# Patient Record
Sex: FEMALE | Race: WHITE | URBAN - METROPOLITAN AREA
[De-identification: names, ages, dates, MRNs, and addresses within clinical notes are randomized per-mention and may not be internally consistent; named-entity substitution may affect disease eponyms.]

---

## 2017-06-05 ENCOUNTER — INPATIENT (INPATIENT)
Facility: HOSPITAL | Age: 82
LOS: 14 days | Discharge: HOME HEALTH | End: 2017-06-20
Attending: PHYSICAL MEDICINE & REHABILITATION

## 2017-06-05 DIAGNOSIS — J44.9 CHRONIC OBSTRUCTIVE PULMONARY DISEASE, UNSPECIFIED: ICD-10-CM

## 2017-06-05 DIAGNOSIS — E03.9 HYPOTHYROIDISM, UNSPECIFIED: ICD-10-CM

## 2017-06-05 DIAGNOSIS — R06.02 SHORTNESS OF BREATH: ICD-10-CM

## 2017-06-07 PROBLEM — Z00.00 ENCOUNTER FOR PREVENTIVE HEALTH EXAMINATION: Status: ACTIVE | Noted: 2017-06-07
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
## 2017-06-28 DIAGNOSIS — S22.43XD MULTIPLE FRACTURES OF RIBS, BILATERAL, SUBSEQUENT ENCOUNTER FOR FRACTURE WITH ROUTINE HEALING: ICD-10-CM

## 2017-06-28 DIAGNOSIS — S22.49XD MULTIPLE FRACTURES OF RIBS, UNSPECIFIED SIDE, SUBSEQUENT ENCOUNTER FOR FRACTURE WITH ROUTINE HEALING: ICD-10-CM

## 2017-06-28 DIAGNOSIS — S01.01XD LACERATION WITHOUT FOREIGN BODY OF SCALP, SUBSEQUENT ENCOUNTER: ICD-10-CM

## 2017-06-28 DIAGNOSIS — S32.009D UNSPECIFIED FRACTURE OF UNSPECIFIED LUMBAR VERTEBRA, SUBSEQUENT ENCOUNTER FOR FRACTURE WITH ROUTINE HEALING: ICD-10-CM

## 2017-06-28 DIAGNOSIS — Z90.710 ACQUIRED ABSENCE OF BOTH CERVIX AND UTERUS: ICD-10-CM

## 2017-06-28 DIAGNOSIS — B35.1 TINEA UNGUIUM: ICD-10-CM

## 2017-06-28 DIAGNOSIS — Z87.01 PERSONAL HISTORY OF PNEUMONIA (RECURRENT): ICD-10-CM

## 2017-06-28 DIAGNOSIS — S42.002D FRACTURE OF UNSPECIFIED PART OF LEFT CLAVICLE, SUBSEQUENT ENCOUNTER FOR FRACTURE WITH ROUTINE HEALING: ICD-10-CM

## 2017-06-28 DIAGNOSIS — Z96.0 PRESENCE OF UROGENITAL IMPLANTS: ICD-10-CM

## 2017-06-28 DIAGNOSIS — J96.12 CHRONIC RESPIRATORY FAILURE WITH HYPERCAPNIA: ICD-10-CM

## 2017-06-28 DIAGNOSIS — I82.4Z3 ACUTE EMBOLISM AND THROMBOSIS OF UNSPECIFIED DEEP VEINS OF DISTAL LOWER EXTREMITY, BILATERAL: ICD-10-CM

## 2017-06-28 DIAGNOSIS — J44.9 CHRONIC OBSTRUCTIVE PULMONARY DISEASE, UNSPECIFIED: ICD-10-CM

## 2017-06-28 DIAGNOSIS — I50.9 HEART FAILURE, UNSPECIFIED: ICD-10-CM

## 2017-06-28 DIAGNOSIS — Z51.89 ENCOUNTER FOR OTHER SPECIFIED AFTERCARE: ICD-10-CM

## 2017-06-28 DIAGNOSIS — S00.03XD CONTUSION OF SCALP, SUBSEQUENT ENCOUNTER: ICD-10-CM

## 2017-06-28 DIAGNOSIS — E03.9 HYPOTHYROIDISM, UNSPECIFIED: ICD-10-CM

## 2017-06-28 DIAGNOSIS — Z99.81 DEPENDENCE ON SUPPLEMENTAL OXYGEN: ICD-10-CM

## 2017-06-28 DIAGNOSIS — B95.62 METHICILLIN RESISTANT STAPHYLOCOCCUS AUREUS INFECTION AS THE CAUSE OF DISEASES CLASSIFIED ELSEWHERE: ICD-10-CM

## 2017-06-29 DIAGNOSIS — M85.9 DISORDER OF BONE DENSITY AND STRUCTURE, UNSPECIFIED: ICD-10-CM

## 2017-06-29 DIAGNOSIS — S22.059D UNSPECIFIED FRACTURE OF T5-T6 VERTEBRA, SUBSEQUENT ENCOUNTER FOR FRACTURE WITH ROUTINE HEALING: ICD-10-CM

## 2017-06-29 DIAGNOSIS — S22.052D: ICD-10-CM

## 2017-06-29 DIAGNOSIS — S22.051D: ICD-10-CM

## 2017-09-13 ENCOUNTER — OUTPATIENT (OUTPATIENT)
Dept: OUTPATIENT SERVICES | Facility: HOSPITAL | Age: 82
LOS: 1 days | Discharge: HOME | End: 2017-09-13

## 2017-09-13 DIAGNOSIS — J44.9 CHRONIC OBSTRUCTIVE PULMONARY DISEASE, UNSPECIFIED: ICD-10-CM

## 2017-09-13 DIAGNOSIS — E03.9 HYPOTHYROIDISM, UNSPECIFIED: ICD-10-CM

## 2017-09-13 DIAGNOSIS — R06.02 SHORTNESS OF BREATH: ICD-10-CM

## 2017-09-13 DIAGNOSIS — R13.10 DYSPHAGIA, UNSPECIFIED: ICD-10-CM

## 2019-03-08 ENCOUNTER — OUTPATIENT (OUTPATIENT)
Dept: OUTPATIENT SERVICES | Facility: HOSPITAL | Age: 84
LOS: 1 days | Discharge: HOME | End: 2019-03-08

## 2019-03-08 DIAGNOSIS — R91.8 OTHER NONSPECIFIC ABNORMAL FINDING OF LUNG FIELD: ICD-10-CM

## 2019-08-23 ENCOUNTER — OUTPATIENT (OUTPATIENT)
Dept: OUTPATIENT SERVICES | Facility: HOSPITAL | Age: 84
LOS: 1 days | Discharge: HOME | End: 2019-08-23
Payer: MEDICARE

## 2019-08-23 DIAGNOSIS — J44.9 CHRONIC OBSTRUCTIVE PULMONARY DISEASE, UNSPECIFIED: ICD-10-CM

## 2019-08-23 PROCEDURE — 71046 X-RAY EXAM CHEST 2 VIEWS: CPT | Mod: 26

## 2020-08-12 ENCOUNTER — INPATIENT (INPATIENT)
Facility: HOSPITAL | Age: 85
LOS: 4 days | Discharge: NURSING, REHAB OR LONG-TERM CARE | End: 2020-08-17
Attending: INTERNAL MEDICINE | Admitting: INTERNAL MEDICINE
Payer: MEDICARE

## 2020-08-12 VITALS
SYSTOLIC BLOOD PRESSURE: 189 MMHG | OXYGEN SATURATION: 95 % | HEART RATE: 92 BPM | DIASTOLIC BLOOD PRESSURE: 76 MMHG | TEMPERATURE: 99 F | RESPIRATION RATE: 18 BRPM

## 2020-08-12 LAB
ALBUMIN SERPL ELPH-MCNC: 4.3 G/DL — SIGNIFICANT CHANGE UP (ref 3.5–5.2)
ALP SERPL-CCNC: 144 U/L — HIGH (ref 30–115)
ALT FLD-CCNC: 80 U/L — HIGH (ref 0–41)
ANION GAP SERPL CALC-SCNC: 12 MMOL/L — SIGNIFICANT CHANGE UP (ref 7–14)
APTT BLD: 26 SEC — LOW (ref 27–39.2)
AST SERPL-CCNC: 103 U/L — HIGH (ref 0–41)
BASOPHILS # BLD AUTO: 0.04 K/UL — SIGNIFICANT CHANGE UP (ref 0–0.2)
BASOPHILS NFR BLD AUTO: 0.7 % — SIGNIFICANT CHANGE UP (ref 0–1)
BILIRUB SERPL-MCNC: 0.3 MG/DL — SIGNIFICANT CHANGE UP (ref 0.2–1.2)
BLD GP AB SCN SERPL QL: SIGNIFICANT CHANGE UP
BUN SERPL-MCNC: 25 MG/DL — HIGH (ref 10–20)
CALCIUM SERPL-MCNC: 9.8 MG/DL — SIGNIFICANT CHANGE UP (ref 8.5–10.1)
CHLORIDE SERPL-SCNC: 98 MMOL/L — SIGNIFICANT CHANGE UP (ref 98–110)
CK SERPL-CCNC: 208 U/L — SIGNIFICANT CHANGE UP (ref 0–225)
CO2 SERPL-SCNC: 30 MMOL/L — SIGNIFICANT CHANGE UP (ref 17–32)
CREAT SERPL-MCNC: 0.9 MG/DL — SIGNIFICANT CHANGE UP (ref 0.7–1.5)
EOSINOPHIL # BLD AUTO: 0.45 K/UL — SIGNIFICANT CHANGE UP (ref 0–0.7)
EOSINOPHIL NFR BLD AUTO: 7.4 % — SIGNIFICANT CHANGE UP (ref 0–8)
ETHANOL SERPL-MCNC: <10 MG/DL — SIGNIFICANT CHANGE UP
GLUCOSE BLDC GLUCOMTR-MCNC: 136 MG/DL — HIGH (ref 70–99)
GLUCOSE SERPL-MCNC: 94 MG/DL — SIGNIFICANT CHANGE UP (ref 70–99)
HCT VFR BLD CALC: 35.5 % — LOW (ref 37–47)
HGB BLD-MCNC: 10.7 G/DL — LOW (ref 12–16)
IMM GRANULOCYTES NFR BLD AUTO: 0.5 % — HIGH (ref 0.1–0.3)
INR BLD: 0.97 RATIO — SIGNIFICANT CHANGE UP (ref 0.65–1.3)
LACTATE SERPL-SCNC: 1.8 MMOL/L — SIGNIFICANT CHANGE UP (ref 0.7–2)
LIDOCAIN IGE QN: 30 U/L — SIGNIFICANT CHANGE UP (ref 7–60)
LYMPHOCYTES # BLD AUTO: 2.13 K/UL — SIGNIFICANT CHANGE UP (ref 1.2–3.4)
LYMPHOCYTES # BLD AUTO: 35.2 % — SIGNIFICANT CHANGE UP (ref 20.5–51.1)
MCHC RBC-ENTMCNC: 30.1 G/DL — LOW (ref 32–37)
MCHC RBC-ENTMCNC: 30.6 PG — SIGNIFICANT CHANGE UP (ref 27–31)
MCV RBC AUTO: 101.4 FL — HIGH (ref 81–99)
MONOCYTES # BLD AUTO: 0.68 K/UL — HIGH (ref 0.1–0.6)
MONOCYTES NFR BLD AUTO: 11.2 % — HIGH (ref 1.7–9.3)
NEUTROPHILS # BLD AUTO: 2.72 K/UL — SIGNIFICANT CHANGE UP (ref 1.4–6.5)
NEUTROPHILS NFR BLD AUTO: 45 % — SIGNIFICANT CHANGE UP (ref 42.2–75.2)
NRBC # BLD: 0 /100 WBCS — SIGNIFICANT CHANGE UP (ref 0–0)
PLATELET # BLD AUTO: 162 K/UL — SIGNIFICANT CHANGE UP (ref 130–400)
POTASSIUM SERPL-MCNC: 5.3 MMOL/L — HIGH (ref 3.5–5)
POTASSIUM SERPL-SCNC: 5.3 MMOL/L — HIGH (ref 3.5–5)
PROT SERPL-MCNC: 6.6 G/DL — SIGNIFICANT CHANGE UP (ref 6–8)
PROTHROM AB SERPL-ACNC: 11.2 SEC — SIGNIFICANT CHANGE UP (ref 9.95–12.87)
RBC # BLD: 3.5 M/UL — LOW (ref 4.2–5.4)
RBC # FLD: 13.8 % — SIGNIFICANT CHANGE UP (ref 11.5–14.5)
SODIUM SERPL-SCNC: 140 MMOL/L — SIGNIFICANT CHANGE UP (ref 135–146)
WBC # BLD: 6.05 K/UL — SIGNIFICANT CHANGE UP (ref 4.8–10.8)
WBC # FLD AUTO: 6.05 K/UL — SIGNIFICANT CHANGE UP (ref 4.8–10.8)

## 2020-08-12 PROCEDURE — 71045 X-RAY EXAM CHEST 1 VIEW: CPT | Mod: 26

## 2020-08-12 PROCEDURE — 93010 ELECTROCARDIOGRAM REPORT: CPT

## 2020-08-12 PROCEDURE — 74177 CT ABD & PELVIS W/CONTRAST: CPT | Mod: 26

## 2020-08-12 PROCEDURE — 73562 X-RAY EXAM OF KNEE 3: CPT | Mod: 26,LT

## 2020-08-12 PROCEDURE — 71260 CT THORAX DX C+: CPT | Mod: 26

## 2020-08-12 PROCEDURE — 73552 X-RAY EXAM OF FEMUR 2/>: CPT | Mod: 26,LT

## 2020-08-12 PROCEDURE — 99285 EMERGENCY DEPT VISIT HI MDM: CPT | Mod: CS

## 2020-08-12 PROCEDURE — 73502 X-RAY EXAM HIP UNI 2-3 VIEWS: CPT | Mod: 26,LT

## 2020-08-12 RX ORDER — PANTOPRAZOLE SODIUM 20 MG/1
40 TABLET, DELAYED RELEASE ORAL
Refills: 0 | Status: DISCONTINUED | OUTPATIENT
Start: 2020-08-12 | End: 2020-08-13

## 2020-08-12 RX ORDER — SENNA PLUS 8.6 MG/1
2 TABLET ORAL AT BEDTIME
Refills: 0 | Status: DISCONTINUED | OUTPATIENT
Start: 2020-08-12 | End: 2020-08-13

## 2020-08-12 RX ORDER — ACETAMINOPHEN 500 MG
650 TABLET ORAL ONCE
Refills: 0 | Status: COMPLETED | OUTPATIENT
Start: 2020-08-12 | End: 2020-08-12

## 2020-08-12 RX ORDER — SODIUM CHLORIDE 9 MG/ML
1000 INJECTION, SOLUTION INTRAVENOUS
Refills: 0 | Status: DISCONTINUED | OUTPATIENT
Start: 2020-08-12 | End: 2020-08-13

## 2020-08-12 RX ORDER — MORPHINE SULFATE 50 MG/1
4 CAPSULE, EXTENDED RELEASE ORAL ONCE
Refills: 0 | Status: DISCONTINUED | OUTPATIENT
Start: 2020-08-12 | End: 2020-08-12

## 2020-08-12 RX ORDER — HEPARIN SODIUM 5000 [USP'U]/ML
5000 INJECTION INTRAVENOUS; SUBCUTANEOUS EVERY 8 HOURS
Refills: 0 | Status: DISCONTINUED | OUTPATIENT
Start: 2020-08-12 | End: 2020-08-13

## 2020-08-12 RX ORDER — SODIUM CHLORIDE 9 MG/ML
500 INJECTION INTRAMUSCULAR; INTRAVENOUS; SUBCUTANEOUS ONCE
Refills: 0 | Status: COMPLETED | OUTPATIENT
Start: 2020-08-12 | End: 2020-08-12

## 2020-08-12 RX ORDER — ONDANSETRON 8 MG/1
4 TABLET, FILM COATED ORAL ONCE
Refills: 0 | Status: COMPLETED | OUTPATIENT
Start: 2020-08-12 | End: 2020-08-12

## 2020-08-12 RX ORDER — ACETAMINOPHEN 500 MG
650 TABLET ORAL EVERY 6 HOURS
Refills: 0 | Status: DISCONTINUED | OUTPATIENT
Start: 2020-08-12 | End: 2020-08-13

## 2020-08-12 RX ADMIN — HEPARIN SODIUM 5000 UNIT(S): 5000 INJECTION INTRAVENOUS; SUBCUTANEOUS at 23:05

## 2020-08-12 RX ADMIN — SODIUM CHLORIDE 500 MILLILITER(S): 9 INJECTION INTRAMUSCULAR; INTRAVENOUS; SUBCUTANEOUS at 14:58

## 2020-08-12 RX ADMIN — MORPHINE SULFATE 4 MILLIGRAM(S): 50 CAPSULE, EXTENDED RELEASE ORAL at 20:10

## 2020-08-12 RX ADMIN — MORPHINE SULFATE 4 MILLIGRAM(S): 50 CAPSULE, EXTENDED RELEASE ORAL at 18:40

## 2020-08-12 RX ADMIN — SODIUM CHLORIDE 75 MILLILITER(S): 9 INJECTION, SOLUTION INTRAVENOUS at 23:05

## 2020-08-12 RX ADMIN — Medication 650 MILLIGRAM(S): at 14:58

## 2020-08-12 RX ADMIN — SODIUM CHLORIDE 500 MILLILITER(S): 9 INJECTION INTRAMUSCULAR; INTRAVENOUS; SUBCUTANEOUS at 15:30

## 2020-08-12 RX ADMIN — MORPHINE SULFATE 4 MILLIGRAM(S): 50 CAPSULE, EXTENDED RELEASE ORAL at 18:55

## 2020-08-12 RX ADMIN — ONDANSETRON 4 MILLIGRAM(S): 8 TABLET, FILM COATED ORAL at 20:09

## 2020-08-12 RX ADMIN — Medication 650 MILLIGRAM(S): at 15:30

## 2020-08-12 NOTE — CONSULT NOTE ADULT - SUBJECTIVE AND OBJECTIVE BOX
TRAUMA SERVICE CONSULT NOTE  --------------------------------------------------------------------------------------------    TRAUMA ACTIVATION LEVEL: CONSULT    MECHANISM OF INJURY:      [X] Blunt  	[] MVC	[X] Fall	[] Pedestrian Struck	[] Motorcycle accident      [] Penetrating  	[] Gun Shot Wound 		[] Stab Wound    GCS: 15	E: 4	V: 5	M: 6    HPI: 89F DNR/DNI PMH COPD on 3-4L NC, hypothyroidism, lives at home with her son, ADLs intact presenting to the ED s/p mechanical fall, -HT, -LOC, -AC. Per the patient she had gotten up to check on her albuterol treatment, started to sit back down on her recliner but missed, falling on her left side. She denies LOC, HT. She was unable to ambulate after the fall and complains of L hip pain. No obvious external signs of trauma on initial examination.     Primary Survey:   A - airway intact  B - bilateral breath sounds and good chest rise  C - initial BP  BP: 189/76 (08-12-20 @ 14:16) , HR HR: 92 (08-12-20 @ 14:16), palpable pulses in all extremities  D - GCS 15 on arrival  Exposure obtained      General: NAD  HEENT: Normocephalic, atraumatic, EOMI, PEERLA.  Neck: Soft, midline trachea.  Chest: No chest wall tenderness.   Cardiac: S1, S2, RRR  Respiratory: Bilateral breath sounds, clear and equal bilaterally  Abdomen: Soft, non-distended, non-tender, no rebound, no guarding, no masses palpated  Groin: Normal appearing  Ext: palp radial b/l UE, b/l DP palp in Lower Extrem, motor and sensory grossly intact in all 4 extremities, L hip tenderness  Back: no TTP, no palpable runoff/stepoff/deformity    Patient denies fevers/chills, denies lightheadedness/dizziness, denies SOB/chest pain, denies nausea/vomiting, denies constipation/diarrhea    ROS: 10-system review is otherwise negative except HPI above.      PAST MEDICAL & SURGICAL HISTORY:  Hypothyroid  COPD (chronic obstructive pulmonary disease)    FAMILY HISTORY:    [] Family history not pertinent as reviewed with the patient and family    SOCIAL HISTORY:  ***    ALLERGIES: No Known Allergies      HOME MEDICATIONS: ***    CURRENT MEDICATIONS  MEDICATIONS (STANDING):   MEDICATIONS (PRN):  --------------------------------------------------------------------------------------------    Vitals:   T(C): 37 (08-12-20 @ 14:16), Max: 37 (08-12-20 @ 14:16)  HR: 92 (08-12-20 @ 14:16) (92 - 92)  BP: 189/76 (08-12-20 @ 14:16) (189/76 - 189/76)  RR: 18 (08-12-20 @ 14:16) (18 - 18)  SpO2: 95% (08-12-20 @ 14:16) (95% - 95%)  CAPILLARY BLOOD GLUCOSE      --------------------------------------------------------------------------------------------    LABS  CBC (08-12 @ 14:32)                              10.7<L>                         6.05    )----------------(  162        45.0  % Neutrophils, 35.2  % Lymphocytes, ANC: 2.72                                35.5<L>    BMP (08-12 @ 14:32)             140     |  98      |  25<H> 		Ca++ --      Ca 9.8                ---------------------------------( 94    		Mg --                 5.3<H>  |  30      |  0.9   			Ph --        LFTs (08-12 @ 14:32)      TPro 6.6 / Alb 4.3 / TBili 0.3 / DBili -- / <H> / ALT 80<H> / AlkPhos 144<H>    Coags (08-12 @ 14:32)  aPTT 26.0<L> / INR 0.97 / PT 11.20    Cardiac Markers (08-12 @ 14:32)     HSTrop: -- / CKMB: -- / CK: 208    ABG (08-12 @ 14:32)      /  /  /  /  / %     Lactate:  1.8    --------------------------------------------------------------------------------------------    IMAGING  < from: Xray Chest 1 View-PORTABLE IMMEDIATE (08.12.20 @ 15:07) >  Impression:    No radiographic evidence of acute cardiopulmonary disease.    < end of copied text >  < from: Xray Knee 3 Views, Left (08.12.20 @ 15:07) >  Impression:  No fracture or dislocation is seen.    < end of copied text >      --------------------------------------------------------------------------------------------

## 2020-08-12 NOTE — ED PROVIDER NOTE - NS ED ROS FT
Review of Systems         Constitutional: (-) fever (-) chills (-) weakness       EENT: (-) visual changes (-) sore throat (-) congestion       Cardiovascular: (-) chest pain (-) syncope       Respiratory: (-) cough, (-) shortness of breath       Gastrointestinal: (-) abdominal pain (-) vomiting (-) diarrhea (-) nausea (-) constipation       Genitourinary: (-) dysuria (-) frequency (-) hematuria       Musculoskeletal: (-) neck pain (-) back pain (+) joint pain       Integumentary: (-) rash       Neurological: (-) headache (-) altered mental status (-) dizziness (-) paresthesias       Psych: (-) psych history

## 2020-08-12 NOTE — CONSULT NOTE ADULT - SUBJECTIVE AND OBJECTIVE BOX
SICU Consultation Note  ======================================================================================================  TANNA MCCRACKEN  [id number]    89y Female w/ PMHx of COPD on 3-4L of O2 at home, hypothyroidism, lives at home w/ her son (Andrzej), ADLs intact. Patient presents to the ED today s/p mechanical fall, - HT, -LOC, -AC. As per the patient, she got up out of her recliner to check on her albuterol treatment, went back to sit in her recliner and missed falling onto her L side. Patient was unable to ambulate after fall. Patient denies numbness/tingling or radiating pain. LLE shortened and externally rotated. Further imaging showed R posterior 10-11 rib fx and a age indeterminant T5 vertebral body compression fx.       Imaging:   CT Chest w/ IV Cont (08.12.20 @ 18:58)   IMPRESSION:  1.  Acute left sided comminuted intertrochanteric fracture with moderate surrounding hematoma.  2.  Acute right posterior 10th and 11th ribfractures.  3.  Age indeterminant T5 vertebral body compression deformity.  4.  Additional findings as detailed above.        PMH  PAST MEDICAL & SURGICAL HISTORY:  Hypothyroid  COPD (chronic obstructive pulmonary disease)      Allergies   No Known Allergies    Current Medications:   acetaminophen   Tablet .. 650 milliGRAM(s) Oral every 6 hours PRN Mild Pain (1 - 3)  heparin   Injectable 5000 Unit(s) SubCutaneous every 8 hours  lactated ringers. 1000 milliLiter(s) (75 mL/Hr) IV Continuous <Continuous>  pantoprazole    Tablet 40 milliGRAM(s) Oral before breakfast  senna 2 Tablet(s) Oral at bedtime      Advanced Directives: DNR/DNI     ICU Vital Signs Last 24 Hrs  T(F): 98.6 Max: 98.6   HR: 85  BP: 140/74   RR: 18   SpO2: 95%     Physical Exam:   ---------------------------------------------------------------------------------------  NEURO:   Exam: A&Ox3, no focal deficits    RESPIRATORY:  Lungs clear to auscultation b/l, Normal expansion/effort    CARDIOVASCULAR:   S1/S2.  RRR  No peripheral edema    GASTROINTESTINAL:  Abdomen soft, non-tender, non-distended      MUSCULOSKELETAL:  Extremities warm, pink, well-perfused. LLE shortened, externally rotated     DERM:  No skin breakdown     :   Exam: Tobar catheter in place.       Tubes/Lines/Drains   ----------------------------------------------------------------------------------------------------------  [x] Peripheral IV  [X] Urinary Catheter Tobar SICU Consultation Note  ======================================================================================================  TANNA MCCRACKEN  [id number]    89y Female w/ PMHx of COPD on 3-4L of O2 at home, hypothyroidism, lives at home w/ her son (Andrzej), ADLs intact. Patient presents to the ED today s/p mechanical fall, - HT, -LOC, -AC. As per the patient, she got up out of her recliner to check on her albuterol treatment, went back to sit in her recliner and missed falling onto her L side. Patient was unable to ambulate after fall. Patient denies numbness/tingling or radiating pain. LLE shortened and externally rotated. Further imaging showed R posterior 10-11 rib fx and a age indeterminant T5 vertebral body compression fx.       Imaging:   CT Chest w/ IV Cont (08.12.20 @ 18:58)   IMPRESSION:  1.  Acute left sided comminuted intertrochanteric fracture with moderate surrounding hematoma.  2.  Acute right posterior 10th and 11th ribfractures.  3.  Age indeterminant T5 vertebral body compression deformity.  4.  Additional findings as detailed above.        PMH  PAST MEDICAL & SURGICAL HISTORY:  Hypothyroid  COPD (chronic obstructive pulmonary disease)      Allergies   No Known Allergies    Current Medications:   acetaminophen   Tablet .. 650 milliGRAM(s) Oral every 6 hours PRN Mild Pain (1 - 3)  heparin   Injectable 5000 Unit(s) SubCutaneous every 8 hours  lactated ringers. 1000 milliLiter(s) (75 mL/Hr) IV Continuous <Continuous>  pantoprazole    Tablet 40 milliGRAM(s) Oral before breakfast  senna 2 Tablet(s) Oral at bedtime      Advanced Directives: Full code     ICU Vital Signs Last 24 Hrs  T(F): 98.6 Max: 98.6   HR: 85  BP: 140/74   RR: 18   SpO2: 95%     Physical Exam:   ---------------------------------------------------------------------------------------  NEURO:   Exam: A&Ox3, no focal deficits    RESPIRATORY:  Lungs clear to auscultation b/l, Normal expansion/effort    CARDIOVASCULAR:   S1/S2.  RRR  No peripheral edema    GASTROINTESTINAL:  Abdomen soft, non-tender, non-distended      MUSCULOSKELETAL:  Extremities warm, pink, well-perfused. LLE shortened, externally rotated     DERM:  No skin breakdown     :   Exam: Tobar catheter in place.       Tubes/Lines/Drains   ----------------------------------------------------------------------------------------------------------  [x] Peripheral IV  [X] Urinary Catheter Tobar

## 2020-08-12 NOTE — ED PROVIDER NOTE - ATTENDING CONTRIBUTION TO CARE
89 yr old f w/ a pmh significant for COPD and hypothyrodism who presents today s/p fall. As per pt she was attempting to sit down on a chair, when she fell and landed on her L hip pain. Denies any other complaints.     Review of Systems    Constitutional: (-) fever  Cardiovascular: (-) chest pain, (-) syncope  Respiratory: (-) cough, (-) shortness of breath  Gastrointestinal: (-) vomiting, (-) diarrhea, (-) abdominal pain  Musculoskeletal: (-) neck pain, (-) back pain, (-) joint pain  Integumentary: (-) rash, (-) edema  Neurological: (-) headache, (-) altered mental status    Except as documented in the HPI, all other systems are negative.    VITAL SIGNS: I have reviewed nursing notes and confirm.  CONSTITUTIONAL: non-toxic, well appearing  SKIN: no rash, no petechiae.  EYES: PERRL, EOMI, pink conjunctiva, anicteric  ENT: tongue midline, no exudates, MMM  NECK: Supple; no meningismus, no JVD  CARD: RRR, no murmurs, equal radial pulses bilaterally 2+  RESP: CTAB, no respiratory distress  ABD: Soft, non-tender, non-distended, no peritoneal signs, no HSM, no CVA tenderness  EXT: unable to range L hip or knee. pulses DP +2 bilaterally. sensation noted bilaterally.   NEURO: Alert, oriented. CN2-12 intact, equal strength bilaterally, nl gait.  PSYCH: Cooperative, appropriate.      a/p  89 yr old f that presents s/p fall concern for L hip fracture  -ortho and trauma aware  -labs  -imaging  -consider admission

## 2020-08-12 NOTE — ED PROVIDER NOTE - PHYSICAL EXAMINATION
Physical Exam    Vital Signs: I have reviewed the initial vital signs  Constitutional: well-nourished, appears stated age, no acute distress  EENT: Conjunctiva pink, Sclera clear, PERRLA, EOMI. Mucous membranes moist, no exudates or lesions noted, uvula midline.  Cardiovascular: S1 and S2 present, regular rate, regular rhythm. Well perfused extremities, no peripheral edema  Respiratory: unlabored respiratory effort, clear to auscultation bilaterally no wheezing, rales or rhonchi  Gastrointestinal: soft, non-tender abdomen. No guarding or rebound tenderness  Musculoskeletal: supple nontender neck, no midline tenderness. Left DP 2 +, sensation intact, guarded. no lrom in left hip and knee. from in left ankle. no ttp with rib compression   Integumentary: warm, dry, no rash  Neurologic: A & O x 3, CN II-XII grossly intact, all extremities’ motor and sensory functions grossly intact  Psychiatric: appropriate mood, appropriate affect

## 2020-08-12 NOTE — ED ADULT NURSE NOTE - OBJECTIVE STATEMENT
Patient c/o fall this afternoon at home. denies LOC, denies AC, denies head injury. Patient c/o left hip pain. On assessment left hip externally rotated, +pulses present.

## 2020-08-12 NOTE — H&P ADULT - ASSESSMENT
89F DNR/DNI PMH COPD on 3-4L NC, hypothyroidism, lives at home with her son, ADLs intact presenting to the ED s/p mechanical fall, -HT, -LOC, -AC.    Plan:   Admit to trauma  SICU consult   NPO   IVF  Ortho consult, weight bearing status, possible operative intervention   PT/Rehab consult

## 2020-08-12 NOTE — CONSULT NOTE ADULT - ASSESSMENT
ASSESSMENT: 89F DNR/DNI PMH COPD on 3-4L NC, hypothyroidism, lives at home with her son, ADLs intact presenting to the ED s/p mechanical fall, -HT, -LOC, -AC.    PLAN:  ***  - CT CAP  - will follow  -   -   - Patient seen/examined with attending.  - Plan to be discussed with Attending,

## 2020-08-12 NOTE — H&P ADULT - NSHPPHYSICALEXAM_GEN_ALL_CORE
General: NAD  HEENT: Normocephalic, atraumatic, EOMI, PEERLA.  Neck: Soft, midline trachea.  Chest: No chest wall tenderness.   Cardiac: S1, S2, RRR  Respiratory: Bilateral breath sounds, clear and equal bilaterally  Abdomen: Soft, non-distended, non-tender, no rebound, no guarding, no masses palpated  Groin: Normal appearing  Ext: palp radial b/l UE, b/l DP palp in Lower Extrem, motor and sensory grossly intact in all 4 extremities, L hip tenderness  Back: no TTP, no palpable runoff/stepoff/deformity

## 2020-08-12 NOTE — ED PROVIDER NOTE - OBJECTIVE STATEMENT
89 year old female hx copd on 3 l, hypothyroidism p/w fall. PAtient sliopped off chair and fell onto left hip, unable to ambulate afterwards. Left hip pain severe, constant, no pall/prov factors, unchanged. No numbness, weakness, paresthesias. No head trauma, loc, bloodthinners. No chest pain, abd pain, shortness of breath, dysuria, hematuria.

## 2020-08-12 NOTE — ED PROVIDER NOTE - CARE PLAN
Principal Discharge DX:	Hip fracture, left, closed, initial encounter  Secondary Diagnosis:	Rib fractures  Secondary Diagnosis:	Thoracic compression fracture, closed, initial encounter  Secondary Diagnosis:	Fall

## 2020-08-12 NOTE — ED PROVIDER NOTE - CLINICAL SUMMARY MEDICAL DECISION MAKING FREE TEXT BOX
Authored by Dr. Gala Hahn: pt s/o to me by Dr. Phoenix - 89f h/o copd on home O2, hypothyroidism presenting s/p fall, trauma alert was activated on arrival pt s/o to me pending imaging and consultant recommendations - imaging showing R hip & rib fxs, age indeterminant thoracic compression fx - ortho consulted, no plans for surgical intvn @ this time - admitted to Trauma Dr. Stearns for further mgmt Authored by Dr. Gala Hahn: pt s/o to me by Dr. Phoenix - 89f h/o copd on home O2, hypothyroidism presenting s/p mechanical fall (missed seat when trying to sit back down into recliner, no head trauma/loc), trauma alert was activated on arrival, pt s/o to me pending imaging and consultant recommendations - imaging showing R hip & rib fxs, age indeterminant thoracic compression fx - ortho consulted, no plans for surgical intvn @ this time - admitted to Trauma Dr. Stearns for further mgmt

## 2020-08-12 NOTE — CONSULT NOTE ADULT - ASSESSMENT
Assessment & Plan    89y Female  s/p     NEURO:    Pain-controlled with Tylenol     Monitor for changes in mental status     RESP:     R posterior 10-11 rib fractures. Encourage IS pulling 500cc     Hx of COPD. ON 3-4L NC at home. Accepting O2 sats >88%     AM CXR       CARDS:     Maintain normotension. MAP >65     Troponin x3 pending     EKG NSR        GI/NUTR:     Diet, NPO for OR tomorrow     GI Prophylaxis- Protonix     Bowel regimen- Senna     /RENAL:     Tobar in place. Monitor I&Os.     Monitor lytes, replete as needed     Trend BUN/Cr 25/0.9    LR @ 75cc/hr     HEME/ONC:     DVT prophylaxis- HSQ     Trend H/H     Type and Screen     2 units of PRBC on hold for OR tomorrow     ID:    No dx. Afebrile. Trend WBC         ENDO:    Hx of Hypothyroidism.     FS Q 6 hours while NPO     MSK:     L intertrochanteric hip fracture. OR tomorrow with Ortho     Hospitalist consulted at Ortho request for risk stratification     NWB LLE     LINES/DRAINS:  Ekaterina JOSEPH     Have to call tanja Donnelly to clarify medication list and DNR/DNI status **     DISPO:    CEU/Step Down Unit     D/W Dr Torres

## 2020-08-12 NOTE — ED PROVIDER NOTE - PROGRESS NOTE DETAILS
spoke with ortho and trauma- recs for ct a/p and chest seen by ortho, no plans for surgical intvn @ this time - d/w trauma, admit to Dr. Lund's svc

## 2020-08-12 NOTE — ED ADULT TRIAGE NOTE - CHIEF COMPLAINT QUOTE
S/p fall while trying to sit in recliner. Denies head trauma. C/o left groin pain. Pt takes aspirin.

## 2020-08-12 NOTE — H&P ADULT - HISTORY OF PRESENT ILLNESS
89F DNR/DNI PMH COPD on 3-4L NC, hypothyroidism, lives at home with her son, ADLs intact presenting to the ED s/p mechanical fall, -HT, -LOC, -AC. Per the patient she had gotten up to check on her albuterol treatment, started to sit back down on her recliner but missed, falling on her left side. She denies LOC, HT. She was unable to ambulate after the fall and complains of L hip pain. No obvious external signs of trauma on initial examination.

## 2020-08-12 NOTE — H&P ADULT - NSHPLABSRESULTS_GEN_ALL_CORE
LABS:                         10.7   6.05  )-----------( 162      ( 12 Aug 2020 14:32 )             35.5     08-12    140  |  98  |  25<H>  ----------------------------<  94  5.3<H>   |  30  |  0.9    Ca    9.8      12 Aug 2020 14:32    TPro  6.6  /  Alb  4.3  /  TBili  0.3  /  DBili  x   /  AST  103<H>  /  ALT  80<H>  /  AlkPhos  144<H>  08-12    PT/INR - ( 12 Aug 2020 14:32 )   PT: 11.20 sec;   INR: 0.97 ratio    PTT - ( 12 Aug 2020 14:32 )  PTT:26.0 sec      Lactate, Blood: 1.8 mmol/L (08-12 @ 14:32)      RADIOLOGY, EKG & ADDITIONAL TESTS: Reviewed.   < from: Xray Hip 2-3 Views, Left (08.12.20 @ 15:07) >    IMPRESSION:  Acute left intertrochanteric fracture.  < end of copied text >    < from: CT Chest w/ IV Cont (08.12.20 @ 18:58) >    IMPRESSION:  1.  Acute left sided comminuted intertrochanteric fracture with moderate surrounding hematoma.  2.  Acute right posterior 10th and 11th ribfractures.  3.  Age indeterminant T5 vertebral body compression deformity.  4.  Additional findings as detailed above.    < end of copied text >

## 2020-08-12 NOTE — CONSULT NOTE ADULT - SUBJECTIVE AND OBJECTIVE BOX
Orthopaedics Consult Note    TANNA MCCRACKEN  [id number]    Ph:   (Andrzej, son)      89F presenting after mechanical fall when trying to sit in her recliner earlier today. Reported immediate L hip pain and inability to ambulate.  Imaging shows Left intertrochanteric femur fx.   Baseline independent ambulator without walking assistance.  No head trauma/LOC.  Denies pain or injury elsewhere. Denies numbness/tingling/radiating pain.      PMH/PSH  COPD, hypothyroid, HTN, HLD      Medications      Allergies  No Known Allergies        T(C): 37 (08-12-20 @ 14:16), Max: 37 (08-12-20 @ 14:16)  HR: 92 (08-12-20 @ 14:16) (92 - 92)  BP: 189/76 (08-12-20 @ 14:16) (189/76 - 189/76)  RR: 18 (08-12-20 @ 14:16) (18 - 18)  SpO2: 95% (08-12-20 @ 14:16) (95% - 95%)    Physical Exam  NAD  Breathing comfortably on RA  Resting comfortably    BL UE  skin intact  full painless AROM  nonTTP throughout  Motor intact ax/ain/pin/io  SILT ax/m/r/u    LLE  skin intact  shortened, externally rotated  (+)TTP at hip  NonTTP elsewhere in extremity  Motor: TA/EHL/FHL/Gastroc intact  Sensory: SP/DP/Sorto/Sa intact  Vasc: foot WWP, 2+ DP pulse    RLE  skin intact  No deformity/laceration/abrasion noted  No swelling/erythema/ecchymosis noted  Motor: TA/EHL/FHL/Gastroc intact  Sensory: SP/DP/Sorto/Sa intact  Vasc: foot WWP, 2+ DP pulse    Labs                        10.7   6.05  )-----------( 162      ( 12 Aug 2020 14:32 )             35.5     08-12    140  |  98  |  25<H>  ----------------------------<  94  5.3<H>   |  30  |  0.9    Ca    9.8      12 Aug 2020 14:32    TPro  6.6  /  Alb  4.3  /  TBili  0.3  /  DBili  x   /  AST  103<H>  /  ALT  80<H>  /  AlkPhos  144<H>  08-12    LIVER FUNCTIONS - ( 12 Aug 2020 14:32 )  Alb: 4.3 g/dL / Pro: 6.6 g/dL / ALK PHOS: 144 U/L / ALT: 80 U/L / AST: 103 U/L / GGT: x           PT/INR - ( 12 Aug 2020 14:32 )   PT: 11.20 sec;   INR: 0.97 ratio         PTT - ( 12 Aug 2020 14:32 )  PTT:26.0 sec    Img  Left intertrochanteric femur fracture    A/P: Patient is a 89y year old Female with Left intertrochanteric femur fracture    -Admit to medicine  -Nonweightbearing to LLE  -Counseled patient that surgical fixation is necessary in order to resume function and improve pain.  RBCA discussed at length and patient/family amenable to plan. Consent obtained, patient added on to OR schedule for Left hip ORIF for tomorrow 8/13/2020  -Diet OK today, please make NPO at midnight  - please obtain preop labs/studies (CBC, CMP, T&S, Coags, EKG/CXR)  - rapid Covid test  - 2u pRBC on hold for OR  - DVT ppx (give stat dose of chem dvt ppx tonight, hold in AM), SCDs  -Pain control PRN  -Home medications to be resumed  - will need clearance/risk stratification note from primary team prior to proceeding with surgery, with note signed by medicine attending  - Ortho to follow  - remainder of care per primary team

## 2020-08-13 DIAGNOSIS — S22.000A WEDGE COMPRESSION FRACTURE OF UNSPECIFIED THORACIC VERTEBRA, INITIAL ENCOUNTER FOR CLOSED FRACTURE: ICD-10-CM

## 2020-08-13 LAB
ANION GAP SERPL CALC-SCNC: 10 MMOL/L — SIGNIFICANT CHANGE UP (ref 7–14)
ANION GAP SERPL CALC-SCNC: 5 MMOL/L — LOW (ref 7–14)
ANION GAP SERPL CALC-SCNC: 7 MMOL/L — SIGNIFICANT CHANGE UP (ref 7–14)
APPEARANCE UR: CLEAR — SIGNIFICANT CHANGE UP
APTT BLD: 29.4 SEC — SIGNIFICANT CHANGE UP (ref 27–39.2)
APTT BLD: 32.6 SEC — SIGNIFICANT CHANGE UP (ref 27–39.2)
BACTERIA # UR AUTO: NEGATIVE — SIGNIFICANT CHANGE UP
BASE EXCESS BLDA CALC-SCNC: 9.3 MMOL/L — HIGH (ref -2–2)
BILIRUB UR-MCNC: NEGATIVE — SIGNIFICANT CHANGE UP
BUN SERPL-MCNC: 18 MG/DL — SIGNIFICANT CHANGE UP (ref 10–20)
BUN SERPL-MCNC: 21 MG/DL — HIGH (ref 10–20)
BUN SERPL-MCNC: 22 MG/DL — HIGH (ref 10–20)
CALCIUM SERPL-MCNC: 8.3 MG/DL — LOW (ref 8.5–10.1)
CALCIUM SERPL-MCNC: 8.7 MG/DL — SIGNIFICANT CHANGE UP (ref 8.5–10.1)
CALCIUM SERPL-MCNC: 9.3 MG/DL — SIGNIFICANT CHANGE UP (ref 8.5–10.1)
CHLORIDE SERPL-SCNC: 100 MMOL/L — SIGNIFICANT CHANGE UP (ref 98–110)
CHLORIDE SERPL-SCNC: 102 MMOL/L — SIGNIFICANT CHANGE UP (ref 98–110)
CHLORIDE SERPL-SCNC: 98 MMOL/L — SIGNIFICANT CHANGE UP (ref 98–110)
CK MB CFR SERPL CALC: 4.3 NG/ML — SIGNIFICANT CHANGE UP (ref 0.6–6.3)
CK MB CFR SERPL CALC: 4.6 NG/ML — SIGNIFICANT CHANGE UP (ref 0.6–6.3)
CK MB CFR SERPL CALC: 5 NG/ML — SIGNIFICANT CHANGE UP (ref 0.6–6.3)
CK SERPL-CCNC: 113 U/L — SIGNIFICANT CHANGE UP (ref 0–225)
CK SERPL-CCNC: 126 U/L — SIGNIFICANT CHANGE UP (ref 0–225)
CK SERPL-CCNC: 127 U/L — SIGNIFICANT CHANGE UP (ref 0–225)
CK SERPL-CCNC: 148 U/L — SIGNIFICANT CHANGE UP (ref 0–225)
CO2 SERPL-SCNC: 29 MMOL/L — SIGNIFICANT CHANGE UP (ref 17–32)
CO2 SERPL-SCNC: 33 MMOL/L — HIGH (ref 17–32)
CO2 SERPL-SCNC: 36 MMOL/L — HIGH (ref 17–32)
COLOR SPEC: YELLOW — SIGNIFICANT CHANGE UP
CREAT SERPL-MCNC: 0.7 MG/DL — SIGNIFICANT CHANGE UP (ref 0.7–1.5)
CREAT SERPL-MCNC: 0.8 MG/DL — SIGNIFICANT CHANGE UP (ref 0.7–1.5)
CREAT SERPL-MCNC: 0.9 MG/DL — SIGNIFICANT CHANGE UP (ref 0.7–1.5)
DIFF PNL FLD: SIGNIFICANT CHANGE UP
EPI CELLS # UR: 0 /HPF — SIGNIFICANT CHANGE UP (ref 0–5)
GLUCOSE BLDC GLUCOMTR-MCNC: 116 MG/DL — HIGH (ref 70–99)
GLUCOSE BLDC GLUCOMTR-MCNC: 216 MG/DL — HIGH (ref 70–99)
GLUCOSE BLDC GLUCOMTR-MCNC: 54 MG/DL — LOW (ref 70–99)
GLUCOSE BLDC GLUCOMTR-MCNC: 72 MG/DL — SIGNIFICANT CHANGE UP (ref 70–99)
GLUCOSE BLDC GLUCOMTR-MCNC: 95 MG/DL — SIGNIFICANT CHANGE UP (ref 70–99)
GLUCOSE SERPL-MCNC: 110 MG/DL — HIGH (ref 70–99)
GLUCOSE SERPL-MCNC: 118 MG/DL — HIGH (ref 70–99)
GLUCOSE SERPL-MCNC: 151 MG/DL — HIGH (ref 70–99)
GLUCOSE UR QL: NEGATIVE — SIGNIFICANT CHANGE UP
HCO3 BLDA-SCNC: 35 MMOL/L — HIGH (ref 21–29)
HCT VFR BLD CALC: 25.6 % — LOW (ref 37–47)
HCT VFR BLD CALC: 30 % — LOW (ref 37–47)
HCT VFR BLD CALC: 30.6 % — LOW (ref 37–47)
HGB BLD-MCNC: 7.7 G/DL — LOW (ref 12–16)
HGB BLD-MCNC: 9 G/DL — LOW (ref 12–16)
HGB BLD-MCNC: 9.4 G/DL — LOW (ref 12–16)
HYALINE CASTS # UR AUTO: 0 /LPF — SIGNIFICANT CHANGE UP (ref 0–7)
INR BLD: 0.97 RATIO — SIGNIFICANT CHANGE UP (ref 0.65–1.3)
INR BLD: 0.99 RATIO — SIGNIFICANT CHANGE UP (ref 0.65–1.3)
KETONES UR-MCNC: ABNORMAL
LEUKOCYTE ESTERASE UR-ACNC: NEGATIVE — SIGNIFICANT CHANGE UP
MAGNESIUM SERPL-MCNC: 1.9 MG/DL — SIGNIFICANT CHANGE UP (ref 1.8–2.4)
MAGNESIUM SERPL-MCNC: 1.9 MG/DL — SIGNIFICANT CHANGE UP (ref 1.8–2.4)
MAGNESIUM SERPL-MCNC: 2.3 MG/DL — SIGNIFICANT CHANGE UP (ref 1.8–2.4)
MCHC RBC-ENTMCNC: 30 G/DL — LOW (ref 32–37)
MCHC RBC-ENTMCNC: 30.1 G/DL — LOW (ref 32–37)
MCHC RBC-ENTMCNC: 30.5 PG — SIGNIFICANT CHANGE UP (ref 27–31)
MCHC RBC-ENTMCNC: 30.7 G/DL — LOW (ref 32–37)
MCHC RBC-ENTMCNC: 30.8 PG — SIGNIFICANT CHANGE UP (ref 27–31)
MCHC RBC-ENTMCNC: 31.2 PG — HIGH (ref 27–31)
MCV RBC AUTO: 101.7 FL — HIGH (ref 81–99)
MCV RBC AUTO: 101.7 FL — HIGH (ref 81–99)
MCV RBC AUTO: 102.4 FL — HIGH (ref 81–99)
NITRITE UR-MCNC: NEGATIVE — SIGNIFICANT CHANGE UP
NRBC # BLD: 0 /100 WBCS — SIGNIFICANT CHANGE UP (ref 0–0)
PCO2 BLDA: 56 MMHG — HIGH (ref 38–42)
PH BLDA: 7.41 — SIGNIFICANT CHANGE UP (ref 7.38–7.42)
PH UR: 6.5 — SIGNIFICANT CHANGE UP (ref 5–8)
PHOSPHATE SERPL-MCNC: 3.6 MG/DL — SIGNIFICANT CHANGE UP (ref 2.1–4.9)
PHOSPHATE SERPL-MCNC: 3.8 MG/DL — SIGNIFICANT CHANGE UP (ref 2.1–4.9)
PHOSPHATE SERPL-MCNC: 3.8 MG/DL — SIGNIFICANT CHANGE UP (ref 2.1–4.9)
PLATELET # BLD AUTO: 124 K/UL — LOW (ref 130–400)
PLATELET # BLD AUTO: 125 K/UL — LOW (ref 130–400)
PLATELET # BLD AUTO: 142 K/UL — SIGNIFICANT CHANGE UP (ref 130–400)
PO2 BLDA: 134 MMHG — HIGH (ref 78–95)
POTASSIUM SERPL-MCNC: 4.8 MMOL/L — SIGNIFICANT CHANGE UP (ref 3.5–5)
POTASSIUM SERPL-MCNC: 4.8 MMOL/L — SIGNIFICANT CHANGE UP (ref 3.5–5)
POTASSIUM SERPL-MCNC: 5.5 MMOL/L — HIGH (ref 3.5–5)
POTASSIUM SERPL-SCNC: 4.8 MMOL/L — SIGNIFICANT CHANGE UP (ref 3.5–5)
POTASSIUM SERPL-SCNC: 4.8 MMOL/L — SIGNIFICANT CHANGE UP (ref 3.5–5)
POTASSIUM SERPL-SCNC: 5.5 MMOL/L — HIGH (ref 3.5–5)
PROT UR-MCNC: ABNORMAL
PROTHROM AB SERPL-ACNC: 11.2 SEC — SIGNIFICANT CHANGE UP (ref 9.95–12.87)
PROTHROM AB SERPL-ACNC: 11.4 SEC — SIGNIFICANT CHANGE UP (ref 9.95–12.87)
RBC # BLD: 2.5 M/UL — LOW (ref 4.2–5.4)
RBC # BLD: 2.95 M/UL — LOW (ref 4.2–5.4)
RBC # BLD: 3.01 M/UL — LOW (ref 4.2–5.4)
RBC # FLD: 13.8 % — SIGNIFICANT CHANGE UP (ref 11.5–14.5)
RBC # FLD: 13.8 % — SIGNIFICANT CHANGE UP (ref 11.5–14.5)
RBC # FLD: 13.9 % — SIGNIFICANT CHANGE UP (ref 11.5–14.5)
RBC CASTS # UR COMP ASSIST: 4 /HPF — SIGNIFICANT CHANGE UP (ref 0–4)
SAO2 % BLDA: 100 % — HIGH (ref 92–96)
SARS-COV-2 RNA SPEC QL NAA+PROBE: SIGNIFICANT CHANGE UP
SODIUM SERPL-SCNC: 138 MMOL/L — SIGNIFICANT CHANGE UP (ref 135–146)
SODIUM SERPL-SCNC: 141 MMOL/L — SIGNIFICANT CHANGE UP (ref 135–146)
SODIUM SERPL-SCNC: 141 MMOL/L — SIGNIFICANT CHANGE UP (ref 135–146)
SP GR SPEC: 1.04 — HIGH (ref 1.01–1.02)
TROPONIN T SERPL-MCNC: <0.01 NG/ML — SIGNIFICANT CHANGE UP
UROBILINOGEN FLD QL: SIGNIFICANT CHANGE UP
WBC # BLD: 6.93 K/UL — SIGNIFICANT CHANGE UP (ref 4.8–10.8)
WBC # BLD: 7.88 K/UL — SIGNIFICANT CHANGE UP (ref 4.8–10.8)
WBC # BLD: 8.83 K/UL — SIGNIFICANT CHANGE UP (ref 4.8–10.8)
WBC # FLD AUTO: 6.93 K/UL — SIGNIFICANT CHANGE UP (ref 4.8–10.8)
WBC # FLD AUTO: 7.88 K/UL — SIGNIFICANT CHANGE UP (ref 4.8–10.8)
WBC # FLD AUTO: 8.83 K/UL — SIGNIFICANT CHANGE UP (ref 4.8–10.8)
WBC UR QL: 0 /HPF — SIGNIFICANT CHANGE UP (ref 0–5)

## 2020-08-13 PROCEDURE — 72125 CT NECK SPINE W/O DYE: CPT | Mod: 26

## 2020-08-13 PROCEDURE — 99222 1ST HOSP IP/OBS MODERATE 55: CPT

## 2020-08-13 PROCEDURE — 93010 ELECTROCARDIOGRAM REPORT: CPT

## 2020-08-13 PROCEDURE — 99497 ADVNCD CARE PLAN 30 MIN: CPT | Mod: 25

## 2020-08-13 PROCEDURE — 71045 X-RAY EXAM CHEST 1 VIEW: CPT | Mod: 26

## 2020-08-13 PROCEDURE — 99233 SBSQ HOSP IP/OBS HIGH 50: CPT

## 2020-08-13 RX ORDER — ATORVASTATIN CALCIUM 80 MG/1
40 TABLET, FILM COATED ORAL AT BEDTIME
Refills: 0 | Status: DISCONTINUED | OUTPATIENT
Start: 2020-08-13 | End: 2020-08-17

## 2020-08-13 RX ORDER — METOPROLOL TARTRATE 50 MG
25 TABLET ORAL DAILY
Refills: 0 | Status: DISCONTINUED | OUTPATIENT
Start: 2020-08-13 | End: 2020-08-17

## 2020-08-13 RX ORDER — SODIUM CHLORIDE 9 MG/ML
1000 INJECTION, SOLUTION INTRAVENOUS
Refills: 0 | Status: DISCONTINUED | OUTPATIENT
Start: 2020-08-13 | End: 2020-08-13

## 2020-08-13 RX ORDER — ENOXAPARIN SODIUM 100 MG/ML
40 INJECTION SUBCUTANEOUS EVERY 24 HOURS
Refills: 0 | Status: DISCONTINUED | OUTPATIENT
Start: 2020-08-13 | End: 2020-08-13

## 2020-08-13 RX ORDER — PANTOPRAZOLE SODIUM 20 MG/1
40 TABLET, DELAYED RELEASE ORAL
Refills: 0 | Status: DISCONTINUED | OUTPATIENT
Start: 2020-08-13 | End: 2020-08-17

## 2020-08-13 RX ORDER — ACETAMINOPHEN 500 MG
650 TABLET ORAL EVERY 6 HOURS
Refills: 0 | Status: COMPLETED | OUTPATIENT
Start: 2020-08-13 | End: 2020-08-16

## 2020-08-13 RX ORDER — LEVOTHYROXINE SODIUM 125 MCG
88 TABLET ORAL DAILY
Refills: 0 | Status: DISCONTINUED | OUTPATIENT
Start: 2020-08-13 | End: 2020-08-17

## 2020-08-13 RX ORDER — INSULIN HUMAN 100 [IU]/ML
10 INJECTION, SOLUTION SUBCUTANEOUS ONCE
Refills: 0 | Status: COMPLETED | OUTPATIENT
Start: 2020-08-13 | End: 2020-08-13

## 2020-08-13 RX ORDER — OXYCODONE HYDROCHLORIDE 5 MG/1
5 TABLET ORAL EVERY 6 HOURS
Refills: 0 | Status: DISCONTINUED | OUTPATIENT
Start: 2020-08-13 | End: 2020-08-17

## 2020-08-13 RX ORDER — MAGNESIUM HYDROXIDE 400 MG/1
30 TABLET, CHEWABLE ORAL DAILY
Refills: 0 | Status: DISCONTINUED | OUTPATIENT
Start: 2020-08-13 | End: 2020-08-13

## 2020-08-13 RX ORDER — OXYCODONE HYDROCHLORIDE 5 MG/1
5 TABLET ORAL EVERY 4 HOURS
Refills: 0 | Status: DISCONTINUED | OUTPATIENT
Start: 2020-08-13 | End: 2020-08-13

## 2020-08-13 RX ORDER — ONDANSETRON 8 MG/1
4 TABLET, FILM COATED ORAL ONCE
Refills: 0 | Status: DISCONTINUED | OUTPATIENT
Start: 2020-08-13 | End: 2020-08-13

## 2020-08-13 RX ORDER — MEPERIDINE HYDROCHLORIDE 50 MG/ML
12.5 INJECTION INTRAMUSCULAR; INTRAVENOUS; SUBCUTANEOUS
Refills: 0 | Status: DISCONTINUED | OUTPATIENT
Start: 2020-08-13 | End: 2020-08-13

## 2020-08-13 RX ORDER — INSULIN HUMAN 100 [IU]/ML
INJECTION, SOLUTION SUBCUTANEOUS EVERY 6 HOURS
Refills: 0 | Status: DISCONTINUED | OUTPATIENT
Start: 2020-08-13 | End: 2020-08-13

## 2020-08-13 RX ORDER — KETOROLAC TROMETHAMINE 30 MG/ML
15 SYRINGE (ML) INJECTION ONCE
Refills: 0 | Status: DISCONTINUED | OUTPATIENT
Start: 2020-08-13 | End: 2020-08-13

## 2020-08-13 RX ORDER — ACETAMINOPHEN 500 MG
650 TABLET ORAL EVERY 6 HOURS
Refills: 0 | Status: DISCONTINUED | OUTPATIENT
Start: 2020-08-13 | End: 2020-08-13

## 2020-08-13 RX ORDER — MORPHINE SULFATE 50 MG/1
1 CAPSULE, EXTENDED RELEASE ORAL
Refills: 0 | Status: DISCONTINUED | OUTPATIENT
Start: 2020-08-13 | End: 2020-08-13

## 2020-08-13 RX ORDER — CHLORHEXIDINE GLUCONATE 213 G/1000ML
1 SOLUTION TOPICAL
Refills: 0 | Status: DISCONTINUED | OUTPATIENT
Start: 2020-08-13 | End: 2020-08-17

## 2020-08-13 RX ORDER — MAGNESIUM SULFATE 500 MG/ML
1 VIAL (ML) INJECTION ONCE
Refills: 0 | Status: COMPLETED | OUTPATIENT
Start: 2020-08-13 | End: 2020-08-13

## 2020-08-13 RX ORDER — MORPHINE SULFATE 50 MG/1
2 CAPSULE, EXTENDED RELEASE ORAL EVERY 4 HOURS
Refills: 0 | Status: DISCONTINUED | OUTPATIENT
Start: 2020-08-13 | End: 2020-08-13

## 2020-08-13 RX ORDER — SENNA PLUS 8.6 MG/1
2 TABLET ORAL AT BEDTIME
Refills: 0 | Status: DISCONTINUED | OUTPATIENT
Start: 2020-08-13 | End: 2020-08-17

## 2020-08-13 RX ORDER — ONDANSETRON 8 MG/1
4 TABLET, FILM COATED ORAL EVERY 6 HOURS
Refills: 0 | Status: DISCONTINUED | OUTPATIENT
Start: 2020-08-13 | End: 2020-08-17

## 2020-08-13 RX ORDER — ENOXAPARIN SODIUM 100 MG/ML
40 INJECTION SUBCUTANEOUS DAILY
Refills: 0 | Status: DISCONTINUED | OUTPATIENT
Start: 2020-08-14 | End: 2020-08-16

## 2020-08-13 RX ORDER — MORPHINE SULFATE 50 MG/1
2 CAPSULE, EXTENDED RELEASE ORAL
Refills: 0 | Status: DISCONTINUED | OUTPATIENT
Start: 2020-08-13 | End: 2020-08-13

## 2020-08-13 RX ORDER — DEXTROSE 50 % IN WATER 50 %
12.5 SYRINGE (ML) INTRAVENOUS ONCE
Refills: 0 | Status: COMPLETED | OUTPATIENT
Start: 2020-08-13 | End: 2020-08-13

## 2020-08-13 RX ORDER — CEFAZOLIN SODIUM 1 G
2000 VIAL (EA) INJECTION EVERY 8 HOURS
Refills: 0 | Status: COMPLETED | OUTPATIENT
Start: 2020-08-14 | End: 2020-08-14

## 2020-08-13 RX ORDER — DEXTROSE 50 % IN WATER 50 %
50 SYRINGE (ML) INTRAVENOUS ONCE
Refills: 0 | Status: COMPLETED | OUTPATIENT
Start: 2020-08-13 | End: 2020-08-13

## 2020-08-13 RX ORDER — INSULIN HUMAN 100 [IU]/ML
INJECTION, SOLUTION SUBCUTANEOUS EVERY 4 HOURS
Refills: 0 | Status: DISCONTINUED | OUTPATIENT
Start: 2020-08-13 | End: 2020-08-13

## 2020-08-13 RX ORDER — CALCIUM GLUCONATE 100 MG/ML
1 VIAL (ML) INTRAVENOUS ONCE
Refills: 0 | Status: COMPLETED | OUTPATIENT
Start: 2020-08-13 | End: 2020-08-13

## 2020-08-13 RX ORDER — CHLORHEXIDINE GLUCONATE 213 G/1000ML
1 SOLUTION TOPICAL
Refills: 0 | Status: DISCONTINUED | OUTPATIENT
Start: 2020-08-13 | End: 2020-08-13

## 2020-08-13 RX ADMIN — Medication 650 MILLIGRAM(S): at 08:24

## 2020-08-13 RX ADMIN — ATORVASTATIN CALCIUM 40 MILLIGRAM(S): 80 TABLET, FILM COATED ORAL at 22:25

## 2020-08-13 RX ADMIN — Medication 50 MILLILITER(S): at 03:50

## 2020-08-13 RX ADMIN — INSULIN HUMAN 10 UNIT(S): 100 INJECTION, SOLUTION SUBCUTANEOUS at 03:49

## 2020-08-13 RX ADMIN — OXYCODONE HYDROCHLORIDE 5 MILLIGRAM(S): 5 TABLET ORAL at 19:46

## 2020-08-13 RX ADMIN — Medication 100 GRAM(S): at 04:14

## 2020-08-13 RX ADMIN — Medication 15 MILLIGRAM(S): at 21:25

## 2020-08-13 RX ADMIN — OXYCODONE HYDROCHLORIDE 5 MILLIGRAM(S): 5 TABLET ORAL at 20:16

## 2020-08-13 RX ADMIN — Medication 12.5 GRAM(S): at 12:26

## 2020-08-13 RX ADMIN — SODIUM CHLORIDE 100 MILLILITER(S): 9 INJECTION, SOLUTION INTRAVENOUS at 17:20

## 2020-08-13 RX ADMIN — Medication 88 MICROGRAM(S): at 20:59

## 2020-08-13 RX ADMIN — SENNA PLUS 2 TABLET(S): 8.6 TABLET ORAL at 22:25

## 2020-08-13 RX ADMIN — Medication 15 MILLIGRAM(S): at 20:55

## 2020-08-13 RX ADMIN — Medication 50 GRAM(S): at 20:28

## 2020-08-13 RX ADMIN — PANTOPRAZOLE SODIUM 40 MILLIGRAM(S): 20 TABLET, DELAYED RELEASE ORAL at 06:22

## 2020-08-13 RX ADMIN — Medication 40 MILLIGRAM(S): at 12:20

## 2020-08-13 RX ADMIN — Medication 650 MILLIGRAM(S): at 08:54

## 2020-08-13 RX ADMIN — Medication 40 MILLIGRAM(S): at 18:58

## 2020-08-13 NOTE — CONSULT NOTE ADULT - SUBJECTIVE AND OBJECTIVE BOX
HPI:  89F DNR/DNI PMH COPD on 3-4L NC, hypothyroidism, lives at home with her son, ADLs intact presenting to the ED s/p mechanical fall, -HT, -LOC, -AC. Per the patient she had gotten up to check on her albuterol treatment, started to sit back down on her recliner but missed, falling on her left side. She denies LOC, HT. She was unable to ambulate after the fall and complains of L hip pain. No obvious external signs of trauma on initial examination. (12 Aug 2020 21:20)    PMHx:  COPD  Chronic resp failure on home o2  hypothyroidism     Home meds  synthroid 88 mcg  metoprolol 25mg q24  rosuvastatin 10mg  Trillegy inhaler - breo/incruse  asa 81mg  albuterol prn   ferrous sulfate     per son over phone      Vital Signs Last 24 Hrs  T(C): 36.8 (13 Aug 2020 12:32), Max: 37.1 (13 Aug 2020 08:00)  T(F): 98.3 (13 Aug 2020 12:00), Max: 98.7 (13 Aug 2020 08:00)  HR: 82 (13 Aug 2020 12:32) (69 - 92)  BP: 128/59 (13 Aug 2020 12:32) (127/60 - 189/76)  BP(mean): 84 (13 Aug 2020 12:32) (83 - 86)  RR: 19 (13 Aug 2020 12:32) (18 - 20)  SpO2: 99% (13 Aug 2020 12:32) (95% - 99%)  Pe  nad  aaox3  [id number]  ctabl no wheeze  softntn+bs  LLE ext rotated , tender lateral, shortned

## 2020-08-13 NOTE — PROGRESS NOTE ADULT - ASSESSMENT
A/P: 89yo F s/p procedure as above. Doing well post-operatively.  - WBAT LLE  - Abx for 24 hours (2g ancef x 3 doses; received 1 in OR)  - DVT prophylaxis   - AM labs  - Monitor dressing  - PT/OT/Rehab  - Dispo planning A/P: 89yo F s/p procedure as above. Doing well post-operatively.  - WBAT LLE  - Abx for 24 hours (2g ancef x 3 doses; received 1 in OR)  - DVT prophylaxis   - recommend transfusion 1U PRBC (post-op Hgb 7.7 from 9.0 pre-op)  - AM labs  - Monitor dressing  - PT/OT/Rehab  - Dispo planning

## 2020-08-13 NOTE — CONSULT NOTE ADULT - SUBJECTIVE AND OBJECTIVE BOX
HPI:  89 y/o F DNR/DNI PMH COPD on 3-4L NC, hypothyroidism, lives at home with her son, ADLs intact presenting to the ED s/p mechanical fall, -HT, -LOC, -AC. Per the patient she had gotten up to check on her albuterol treatment, started to sit back down on her recliner but missed, falling on her left side. She denies LOC, HT. She was unable to ambulate after the fall and complains of L hip pain. No obvious external signs of trauma on initial examination. (12 Aug 2020 21:20)    Cardiology consulted for pre-op optimization for surgery by orthopedic surgery.    PAST MEDICAL & SURGICAL HISTORY  Hypothyroid  COPD (chronic obstructive pulmonary disease)      FAMILY HISTORY:  FAMILY HISTORY:      SOCIAL HISTORY:  []smoker  []Alcohol  []Drug    ALLERGIES:  No Known Allergies      MEDICATIONS:  MEDICATIONS  (STANDING):  heparin   Injectable 5000 Unit(s) SubCutaneous every 8 hours  insulin regular  human corrective regimen sliding scale   IV Push every 6 hours  lactated ringers. 1000 milliLiter(s) (75 mL/Hr) IV Continuous <Continuous>  pantoprazole    Tablet 40 milliGRAM(s) Oral before breakfast  senna 2 Tablet(s) Oral at bedtime    MEDICATIONS  (PRN):  acetaminophen   Tablet .. 650 milliGRAM(s) Oral every 6 hours PRN Mild Pain (1 - 3)      HOME MEDICATIONS:  Home Medications:      VITALS:   T(F): 98.7 (08-13 @ 08:00), Max: 98.7 (08-13 @ 08:00)  HR: 78 (08-13 @ 08:00) (71 - 92)  BP: 127/60 (08-13 @ 08:00) (127/60 - 189/76)  BP(mean): 86 (08-13 @ 08:00) (86 - 86)  RR: 19 (08-13 @ 08:00) (18 - 19)  SpO2: 99% (08-13 @ 08:00) (95% - 99%)    I&O's Summary    12 Aug 2020 07:01  -  13 Aug 2020 07:00  --------------------------------------------------------  IN: 750 mL / OUT: 850 mL / NET: -100 mL    13 Aug 2020 07:01  -  13 Aug 2020 10:21  --------------------------------------------------------  IN: 150 mL / OUT: 100 mL / NET: 50 mL        REVIEW OF SYSTEMS:  CONSTITUTIONAL: No weakness, fevers or chills  EYES: No visual changes  ENT: No vertigo or throat pain   NECK: No pain or stiffness  RESPIRATORY: No cough, wheezing, hemoptysis; on 3L NC  CARDIOVASCULAR: No chest pain or palpitations  GASTROINTESTINAL: No abdominal or epigastric pain. No nausea, vomiting, or hematemesis; No diarrhea or constipation. No melena or hematochezia.  GENITOURINARY: No dysuria, frequency or hematuria  NEUROLOGICAL: No numbness or weakness  SKIN: No itching,   MSK: mild left hip pain    PHYSICAL EXAM:  NEURO: patient is awake , alert and oriented  GEN: Not in acute distress  NECK: no thyroid enlargement, no JVD  LUNGS: Clear to auscultation bilaterally   CARDIOVASCULAR: S1/S2 present, RRR , no murmurs or rubs, no carotid bruits,  + PP bilaterally  ABD: Soft, non-tender, non-distended, +BS  EXT: No CANDY  SKIN: mild ecchymosis in the lower extremities    LABS:                        9.0    6.93  )-----------( 142      ( 13 Aug 2020 06:55 )             30.0     08-13    141  |  100  |  21<H>  ----------------------------<  118<H>  4.8   |  36<H>  |  0.9    Ca    9.3      13 Aug 2020 06:55  Phos  3.8     08-13  Mg     2.3     08-13    TPro  6.6  /  Alb  4.3  /  TBili  0.3  /  DBili  x   /  AST  103<H>  /  ALT  80<H>  /  AlkPhos  144<H>  08-12    PT/INR - ( 13 Aug 2020 06:55 )   PT: 11.20 sec;   INR: 0.97 ratio         PTT - ( 13 Aug 2020 06:55 )  PTT:29.4 sec  Creatine Kinase, Serum: 113 U/L (08-13-20 @ 06:55)  Troponin T, Serum: <0.01 ng/mL (08-13-20 @ 06:55)  Creatine Kinase, Serum: 126 U/L (08-13-20 @ 02:08)  Lactate, Blood: 1.8 mmol/L (08-12-20 @ 14:32)  Creatine Kinase, Serum: 208 U/L (08-12-20 @ 14:32)    CARDIAC MARKERS ( 13 Aug 2020 06:55 )  x     / <0.01 ng/mL / 113 U/L / x     / 4.6 ng/mL  CARDIAC MARKERS ( 13 Aug 2020 02:08 )  x     / x     / 126 U/L / x     / x      CARDIAC MARKERS ( 12 Aug 2020 14:32 )  x     / x     / 208 U/L / x     / x            Troponin trend:            RADIOLOGY:  -CXR:   EXAM:  XR CHEST PORTABLE IMMED 1V          PROCEDURE DATE:  08/12/2020      INTERPRETATION:  Clinical History / Reason for Exam:  89-year-old female post fall    Comparison:  Chest radiograph performed 8/23/2019.    Technique/Positioning:  A single, frontal view of the chest is submitted..    Findings:    Support devices:  None.    Cardiac/mediastinum/hilum:  Unremarkable.    Lung parenchyma/Pleura:  No focal pulmonary opacity, pleural effusion or pneumothorax is seen.    Skeleton/soft tissues: Stable    Impression:    No radiographic evidence of acute cardiopulmonary disease.    -TTE:  -CT Chest & CT [place] and Pelvis  :  EXAM:  CT ABDOMEN AND PELVIS IC        EXAM:  CT CHEST IC          PROCEDURE DATE:  08/12/2020        INTERPRETATION:  CLINICAL STATEMENT: Fall, hip fracture    TECHNIQUE: Contiguous axial CT images were obtained from the thoracic inlet to the pubic symphysis following administration of 100c Optiray 320 intravenous contrast.  Oral contrast was not administered.  Reformatted images in the coronal and sagittal planes were acquired.    COMPARISON CT: CT chest 8/30/2016    OTHER STUDIES USED FOR CORRELATION: None.      FINDINGS:    CHEST:    LUNGS/PLEURA/AIRWAYS: Secretions within the trachea. Moderate centrilobular emphysema. Evaluation of lung parenchyma is limited secondary to patient motion artifact.    MEDIASTINUM/THORACIC NODES: Unremarkable.    HEART/GREAT VESSELS: Aortic and coronary artery calcifications.      ABDOMEN/PELVIS:    HEPATOBILIARY: Unremarkable.    SPLEEN: Unremarkable.    PANCREAS: Unremarkable.    ADRENAL GLANDS: Unremarkable.    KIDNEYS: Numerous bilateral renal cysts. No hydronephrosis.    ABDOMINOPELVIC NODES: Unremarkable.    PELVIC ORGANS: Evaluation of pelvis is limited secondary to metal artifact. .    PERITONEUM/MESENTERY/BOWEL: No obvious bowel obstruction, pneumoperitoneum or ascites. Diffuse sigmoid diverticulosis without evidence of diverticulitis.    BONES/SOFT TISSUES: Acute right posterior 10th and 11th rib fractures. Age indeterminant T5 vertebral body compression deformity. Acute left sided comminuted intertrochanteric fracture with moderate surrounding hematoma. Deformity of the left fourth rib compatible with chronic fracture.      IMPRESSION:  1.  Acute left sided comminuted intertrochanteric fracture with moderate surrounding hematoma.  2.  Acute right posterior 10th and 11th rib fractures.  3.  Age indeterminant T5 vertebral body compression deformity.  4.  Additional findings as detailed above.    -STRESS TEST: N/A  -CATHETERIZATION: N/A    ECG:  Ventricular Rate 75 BPM    Atrial Rate 75 BPM    P-R Interval 154 ms    QRS Duration 84 ms    Q-T Interval 402 ms    QTC Calculation(Bezet) 448 ms    P Axis 65 degrees    R Axis 79 degrees    T Axis 73 degrees    Diagnosis Line Sinus rhythm with marked sinus arrhythmia  Otherwise normal ECG    Confirmed by Nicola Kay [id number]) on 8/13/2020 7:58:23 AM    TELEMETRY EVENTS: N/A HPI:  89 y/o F DNR/DNI PMH COPD on 3-4L NC, hypothyroidism, lives at home with her son, ADLs intact presenting to the ED s/p mechanical fall, -HT, -LOC, -AC. Per the patient she had gotten up to check on her albuterol treatment, started to sit back down on her recliner but missed, falling on her left side. She denies LOC, HT. She was unable to ambulate after the fall and complains of L hip pain. No obvious external signs of trauma on initial examination. (12 Aug 2020 21:20)    Cardiology consulted for pre-op optimization for surgery by orthopedic surgery.    PAST MEDICAL & SURGICAL HISTORY  Hypothyroid  COPD (chronic obstructive pulmonary disease)      FAMILY HISTORY:  FAMILY HISTORY:      SOCIAL HISTORY:  []smoker  []Alcohol  []Drug    ALLERGIES:  No Known Allergies      MEDICATIONS:  MEDICATIONS  (STANDING):  heparin   Injectable 5000 Unit(s) SubCutaneous every 8 hours  insulin regular  human corrective regimen sliding scale   IV Push every 6 hours  lactated ringers. 1000 milliLiter(s) (75 mL/Hr) IV Continuous <Continuous>  pantoprazole    Tablet 40 milliGRAM(s) Oral before breakfast  senna 2 Tablet(s) Oral at bedtime    MEDICATIONS  (PRN):  acetaminophen   Tablet .. 650 milliGRAM(s) Oral every 6 hours PRN Mild Pain (1 - 3)      HOME MEDICATIONS:  Home Medications:      VITALS:   T(F): 98.7 (08-13 @ 08:00), Max: 98.7 (08-13 @ 08:00)  HR: 78 (08-13 @ 08:00) (71 - 92)  BP: 127/60 (08-13 @ 08:00) (127/60 - 189/76)  BP(mean): 86 (08-13 @ 08:00) (86 - 86)  RR: 19 (08-13 @ 08:00) (18 - 19)  SpO2: 99% (08-13 @ 08:00) (95% - 99%)    I&O's Summary    12 Aug 2020 07:01  -  13 Aug 2020 07:00  --------------------------------------------------------  IN: 750 mL / OUT: 850 mL / NET: -100 mL    13 Aug 2020 07:01  -  13 Aug 2020 10:21  --------------------------------------------------------  IN: 150 mL / OUT: 100 mL / NET: 50 mL        REVIEW OF SYSTEMS:  CONSTITUTIONAL: No weakness, fevers or chills  EYES: No visual changes  ENT: No vertigo or throat pain   NECK: No pain or stiffness  RESPIRATORY: No cough, wheezing, hemoptysis; on 3L NC  CARDIOVASCULAR: No chest pain or palpitations  GASTROINTESTINAL: No abdominal or epigastric pain. No nausea, vomiting, or hematemesis; No diarrhea or constipation. No melena or hematochezia.  GENITOURINARY: No dysuria, frequency or hematuria  NEUROLOGICAL: No numbness or weakness  SKIN: No itching,   MSK: mild left hip pain    PHYSICAL EXAM:  NEURO: patient is awake , alert and oriented  GEN: Not in acute distress  NECK: no thyroid enlargement, no JVD  LUNGS: Clear to auscultation bilaterally   CARDIOVASCULAR: S1/S2 present, RRR , no murmurs or rubs, no carotid bruits,  + PP bilaterally  ABD: Soft, non-tender, non-distended, +BS  EXT: No CANDY  SKIN: mild ecchymosis in the lower extremities    LABS:                        9.0    6.93  )-----------( 142      ( 13 Aug 2020 06:55 )             30.0     08-13    141  |  100  |  21<H>  ----------------------------<  118<H>  4.8   |  36<H>  |  0.9    Ca    9.3      13 Aug 2020 06:55  Phos  3.8     08-13  Mg     2.3     08-13    TPro  6.6  /  Alb  4.3  /  TBili  0.3  /  DBili  x   /  AST  103<H>  /  ALT  80<H>  /  AlkPhos  144<H>  08-12    PT/INR - ( 13 Aug 2020 06:55 )   PT: 11.20 sec;   INR: 0.97 ratio         PTT - ( 13 Aug 2020 06:55 )  PTT:29.4 sec  Creatine Kinase, Serum: 113 U/L (08-13-20 @ 06:55)  Troponin T, Serum: <0.01 ng/mL (08-13-20 @ 06:55)  Creatine Kinase, Serum: 126 U/L (08-13-20 @ 02:08)  Lactate, Blood: 1.8 mmol/L (08-12-20 @ 14:32)  Creatine Kinase, Serum: 208 U/L (08-12-20 @ 14:32)    CARDIAC MARKERS ( 13 Aug 2020 06:55 )  x     / <0.01 ng/mL / 113 U/L / x     / 4.6 ng/mL  CARDIAC MARKERS ( 13 Aug 2020 02:08 )  x     / x     / 126 U/L / x     / x      CARDIAC MARKERS ( 12 Aug 2020 14:32 )  x     / x     / 208 U/L / x     / x            Troponin trend:            RADIOLOGY:  -CXR:   EXAM:  XR CHEST PORTABLE IMMED 1V          PROCEDURE DATE:  08/12/2020      INTERPRETATION:  Clinical History / Reason for Exam:  89-year-old female post fall    Comparison:  Chest radiograph performed 8/23/2019.    Technique/Positioning:  A single, frontal view of the chest is submitted..    Findings:    Support devices:  None.    Cardiac/mediastinum/hilum:  Unremarkable.    Lung parenchyma/Pleura:  No focal pulmonary opacity, pleural effusion or pneumothorax is seen.    Skeleton/soft tissues: Stable    Impression:    No radiographic evidence of acute cardiopulmonary disease.    -TTE:  -CT Chest & CT [place] and Pelvis  :  EXAM:  CT ABDOMEN AND PELVIS IC        EXAM:  CT CHEST IC          PROCEDURE DATE:  08/12/2020        INTERPRETATION:  CLINICAL STATEMENT: Fall, hip fracture    TECHNIQUE: Contiguous axial CT images were obtained from the thoracic inlet to the pubic symphysis following administration of 100c Optiray 320 intravenous contrast.  Oral contrast was not administered.  Reformatted images in the coronal and sagittal planes were acquired.    COMPARISON CT: CT chest 8/30/2016    OTHER STUDIES USED FOR CORRELATION: None.      FINDINGS:    CHEST:    LUNGS/PLEURA/AIRWAYS: Secretions within the trachea. Moderate centrilobular emphysema. Evaluation of lung parenchyma is limited secondary to patient motion artifact.    MEDIASTINUM/THORACIC NODES: Unremarkable.    HEART/GREAT VESSELS: Aortic and coronary artery calcifications.      ABDOMEN/PELVIS:    HEPATOBILIARY: Unremarkable.    SPLEEN: Unremarkable.    PANCREAS: Unremarkable.    ADRENAL GLANDS: Unremarkable.    KIDNEYS: Numerous bilateral renal cysts. No hydronephrosis.    ABDOMINOPELVIC NODES: Unremarkable.    PELVIC ORGANS: Evaluation of pelvis is limited secondary to metal artifact. .    PERITONEUM/MESENTERY/BOWEL: No obvious bowel obstruction, pneumoperitoneum or ascites. Diffuse sigmoid diverticulosis without evidence of diverticulitis.    BONES/SOFT TISSUES: Acute right posterior 10th and 11th rib fractures. Age indeterminant T5 vertebral body compression deformity. Acute left sided comminuted intertrochanteric fracture with moderate surrounding hematoma. Deformity of the left fourth rib compatible with chronic fracture.      IMPRESSION:  1.  Acute left sided comminuted intertrochanteric fracture with moderate surrounding hematoma.  2.  Acute right posterior 10th and 11th rib fractures.  3.  Age indeterminant T5 vertebral body compression deformity.  4.  Additional findings as detailed above.      -STRESS TEST: N/A  -CATHETERIZATION: N/A    ECG:  Ventricular Rate 75 BPM    Atrial Rate 75 BPM    P-R Interval 154 ms    QRS Duration 84 ms    Q-T Interval 402 ms    QTC Calculation(Bezet) 448 ms    P Axis 65 degrees    R Axis 79 degrees    T Axis 73 degrees    Diagnosis Line Sinus rhythm with marked sinus arrhythmia  Otherwise normal ECG    Confirmed by Nicola Kay ([id number]) on 8/13/2020 7:58:23 AM    TELEMETRY EVENTS: N/A

## 2020-08-13 NOTE — PRE-ANESTHESIA EVALUATION ADULT - NSANTHOSAYNRD_GEN_A_CORE
No. YUNIOR screening performed.  STOP BANG Legend: 0-2 = LOW Risk; 3-4 = INTERMEDIATE Risk; 5-8 = HIGH Risk

## 2020-08-13 NOTE — CONSULT NOTE ADULT - SUBJECTIVE AND OBJECTIVE BOX
89F DNR/DNI PMH COPD on 3-4L NC, hypothyroidism, lives at home with her son, ADLs intact presenting to the ED s/p mechanical fall, -HT, -LOC, -AC. Per the patient she had gotten up to check on her albuterol treatment, started to sit back down on her recliner but missed, falling on her left side. She denies LOC, HT. She was unable to ambulate after the fall and complains of L hip pain.  She denied any back pain, as per son, she has history of vertebral fracture few years ago.

## 2020-08-13 NOTE — CHART NOTE - NSCHARTNOTEFT_GEN_A_CORE
PACU ANESTHESIA ADMISSION NOTE      Procedure: Insertion, Gamma nail, hip: left    Post op diagnosis:  Fracture, intertrochanteric, left femur      ____  Intubated  TV:______       Rate: ______      FiO2: ______    _x___  Patent Airway    _x___  Full return of protective reflexes    _x___  Full recovery from anesthesia / back to baseline status    Vitals  SPO2:-99% 4l nc  HR:-90  RR:-12  B.P:-104/51  TEMP:-97.8    Mental Status:  _x___ Awake   ___x_ Alert   _____ Drowsy   _____ Sedated    Nausea/Vomiting:  _x___  NO       ______Yes,   See Post - Op Orders         Pain Scale (0-10):  __0___    Treatment: _x___ None    __x__ See Post - Op/PCA Orders    Post - Operative Fluids:   ___ Oral   ____x See Post - Op Orders    Plan: Discharge:   ____Home       ___x__Floor     _____Critical Care    _____  Other:_________________    Comments:  Report endorsed to RN in pacu  Vitals stable  No anesthesia issues or complications noted.  Discharge to patient to floor when criteria met.

## 2020-08-13 NOTE — CONSULT NOTE ADULT - ATTENDING COMMENTS
OR
89 y/o female, S/p Fall.  Closed Left Intertrochanteric Femur Fracture.  Closed Right 10,11 Rib Fractures.  Acute pain due to trauma.  COPD.    PLAN:  - pain control  - follow CXR  - incentive spirometer  - Ortho for ORIF  - keep on Solumedrol perioperatively due to COPD  - DVT prophylaxis
patient seen and examined, agree with above, sp fall, fx, severe COPD oxygen dependant, high for surgery, steroids periop, spoke with son

## 2020-08-13 NOTE — PATIENT PROFILE ADULT - NSTRANSFERBELONGINGSDISPO_GEN_A_NUR
HOME MONITORING REPORT    INR today:   Results for orders placed or performed in visit on 11/21/18   Protime-INR   Result Value Ref Range    INR 1.80        INR Goal: 2.0-3.0    Dosing Plan  As of 11/21/2018    TTR:   61.7 % (6 mo)   Full warfarin instructions:   0 mg on Sun; 2.5 mg all other days            Ange notified to go back to 2.5mg daily
not applicable

## 2020-08-13 NOTE — PROGRESS NOTE ADULT - SUBJECTIVE AND OBJECTIVE BOX
TANNA MCCRACKEN  [id number]  89y Female    Indication for ICU admission: L intertrochanteric hip fracture. R posterior rib fracture 10-11     Admit Date:08-12-20  ICU Date:8/12  OR Date:    No Known Allergies    PAST MEDICAL & SURGICAL HISTORY:  Hypothyroid  COPD (chronic obstructive pulmonary disease)        24HRS EVENT:    Overnight: K 5.5, EKG pending. Treated w/ Ca, D50 and insulin. Repleted Mg. F/U 4:30 AM BMP. F/U hospitalist consult. Call son in the AM to clarify medication list and code status post op     NEURO:    Pain-controlled with Tylenol     Monitor for changes in mental status     RESP:     R posterior 10-11 rib fractures. Encourage IS pulling 500cc     Hx of COPD. ON 3-4L NC at home. Accepting O2 sats >88%     AM CXR       CARDS:     Maintain normotension. MAP >65     Troponin x3 pending     EKG NSR        GI/NUTR:     Diet, NPO for OR tomorrow     GI Prophylaxis- Protonix     Bowel regimen- Senna     /RENAL:     Tobar in place. UOP 50cc/hr     Hyper K treated. Mg repleted     Trend BUN/Cr 25/0.9    LR @ 75cc/hr     HEME/ONC:     DVT prophylaxis- HSQ     Hgb 12     Type and Screen     2 units of PRBC on hold for OR tomorrow     ID:    No dx. Afebrile. Trend WBC       ENDO:    Hx of Hypothyroidism. Clarify medications     FS Q 6 hours while NPO     MSK:     L intertrochanteric hip fracture. OR tomorrow with Ortho     Hospitalist consulted at Ortho request for risk stratification     NWB LLE         DVT PTX: HSQ     GI PTX: PPI     ***Tubes/Lines/Drains  ***  Peripheral IV  Urinary Catheter		    REVIEW OF SYSTEMS    [x] A ten-point review of systems was otherwise negative except as noted.  [ ] Due to altered mental status/intubation, subjective information were not able to be obtained from the patient. History was obtained, to the extent possible, from review of the chart and collateral sources of information. TANNA MCCRACKEN  [id number]  89y Female    Indication for ICU admission: L intertrochanteric hip fracture. R posterior rib fracture 10-11     Admit Date:20  ICU Date:  OR Date:    No Known Allergies    PAST MEDICAL & SURGICAL HISTORY:  Hypothyroid  COPD (chronic obstructive pulmonary disease)    24HRS EVENT:    Overnight: K 5.5, EKG pending. Treated w/ Ca, D50 and insulin. Repleted Mg. F/U 4:30 AM BMP. F/U hospitalist consult. Call son in the AM to clarify medication list and code status post op     NEURO:    Pain-controlled with Tylenol     Monitor for changes in mental status     RESP:     R posterior 10-11 rib fractures. Encourage IS pulling 500cc     Hx of COPD. ON 3-4L NC at home. Accepting O2 sats >88%     AM CXR       CARDS:     Maintain normotension. MAP >65     Troponin x3 pending     EKG NSR        GI/NUTR:     Diet, NPO for OR tomorrow     GI Prophylaxis- Protonix     Bowel regimen- Senna     /RENAL:     Tobar in place. UOP 50cc/hr     Hyper K treated. Mg repleted     Trend BUN/Cr 25/0.9    LR @ 75cc/hr     HEME/ONC:     DVT prophylaxis- HSQ     Hgb 12     Type and Screen     2 units of PRBC on hold for OR tomorrow     ID:    No dx. Afebrile. Trend WBC       ENDO:    Hx of Hypothyroidism. Clarify medications     FS Q 6 hours while NPO     MSK:     L intertrochanteric hip fracture. OR tomorrow with Ortho     Hospitalist consulted at Ortho request for risk stratification     NWB LLE         DVT PTX: HSQ     GI PTX: PPI     ***Tubes/Lines/Drains  ***  Peripheral IV  Urinary Catheter		    REVIEW OF SYSTEMS    [x] A ten-point review of systems was otherwise negative except as noted.  [ ] Due to altered mental status/intubation, subjective information were not able to be obtained from the patient. History was obtained, to the extent possible, from review of the chart and collateral sources of information.      Daily     Daily     Diet, NPO:   Except Medications (20 @ 21:29)      CURRENT MEDS:  Neurologic Medications  acetaminophen   Tablet .. 650 milliGRAM(s) Oral every 6 hours PRN Mild Pain (1 - 3)    Respiratory Medications    Cardiovascular Medications    Gastrointestinal Medications  lactated ringers. 1000 milliLiter(s) IV Continuous <Continuous>  pantoprazole    Tablet 40 milliGRAM(s) Oral before breakfast  senna 2 Tablet(s) Oral at bedtime    Genitourinary Medications    Hematologic/Oncologic Medications  heparin   Injectable 5000 Unit(s) SubCutaneous every 8 hours    Antimicrobial/Immunologic Medications    Endocrine/Metabolic Medications  insulin regular  human corrective regimen sliding scale   IV Push every 6 hours    Topical/Other Medications      ICU Vital Signs Last 24 Hrs  T(C): 37.1 (13 Aug 2020 08:00), Max: 37.1 (13 Aug 2020 08:00)  T(F): 98.7 (13 Aug 2020 08:00), Max: 98.7 (13 Aug 2020 08:00)  HR: 78 (13 Aug 2020 08:00) (71 - 92)  BP: 127/60 (13 Aug 2020 08:00) (127/60 - 189/76)  BP(mean): 86 (13 Aug 2020 08:) (86 - 86)  ABP: --  ABP(mean): --  RR: 19 (13 Aug 2020 08:) (18 - 19)  SpO2: 99% (13 Aug 2020 08:) (95% - 99%)      Adult Advanced Hemodynamics Last 24 Hrs  CVP(mm Hg): --  CVP(cm H2O): --  CO: --  CI: --  PA: --  PA(mean): --  PCWP: --  SVR: --  SVRI: --  PVR: --  PVRI: --          I&O's Summary    12 Aug 2020 07:  -  13 Aug 2020 07:00  --------------------------------------------------------  IN: 750 mL / OUT: 850 mL / NET: -100 mL    13 Aug 2020 07:01  -  13 Aug 2020 08:30  --------------------------------------------------------  IN: 150 mL / OUT: 100 mL / NET: 50 mL      I&O's Detail    12 Aug 2020 07:  -  13 Aug 2020 07:00  --------------------------------------------------------  IN:    IV PiggyBack: 150 mL    lactated ringers.: 600 mL  Total IN: 750 mL    OUT:    Indwelling Catheter - Urethral: 850 mL  Total OUT: 850 mL    Total NET: -100 mL      13 Aug 2020 07:01  -  13 Aug 2020 08:30  --------------------------------------------------------  IN:    lactated ringers.: 150 mL  Total IN: 150 mL    OUT:    Indwelling Catheter - Urethral: 100 mL  Total OUT: 100 mL    Total NET: 50 mL          PHYSICAL EXAM:    General/Neuro  Deficits:                             alert & oriented x 3, no focal deficits  Pupils:    Lungs:      clear to auscultation, Normal expansion/effort.     Cardiovascular : S1, S2.  Regular rate and rhythm.  Peripheral edema   Cardiac Rhythm: Normal Sinus Rhythm    GI: Abdomen soft, Non-tender, Non-distended.  +BS    Extremities: Extremities warm, pink, well-perfused. Pulses: palpable dp b/l    Derm: Good skin turgor, no skin breakdown.      :       Tobar catheter in place.      CXR:     LABS:  CAPILLARY BLOOD GLUCOSE      POCT Blood Glucose.: 216 mg/dL (13 Aug 2020 05:56)  POCT Blood Glucose.: 136 mg/dL (12 Aug 2020 23:37)                          9.0    6.93  )-----------( 142      ( 13 Aug 2020 06:55 )             30.0           141  |  100  |  21<H>  ----------------------------<  118<H>  4.8   |  36<H>  |  0.9    Ca    9.3      13 Aug 2020 06:55  Phos  3.8       Mg     2.3         TPro  6.6  /  Alb  4.3  /  TBili  0.3  /  DBili  x   /  AST  103<H>  /  ALT  80<H>  /  AlkPhos  144<H>        PT/INR - ( 13 Aug 2020 06:55 )   PT: 11.20 sec;   INR: 0.97 ratio         PTT - ( 13 Aug 2020 06:55 )  PTT:29.4 sec  CARDIAC MARKERS ( 13 Aug 2020 06:55 )  x     / <0.01 ng/mL / 113 U/L / x     / 4.6 ng/mL  CARDIAC MARKERS ( 13 Aug 2020 02:08 )  x     / x     / 126 U/L / x     / x      CARDIAC MARKERS ( 12 Aug 2020 14:32 )  x     / x     / 208 U/L / x     / x          Urinalysis Basic - ( 13 Aug 2020 03:00 )    Color: Yellow / Appearance: Clear / S.042 / pH: x  Gluc: x / Ketone: Small  / Bili: Negative / Urobili: <2 mg/dL   Blood: x / Protein: 30 mg/dL / Nitrite: Negative   Leuk Esterase: Negative / RBC: 4 /HPF / WBC 0 /HPF   Sq Epi: x / Non Sq Epi: 0 /HPF / Bacteria: Negative

## 2020-08-13 NOTE — CHART NOTE - NSCHARTNOTEFT_GEN_A_CORE
Vigorito, Di    89 y/o, well known to ICU, s/p fall w/ L intertrochanteric fx and R 10-11 rib fx, s/p LLE insertion of Gamma nail  OR 1h, EBL 100cc, IVF 800cc, U/O 50cc, 2gm of Ancef given. Spinal anesthesia used, pt was hemodynamically stable intra op    PE  -neuro intact  -CTA, no chest tenderness  -S1, S2  -abd soft, nt, nd  -santizo in place  -LLE 2 lateral dressings in place, c/d/i, soft, non tender, no hematoma/ecchymosis  -DP's palpable b/l    Labs pending    A/P  -pain control w/ tylenol  -BIPAP post op, encourage IS, AM cxr pending, solumedrol 40mg q12 as per Pulmonology  -keep normotensive, CE x3, post op EKG - Sinus arrhythmia,   -diet, PPI, senna  -santizo in place, strict i/o's, IVF LR @100, IVL once tolerating diet  -holding LVX as per Ortho til am, SCDs only  -darin-op ancef, monitor for signs of fever, f/u stat labs  -f/u f/s while on solu-medrol

## 2020-08-13 NOTE — PROGRESS NOTE ADULT - SUBJECTIVE AND OBJECTIVE BOX
Orthopedic Post-Operative Check    Procedure: L hip IMN  EBL: 100    Patient seen and examined postoperatively.  No acute issues. Pain in L hip well controlled.  Denies nausea/vomiting, fever/chills, chest pain/SOB.    T(C): 37 (08-13-20 @ 19:15), Max: 37.1 (08-13-20 @ 08:00)  HR: 89 (08-13-20 @ 20:30) (68 - 101)  BP: 154/67 (08-13-20 @ 20:30) (99/55 - 160/68)  RR: 18 (08-13-20 @ 20:30) (18 - 25)  SpO2: 97% (08-13-20 @ 20:30) (95% - 100%)    No apparent distress  Nonlabored breathing    LLE  Dressing C/D/I  Motor intact EHL/FHL/TA/Gastroc  SILT s/s/sp/dp/t distribution  Palpable DP/PT pulses, foot/toes wwp    08-13-20 @ 17:47  H/H: 7.7/25.6  WBC: 8.83  08-13-20 @ 06:55  H/H: 9.0/30.0  WBC: 6.93  08-13-20 @ 02:08  H/H: 9.4/30.6  WBC: 7.88      08-13-20 @ 17:47  Na 141   K 4.8    Cl 102  HCO3 29    BUN 18  Cr 0.7  Gluc 110  08-13-20 @ 06:55  Na 141   K 4.8    Cl 100  HCO3 36    BUN 21  Cr 0.9  Gluc 118  08-13-20 @ 02:08  Na 138   K 5.5    Cl 98  HCO3 33    BUN 22  Cr 0.8  Gluc 151

## 2020-08-13 NOTE — CONSULT NOTE ADULT - ASSESSMENT
Assessment:  89 y/o F DNR/DNI PMH COPD on 3-4L NC, hypothyroidism, lives at home with her son, ADLs intact presenting to the ED s/p mechanical fall, -HT, -LOC, -AC. Per the patient she had gotten up to check on her albuterol treatment, started to sit back down on her recliner but missed, falling on her left side. She denies LOC, HT. She was unable to ambulate after the fall and complains of L hip pain. No obvious external signs of trauma on initial examination. Cardiology was consulted for pre-op optimization for surgery by orthopedics.      * SUMMARY:    * Patient-based characteristics (Functional capacity)  Patient is able to achieve more than 4 MET (walk 4 blocks, climb 2 flights of stairs, etc...)          Y [X] / N []    High-risk patient:   Recent (<30 days) or active MI          Y [] / N [X]                                    Unstable or severe angina          Y [] / N [X]                                    Decompensated heart failure, or worsening or new-onset heart failure          Y [] / N [X]                                    Severe valvular disease          Y [] / N [X]                                    Significant arrhythmia (Tachy- or Bradyarrhythmia)          Y [] / N [X]    * Surgery/Procedure-based characteristics (Type of surgery)  - Low-risk procedure (outpatient procedure, elective, endoscopy, etc...)          Y [] / N [X]  - Elevated or Moderate-risk procedure (Inpatient)          Y [X] / N []  - High-risk procedure (urgent/emergent procedure, Intrathoracic, vascular, etc...)          Y [] / N [X]    * Revised Cardiac Risk Index (RCRI)  1- History of ischemic heart disease          Y [] / N [X]  2- History of congestive heart failure          Y [] / N [X]  3- History of stroke/TIA          Y [] / N [X]  4- History of insulin-dependent diabetes          Y [] / N [X]  5- Chronic kidney disease (Cr >2mg/dL)          Y [] / N [X]  6- Undergoing suprainguinal vascular, intraperitoneal, or intrathoracic surgery          Y [] / N [X]    Class I risk (Zero factors) --> 3.9% (30-day risk of death, MI, or cardiac arrest)  Class II risk (One factor) --> 6% risk  Class III risk (Two factors) --> 10.1% risk  Class IV risk (Three factors or more) --> >15% risk    * IMPRESSION & RECOMMENDATIONS:  Low (<1%) / Moderate / High (>25%) -risk patient for a Low (<1%) / Moderate / High (>7%) -risk surgery/procedure  No further cardiac work-up is needed at the moment. There are no current cardiac contraindications to prevent from proceeding with the scheduled surgery/procedure.    - This consult serves only as a darin-operative cardiac risk stratification and evaluation to predict 30-days cardiac complications risk and mortality. The decision to proceed with the surgery/procedure is made by the performing physician and the patient - Assessment:  89 y/o F DNR/DNI PMH COPD on 3-4L NC, hypothyroidism, lives at home with her son, ADLs intact presenting to the ED s/p mechanical fall, -HT, -LOC, -AC. Per the patient she had gotten up to check on her albuterol treatment, started to sit back down on her recliner but missed, falling on her left side. She denies LOC, HT. She was unable to ambulate after the fall and complains of L hip pain. No obvious external signs of trauma on initial examination. Cardiology was consulted for pre-op optimization for surgery by orthopedics.      * SUMMARY:    * Patient-based characteristics (Functional capacity)  Patient is able to achieve more than 4 MET (walk 4 blocks, climb 2 flights of stairs, etc...)          Y [X] / N []    High-risk patient:   Recent (<30 days) or active MI          Y [] / N [X]                                    Unstable or severe angina          Y [] / N [X]                                    Decompensated heart failure, or worsening or new-onset heart failure          Y [] / N [X]                                    Severe valvular disease          Y [] / N [X]                                    Significant arrhythmia (Tachy- or Bradyarrhythmia)          Y [] / N [X]    * Surgery/Procedure-based characteristics (Type of surgery)  - Low-risk procedure (outpatient procedure, elective, endoscopy, etc...)          Y [] / N [X]  - Elevated or Moderate-risk procedure (Inpatient)          Y [X] / N []  - High-risk procedure (urgent/emergent procedure, Intrathoracic, vascular, etc...)          Y [] / N [X]    * Revised Cardiac Risk Index (RCRI)  1- History of ischemic heart disease          Y [] / N [X]  2- History of congestive heart failure          Y [] / N [X]  3- History of stroke/TIA          Y [] / N [X]  4- History of insulin-dependent diabetes          Y [] / N [X]  5- Chronic kidney disease (Cr >2mg/dL)          Y [] / N [X]  6- Undergoing suprainguinal vascular, intraperitoneal, or intrathoracic surgery          Y [] / N [X]    Class I risk (Zero factors) --> 3.9% (30-day risk of death, MI, or cardiac arrest)  Class II risk (One factor) --> 6% risk  Class III risk (Two factors) --> 10.1% risk  Class IV risk (Three factors or more) --> >15% risk    * IMPRESSION & RECOMMENDATIONS:  Intermediate risk    No further cardiac work-up is needed at the moment. There are no current cardiac contraindications to prevent from proceeding with the scheduled surgery/procedure.    - This consult serves only as a darin-operative cardiac risk stratification and evaluation to predict 30-days cardiac complications risk and mortality. The decision to proceed with the surgery/procedure is made by the performing physician and the patient -

## 2020-08-13 NOTE — CONSULT NOTE ADULT - ASSESSMENT
PE:  T=98.7   BP: 133/58   HR: 69   RR: 20           awake, alert and oriented x3, follows simple commands         pupils pinpoint bilaterally         no pronator drift         no tenderness on palpation to cervical, thoracic and lumbar spine region         moves all 4s spontaneously with good strength except LLE due to hip fracture and pain limited exam         sensation intact and symmetrical

## 2020-08-13 NOTE — PROGRESS NOTE ADULT - ASSESSMENT
Assessment & Plan    NEURO:    Pain-controlled with Tylenol     Monitor for changes in mental status     RESP:     R posterior 10-11 rib fractures. Encourage IS pulling 500cc     Hx of COPD. ON 3-4L NC at home. Accepting O2 sats >88%     AM CXR       CARDS:     Maintain normotension. MAP >65     Troponin x3 pending     EKG NSR        GI/NUTR:     Diet, NPO for OR tomorrow     GI Prophylaxis- Protonix     Bowel regimen- Senna     /RENAL:     Tobar in place. Monitor I&Os.     Monitor lytes, replete as needed     Trend BUN/Cr 25/0.9    LR @ 75cc/hr     HEME/ONC:     DVT prophylaxis- HSQ     Trend H/H     Type and Screen     2 units of PRBC on hold for OR tomorrow     ID:    No dx. Afebrile. Trend WBC         ENDO:    Hx of Hypothyroidism.     FS Q 6 hours while NPO     MSK:     L intertrochanteric hip fracture. OR tomorrow with Ortho     Hospitalist consulted at Ortho request for risk stratification     NWB LLE     LINES/DRAINS:  Ekaterina JOSEPH     Have to call tanja Donnelly to clarify medication list and DNR/DNI status **     DISPO:    CEU/Step Down Unit     D/W Dr Torres Assessment & Plan    NEURO:    Pain-controlled with Tylenol     Monitor for changes in mental status     RESP:     R posterior 10-11 rib fractures. Encourage IS pulling 500cc     Hx of COPD. ON 3-4L NC at home. Accepting O2 sats >88%     AM CXR       CARDS:     Maintain normotension. MAP >65     Troponin x3 pending     EKG NSR        GI/NUTR:     Diet, NPO for OR today     GI Prophylaxis- Protonix     Bowel regimen- Senna     /RENAL:     Tobar in place. Monitor I&Os.     OV- K 5.5, no EKG changes, treated, repeat 4.8    Monitor lytes, replete as needed     Trend BUN/Cr 25/0.9    LR @ 75cc/hr     HEME/ONC:     DVT prophylaxis- HSQ     Trend H/H 9/30    Type and Screen     2 units of PRBC on hold for OR    ID:    No dx. Afebrile. Trend WBC         ENDO:    Hx of Hypothyroidism.     FS Q 6 hours while NPO     MSK:     L intertrochanteric hip fracture. OR tomorrow with Ortho     Hospitalist consulted at Ortho request for risk stratification     NWB LLE     LINES/DRAINS:  Ekaterina JOSEPH     Have to call tanja Donnelly to clarify medication list and DNR/DNI status **     DISPO:    CEU/Step Down Unit

## 2020-08-13 NOTE — CONSULT NOTE ADULT - ASSESSMENT
Impression:  Severe COPD on home O2 4L NC  Probable Pulmonary HTN WHO III  Left comminuted intertrochanteric fracture  Clearance for ORIF    ARISCAT > 82; higher risk than average for post-op pulmonary complications including respiratory failure, hypoxemia, pneumonia, aspiration pneumonitis, atelectasis, pneumothorax, bronchospasm, COPD exacerbation  [facility] respiratory failure index 23; 4.2%  [place] postop respiratory failure 4.16%  NSQIP higher risk than average for serious complication, any complication, pneumonia, reintubation, cardiac, UTI, VTE, return to OR, sepsis & death  Please obtain ABG  c/w Breo-Ellipta & Incruse IH  Prednisone 40mg day before, of and postop  BiPAP 2 hours before and continuous postop  TTE  Modified Caprini score > 5, DVT ppx Lovenox 30mg bid Impression:  Severe COPD on home O2 4L NC  Probable Pulmonary HTN WHO III  Left comminuted intertrochanteric fracture  Clearance for ORIF    ARISCAT > 82; higher risk than average for post-op pulmonary complications including respiratory failure, hypoxemia, pneumonia, aspiration pneumonitis, atelectasis, pneumothorax, bronchospasm, COPD exacerbation  [facility] respiratory failure index 23; 4.2%  [place] postop respiratory failure 4.16%  NSQIP higher risk than average for serious complication, any complication, pneumonia, reintubation, cardiac, UTI, VTE, return to OR, sepsis & death  Please obtain ABG  c/w Breo-Ellipta & Incruse IH  Prednisone 40mg day before, of and postop  BiPAP 2 hours before and continuous postop  TTE  Modified Caprini score > 5, DVT ppx Lovenox 30mg bid  Pending Attending to see patient Impression:  Severe COPD on home O2 4L NC  Probable Pulmonary HTN WHO III  Left comminuted intertrochanteric fracture  Posterior 10-11th rib fractures  Clearance for ORIF    Recommendations:  ARISCAT > 82; higher risk than average for post-op pulmonary complications including respiratory failure, hypoxemia, pneumonia, aspiration pneumonitis, atelectasis, pneumothorax, bronchospasm, COPD exacerbation  [facility] failure index 23; 4.2%  [place] postop respiratory failure 4.16%  NSQIP higher risk than average for serious complication, any complication, pneumonia, reintubation, cardiac, UTI, VTE, return to OR, sepsis & death  Please obtain ABG  c/w Breo-Ellipta & Incruse IH  Prednisone 40mg day before, of and postop  BiPAP 2 hours before and continuous postop  TTE  Modified Caprini score > 5, DVT ppx Lovenox 30mg bid  Transfuse & attempt a target Hb > 10-11  Pending Attending to see patient Impression:  Severe COPD on home O2 4L NC  Probable Pulmonary HTN WHO III  Left comminuted intertrochanteric fracture  Right posterior 10-11th rib fractures  Clearance for ORIF    Recommendations:  ARISCAT > 82; higher risk than average for post-op pulmonary complications including respiratory failure, hypoxemia, pneumonia, aspiration pneumonitis, atelectasis, pneumothorax, bronchospasm, COPD exacerbation  [facility]; 4.2%  [place] postop respiratory failure 4.16%  NSQIP higher risk than average for serious complication, any complication, pneumonia, reintubation, cardiac, UTI, VTE, return to OR, sepsis & death  Please obtain ABG  c/w Breo-Ellipta & Incruse IH  Prednisone 40mg day before, of and postop  BiPAP 2 hours before and continuous postop  TTE  Transfuse & attempt a target Hb > 10-11  Pain control, avoid opioids if possible  Bowel regimen  Modified Caprini score > 5, DVT ppx Lovenox 30mg bid  Pending Attending to see patient Impression:  Severe COPD on home O2 4L NC  Probable Pulmonary HTN WHO III  Left comminuted intertrochanteric fracture  Right posterior 10-11th rib fractures  Clearance for ORIF    Recommendations:  ARISCAT > 82; higher risk than average for post-op pulmonary complications including respiratory failure, hypoxemia, pneumonia, aspiration pneumonitis, atelectasis, pneumothorax, bronchospasm, COPD exacerbation  [facility] index 23; 4.2%  [place] postop respiratory failure 4.16%  NSQIP higher risk than average for serious complication, any complication, pneumonia, reintubation, cardiac, UTI, VTE, return to OR, sepsis & death  Please obtain ABG  c/w Breo-Ellipta & Incruse IH or alternative LABA/ICS & LAMA  Solumedrol 40mg bid  BiPAP 2 hours before and continuous postop  TTE  Hb target per Ortho  Pain control, avoid opioids if possible  Bowel regimen  Modified Caprini score > 5, DVT ppx per Ortho  Avoid general anesthesia, if possible epidural or NMB Impression:  Severe COPD on home O2 4L NC  Probable Pulmonary HTN WHO III  Left comminuted intertrochanteric fracture  Right posterior 10-11th rib fractures  Clearance for ORIF    Recommendations:  ARISCAT > 82; higher risk than average for post-op pulmonary complications including respiratory failure, hypoxemia, pneumonia, aspiration pneumonitis, atelectasis, pneumothorax, bronchospasm, COPD exacerbation  [facility] 23; 4.2%  [place] postop respiratory failure 4.16%  NSQIP higher risk than average for serious complication, any complication, pneumonia, reintubation, cardiac, UTI, VTE, return to OR, sepsis & death  Please obtain ABG  c/w Breo-Ellipta & Incruse IH or alternative LABA/ICS & LAMA  Solumedrol 40mg bid  BiPAP 2 hours before and continuous postop  TTE  Hb target per Ortho  Incentive spirometer  Pain control, avoid opioids if possible  Bowel regimen  Modified Caprini score > 5, DVT ppx per Ortho  Avoid general anesthesia, if possible epidural or NMB Impression:  Severe COPD on home O2 4L NC  Left comminuted intertrochanteric fracture sp fall  Right posterior 10-11th rib fractures  Clearance for ORIF    Recommendations:  ARISCAT > 82; higher risk than average for post-op pulmonary complications including respiratory failure, hypoxemia, pneumonia, aspiration pneumonitis, atelectasis, pneumothorax, bronchospasm, COPD exacerbation  [facility] 23; 4.2%  [place] postop respiratory failure 4.16%  NSQIP higher risk than average for serious complication, any complication, pneumonia, reintubation, cardiac, UTI, VTE, return to OR, sepsis & death  Please obtain ABG  c/w Breo-Ellipta & Incruse IH or alternative LABA/ICS & LAMA  Solumedrol 40mg bid  BiPAP 2 hours before and continuous postop  Incentive spirometer  Pain control, avoid opioids if possible  Bowel regimen  spoke with son

## 2020-08-13 NOTE — CONSULT NOTE ADULT - ASSESSMENT
#mechanical fall  #left hip fracture  #copd with chronic resp failure  #hypothyroidism   #DNR/I    - bed rest, pain control, npo - add on for OR  - Ortho for ORIF  - discussed with pulmonary - resume maintance inhalers - in house can use Symbicort and spiriva   - solumderol perioperatively   - pt and son willing to rescind dnr/i for surgery   - follow up with Pulm recs  - resume synthroid (not ordered)  - GOC discussed with pt and son over phone - DNR/I - willing to rescind for surgery  - proceed with all other medical tx  time spent 20 mins  - consider post op icu monitoring   - dvt ppx  - pt has METS>4 prior to fall , no chest pain, no CHF sx - pt is moderate risk for intermediate risk procedure , EKG wnl no ischemic changes - may proceed - no further cardiac workup needed     discussed with surgical team and pulmonary and son over phone   post op pt/rehab

## 2020-08-13 NOTE — PROGRESS NOTE ADULT - SUBJECTIVE AND OBJECTIVE BOX
Orthopaedics Progress Note    TANNA MCCRACKEN  [id number]    Patient is a 89y year old Female with L IT fx s/p fall yesterday.    Patient seen and examined bedside this morning. Pain in L hip well controlled. No other complaints. Denies nausea/vomiting, fever/chills, chest pain/SOB.    acetaminophen   Tablet .. 650 milliGRAM(s) Oral every 6 hours PRN  heparin   Injectable 5000 Unit(s) SubCutaneous every 8 hours  insulin regular  human corrective regimen sliding scale   IV Push every 6 hours  lactated ringers. 1000 milliLiter(s) IV Continuous <Continuous>  pantoprazole    Tablet 40 milliGRAM(s) Oral before breakfast  senna 2 Tablet(s) Oral at bedtime      T(C): 37.1 (08-13-20 @ 08:00), Max: 37.1 (08-13-20 @ 08:00)  HR: 78 (08-13-20 @ 08:00) (71 - 92)  BP: 127/60 (08-13-20 @ 08:00) (127/60 - 189/76)  RR: 19 (08-13-20 @ 08:00) (18 - 19)  SpO2: 99% (08-13-20 @ 08:00) (95% - 99%)    Physical Exam  NAD, resting comfortably  Breathing comfortably on RA    LLE  Skin intact; no erythema, ecchymosis, fluctuance  TTP L hip, nttp elsewhere  Compartments soft, compressible  Motor: TA/EHL/Gastroc intact  Sensory: SP/DP/Sorto/Sa intact  Vasc: foot WWP, cap refill <2s    Labs                        9.0    6.93  )-----------( 142      ( 13 Aug 2020 06:55 )             30.0     08-13    141  |  100  |  21<H>  ----------------------------<  118<H>  4.8   |  36<H>  |  0.9    Ca    9.3      13 Aug 2020 06:55  Phos  3.8     08-13  Mg     2.3     08-13    TPro  6.6  /  Alb  4.3  /  TBili  0.3  /  DBili  x   /  AST  103<H>  /  ALT  80<H>  /  AlkPhos  144<H>  08-12    LIVER FUNCTIONS - ( 12 Aug 2020 14:32 )  Alb: 4.3 g/dL / Pro: 6.6 g/dL / ALK PHOS: 144 U/L / ALT: 80 U/L / AST: 103 U/L / GGT: x           PT/INR - ( 13 Aug 2020 06:55 )   PT: 11.20 sec;   INR: 0.97 ratio         PTT - ( 13 Aug 2020 06:55 )  PTT:29.4 sec    A/P: Patient is a 89y year old Female as above, with plans for operative fixation of L IT fx with orthopaedics today. Consent in chart. Pending medical clearance.  -NWB LLE  -DVT ppx  -Pain control  -NPO pending OR this afternoon  -pre-op for today: needs medical clearance

## 2020-08-13 NOTE — CONSULT NOTE ADULT - SUBJECTIVE AND OBJECTIVE BOX
Patient is a 89y old  Female who presents with a chief complaint of     HPI:  89F DNR/DNI PMH COPD on 3-4L NC, hypothyroidism, lives at home with her son, ADLs intact presenting to the ED s/p mechanical fall, -HT, -LOC, -AC. Per the patient she had gotten up to check on her albuterol treatment, started to sit back down on her recliner but missed, falling on her left side. She denies LOC, HT. She was unable to ambulate after the fall and complains of L hip pain. No obvious external signs of trauma on initial examination. (12 Aug 2020 21:20)      PAST MEDICAL & SURGICAL HISTORY:  Hypothyroid  COPD (chronic obstructive pulmonary disease)      SOCIAL HX:   Smoking                         ETOH                            Other    FAMILY HISTORY:  No cardiovascular or pulmonary family history     REVIEW OF SYSTEMS:  All ROS are negative exept per HPI     Allergies  No Known Allergies  Intolerances      PHYSICAL EXAM  Vital Signs Last 24 Hrs  T(C): 37.1 (13 Aug 2020 08:00), Max: 37.1 (13 Aug 2020 08:00)  T(F): 98.7 (13 Aug 2020 08:00), Max: 98.7 (13 Aug 2020 08:00)  HR: 78 (13 Aug 2020 08:00) (71 - 92)  BP: 127/60 (13 Aug 2020 08:00) (127/60 - 189/76)  BP(mean): 86 (13 Aug 2020 08:00) (86 - 86)  RR: 19 (13 Aug 2020 08:00) (18 - 19)  SpO2: 99% (13 Aug 2020 08:00) (95% - 99%)    CONSTITUTIONAL:  Well nourished.  NAD    ENT:   Airway patent,   No thrush    EYES:   Clear bilaterally,   pupils equal,   round and reactive to light.    CARDIAC:   Normal rate,   regular rhythm.    no edema    RESPIRATORY:   No wheezing   Normal chest expansion  Not tachypneic,  No use of accessory muscles    GASTROINTESTINAL:  Abdomen soft, non-tender,   No guarding,   Positive BS    MUSCULOSKELETAL:   Range of motion is not limited,  No clubbing, cyanosis    NEUROLOGICAL:   Alert and oriented   No motor deficits.    SKIN:   Skin normal color for race,   No evidence of rash.      HEME LYMPH:   No cervical  lymphadenopathy.  no inguinal lymphadenopathy        LABS:                        9.0    6.93  )-----------( 142      ( 13 Aug 2020 06:55 )             30.0     08-13  141  |  100  |  21<H>  ----------------------------<  118<H>  4.8   |  36<H>  |  0.9    Ca    9.3     13 Aug 2020 06:55  Phos  3.8     08  Mg     2.3       TPro  6.6  /  Alb  4.3  /  TBili  0.3  /  DBili  x   /  AST  103<H>  /  ALT  80<H>  /  AlkPhos  144<H>  0812  PT/INR - ( 13 Aug 2020 06:55 )   PT: 11.20 sec;   INR: 0.97 ratio    PTT - ( 13 Aug 2020 06:55 )  PTT:29.4 sec                                           Urinalysis Basic - ( 13 Aug 2020 03:00 )  Color: Yellow / Appearance: Clear / S.042 / pH: x  Gluc: x / Ketone: Small  / Bili: Negative / Urobili: <2 mg/dL   Blood: x / Protein: 30 mg/dL / Nitrite: Negative   Leuk Esterase: Negative / RBC: 4 /HPF / WBC 0 /HPF   Sq Epi: x / Non Sq Epi: 0 /HPF / Bacteria: Negative      CARDIAC MARKERS ( 13 Aug 2020 06:55 )  x     / <0.01 ng/mL / 113 U/L / x     / 4.6 ng/mL  CARDIAC MARKERS ( 13 Aug 2020 02:08 )  x     / x     / 126 U/L / x     / x      CARDIAC MARKERS ( 12 Aug 2020 14:32 )  x     / x     / 208 U/L / x     / x        LIVER FUNCTIONS - ( 12 Aug 2020 14:32 )  Alb: 4.3 g/dL / Pro: 6.6 g/dL / ALK PHOS: 144 U/L / ALT: 80 U/L / AST: 103 U/L / GGT: x                          MEDICATIONS  (STANDING):  heparin   Injectable 5000 Unit(s) SubCutaneous every 8 hours  insulin regular  human corrective regimen sliding scale   IV Push every 6 hours  lactated ringers. 1000 milliLiter(s) (75 mL/Hr) IV Continuous <Continuous>  pantoprazole    Tablet 40 milliGRAM(s) Oral before breakfast  senna 2 Tablet(s) Oral at bedtime    MEDICATIONS  (PRN):  acetaminophen   Tablet .. 650 milliGRAM(s) Oral every 6 hours PRN Mild Pain (1 - 3)      X-Rays reviewed:    CXR interpreted by me: Patient is a 89y old  Female who presents with a chief complaint of     HPI:  89F DNR/DNI PMH COPD on 3-4L NC, hypothyroidism, lives at home with her son, ADLs intact presenting to the ED s/p mechanical fall, -HT, -LOC, -AC. Per the patient she had gotten up to check on her albuterol treatment, started to sit back down on her recliner but missed, falling on her left side. She denies LOC, HT. She was unable to ambulate after the fall and complains of L hip pain. No obvious external signs of trauma on initial examination. (12 Aug 2020 21:20)      PAST MEDICAL & SURGICAL HISTORY:  Hypothyroid  COPD (chronic obstructive pulmonary disease)      SOCIAL HX:   Smoking                         ETOH                            Other    FAMILY HISTORY:  No cardiovascular or pulmonary family history     REVIEW OF SYSTEMS:  All ROS are negative exept per HPI     Allergies  No Known Allergies  Intolerances      PHYSICAL EXAM  Vital Signs Last 24 Hrs  T(C): 37.1 (13 Aug 2020 08:00), Max: 37.1 (13 Aug 2020 08:00)  T(F): 98.7 (13 Aug 2020 08:00), Max: 98.7 (13 Aug 2020 08:00)  HR: 78 (13 Aug 2020 08:00) (71 - 92)  BP: 127/60 (13 Aug 2020 08:00) (127/60 - 189/76)  BP(mean): 86 (13 Aug 2020 08:00) (86 - 86)  RR: 19 (13 Aug 2020 08:00) (18 - 19)  SpO2: 99% (13 Aug 2020 08:00) (95% - 99%)    CONSTITUTIONAL:  Frail.  NAD    ENT:   Airway patent,   No thrush    EYES:   Clear bilaterally,   pupils equal,   round and reactive to light.    CARDIAC:   Normal rate,   regular rhythm.    no edema    RESPIRATORY:   Right inspiratory crackles  No wheezing   Normal chest expansion  Not tachypneic,  No use of accessory muscles    GASTROINTESTINAL:  Abdomen soft, non-tender,   No guarding,   Positive BS    MUSCULOSKELETAL:   Shortened left extremity  Externally rotated left lower extremity  Left hip tenderness  Mild clubbing, no cyanosis    NEUROLOGICAL:   Alert and oriented   No motor deficits.    SKIN:   Skin normal color for race,   No evidence of rash.    HEME LYMPH:   No cervical  lymphadenopathy.  no inguinal lymphadenopathy      LABS:                        9.0    6.93  )-----------( 142      ( 13 Aug 2020 06:55 )             30.0     08-13  141  |  100  |  21<H>  ----------------------------<  118<H>  4.8   |  36<H>  |  0.9    Ca    9.3     13 Aug 2020 06:55  Phos  3.8     08-13  Mg     2.3     -  TPro  6.6  /  Alb  4.3  /  TBili  0.3  /  DBili  x   /  AST  103<H>  /  ALT  80<H>  /  AlkPhos  144<H>  08-12  PT/INR - ( 13 Aug 2020 06:55 )   PT: 11.20 sec;   INR: 0.97 ratio    PTT - ( 13 Aug 2020 06:55 )  PTT:29.4 sec                                           Urinalysis Basic - ( 13 Aug 2020 03:00 )  Color: Yellow / Appearance: Clear / S.042 / pH: x  Gluc: x / Ketone: Small  / Bili: Negative / Urobili: <2 mg/dL   Blood: x / Protein: 30 mg/dL / Nitrite: Negative   Leuk Esterase: Negative / RBC: 4 /HPF / WBC 0 /HPF   Sq Epi: x / Non Sq Epi: 0 /HPF / Bacteria: Negative      CARDIAC MARKERS ( 13 Aug 2020 06:55 )  x     / <0.01 ng/mL / 113 U/L / x     / 4.6 ng/mL  CARDIAC MARKERS ( 13 Aug 2020 02:08 )  x     / x     / 126 U/L / x     / x      CARDIAC MARKERS ( 12 Aug 2020 14:32 )  x     / x     / 208 U/L / x     / x        LIVER FUNCTIONS - ( 12 Aug 2020 14:32 )  Alb: 4.3 g/dL / Pro: 6.6 g/dL / ALK PHOS: 144 U/L / ALT: 80 U/L / AST: 103 U/L / GGT: x                          MEDICATIONS  (STANDING):  heparin   Injectable 5000 Unit(s) SubCutaneous every 8 hours  insulin regular  human corrective regimen sliding scale   IV Push every 6 hours  lactated ringers. 1000 milliLiter(s) (75 mL/Hr) IV Continuous <Continuous>  pantoprazole    Tablet 40 milliGRAM(s) Oral before breakfast  senna 2 Tablet(s) Oral at bedtime    MEDICATIONS  (PRN):  acetaminophen   Tablet .. 650 milliGRAM(s) Oral every 6 hours PRN Mild Pain (1 - 3)      < from: CT Abdomen and Pelvis w/ IV Cont (20 @ 18:59) >  FINDINGS:  CHEST:  LUNGS/PLEURA/AIRWAYS: Secretions within the trachea. Moderate centrilobular emphysema. Evaluation of lung parenchyma is limited secondary to patient motion artifact.  MEDIASTINUM/THORACIC NODES: Unremarkable.  HEART/GREAT VESSELS: Aortic and coronary artery calcifications.    ABDOMEN/PELVIS:  HEPATOBILIARY: Unremarkable.  SPLEEN: Unremarkable.  PANCREAS: Unremarkable.  ADRENAL GLANDS: Unremarkable.  KIDNEYS: Numerous bilateral renal cysts. No hydronephrosis.  ABDOMINOPELVIC NODES: Unremarkable.  PELVIC ORGANS: Evaluation of pelvis is limited secondary to metal artifact. .  PERITONEUM/MESENTERY/BOWEL: No obvious bowel obstruction, pneumoperitoneum or ascites. Diffuse sigmoid diverticulosis without evidence of diverticulitis.  BONES/SOFT TISSUES: Acute right posterior 10th and 11th rib fractures. Age indeterminant T5 vertebral body compression deformity. Acute left sided comminuted intertrochanteric fracture with moderate surrounding hematoma. Deformity of the left fourth rib compatible with chronic fracture.    IMPRESSION:  1.  Acute left sided comminuted intertrochanteric fracture with moderate surrounding hematoma.  2.  Acute right posterior 10th and 11th ribfractures.  3.  Age indeterminant T5 vertebral body compression deformity.  4.  Additional findings as detailed above.  < end of copied text > Patient is a 89y old  Female who presents with a chief complaint of     HPI:  89F DNR/DNI PMH COPD on 3-4L NC, hypothyroidism, lives at home with her son, ADLs intact presenting to the ED s/p mechanical fall, -HT, -LOC, -AC. Per the patient she had gotten up to check on her albuterol treatment, started to sit back down on her recliner but missed, falling on her left side. She denies LOC, HT. She was unable to ambulate after the fall and complains of L hip pain. No obvious external signs of trauma on initial examination. (12 Aug 2020 21:20)      PAST MEDICAL & SURGICAL HISTORY:  Hypothyroid  COPD (chronic obstructive pulmonary disease)      SOCIAL HX:   Smoking                         ETOH                            Other    FAMILY HISTORY:  No cardiovascular or pulmonary family history     REVIEW OF SYSTEMS:  All ROS are negative exept per HPI     Allergies  No Known Allergies  Intolerances      PHYSICAL EXAM  Vital Signs Last 24 Hrs  T(C): 37.1 (13 Aug 2020 08:00), Max: 37.1 (13 Aug 2020 08:00)  T(F): 98.7 (13 Aug 2020 08:00), Max: 98.7 (13 Aug 2020 08:00)  HR: 78 (13 Aug 2020 08:00) (71 - 92)  BP: 127/60 (13 Aug 2020 08:00) (127/60 - 189/76)  BP(mean): 86 (13 Aug 2020 08:00) (86 - 86)  RR: 19 (13 Aug 2020 08:00) (18 - 19)  SpO2: 99% (13 Aug 2020 08:00) (95% - 99%)    CONSTITUTIONAL:  Frail.  NAD    ENT:   Airway patent,   No thrush    EYES:   Clear bilaterally,   pupils equal,   round and reactive to light.    CARDIAC:   Normal rate,   regular rhythm.    no edema    RESPIRATORY:   Right inspiratory crackles  No wheezing   Normal chest expansion  Not tachypneic,  No use of accessory muscles    GASTROINTESTINAL:  Abdomen soft, non-tender,   No guarding,   Positive BS    MUSCULOSKELETAL:   Shortened left extremity  Externally rotated left lower extremity  Left hip tenderness  Right lower posterior chest wall mild tenderness  Mild clubbing, no cyanosis    NEUROLOGICAL:   Alert and oriented   No motor deficits.    SKIN:   Skin normal color for race,   No evidence of rash.    HEME LYMPH:   No cervical  lymphadenopathy.  no inguinal lymphadenopathy      LABS:                        9.0    6.93  )-----------( 142      ( 13 Aug 2020 06:55 )             30.0     08-13  141  |  100  |  21<H>  ----------------------------<  118<H>  4.8   |  36<H>  |  0.9    Ca    9.3     13 Aug 2020 06:55  Phos  3.8     08-13  Mg     2.3     08-13  TPro  6.6  /  Alb  4.3  /  TBili  0.3  /  DBili  x   /  AST  103<H>  /  ALT  80<H>  /  AlkPhos  144<H>  08-12  PT/INR - ( 13 Aug 2020 06:55 )   PT: 11.20 sec;   INR: 0.97 ratio    PTT - ( 13 Aug 2020 06:55 )  PTT:29.4 sec                                           Urinalysis Basic - ( 13 Aug 2020 03:00 )  Color: Yellow / Appearance: Clear / S.042 / pH: x  Gluc: x / Ketone: Small  / Bili: Negative / Urobili: <2 mg/dL   Blood: x / Protein: 30 mg/dL / Nitrite: Negative   Leuk Esterase: Negative / RBC: 4 /HPF / WBC 0 /HPF   Sq Epi: x / Non Sq Epi: 0 /HPF / Bacteria: Negative      CARDIAC MARKERS ( 13 Aug 2020 06:55 )  x     / <0.01 ng/mL / 113 U/L / x     / 4.6 ng/mL  CARDIAC MARKERS ( 13 Aug 2020 02:08 )  x     / x     / 126 U/L / x     / x      CARDIAC MARKERS ( 12 Aug 2020 14:32 )  x     / x     / 208 U/L / x     / x        LIVER FUNCTIONS - ( 12 Aug 2020 14:32 )  Alb: 4.3 g/dL / Pro: 6.6 g/dL / ALK PHOS: 144 U/L / ALT: 80 U/L / AST: 103 U/L / GGT: x                          MEDICATIONS  (STANDING):  heparin   Injectable 5000 Unit(s) SubCutaneous every 8 hours  insulin regular  human corrective regimen sliding scale   IV Push every 6 hours  lactated ringers. 1000 milliLiter(s) (75 mL/Hr) IV Continuous <Continuous>  pantoprazole    Tablet 40 milliGRAM(s) Oral before breakfast  senna 2 Tablet(s) Oral at bedtime    MEDICATIONS  (PRN):  acetaminophen   Tablet .. 650 milliGRAM(s) Oral every 6 hours PRN Mild Pain (1 - 3)      < from: CT Abdomen and Pelvis w/ IV Cont (20 @ 18:59) >  FINDINGS:  CHEST:  LUNGS/PLEURA/AIRWAYS: Secretions within the trachea. Moderate centrilobular emphysema. Evaluation of lung parenchyma is limited secondary to patient motion artifact.  MEDIASTINUM/THORACIC NODES: Unremarkable.  HEART/GREAT VESSELS: Aortic and coronary artery calcifications.    ABDOMEN/PELVIS:  HEPATOBILIARY: Unremarkable.  SPLEEN: Unremarkable.  PANCREAS: Unremarkable.  ADRENAL GLANDS: Unremarkable.  KIDNEYS: Numerous bilateral renal cysts. No hydronephrosis.  ABDOMINOPELVIC NODES: Unremarkable.  PELVIC ORGANS: Evaluation of pelvis is limited secondary to metal artifact. .  PERITONEUM/MESENTERY/BOWEL: No obvious bowel obstruction, pneumoperitoneum or ascites. Diffuse sigmoid diverticulosis without evidence of diverticulitis.  BONES/SOFT TISSUES: Acute right posterior 10th and 11th rib fractures. Age indeterminant T5 vertebral body compression deformity. Acute left sided comminuted intertrochanteric fracture with moderate surrounding hematoma. Deformity of the left fourth rib compatible with chronic fracture.    IMPRESSION:  1.  Acute left sided comminuted intertrochanteric fracture with moderate surrounding hematoma.  2.  Acute right posterior 10th and 11th ribfractures.  3.  Age indeterminant T5 vertebral body compression deformity.  4.  Additional findings as detailed above.  < end of copied text > Patient is a 89y old  Female who presents with a chief complaint of fall    HPI:  89F DNR/DNI PMH COPD on 3-4L NC, hypothyroidism, lives at home with her son, ADLs intact presenting to the ED s/p mechanical fall, -HT, -LOC, -AC. Per the patient she had gotten up to check on her albuterol treatment, started to sit back down on her recliner but missed, falling on her left side. She denies LOC, HT. She was unable to ambulate after the fall and complains of L hip pain. No obvious external signs of trauma on initial examination. (12 Aug 2020 21:20)      PAST MEDICAL & SURGICAL HISTORY:  Hypothyroid  COPD (chronic obstructive pulmonary disease)      SOCIAL HX:   Smoking                         ETOH                            Other    FAMILY HISTORY:  No cardiovascular or pulmonary family history     REVIEW OF SYSTEMS:  All ROS are negative exept per HPI     Allergies  No Known Allergies  Intolerances      PHYSICAL EXAM  Vital Signs Last 24 Hrs  T(C): 37.1 (13 Aug 2020 08:00), Max: 37.1 (13 Aug 2020 08:00)  T(F): 98.7 (13 Aug 2020 08:00), Max: 98.7 (13 Aug 2020 08:00)  HR: 78 (13 Aug 2020 08:00) (71 - 92)  BP: 127/60 (13 Aug 2020 08:00) (127/60 - 189/76)  BP(mean): 86 (13 Aug 2020 08:00) (86 - 86)  RR: 19 (13 Aug 2020 08:00) (18 - 19)  SpO2: 99% (13 Aug 2020 08:00) (95% - 99%)    CONSTITUTIONAL:  Frail.  NAD    ENT:   Airway patent,   No thrush    EYES:   Clear bilaterally,   pupils equal,   round and reactive to light.    CARDIAC:   Normal rate,   regular rhythm.    no edema    RESPIRATORY:   Right inspiratory crackles  No wheezing   Normal chest expansion  Not tachypneic,  No use of accessory muscles    GASTROINTESTINAL:  Abdomen soft, non-tender,   No guarding,   Positive BS    MUSCULOSKELETAL:   Shortened left extremity  Externally rotated left lower extremity  Left hip tenderness  Right lower posterior chest wall mild tenderness  Mild clubbing, no cyanosis    NEUROLOGICAL:   Alert and oriented   No motor deficits.    SKIN:   Skin normal color for race,   No evidence of rash.    HEME LYMPH:   No cervical  lymphadenopathy.  no inguinal lymphadenopathy      LABS:                        9.0    6.93  )-----------( 142      ( 13 Aug 2020 06:55 )             30.0     08-13  141  |  100  |  21<H>  ----------------------------<  118<H>  4.8   |  36<H>  |  0.9    Ca    9.3     13 Aug 2020 06:55  Phos  3.8     08-13  Mg     2.3     08-13  TPro  6.6  /  Alb  4.3  /  TBili  0.3  /  DBili  x   /  AST  103<H>  /  ALT  80<H>  /  AlkPhos  144<H>  08-12  PT/INR - ( 13 Aug 2020 06:55 )   PT: 11.20 sec;   INR: 0.97 ratio    PTT - ( 13 Aug 2020 06:55 )  PTT:29.4 sec                                           Urinalysis Basic - ( 13 Aug 2020 03:00 )  Color: Yellow / Appearance: Clear / S.042 / pH: x  Gluc: x / Ketone: Small  / Bili: Negative / Urobili: <2 mg/dL   Blood: x / Protein: 30 mg/dL / Nitrite: Negative   Leuk Esterase: Negative / RBC: 4 /HPF / WBC 0 /HPF   Sq Epi: x / Non Sq Epi: 0 /HPF / Bacteria: Negative      CARDIAC MARKERS ( 13 Aug 2020 06:55 )  x     / <0.01 ng/mL / 113 U/L / x     / 4.6 ng/mL  CARDIAC MARKERS ( 13 Aug 2020 02:08 )  x     / x     / 126 U/L / x     / x      CARDIAC MARKERS ( 12 Aug 2020 14:32 )  x     / x     / 208 U/L / x     / x        LIVER FUNCTIONS - ( 12 Aug 2020 14:32 )  Alb: 4.3 g/dL / Pro: 6.6 g/dL / ALK PHOS: 144 U/L / ALT: 80 U/L / AST: 103 U/L / GGT: x                          MEDICATIONS  (STANDING):  heparin   Injectable 5000 Unit(s) SubCutaneous every 8 hours  insulin regular  human corrective regimen sliding scale   IV Push every 6 hours  lactated ringers. 1000 milliLiter(s) (75 mL/Hr) IV Continuous <Continuous>  pantoprazole    Tablet 40 milliGRAM(s) Oral before breakfast  senna 2 Tablet(s) Oral at bedtime    MEDICATIONS  (PRN):  acetaminophen   Tablet .. 650 milliGRAM(s) Oral every 6 hours PRN Mild Pain (1 - 3)      < from: CT Abdomen and Pelvis w/ IV Cont (20 @ 18:59) >  FINDINGS:  CHEST:  LUNGS/PLEURA/AIRWAYS: Secretions within the trachea. Moderate centrilobular emphysema. Evaluation of lung parenchyma is limited secondary to patient motion artifact.  MEDIASTINUM/THORACIC NODES: Unremarkable.  HEART/GREAT VESSELS: Aortic and coronary artery calcifications.    ABDOMEN/PELVIS:  HEPATOBILIARY: Unremarkable.  SPLEEN: Unremarkable.  PANCREAS: Unremarkable.  ADRENAL GLANDS: Unremarkable.  KIDNEYS: Numerous bilateral renal cysts. No hydronephrosis.  ABDOMINOPELVIC NODES: Unremarkable.  PELVIC ORGANS: Evaluation of pelvis is limited secondary to metal artifact. .  PERITONEUM/MESENTERY/BOWEL: No obvious bowel obstruction, pneumoperitoneum or ascites. Diffuse sigmoid diverticulosis without evidence of diverticulitis.  BONES/SOFT TISSUES: Acute right posterior 10th and 11th rib fractures. Age indeterminant T5 vertebral body compression deformity. Acute left sided comminuted intertrochanteric fracture with moderate surrounding hematoma. Deformity of the left fourth rib compatible with chronic fracture.    IMPRESSION:  1.  Acute left sided comminuted intertrochanteric fracture with moderate surrounding hematoma.  2.  Acute right posterior 10th and 11th ribfractures.  3.  Age indeterminant T5 vertebral body compression deformity.  4.  Additional findings as detailed above.  < end of copied text > Patient is a 89y old  Female who presents with a chief complaint of fall    HPI:  89F PMH severe COPD on 3-4L NC, hypothyroidism, lives at home with her son, ADLs intact presenting to the ED s/p mechanical fall, -HT, -LOC, -AC. Per the patient she had gotten up to check on her albuterol treatment, started to sit back down on her recliner but missed, falling on her left side. She denies LOC, HT. She was unable to ambulate after the fall and complains of L hip pain. No obvious external signs of trauma on initial examination. (12 Aug 2020 21:20) called for preop clearance, occ cough      PAST MEDICAL & SURGICAL HISTORY:  Hypothyroid  COPD (chronic obstructive pulmonary disease)      SOCIAL HX:   Smoking  ex  FAMILY HISTORY:  No cardiovascular or pulmonary family history     REVIEW OF SYSTEMS:  All ROS are negative exept per HPI     Allergies  No Known Allergies  Intolerances      PHYSICAL EXAM  Vital Signs Last 24 Hrs  T(C): 37.1 (13 Aug 2020 08:00), Max: 37.1 (13 Aug 2020 08:00)  T(F): 98.7 (13 Aug 2020 08:00), Max: 98.7 (13 Aug 2020 08:00)  HR: 78 (13 Aug 2020 08:00) (71 - 92)  BP: 127/60 (13 Aug 2020 08:00) (127/60 - 189/76)  BP(mean): 86 (13 Aug 2020 08:00) (86 - 86)  RR: 19 (13 Aug 2020 08:00) (18 - 19)  SpO2: 99% (13 Aug 2020 08:00) (95% - 99%)    CONSTITUTIONAL:  Frail.  NAD    ENT:   Airway patent,   No thrush    EYES:   Clear bilaterally,   pupils equal,   round and reactive to light.    CARDIAC:   Normal rate,   regular rhythm.    no edema    RESPIRATORY:   Right inspiratory crackles  No wheezing   Normal chest expansion  Not tachypneic,  No use of accessory muscles    GASTROINTESTINAL:  Abdomen soft, non-tender,   No guarding,   Positive BS    MUSCULOSKELETAL:   Shortened left extremity  Externally rotated left lower extremity  Left hip tenderness  Right lower posterior chest wall mild tenderness  Mild clubbing, no cyanosis    NEUROLOGICAL:   Alert and oriented   No motor deficits.      LABS:                        9.0    6.93  )-----------( 142      ( 13 Aug 2020 06:55 )             30.0     08-13  141  |  100  |  21<H>  ----------------------------<  118<H>  4.8   |  36<H>  |  0.9    Ca    9.3     13 Aug 2020 06:55  Phos  3.8     08-13  Mg     2.3       TPro  6.6  /  Alb  4.3  /  TBili  0.3  /  DBili  x   /  AST  103<H>  /  ALT  80<H>  /  AlkPhos  144<H>  08-12  PT/INR - ( 13 Aug 2020 06:55 )   PT: 11.20 sec;   INR: 0.97 ratio    PTT - ( 13 Aug 2020 06:55 )  PTT:29.4 sec                                           Urinalysis Basic - ( 13 Aug 2020 03:00 )  Color: Yellow / Appearance: Clear / S.042 / pH: x  Gluc: x / Ketone: Small  / Bili: Negative / Urobili: <2 mg/dL   Blood: x / Protein: 30 mg/dL / Nitrite: Negative   Leuk Esterase: Negative / RBC: 4 /HPF / WBC 0 /HPF   Sq Epi: x / Non Sq Epi: 0 /HPF / Bacteria: Negative      CARDIAC MARKERS ( 13 Aug 2020 06:55 )  x     / <0.01 ng/mL / 113 U/L / x     / 4.6 ng/mL  CARDIAC MARKERS ( 13 Aug 2020 02:08 )  x     / x     / 126 U/L / x     / x      CARDIAC MARKERS ( 12 Aug 2020 14:32 )  x     / x     / 208 U/L / x     / x        LIVER FUNCTIONS - ( 12 Aug 2020 14:32 )  Alb: 4.3 g/dL / Pro: 6.6 g/dL / ALK PHOS: 144 U/L / ALT: 80 U/L / AST: 103 U/L / GGT: x                          MEDICATIONS  (STANDING):  heparin   Injectable 5000 Unit(s) SubCutaneous every 8 hours  insulin regular  human corrective regimen sliding scale   IV Push every 6 hours  lactated ringers. 1000 milliLiter(s) (75 mL/Hr) IV Continuous <Continuous>  pantoprazole    Tablet 40 milliGRAM(s) Oral before breakfast  senna 2 Tablet(s) Oral at bedtime    MEDICATIONS  (PRN):  acetaminophen   Tablet .. 650 milliGRAM(s) Oral every 6 hours PRN Mild Pain (1 - 3)      < from: CT Abdomen and Pelvis w/ IV Cont (20 @ 18:59) >  FINDINGS:  CHEST:  LUNGS/PLEURA/AIRWAYS: Secretions within the trachea. Moderate centrilobular emphysema. Evaluation of lung parenchyma is limited secondary to patient motion artifact.  MEDIASTINUM/THORACIC NODES: Unremarkable.  HEART/GREAT VESSELS: Aortic and coronary artery calcifications.    ABDOMEN/PELVIS:  HEPATOBILIARY: Unremarkable.  SPLEEN: Unremarkable.  PANCREAS: Unremarkable.  ADRENAL GLANDS: Unremarkable.  KIDNEYS: Numerous bilateral renal cysts. No hydronephrosis.  ABDOMINOPELVIC NODES: Unremarkable.  PELVIC ORGANS: Evaluation of pelvis is limited secondary to metal artifact. .  PERITONEUM/MESENTERY/BOWEL: No obvious bowel obstruction, pneumoperitoneum or ascites. Diffuse sigmoid diverticulosis without evidence of diverticulitis.  BONES/SOFT TISSUES: Acute right posterior 10th and 11th rib fractures. Age indeterminant T5 vertebral body compression deformity. Acute left sided comminuted intertrochanteric fracture with moderate surrounding hematoma. Deformity of the left fourth rib compatible with chronic fracture.    IMPRESSION:  1.  Acute left sided comminuted intertrochanteric fracture with moderate surrounding hematoma.  2.  Acute right posterior 10th and 11th ribfractures.  3.  Age indeterminant T5 vertebral body compression deformity.  4.  Additional findings as detailed above.  < end of copied text >

## 2020-08-14 LAB
A1C WITH ESTIMATED AVERAGE GLUCOSE RESULT: 5.3 % — SIGNIFICANT CHANGE UP (ref 4–5.6)
ANION GAP SERPL CALC-SCNC: 12 MMOL/L — SIGNIFICANT CHANGE UP (ref 7–14)
ANION GAP SERPL CALC-SCNC: 9 MMOL/L — SIGNIFICANT CHANGE UP (ref 7–14)
APTT BLD: 28.8 SEC — SIGNIFICANT CHANGE UP (ref 27–39.2)
BUN SERPL-MCNC: 22 MG/DL — HIGH (ref 10–20)
BUN SERPL-MCNC: 25 MG/DL — HIGH (ref 10–20)
CALCIUM SERPL-MCNC: 8.5 MG/DL — SIGNIFICANT CHANGE UP (ref 8.5–10.1)
CALCIUM SERPL-MCNC: 8.6 MG/DL — SIGNIFICANT CHANGE UP (ref 8.5–10.1)
CHLORIDE SERPL-SCNC: 98 MMOL/L — SIGNIFICANT CHANGE UP (ref 98–110)
CHLORIDE SERPL-SCNC: 99 MMOL/L — SIGNIFICANT CHANGE UP (ref 98–110)
CK MB CFR SERPL CALC: 6 NG/ML — SIGNIFICANT CHANGE UP (ref 0.6–6.3)
CK MB CFR SERPL CALC: 6.4 NG/ML — HIGH (ref 0.6–6.3)
CK MB CFR SERPL CALC: 8.3 NG/ML — HIGH (ref 0.6–6.3)
CK SERPL-CCNC: 171 U/L — SIGNIFICANT CHANGE UP (ref 0–225)
CK SERPL-CCNC: 190 U/L — SIGNIFICANT CHANGE UP (ref 0–225)
CK SERPL-CCNC: 266 U/L — HIGH (ref 0–225)
CO2 SERPL-SCNC: 30 MMOL/L — SIGNIFICANT CHANGE UP (ref 17–32)
CO2 SERPL-SCNC: 30 MMOL/L — SIGNIFICANT CHANGE UP (ref 17–32)
CREAT SERPL-MCNC: 0.8 MG/DL — SIGNIFICANT CHANGE UP (ref 0.7–1.5)
CREAT SERPL-MCNC: 0.9 MG/DL — SIGNIFICANT CHANGE UP (ref 0.7–1.5)
ESTIMATED AVERAGE GLUCOSE: 105 MG/DL — SIGNIFICANT CHANGE UP (ref 68–114)
GLUCOSE BLDC GLUCOMTR-MCNC: 141 MG/DL — HIGH (ref 70–99)
GLUCOSE BLDC GLUCOMTR-MCNC: 142 MG/DL — HIGH (ref 70–99)
GLUCOSE BLDC GLUCOMTR-MCNC: 161 MG/DL — HIGH (ref 70–99)
GLUCOSE BLDC GLUCOMTR-MCNC: 174 MG/DL — HIGH (ref 70–99)
GLUCOSE BLDC GLUCOMTR-MCNC: 177 MG/DL — HIGH (ref 70–99)
GLUCOSE BLDC GLUCOMTR-MCNC: 187 MG/DL — HIGH (ref 70–99)
GLUCOSE SERPL-MCNC: 144 MG/DL — HIGH (ref 70–99)
GLUCOSE SERPL-MCNC: 163 MG/DL — HIGH (ref 70–99)
HCT VFR BLD CALC: 27.4 % — LOW (ref 37–47)
HGB BLD-MCNC: 8.2 G/DL — LOW (ref 12–16)
MAGNESIUM SERPL-MCNC: 2.2 MG/DL — SIGNIFICANT CHANGE UP (ref 1.8–2.4)
MCHC RBC-ENTMCNC: 29.9 G/DL — LOW (ref 32–37)
MCHC RBC-ENTMCNC: 30.6 PG — SIGNIFICANT CHANGE UP (ref 27–31)
MCV RBC AUTO: 102.2 FL — HIGH (ref 81–99)
NRBC # BLD: 0 /100 WBCS — SIGNIFICANT CHANGE UP (ref 0–0)
PHOSPHATE SERPL-MCNC: 4.2 MG/DL — SIGNIFICANT CHANGE UP (ref 2.1–4.9)
PLATELET # BLD AUTO: 126 K/UL — LOW (ref 130–400)
POTASSIUM SERPL-MCNC: 5 MMOL/L — SIGNIFICANT CHANGE UP (ref 3.5–5)
POTASSIUM SERPL-MCNC: 5.1 MMOL/L — HIGH (ref 3.5–5)
POTASSIUM SERPL-SCNC: 5 MMOL/L — SIGNIFICANT CHANGE UP (ref 3.5–5)
POTASSIUM SERPL-SCNC: 5.1 MMOL/L — HIGH (ref 3.5–5)
RBC # BLD: 2.68 M/UL — LOW (ref 4.2–5.4)
RBC # FLD: 13.8 % — SIGNIFICANT CHANGE UP (ref 11.5–14.5)
SODIUM SERPL-SCNC: 137 MMOL/L — SIGNIFICANT CHANGE UP (ref 135–146)
SODIUM SERPL-SCNC: 141 MMOL/L — SIGNIFICANT CHANGE UP (ref 135–146)
TROPONIN T SERPL-MCNC: <0.01 NG/ML — SIGNIFICANT CHANGE UP
WBC # BLD: 8.02 K/UL — SIGNIFICANT CHANGE UP (ref 4.8–10.8)
WBC # FLD AUTO: 8.02 K/UL — SIGNIFICANT CHANGE UP (ref 4.8–10.8)

## 2020-08-14 PROCEDURE — 71045 X-RAY EXAM CHEST 1 VIEW: CPT | Mod: 26

## 2020-08-14 PROCEDURE — 93010 ELECTROCARDIOGRAM REPORT: CPT

## 2020-08-14 PROCEDURE — 99233 SBSQ HOSP IP/OBS HIGH 50: CPT

## 2020-08-14 RX ORDER — METOPROLOL TARTRATE 50 MG
5 TABLET ORAL ONCE
Refills: 0 | Status: COMPLETED | OUTPATIENT
Start: 2020-08-14 | End: 2020-08-14

## 2020-08-14 RX ORDER — LANOLIN ALCOHOL/MO/W.PET/CERES
5 CREAM (GRAM) TOPICAL AT BEDTIME
Refills: 0 | Status: DISCONTINUED | OUTPATIENT
Start: 2020-08-14 | End: 2020-08-17

## 2020-08-14 RX ORDER — INSULIN LISPRO 100/ML
VIAL (ML) SUBCUTANEOUS
Refills: 0 | Status: DISCONTINUED | OUTPATIENT
Start: 2020-08-14 | End: 2020-08-17

## 2020-08-14 RX ADMIN — Medication 40 MILLIGRAM(S): at 06:08

## 2020-08-14 RX ADMIN — Medication 650 MILLIGRAM(S): at 06:08

## 2020-08-14 RX ADMIN — ATORVASTATIN CALCIUM 40 MILLIGRAM(S): 80 TABLET, FILM COATED ORAL at 21:40

## 2020-08-14 RX ADMIN — Medication 3: at 17:13

## 2020-08-14 RX ADMIN — Medication 25 MILLIGRAM(S): at 06:09

## 2020-08-14 RX ADMIN — Medication 650 MILLIGRAM(S): at 06:40

## 2020-08-14 RX ADMIN — Medication 650 MILLIGRAM(S): at 11:22

## 2020-08-14 RX ADMIN — Medication 650 MILLIGRAM(S): at 17:08

## 2020-08-14 RX ADMIN — Medication 3: at 11:23

## 2020-08-14 RX ADMIN — ENOXAPARIN SODIUM 40 MILLIGRAM(S): 100 INJECTION SUBCUTANEOUS at 11:22

## 2020-08-14 RX ADMIN — PANTOPRAZOLE SODIUM 40 MILLIGRAM(S): 20 TABLET, DELAYED RELEASE ORAL at 06:09

## 2020-08-14 RX ADMIN — SENNA PLUS 2 TABLET(S): 8.6 TABLET ORAL at 21:41

## 2020-08-14 RX ADMIN — Medication 5 MILLIGRAM(S): at 18:24

## 2020-08-14 RX ADMIN — Medication 650 MILLIGRAM(S): at 11:52

## 2020-08-14 RX ADMIN — Medication 100 MILLIGRAM(S): at 08:53

## 2020-08-14 RX ADMIN — Medication 40 MILLIGRAM(S): at 17:07

## 2020-08-14 RX ADMIN — Medication 100 MILLIGRAM(S): at 00:04

## 2020-08-14 RX ADMIN — CHLORHEXIDINE GLUCONATE 1 APPLICATION(S): 213 SOLUTION TOPICAL at 06:09

## 2020-08-14 RX ADMIN — Medication 5 MILLIGRAM(S): at 18:33

## 2020-08-14 RX ADMIN — Medication 650 MILLIGRAM(S): at 17:38

## 2020-08-14 RX ADMIN — Medication 650 MILLIGRAM(S): at 00:40

## 2020-08-14 RX ADMIN — Medication 650 MILLIGRAM(S): at 00:06

## 2020-08-14 NOTE — OCCUPATIONAL THERAPY INITIAL EVALUATION ADULT - PLANNED THERAPY INTERVENTIONS, OT EVAL
ADL retraining/balance training/ROM/transfer training/bed mobility training/strengthening/stretching

## 2020-08-14 NOTE — PHYSICAL THERAPY INITIAL EVALUATION ADULT - PERTINENT HX OF CURRENT PROBLEM, REHAB EVAL
Pt presented to hospital s/p fall @ home. Trauma w/u showed left hip intertrochanteric fx, right 10-11 rib fx and C1 fx ( neck collar ). s/p ORIF of left hip fx on 8/13, wbat as per ortho

## 2020-08-14 NOTE — PHYSICAL THERAPY INITIAL EVALUATION ADULT - GENERAL OBSERVATIONS, REHAB EVAL
10:00-10:52 Pt encountered semifowler in bed with tele, c/s collar, IV lock, 3L/m O2 NC, santizo, chair alarm, incision C/D/I, in NAD. Pt without c/o, denies pain. BP: 128/58 SpO2 99% on 3L/m. Pt agreeable to PT. Pt left same as found, sitting in b/s recliner chair in NAD with +chair alarm & OT Mahsa @ b/s. BP: 145/61 SpO2 99% on 3L/m. Pt with c/o pain 5/10 with bed mobility & 4/10 with standing activities.

## 2020-08-14 NOTE — CONSULT NOTE ADULT - CONSULT REQUESTED DATE/TIME
13-Aug-2020 09:01
13-Aug-2020 10:20
12-Aug-2020 17:14
12-Aug-2020 21:00
13-Aug-2020 12:01
13-Aug-2020 12:50
14-Aug-2020 08:20
12-Aug-2020 23:45

## 2020-08-14 NOTE — CONSULT NOTE ADULT - CONSULT REASON
Mechanical Fall   L intertrochanteric Fx   R 10-11 posterior rib fx
Pre-op clearance
Preop clearance  Severe COPD
Left hip fracture
S/p fall, -HT, -LOC, -AC
T5 compression fracture
left hip fx
preop risk

## 2020-08-14 NOTE — CHART NOTE - NSCHARTNOTEFT_GEN_A_CORE
At 6:10 PM, SICU PA receives call from CEU nurse that patient's HR is in the 160s. STAT EKG is ordered. CEU nurse told to call EKG tech. SICU PA arrives at bedside and finds patient tachycardic to 145 and BP 86/60 MAP 68. Patient is A&Ox3, conversant, in NAD. Metoprolol 5 IVP is given.  HR decreased to 130s then returns to 140s.  cc bolus is given as patient reported minimal PO intake. Five minutes later, a second dose of metoprolol 5 IVP is given. BP at this point is 105/70. After about 3 minutes, HR normalizes to 76. EKG tech arrives and EKG is obtained. EKG shows sinus rhythm with marked sinus arrythmia, non specific ST abnormalities. No ST elevation is observed. Monitor in CEU (which does not document twelve lead) looks to show Afib RVR. Patient's planned downgrade to the floor will be cancelled. Primary team will be notified. Patient will be monitored overnight in CEU. At 6:10 PM, SICU PA receives call from CEU nurse that patient's HR is in the 160s. STAT EKG is ordered. CEU nurse told to call EKG tech. SICU PA arrives at bedside and finds patient tachycardic to 145 and BP 86/60 MAP 68. Patient is A&Ox3, conversant, in NAD. Metoprolol 5 IVP is given.  HR decreased to 130s then returns to 140s.  cc bolus is given as patient reported minimal PO intake. Five minutes later, a second dose of metoprolol 5 IVP is given. BP at this point is 105/70. After about 3 minutes, HR normalizes to 76. EKG tech arrives and EKG is obtained. EKG shows sinus rhythm with marked sinus arrythmia, non specific ST abnormalities. No ST elevation is observed. Monitor in CEU (which does not document twelve lead) looks to show Afib RVR. Patient's planned downgrade to the floor will be cancelled. Primary team notified. Patient will be monitored overnight in CEU. CE x3 will be obtained along with AM EKG. At 6:10 PM, SICU PA receives call from CEU nurse that patient's HR is in the 160s. STAT EKG is ordered. CEU nurse told to call EKG tech. SICU PA arrives at bedside and finds patient tachycardic to 145 and BP 86/60 MAP 68. Patient is A&Ox3, conversant, in NAD. Metoprolol 5 IVP is given.  HR decreased to 130s then returns to 140s.  cc bolus is given as patient reported minimal PO intake. Five minutes later, a second dose of metoprolol 5 IVP is given. BP at this point is 105/70. After about 3 minutes, HR normalizes to 76. EKG tech arrives and EKG is obtained. EKG shows sinus rhythm with marked sinus arrythmia, non specific ST abnormalities. No ST elevation is observed. Monitor in CEU (which does not document twelve lead) looks to show Afib RVR. By the time SICU PA left bedside, /56 MAP 80. Patient's planned downgrade to the floor will be cancelled. Primary team notified. Patient will be monitored overnight in CEU. CE x3 will be obtained along with AM EKG.

## 2020-08-14 NOTE — CONSULT NOTE ADULT - SUBJECTIVE AND OBJECTIVE BOX
HPI:  89F DNR/DNI PMH COPD on 3-4L NC, hypothyroidism, lives at home with her son, ADLs intact presenting to the ED s/p mechanical fall, -HT, -LOC, -AC. Per the patient she had gotten up to check on her albuterol treatment, started to sit back down on her recliner but missed, falling on her left side. She denies LOC, HT. She was unable to ambulate after the fall and complains of L hip pain. No obvious external signs of trauma on initial examination. (12 Aug 2020 21:20). Trauma w/u showed left hip it fx, right 10-11 rib fx and C1 fx ( neck collar ). s/p orif of left hip fx on , wbat as per ortho      PAST MEDICAL & SURGICAL HISTORY:  Hypothyroid  COPD (chronic obstructive pulmonary disease)      Hospital Course:    TODAY'S SUBJECTIVE & REVIEW OF SYMPTOMS:     Constitutional WNL   Cardio WNL   Resp WNL   GI WNL  Heme WNL  Endo WNL  Skin WNL  MSK pain  Neuro WNL  Cognitive WNL  Psych WNL      MEDICATIONS  (STANDING):  acetaminophen   Tablet .. 650 milliGRAM(s) Oral every 6 hours  atorvastatin 40 milliGRAM(s) Oral at bedtime  ceFAZolin   IVPB 2000 milliGRAM(s) IV Intermittent every 8 hours  chlorhexidine 4% Liquid 1 Application(s) Topical <User Schedule>  enoxaparin Injectable 40 milliGRAM(s) SubCutaneous daily  insulin lispro (HumaLOG) corrective regimen sliding scale   SubCutaneous Before meals and at bedtime  levothyroxine 88 MICROGram(s) Oral daily  methylPREDNISolone sodium succinate Injectable 40 milliGRAM(s) IV Push every 12 hours  metoprolol succinate ER 25 milliGRAM(s) Oral daily  pantoprazole    Tablet 40 milliGRAM(s) Oral before breakfast  senna 2 Tablet(s) Oral at bedtime    MEDICATIONS  (PRN):  ondansetron Injectable 4 milliGRAM(s) IV Push every 6 hours PRN Nausea and/or Vomiting  oxyCODONE    IR 5 milliGRAM(s) Oral every 6 hours PRN Severe Pain (7 - 10)      FAMILY HISTORY:      Allergies    No Known Allergies    Intolerances        SOCIAL HISTORY:    [  ] Etoh  [  ] Smoking  [  ] Substance abuse     Home Environment:  [  ] Home Alone  [x  ] Lives with Family  [  ] Home Health Aid    Dwelling:  [  ] Apartment  [x  ] Private House  [  ] Adult Home  [  ] Skilled Nursing Facility      [  ] Short Term  [  ] Long Term  [x  ] Stairs       Elevator [  ]    FUNCTIONAL STATUS PTA: (Check all that apply)  Ambulation: [ x  ]Independent    [  ] Dependent     [  ] Non-Ambulatory  Assistive Device: [  ] SA Cane  [  ]  Q Cane  [  ] Walker  [  ]  Wheelchair  ADL : [x  ] Independent  [  ]  Dependent       Vital Signs Last 24 Hrs  T(C): 36.8 (14 Aug 2020 08:00), Max: 37.1 (13 Aug 2020 22:00)  T(F): 98.2 (14 Aug 2020 08:00), Max: 98.8 (13 Aug 2020 22:00)  HR: 77 (14 Aug 2020 08:00) (68 - 101)  BP: 117/53 (14 Aug 2020 08:00) (99/55 - 154/67)  BP(mean): 81 (13 Aug 2020 19:00) (70 - 106)  RR: 19 (14 Aug 2020 08:00) (18 - 25)  SpO2: 99% (14 Aug 2020 06:00) (95% - 100%)      PHYSICAL EXAM: Alert & Oriented X3  GENERAL: NAD, well-groomed, well-developed  HEAD:  Atraumatic, Normocephalic  CHEST/LUNG: Clear   HEART: S1S2+  ABDOMEN: Soft, Nontender  EXTREMITIES:  no calf tenderness    NERVOUS SYSTEM:  Cranial Nerves 2-12 intact [  ] Abnormal  [  ]  ROM: WFL all extremities [  ]  Abnormal [x  ]limited lle  Motor Strength: WFL all extremities  [  ]  Abnormal [x  ]limited lle  Sensation: intact to light touch [ x ] Abnormal [  ]  Reflexes: Symmetric [  ]  Abnormal [  ]    FUNCTIONAL STATUS:  Bed Mobility: Independent [  ]  Supervision [  ]  Needs Assistance [x  ]  N/A [  ]  Transfers: Independent [  ]  Supervision [  ]  Needs Assistance [ x ]  N/A [  ]   Ambulation: Independent [  ]  Supervision [  ]  Needs Assistance [  ]  N/A [  ]  ADL: Independent [  ] Requires Assistance [  ] N/A [  ]      LABS:                        8.2    8.02  )-----------( 126      ( 14 Aug 2020 00:03 )             27.4     08-14    137  |  98  |  22<H>  ----------------------------<  163<H>  5.1<H>   |  30  |  0.8    Ca    8.5      14 Aug 2020 00:03  Phos  4.2     08-14  Mg     2.2     08-14    TPro  6.6  /  Alb  4.3  /  TBili  0.3  /  DBili  x   /  AST  103<H>  /  ALT  80<H>  /  AlkPhos  144<H>  08-12    PT/INR - ( 13 Aug 2020 06:55 )   PT: 11.20 sec;   INR: 0.97 ratio         PTT - ( 13 Aug 2020 06:55 )  PTT:29.4 sec  Urinalysis Basic - ( 13 Aug 2020 03:00 )    Color: Yellow / Appearance: Clear / S.042 / pH: x  Gluc: x / Ketone: Small  / Bili: Negative / Urobili: <2 mg/dL   Blood: x / Protein: 30 mg/dL / Nitrite: Negative   Leuk Esterase: Negative / RBC: 4 /HPF / WBC 0 /HPF   Sq Epi: x / Non Sq Epi: 0 /HPF / Bacteria: Negative        RADIOLOGY & ADDITIONAL STUDIES:

## 2020-08-14 NOTE — OCCUPATIONAL THERAPY INITIAL EVALUATION ADULT - GENERAL OBSERVATIONS, REHAB EVAL
pt received seated in b/s chair in NAD with PT Sunni finishing PT evaluation, +IV lock, +[place] J collar, agreeable to OT evaluation left seated in b/s chair with +chair alarm, call bell in reach

## 2020-08-14 NOTE — PROGRESS NOTE ADULT - ASSESSMENT
Assessment & Plan    NEURO:    Pain-controlled with Tylenol and Oxycodone     Monitor for changes in mental status     [place] J collar in place. New finding of C1 non displaced fracture. Re consulted Neuro Sx overnight     RESP:     R posterior 10-11 rib fractures. Encourage IS pulling 500-750cc    Hx of COPD. On RA currently satting >92%     AM CXR       CARDS:     Maintain normotension. MAP >65     Troponin x3 pending     Hx of HTN and HLD: Restarted Metoprolol and Statin     EKG NSR        GI/NUTR:     Diet, regular     GI Prophylaxis- Protonix     Bowel regimen- Senna     /RENAL:     Tobar in place. Monitor I&Os.     Monitor lytes, replete as needed     Trend BUN/Cr     HEME/ONC:     DVT prophylaxis- Lovenox     Trend H/H     ID:    No dx. Afebrile. Trend WBC         ENDO:    Hx of Hypothyroidism. Restarted Synthroid     FS AC HS     MSK:     POD #1 s/p L hip gamma nail     PT/OT consult     LINES/DRAINS:  Ekaterina JOSEPH       DISPO:    CEU/Step Down Unit

## 2020-08-14 NOTE — OCCUPATIONAL THERAPY INITIAL EVALUATION ADULT - NS ASR FOLLOW COMMAND OT EVAL
You can access the [facility] Portal, offered by [facility], by registering with the following website: [email or web address] Psychiatric Center 100% of the time

## 2020-08-14 NOTE — PROGRESS NOTE ADULT - SUBJECTIVE AND OBJECTIVE BOX
TANNA MCCRACKEN  [id number]  89y Female    Indication for ICU admission: L intertrochanteric hip fracture. R posterior rib fracture 10-11     Admit Date:08-12-20  ICU Date:8/12  OR Date:    No Known Allergies    PAST MEDICAL & SURGICAL HISTORY:  Hypothyroid  COPD (chronic obstructive pulmonary disease)        24HRS EVENT:    Overnight: Was not able to get in contact w/ son Andrzej to verify meds. Restarted Metoprolol, Statin and Synthroid she refilled those prescriptions last in June 2020. CT cervical spine came back w/ fracture of the posterior arch of C1 extending into the transverse foramen. Patient has no tenderness and no neuro deficits. Power J collar was placed on the patient overnight and Neuro Sx was re-consulted.     NEURO:    Pain-controlled with Tylenol     Monitor for changes in mental status     Age indeterminant T5 fx: NSX c/s, CT c-spine pending     RESP:     R posterior 10-11 rib fractures. Encourage IS pulling 500cc     Hx of COPD. ON 3-4L NC at home. Accepting O2 sats >88%     AM CXR   - pulm c/s - BIPAP pre and post op, solumedrol 40q12, pre op ABG CO2 56      CARDS:     Maintain normotension. MAP >65     Troponin x3 pending     EKG NSR        GI/NUTR:     Diet, NPO for OR tomorrow     GI Prophylaxis- Protonix     Bowel regimen- Senna     /RENAL:     Tobar in place. UOP 50cc/hr     Hyper K treated. Mg repleted     Trend BUN/Cr 25/0.9    LR @ 75cc/hr     HEME/ONC:     DVT prophylaxis- HSQ     Hgb 12     Type and Screen     2 units of PRBC on hold for OR tomorrow     ID:    No dx. Afebrile. Trend WBC         ENDO:    Hx of Hypothyroidism. Clarify medications     FS Q 6 hours while NPO     MSK:     L intertrochanteric hip fracture. OR tomorrow with Ortho     Hospitalist consulted at Ortho request for risk stratification     NWB LLE         DVT PTX: HSQ     GI PTX: PPI     ***Tubes/Lines/Drains  ***  Peripheral IV  Urinary Catheter		    REVIEW OF SYSTEMS    [x] A ten-point review of systems was otherwise negative except as noted.  [ ] Due to altered mental status/intubation, subjective information were not able to be obtained from the patient. History was obtained, to the extent possible, from review of the chart and collateral sources of information. TANNA MCCRACKEN  [id number]  89y Female    Indication for ICU admission: L intertrochanteric hip fracture. R posterior rib fracture 10-11     Admit Date:20  ICU Date:  OR Date:    No Known Allergies    PAST MEDICAL & SURGICAL HISTORY:  Hypothyroid  COPD (chronic obstructive pulmonary disease)        24HRS EVENT:    Overnight: Was not able to get in contact w/ son Andrzej to verify meds. Restarted Metoprolol, Statin and Synthroid she refilled those prescriptions last in 2020. CT cervical spine came back w/ fracture of the posterior arch of C1 extending into the transverse foramen. Patient has no tenderness and no neuro deficits. [place] J collar was placed on the patient overnight and Neuro Sx was re-consulted.     NEURO:    Pain-controlled with Tylenol     Monitor for changes in mental status     Age indeterminant T5 fx: NSX c/s, CT c-spine pending     RESP:     R posterior 10-11 rib fractures. Encourage IS pulling 500cc     Hx of COPD. ON 3-4L NC at home. Accepting O2 sats >88%     AM CXR   - pulm c/s - BIPAP pre and post op, solumedrol 40q12, pre op ABG CO2 56      CARDS:     Maintain normotension. MAP >65     Troponin x3 pending     EKG NSR        GI/NUTR:     Diet, NPO for OR tomorrow     GI Prophylaxis- Protonix     Bowel regimen- Senna     /RENAL:     Tobar in place. UOP 50cc/hr     Hyper K treated. Mg repleted     Trend BUN/Cr 25/0.9    LR @ 75cc/hr     HEME/ONC:     DVT prophylaxis- HSQ     Hgb 12     Type and Screen     2 units of PRBC on hold for OR tomorrow     ID:    No dx. Afebrile. Trend WBC         ENDO:    Hx of Hypothyroidism. Clarify medications     FS Q 6 hours while NPO     MSK:     L intertrochanteric hip fracture. OR tomorrow with Ortho     Hospitalist consulted at Ortho request for risk stratification     NWB LLE         DVT PTX: HSQ     GI PTX: PPI     ***Tubes/Lines/Drains  ***  Peripheral IV  Urinary Catheter		    REVIEW OF SYSTEMS    [x] A ten-point review of systems was otherwise negative except as noted.  [ ] Due to altered mental status/intubation, subjective information were not able to be obtained from the patient. History was obtained, to the extent possible, from review of the chart and collateral sources of information.          Daily Height in cm: 149.86 (13 Aug 2020 15:07)    Daily     Diet, Regular (20 @ 17:50)      CURRENT MEDS:  Neurologic Medications  acetaminophen   Tablet .. 650 milliGRAM(s) Oral every 6 hours  ondansetron Injectable 4 milliGRAM(s) IV Push every 6 hours PRN Nausea and/or Vomiting  oxyCODONE    IR 5 milliGRAM(s) Oral every 6 hours PRN Severe Pain (7 - 10)    Respiratory Medications    Cardiovascular Medications  metoprolol succinate ER 25 milliGRAM(s) Oral daily    Gastrointestinal Medications  pantoprazole    Tablet 40 milliGRAM(s) Oral before breakfast  senna 2 Tablet(s) Oral at bedtime    Genitourinary Medications    Hematologic/Oncologic Medications  enoxaparin Injectable 40 milliGRAM(s) SubCutaneous daily    Antimicrobial/Immunologic Medications  ceFAZolin   IVPB 2000 milliGRAM(s) IV Intermittent every 8 hours    Endocrine/Metabolic Medications  atorvastatin 40 milliGRAM(s) Oral at bedtime  insulin lispro (HumaLOG) corrective regimen sliding scale   SubCutaneous Before meals and at bedtime  levothyroxine 88 MICROGram(s) Oral daily  methylPREDNISolone sodium succinate Injectable 40 milliGRAM(s) IV Push every 12 hours    Topical/Other Medications  chlorhexidine 4% Liquid 1 Application(s) Topical <User Schedule>      ICU Vital Signs Last 24 Hrs  T(C): 36.8 (14 Aug 2020 08:00), Max: 37.1 (13 Aug 2020 22:00)  T(F): 98.2 (14 Aug 2020 08:00), Max: 98.8 (13 Aug 2020 22:00)  HR: 77 (14 Aug 2020 08:00) (68 - 101)  BP: 117/53 (14 Aug 2020 08:00) (99/55 - 154/67)  BP(mean): 81 (13 Aug 2020 19:00) (70 - 106)  ABP: --  ABP(mean): --  RR: 19 (14 Aug 2020 08:00) (18 - 25)  SpO2: 99% (14 Aug 2020 06:00) (95% - 100%)      Adult Advanced Hemodynamics Last 24 Hrs  CVP(mm Hg): --  CVP(cm H2O): --  CO: --  CI: --  PA: --  PA(mean): --  PCWP: --  SVR: --  SVRI: --  PVR: --  PVRI: --      ABG - ( 13 Aug 2020 12:39 )  pH, Arterial: 7.41  pH, Blood: x     /  pCO2: 56    /  pO2: 134   / HCO3: 35    / Base Excess: 9.3   /  SaO2: 100                 I&O's Summary    13 Aug 2020 07:  -  14 Aug 2020 07:00  --------------------------------------------------------  IN: 1200 mL / OUT: 712 mL / NET: 488 mL      I&O's Detail    13 Aug 2020 07:  -  14 Aug 2020 07:00  --------------------------------------------------------  IN:    IV PiggyBack: 200 mL    lactated ringers.: 600 mL    lactated ringers.: 400 mL  Total IN: 1200 mL    OUT:    Indwelling Catheter - Urethral: 712 mL  Total OUT: 712 mL    Total NET: 488 mL          PHYSICAL EXAM:    General/Neuro  RASS:             GCS:     = E   / V   / M      Deficits:                             alert & oriented x 3, no focal deficits  Pupils:    Lungs:      clear to auscultation, Normal expansion/effort.     Cardiovascular : S1, S2.  Regular rate and rhythm.  Peripheral edema   Cardiac Rhythm: Normal Sinus Rhythm    GI: Abdomen soft, Non-tender, Non-distended.    Gastrostomy / Jejunostomy tube in place.  Nasogastric tube in place.  Colostomy / Ileostomy.    Wound:    Extremities: Extremities warm, pink, well-perfused. Pulses:Rt     Lt. dressing c/d/i    Derm: Good skin turgor, no skin breakdown.            CXR:     LABS:  CAPILLARY BLOOD GLUCOSE      POCT Blood Glucose.: 141 mg/dL (14 Aug 2020 07:59)  POCT Blood Glucose.: 161 mg/dL (14 Aug 2020 02:04)  POCT Blood Glucose.: 174 mg/dL (14 Aug 2020 00:00)  POCT Blood Glucose.: 95 mg/dL (13 Aug 2020 18:19)  POCT Blood Glucose.: 116 mg/dL (13 Aug 2020 14:10)  POCT Blood Glucose.: 72 mg/dL (13 Aug 2020 12:42)  POCT Blood Glucose.: 54 mg/dL (13 Aug 2020 12:02)                          8.2    8.02  )-----------( 126      ( 14 Aug 2020 00:03 )             27.4       08-14    137  |  98  |  22<H>  ----------------------------<  163<H>  5.1<H>   |  30  |  0.8    Ca    8.5      14 Aug 2020 00:03  Phos  4.2     08-14  Mg     2.2     08-14    TPro  6.6  /  Alb  4.3  /  TBili  0.3  /  DBili  x   /  AST  103<H>  /  ALT  80<H>  /  AlkPhos  144<H>  08-12      PT/INR - ( 13 Aug 2020 06:55 )   PT: 11.20 sec;   INR: 0.97 ratio         PTT - ( 13 Aug 2020 06:55 )  PTT:29.4 sec  CARDIAC MARKERS ( 14 Aug 2020 05:29 )  x     / <0.01 ng/mL / x     / x     / 6.0 ng/mL  CARDIAC MARKERS ( 14 Aug 2020 00:03 )  x     / <0.01 ng/mL / 190 U/L / x     / 6.4 ng/mL  CARDIAC MARKERS ( 13 Aug 2020 17:47 )  x     / <0.01 ng/mL / 148 U/L / x     / 5.0 ng/mL  CARDIAC MARKERS ( 13 Aug 2020 11:39 )  x     / <0.01 ng/mL / 127 U/L / x     / 4.3 ng/mL  CARDIAC MARKERS ( 13 Aug 2020 06:55 )  x     / <0.01 ng/mL / 113 U/L / x     / 4.6 ng/mL  CARDIAC MARKERS ( 13 Aug 2020 02:08 )  x     / x     / 126 U/L / x     / x      CARDIAC MARKERS ( 12 Aug 2020 14:32 )  x     / x     / 208 U/L / x     / x          Urinalysis Basic - ( 13 Aug 2020 03:00 )    Color: Yellow / Appearance: Clear / S.042 / pH: x  Gluc: x / Ketone: Small  / Bili: Negative / Urobili: <2 mg/dL   Blood: x / Protein: 30 mg/dL / Nitrite: Negative   Leuk Esterase: Negative / RBC: 4 /HPF / WBC 0 /HPF   Sq Epi: x / Non Sq Epi: 0 /HPF / Bacteria: Negative

## 2020-08-14 NOTE — CHART NOTE - NSCHARTNOTEFT_GEN_A_CORE
SICU Transfer Note:    Transfer from: SICU  Transfer to:  ( x) Surgery    (  ) Telemetry    (  ) Medicine    (  ) Palliative    (  ) Stroke Unit    (  ) _______________      SICU COURSE:      89y Female w/ PMHx of COPD on 3-4L of O2 at home, hypothyroidism, lives at home w/ her son (Andrzej), ADLs intact. Patient presents to the ED today s/p mechanical fall, - HT, -LOC, -AC. As per the patient, she got up out of her recliner to check on her albuterol treatment, went back to sit in her recliner and missed falling onto her L side. Patient was unable to ambulate after fall. Patient denies numbness/tingling or radiating pain. LLE shortened and externally rotated. Further imaging showed R posterior 10-11 rib fx and a age indeterminant T5 vertebral body compression fx (for which neurosurgery was consulted and said no recs).       Imaging:   CT Chest w/ IV Cont (08.12.20 @ 18:58)   IMPRESSION:  1.  Acute left sided comminuted intertrochanteric fracture with moderate surrounding hematoma.  2.  Acute right posterior 10th and 11th ribfractures.  3.  Age indeterminant T5 vertebral body compression deformity.  4.  Additional findings as detailed above.      Insertion of gamma nail to R hip 8/13. Solumedrol pre and post op. Primary team ordered ct spine non con which showed nondisplaced fracture traversing right posterior arch of C1 and extending into the right transverse foramen. Neuro surgery consulted, pending recs. Pt currently in [facility].         PAST MEDICAL & SURGICAL HISTORY:  Hypothyroid  COPD (chronic obstructive pulmonary disease)    Allergies    No Known Allergies    Intolerances      MEDICATIONS  (STANDING):  acetaminophen   Tablet .. 650 milliGRAM(s) Oral every 6 hours  atorvastatin 40 milliGRAM(s) Oral at bedtime  chlorhexidine 4% Liquid 1 Application(s) Topical <User Schedule>  enoxaparin Injectable 40 milliGRAM(s) SubCutaneous daily  insulin lispro (HumaLOG) corrective regimen sliding scale   SubCutaneous Before meals and at bedtime  levothyroxine 88 MICROGram(s) Oral daily  methylPREDNISolone sodium succinate Injectable 40 milliGRAM(s) IV Push every 12 hours  metoprolol succinate ER 25 milliGRAM(s) Oral daily  pantoprazole    Tablet 40 milliGRAM(s) Oral before breakfast  senna 2 Tablet(s) Oral at bedtime    MEDICATIONS  (PRN):  ondansetron Injectable 4 milliGRAM(s) IV Push every 6 hours PRN Nausea and/or Vomiting  oxyCODONE    IR 5 milliGRAM(s) Oral every 6 hours PRN Severe Pain (7 - 10)        Vital Signs Last 24 Hrs  T(C): 36.7 (14 Aug 2020 09:51), Max: 37.1 (13 Aug 2020 22:00)  T(F): 98 (14 Aug 2020 09:51), Max: 98.8 (13 Aug 2020 22:00)  HR: 86 (14 Aug 2020 09:51) (68 - 101)  BP: 131/58 (14 Aug 2020 09:51) (99/55 - 154/67)  BP(mean): 81 (13 Aug 2020 19:00) (70 - 106)  RR: 19 (14 Aug 2020 08:00) (18 - 25)  SpO2: 99% (14 Aug 2020 06:00) (95% - 100%)  I&O's Summary    13 Aug 2020 07:01  -  14 Aug 2020 07:00  --------------------------------------------------------  IN: 1200 mL / OUT: 712 mL / NET: 488 mL        LABS                                            8.2                   Neurophils% (auto):   x      (08-14 @ 00:03):    8.02 )-----------(126          Lymphocytes% (auto):  x                                             27.4                   Eosinphils% (auto):   x        Manual%: Neutrophils x    ; Lymphocytes x    ; Eosinophils x    ; Bands%: x    ; Blasts x                                    141    |  99     |  25                  Calcium: 8.6   / iCa: x      (08-14 @ 05:29)    ----------------------------<  144       Magnesium: x                                5.0     |  30     |  0.9              Phosphorous: x                NEURO:    Pain-controlled with Tylenol and Oxycodone     Monitor for changes in mental status     [place] J collar in place. New finding of C1 non displaced fracture. Re consulted Neuro Sx    RESP:     R posterior 10-11 rib fractures. Encourage IS pulling 500-750cc    Hx of COPD. On RA currently satting >92%     AM CXR       CARDS:     Maintain normotension. MAP >65     Troponin x3 pending     Hx of HTN and HLD: Restarted Metoprolol and Statin     EKG NSR        GI/NUTR:     Diet, regular     GI Prophylaxis- Protonix     Bowel regimen- Senna     /RENAL:     Tobar in place. Monitor I&Os.     Monitor lytes, replete as needed     Trend BUN/Cr     HEME/ONC:     DVT prophylaxis- Lovenox     Trend H/H     ID:    No dx. Afebrile. Trend WBC         ENDO:    Hx of Hypothyroidism. Restarted Synthroid     FS AC HS     MSK:     POD #1 s/p L hip gamma nail     PT/OT consult     LINES/DRAINS:  PIV    DISPO:    Trauma floor SICU Transfer Note:    Transfer from: SICU  Transfer to:  ( x) Surgery    (  ) Telemetry    (  ) Medicine    (  ) Palliative    (  ) Stroke Unit    (  ) _______________      SICU COURSE:      89y Female w/ PMHx of COPD on 3-4L of O2 at home, hypothyroidism, lives at home w/ her son (Andrzej), ADLs intact. Patient presents to the ED today s/p mechanical fall, - HT, -LOC, -AC. As per the patient, she got up out of her recliner to check on her albuterol treatment, went back to sit in her recliner and missed falling onto her L side. Patient was unable to ambulate after fall. Patient denies numbness/tingling or radiating pain. LLE shortened and externally rotated. Further imaging showed R posterior 10-11 rib fx and a age indeterminant T5 vertebral body compression fx (for which neurosurgery was consulted and said no recs).       Imaging:   CT Chest w/ IV Cont (08.12.20 @ 18:58)   IMPRESSION:  1.  Acute left sided comminuted intertrochanteric fracture with moderate surrounding hematoma.  2.  Acute right posterior 10th and 11th ribfractures.  3.  Age indeterminant T5 vertebral body compression deformity.  4.  Additional findings as detailed above.      Insertion of gamma nail to R hip 8/13. Solumedrol pre and post op. Primary team ordered ct spine non con which showed nondisplaced fracture traversing right posterior arch of C1 and extending into the right transverse foramen. Neuro surgery consulted, pending recs. Pt currently in [facility].         PAST MEDICAL & SURGICAL HISTORY:  Hypothyroid  COPD (chronic obstructive pulmonary disease)    Allergies    No Known Allergies    Intolerances      MEDICATIONS  (STANDING):  acetaminophen   Tablet .. 650 milliGRAM(s) Oral every 6 hours  atorvastatin 40 milliGRAM(s) Oral at bedtime  chlorhexidine 4% Liquid 1 Application(s) Topical <User Schedule>  enoxaparin Injectable 40 milliGRAM(s) SubCutaneous daily  insulin lispro (HumaLOG) corrective regimen sliding scale   SubCutaneous Before meals and at bedtime  levothyroxine 88 MICROGram(s) Oral daily  methylPREDNISolone sodium succinate Injectable 40 milliGRAM(s) IV Push every 12 hours  metoprolol succinate ER 25 milliGRAM(s) Oral daily  pantoprazole    Tablet 40 milliGRAM(s) Oral before breakfast  senna 2 Tablet(s) Oral at bedtime    MEDICATIONS  (PRN):  ondansetron Injectable 4 milliGRAM(s) IV Push every 6 hours PRN Nausea and/or Vomiting  oxyCODONE    IR 5 milliGRAM(s) Oral every 6 hours PRN Severe Pain (7 - 10)        Vital Signs Last 24 Hrs  T(C): 36.7 (14 Aug 2020 09:51), Max: 37.1 (13 Aug 2020 22:00)  T(F): 98 (14 Aug 2020 09:51), Max: 98.8 (13 Aug 2020 22:00)  HR: 86 (14 Aug 2020 09:51) (68 - 101)  BP: 131/58 (14 Aug 2020 09:51) (99/55 - 154/67)  BP(mean): 81 (13 Aug 2020 19:00) (70 - 106)  RR: 19 (14 Aug 2020 08:00) (18 - 25)  SpO2: 99% (14 Aug 2020 06:00) (95% - 100%)  I&O's Summary    13 Aug 2020 07:01  -  14 Aug 2020 07:00  --------------------------------------------------------  IN: 1200 mL / OUT: 712 mL / NET: 488 mL        LABS                                            8.2                   Neurophils% (auto):   x      (08-14 @ 00:03):    8.02 )-----------(126          Lymphocytes% (auto):  x                                             27.4                   Eosinphils% (auto):   x        Manual%: Neutrophils x    ; Lymphocytes x    ; Eosinophils x    ; Bands%: x    ; Blasts x                                    141    |  99     |  25                  Calcium: 8.6   / iCa: x      (08-14 @ 05:29)    ----------------------------<  144       Magnesium: x                                5.0     |  30     |  0.9              Phosphorous: x                NEURO:    Pain-controlled with Tylenol and Oxycodone     Monitor for changes in mental status     [place] J collar in place. New finding of C1 non displaced fracture. Re consulted Neuro Sx    Neuro says no to CT angio, will follow up with pt    RESP:     R posterior 10-11 rib fractures. Encourage IS pulling 500-750cc    Hx of COPD. On RA currently satting >92%     AM CXR       CARDS:     Maintain normotension. MAP >65     Troponin x3 negative    Hx of HTN and HLD: Restarted Metoprolol and Statin     EKG NSR        GI/NUTR:     Diet, regular     GI Prophylaxis- Protonix     Bowel regimen- Senna     /RENAL:     Tobar in place. Monitor I&Os.     Monitor lytes, replete as needed     Trend BUN/Cr     HEME/ONC:     DVT prophylaxis- Lovenox     Trend H/H     ID:    No dx. Afebrile. Trend WBC         ENDO:    Hx of Hypothyroidism. Restarted Synthroid     FS AC HS     MSK:     POD #1 s/p L hip gamma nail     PT/OT consult     LINES/DRAINS:  PIV    DISPO:    Trauma floor SICU Transfer Note:    Transfer from: SICU  Transfer to:  ( x) Surgery    (  ) Telemetry    (  ) Medicine    (  ) Palliative    (  ) Stroke Unit    (  ) _______________      SICU COURSE:      89y Female w/ PMHx of COPD on 3-4L of O2 at home, hypothyroidism, lives at home w/ her son (Andrzej), ADLs intact. Patient presents to the ED today s/p mechanical fall, - HT, -LOC, -AC. As per the patient, she got up out of her recliner to check on her albuterol treatment, went back to sit in her recliner and missed falling onto her L side. Patient was unable to ambulate after fall. Patient denies numbness/tingling or radiating pain. LLE shortened and externally rotated. Further imaging showed R posterior 10-11 rib fx and a age indeterminant T5 vertebral body compression fx (for which neurosurgery was consulted and said no recs).       Imaging:   CT Chest w/ IV Cont (08.12.20 @ 18:58)   IMPRESSION:  1.  Acute left sided comminuted intertrochanteric fracture with moderate surrounding hematoma.  2.  Acute right posterior 10th and 11th ribfractures.  3.  Age indeterminant T5 vertebral body compression deformity.  4.  Additional findings as detailed above.      Insertion of gamma nail to R hip 8/13. Solumedrol pre and post op. Primary team ordered ct spine non con which showed nondisplaced fracture traversing right posterior arch of C1 and extending into the right transverse foramen. Neuro surgery consulted, pending recs. Pt currently in [facility].         PAST MEDICAL & SURGICAL HISTORY:  Hypothyroid  COPD (chronic obstructive pulmonary disease)    Allergies    No Known Allergies    Intolerances      MEDICATIONS  (STANDING):  acetaminophen   Tablet .. 650 milliGRAM(s) Oral every 6 hours  atorvastatin 40 milliGRAM(s) Oral at bedtime  chlorhexidine 4% Liquid 1 Application(s) Topical <User Schedule>  enoxaparin Injectable 40 milliGRAM(s) SubCutaneous daily  insulin lispro (HumaLOG) corrective regimen sliding scale   SubCutaneous Before meals and at bedtime  levothyroxine 88 MICROGram(s) Oral daily  methylPREDNISolone sodium succinate Injectable 40 milliGRAM(s) IV Push every 12 hours  metoprolol succinate ER 25 milliGRAM(s) Oral daily  pantoprazole    Tablet 40 milliGRAM(s) Oral before breakfast  senna 2 Tablet(s) Oral at bedtime    MEDICATIONS  (PRN):  ondansetron Injectable 4 milliGRAM(s) IV Push every 6 hours PRN Nausea and/or Vomiting  oxyCODONE    IR 5 milliGRAM(s) Oral every 6 hours PRN Severe Pain (7 - 10)        Vital Signs Last 24 Hrs  T(C): 36.7 (14 Aug 2020 09:51), Max: 37.1 (13 Aug 2020 22:00)  T(F): 98 (14 Aug 2020 09:51), Max: 98.8 (13 Aug 2020 22:00)  HR: 86 (14 Aug 2020 09:51) (68 - 101)  BP: 131/58 (14 Aug 2020 09:51) (99/55 - 154/67)  BP(mean): 81 (13 Aug 2020 19:00) (70 - 106)  RR: 19 (14 Aug 2020 08:00) (18 - 25)  SpO2: 99% (14 Aug 2020 06:00) (95% - 100%)  I&O's Summary    13 Aug 2020 07:01  -  14 Aug 2020 07:00  --------------------------------------------------------  IN: 1200 mL / OUT: 712 mL / NET: 488 mL        LABS                                            8.2                   Neurophils% (auto):   x      (08-14 @ 00:03):    8.02 )-----------(126          Lymphocytes% (auto):  x                                             27.4                   Eosinphils% (auto):   x        Manual%: Neutrophils x    ; Lymphocytes x    ; Eosinophils x    ; Bands%: x    ; Blasts x                                    141    |  99     |  25                  Calcium: 8.6   / iCa: x      (08-14 @ 05:29)    ----------------------------<  144       Magnesium: x                                5.0     |  30     |  0.9              Phosphorous: x                NEURO:    Pain-controlled with Tylenol and Oxycodone     Monitor for changes in mental status     [place] J collar in place. New finding of C1 non displaced fracture. Re consulted Neuro Sx    Neuro says no to CT angio, will follow up with pt    RESP:     R posterior 10-11 rib fractures. Encourage IS pulling 500-750cc    Hx of COPD. On RA currently satting >92%     AM CXR       CARDS:     Maintain normotension. MAP >65     Troponin x3 negative    Hx of HTN and HLD: Restarted Metoprolol and Statin     EKG NSR        GI/NUTR:     Diet, regular     GI Prophylaxis- Protonix     Bowel regimen- Senna     /RENAL:     D/haritha santizo 8/14    F/u TOV     Monitor lytes, replete as needed     Trend BUN/Cr     HEME/ONC:     DVT prophylaxis- Lovenox     Trend H/H     ID:    No dx. Afebrile. Trend WBC         ENDO:    Hx of Hypothyroidism. Restarted Synthroid     FS AC HS     MSK:     POD #1 s/p L hip gamma nail     PT/OT consult     LINES/DRAINS:  PIV    DISPO:    Trauma floor SICU Transfer Note:    Transfer from: SICU  Transfer to:  ( x) Surgery    (  ) Telemetry    (  ) Medicine    (  ) Palliative    (  ) Stroke Unit    (  ) _______________      SICU COURSE:      89y Female w/ PMHx of COPD on 3-4L of O2 at home, hypothyroidism, lives at home w/ her son (Andrzej), ADLs intact. Patient presents to the ED today s/p mechanical fall, - HT, -LOC, -AC. As per the patient, she got up out of her recliner to check on her albuterol treatment, went back to sit in her recliner and missed falling onto her L side. Patient was unable to ambulate after fall. Patient denies numbness/tingling or radiating pain. LLE shortened and externally rotated. Further imaging showed R posterior 10-11 rib fx and a age indeterminant T5 vertebral body compression fx (for which neurosurgery was consulted and said no recs).       Imaging:   CT Chest w/ IV Cont (08.12.20 @ 18:58)   IMPRESSION:  1.  Acute left sided comminuted intertrochanteric fracture with moderate surrounding hematoma.  2.  Acute right posterior 10th and 11th ribfractures.  3.  Age indeterminant T5 vertebral body compression deformity.  4.  Additional findings as detailed above.      Insertion of gamma nail to R hip 8/13. Solumedrol pre and post op. Primary team ordered ct spine non con which showed nondisplaced fracture traversing right posterior arch of C1 and extending into the right transverse foramen. Neuro surgery consulted, pending recs. Pt currently in [facility].         PAST MEDICAL & SURGICAL HISTORY:  Hypothyroid  COPD (chronic obstructive pulmonary disease)    Allergies    No Known Allergies    Intolerances      MEDICATIONS  (STANDING):  acetaminophen   Tablet .. 650 milliGRAM(s) Oral every 6 hours  atorvastatin 40 milliGRAM(s) Oral at bedtime  chlorhexidine 4% Liquid 1 Application(s) Topical <User Schedule>  enoxaparin Injectable 40 milliGRAM(s) SubCutaneous daily  insulin lispro (HumaLOG) corrective regimen sliding scale   SubCutaneous Before meals and at bedtime  levothyroxine 88 MICROGram(s) Oral daily  methylPREDNISolone sodium succinate Injectable 40 milliGRAM(s) IV Push every 12 hours  metoprolol succinate ER 25 milliGRAM(s) Oral daily  pantoprazole    Tablet 40 milliGRAM(s) Oral before breakfast  senna 2 Tablet(s) Oral at bedtime    MEDICATIONS  (PRN):  ondansetron Injectable 4 milliGRAM(s) IV Push every 6 hours PRN Nausea and/or Vomiting  oxyCODONE    IR 5 milliGRAM(s) Oral every 6 hours PRN Severe Pain (7 - 10)        Vital Signs Last 24 Hrs  T(C): 36.7 (14 Aug 2020 09:51), Max: 37.1 (13 Aug 2020 22:00)  T(F): 98 (14 Aug 2020 09:51), Max: 98.8 (13 Aug 2020 22:00)  HR: 86 (14 Aug 2020 09:51) (68 - 101)  BP: 131/58 (14 Aug 2020 09:51) (99/55 - 154/67)  BP(mean): 81 (13 Aug 2020 19:00) (70 - 106)  RR: 19 (14 Aug 2020 08:00) (18 - 25)  SpO2: 99% (14 Aug 2020 06:00) (95% - 100%)  I&O's Summary    13 Aug 2020 07:01  -  14 Aug 2020 07:00  --------------------------------------------------------  IN: 1200 mL / OUT: 712 mL / NET: 488 mL        LABS                                            8.2                   Neurophils% (auto):   x      (08-14 @ 00:03):    8.02 )-----------(126          Lymphocytes% (auto):  x                                             27.4                   Eosinphils% (auto):   x        Manual%: Neutrophils x    ; Lymphocytes x    ; Eosinophils x    ; Bands%: x    ; Blasts x                                    141    |  99     |  25                  Calcium: 8.6   / iCa: x      (08-14 @ 05:29)    ----------------------------<  144       Magnesium: x                                5.0     |  30     |  0.9              Phosphorous: x                NEURO:    Pain-controlled with Tylenol and Oxycodone     Monitor for changes in mental status     [place] J collar in place. New finding of C1 non displaced fracture. Re consulted Neuro Sx    Neuro says no to CT angio, will follow up with pt    RESP:     R posterior 10-11 rib fractures. Encourage IS pulling 500-750cc    Hx of COPD. On NC    Uses 3-4L O2 at home    AM CXR       CARDS:     Maintain normotension. MAP >65     Troponin x3 negative    Hx of HTN and HLD: Restarted Metoprolol and Statin     EKG NSR        GI/NUTR:     Diet, regular     GI Prophylaxis- Protonix     Bowel regimen- Senna     /RENAL:     D/haritha santizo 8/14    F/u TOV     Monitor lytes, replete as needed     Trend BUN/Cr     HEME/ONC:     DVT prophylaxis- Lovenox     Trend H/H     ID:    No dx. Afebrile. Trend WBC         ENDO:    Hx of Hypothyroidism. Restarted Synthroid     FS AC HS     MSK:     POD #1 s/p L hip gamma nail     PT/OT consult     LINES/DRAINS:  PIV    DISPO:    Trauma floor

## 2020-08-14 NOTE — CONSULT NOTE ADULT - ASSESSMENT
IMPRESSION: Rehab of multitrauma - L hip IT fx, s/p orif                                                       - C1 fx, s/p neck collar                                                       - R 10-11 rib fx    PRECAUTIONS: [  ] Cardiac  [  ] Respiratory  [  ] Seizures [  ] Contact Isolation  [  ] Droplet Isolation  [  ] Other    Weight Bearing Status: WBAT LLE    RECOMMENDATION:    Out of Bed to Chair     DVT/Decubiti Prophylaxis    REHAB PLAN:     [ x  ] Bedside P/T 3-5 times a week   [ x  ]   Bedside O/T  2-3 times a week             [   ] No Rehab Therapy Indicated                   [   ]  Speech Therapy   Conditioning/ROM                                    ADL  Bed Mobility                                               Conditioning/ROM  Transfers                                                     Bed Mobility  Sitting /Standing Balance                         Transfers                                        Gait Training                                               Sitting/Standing Balance  Stair Training [   ]Applicable                    Home equipment Eval                                                                        Splinting  [   ] Only      GOALS:   ADL   [ x  ]   Independent                    Transfers  [  x ] Independent                          Ambulation  [  x ] Independent     [    x] With device                            [   ]  CG                                                         [   ]  CG                                                                  [   ] CG                            [    ] Min A                                                   [   ] Min A                                                              [   ] Min  A          DISCHARGE PLAN:   [ x  ]  Good candidate for Intensive Rehabilitation/Hospital based                                             Will tolerate 3hrs Intensive Rehab Daily                                       [    ]  Short Term Rehab in Skilled Nursing Facility                                       [    ]  Home with Outpatient or  services                                         [    ]  Possible Candidate for Intensive Hospital based Rehab

## 2020-08-14 NOTE — OCCUPATIONAL THERAPY INITIAL EVALUATION ADULT - PERTINENT HX OF CURRENT PROBLEM, REHAB EVAL
89 y/o f s/p fall with resulting LIT fx s/p IMN,posterior 10th and 11th rib fractures, Age indeterminate T5 vertebral body compression deformity,Nondisplaced fracture traversing right posterior arch of C1 and extending into the right transverse foramen

## 2020-08-15 LAB
ANION GAP SERPL CALC-SCNC: 10 MMOL/L — SIGNIFICANT CHANGE UP (ref 7–14)
ANION GAP SERPL CALC-SCNC: 8 MMOL/L — SIGNIFICANT CHANGE UP (ref 7–14)
BLD GP AB SCN SERPL QL: SIGNIFICANT CHANGE UP
BUN SERPL-MCNC: 40 MG/DL — HIGH (ref 10–20)
BUN SERPL-MCNC: 41 MG/DL — HIGH (ref 10–20)
CALCIUM SERPL-MCNC: 8.1 MG/DL — LOW (ref 8.5–10.1)
CALCIUM SERPL-MCNC: 8.5 MG/DL — SIGNIFICANT CHANGE UP (ref 8.5–10.1)
CHLORIDE SERPL-SCNC: 101 MMOL/L — SIGNIFICANT CHANGE UP (ref 98–110)
CHLORIDE SERPL-SCNC: 101 MMOL/L — SIGNIFICANT CHANGE UP (ref 98–110)
CK MB CFR SERPL CALC: 6.6 NG/ML — HIGH (ref 0.6–6.3)
CK MB CFR SERPL CALC: 7.3 NG/ML — HIGH (ref 0.6–6.3)
CK SERPL-CCNC: 213 U/L — SIGNIFICANT CHANGE UP (ref 0–225)
CK SERPL-CCNC: 213 U/L — SIGNIFICANT CHANGE UP (ref 0–225)
CO2 SERPL-SCNC: 30 MMOL/L — SIGNIFICANT CHANGE UP (ref 17–32)
CO2 SERPL-SCNC: 31 MMOL/L — SIGNIFICANT CHANGE UP (ref 17–32)
CREAT SERPL-MCNC: 1 MG/DL — SIGNIFICANT CHANGE UP (ref 0.7–1.5)
CREAT SERPL-MCNC: 1.1 MG/DL — SIGNIFICANT CHANGE UP (ref 0.7–1.5)
GLUCOSE BLDC GLUCOMTR-MCNC: 115 MG/DL — HIGH (ref 70–99)
GLUCOSE BLDC GLUCOMTR-MCNC: 159 MG/DL — HIGH (ref 70–99)
GLUCOSE BLDC GLUCOMTR-MCNC: 72 MG/DL — SIGNIFICANT CHANGE UP (ref 70–99)
GLUCOSE BLDC GLUCOMTR-MCNC: 96 MG/DL — SIGNIFICANT CHANGE UP (ref 70–99)
GLUCOSE SERPL-MCNC: 125 MG/DL — HIGH (ref 70–99)
GLUCOSE SERPL-MCNC: 136 MG/DL — HIGH (ref 70–99)
HCT VFR BLD CALC: 20.9 % — LOW (ref 37–47)
HCT VFR BLD CALC: 22 % — LOW (ref 37–47)
HCT VFR BLD CALC: 25.9 % — LOW (ref 37–47)
HGB BLD-MCNC: 6.5 G/DL — CRITICAL LOW (ref 12–16)
HGB BLD-MCNC: 6.7 G/DL — CRITICAL LOW (ref 12–16)
HGB BLD-MCNC: 8.2 G/DL — LOW (ref 12–16)
MAGNESIUM SERPL-MCNC: 2.5 MG/DL — HIGH (ref 1.8–2.4)
MCHC RBC-ENTMCNC: 30.5 G/DL — LOW (ref 32–37)
MCHC RBC-ENTMCNC: 30.5 PG — SIGNIFICANT CHANGE UP (ref 27–31)
MCHC RBC-ENTMCNC: 30.7 PG — SIGNIFICANT CHANGE UP (ref 27–31)
MCHC RBC-ENTMCNC: 31 PG — SIGNIFICANT CHANGE UP (ref 27–31)
MCHC RBC-ENTMCNC: 31.1 G/DL — LOW (ref 32–37)
MCHC RBC-ENTMCNC: 31.7 G/DL — LOW (ref 32–37)
MCV RBC AUTO: 100.9 FL — HIGH (ref 81–99)
MCV RBC AUTO: 96.3 FL — SIGNIFICANT CHANGE UP (ref 81–99)
MCV RBC AUTO: 99.5 FL — HIGH (ref 81–99)
NRBC # BLD: 0 /100 WBCS — SIGNIFICANT CHANGE UP (ref 0–0)
PHOSPHATE SERPL-MCNC: 4.1 MG/DL — SIGNIFICANT CHANGE UP (ref 2.1–4.9)
PLATELET # BLD AUTO: 145 K/UL — SIGNIFICANT CHANGE UP (ref 130–400)
PLATELET # BLD AUTO: 159 K/UL — SIGNIFICANT CHANGE UP (ref 130–400)
PLATELET # BLD AUTO: 170 K/UL — SIGNIFICANT CHANGE UP (ref 130–400)
POTASSIUM SERPL-MCNC: 5.1 MMOL/L — HIGH (ref 3.5–5)
POTASSIUM SERPL-MCNC: 5.3 MMOL/L — HIGH (ref 3.5–5)
POTASSIUM SERPL-SCNC: 5.1 MMOL/L — HIGH (ref 3.5–5)
POTASSIUM SERPL-SCNC: 5.3 MMOL/L — HIGH (ref 3.5–5)
RBC # BLD: 2.1 M/UL — LOW (ref 4.2–5.4)
RBC # BLD: 2.18 M/UL — LOW (ref 4.2–5.4)
RBC # BLD: 2.69 M/UL — LOW (ref 4.2–5.4)
RBC # FLD: 14.1 % — SIGNIFICANT CHANGE UP (ref 11.5–14.5)
RBC # FLD: 14.2 % — SIGNIFICANT CHANGE UP (ref 11.5–14.5)
RBC # FLD: 16.1 % — HIGH (ref 11.5–14.5)
SODIUM SERPL-SCNC: 139 MMOL/L — SIGNIFICANT CHANGE UP (ref 135–146)
SODIUM SERPL-SCNC: 142 MMOL/L — SIGNIFICANT CHANGE UP (ref 135–146)
TROPONIN T SERPL-MCNC: <0.01 NG/ML — SIGNIFICANT CHANGE UP
TROPONIN T SERPL-MCNC: <0.01 NG/ML — SIGNIFICANT CHANGE UP
WBC # BLD: 7.54 K/UL — SIGNIFICANT CHANGE UP (ref 4.8–10.8)
WBC # BLD: 8.09 K/UL — SIGNIFICANT CHANGE UP (ref 4.8–10.8)
WBC # BLD: 8.93 K/UL — SIGNIFICANT CHANGE UP (ref 4.8–10.8)
WBC # FLD AUTO: 7.54 K/UL — SIGNIFICANT CHANGE UP (ref 4.8–10.8)
WBC # FLD AUTO: 8.09 K/UL — SIGNIFICANT CHANGE UP (ref 4.8–10.8)
WBC # FLD AUTO: 8.93 K/UL — SIGNIFICANT CHANGE UP (ref 4.8–10.8)

## 2020-08-15 PROCEDURE — 71045 X-RAY EXAM CHEST 1 VIEW: CPT | Mod: 26

## 2020-08-15 PROCEDURE — 99233 SBSQ HOSP IP/OBS HIGH 50: CPT

## 2020-08-15 RX ADMIN — PANTOPRAZOLE SODIUM 40 MILLIGRAM(S): 20 TABLET, DELAYED RELEASE ORAL at 05:49

## 2020-08-15 RX ADMIN — Medication 40 MILLIGRAM(S): at 05:48

## 2020-08-15 RX ADMIN — ATORVASTATIN CALCIUM 40 MILLIGRAM(S): 80 TABLET, FILM COATED ORAL at 21:33

## 2020-08-15 RX ADMIN — SENNA PLUS 2 TABLET(S): 8.6 TABLET ORAL at 21:33

## 2020-08-15 RX ADMIN — Medication 650 MILLIGRAM(S): at 23:29

## 2020-08-15 RX ADMIN — Medication 650 MILLIGRAM(S): at 05:48

## 2020-08-15 RX ADMIN — Medication 650 MILLIGRAM(S): at 17:41

## 2020-08-15 RX ADMIN — Medication 650 MILLIGRAM(S): at 11:31

## 2020-08-15 RX ADMIN — Medication 40 MILLIGRAM(S): at 17:40

## 2020-08-15 RX ADMIN — ENOXAPARIN SODIUM 40 MILLIGRAM(S): 100 INJECTION SUBCUTANEOUS at 16:42

## 2020-08-15 RX ADMIN — CHLORHEXIDINE GLUCONATE 1 APPLICATION(S): 213 SOLUTION TOPICAL at 05:49

## 2020-08-15 RX ADMIN — Medication 88 MICROGRAM(S): at 05:49

## 2020-08-15 RX ADMIN — Medication 25 MILLIGRAM(S): at 05:49

## 2020-08-15 RX ADMIN — Medication 5 MILLIGRAM(S): at 21:33

## 2020-08-15 RX ADMIN — Medication 650 MILLIGRAM(S): at 11:30

## 2020-08-15 RX ADMIN — Medication 3: at 11:59

## 2020-08-15 NOTE — PROGRESS NOTE ADULT - SUBJECTIVE AND OBJECTIVE BOX
TANNA MCCRACKEN  [id number]  89y Female    Indication for ICU admission: L intertrochanteric hip fracture. R posterior rib fracture 10-11     Admit Date:08-12-20  ICU Date:8/12  OR Date:    No Known Allergies    PAST MEDICAL & SURGICAL HISTORY:  Hypothyroid  COPD (chronic obstructive pulmonary disease)        24HRS EVENT:    Overnight: Voided. Changed [place] J to a smaller size.  overnight. Melatonin given x1.     NEURO:    Pain-controlled with Tylenol     Monitor for changes in mental status     Age indeterminant T5 fx     C1 transverse foramen fx. Neuro intact. No CTA recommended. Neuro Sx outpatient.      [place] J collar in place     RESP:     R posterior 10-11 rib fractures. Encourage IS pulling 500-750cc    Hx of COPD. ON 3-4L NC at home. Accepting O2 sats >88%     AM CXR   - pulm c/s - BIPAP pre and post op, solumedrol 40q12, pre op ABG CO2 56      CARDS:     Maintain normotension. MAP >65     Hx of HTN and HLD. Continue Metoprolol and Atorvastatin daily     Troponin x3 pending     EKG NSR     Episode of Afib w/ RVR 's 8/14. HR broke w/ Lopressor 5x2.     GI/NUTR:     Diet,  Regular     GI Prophylaxis- Protonix     Bowel regimen- Senna     /RENAL:     No santizo, voiding     Trend BUN/Cr 25/0.9       HEME/ONC:     DVT prophylaxis- Lovenox      Hgb 8.2     Type and Screen       ID:    No dx. Afebrile. Trend WBC         ENDO:    Hx of Hypothyroidism. Synthroid     FS Q 6 hours while NPO     MSK:     L intertrochanteric hip fracture. POD #2 L hip gamma nail     WBAT LLE         DVT PTX: HSQ     GI PTX: PPI     ***Tubes/Lines/Drains  ***  Peripheral IV  		    REVIEW OF SYSTEMS    [x] A ten-point review of systems was otherwise negative except as noted.  [ ] Due to altered mental status/intubation, subjective information were not able to be obtained from the patient. History was obtained, to the extent possible, from review of the chart and collateral sources of information. TANNA MCCRACKEN  [id number]  89y Female    Indication for ICU admission: L intertrochanteric hip fracture. R posterior rib fracture 10-11     Admit Date:08-12-20  ICU Date:8/12  OR Date:    No Known Allergies    PAST MEDICAL & SURGICAL HISTORY:  Hypothyroid  COPD (chronic obstructive pulmonary disease)        24HRS EVENT:    Went into Afib RVR yesterday 6pm, broke with metoprolol 5 IVP x2 and 250 cc bolus    ON: Voided, HR remained <110. Hemoglobin 6.7, transfused 1 u PRBC, pending repeat     NEURO:    Pain-controlled with Tylenol     Monitor for changes in mental status     Age indeterminant T5 fx     C1 transverse foramen fx. Neuro intact. No CTA recommended. Neuro Sx outpatient.      [place] J collar in place     RESP:     R posterior 10-11 rib fractures. Encourage IS pulling 500-750cc    Hx of COPD. ON 3-4L NC at home. Accepting O2 sats >88%     AM CXR   - pulm c/s - BIPAP pre and post op, solumedrol 40q12, pre op ABG CO2 56      CARDS:     Maintain normotension. MAP >65     Hx of HTN and HLD. Continue Metoprolol and Atorvastatin daily     Troponin x3 negative    EKG NSR     Episode of Afib w/ RVR 's 8/14. HR broke w/ Lopressor 5x2.     GI/NUTR:     Diet,  Regular     GI Prophylaxis- Protonix     Bowel regimen- Senna     /RENAL:     No santizo, voiding     Trend BUN/Cr 25/0.9       HEME/ONC:     DVT prophylaxis- Lovenox      Hgb 8.2 > 6.7, transfused 1 u PRBC, f/u repeat     Type and Screen     ID:    No dx. Afebrile. Trend WBC 7      ENDO:    Hx of Hypothyroidism. Synthroid     FS Q 6 hours while NPO     MSK:     L intertrochanteric hip fracture. POD #2 L hip gamma nail     WBAT LLE         DVT PTX: HSQ     GI PTX: PPI     ***Tubes/Lines/Drains  ***  Peripheral IV  		    REVIEW OF SYSTEMS    [x] A ten-point review of systems was otherwise negative except as noted.  [ ] Due to altered mental status/intubation, subjective information were not able to be obtained from the patient. History was obtained, to the extent possible, from review of the chart and collateral sources of information.\    Daily Height in cm: 53.34 (14 Aug 2020 09:51)    Daily     Diet, Regular (08-13-20 @ 17:50)      CURRENT MEDS:  Neurologic Medications  acetaminophen   Tablet .. 650 milliGRAM(s) Oral every 6 hours  melatonin 5 milliGRAM(s) Oral at bedtime  ondansetron Injectable 4 milliGRAM(s) IV Push every 6 hours PRN Nausea and/or Vomiting  oxyCODONE    IR 5 milliGRAM(s) Oral every 6 hours PRN Severe Pain (7 - 10)    Respiratory Medications    Cardiovascular Medications  metoprolol succinate ER 25 milliGRAM(s) Oral daily    Gastrointestinal Medications  bisacodyl Suppository 10 milliGRAM(s) Rectal daily PRN If no bowel movement  pantoprazole    Tablet 40 milliGRAM(s) Oral before breakfast  senna 2 Tablet(s) Oral at bedtime    Genitourinary Medications    Hematologic/Oncologic Medications  enoxaparin Injectable 40 milliGRAM(s) SubCutaneous daily    Antimicrobial/Immunologic Medications    Endocrine/Metabolic Medications  atorvastatin 40 milliGRAM(s) Oral at bedtime  insulin lispro (HumaLOG) corrective regimen sliding scale   SubCutaneous Before meals and at bedtime  levothyroxine 88 MICROGram(s) Oral daily  methylPREDNISolone sodium succinate Injectable 40 milliGRAM(s) IV Push every 12 hours    Topical/Other Medications  chlorhexidine 4% Liquid 1 Application(s) Topical <User Schedule>      ICU Vital Signs Last 24 Hrs  T(C): 36.8 (15 Aug 2020 06:04), Max: 36.8 (14 Aug 2020 08:00)  T(F): 98.2 (15 Aug 2020 06:04), Max: 98.2 (14 Aug 2020 08:00)  HR: 90 (15 Aug 2020 06:04) (75 - 90)  BP: 146/65 (15 Aug 2020 06:04) (100/55 - 146/65)  BP(mean): --  ABP: --  ABP(mean): --  RR: 20 (15 Aug 2020 06:04) (18 - 20)  SpO2: 100% (15 Aug 2020 06:04) (100% - 100%)      Adult Advanced Hemodynamics Last 24 Hrs  CVP(mm Hg): --  CVP(cm H2O): --  CO: --  CI: --  PA: --  PA(mean): --  PCWP: --  SVR: --  SVRI: --  PVR: --  PVRI: --      ABG - ( 13 Aug 2020 12:39 )  pH, Arterial: 7.41  pH, Blood: x     /  pCO2: 56    /  pO2: 134   / HCO3: 35    / Base Excess: 9.3   /  SaO2: 100                 I&O's Summary    14 Aug 2020 07:01  -  15 Aug 2020 07:00  --------------------------------------------------------  IN: 0 mL / OUT: 1500 mL / NET: -1500 mL      I&O's Detail    14 Aug 2020 07:01  -  15 Aug 2020 07:00  --------------------------------------------------------  IN:  Total IN: 0 mL    OUT:    Indwelling Catheter - Urethral: 100 mL    Voided: 1400 mL  Total OUT: 1500 mL    Total NET: -1500 mL          PHYSICAL EXAM:    General/Neuro  Deficits:  Aalert & oriented x 3, no focal deficits    Lungs: Clear to auscultation, Normal expansion/effort.     Cardiovascular : S1, S2.  Regular rate and rhythm.    Cardiac Rhythm: Normal Sinus Rhythm    GI: Abdomen soft, Non-tender, Non-distended.      Extremities: Extremities warm, pink, well-perfused.   Wounds: SS fluid, clean, intact     Derm: Good skin turgor, no skin breakdown.      :  Voiding    CXR:       LABS:  CAPILLARY BLOOD GLUCOSE      POCT Blood Glucose.: 142 mg/dL (14 Aug 2020 21:35)  POCT Blood Glucose.: 177 mg/dL (14 Aug 2020 17:11)  POCT Blood Glucose.: 187 mg/dL (14 Aug 2020 11:20)  POCT Blood Glucose.: 141 mg/dL (14 Aug 2020 07:59)                          6.7    7.54  )-----------( 159      ( 15 Aug 2020 01:50 )             22.0         08-15    142  |  101  |  41<H>  ----------------------------<  136<H>  5.3<H>   |  31  |  1.1    Ca    8.5      15 Aug 2020 01:50  Phos  4.1     08-15  Mg     2.5     08-15        PTT - ( 14 Aug 2020 12:04 )  PTT:28.8 sec  CARDIAC MARKERS ( 14 Aug 2020 21:01 )  x     / <0.01 ng/mL / 266 U/L / x     / 8.3 ng/mL  CARDIAC MARKERS ( 14 Aug 2020 05:29 )  x     / <0.01 ng/mL / 171 U/L / x     / 6.0 ng/mL  CARDIAC MARKERS ( 14 Aug 2020 00:03 )  x     / <0.01 ng/mL / 190 U/L / x     / 6.4 ng/mL  CARDIAC MARKERS ( 13 Aug 2020 17:47 )  x     / <0.01 ng/mL / 148 U/L / x     / 5.0 ng/mL  CARDIAC MARKERS ( 13 Aug 2020 11:39 )  x     / <0.01 ng/mL / 127 U/L / x     / 4.3 ng/mL TANNA MCCRACKEN  [id number]  89y Female    Indication for ICU admission: L intertrochanteric hip fracture. R posterior rib fracture 10-11     Admit Date:08-12-20  ICU Date:8/12  OR Date:    No Known Allergies    PAST MEDICAL & SURGICAL HISTORY:  Hypothyroid  COPD (chronic obstructive pulmonary disease)        24HRS EVENT:    Went into Afib RVR yesterday 6pm, broke with metoprolol 5 IVP x2 and 250 cc bolus    ON: Voided, HR remained <110. Hemoglobin 6.7, transfused 1 u PRBC, pending repeat     NEURO:    Pain-controlled with Tylenol     Monitor for changes in mental status     Age indeterminant T5 fx     C1 transverse foramen fx. Neuro intact. No CTA recommended. Neuro Sx outpatient.      [place] J collar in place     RESP:     R posterior 10-11 rib fractures. Encourage IS pulling 500-750cc    Hx of COPD. ON 3-4L NC at home. Accepting O2 sats >88%     AM CXR   - pulm c/s - BIPAP pre and post op, solumedrol 40q12, pre op ABG CO2 56      CARDS:     Maintain normotension. MAP >65     Hx of HTN and HLD. Continue Metoprolol and Atorvastatin daily     Troponin x3 negative post episode of Afib RVR    EKG NSR     Episode of Afib w/ RVR 's 8/14. HR broke w/ Lopressor 5x2.     GI/NUTR:     Diet,  Regular     GI Prophylaxis- Protonix     Bowel regimen- Senna     /RENAL:     No santizo, voiding     Trend BUN/Cr 25/0.9       HEME/ONC:     DVT prophylaxis- Lovenox      Hgb 8.2 > 6.7, transfused 1 u PRBC, f/u repeat     Type and Screen     ID:    No dx. Afebrile. Trend WBC 7      ENDO:    Hx of Hypothyroidism. Synthroid     FS Q 6 hours while NPO     MSK:     L intertrochanteric hip fracture. POD #2 L hip gamma nail     WBAT LLE         DVT PTX: HSQ     GI PTX: PPI     ***Tubes/Lines/Drains  ***  Peripheral IV  		    REVIEW OF SYSTEMS    [x] A ten-point review of systems was otherwise negative except as noted.  [ ] Due to altered mental status/intubation, subjective information were not able to be obtained from the patient. History was obtained, to the extent possible, from review of the chart and collateral sources of information.\    Daily Height in cm: 53.34 (14 Aug 2020 09:51)    Daily     Diet, Regular (08-13-20 @ 17:50)      CURRENT MEDS:  Neurologic Medications  acetaminophen   Tablet .. 650 milliGRAM(s) Oral every 6 hours  melatonin 5 milliGRAM(s) Oral at bedtime  ondansetron Injectable 4 milliGRAM(s) IV Push every 6 hours PRN Nausea and/or Vomiting  oxyCODONE    IR 5 milliGRAM(s) Oral every 6 hours PRN Severe Pain (7 - 10)    Respiratory Medications    Cardiovascular Medications  metoprolol succinate ER 25 milliGRAM(s) Oral daily    Gastrointestinal Medications  bisacodyl Suppository 10 milliGRAM(s) Rectal daily PRN If no bowel movement  pantoprazole    Tablet 40 milliGRAM(s) Oral before breakfast  senna 2 Tablet(s) Oral at bedtime    Genitourinary Medications    Hematologic/Oncologic Medications  enoxaparin Injectable 40 milliGRAM(s) SubCutaneous daily    Antimicrobial/Immunologic Medications    Endocrine/Metabolic Medications  atorvastatin 40 milliGRAM(s) Oral at bedtime  insulin lispro (HumaLOG) corrective regimen sliding scale   SubCutaneous Before meals and at bedtime  levothyroxine 88 MICROGram(s) Oral daily  methylPREDNISolone sodium succinate Injectable 40 milliGRAM(s) IV Push every 12 hours    Topical/Other Medications  chlorhexidine 4% Liquid 1 Application(s) Topical <User Schedule>      ICU Vital Signs Last 24 Hrs  T(C): 36.8 (15 Aug 2020 06:04), Max: 36.8 (14 Aug 2020 08:00)  T(F): 98.2 (15 Aug 2020 06:04), Max: 98.2 (14 Aug 2020 08:00)  HR: 90 (15 Aug 2020 06:04) (75 - 90)  BP: 146/65 (15 Aug 2020 06:04) (100/55 - 146/65)  BP(mean): --  ABP: --  ABP(mean): --  RR: 20 (15 Aug 2020 06:04) (18 - 20)  SpO2: 100% (15 Aug 2020 06:04) (100% - 100%)      Adult Advanced Hemodynamics Last 24 Hrs  CVP(mm Hg): --  CVP(cm H2O): --  CO: --  CI: --  PA: --  PA(mean): --  PCWP: --  SVR: --  SVRI: --  PVR: --  PVRI: --      ABG - ( 13 Aug 2020 12:39 )  pH, Arterial: 7.41  pH, Blood: x     /  pCO2: 56    /  pO2: 134   / HCO3: 35    / Base Excess: 9.3   /  SaO2: 100                 I&O's Summary    14 Aug 2020 07:01  -  15 Aug 2020 07:00  --------------------------------------------------------  IN: 0 mL / OUT: 1500 mL / NET: -1500 mL      I&O's Detail    14 Aug 2020 07:01  -  15 Aug 2020 07:00  --------------------------------------------------------  IN:  Total IN: 0 mL    OUT:    Indwelling Catheter - Urethral: 100 mL    Voided: 1400 mL  Total OUT: 1500 mL    Total NET: -1500 mL          PHYSICAL EXAM:    General/Neuro  Deficits:  Aalert & oriented x 3, no focal deficits  Pepin J in place, reports less pain    Lungs: Clear to auscultation, Normal expansion/effort.     Cardiovascular : S1, S2.  Regular rate and rhythm.  HR <100    GI: Abdomen soft, Non-tender, Non-distended.      Extremities: Extremities warm, pink, well-perfused.   Wound: L thigh dress intact, some SS soiling on dressing     Derm: Good skin turgor, no skin breakdown.      :  Voiding    CXR:       LABS:  CAPILLARY BLOOD GLUCOSE      POCT Blood Glucose.: 142 mg/dL (14 Aug 2020 21:35)  POCT Blood Glucose.: 177 mg/dL (14 Aug 2020 17:11)  POCT Blood Glucose.: 187 mg/dL (14 Aug 2020 11:20)  POCT Blood Glucose.: 141 mg/dL (14 Aug 2020 07:59)                          6.7    7.54  )-----------( 159      ( 15 Aug 2020 01:50 )             22.0         08-15    142  |  101  |  41<H>  ----------------------------<  136<H>  5.3<H>   |  31  |  1.1    Ca    8.5      15 Aug 2020 01:50  Phos  4.1     08-15  Mg     2.5     08-15        PTT - ( 14 Aug 2020 12:04 )  PTT:28.8 sec  CARDIAC MARKERS ( 14 Aug 2020 21:01 )  x     / <0.01 ng/mL / 266 U/L / x     / 8.3 ng/mL  CARDIAC MARKERS ( 14 Aug 2020 05:29 )  x     / <0.01 ng/mL / 171 U/L / x     / 6.0 ng/mL  CARDIAC MARKERS ( 14 Aug 2020 00:03 )  x     / <0.01 ng/mL / 190 U/L / x     / 6.4 ng/mL  CARDIAC MARKERS ( 13 Aug 2020 17:47 )  x     / <0.01 ng/mL / 148 U/L / x     / 5.0 ng/mL  CARDIAC MARKERS ( 13 Aug 2020 11:39 )  x     / <0.01 ng/mL / 127 U/L / x     / 4.3 ng/mL TANNA MCCRACKEN  [id number]  89y Female    Indication for ICU admission: L intertrochanteric hip fracture. R posterior rib fracture 10-11     Admit Date:08-12-20  ICU Date:8/12  OR Date:    No Known Allergies    PAST MEDICAL & SURGICAL HISTORY:  Hypothyroid  COPD (chronic obstructive pulmonary disease)        24HRS EVENT:    Went into Afib RVR yesterday 6pm, broke with metoprolol 5 IVP x2 and 250 cc bolus    ON: Voided, HR remained <110. Hemoglobin 6.7, transfused 1 u PRBC, repeat 6.5 >> just started getting blood    NEURO:    Pain-controlled with Tylenol     Monitor for changes in mental status     Age indeterminant T5 fx     C1 transverse foramen fx. Neuro intact. No CTA recommended. Neuro Sx outpatient.      [place] J collar in place     RESP:     R posterior 10-11 rib fractures. Encourage IS pulling 500-750cc    Hx of COPD. ON 3-4L NC at home. Accepting O2 sats >88%     AM CXR   - pulm c/s - BIPAP pre and post op, solumedrol 40q12, pre op ABG CO2 56      CARDS:     Maintain normotension. MAP >65     Hx of HTN and HLD. Continue Metoprolol and Atorvastatin daily     Troponin x3 negative post episode of Afib RVR    EKG NSR     Episode of Afib w/ RVR 's 8/14. HR broke w/ Lopressor 5x2.     GI/NUTR:     Diet,  Regular     GI Prophylaxis- Protonix     Bowel regimen- Senna     /RENAL:     No santizo, voiding     Trend BUN/Cr 25/0.9       HEME/ONC:     DVT prophylaxis- Lovenox      Hgb 8.2 > 6.7, transfused 1 u PRBC, f/u repeat     Type and Screen     ID:    No dx. Afebrile. Trend WBC 7      ENDO:    Hx of Hypothyroidism. Synthroid     FS Q 6 hours while NPO     MSK:     L intertrochanteric hip fracture. POD #2 L hip gamma nail     WBAT LLE         DVT PTX: HSQ     GI PTX: PPI     ***Tubes/Lines/Drains  ***  Peripheral IV  		    REVIEW OF SYSTEMS    [x] A ten-point review of systems was otherwise negative except as noted.  [ ] Due to altered mental status/intubation, subjective information were not able to be obtained from the patient. History was obtained, to the extent possible, from review of the chart and collateral sources of information.\    Daily Height in cm: 53.34 (14 Aug 2020 09:51)    Daily     Diet, Regular (08-13-20 @ 17:50)      CURRENT MEDS:  Neurologic Medications  acetaminophen   Tablet .. 650 milliGRAM(s) Oral every 6 hours  melatonin 5 milliGRAM(s) Oral at bedtime  ondansetron Injectable 4 milliGRAM(s) IV Push every 6 hours PRN Nausea and/or Vomiting  oxyCODONE    IR 5 milliGRAM(s) Oral every 6 hours PRN Severe Pain (7 - 10)    Respiratory Medications    Cardiovascular Medications  metoprolol succinate ER 25 milliGRAM(s) Oral daily    Gastrointestinal Medications  bisacodyl Suppository 10 milliGRAM(s) Rectal daily PRN If no bowel movement  pantoprazole    Tablet 40 milliGRAM(s) Oral before breakfast  senna 2 Tablet(s) Oral at bedtime    Genitourinary Medications    Hematologic/Oncologic Medications  enoxaparin Injectable 40 milliGRAM(s) SubCutaneous daily    Antimicrobial/Immunologic Medications    Endocrine/Metabolic Medications  atorvastatin 40 milliGRAM(s) Oral at bedtime  insulin lispro (HumaLOG) corrective regimen sliding scale   SubCutaneous Before meals and at bedtime  levothyroxine 88 MICROGram(s) Oral daily  methylPREDNISolone sodium succinate Injectable 40 milliGRAM(s) IV Push every 12 hours    Topical/Other Medications  chlorhexidine 4% Liquid 1 Application(s) Topical <User Schedule>      ICU Vital Signs Last 24 Hrs  T(C): 36.8 (15 Aug 2020 06:04), Max: 36.8 (14 Aug 2020 08:00)  T(F): 98.2 (15 Aug 2020 06:04), Max: 98.2 (14 Aug 2020 08:00)  HR: 90 (15 Aug 2020 06:04) (75 - 90)  BP: 146/65 (15 Aug 2020 06:04) (100/55 - 146/65)  BP(mean): --  ABP: --  ABP(mean): --  RR: 20 (15 Aug 2020 06:04) (18 - 20)  SpO2: 100% (15 Aug 2020 06:04) (100% - 100%)      Adult Advanced Hemodynamics Last 24 Hrs  CVP(mm Hg): --  CVP(cm H2O): --  CO: --  CI: --  PA: --  PA(mean): --  PCWP: --  SVR: --  SVRI: --  PVR: --  PVRI: --      ABG - ( 13 Aug 2020 12:39 )  pH, Arterial: 7.41  pH, Blood: x     /  pCO2: 56    /  pO2: 134   / HCO3: 35    / Base Excess: 9.3   /  SaO2: 100                 I&O's Summary    14 Aug 2020 07:01  -  15 Aug 2020 07:00  --------------------------------------------------------  IN: 0 mL / OUT: 1500 mL / NET: -1500 mL      I&O's Detail    14 Aug 2020 07:01  -  15 Aug 2020 07:00  --------------------------------------------------------  IN:  Total IN: 0 mL    OUT:    Indwelling Catheter - Urethral: 100 mL    Voided: 1400 mL  Total OUT: 1500 mL    Total NET: -1500 mL          PHYSICAL EXAM:    General/Neuro  Deficits:  Aalert & oriented x 3, no focal deficits  [place] J in place, reports less pain    Lungs: Clear to auscultation, Normal expansion/effort.     Cardiovascular : S1, S2.  Regular rate and rhythm.  HR <100    GI: Abdomen soft, Non-tender, Non-distended.      Extremities: Extremities warm, pink, well-perfused.   Wound: L thigh dress intact, some SS soiling on dressing     Derm: Good skin turgor, no skin breakdown.      :  Voiding    CXR:       LABS:  CAPILLARY BLOOD GLUCOSE      POCT Blood Glucose.: 142 mg/dL (14 Aug 2020 21:35)  POCT Blood Glucose.: 177 mg/dL (14 Aug 2020 17:11)  POCT Blood Glucose.: 187 mg/dL (14 Aug 2020 11:20)  POCT Blood Glucose.: 141 mg/dL (14 Aug 2020 07:59)                          6.7    7.54  )-----------( 159      ( 15 Aug 2020 01:50 )             22.0         08-15    142  |  101  |  41<H>  ----------------------------<  136<H>  5.3<H>   |  31  |  1.1    Ca    8.5      15 Aug 2020 01:50  Phos  4.1     08-15  Mg     2.5     08-15        PTT - ( 14 Aug 2020 12:04 )  PTT:28.8 sec  CARDIAC MARKERS ( 14 Aug 2020 21:01 )  x     / <0.01 ng/mL / 266 U/L / x     / 8.3 ng/mL  CARDIAC MARKERS ( 14 Aug 2020 05:29 )  x     / <0.01 ng/mL / 171 U/L / x     / 6.0 ng/mL  CARDIAC MARKERS ( 14 Aug 2020 00:03 )  x     / <0.01 ng/mL / 190 U/L / x     / 6.4 ng/mL  CARDIAC MARKERS ( 13 Aug 2020 17:47 )  x     / <0.01 ng/mL / 148 U/L / x     / 5.0 ng/mL  CARDIAC MARKERS ( 13 Aug 2020 11:39 )  x     / <0.01 ng/mL / 127 U/L / x     / 4.3 ng/mL

## 2020-08-15 NOTE — CHART NOTE - NSCHARTNOTEFT_GEN_A_CORE
Hospitalist recalled for transfer of service.    Patient seen and examined, feels well, no complaints.  Hemodynamically stable  PE unremarkable  s/p 1u PRBC today for acute post op anemia with appropriate response  Pending cardiac f/u for post-op Afib and tapering steroids for COPD  PT f/u  Okay to transfer to medical service, discussed with Trauma team

## 2020-08-15 NOTE — CHART NOTE - NSCHARTNOTEFT_GEN_A_CORE
GENERAL SURGERY FLOOR TRANSFER NOTE    89y [id number] transferred to medicine from trauma service.    SUBJECTIVE: 89f S/P Fall with left hip fracture, acute right posterior 10-11 rib fractures, age indeterminate T5 fracture. Went to OR with orthopedics for Gamma nail comminuted intertrochanteric hip fx, DNR/DNI was rescinded for the OR. Post operatively, the patient was managed in the step down unit, had an episode of A fib overnight, returned to normal sinus rhythm after 2 doses of metoprolol. Cardiology consult pending. The patient's hemoglobin dropped to 6.5 this morning, was given 1 unit PRBC, hgb now 8.2. Neurosurgery saw the patient, recommended to D/C hard collar, the patient should continue to use incentive spirometer 10x/hr for at least 1 week. Approved for medical transfer by Dr. Arshad for management of A fib, no further traumatic intervention is required.     HIP FRACTURE LEFT CLOSED INITIAL RIB FRACTURE THORACIC COMPRESSION  ^S/P FALL  C ORSA 5/31/2017 [facility]    ABSCESS  Handoff  MEWS Score  Hypothyroid  COPD (chronic obstructive pulmonary disease)  Fracture, intertrochanteric, left femur  Fracture, intertrochanteric, left femur  Hip fracture, left, closed, initial encounter  Thoracic compression fracture, closed, initial encounter  Insertion, Gamma nail, hip  S/P FALL  C ORSA 5/31/2017 [facility]    ABSCESS  90+  Fall  Thoracic compression fracture, closed, initial encounter  Rib fractures  SysAdmin_VisitLink    No Known Allergies    acetaminophen   Tablet .. 650 milliGRAM(s) Oral every 6 hours  atorvastatin 40 milliGRAM(s) Oral at bedtime  bisacodyl Suppository 10 milliGRAM(s) Rectal daily PRN  chlorhexidine 4% Liquid 1 Application(s) Topical <User Schedule>  enoxaparin Injectable 40 milliGRAM(s) SubCutaneous daily  insulin lispro (HumaLOG) corrective regimen sliding scale   SubCutaneous Before meals and at bedtime  levothyroxine 88 MICROGram(s) Oral daily  melatonin 5 milliGRAM(s) Oral at bedtime  methylPREDNISolone sodium succinate Injectable 40 milliGRAM(s) IV Push every 12 hours  metoprolol succinate ER 25 milliGRAM(s) Oral daily  ondansetron Injectable 4 milliGRAM(s) IV Push every 6 hours PRN  oxyCODONE    IR 5 milliGRAM(s) Oral every 6 hours PRN  pantoprazole    Tablet 40 milliGRAM(s) Oral before breakfast  senna 2 Tablet(s) Oral at bedtime      OBJECTIVE:    T(C): 36.6 (08-15-20 @ 13:43), Max: 36.8 (08-15-20 @ 06:04)  HR: 86 (08-15-20 @ 13:43) (70 - 93)  BP: 121/63 (08-15-20 @ 13:43) (116/56 - 146/65)  RR: 20 (08-15-20 @ 13:43) (19 - 20)  SpO2: 100% (08-15-20 @ 13:43) (100% - 100%)  Wt(kg): --      I&O's Summary    14 Aug 2020 07:01  -  15 Aug 2020 07:00  --------------------------------------------------------  IN: 0 mL / OUT: 1500 mL / NET: -1500 mL    15 Aug 2020 07:01  -  15 Aug 2020 16:32  --------------------------------------------------------  IN: 304 mL / OUT: 900 mL / NET: -596 mL                              8.2    8.93  )-----------( 170      ( 15 Aug 2020 11:46 )             25.9       08-15    139  |  101  |  40<H>  ----------------------------<  125<H>  5.1<H>   |  30  |  1.0    Ca    8.1<L>      15 Aug 2020 06:41  Phos  4.1     08-15  Mg     2.5     08-15        MEDICATIONS  (STANDING):  acetaminophen   Tablet .. 650 milliGRAM(s) Oral every 6 hours  atorvastatin 40 milliGRAM(s) Oral at bedtime  chlorhexidine 4% Liquid 1 Application(s) Topical <User Schedule>  enoxaparin Injectable 40 milliGRAM(s) SubCutaneous daily  insulin lispro (HumaLOG) corrective regimen sliding scale   SubCutaneous Before meals and at bedtime  levothyroxine 88 MICROGram(s) Oral daily  melatonin 5 milliGRAM(s) Oral at bedtime  methylPREDNISolone sodium succinate Injectable 40 milliGRAM(s) IV Push every 12 hours  metoprolol succinate ER 25 milliGRAM(s) Oral daily  pantoprazole    Tablet 40 milliGRAM(s) Oral before breakfast  senna 2 Tablet(s) Oral at bedtime    MEDICATIONS  (PRN):  bisacodyl Suppository 10 milliGRAM(s) Rectal daily PRN If no bowel movement  ondansetron Injectable 4 milliGRAM(s) IV Push every 6 hours PRN Nausea and/or Vomiting  oxyCODONE    IR 5 milliGRAM(s) Oral every 6 hours PRN Severe Pain (7 - 10)          Basic Metabolic Panel: 23:30 (08-15-20 @ 10:10)  Magnesium, Serum: 23:30 (08-15-20 @ 10:10)  Phosphorus Level, Serum: 23:30 (08-15-20 @ 10:10)  Complete Blood Count: 23:30 (08-15-20 @ 10:10)  Prothrombin Time and INR, Plasma:  Start Date:15-Aug-2020. 23:30 (08-15-20 @ 10:10)  Activated Partial Thromboplastin Time:  Start Date:15-Aug-2020. 23:30 (08-15-20 @ 10:10)      89F s/p ORIF gamma nail for left hip fracture, acute right posterior 10-11 rib fractures, age indeterminate T5 fracture  -Trend hgb, given 1 unit prbc 8/15 for hgb 6.7, repeat hemoglobin 8.2  -Follow up orthopedics for starting anticoagulation if necessary for a fib  -Follow up cardiology consult for new onset a fib  -Follow up PT/Rehab  -Neurosurgery : no c collar required  -Continue incentive spirometer 10x/hr for at least 1 week for rib fractures    Approved for medical transfer by Dr. Arshad at [phone]  Signed out to medical resident, Dr. Echols at [phone] on 8/15 at [phone]

## 2020-08-15 NOTE — PROGRESS NOTE ADULT - SUBJECTIVE AND OBJECTIVE BOX
ORTHOPEDIC PROGRESS      POD2 s/p L hip IMN.  S/e at bedside.  Pain controlled.  Hgb 6.7, received 1u, repeat 6.5, receiving 2nd unit now.  Got into chair with PT yesterday.    T(C): 36.7 (08-15-20 @ 08:00), Max: 36.8 (08-15-20 @ 06:04)  HR: 70 (08-15-20 @ 08:00) (70 - 90)  BP: 142/59 (08-15-20 @ 08:00) (100/55 - 146/65)  RR: 19 (08-15-20 @ 08:00) (18 - 20)  SpO2: 100% (08-15-20 @ 08:00) (100% - 100%)    Gen: awake alert NAD    LLE:   dressing moderate drainage, changed  SILT s/s/sp/dp/t  motor intact TA/EHL/GS  compartments soft/nontender  2+ dp, digits wwp                             6.5    8.09  )-----------( 145      ( 15 Aug 2020 06:41 )             20.9       A/P 89yFemale POD2 s/p L hip IMN    - WBAT  - trend Hgb, transfuse prn  - pain control  - DVT prophylaxis  - IS encouraged  - PT/rehab  - Dispo planning

## 2020-08-15 NOTE — CHART NOTE - NSCHARTNOTEFT_GEN_A_CORE
Subjective: 89yFemale with a pmhx of HIP FRACTURE LEFT CLOSED INITIAL RIB FRACTURE THORACIC COMPRESSION  ^S/P FALL  C ORSA 5/31/2017 [facility]    ABSCESS  Handoff  MEWS Score  Hypothyroid  COPD (chronic obstructive pulmonary disease)  Fracture, intertrochanteric, left femur  Fracture, intertrochanteric, left femur  Hip fracture, left, closed, initial encounter  Thoracic compression fracture, closed, initial encounter  Insertion, Gamma nail, hip  S/P FALL  C ORSA 5/31/2017 [facility]    ABSCESS  90+  Fall  Thoracic compression fracture, closed, initial encounter  Rib fractures  SysAdmin_VisitLink    s/p Fall  Called to evaluate finding of C1 R posterior arch fracture.   Pt found with hard C-collar on.  Examined at bedside with Dr Villeda.  Pt denies neck pain, difficulty with ROM.  Denies pain, numbness or tingling down arms.  Collar removed and pt examined.  Displayed full ROM without any pain elicited.  No pain to palpation of neck.  Good motor strength b/l UE's and LE's. Collar removed.  No need for follow up imaging or collar.     Allergies    No Known Allergies    Intolerances        Vital Signs Last 24 Hrs  T(C): 36.6 (15 Aug 2020 13:43), Max: 36.8 (15 Aug 2020 06:04)  T(F): 97.9 (15 Aug 2020 13:43), Max: 98.2 (15 Aug 2020 06:04)  HR: 86 (15 Aug 2020 13:43) (70 - 93)  BP: 121/63 (15 Aug 2020 13:43) (116/56 - 146/65)  BP(mean): 86 (15 Aug 2020 13:43) (86 - 88)  RR: 20 (15 Aug 2020 13:43) (19 - 20)  SpO2: 100% (15 Aug 2020 12:00) (100% - 100%)      acetaminophen   Tablet .. 650 milliGRAM(s) Oral every 6 hours  atorvastatin 40 milliGRAM(s) Oral at bedtime  bisacodyl Suppository 10 milliGRAM(s) Rectal daily PRN  chlorhexidine 4% Liquid 1 Application(s) Topical <User Schedule>  enoxaparin Injectable 40 milliGRAM(s) SubCutaneous daily  insulin lispro (HumaLOG) corrective regimen sliding scale   SubCutaneous Before meals and at bedtime  levothyroxine 88 MICROGram(s) Oral daily  melatonin 5 milliGRAM(s) Oral at bedtime  methylPREDNISolone sodium succinate Injectable 40 milliGRAM(s) IV Push every 12 hours  metoprolol succinate ER 25 milliGRAM(s) Oral daily  ondansetron Injectable 4 milliGRAM(s) IV Push every 6 hours PRN  oxyCODONE    IR 5 milliGRAM(s) Oral every 6 hours PRN  pantoprazole    Tablet 40 milliGRAM(s) Oral before breakfast  senna 2 Tablet(s) Oral at bedtime        08-14-20 @ 07:01  -  08-15-20 @ 07:00  --------------------------------------------------------  IN: 0 mL / OUT: 1500 mL / NET: -1500 mL    08-15-20 @ 07:01  -  08-15-20 @ 14:18  --------------------------------------------------------  IN: 304 mL / OUT: 900 mL / NET: -596 mL        REVIEW OF SYSTEMS    [x ] A ten-point review of systems was otherwise negative except as noted.  [ ] Due to altered mental status/intubation, subjective information were not able to be obtained from the patient. History was obtained, to the extent possible, from review of the chart and collateral sources of information.          CBC Full  -  ( 15 Aug 2020 11:46 )  WBC Count : 8.93 K/uL  RBC Count : 2.69 M/uL  Hemoglobin : 8.2 g/dL  Hematocrit : 25.9 %  Platelet Count - Automated : 170 K/uL  Mean Cell Volume : 96.3 fL  Mean Cell Hemoglobin : 30.5 pg  Mean Cell Hemoglobin Concentration : 31.7 g/dL  Auto Neutrophil # : x  Auto Lymphocyte # : x  Auto Monocyte # : x  Auto Eosinophil # : x  Auto Basophil # : x  Auto Neutrophil % : x  Auto Lymphocyte % : x  Auto Monocyte % : x  Auto Eosinophil % : x  Auto Basophil % : x    08-15    139  |  101  |  40<H>  ----------------------------<  125<H>  5.1<H>   |  30  |  1.0    Ca    8.1<L>      15 Aug 2020 06:41  Phos  4.1     08-15  Mg     2.5     08-15      PTT - ( 14 Aug 2020 12:04 )  PTT:28.8 sec      Assessment/Plan:   d/c C-collar  no need for further imaging  care per surgery team  d/w attg

## 2020-08-15 NOTE — PROGRESS NOTE ADULT - ASSESSMENT
NEURO:    Pain-controlled with Tylenol and Oxycodone     Monitor for changes in mental status     [place] J collar in place. New finding of C1 non displaced fracture.     Neuro Sx f/u outpatient     RESP:     R posterior 10-11 rib fractures. Encourage IS pulling 500-750cc    Hx of COPD. On RA currently satting >92%     AM CXR       CARDS:     Maintain normotension. MAP >65     Troponin x3 negative     Hx of HTN and HLD: Continue Metoprolol and Statin     EKG NSR        GI/NUTR:     Diet, regular     GI Prophylaxis- Protonix     Bowel regimen- Senna     /RENAL:     Voiding     Monitor lytes, replete as needed     Trend BUN/Cr     HEME/ONC:     DVT prophylaxis- Lovenox     Trend H/H     ID:    No dx. Afebrile. Trend WBC         ENDO:    Hx of Hypothyroidism. Continue Synthroid     FS AC HS     MSK:     POD #2 s/p L hip gamma nail     PT/OT following    LINES/DRAINS:  PIV    DISPO:    CEU/Step Down Unit ASSESSMENT: POD#2 s/p gamma nail of L hip fracture.     Went into Afib RVR yesterday 6pm, broke with metoprolol 5 IVP x2  ON: Voided, HR remained <110. Hemoglobin 6.7, transfused 1 u PRBC    Overnight: Voided. Changed [place] J to a smaller size.  overnight. Melatonin given x1.     NEURO:    Pain-controlled with Tylenol     Monitor for changes in mental status     Age indeterminant T5 fx     C1 transverse foramen fx. Neuro intact. No CTA recommended. Neuro Sx outpatient.      [place] J collar in place     RESP:     R posterior 10-11 rib fractures. Encourage IS pulling 500-750cc    Hx of COPD. ON 3-4L NC at home. Accepting O2 sats >88%     AM CXR   - pulm c/s - BIPAP pre and post op, solumedrol 40q12, pre op ABG CO2 56      CARDS:     Maintain normotension. MAP >65     Hx of HTN and HLD. Continue Metoprolol and Atorvastatin daily     Troponin x3 pending     EKG NSR     Episode of Afib w/ RVR 's 8/14. HR broke w/ Lopressor 5x2.     GI/NUTR:     Diet,  Regular     GI Prophylaxis- Protonix     Bowel regimen- Senna     /RENAL:     No santizo, voiding     Trend BUN/Cr 25/0.9       HEME/ONC:     DVT prophylaxis- Lovenox      Hgb 8.2     Type and Screen     ID:    No dx. Afebrile. Trend WBC       ENDO:    Hx of Hypothyroidism. Synthroid     FS Q 6 hours while NPO     MSK:     L intertrochanteric hip fracture. POD #2 L hip gamma nail     WBAT LLE       LINES/DRAINS:  PIV    DISPO:    CEU/Step Down Unit

## 2020-08-15 NOTE — CHART NOTE - NSCHARTNOTEFT_GEN_A_CORE
HPI: 89y Female w/ PMHx of COPD on 3-4L of O2 at home, hypothyroidism, lives at home w/ her son (Andrzej), ADLs intact. Patient presents to the ED 8/12 s/p mechanical fall, - HT, -LOC, -AC. As per the patient, she got up out of her recliner to check on her albuterol treatment, went back to sit in her recliner and missed falling onto her L side. Patient was unable to ambulate after fall. LLE was found to be shortened and externally rotated. Plan scan was significant for R posterior 10-11 rib fx, age indeterminant T5 vertebral body compression fx, and L intertrochanteric hip fracture. She is s/p Left intermedullary gamma nail fixation on 8/13. She was then found to have non displaced fracture transvering right posterior arch of C1 extending into right transverse foramen.  As per neurosurgery, there is nothing to do at this time. CTA of neck is not warranted and patient can maintain c-collar. After L hip fixation, patient has been monitored in CEU. She was planned for downgrade yesterday, but was kept in CEU due to episode of Afib RVR to  that resolved after metoprolol 5 IVP x2 and 250 cc bolus. She was monitored overnight and HR remained <100.  Hemoglobin overnight dropped to 6.7 with repeat 6.5, then given 1 u PRBC, repeat Hgb ______. Cardiac enzymes x3 after episode of tachycardia were negative.    PHYSICAL EXAM:    General/Neuro  Deficits:  Alert & oriented x 3, no focal deficits  [place] J in place, reports less pain    Lungs: Clear to auscultation, Normal expansion/effort.     Cardiovascular : S1, S2.  Regular rate and rhythm.  HR <100    GI: Abdomen soft, Non-tender, Non-distended.      Extremities: Extremities warm, pink, well-perfused.   Wound: L thigh dress c/d/i, changed today  Palpable small hematoma of L thigh     Derm: Good skin turgor, no skin breakdown.      :  Voiding    ASSESSMENT/PLAN: 89y Female w/ PMHx of COPD on 3-4L of O2 at home, hypothyroidism, lives at home w/ her son (Andrzej), ADLs intact s/p Left intermedullary gamma nail fixation on 8/13.     NEURO:    Pain-controlled with Tylenol     Monitor for changes in mental status     Age indeterminant T5 fx     C1 transverse foramen fx. Neuro intact. No CTA recommended. Neuro Sx outpatient.     [place] J collar in place     RESP:     R posterior 10-11 rib fractures. Encourage IS pulling 500-750cc    Hx of COPD. ON 3-4L NC at home. Accepting O2 sats >88%     AM CXR     Pulm c/s - BIPAP pre and post op, solumedrol 40q12, pre op ABG CO2 56      CARDS:     Maintain normotension. MAP >65     Hx of HTN and HLD. Continue Metoprolol and Atorvastatin daily     Troponin x3 negative post episode of Afib RVR    EKG NSR     Episode of Afib w/ RVR 's 8/14. HR broke w/ Lopressor 5x2.     GI/NUTR:     Diet,  Regular     GI Prophylaxis- Protonix     Bowel regimen- Senna     /RENAL:     No santizo, voiding     Trend BUN/Cr 25/0.9       HEME/ONC:     DVT prophylaxis- Lovenox      Hgb 8.2 > 6.7, transfused 1 u PRBC, f/u repeat     Type and Screen     ID:    No dx. Afebrile. Trend WBC 7      ENDO:    Hx of Hypothyroidism. Synthroid     FS Q 6 hours while NPO     MSK:     L intertrochanteric hip fracture. POD #2 L hip gamma nail     Small hematoma on L thigh     WBAT LLE    Disposition: Downgrade to telemetry floor    Follow up:  Monitor cardiac status   F/u 2330 labs     Signed out to on 8/15/20 at HPI: 89y Female w/ PMHx of COPD on 3-4L of O2 at home, hypothyroidism, lives at home w/ her son (Andrzej), ADLs intact. Patient presents to the ED 8/12 s/p mechanical fall, - HT, -LOC, -AC. As per the patient, she got up out of her recliner to check on her albuterol treatment, went back to sit in her recliner and missed falling onto her L side. Patient was unable to ambulate after fall. LLE was found to be shortened and externally rotated. Plan scan was significant for R posterior 10-11 rib fx, age indeterminant T5 vertebral body compression fx, and L intertrochanteric hip fracture. She is s/p Left intermedullary gamma nail fixation on 8/13. She was then found to have non displaced fracture transvering right posterior arch of C1 extending into right transverse foramen.  As per neurosurgery, there is nothing to do at this time. CTA of neck is not warranted and patient can maintain c-collar. After L hip fixation, patient has been monitored in CEU. She was planned for downgrade yesterday, but was kept in CEU due to episode of Afib RVR to  that resolved after metoprolol 5 IVP x2 and 250 cc bolus. She was monitored overnight and HR remained <100.  Hemoglobin overnight dropped to 6.7 with repeat 6.5, then given 1 u PRBC, repeat Hgb 8.2. Cardiac enzymes x3 after episode of tachycardia were negative.    PHYSICAL EXAM:    General/Neuro  Deficits:  Alert & oriented x 3, no focal deficits  [place] J in place, reports less pain    Lungs: Clear to auscultation, Normal expansion/effort.     Cardiovascular : S1, S2.  Regular rate and rhythm.  HR <100    GI: Abdomen soft, Non-tender, Non-distended.      Extremities: Extremities warm, pink, well-perfused.   Wound: L thigh dress c/d/i, changed today  Palpable small hematoma of L thigh     Derm: Good skin turgor, no skin breakdown.      :  Voiding    ASSESSMENT/PLAN: 89y Female w/ PMHx of COPD on 3-4L of O2 at home, hypothyroidism, lives at home w/ her son (Andrzej), ADLs intact s/p Left intermedullary gamma nail fixation on 8/13.     NEURO:    Pain-controlled with Tylenol     Monitor for changes in mental status     Age indeterminant T5 fx     C1 transverse foramen fx. Neuro intact. No CTA recommended. Neuro Sx outpatient.     [place] J collar in place     RESP:     R posterior 10-11 rib fractures. Encourage IS pulling 500-750cc    Hx of COPD. ON 3-4L NC at home. Accepting O2 sats >88%     AM CXR     Pulm c/s - BIPAP pre and post op, solumedrol 40q12, pre op ABG CO2 56      CARDS:     Maintain normotension. MAP >65     Hx of HTN and HLD. Continue Metoprolol and Atorvastatin daily     Troponin x2 negative post episode of Afib RVR, f/u 3rd CE    EKG NSR     Episode of Afib w/ RVR 's 8/14. HR broke w/ Lopressor 5x2.     GI/NUTR:     Diet,  Regular     GI Prophylaxis- Protonix     Bowel regimen- Senna     /RENAL:     No santizo, voiding     Trend BUN/Cr 25/0.9       HEME/ONC:     DVT prophylaxis- Lovenox      Hgb 8.2 > 6.7, transfused 1 u PRBC, f/u repeat     Type and Screen     ID:    No dx. Afebrile. Trend WBC 7      ENDO:    Hx of Hypothyroidism. Synthroid     FS Q 6 hours while NPO     MSK:     L intertrochanteric hip fracture. POD #2 L hip gamma nail     Small hematoma on L thigh     WBAT LLE    Disposition: Downgrade to telemetry floor    Follow up:  Monitor cardiac status  F/u third CE, has been neg for two post arrhythmia event   F/u 2330 labs     Signed out to  on 8/15/20 at   Signed out to on 8/15/20 at HPI: 89y Female w/ PMHx of COPD on 3-4L of O2 at home, hypothyroidism, lives at home w/ her son (Andrzej), ADLs intact. Patient presents to the ED 8/12 s/p mechanical fall, - HT, -LOC, -AC. As per the patient, she got up out of her recliner to check on her albuterol treatment, went back to sit in her recliner and missed falling onto her L side. Patient was unable to ambulate after fall. LLE was found to be shortened and externally rotated. Plan scan was significant for R posterior 10-11 rib fx, age indeterminant T5 vertebral body compression fx, and L intertrochanteric hip fracture. She is s/p Left intermedullary gamma nail fixation on 8/13. She was then found to have non displaced fracture transvering right posterior arch of C1 extending into right transverse foramen.  As per neurosurgery, there is nothing to do at this time. CTA of neck is not warranted and patient can maintain c-collar. After L hip fixation, patient has been monitored in CEU. She was planned for downgrade yesterday, but was kept in CEU due to episode of Afib RVR to  that resolved after metoprolol 5 IVP x2 and 250 cc bolus. She was monitored overnight and HR remained <100.  Hemoglobin overnight dropped to 6.7 with repeat 6.5, then given 1 u PRBC, repeat Hgb 8.2. Cardiac enzymes x3 after episode of tachycardia were negative.    PHYSICAL EXAM:    General/Neuro  Deficits:  Alert & oriented x 3, no focal deficits  [place] J in place, reports less pain    Lungs: Clear to auscultation, Normal expansion/effort.     Cardiovascular : S1, S2.  Regular rate and rhythm.  HR <100    GI: Abdomen soft, Non-tender, Non-distended.      Extremities: Extremities warm, pink, well-perfused.   Wound: L thigh dress c/d/i, changed today  Palpable small hematoma of L thigh     Derm: Good skin turgor, no skin breakdown.      :  Voiding    ASSESSMENT/PLAN: 89y Female w/ PMHx of COPD on 3-4L of O2 at home, hypothyroidism, lives at home w/ her son (Andrzej), ADLs intact s/p Left intermedullary gamma nail fixation on 8/13.     NEURO:    Pain-controlled with Tylenol     Monitor for changes in mental status     Age indeterminant T5 fx     C1 transverse foramen fx. Neuro intact. No CTA recommended. Neuro Sx outpatient.     [place] J collar in place     RESP:     R posterior 10-11 rib fractures. Encourage IS pulling 500-750cc    Hx of COPD. ON 3-4L NC at home. Accepting O2 sats >88%     AM CXR     Pulm c/s - BIPAP pre and post op, solumedrol 40q12, pre op ABG CO2 56      CARDS:     Maintain normotension. MAP >65     Hx of HTN and HLD. Continue Metoprolol and Atorvastatin daily     Troponin x2 negative post episode of Afib RVR, f/u 3rd CE    EKG NSR     Episode of Afib w/ RVR 's 8/14. HR broke w/ Lopressor 5x2.     GI/NUTR:     Diet,  Regular     GI Prophylaxis- Protonix     Bowel regimen- Senna     /RENAL:     No santizo, voiding     Trend BUN/Cr 25/0.9       HEME/ONC:     DVT prophylaxis- Lovenox      Hgb 8.2 > 6.7, transfused 1 u PRBC -> repeat Hgb 8.2    Type and Screen     ID:    No dx. Afebrile. Trend WBC 7      ENDO:    Hx of Hypothyroidism. Synthroid     FS Q 6 hours while NPO     MSK:     L intertrochanteric hip fracture. POD #2 L hip gamma nail     Small hematoma on L thigh     WBAT LLE    Disposition: Downgrade to telemetry floor    Follow up:  Monitor cardiac status  F/u third CE, has been neg for two post arrhythmia event   F/u 2330 labs     Signed out to  on 8/15/20 at HPI: 89y Female w/ PMHx of COPD on 3-4L of O2 at home, hypothyroidism, lives at home w/ her son (Andrzej), ADLs intact. Patient presents to the ED 8/12 s/p mechanical fall, - HT, -LOC, -AC. As per the patient, she got up out of her recliner to check on her albuterol treatment, went back to sit in her recliner and missed falling onto her L side. Patient was unable to ambulate after fall. LLE was found to be shortened and externally rotated. Plan scan was significant for R posterior 10-11 rib fx, age indeterminant T5 vertebral body compression fx, and L intertrochanteric hip fracture. She is s/p Left intermedullary gamma nail fixation on 8/13. She was then found to have non displaced fracture transvering right posterior arch of C1 extending into right transverse foramen.  As per neurosurgery, there is nothing to do at this time. CTA of neck is not warranted and patient can maintain c-collar. After L hip fixation, patient has been monitored in CEU. She was planned for downgrade yesterday, but was kept in CEU due to episode of Afib RVR to  that resolved after metoprolol 5 IVP x2 and 250 cc bolus. She was monitored overnight and HR remained <100.  Hemoglobin overnight dropped to 6.7 with repeat 6.5, then given 1 u PRBC, repeat Hgb 8.2. Cardiac enzymes x3 after episode of tachycardia were negative.    PHYSICAL EXAM:    General/Neuro  Deficits:  Alert & oriented x 3, no focal deficits  [place] J in place, reports less pain    Lungs: Clear to auscultation, Normal expansion/effort.     Cardiovascular : S1, S2.  Regular rate and rhythm.  HR <100    GI: Abdomen soft, Non-tender, Non-distended.      Extremities: Extremities warm, pink, well-perfused.   Wound: L thigh dress c/d/i, changed today  Palpable small hematoma of L thigh     Derm: Good skin turgor, no skin breakdown.      :  Voiding    ASSESSMENT/PLAN: 89y Female w/ PMHx of COPD on 3-4L of O2 at home, hypothyroidism, lives at home w/ her son (Andrzej), ADLs intact s/p Left intermedullary gamma nail fixation on 8/13.     NEURO:    Pain-controlled with Tylenol     Monitor for changes in mental status     Age indeterminant T5 fx     C1 transverse foramen fx. Neuro intact. No CTA recommended. Neuro Sx outpatient.     [place] J collar in place     RESP:     R posterior 10-11 rib fractures. Encourage IS pulling 500-750cc    Hx of COPD. ON 3-4L NC at home. Accepting O2 sats >88%     AM CXR     Pulm c/s - BIPAP pre and post op, solumedrol 40q12, pre op ABG CO2 56      CARDS:     Maintain normotension. MAP >65     Hx of HTN and HLD. Continue Metoprolol and Atorvastatin daily     Troponin x3 negative post episode of Afib RVR    EKG NSR     Episode of Afib w/ RVR 's 8/14. HR broke w/ Lopressor 5x2.     GI/NUTR:     Diet,  Regular     GI Prophylaxis- Protonix     Bowel regimen- Senna     /RENAL:     No santizo, voiding     Trend BUN/Cr 25/0.9       HEME/ONC:     DVT prophylaxis- Lovenox      Hgb 8.2 > 6.7, transfused 1 u PRBC -> repeat Hgb 8.2    Type and Screen     ID:    No dx. Afebrile. Trend WBC 7      ENDO:    Hx of Hypothyroidism. Synthroid     FS Q 6 hours while NPO     MSK:     L intertrochanteric hip fracture. POD #2 L hip gamma nail     Small hematoma on L thigh     WBAT LLE    Disposition: Downgrade to telemetry floor    Follow up:  Monitor cardiac status  F/u 2330 labs     Signed out to  on 8/15/20 at HPI: 89y Female w/ PMHx of COPD on 3-4L of O2 at home, hypothyroidism, lives at home w/ her son (Andrzej), ADLs intact. Patient presents to the ED 8/12 s/p mechanical fall, - HT, -LOC, -AC. As per the patient, she got up out of her recliner to check on her albuterol treatment, went back to sit in her recliner and missed falling onto her L side. Patient was unable to ambulate after fall. LLE was found to be shortened and externally rotated. Plan scan was significant for R posterior 10-11 rib fx, age indeterminant T5 vertebral body compression fx, and L intertrochanteric hip fracture. She is s/p Left intermedullary gamma nail fixation on 8/13. She was then found to have non displaced fracture transvering right posterior arch of C1 extending into right transverse foramen.  As per neurosurgery, there is nothing to do at this time. CTA of neck is not warranted and patient can maintain c-collar. After L hip fixation, patient has been monitored in CEU. She was planned for downgrade yesterday, but was kept in CEU due to episode of Afib RVR to  that resolved after metoprolol 5 IVP x2 and 250 cc bolus. She was monitored overnight and HR remained <100.  Hemoglobin overnight dropped to 6.7 with repeat 6.5, then given 1 u PRBC, repeat Hgb 8.2. Cardiac enzymes x3 after episode of tachycardia were negative.  Patient is stable for downgrade to telemetry.     PHYSICAL EXAM:    General/Neuro  Deficits:  Alert & oriented x 3, no focal deficits  [place] J in place, reports less pain    Lungs: Clear to auscultation, Normal expansion/effort.     Cardiovascular : S1, S2.  Regular rate and rhythm.  HR <100    GI: Abdomen soft, Non-tender, Non-distended.      Extremities: Extremities warm, pink, well-perfused.   Wound: L thigh dress c/d/i, changed today  Palpable small hematoma of L thigh     Derm: Good skin turgor, no skin breakdown.      :  Voiding    ASSESSMENT/PLAN: 89y Female w/ PMHx of COPD on 3-4L of O2 at home, hypothyroidism, lives at home w/ her son (Andrzej), ADLs intact s/p Left intermedullary gamma nail fixation on 8/13.     NEURO:    Pain-controlled with Tylenol     Monitor for changes in mental status     Age indeterminant T5 fx     C1 transverse foramen fx. Neuro intact. No CTA recommended. Neuro Sx outpatient.     [place] J collar in place     RESP:     R posterior 10-11 rib fractures. Encourage IS pulling 500-750cc    Hx of COPD. ON 3-4L NC at home. Accepting O2 sats >88%     AM CXR     Pulm c/s - BIPAP pre and post op, solumedrol 40q12, pre op ABG CO2 56      CARDS:     Maintain normotension. MAP >65     Hx of HTN and HLD. Continue Metoprolol and Atorvastatin daily     Troponin x3 negative post episode of Afib RVR    EKG NSR     Episode of Afib w/ RVR 's 8/14. HR broke w/ Lopressor 5x2.     Cards consult placed 8/15    GI/NUTR:     Diet,  Regular     GI Prophylaxis- Protonix     Bowel regimen- Senna     /RENAL:     No santizo, voiding     Trend BUN/Cr 25/0.9       HEME/ONC:     DVT prophylaxis- Lovenox      Hgb 8.2 > 6.7, transfused 1 u PRBC -> repeat Hgb 8.2    Type and Screen     ID:    No dx. Afebrile. Trend WBC 7      ENDO:    Hx of Hypothyroidism. Synthroid     FS Q 6 hours while NPO     MSK:     L intertrochanteric hip fracture. POD #2 L hip gamma nail     Small hematoma on L thigh     WBAT LLE    Disposition: Downgrade to telemetry floor    Follow up:  Monitor cardiac status  F/u 2330 labs   F/u Cardiology consult     Signed out to trauma service [id number] Workman 8/15/20 at 1:30PM

## 2020-08-16 LAB
ANION GAP SERPL CALC-SCNC: 7 MMOL/L — SIGNIFICANT CHANGE UP (ref 7–14)
APTT BLD: 23.7 SEC — CRITICAL LOW (ref 27–39.2)
BUN SERPL-MCNC: 38 MG/DL — HIGH (ref 10–20)
CALCIUM SERPL-MCNC: 8.2 MG/DL — LOW (ref 8.5–10.1)
CHLORIDE SERPL-SCNC: 103 MMOL/L — SIGNIFICANT CHANGE UP (ref 98–110)
CO2 SERPL-SCNC: 32 MMOL/L — SIGNIFICANT CHANGE UP (ref 17–32)
CREAT SERPL-MCNC: 0.9 MG/DL — SIGNIFICANT CHANGE UP (ref 0.7–1.5)
GLUCOSE BLDC GLUCOMTR-MCNC: 116 MG/DL — HIGH (ref 70–99)
GLUCOSE BLDC GLUCOMTR-MCNC: 153 MG/DL — HIGH (ref 70–99)
GLUCOSE BLDC GLUCOMTR-MCNC: 159 MG/DL — HIGH (ref 70–99)
GLUCOSE BLDC GLUCOMTR-MCNC: 80 MG/DL — SIGNIFICANT CHANGE UP (ref 70–99)
GLUCOSE SERPL-MCNC: 84 MG/DL — SIGNIFICANT CHANGE UP (ref 70–99)
HCT VFR BLD CALC: 26 % — LOW (ref 37–47)
HGB BLD-MCNC: 8.2 G/DL — LOW (ref 12–16)
INR BLD: 0.92 RATIO — SIGNIFICANT CHANGE UP (ref 0.65–1.3)
MAGNESIUM SERPL-MCNC: 2.2 MG/DL — SIGNIFICANT CHANGE UP (ref 1.8–2.4)
MCHC RBC-ENTMCNC: 30.6 PG — SIGNIFICANT CHANGE UP (ref 27–31)
MCHC RBC-ENTMCNC: 31.5 G/DL — LOW (ref 32–37)
MCV RBC AUTO: 97 FL — SIGNIFICANT CHANGE UP (ref 81–99)
NRBC # BLD: 0 /100 WBCS — SIGNIFICANT CHANGE UP (ref 0–0)
PHOSPHATE SERPL-MCNC: 2.7 MG/DL — SIGNIFICANT CHANGE UP (ref 2.1–4.9)
PLATELET # BLD AUTO: 155 K/UL — SIGNIFICANT CHANGE UP (ref 130–400)
POTASSIUM SERPL-MCNC: 4.5 MMOL/L — SIGNIFICANT CHANGE UP (ref 3.5–5)
POTASSIUM SERPL-SCNC: 4.5 MMOL/L — SIGNIFICANT CHANGE UP (ref 3.5–5)
PROTHROM AB SERPL-ACNC: 10.6 SEC — SIGNIFICANT CHANGE UP (ref 9.95–12.87)
RBC # BLD: 2.68 M/UL — LOW (ref 4.2–5.4)
RBC # FLD: 16.8 % — HIGH (ref 11.5–14.5)
SODIUM SERPL-SCNC: 142 MMOL/L — SIGNIFICANT CHANGE UP (ref 135–146)
WBC # BLD: 7.57 K/UL — SIGNIFICANT CHANGE UP (ref 4.8–10.8)
WBC # FLD AUTO: 7.57 K/UL — SIGNIFICANT CHANGE UP (ref 4.8–10.8)

## 2020-08-16 PROCEDURE — 71045 X-RAY EXAM CHEST 1 VIEW: CPT | Mod: 26

## 2020-08-16 PROCEDURE — 99232 SBSQ HOSP IP/OBS MODERATE 35: CPT

## 2020-08-16 PROCEDURE — 99233 SBSQ HOSP IP/OBS HIGH 50: CPT

## 2020-08-16 RX ORDER — APIXABAN 2.5 MG/1
2.5 TABLET, FILM COATED ORAL EVERY 12 HOURS
Refills: 0 | Status: DISCONTINUED | OUTPATIENT
Start: 2020-08-16 | End: 2020-08-17

## 2020-08-16 RX ORDER — DRONEDARONE 400 MG/1
400 TABLET, FILM COATED ORAL
Refills: 0 | Status: DISCONTINUED | OUTPATIENT
Start: 2020-08-16 | End: 2020-08-17

## 2020-08-16 RX ADMIN — ATORVASTATIN CALCIUM 40 MILLIGRAM(S): 80 TABLET, FILM COATED ORAL at 21:21

## 2020-08-16 RX ADMIN — Medication 88 MICROGRAM(S): at 05:41

## 2020-08-16 RX ADMIN — Medication 5 MILLIGRAM(S): at 21:21

## 2020-08-16 RX ADMIN — PANTOPRAZOLE SODIUM 40 MILLIGRAM(S): 20 TABLET, DELAYED RELEASE ORAL at 05:40

## 2020-08-16 RX ADMIN — SENNA PLUS 2 TABLET(S): 8.6 TABLET ORAL at 21:21

## 2020-08-16 RX ADMIN — Medication 650 MILLIGRAM(S): at 12:00

## 2020-08-16 RX ADMIN — CHLORHEXIDINE GLUCONATE 1 APPLICATION(S): 213 SOLUTION TOPICAL at 05:42

## 2020-08-16 RX ADMIN — Medication 3: at 11:49

## 2020-08-16 RX ADMIN — Medication 25 MILLIGRAM(S): at 05:40

## 2020-08-16 RX ADMIN — APIXABAN 2.5 MILLIGRAM(S): 2.5 TABLET, FILM COATED ORAL at 16:44

## 2020-08-16 RX ADMIN — Medication 3: at 16:43

## 2020-08-16 RX ADMIN — Medication 650 MILLIGRAM(S): at 05:42

## 2020-08-16 RX ADMIN — Medication 40 MILLIGRAM(S): at 05:41

## 2020-08-16 RX ADMIN — Medication 650 MILLIGRAM(S): at 11:12

## 2020-08-16 RX ADMIN — Medication 650 MILLIGRAM(S): at 06:50

## 2020-08-16 RX ADMIN — DRONEDARONE 400 MILLIGRAM(S): 400 TABLET, FILM COATED ORAL at 16:44

## 2020-08-16 NOTE — PROGRESS NOTE ADULT - SUBJECTIVE AND OBJECTIVE BOX
no chest pain   no sob   was in afib post op and now in NSR    Vital Signs Last 24 Hrs  T(C): 36.6 (16 Aug 2020 05:31), Max: 36.6 (15 Aug 2020 13:43)  T(F): 97.9 (16 Aug 2020 05:31), Max: 97.9 (15 Aug 2020 13:43)  HR: 73 (16 Aug 2020 05:31) (73 - 90)  BP: 138/63 (16 Aug 2020 05:31) (121/63 - 143/65)  BP(mean): 86 (15 Aug 2020 13:43) (86 - 88)  RR: 18 (16 Aug 2020 05:31) (18 - 20)  SpO2: 98% (16 Aug 2020 06:55) (98% - 100%)    PHYSICAL EXAM:  GENERAL: NAD  Pulm: Clear to auscultation bilaterally; No wheeze  CV: Regular rate and rhythm; No murmurs, rubs, or gallops  GI: Soft, Nontender, Nondistended; Bowel sounds present  EXTREMITIES:  2+ Peripheral Pulses, No clubbing, cyanosis, or edema  PSYCH: AAOx3  NEUROLOGY: non-focal  SKIN: No rashes                           8.2    7.57  )-----------( 155      ( 16 Aug 2020 01:09 )             26.0     08-16    142  |  103  |  38<H>  ----------------------------<  84  4.5   |  32  |  0.9    Ca    8.2<L>      16 Aug 2020 01:09  Phos  2.7     08-16  Mg     2.2     08-16        PT/INR - ( 16 Aug 2020 01:09 )   PT: 10.60 sec;   INR: 0.92 ratio         PTT - ( 16 Aug 2020 01:09 )  PTT:23.7 sec  CARDIAC MARKERS ( 15 Aug 2020 11:46 )  x     / <0.01 ng/mL / 213 U/L / x     / 6.6 ng/mL  CARDIAC MARKERS ( 15 Aug 2020 06:41 )  x     / <0.01 ng/mL / 213 U/L / x     / 7.3 ng/mL  CARDIAC MARKERS ( 14 Aug 2020 21:01 )  x     / <0.01 ng/mL / 266 U/L / x     / 8.3 ng/mL    MEDICATIONS  (STANDING):  acetaminophen   Tablet .. 650 milliGRAM(s) Oral every 6 hours  apixaban 2.5 milliGRAM(s) Oral every 12 hours  atorvastatin 40 milliGRAM(s) Oral at bedtime  chlorhexidine 4% Liquid 1 Application(s) Topical <User Schedule>  dronedarone 400 milliGRAM(s) Oral two times a day  insulin lispro (HumaLOG) corrective regimen sliding scale   SubCutaneous Before meals and at bedtime  levothyroxine 88 MICROGram(s) Oral daily  melatonin 5 milliGRAM(s) Oral at bedtime  metoprolol succinate ER 25 milliGRAM(s) Oral daily  pantoprazole    Tablet 40 milliGRAM(s) Oral before breakfast  senna 2 Tablet(s) Oral at bedtime    MEDICATIONS  (PRN):  bisacodyl Suppository 10 milliGRAM(s) Rectal daily PRN If no bowel movement  ondansetron Injectable 4 milliGRAM(s) IV Push every 6 hours PRN Nausea and/or Vomiting  oxyCODONE    IR 5 milliGRAM(s) Oral every 6 hours PRN Severe Pain (7 - 10)

## 2020-08-16 NOTE — PROGRESS NOTE ADULT - SUBJECTIVE AND OBJECTIVE BOX
Orthopaedics Progress Note    TANNA MCCRACKEN  [id number]    POD #3 s/p L hip IMN.  S/e at bedside.  Pain controlled.  Received 2u pRBC transfusion yesterday due to hgb 6.5, improved to 8.2 today.  Has been OOBTC.      acetaminophen   Tablet .. 650 milliGRAM(s) Oral every 6 hours  apixaban 2.5 milliGRAM(s) Oral every 12 hours  atorvastatin 40 milliGRAM(s) Oral at bedtime  bisacodyl Suppository 10 milliGRAM(s) Rectal daily PRN  chlorhexidine 4% Liquid 1 Application(s) Topical <User Schedule>  dronedarone 400 milliGRAM(s) Oral two times a day  insulin lispro (HumaLOG) corrective regimen sliding scale   SubCutaneous Before meals and at bedtime  levothyroxine 88 MICROGram(s) Oral daily  melatonin 5 milliGRAM(s) Oral at bedtime  metoprolol succinate ER 25 milliGRAM(s) Oral daily  ondansetron Injectable 4 milliGRAM(s) IV Push every 6 hours PRN  oxyCODONE    IR 5 milliGRAM(s) Oral every 6 hours PRN  pantoprazole    Tablet 40 milliGRAM(s) Oral before breakfast  senna 2 Tablet(s) Oral at bedtime      T(C): 36.6 (08-16-20 @ 05:31), Max: 36.6 (08-15-20 @ 13:43)  HR: 73 (08-16-20 @ 05:31) (73 - 90)  BP: 138/63 (08-16-20 @ 05:31) (121/63 - 143/65)  RR: 18 (08-16-20 @ 05:31) (18 - 20)  SpO2: 98% (08-16-20 @ 06:55) (98% - 100%)    Physical Exam  NAD, resting comfortably  Breathing comfortably on RA    LLE:   dressing moderate drainage, changed  SILT s/s/sp/dp/t  motor intact TA/EHL/GS  compartments soft/nontender  2+ dp, digits wwp    Labs                        8.2    7.57  )-----------( 155      ( 16 Aug 2020 01:09 )             26.0     08-16    142  |  103  |  38<H>  ----------------------------<  84  4.5   |  32  |  0.9    Ca    8.2<L>      16 Aug 2020 01:09  Phos  2.7     08-16  Mg     2.2     08-16        PT/INR - ( 16 Aug 2020 01:09 )   PT: 10.60 sec;   INR: 0.92 ratio         PTT - ( 16 Aug 2020 01:09 )  PTT:23.7 sec    A/P 89yFemale POD#3 s/p L hip IMN    - WBAT to LLE  - trend Hgb, transfuse prn  - pain control  - DVT prophylaxis  - IS encouraged  - PT/rehab  - Dispo planning

## 2020-08-16 NOTE — PROGRESS NOTE ADULT - ASSESSMENT
89F  PMH COPD on 3-4L NC, hypothyroidism, lives at home with her son, ADLs intact presenting to the ED s/p mechanical fall, -HT, -LOC, -AC. Per the patient she had gotten up to check on her albuterol treatment, started to sit back down on her recliner but missed, falling on her left side. She denies LOC, HT. She was unable to ambulate after the fall and complained of L hip pain. found to have left hip fx and s/p IMN left hip today is post op day 3 and post op had afib and post op anemia       #POD3  s/p L hip IMN  ortho follow up   incentive spiromtery  PT francie carmen   d/c sukhdev     #AFIB now in NSR   started on eliquis 2.5 BID Discussed benefits of starting anticoagulation to prevent strokes from Afib and risks involved in starting anticoagulantion including risk of bleeding, hemorrhagic stroke, GI bleed and even death. Pt agreed to start anticoagulation given benefits outweighs risk.  per cardiology started on multaq 400 BID to maintain NSR   c/w toprol 25 qday     #posy op anemia: s/p transfusion and hgb stable for 2 days now     #COPD stable on 3-4 L of O2 which is baseline     #hypothyroid c/w synthyroid     #Progress Note Handoff  Pending (specify):  PT follow up, ortho follow up.  discharge planning   Disposition: ?SNF 89F  PMH COPD on 3-4L NC, hypothyroidism, lives at home with her son, ADLs intact presenting to the ED s/p mechanical fall, -HT, -LOC, -AC. Per the patient she had gotten up to check on her albuterol treatment, started to sit back down on her recliner but missed, falling on her left side. She denies LOC, HT. She was unable to ambulate after the fall and complained of L hip pain. found to have left hip fx and s/p IMN left hip today is post op day 3 and post op had afib and post op anemia       #POD3  s/p L hip IMN  ortho follow up   incentive spiromtery  PT francie carmen   d/c sukhdev     #AFIB now in NSR   started on eliquis 2.5 BID Discussed benefits of starting anticoagulation to prevent strokes from Afib and risks involved in starting anticoagulantion including risk of bleeding, hemorrhagic stroke, GI bleed and even death. Pt agreed to start anticoagulation given benefits outweighs risk.  per cardiology started on multaq 400 BID to maintain NSR   c/w toprol 25 qday     #posy op anemia: s/p transfusion and hgb stable for 2 days now     #COPD stable on 3-4 L of O2 which is baseline. pulm started patient on IV steroids prior to surgery to prevent COPD exacerbation and possible pulm complications. patient stable from pulm stand point at baseline on 3-4 liters NC which is her baseline and no wheezes on exam. can d/c IV steroids. will need to clarify with son  if patient is on home prednisone chronically     #hypothyroid c/w synthyroid     #Progress Note Handoff  Pending (specify):  PT follow up, ortho follow up.  discharge planning   Disposition: ?SNF

## 2020-08-16 NOTE — PROGRESS NOTE ADULT - ASSESSMENT
89 y/o F DNR/DNI PMH COPD on 3-4L NC, hypothyroidism, lives at home with her son, ADLs intact presenting to the ED s/p mechanical fall  Post op course complicated by paroxysmal a.fib    - Multaq 400 bid for NSR maintenance  - Long term A/C once safe from sx point  - Cont BB

## 2020-08-17 ENCOUNTER — TRANSCRIPTION ENCOUNTER (OUTPATIENT)
Age: 85
End: 2020-08-17

## 2020-08-17 ENCOUNTER — INPATIENT (INPATIENT)
Facility: HOSPITAL | Age: 85
LOS: 13 days | Discharge: HOME HEALTH | End: 2020-08-31
Attending: PHYSICAL MEDICINE & REHABILITATION | Admitting: PHYSICAL MEDICINE & REHABILITATION
Payer: MEDICARE

## 2020-08-17 VITALS
SYSTOLIC BLOOD PRESSURE: 112 MMHG | TEMPERATURE: 98 F | DIASTOLIC BLOOD PRESSURE: 53 MMHG | HEART RATE: 69 BPM | RESPIRATION RATE: 18 BRPM

## 2020-08-17 VITALS
RESPIRATION RATE: 18 BRPM | HEART RATE: 76 BPM | DIASTOLIC BLOOD PRESSURE: 63 MMHG | SYSTOLIC BLOOD PRESSURE: 134 MMHG | TEMPERATURE: 97 F

## 2020-08-17 PROBLEM — E03.9 HYPOTHYROIDISM, UNSPECIFIED: Chronic | Status: ACTIVE | Noted: 2020-08-12

## 2020-08-17 PROBLEM — J44.9 CHRONIC OBSTRUCTIVE PULMONARY DISEASE, UNSPECIFIED: Chronic | Status: ACTIVE | Noted: 2020-08-12

## 2020-08-17 LAB
GLUCOSE BLDC GLUCOMTR-MCNC: 114 MG/DL — HIGH (ref 70–99)
GLUCOSE BLDC GLUCOMTR-MCNC: 76 MG/DL — SIGNIFICANT CHANGE UP (ref 70–99)
GLUCOSE BLDC GLUCOMTR-MCNC: 90 MG/DL — SIGNIFICANT CHANGE UP (ref 70–99)

## 2020-08-17 PROCEDURE — 93010 ELECTROCARDIOGRAM REPORT: CPT

## 2020-08-17 PROCEDURE — 99239 HOSP IP/OBS DSCHRG MGMT >30: CPT

## 2020-08-17 RX ORDER — ENOXAPARIN SODIUM 100 MG/ML
40 INJECTION SUBCUTANEOUS DAILY
Refills: 0 | Status: DISCONTINUED | OUTPATIENT
Start: 2020-08-18 | End: 2020-08-31

## 2020-08-17 RX ORDER — IPRATROPIUM/ALBUTEROL SULFATE 18-103MCG
3 AEROSOL WITH ADAPTER (GRAM) INHALATION EVERY 6 HOURS
Refills: 0 | Status: DISCONTINUED | OUTPATIENT
Start: 2020-08-17 | End: 2020-08-31

## 2020-08-17 RX ORDER — LANOLIN ALCOHOL/MO/W.PET/CERES
5 CREAM (GRAM) TOPICAL AT BEDTIME
Refills: 0 | Status: DISCONTINUED | OUTPATIENT
Start: 2020-08-17 | End: 2020-08-31

## 2020-08-17 RX ORDER — SENNA PLUS 8.6 MG/1
2 TABLET ORAL AT BEDTIME
Refills: 0 | Status: DISCONTINUED | OUTPATIENT
Start: 2020-08-17 | End: 2020-08-31

## 2020-08-17 RX ORDER — ALBUTEROL 90 UG/1
1 AEROSOL, METERED ORAL EVERY 4 HOURS
Refills: 0 | Status: COMPLETED | OUTPATIENT
Start: 2020-08-17 | End: 2021-07-16

## 2020-08-17 RX ORDER — APIXABAN 2.5 MG/1
1 TABLET, FILM COATED ORAL
Qty: 0 | Refills: 0 | DISCHARGE
Start: 2020-08-17

## 2020-08-17 RX ORDER — ONDANSETRON 8 MG/1
4 TABLET, FILM COATED ORAL EVERY 6 HOURS
Refills: 0 | Status: DISCONTINUED | OUTPATIENT
Start: 2020-08-17 | End: 2020-08-21

## 2020-08-17 RX ORDER — TIOTROPIUM BROMIDE 18 UG/1
1 CAPSULE ORAL; RESPIRATORY (INHALATION) DAILY
Refills: 0 | Status: DISCONTINUED | OUTPATIENT
Start: 2020-08-17 | End: 2020-08-31

## 2020-08-17 RX ORDER — LEVOTHYROXINE SODIUM 125 MCG
88 TABLET ORAL DAILY
Refills: 0 | Status: DISCONTINUED | OUTPATIENT
Start: 2020-08-17 | End: 2020-08-25

## 2020-08-17 RX ORDER — OXYCODONE HYDROCHLORIDE 5 MG/1
5 TABLET ORAL EVERY 6 HOURS
Refills: 0 | Status: DISCONTINUED | OUTPATIENT
Start: 2020-08-17 | End: 2020-08-21

## 2020-08-17 RX ORDER — METOPROLOL TARTRATE 50 MG
25 TABLET ORAL DAILY
Refills: 0 | Status: DISCONTINUED | OUTPATIENT
Start: 2020-08-17 | End: 2020-08-31

## 2020-08-17 RX ORDER — INSULIN LISPRO 100/ML
VIAL (ML) SUBCUTANEOUS
Refills: 0 | Status: DISCONTINUED | OUTPATIENT
Start: 2020-08-17 | End: 2020-08-18

## 2020-08-17 RX ORDER — ASPIRIN/CALCIUM CARB/MAGNESIUM 324 MG
1 TABLET ORAL
Qty: 0 | Refills: 0 | DISCHARGE
Start: 2020-08-17

## 2020-08-17 RX ORDER — METOPROLOL TARTRATE 50 MG
1 TABLET ORAL
Qty: 0 | Refills: 0 | DISCHARGE
Start: 2020-08-17

## 2020-08-17 RX ORDER — APIXABAN 2.5 MG/1
2.5 TABLET, FILM COATED ORAL EVERY 12 HOURS
Refills: 0 | Status: DISCONTINUED | OUTPATIENT
Start: 2020-08-17 | End: 2020-08-17

## 2020-08-17 RX ORDER — LANOLIN ALCOHOL/MO/W.PET/CERES
1 CREAM (GRAM) TOPICAL
Qty: 0 | Refills: 0 | DISCHARGE
Start: 2020-08-17

## 2020-08-17 RX ORDER — LEVOTHYROXINE SODIUM 125 MCG
1 TABLET ORAL
Qty: 0 | Refills: 0 | DISCHARGE
Start: 2020-08-17

## 2020-08-17 RX ORDER — OXYCODONE HYDROCHLORIDE 5 MG/1
1 TABLET ORAL
Qty: 0 | Refills: 0 | DISCHARGE
Start: 2020-08-17

## 2020-08-17 RX ORDER — ASPIRIN/CALCIUM CARB/MAGNESIUM 324 MG
81 TABLET ORAL DAILY
Refills: 0 | Status: DISCONTINUED | OUTPATIENT
Start: 2020-08-17 | End: 2020-08-17

## 2020-08-17 RX ORDER — ALBUTEROL 90 UG/1
1 AEROSOL, METERED ORAL EVERY 6 HOURS
Refills: 0 | Status: DISCONTINUED | OUTPATIENT
Start: 2020-08-17 | End: 2020-08-31

## 2020-08-17 RX ORDER — LANOLIN ALCOHOL/MO/W.PET/CERES
5 CREAM (GRAM) TOPICAL AT BEDTIME
Refills: 0 | Status: DISCONTINUED | OUTPATIENT
Start: 2020-08-17 | End: 2020-08-19

## 2020-08-17 RX ORDER — PANTOPRAZOLE SODIUM 20 MG/1
1 TABLET, DELAYED RELEASE ORAL
Qty: 0 | Refills: 0 | DISCHARGE
Start: 2020-08-17

## 2020-08-17 RX ORDER — ATORVASTATIN CALCIUM 80 MG/1
40 TABLET, FILM COATED ORAL AT BEDTIME
Refills: 0 | Status: DISCONTINUED | OUTPATIENT
Start: 2020-08-17 | End: 2020-08-31

## 2020-08-17 RX ORDER — DRONEDARONE 400 MG/1
1 TABLET, FILM COATED ORAL
Qty: 0 | Refills: 0 | DISCHARGE
Start: 2020-08-17

## 2020-08-17 RX ORDER — SENNA PLUS 8.6 MG/1
2 TABLET ORAL
Qty: 0 | Refills: 0 | DISCHARGE
Start: 2020-08-17

## 2020-08-17 RX ORDER — ASPIRIN/CALCIUM CARB/MAGNESIUM 324 MG
81 TABLET ORAL DAILY
Refills: 0 | Status: DISCONTINUED | OUTPATIENT
Start: 2020-08-17 | End: 2020-08-31

## 2020-08-17 RX ORDER — CHLORHEXIDINE GLUCONATE 213 G/1000ML
1 SOLUTION TOPICAL
Refills: 0 | Status: DISCONTINUED | OUTPATIENT
Start: 2020-08-17 | End: 2020-08-28

## 2020-08-17 RX ORDER — ATORVASTATIN CALCIUM 80 MG/1
1 TABLET, FILM COATED ORAL
Qty: 0 | Refills: 0 | DISCHARGE
Start: 2020-08-17

## 2020-08-17 RX ORDER — DRONEDARONE 400 MG/1
400 TABLET, FILM COATED ORAL
Refills: 0 | Status: DISCONTINUED | OUTPATIENT
Start: 2020-08-17 | End: 2020-08-31

## 2020-08-17 RX ORDER — PANTOPRAZOLE SODIUM 20 MG/1
40 TABLET, DELAYED RELEASE ORAL
Refills: 0 | Status: DISCONTINUED | OUTPATIENT
Start: 2020-08-17 | End: 2020-08-31

## 2020-08-17 RX ORDER — BUDESONIDE AND FORMOTEROL FUMARATE DIHYDRATE 160; 4.5 UG/1; UG/1
2 AEROSOL RESPIRATORY (INHALATION)
Refills: 0 | Status: DISCONTINUED | OUTPATIENT
Start: 2020-08-17 | End: 2020-08-31

## 2020-08-17 RX ADMIN — ATORVASTATIN CALCIUM 40 MILLIGRAM(S): 80 TABLET, FILM COATED ORAL at 22:23

## 2020-08-17 RX ADMIN — PANTOPRAZOLE SODIUM 40 MILLIGRAM(S): 20 TABLET, DELAYED RELEASE ORAL at 06:01

## 2020-08-17 RX ADMIN — SENNA PLUS 2 TABLET(S): 8.6 TABLET ORAL at 22:24

## 2020-08-17 RX ADMIN — DRONEDARONE 400 MILLIGRAM(S): 400 TABLET, FILM COATED ORAL at 17:27

## 2020-08-17 RX ADMIN — DRONEDARONE 400 MILLIGRAM(S): 400 TABLET, FILM COATED ORAL at 06:01

## 2020-08-17 RX ADMIN — Medication 5 MILLIGRAM(S): at 22:23

## 2020-08-17 RX ADMIN — CHLORHEXIDINE GLUCONATE 1 APPLICATION(S): 213 SOLUTION TOPICAL at 06:01

## 2020-08-17 RX ADMIN — Medication 88 MICROGRAM(S): at 06:01

## 2020-08-17 RX ADMIN — Medication 25 MILLIGRAM(S): at 06:01

## 2020-08-17 RX ADMIN — APIXABAN 2.5 MILLIGRAM(S): 2.5 TABLET, FILM COATED ORAL at 06:02

## 2020-08-17 NOTE — H&P ADULT - ASSESSMENT
ASSESSMENT/PLAN  89 y/o female with hx of COPD on 3-4 L O2 nasal canula, hypothyroidism and new onset atrial fibriliation admitted to  for rehab of multitrauma after fall with injuries including a nondisplaced C1 fx, aged indeterminant T5 fx, right posterior 10-11 rib fractures, and left intertroch fx. She is WBAT for her LLE.    Level of function PTA: Independent with ambulation without assistive device and ADLs,    Rehab of multitrauma  #Left hip IMN - WBAT LLE  #C1 fx - c-collar cleared by ortho, f/u outpatient  #T5 fx - f/u outpatient  #Right posterior 10-11 fx - Incentive spirometer     -Pain control: Tylenol PRN, oxycodone prn    -GI/Bowel Mgmt -  senna, miralax prn    -Bladder management: not indicated     -Skin: left hip surgical wound incisions C/D/I  -No active issues at this time    -FEN   - Diet -  Regular  - Dysphagia  - None    Medical Comorbidities    #Atrial fibrillation  -Eliquis 2.5 mg q12h  -Metoprolol ER 25 POD  -Multaq 400 BID    #COPD  -3-4 L O2 NC  -Continue home meds    #Hypothyroidism  -Levothyroxine 88 mcg daily    #HLD  Atorvastatin 40mg po qhs    Precautions / PROPHYLAXIS:      - Falls    - Ortho: Weight bearing status: WBAT LLE      - DVT prophylaxis: Eliquis      MEDICAL PROGNOSIS: GOOD            REHAB POTENTIAL: GOOD             ESTIMATED DISPOSITION: HOME WITH HOME CARE                ELOS:  [     ] 7-14    [X]  14-21    [    ]    Other    THERAPY ORDERS and INITIAL INDIVIDUALIZED PLAN OF CARE:  This initial indivualized interdisciplinary plan of care, which was established by me (the attending physiatrist), is based on elements from the post admission evaluation. The interdisciplinary therapy program is to be at least 3 hrs a day, at least 5 days per week from from physical, occupational and/ or speech therapies as ordered by me below.      [ x  ] P.T. 90 mins /day at least 5 out of 7 days:  [  x ] superficial  modalities prn, [ x  ] A/AAROM, [ x  ] PREs, [ x  ] transfer training,            [ x  ] progressive ambulation, [x   ] stairs                                               [ x  ] O.T. 90 mins. /day at least 5 out of 7 days::  [ x  ] modalities prn, [ x  ]A/AAROM, [ x  ] PREs, functional transfer training, [ x  ] ADLs,              [   ] cognitive/ perceptual eval and training, [   ] splint eval                                                  [   ] S.L.P:  [   ] speech eval, [   ] swallow eval     [   ] Neuropsychology      [X] Individualized rec. therapy      RATIONALE FOR INPATIENT ADMISSION - Patient demonstrates the following: (check all that apply)  [X] Medically appropriate for rehabilitation admission  [X] Has attainable rehab goals with an appropriate initial discharge plan  [X] Has rehabilitation potential (expected to make a significant improvement within a reasonable period of time)  [X] Requires close medical management by a rehab physician, rehab nursing care,  and comprehensive interdisciplinary team (including PT, OT)      Patient see and discussed with my attending, Dr. Min ASSESSMENT/PLAN  89 y/o female with hx of COPD on 3-4 L O2 nasal canula, hypothyroidism and new onset atrial fibriliation admitted to  for rehab of multitrauma after fall with injuries including a nondisplaced C1 fx, aged indeterminant T5 fx, right posterior 10-11 rib fractures, and left intertroch fx. She is WBAT for her LLE.    Level of function PTA: Independent with ambulation without assistive device and ADLs,    Rehab of multitrauma statr 3 hrs of acute interdisciplinary rehab  #Left hip IMN - WBAT LLE  #C1 fx - c-collar cleared by ortho, f/u outpatient  #T5 fx - f/u outpatient  #Right posterior 10-11 fx   resp- Incentive spirometer     -Pain control: Tylenol PRN, oxycodone prn    -GI/Bowel Mgmt -  senna, miralax prn    -Bladder management: not indicated     -Skin: left hip surgical wound incisions C/D/I  -No active issues at this time    -FEN   - Diet -  Regular  - Dysphagia  - None    Medical Comorbidities    #Atrial fibrillation  -Eliquis 2.5 mg q12h  -Metoprolol ER 25 POD  -Multaq 400 BID    #COPD  -2-4 L O2 NC  -Continue home meds    #Hypothyroidism  -Levothyroxine 88 mcg daily    #HLD  Atorvastatin 40mg po qhs    Precautions / PROPHYLAXIS:      - Falls    - Ortho: Weight bearing status: WBAT LLE      - DVT prophylaxis: Eliquis      MEDICAL PROGNOSIS: GOOD            REHAB POTENTIAL: GOOD             ESTIMATED DISPOSITION: HOME WITH HOME CARE                ELOS:  [     ] 7-14    [X]  14-21    [    ]    Other    THERAPY ORDERS and INITIAL INDIVIDUALIZED PLAN OF CARE:  This initial indivualized interdisciplinary plan of care, which was established by me (the attending physiatrist), is based on elements from the post admission evaluation. The interdisciplinary therapy program is to be at least 3 hrs a day, at least 5 days per week from from physical, occupational and/ or speech therapies as ordered by me below.      [ x  ] P.T. 90 mins /day at least 5 out of 7 days:  [  x ] superficial  modalities prn, [ x  ] A/AAROM, [ x  ] PREs, [ x  ] transfer training,            [ x  ] progressive ambulation, [x   ] stairs                                               [ x  ] O.T. 90 mins. /day at least 5 out of 7 days::  [ x  ] modalities prn, [ x  ]A/AAROM, [ x  ] PREs, functional transfer training, [ x  ] ADLs,              [   ] cognitive/ perceptual eval and training, [   ] splint eval                                                  [   ] S.L.P:  [   ] speech eval, [   ] swallow eval     [   ] Neuropsychology      [X] Individualized rec. therapy      RATIONALE FOR INPATIENT ADMISSION - Patient demonstrates the following: (check all that apply)  [X] Medically appropriate for rehabilitation admission  [X] Has attainable rehab goals with an appropriate initial discharge plan  [X] Has rehabilitation potential (expected to make a significant improvement within a reasonable period of time)  [X] Requires close medical management by a rehab physician, rehab nursing care,  and comprehensive interdisciplinary team (including PT, OT)      Patient see and discussed with my attending, Dr. Min ASSESSMENT/PLAN  89 y/o female with hx of COPD on 3-4 L O2 nasal canula, hypothyroidism and new onset atrial fibriliation admitted to  for rehab of multitrauma after fall with injuries including a nondisplaced C1 fx, aged indeterminant T5 fx, right posterior 10-11 rib fractures, and left intertroch fx. She is WBAT for her LLE.    Level of function PTA: Independent with ambulation without assistive device and ADLs,    Rehab of multitrauma statr 3 hrs of acute interdisciplinary rehab  #Left hip IMN - WBAT LLE  #C1 fx - c-collar cleared by ortho, f/u outpatient  #T5 fx - f/u outpatient  #Right posterior 10-11 fx   resp- Incentive spirometer     -Pain control: Tylenol PRN, oxycodone prn    -GI/Bowel Mgmt -  senna, miralax prn    -Bladder management: not indicated     -Skin: left hip surgical wound incisions C/D/I  -No active issues at this time    -FEN   - Diet -  Regular  - Dysphagia  - None    Medical Comorbidities    #Atrial fibrillation  -Eliquis 2.5 mg q12h  -Metoprolol ER 25 POD  -Multaq 400 BID    #COPD  -2-4 L O2 NC  -Continue home meds    #Hypothyroidism  -Levothyroxine 88 mcg daily   osteoporosis by FX= add calcium = vid D check level  #HLD  Atorvastatin 40mg po qhs    Precautions / PROPHYLAXIS:      - Falls    - Ortho: Weight bearing status: WBAT LLE      - DVT prophylaxis: Eliquis      MEDICAL PROGNOSIS: GOOD            REHAB POTENTIAL: GOOD             ESTIMATED DISPOSITION: HOME WITH HOME CARE                ELOS:  [     ] 7-14    [X]  14-21    [    ]    Other    THERAPY ORDERS and INITIAL INDIVIDUALIZED PLAN OF CARE:  This initial indivualized interdisciplinary plan of care, which was established by me (the attending physiatrist), is based on elements from the post admission evaluation. The interdisciplinary therapy program is to be at least 3 hrs a day, at least 5 days per week from from physical, occupational and/ or speech therapies as ordered by me below.      [ x  ] P.T. 90 mins /day at least 5 out of 7 days:  [  x ] superficial  modalities prn, [ x  ] A/AAROM, [ x  ] PREs, [ x  ] transfer training,            [ x  ] progressive ambulation, [x   ] stairs                                               [ x  ] O.T. 90 mins. /day at least 5 out of 7 days::  [ x  ] modalities prn, [ x  ]A/AAROM, [ x  ] PREs, functional transfer training, [ x  ] ADLs,              [   ] cognitive/ perceptual eval and training, [   ] splint eval                                                  [   ] S.L.P:  [   ] speech eval, [   ] swallow eval     [   ] Neuropsychology      [X] Individualized rec. therapy      RATIONALE FOR INPATIENT ADMISSION - Patient demonstrates the following: (check all that apply)  [X] Medically appropriate for rehabilitation admission  [X] Has attainable rehab goals with an appropriate initial discharge plan  [X] Has rehabilitation potential (expected to make a significant improvement within a reasonable period of time)  [X] Requires close medical management by a rehab physician, rehab nursing care,  and comprehensive interdisciplinary team (including PT, OT)      Patient see and discussed with my attending, Dr. Min

## 2020-08-17 NOTE — H&P ADULT - HISTORY OF PRESENT ILLNESS
89 y/o right hand dominant female with PMH COPD on 3-4L NC, hypothyroidism presenting to the ED s/p mechanical fall Per the patient she had gotten up to check on her albuterol treatment, started to sit back down on her recliner but missed, falling on her left side. She denies LOC, HT. She was unable to ambulate after the fall and complained of L hip pain. Found have multiple injuries including a nondisplaced C1 fx, aged indeterminant T5 fx, right posterior 10-11 rib fractures, and left intertroch fx. After clearance by both pulmonology and cardiology, she went to OR with orthopedics for Left hip IMN on 813/2020. Post operatively her course was complicated with new onset atrial fibrillation and post operative anemia for which she was given 2 units pRBCs. Hgb over the past 48 hours has been stable at 8.2. For her atrial fibrillation cardiology was consulted and she was started on Eliquis, metoprolol and multaq and is currently in NSR. In regards to her nondisplaced C1 fx she was in a [place] J which was subsequently cleared by neurosurgery    Patient was evaluated by physiatry to be a good candidate for rehab and will be admitted to  to undergo intensive 3 hour/day rehabilitation MD at bedside.    89 y/o right hand dominant female with PMH COPD on 3-4L NC, hypothyroidism presenting to the ED s/p mechanical fall Per the patient she had gotten up to check on her albuterol treatment, started to sit back down on her recliner but missed, falling on her left side. She denies LOC, HT. She was unable to ambulate after the fall and complained of L hip pain. Found have multiple injuries including a nondisplaced C1 fx, aged indeterminant T5 fx, right posterior 10-11 rib fractures, and left intertroch fx. After clearance by both pulmonology and cardiology, she went to OR with orthopedics for Left hip IMN on 813/2020. Post operatively her course was complicated with new onset atrial fibrillation and post operative anemia for which she was given 1 units pRBCs. Hgb over the past 48 hours has been stable at 8.2. For her atrial fibrillation cardiology was consulted and she was started on Eliquis, metoprolol and multaq and is currently in NSR. In regards to her nondisplaced C1 fx she was in a [place] J which was subsequently cleared by neurosurgery    Patient was evaluated by physiatry to be a good candidate for rehab and will be admitted to  to undergo intensive 3 hour/day rehabilitation 89 y/o right hand dominant female with PMH COPD on 3-4L NC, hypothyroidism presenting to the ED s/p mechanical fall Per the patient she had gotten up to check on her albuterol treatment, started to sit back down on her recliner but missed, falling on her left side. She denies LOC, HT. She was unable to ambulate after the fall and complained of L hip pain. Found have multiple injuries including a nondisplaced C1 fx, aged indeterminant T5 fx, right posterior 10-11 rib fractures, and left intertroch fx. After clearance by both pulmonology and cardiology, she went to OR with orthopedics for Left hip IMN on 813/2020. Post operatively her course was complicated with new onset atrial fibrillation and post operative anemia for which she was given 1 units pRBCs. Hgb over the past 48 hours has been stable at 8.2. For her atrial fibrillation cardiology was consulted and she was started on Eliquis, metoprolol and multaq and is currently in NSR. In regards to her nondisplaced C1 fx she was in a [place] J which was subsequently cleared by neurosurgery. pt shama by physiatry deemed a good acute rehab candidate to improve function for safe . DC home CAN tolerate and benefit from 3 hrs  ofd acute rehab     Patient was evaluated by physiatry to be a good candidate for rehab and will be admitted to  to undergo intensive 3 hour/day rehabilitation

## 2020-08-17 NOTE — PROGRESS NOTE ADULT - ATTENDING COMMENTS
Orthopedic Attending    Patient seen and examined with resident and/or PA.  All new laboratory work-up and consults reviewed.  All new radiographs were reviewed.   Agree with findings, assessment and plan.
Patient looks comfortable this am, sitting on a chair.  Afebrile.  CT C-spine yesterday showed C1 fracture - has C-collar on.  Patient is neurologically intact.    ASSESSMENT:  89 y/o female, S/p Fall.  Closed Left Intertrochanteric Femur Fracture.  Closed Right 10,11 Rib Fractures.  C1 Fracture.  Acute pain due to trauma.  COPD.    PLAN:  - pain control  - keep C-collar on at all time  - Neurosurgery evaluation - no CTA neck needed as per Neurosurgery  - incentive spirometer  - Ortho f/u  - keep on Solumedrol perioperatively due to COPD  - DVT prophylaxis  - PT evaluation
Patient remains clinically stable, afebrile.  Pain is controlled.    89 y/o female, S/p Fall.  Closed Left Intertrochanteric Femur Fracture.  Closed Right 10,11 Rib Fractures.  Acute pain due to trauma.  COPD.    PLAN:  - pain control  - follow CXR  - incentive spirometer  - Ortho for ORIF  - keep on Solumedrol perioperatively due to COPD  - DVT prophylaxis    OK from Trauma stand point to proceed with ORIF of hip fracture
agree w above, cont current management
Patient had short episode of A.fib with RVR - controlled with Lopressor.  Hb today 6.3.  Has C-collar on.    ASSESSMENT:  89 y/o female, S/p Fall.  Closed Left Intertrochanteric Femur Fracture. S/p ORIF  Closed Right 10,11 Rib Fractures.  C1 Fracture.  Acute pain due to trauma.  Acute blood loss anemia.  COPD.    PLAN:  - keep C-collar on  - awaiting Neurosurgery evaluation  - transfuse 1 unit PRBC - follow H&H  - continue DVT prophylaxis  - PT  - Cardiology evaluation.

## 2020-08-17 NOTE — PROGRESS NOTE ADULT - SUBJECTIVE AND OBJECTIVE BOX
89 y/o female s/p Left hip intramedullary nail POD#4. Reports pain (3/10) controlled by pain meds. Denies any SOB, palpitations, chest pain, dizziness or any other c/o.  Vital Signs Last 24 Hrs  T(C): 36.7 (17 Aug 2020 07:00), Max: 36.9 (16 Aug 2020 13:26)  T(F): 98 (17 Aug 2020 07:00), Max: 98.5 (16 Aug 2020 13:26)  HR: 74 (17 Aug 2020 07:00) (67 - 74)  BP: 136/60 (17 Aug 2020 07:00) (120/58 - 149/65)  BP(mean): --  RR: 18 (17 Aug 2020 07:00) (18 - 18)  SpO2: --    GEN: WN/WD, A&O X 3, NAD;  Left hip and LE:  Proximal dressing is moderately saturated with serosanguinous drainage changed. Incision clean and dry, staples intact, decreased ROM secondary to pain, NVI, no decreased sensation, dorsi/plantar flexes ankle, no calf tenderness, DP pulse 2+.                        8.2    7.57  )-----------( 155      ( 16 Aug 2020 01:09 )             26.0   08-16    142  |  103  |  38<H>  ----------------------------<  84  4.5   |  32  |  0.9    Ca    8.2<L>      16 Aug 2020 01:09  Phos  2.7     08-16  Mg     2.2     08-16    s/p Left hip intramedullary nail POD#4:  - continue current Tx and PT;  - DVT prophylaxis;  - pain management;  - WBAT, fall precautions;  - d/ disposition per primary team.

## 2020-08-17 NOTE — DISCHARGE NOTE PROVIDER - PROVIDER TOKENS
PROVIDER:[TOKEN:[[id number]],FOLLOWUP:[1 week]],PROVIDER:[TOKEN:[[id number]],FOLLOWUP:[1 week]],PROVIDER:[TOKEN:[[id number]],FOLLOWUP:[Routine]]

## 2020-08-17 NOTE — DISCHARGE NOTE PROVIDER - CARE PROVIDER_API CALL
Chan, Yeoman K (MD)  Family Medicine  [address] [facility]  [place] [address]  Phone: [phone]  Fax: [phone]  Follow Up Time: 1 week    [facility] [place]  CARDIOVASCULAR DISEASE  [address] [place] [address]  Phone: [phone]  Fax: [phone]  Follow Up Time: 1 week    Jose Ramon Jose)  Critical Care Medicine; Internal Medicine; Pulmonary Disease; Sleep Medicine  [address] [place] [address]  Phone: [phone]  Fax: [phone]  Follow Up Time: Routine

## 2020-08-17 NOTE — H&P ADULT - NSHPSOCIALHISTORY_GEN_ALL_CORE
Patient lives in [place] with her son  She reports she is a former   Former smoker, now requiring 3-4 L O2 NC  Prior to fall patient was independent with ambulation and not requiring assistance device  She reports 1 step to front door. Walk in shower. Patient lives in [place] with her son  She reports she is a former   Former smoker, now requiring 3-4 L O2 NC- on 02 at home  Prior to fall patient was independent with ambulation and not requiring assistance device  She reports 1 step to front door. Walk in shower. no stairs to BR

## 2020-08-17 NOTE — PROGRESS NOTE ADULT - ASSESSMENT
Impression:  Severe COPD on home O2 4L NC  Left comminuted intertrochanteric fracture sp fall s/p sx  Right posterior 10-11th rib fx  A Fib    Recommendations:    Incentive spirometry  Pain control  Neb as needed  restart OP inhalers  DVT prophylaxis  rehab eval

## 2020-08-17 NOTE — H&P ADULT - NSHPLABSRESULTS_GEN_ALL_CORE
8.2    7.57  )-----------( 155      ( 16 Aug 2020 01:09 )             26.0     08-16    142  |  103  |  38<H>  ----------------------------<  84  4.5   |  32  |  0.9    Ca    8.2<L>      16 Aug 2020 01:09  Phos  2.7     08-16  Mg     2.2     08-16      PT/INR - ( 16 Aug 2020 01:09 )   PT: 10.60 sec;   INR: 0.92 ratio         PTT - ( 16 Aug 2020 01:09 )  PTT:23.7 sec    POCT Blood Glucose.: 76 mg/dL (08-17-20 @ 11:31)  POCT Blood Glucose.: 90 mg/dL (08-17-20 @ 08:00)  POCT Blood Glucose.: 116 mg/dL (08-16-20 @ 21:40)  POCT Blood Glucose.: 153 mg/dL (08-16-20 @ 16:17)  POCT Blood Glucose.: 159 mg/dL (08-16-20 @ 11:15)  POCT Blood Glucose.: 80 mg/dL (08-16-20 @ 07:29)  POCT Blood Glucose.: 115 mg/dL (08-15-20 @ 21:40)  POCT Blood Glucose.: 72 mg/dL (08-15-20 @ 16:53)  POCT Blood Glucose.: 159 mg/dL (08-15-20 @ 11:41)  POCT Blood Glucose.: 96 mg/dL (08-15-20 @ 07:41)  POCT Blood Glucose.: 142 mg/dL (08-14-20 @ 21:35)  POCT Blood Glucose.: 177 mg/dL (08-14-20 @ 17:11) 8.2    7.57  )-----------( 155      ( 16 Aug 2020 01:09 )             26.0     08-16    142  |  103  |  38<H>  ----------------------------<  84  4.5   |  32  |  0.9    Ca    8.2<L>      16 Aug 2020 01:09  Phos  2.7     08-16  Mg     2.2     08-16  anemia post op    PT/INR - ( 16 Aug 2020 01:09 )   PT: 10.60 sec;   INR: 0.92 ratio         PTT - ( 16 Aug 2020 01:09 )  PTT:23.7 sec    POCT Blood Glucose.: 76 mg/dL (08-17-20 @ 11:31)  POCT Blood Glucose.: 90 mg/dL (08-17-20 @ 08:00)  POCT Blood Glucose.: 116 mg/dL (08-16-20 @ 21:40)  POCT Blood Glucose.: 153 mg/dL (08-16-20 @ 16:17)  POCT Blood Glucose.: 159 mg/dL (08-16-20 @ 11:15)  POCT Blood Glucose.: 80 mg/dL (08-16-20 @ 07:29)  POCT Blood Glucose.: 115 mg/dL (08-15-20 @ 21:40)  POCT Blood Glucose.: 72 mg/dL (08-15-20 @ 16:53)  POCT Blood Glucose.: 159 mg/dL (08-15-20 @ 11:41)  POCT Blood Glucose.: 96 mg/dL (08-15-20 @ 07:41)  POCT Blood Glucose.: 142 mg/dL (08-14-20 @ 21:35)  POCT Blood Glucose.: 177 mg/dL (08-14-20 @ 17:11)

## 2020-08-17 NOTE — H&P ADULT - NSHPREVIEWOFSYSTEMS_GEN_ALL_CORE
REVIEW OF SYSTEMS  Constitutional: No fever, No Chills, No fatigue  HEENT: No eye pain, No visual disturbances, No difficulty hearing  Pulm: No cough,  No shortness of breath  Cardio: No chest pain, No palpitations  GI:  No abdominal pain, No nausea, No vomiting, No diarrhea, No constipation  : No dysuria, No frequency, No hematuria  Neuro: No headaches, No memory loss, No loss of strength, No numbness, No tremors  Skin: No itching, No rashes, No lesions   Endo: No temperature intolerance  MSK: Left hip pain  Psych:  No depression, No anxiety REVIEW OF SYSTEMS  Constitutional: No fever, No Chills, No fatigue  HEENT: No eye pain, No visual disturbances, No difficulty hearing  Pulm: No cough,  +- on o2 at home= shortness of breath( ex smoker)  Cardio: No chest pain, No palpitations  GI:  No abdominal pain, No nausea, No vomiting, No diarrhea, No constipation  : No dysuria, No frequency, No hematuria  Neuro: No headaches, No memory loss, No loss of strength, No numbness, No tremors  Skin: No itching, No rashes, No lesions   Endo: No temperature intolerance  MSK: Left hip pain due to FX  Psych:  No depression, No anxiety

## 2020-08-17 NOTE — CHART NOTE - NSCHARTNOTEFT_GEN_A_CORE
<<<RESIDENT DISCHARGE NOTE>>>     TANNA MCCRACKEN  MRN-[id number]    VITAL SIGNS:  T(F): 97.6 (08-17-20 @ 15:27), Max: 98 (08-17-20 @ 07:00)  HR: 69 (08-17-20 @ 15:27)  BP: 112/53 (08-17-20 @ 15:27)  SpO2: 95% on 3-4L NC      PHYSICAL EXAMINATION:  General: No acute distress  Head & Neck: anicteric sclera  Pulmonary: clear to auscultation bilaterally  Cardiovascular: regular S1 & S2  Gastrointestinal/Abdomen & Pelvis: soft, non tender, non distended  Neurologic/Motor: AAOx3     TEST RESULTS:                        8.2    7.57  )-----------( 155      ( 16 Aug 2020 01:09 )             26.0       08-16    142  |  103  |  38<H>  ----------------------------<  84  4.5   |  32  |  0.9    Ca    8.2<L>      16 Aug 2020 01:09  Phos  2.7     08-16  Mg     2.2     08-16        FINAL DISCHARGE INTERVIEW:  Resident(s) Present: (Name:__Dr. Priya Harrell___________), RN Present: (Name:  ___________)    DISCHARGE MEDICATION RECONCILIATION  reviewed with Attending (Name:_____Dr. Nia Rebollar______)    DISPOSITION:   [  ] Home,    [  ] Home with Visiting Nursing Services,   [    ]  SNF/ NH,    [  x ] Acute Rehab (4A),   [   ] Other (Specify:_________)

## 2020-08-17 NOTE — DISCHARGE NOTE PROVIDER - NSDCCPCAREPLAN_GEN_ALL_CORE_FT
PRINCIPAL DISCHARGE DIAGNOSIS  Diagnosis: Hip fracture, left, closed, initial encounter  Assessment and Plan of Treatment: You came in after a fall. You were found to have fracture in your left hip that required surgery on 8/13. A nail was placed in your bone to help fix the fracture. Please follow up with orthopedics 2 weeks after discharge. You are allowed to weight bear as tolerated on that leg.      SECONDARY DISCHARGE DIAGNOSES  Diagnosis: COPD without exacerbation  Assessment and Plan of Treatment: You are known to have COPD. You use oxygen at home. We gave you steroid here to avoid any flares because of the surgery. You did not go into any flares and did not require any increase of oxygen more than your basal levels.    Diagnosis: Paroxysmal A-fib  Assessment and Plan of Treatment: You had an episode of fast irregular heart beats. You had an EKG that showed Afib. You received medications to slow your heart down to normal. This condition is unkown to cause blood clots which increases your risk of strokes. You were started on a blood thinner to help prevent that problem. We explained to you the risks of bleeding that could come with it especially given your recent surgery and you agreed to take it. You were also started on a medication to keep your heart rate controlled and another medication to keep it regular. You will need to follow up with a cardiologist regarding this issue. Please also follow up with your primary care provider.    Diagnosis: Thoracic compression fracture, closed, initial encounter  Assessment and Plan of Treatment: You came in after a fall, a fracture in one of your vertebra (back bone) was found but it is unkown when it occured, likely not due to this fall.    Diagnosis: Rib fractures  Assessment and Plan of Treatment: You were found to have fractures in 2 ribs on the right side. We did not see any lung injury on the CT scan. You were treated with pain medications. PRINCIPAL DISCHARGE DIAGNOSIS  Diagnosis: Hip fracture, left, closed, initial encounter  Assessment and Plan of Treatment: You came in after a fall. You were found to have fracture in your left hip that required surgery on 8/13. A nail was placed in your bone to help fix the fracture. Please follow up with orthopedics 2 weeks after discharge. You are allowed to weight bear as tolerated on that leg.      SECONDARY DISCHARGE DIAGNOSES  Diagnosis: COPD without exacerbation  Assessment and Plan of Treatment: You are known to have COPD. You use oxygen at home. We gave you steroid here to avoid any flares because of the surgery. You did not go into any flares and did not require any increase of oxygen more than your basal levels.    Diagnosis: Paroxysmal A-fib  Assessment and Plan of Treatment: You had an episode of fast irregular heart beats. You had an EKG that showed Afib. You received medications to slow your heart down to normal. This condition is unkown to cause blood clots which increases your risk of strokes. You were started on a blood thinner to help prevent that problem. We explained to you the risks of bleeding that could come with it especially given your recent surgery and you agreed to take it. Your son who is your healthcare proxy refused the blood thinner so you will not be taking it upon discharge. You were also started on a medication to keep your heart rate controlled and another medication to keep it regular. You will need to follow up with a cardiologist regarding this issue. Please also follow up with your primary care provider.    Diagnosis: Thoracic compression fracture, closed, initial encounter  Assessment and Plan of Treatment: You came in after a fall, a fracture in one of your vertebra (back bone) was found but it is unkown when it occured, likely not due to this fall.    Diagnosis: Rib fractures  Assessment and Plan of Treatment: You were found to have fractures in 2 ribs on the right side. We did not see any lung injury on the CT scan. You were treated with pain medications.

## 2020-08-17 NOTE — DISCHARGE NOTE PROVIDER - NSDCMRMEDTOKEN_GEN_ALL_CORE_FT
albuterol 2.5 mg/3 mL (0.083%) inhalation solution: 3 milliliter(s) inhaled every 6 hours  atorvastatin 40 mg oral tablet: 1 tab(s) orally once a day (at bedtime)  bisacodyl 10 mg rectal suppository: 1 suppository(ies) rectal once a day, As needed, If no bowel movement  dronedarone 400 mg oral tablet: 1 tab(s) orally 2 times a day  levothyroxine 88 mcg (0.088 mg) oral tablet: 1 tab(s) orally once a day  melatonin 5 mg oral tablet: 1 tab(s) orally once a day (at bedtime)  metoprolol succinate 25 mg oral tablet, extended release: 1 tab(s) orally once a day  oxyCODONE 5 mg oral tablet: 1 tab(s) orally every 6 hours, As needed, Severe Pain (7 - 10)  pantoprazole 40 mg oral delayed release tablet: 1 tab(s) orally once a day (before a meal)  senna oral tablet: 2 tab(s) orally once a day (at bedtime)  Trelegy Ellipta inhalation powder: 1 puff(s) inhaled once a day albuterol 2.5 mg/3 mL (0.083%) inhalation solution: 3 milliliter(s) inhaled every 6 hours  aspirin 81 mg oral tablet, chewable: 1 tab(s) orally once a day  atorvastatin 40 mg oral tablet: 1 tab(s) orally once a day (at bedtime)  bisacodyl 10 mg rectal suppository: 1 suppository(ies) rectal once a day, As needed, If no bowel movement  dronedarone 400 mg oral tablet: 1 tab(s) orally 2 times a day  levothyroxine 88 mcg (0.088 mg) oral tablet: 1 tab(s) orally once a day  melatonin 5 mg oral tablet: 1 tab(s) orally once a day (at bedtime)  metoprolol succinate 25 mg oral tablet, extended release: 1 tab(s) orally once a day  oxyCODONE 5 mg oral tablet: 1 tab(s) orally every 6 hours, As needed, Severe Pain (7 - 10)  pantoprazole 40 mg oral delayed release tablet: 1 tab(s) orally once a day (before a meal)  senna oral tablet: 2 tab(s) orally once a day (at bedtime)  Trelegy Ellipta inhalation powder: 1 puff(s) inhaled once a day

## 2020-08-17 NOTE — H&P ADULT - NSHPPHYSICALEXAM_GEN_ALL_CORE
PHYSICAL EXAMINATION   VItals: T(C): 36.7 (08-17-20 @ 07:00), Max: 36.9 (08-16-20 @ 13:26)  HR: 74 (08-17-20 @ 07:00) (67 - 74)  BP: 136/60 (08-17-20 @ 07:00) (120/58 - 149/65)  RR: 18 (08-17-20 @ 07:00) (18 - 18)  SpO2: --    General: NAD, Resting Comfortable,                                  HEENT: NC/AT, EOM I, PERRLA, Normal Conjunctivae  Cardio: RRR                              Pulm: Breathing comfortably on 3 L nasal canula, No Respiratory Distress,  Lungs CTAB                        Abdomen: ND/NT, Soft                                              MSK: No joint swelling, Full ROM                                         Ext: No C/C/E, No calf tenderness    Skin: intact                                                                   Neurological Examination:  Cognitive: [x] AAO x 3                                                                   Attention:  [x] intact                          Memory: [x] intact   Mood/Affect:  [x] wnl                                                                       Communication:  [x]Fluent  CN II - XII  [x] intact  Coordination:   [x] FTN/HTS intact                                                                       Sensory: [x]Intact to light touch                                                                                    Tone:  [x]  wnl    Motor    LEFT    UE: [X] WNL  RIGHT UE:  [X] WNL   LEFT    LE:  [X] other: Decrease strength with hip flexion and knee extension  RIGHT LE:  [X] WNL    Reflex:  [X]  symmetric PHYSICAL EXAMINATION   VItals: T(C): 36.7 (08-17-20 @ 07:00), Max: 36.9 (08-16-20 @ 13:26)  HR: 74 (08-17-20 @ 07:00) (67 - 74)  BP: 136/60 (08-17-20 @ 07:00) (120/58 - 149/65)  RR: 18 (08-17-20 @ 07:00) (18 - 18)  SpO2: --    General: NAD, Resting Comfortable,- seen OOB no collar on== (needs to have miami j collar in place)                                  HEENT: NC/AT, EOM I, PERRLA, Normal Conjunctivae  Cardio: RRR                              Pulm: Breathing comfortably on 3 L nasal canula, No Respiratory Distress,  Lungs CTAB                        Abdomen: ND/NT, Soft                                              MSK: No joint swelling, Full ROM                                         Ext: No C/C/E, No calf tenderness    Skin: intact                                                                   Neurological Examination:  Cognitive: [x] AAO x 3                                                                   Attention:  [x] intact                          Memory: [x] intact   Mood/Affect:  [x] wnl                                                                       Communication:  [x]Fluent  CN II - XII  [x] intact  Coordination:   [x] FTN/HTS intact                                                                       Sensory: [x]Intact to light touch                                                                                    Tone:  [x]  wnl    Motor    LEFT    UE: [X] WNL  RIGHT UE:  [X] WNL   LEFT    LE:  [X] other: Decrease strength with hip flexion and knee extension due to FX hip  RIGHT LE:  [X] WNL    Reflex:  [X]  symmetric

## 2020-08-17 NOTE — DISCHARGE NOTE NURSING/CASE MANAGEMENT/SOCIAL WORK - PATIENT PORTAL LINK FT
You can access the FollowMyHealth Patient Portal offered by [facility] by registering at the following website: [email or web address]. By joining [employer]’s FollowMyHealth portal, you will also be able to view your health information using other applications (apps) compatible with our system.

## 2020-08-17 NOTE — DISCHARGE NOTE PROVIDER - CARE PROVIDERS DIRECT ADDRESSES
,DirectAddress_Unknown,[email or web address],DirectAddress_Unknown

## 2020-08-17 NOTE — DISCHARGE NOTE PROVIDER - HOSPITAL COURSE
89 yo F PMHx COPD on 3-4L NC and hypothyroidism, presented from home (lives with son) s/p mechanical fall that caused Lt. hip pain. No head trauma, no LOC. She was found to have Lt sided comminuted non displaced IT fracture along with Rt. posterior 10-11 rib fractures and T5 compression fracture of undetermined age. She had an intramedullary gamma nail placed on 8/13, ortho signed off, she is WBAT. Patient received 1 dose of IV solumedrol pre-op and 6 doses post-op as per pulm to avoid  exacerbation. There was no increase in her oxygen requirements during hospitalization.    Post-op course complicated by an episode of Afib with RVR, , BP 80/60, MAP 68 on 8/14 in SICU. She received 5 mg IVP metoprolol x2 and was started on fluids. Patient went into sinus rhythm and BP stabilized. Patient was started on eliquis 2.5 mg after explaining risks vs benefits. She was started on metoprolol 25 mg once and multaq 400 mg bid.    She was also found anemic post op Hb 6.7 on 8/15 ( 8/12 Hgb on admission 10.7), she received 1 unit of PRBCs, Hb stable at 8.2 (x2 days). 89 yo F PMHx COPD on 3-4L NC and hypothyroidism, presented from home (lives with son) s/p mechanical fall that caused Lt. hip pain. No head trauma, no LOC. She was found to have Lt sided comminuted non displaced IT fracture along with Rt. posterior 10-11 rib fractures and T5 compression fracture of undetermined age. She had an intramedullary gamma nail placed on 8/13, ortho signed off, she is WBAT. Patient received 1 dose of IV solumedrol pre-op and 6 doses post-op as per pulm to avoid  exacerbation. There was no increase in her oxygen requirements during hospitalization.    Post-op course complicated by an episode of Afib with RVR, , BP 80/60, MAP 68 on 8/14 in SICU. She received 5 mg IVP metoprolol x2 and was started on fluids. Patient went into sinus rhythm and BP stabilized. Patient was started on eliquis 2.5 mg after explaining risks vs benefits. She was started on multaq 400 mg bid and continuing metoprolol 25 mg daily. Patient son who is the proxy on 8/17 refused eliquis so the patient will not be discharged on it as she is a fall risk.    She was also found anemic post op Hb 6.7 on 8/15 ( 8/12 Hgb on admission 10.7), she received 1 unit of PRBCs, Hb stable at 8.2 (x2 days).

## 2020-08-17 NOTE — PROGRESS NOTE ADULT - SUBJECTIVE AND OBJECTIVE BOX
OVERNIGHT EVENTS: events noted, on NC, occ cough    Vital Signs Last 24 Hrs  T(C): 36.5 (17 Aug 2020 00:44), Max: 36.9 (16 Aug 2020 13:26)  T(F): 97.7 (17 Aug 2020 00:44), Max: 98.5 (16 Aug 2020 13:26)  HR: 73 (17 Aug 2020 06:02) (67 - 73)  BP: 132/61 (17 Aug 2020 06:02) (120/58 - 149/65)  RR: 18 (17 Aug 2020 00:44) (18 - 18)  SpO2: 95% NC    PHYSICAL EXAMINATION:    GENERAL: The patient is awake and alert in no apparent distress.     HEENT: Head is normocephalic and atraumatic.    NECK: Supple.    LUNGS: DEC BS BOTH ABSES    HEART: MANUEL 3/6    ABDOMEN: Soft, nontender, and nondistended.      EXTREMITIES: Without any cyanosis, clubbing, rash, lesions or edema.    NEUROLOGIC: Grossly intact.    SKIN: No ulceration or induration present.      LABS:                        8.2    7.57  )-----------( 155      ( 16 Aug 2020 01:09 )             26.0     08-16    142  |  103  |  38<H>  ----------------------------<  84  4.5   |  32  |  0.9    Ca    8.2<L>      16 Aug 2020 01:09  Phos  2.7     08-16  Mg     2.2     08-16      PT/INR - ( 16 Aug 2020 01:09 )   PT: 10.60 sec;   INR: 0.92 ratio         PTT - ( 16 Aug 2020 01:09 )  PTT:23.7 sec      CARDIAC MARKERS ( 15 Aug 2020 11:46 )  x     / <0.01 ng/mL / 213 U/L / x     / 6.6 ng/mL                  08-16-20 @ 07:01  -  08-17-20 @ 07:00  --------------------------------------------------------  IN: 940 mL / OUT: 300 mL / NET: 640 mL        MICROBIOLOGY:      MEDICATIONS  (STANDING):  apixaban 2.5 milliGRAM(s) Oral every 12 hours  atorvastatin 40 milliGRAM(s) Oral at bedtime  chlorhexidine 4% Liquid 1 Application(s) Topical <User Schedule>  dronedarone 400 milliGRAM(s) Oral two times a day  insulin lispro (HumaLOG) corrective regimen sliding scale   SubCutaneous Before meals and at bedtime  levothyroxine 88 MICROGram(s) Oral daily  melatonin 5 milliGRAM(s) Oral at bedtime  metoprolol succinate ER 25 milliGRAM(s) Oral daily  pantoprazole    Tablet 40 milliGRAM(s) Oral before breakfast  senna 2 Tablet(s) Oral at bedtime    MEDICATIONS  (PRN):  bisacodyl Suppository 10 milliGRAM(s) Rectal daily PRN If no bowel movement  ondansetron Injectable 4 milliGRAM(s) IV Push every 6 hours PRN Nausea and/or Vomiting  oxyCODONE    IR 5 milliGRAM(s) Oral every 6 hours PRN Severe Pain (7 - 10)      RADIOLOGY & ADDITIONAL STUDIES:

## 2020-08-18 LAB
ALBUMIN SERPL ELPH-MCNC: 3.2 G/DL — LOW (ref 3.5–5.2)
ALP SERPL-CCNC: 67 U/L — SIGNIFICANT CHANGE UP (ref 30–115)
ALT FLD-CCNC: 10 U/L — SIGNIFICANT CHANGE UP (ref 0–41)
ANION GAP SERPL CALC-SCNC: 10 MMOL/L — SIGNIFICANT CHANGE UP (ref 7–14)
ANION GAP SERPL CALC-SCNC: 6 MMOL/L — LOW (ref 7–14)
APPEARANCE UR: CLEAR — SIGNIFICANT CHANGE UP
AST SERPL-CCNC: 24 U/L — SIGNIFICANT CHANGE UP (ref 0–41)
BILIRUB SERPL-MCNC: 0.5 MG/DL — SIGNIFICANT CHANGE UP (ref 0.2–1.2)
BILIRUB UR-MCNC: NEGATIVE — SIGNIFICANT CHANGE UP
BUN SERPL-MCNC: 26 MG/DL — HIGH (ref 10–20)
BUN SERPL-MCNC: 28 MG/DL — HIGH (ref 10–20)
CALCIUM SERPL-MCNC: 8.2 MG/DL — LOW (ref 8.5–10.1)
CALCIUM SERPL-MCNC: 8.4 MG/DL — LOW (ref 8.5–10.1)
CHLORIDE SERPL-SCNC: 101 MMOL/L — SIGNIFICANT CHANGE UP (ref 98–110)
CHLORIDE SERPL-SCNC: 104 MMOL/L — SIGNIFICANT CHANGE UP (ref 98–110)
CO2 SERPL-SCNC: 30 MMOL/L — SIGNIFICANT CHANGE UP (ref 17–32)
CO2 SERPL-SCNC: 31 MMOL/L — SIGNIFICANT CHANGE UP (ref 17–32)
COLOR SPEC: YELLOW — SIGNIFICANT CHANGE UP
CREAT SERPL-MCNC: 0.8 MG/DL — SIGNIFICANT CHANGE UP (ref 0.7–1.5)
CREAT SERPL-MCNC: 0.9 MG/DL — SIGNIFICANT CHANGE UP (ref 0.7–1.5)
DIFF PNL FLD: NEGATIVE — SIGNIFICANT CHANGE UP
GLUCOSE BLDC GLUCOMTR-MCNC: 116 MG/DL — HIGH (ref 70–99)
GLUCOSE BLDC GLUCOMTR-MCNC: 122 MG/DL — HIGH (ref 70–99)
GLUCOSE BLDC GLUCOMTR-MCNC: 200 MG/DL — HIGH (ref 70–99)
GLUCOSE BLDC GLUCOMTR-MCNC: 215 MG/DL — HIGH (ref 70–99)
GLUCOSE BLDC GLUCOMTR-MCNC: 23 MG/DL — CRITICAL LOW (ref 70–99)
GLUCOSE BLDC GLUCOMTR-MCNC: 23 MG/DL — CRITICAL LOW (ref 70–99)
GLUCOSE BLDC GLUCOMTR-MCNC: 24 MG/DL — CRITICAL LOW (ref 70–99)
GLUCOSE BLDC GLUCOMTR-MCNC: 37 MG/DL — CRITICAL LOW (ref 70–99)
GLUCOSE BLDC GLUCOMTR-MCNC: 37 MG/DL — CRITICAL LOW (ref 70–99)
GLUCOSE BLDC GLUCOMTR-MCNC: 43 MG/DL — CRITICAL LOW (ref 70–99)
GLUCOSE BLDC GLUCOMTR-MCNC: 54 MG/DL — LOW (ref 70–99)
GLUCOSE BLDC GLUCOMTR-MCNC: 73 MG/DL — SIGNIFICANT CHANGE UP (ref 70–99)
GLUCOSE BLDC GLUCOMTR-MCNC: 88 MG/DL — SIGNIFICANT CHANGE UP (ref 70–99)
GLUCOSE BLDC GLUCOMTR-MCNC: 89 MG/DL — SIGNIFICANT CHANGE UP (ref 70–99)
GLUCOSE SERPL-MCNC: 213 MG/DL — HIGH (ref 70–99)
GLUCOSE SERPL-MCNC: 87 MG/DL — SIGNIFICANT CHANGE UP (ref 70–99)
GLUCOSE UR QL: NEGATIVE — SIGNIFICANT CHANGE UP
HCT VFR BLD CALC: 25.9 % — LOW (ref 37–47)
HGB BLD-MCNC: 7.9 G/DL — LOW (ref 12–16)
KETONES UR-MCNC: NEGATIVE — SIGNIFICANT CHANGE UP
LEUKOCYTE ESTERASE UR-ACNC: NEGATIVE — SIGNIFICANT CHANGE UP
MAGNESIUM SERPL-MCNC: 2 MG/DL — SIGNIFICANT CHANGE UP (ref 1.8–2.4)
MCHC RBC-ENTMCNC: 30 PG — SIGNIFICANT CHANGE UP (ref 27–31)
MCHC RBC-ENTMCNC: 30.5 G/DL — LOW (ref 32–37)
MCV RBC AUTO: 98.5 FL — SIGNIFICANT CHANGE UP (ref 81–99)
NITRITE UR-MCNC: NEGATIVE — SIGNIFICANT CHANGE UP
NRBC # BLD: 0 /100 WBCS — SIGNIFICANT CHANGE UP (ref 0–0)
PH UR: 6 — SIGNIFICANT CHANGE UP (ref 5–8)
PLATELET # BLD AUTO: 217 K/UL — SIGNIFICANT CHANGE UP (ref 130–400)
POTASSIUM SERPL-MCNC: 5.2 MMOL/L — HIGH (ref 3.5–5)
POTASSIUM SERPL-MCNC: 5.3 MMOL/L — HIGH (ref 3.5–5)
POTASSIUM SERPL-SCNC: 5.2 MMOL/L — HIGH (ref 3.5–5)
POTASSIUM SERPL-SCNC: 5.3 MMOL/L — HIGH (ref 3.5–5)
PROT SERPL-MCNC: 5 G/DL — LOW (ref 6–8)
PROT UR-MCNC: SIGNIFICANT CHANGE UP
RBC # BLD: 2.63 M/UL — LOW (ref 4.2–5.4)
RBC # FLD: 15.2 % — HIGH (ref 11.5–14.5)
SODIUM SERPL-SCNC: 141 MMOL/L — SIGNIFICANT CHANGE UP (ref 135–146)
SODIUM SERPL-SCNC: 141 MMOL/L — SIGNIFICANT CHANGE UP (ref 135–146)
SP GR SPEC: 1.02 — SIGNIFICANT CHANGE UP (ref 1.01–1.02)
UROBILINOGEN FLD QL: SIGNIFICANT CHANGE UP
WBC # BLD: 6.5 K/UL — SIGNIFICANT CHANGE UP (ref 4.8–10.8)
WBC # FLD AUTO: 6.5 K/UL — SIGNIFICANT CHANGE UP (ref 4.8–10.8)

## 2020-08-18 RX ORDER — DEXTROSE 50 % IN WATER 50 %
25 SYRINGE (ML) INTRAVENOUS ONCE
Refills: 0 | Status: COMPLETED | OUTPATIENT
Start: 2020-08-18 | End: 2020-08-18

## 2020-08-18 RX ORDER — DEXTROSE 10 % IN WATER 10 %
250 INTRAVENOUS SOLUTION INTRAVENOUS ONCE
Refills: 0 | Status: COMPLETED | OUTPATIENT
Start: 2020-08-18 | End: 2020-08-18

## 2020-08-18 RX ADMIN — OXYCODONE HYDROCHLORIDE 5 MILLIGRAM(S): 5 TABLET ORAL at 00:07

## 2020-08-18 RX ADMIN — BUDESONIDE AND FORMOTEROL FUMARATE DIHYDRATE 2 PUFF(S): 160; 4.5 AEROSOL RESPIRATORY (INHALATION) at 21:02

## 2020-08-18 RX ADMIN — PANTOPRAZOLE SODIUM 40 MILLIGRAM(S): 20 TABLET, DELAYED RELEASE ORAL at 06:42

## 2020-08-18 RX ADMIN — DRONEDARONE 400 MILLIGRAM(S): 400 TABLET, FILM COATED ORAL at 05:49

## 2020-08-18 RX ADMIN — Medication 81 MILLIGRAM(S): at 11:39

## 2020-08-18 RX ADMIN — ENOXAPARIN SODIUM 40 MILLIGRAM(S): 100 INJECTION SUBCUTANEOUS at 11:40

## 2020-08-18 RX ADMIN — ATORVASTATIN CALCIUM 40 MILLIGRAM(S): 80 TABLET, FILM COATED ORAL at 21:47

## 2020-08-18 RX ADMIN — Medication 25 MILLIGRAM(S): at 06:42

## 2020-08-18 RX ADMIN — DRONEDARONE 400 MILLIGRAM(S): 400 TABLET, FILM COATED ORAL at 17:11

## 2020-08-18 RX ADMIN — Medication 5 MILLIGRAM(S): at 21:47

## 2020-08-18 RX ADMIN — BUDESONIDE AND FORMOTEROL FUMARATE DIHYDRATE 2 PUFF(S): 160; 4.5 AEROSOL RESPIRATORY (INHALATION) at 08:56

## 2020-08-18 RX ADMIN — Medication 3 MILLILITER(S): at 00:04

## 2020-08-18 NOTE — PROGRESS NOTE ADULT - SUBJECTIVE AND OBJECTIVE BOX
Patient is a 89y old  Female who presents with a chief complaint of Rehab of Multitrauma, rt hip fx orif, c1 fx j collar, rt rib fx (18 Aug 2020 07:39)      HPI:  89 y/o right hand dominant female with PMH COPD on 3-4L NC, hypothyroidism presenting to the ED s/p mechanical fall Per the patient she had gotten up to check on her albuterol treatment, started to sit back down on her recliner but missed, falling on her left side. She denies LOC, HT. She was unable to ambulate after the fall and complained of L hip pain. Found have multiple injuries including a nondisplaced C1 fx, aged indeterminant T5 fx, right posterior 10-11 rib fractures, and left intertroch fx. After clearance by both pulmonology and cardiology, she went to OR with orthopedics for Left hip IMN on 813/2020. Post operatively her course was complicated with new onset atrial fibrillation and post operative anemia for which she was given 1 units pRBCs. Hgb over the past 48 hours has been stable at 8.2. For her atrial fibrillation cardiology was consulted and she was started on Eliquis, metoprolol and multaq and is currently in NSR. In regards to her nondisplaced C1 fx she was in a [place] J which was subsequently cleared by neurosurgery. pt eval by physiatry deemed a good acute rehab candidate to improve function for safe . DC home CAN tolerate and benefit from 3 hrs  ofd acute rehab     Patient was evaluated by physiatry to be a good candidate for rehab and will be admitted to  to undergo intensive 3 hour/day rehabilitation (17 Aug 2020 11:27)      I examined the patient and reviewed the chart. There have been no significant changes since my history and physical except where documented below.    TODAY'S SUBJECTIVE & REVIEW OF SYMPTOMS unchanged         PHYSICAL EXAM= AVSS    Vital Signs Last 24 Hrs  T(C): 35.8 (18 Aug 2020 06:20), Max: 36.4 (17 Aug 2020 15:27)  T(F): 96.5 (18 Aug 2020 06:20), Max: 97.6 (17 Aug 2020 15:27)  HR: 64 (18 Aug 2020 06:20) (62 - 76)  BP: 123/74 (18 Aug 2020 06:20) (112/53 - 134/64)  BP(mean): --  RR: 18 (18 Aug 2020 06:20) (18 - 18)  SpO2: 98% (17 Aug 2020 18:50) (98% - 98%)    Constitutional - NAD, Comfortable OOB to WC, says slept well, o2 via NC  Chest - CTAB  Cardiovascular - RRR  Abdomen - Soft, NTND  Extremities - No C/C/E, No calf tenderness   Neurologic Exam -                    Cognitive - Awake, Alert, AAO to self, place, date, year, situation     Communication - Fluent, No dysarthria     Motor -L EFT    UE: [X] WNL  	RIGHT UE:  [X] WNL   	LEFT    LE:  [X] other: Decrease strength with hip flexion and knee extension due to FX hip  	RIGHT LE:  [X] WNLNo focal deficits                    Sensory - Intact to LT     Reflexes - wnl/ symmetric  Psychiatric - Mood stable, Affect WNL      ALBUTerol    90 MICROgram(s) HFA Inhaler 1 Puff(s) Inhalation every 6 hours  albuterol/ipratropium for Nebulization 3 milliLiter(s) Nebulizer every 6 hours PRN  aspirin  chewable 81 milliGRAM(s) Oral daily  atorvastatin 40 milliGRAM(s) Oral at bedtime  bisacodyl Suppository 10 milliGRAM(s) Rectal daily PRN  budesonide 160 MICROgram(s)/formoterol 4.5 MICROgram(s) Inhaler 2 Puff(s) Inhalation two times a day  chlorhexidine 4% Liquid 1 Application(s) Topical <User Schedule>  dextrose 10% Bolus 250 milliLiter(s) IV Bolus once  dextrose 50% Injectable 25 milliLiter(s) IV Push once  dronedarone 400 milliGRAM(s) Oral two times a day  enoxaparin Injectable 40 milliGRAM(s) SubCutaneous daily  insulin lispro (HumaLOG) corrective regimen sliding scale   SubCutaneous Before meals and at bedtime  levothyroxine 88 MICROGram(s) Oral daily  melatonin 5 milliGRAM(s) Oral at bedtime  melatonin 5 milliGRAM(s) Oral at bedtime  metoprolol succinate ER 25 milliGRAM(s) Oral daily  ondansetron Injectable 4 milliGRAM(s) IV Push every 6 hours PRN  oxyCODONE    IR 5 milliGRAM(s) Oral every 6 hours PRN  pantoprazole    Tablet 40 milliGRAM(s) Oral before breakfast  senna 2 Tablet(s) Oral at bedtime  tiotropium 18 MICROgram(s) Capsule 1 Capsule(s) Inhalation daily      RECENT LABS/IMAGING                        7.9    6.50  )-----------( 217      ( 18 Aug 2020 07:57 )             25.9     08-18    141  |  104  |  26<H>  ----------------------------<  87  5.2<H>   |  31  |  0.8    Ca    8.2<L>      18 Aug 2020 07:57  Mg     2.0     08-18  post op anemia    TPro  5.0<L>  /  Alb  3.2<L>  /  TBili  0.5  /  DBili  x   /  AST  24  /  ALT  10  /  AlkPhos  67  08-18 Patient is a 89y old  Female who presents with a chief complaint of Rehab of Multitrauma, left hip fx orif, c1 fx j collar, rt rib fx (18 Aug 2020 07:39)      HPI:  89 y/o right hand dominant female with PMH COPD on 3-4L NC, hypothyroidism presenting to the ED s/p mechanical fall Per the patient she had gotten up to check on her albuterol treatment, started to sit back down on her recliner but missed, falling on her left side. She denies LOC, HT. She was unable to ambulate after the fall and complained of L hip pain. Found have multiple injuries including a nondisplaced C1 fx, aged indeterminant T5 fx, right posterior 10-11 rib fractures, and left intertroch fx. After clearance by both pulmonology and cardiology, she went to OR with orthopedics for Left hip IMN on 813/2020. Post operatively her course was complicated with new onset atrial fibrillation and post operative anemia for which she was given 1 units pRBCs. Hgb over the past 48 hours has been stable at 8.2. For her atrial fibrillation cardiology was consulted and she was started on Eliquis, metoprolol and multaq and is currently in NSR. In regards to her nondisplaced C1 fx she was in a [place] J which was subsequently cleared by neurosurgery. pt eval by physiatry deemed a good acute rehab candidate to improve function for safe . DC home CAN tolerate and benefit from 3 hrs  ofd acute rehab     Patient was evaluated by physiatry to be a good candidate for rehab and will be admitted to  to undergo intensive 3 hour/day rehabilitation (17 Aug 2020 11:27)      I examined the patient and reviewed the chart. There have been no significant changes since my history and physical except where documented below.    TODAY'S SUBJECTIVE & REVIEW OF SYMPTOMS unchanged         PHYSICAL EXAM= AVSS    Vital Signs Last 24 Hrs  T(C): 35.8 (18 Aug 2020 06:20), Max: 36.4 (17 Aug 2020 15:27)  T(F): 96.5 (18 Aug 2020 06:20), Max: 97.6 (17 Aug 2020 15:27)  HR: 64 (18 Aug 2020 06:20) (62 - 76)  BP: 123/74 (18 Aug 2020 06:20) (112/53 - 134/64)  BP(mean): --  RR: 18 (18 Aug 2020 06:20) (18 - 18)  SpO2: 98% (17 Aug 2020 18:50) (98% - 98%)    Constitutional - NAD, Comfortable OOB to WC, says slept well, o2 via NC  Chest - CTAB  Cardiovascular - RRR  Abdomen - Soft, NTND  Extremities - No C/C/E, No calf tenderness   Neurologic Exam -                    Cognitive - Awake, Alert, AAO to self, place, date, year, situation     Communication - Fluent, No dysarthria     Motor -L EFT    UE: [X] WNL  	RIGHT UE:  [X] WNL   	LEFT    LE:  [X] other: Decrease strength with hip flexion and knee extension due to FX hip  	RIGHT LE:  [X] WNLNo focal deficits                    Sensory - Intact to LT     Reflexes - wnl/ symmetric  Psychiatric - Mood stable, Affect WNL      ALBUTerol    90 MICROgram(s) HFA Inhaler 1 Puff(s) Inhalation every 6 hours  albuterol/ipratropium for Nebulization 3 milliLiter(s) Nebulizer every 6 hours PRN  aspirin  chewable 81 milliGRAM(s) Oral daily  atorvastatin 40 milliGRAM(s) Oral at bedtime  bisacodyl Suppository 10 milliGRAM(s) Rectal daily PRN  budesonide 160 MICROgram(s)/formoterol 4.5 MICROgram(s) Inhaler 2 Puff(s) Inhalation two times a day  chlorhexidine 4% Liquid 1 Application(s) Topical <User Schedule>  dextrose 10% Bolus 250 milliLiter(s) IV Bolus once  dextrose 50% Injectable 25 milliLiter(s) IV Push once  dronedarone 400 milliGRAM(s) Oral two times a day  enoxaparin Injectable 40 milliGRAM(s) SubCutaneous daily  insulin lispro (HumaLOG) corrective regimen sliding scale   SubCutaneous Before meals and at bedtime  levothyroxine 88 MICROGram(s) Oral daily  melatonin 5 milliGRAM(s) Oral at bedtime  melatonin 5 milliGRAM(s) Oral at bedtime  metoprolol succinate ER 25 milliGRAM(s) Oral daily  ondansetron Injectable 4 milliGRAM(s) IV Push every 6 hours PRN  oxyCODONE    IR 5 milliGRAM(s) Oral every 6 hours PRN  pantoprazole    Tablet 40 milliGRAM(s) Oral before breakfast  senna 2 Tablet(s) Oral at bedtime  tiotropium 18 MICROgram(s) Capsule 1 Capsule(s) Inhalation daily      RECENT LABS/IMAGING                        7.9    6.50  )-----------( 217      ( 18 Aug 2020 07:57 )             25.9     08-18    141  |  104  |  26<H>  ----------------------------<  87  5.2<H>   |  31  |  0.8    Ca    8.2<L>      18 Aug 2020 07:57  Mg     2.0     08-18  post op anemia    TPro  5.0<L>  /  Alb  3.2<L>  /  TBili  0.5  /  DBili  x   /  AST  24  /  ALT  10  /  AlkPhos  67  08-18

## 2020-08-18 NOTE — PROGRESS NOTE ADULT - SUBJECTIVE AND OBJECTIVE BOX
89 y/o female s/p Left hip intramedullary nail POD#5. Denies any SOB, palpitations, chest pain, dizziness or any other c/o.  ICU Vital Signs Last 24 Hrs  T(C): 35.8 (18 Aug 2020 06:20), Max: 36.4 (17 Aug 2020 15:27)  T(F): 96.5 (18 Aug 2020 06:20), Max: 97.6 (17 Aug 2020 15:27)  HR: 64 (18 Aug 2020 06:20) (62 - 76)  BP: 123/74 (18 Aug 2020 06:20) (112/53 - 134/64)  BP(mean): --  ABP: --  ABP(mean): --  RR: 18 (18 Aug 2020 06:20) (18 - 18)  SpO2: 98% (17 Aug 2020 18:50) (98% - 98%)      GEN: WN/WD, A&O X 3, NAD;  Left hip and LE:  Proximal dressing is C/D/I. Decreased ROM secondary to pain, NVI, no decreased sensation, dorsi/plantar flexes ankle, no calf tenderness, DP pulse 2+.      s/p Left hip intramedullary nail POD#5:  - continue current Tx and PT;  - DVT prophylaxis;  - pain management;  - WBAT, fall precautions;  - d/ disposition per primary team.  - [facility], please call [phone] for an appointment

## 2020-08-18 NOTE — PROGRESS NOTE ADULT - ASSESSMENT
Rehab of multiple trauma c1 fx, HIP FT rib FX's  Patient requires 3 hrs daily of interdisciplinary inpatient rehab.    89 y/o female with hx of COPD on 3-4 L O2 nasal canula, hypothyroidism and new onset atrial fibriliation admitted to  for rehab of multitrauma after fall with injuries including a nondisplaced C1 fx, aged indeterminant T5 fx, right posterior 10-11 rib fractures, and left intertroch fx. She is WBAT for her LLE.    Level of function PTA: Independent with ambulation without assistive device and ADLs,    Rehab of multitrauma statr 3 hrs of acute interdisciplinary rehab  #Left hip IMN - WBAT LLE  #C1 fx - c-collar cleared by ortho, f/u outpatient  #T5 fx - f/u outpatient  #Right posterior 10-11 fx   resp- Incentive spirometer     -Pain control: Tylenol PRN, oxycodone prn    -GI/Bowel Mgmt -  senna, miralax prn    -Bladder management: not indicated     -Skin: left hip surgical wound incisions C/D/I  -No active issues at this time    -FEN   - Diet -  Regular  - Dysphagia  - None    Medical Comorbidities    paroxsymal Atrial fibrillation  -Eliquis 2.5 mg q12h    -Metoprolol ER 25 POD  -Multaq 400 BID    #COPD  -2-4 L O2 NC  -Continue home meds    #Hypothyroidism  -Levothyroxine 88 mcg daily   osteoporosis by FX= add calcium = vid D check level  #HLD  Atorvastatin 40mg po qhs    Precautions / PROPHYLAXIS:      - Falls    - Ortho: Weight bearing status: WBAT LLE      - DVT prophylaxis: Eliquis  -Metoprolol ER 25 POD  -Multaq 400 BID    #COPD  -2-4 L O2 NC  -Continue home meds    #Hypothyroidism  -Levothyroxine 88 mcg daily   osteoporosis by FX= add calcium = vid D check level  #HLD  Atorvastatin 40mg po qhs    Precautions / PROPHYLAXIS:      - Falls    - Ortho: Weight bearing status: WBAT LLE      - DVT prophylaxis: Eliquis Rehab of multiple trauma c1 fx,  left HIP FT rib FX's  Patient requires 3 hrs daily of interdisciplinary inpatient rehab.    89 y/o female with hx of COPD on 3-4 L O2 nasal canula, hypothyroidism and new onset atrial fibriliation admitted to  for rehab of multitrauma after fall with injuries including a nondisplaced C1 fx, aged indeterminant T5 fx, right posterior 10-11 rib fractures, and left intertroch fx. She is WBAT for her LLE.    Level of function PTA: Independent with ambulation without assistive device and ADLs,    Rehab of multitrauma statr 3 hrs of acute interdisciplinary rehab  #Left hip IMN - WBAT LLE  #C1 fx - c-collar cleared by ortho, f/u outpatient  #T5 fx - f/u outpatient  #Right posterior 10-11 fx   resp- Incentive spirometer     -Pain control: Tylenol PRN, oxycodone prn    -GI/Bowel Mgmt -  senna, miralax prn    -Bladder management: not indicated     -Skin: left hip surgical wound incisions C/D/I  -No active issues at this time    -FEN   - Diet -  Regular  - Dysphagia  - None    Medical Comorbidities    paroxsymal Atrial fibrillation  -Eliquis 2.5 mg q12h    -Metoprolol ER 25 POD  -Multaq 400 BID    #COPD  -2-4 L O2 NC  -Continue home meds    #Hypothyroidism  -Levothyroxine 88 mcg daily   osteoporosis by FX= add calcium = vid D check level  #HLD  Atorvastatin 40mg po qhs    Precautions / PROPHYLAXIS:      - Falls    - Ortho: Weight bearing status: WBAT LLE      - DVT prophylaxis: Eliquis  -Metoprolol ER 25 POD  -Multaq 400 BID    #COPD  -2-4 L O2 NC  -Continue home meds    #Hypothyroidism  -Levothyroxine 88 mcg daily   osteoporosis by FX= add calcium = vid D check level  #HLD  Atorvastatin 40mg po qhs    Precautions / PROPHYLAXIS:      - Falls    - Ortho: Weight bearing status: WBAT LLE      - DVT prophylaxis: Eliquis

## 2020-08-18 NOTE — PROGRESS NOTE ADULT - REASON FOR ADMISSION
Rehab of Multitrauma, rt hip fx orif, c1 fx j collar, rt rib fx Rehab of Multitrauma, left hip fx orif, c1 fx j collar, rt rib fx

## 2020-08-18 NOTE — PROGRESS NOTE ADULT - SUBJECTIVE AND OBJECTIVE BOX
OVERNIGHT EVENTS: events noted, transferred to 4 A, co twitching LE    Vital Signs Last 24 Hrs  T(C): 35.8 (18 Aug 2020 06:20), Max: 36.4 (17 Aug 2020 15:27)  T(F): 96.5 (18 Aug 2020 06:20), Max: 97.6 (17 Aug 2020 15:27)  HR: 64 (18 Aug 2020 06:20) (62 - 76)  BP: 123/74 (18 Aug 2020 06:20) (112/53 - 134/64)  RR: 18 (18 Aug 2020 06:20) (18 - 18)  SpO2: 98% (17 Aug 2020 18:50) (98% - 98%)    PHYSICAL EXAMINATION:    GENERAL: The patient is awake and alert in no apparent distress.     HEENT: Head is normocephalic and atraumatic. Extraocular muscles are intact. Mucous membranes are moist.    NECK: Supple.    LUNGS: DEC BS both bases    HEART: MANUEL 3/6    ABDOMEN: Soft, nontender, and nondistended.      EXTREMITIES: Without any cyanosis, clubbing, rash, lesions or edema.    NEUROLOGIC: Grossly intact.    SKIN: No ulceration or induration present.      LABS:                                08-17-20 @ 07:01  -  08-18-20 @ 07:00  --------------------------------------------------------  IN: 0 mL / OUT: 300 mL / NET: -300 mL        MICROBIOLOGY:      MEDICATIONS  (STANDING):  ALBUTerol    90 MICROgram(s) HFA Inhaler 1 Puff(s) Inhalation every 6 hours  aspirin  chewable 81 milliGRAM(s) Oral daily  atorvastatin 40 milliGRAM(s) Oral at bedtime  budesonide 160 MICROgram(s)/formoterol 4.5 MICROgram(s) Inhaler 2 Puff(s) Inhalation two times a day  chlorhexidine 4% Liquid 1 Application(s) Topical <User Schedule>  dronedarone 400 milliGRAM(s) Oral two times a day  enoxaparin Injectable 40 milliGRAM(s) SubCutaneous daily  insulin lispro (HumaLOG) corrective regimen sliding scale   SubCutaneous Before meals and at bedtime  levothyroxine 88 MICROGram(s) Oral daily  melatonin 5 milliGRAM(s) Oral at bedtime  melatonin 5 milliGRAM(s) Oral at bedtime  metoprolol succinate ER 25 milliGRAM(s) Oral daily  pantoprazole    Tablet 40 milliGRAM(s) Oral before breakfast  senna 2 Tablet(s) Oral at bedtime  tiotropium 18 MICROgram(s) Capsule 1 Capsule(s) Inhalation daily    MEDICATIONS  (PRN):  albuterol/ipratropium for Nebulization 3 milliLiter(s) Nebulizer every 6 hours PRN Shortness of Breath and/or Wheezing  bisacodyl Suppository 10 milliGRAM(s) Rectal daily PRN If no bowel movement  ondansetron Injectable 4 milliGRAM(s) IV Push every 6 hours PRN Nausea and/or Vomiting  oxyCODONE    IR 5 milliGRAM(s) Oral every 6 hours PRN Severe Pain (7 - 10)      RADIOLOGY & ADDITIONAL STUDIES:

## 2020-08-18 NOTE — PROGRESS NOTE ADULT - ASSESSMENT
Impression:    Severe COPD on home O2 4L NC STABLE  Left comminuted intertrochanteric fracture sp fall s/p sx  Right posterior 10-11th rib fx  A Fib    Recommendations:    Incentive spirometry  cardio f/up  Pain control  Neb as needed  SYMBICORT/ spiriva  DVT prophylaxis

## 2020-08-19 LAB
GLUCOSE BLDC GLUCOMTR-MCNC: 88 MG/DL — SIGNIFICANT CHANGE UP (ref 70–99)
GLUCOSE BLDC GLUCOMTR-MCNC: 92 MG/DL — SIGNIFICANT CHANGE UP (ref 70–99)
GLUCOSE BLDC GLUCOMTR-MCNC: 99 MG/DL — SIGNIFICANT CHANGE UP (ref 70–99)

## 2020-08-19 RX ORDER — ACETAMINOPHEN 500 MG
650 TABLET ORAL ONCE
Refills: 0 | Status: COMPLETED | OUTPATIENT
Start: 2020-08-19 | End: 2020-08-19

## 2020-08-19 RX ORDER — SENNOSIDES 8.6 MG/1
8.6 CAPSULE, GELATIN COATED ORAL
Refills: 0 | Status: ACTIVE | COMMUNITY
Start: 2020-08-19

## 2020-08-19 RX ADMIN — Medication 25 MILLIGRAM(S): at 05:40

## 2020-08-19 RX ADMIN — ENOXAPARIN SODIUM 40 MILLIGRAM(S): 100 INJECTION SUBCUTANEOUS at 11:08

## 2020-08-19 RX ADMIN — CHLORHEXIDINE GLUCONATE 1 APPLICATION(S): 213 SOLUTION TOPICAL at 05:39

## 2020-08-19 RX ADMIN — Medication 88 MICROGRAM(S): at 05:40

## 2020-08-19 RX ADMIN — PANTOPRAZOLE SODIUM 40 MILLIGRAM(S): 20 TABLET, DELAYED RELEASE ORAL at 05:40

## 2020-08-19 RX ADMIN — Medication 650 MILLIGRAM(S): at 20:06

## 2020-08-19 RX ADMIN — ATORVASTATIN CALCIUM 40 MILLIGRAM(S): 80 TABLET, FILM COATED ORAL at 22:10

## 2020-08-19 RX ADMIN — ALBUTEROL 1 PUFF(S): 90 AEROSOL, METERED ORAL at 19:59

## 2020-08-19 RX ADMIN — Medication 5 MILLIGRAM(S): at 22:10

## 2020-08-19 RX ADMIN — SENNA PLUS 2 TABLET(S): 8.6 TABLET ORAL at 22:10

## 2020-08-19 RX ADMIN — BUDESONIDE AND FORMOTEROL FUMARATE DIHYDRATE 2 PUFF(S): 160; 4.5 AEROSOL RESPIRATORY (INHALATION) at 11:08

## 2020-08-19 RX ADMIN — Medication 81 MILLIGRAM(S): at 11:08

## 2020-08-19 RX ADMIN — DRONEDARONE 400 MILLIGRAM(S): 400 TABLET, FILM COATED ORAL at 05:40

## 2020-08-19 RX ADMIN — DRONEDARONE 400 MILLIGRAM(S): 400 TABLET, FILM COATED ORAL at 17:19

## 2020-08-19 NOTE — PROGRESS NOTE ADULT - SUBJECTIVE AND OBJECTIVE BOX
OVERNIGHT EVENTS: events noted, c/p hip pain, occ cough    Vital Signs Last 24 Hrs  T(C): 36.4 (19 Aug 2020 04:36), Max: 36.4 (19 Aug 2020 04:36)  T(F): 97.6 (19 Aug 2020 04:36), Max: 97.6 (19 Aug 2020 04:36)  HR: 72 (19 Aug 2020 04:36) (72 - 77)  BP: 105/44 (19 Aug 2020 04:36) (103/53 - 112/53)  RR: 18 (19 Aug 2020 04:36) (18 - 18)  SpO2: 94% (19 Aug 2020 04:36) (94% - 94%)    PHYSICAL EXAMINATION:    GENERAL: The patient is awake and alert in no apparent distress.     HEENT: Head is normocephalic and atraumatic.    NECK: Supple.    LUNGS: dec bs both bases    HEART: MANUEL 3/6    ABDOMEN: Soft, nontender, and nondistended.      EXTREMITIES: Without any cyanosis, clubbing, rash, lesions or edema.    NEUROLOGIC: Grossly intact.    SKIN: No ulceration or induration present.      LABS:                        7.9    6.50  )-----------( 217      ( 18 Aug 2020 07:57 )             25.9     08-18    141  |  101  |  28<H>  ----------------------------<  213<H>  5.3<H>   |  30  |  0.9    Ca    8.4<L>      18 Aug 2020 10:00  Mg     2.0         TPro  5.0<L>  /  Alb  3.2<L>  /  TBili  0.5  /  DBili  x   /  AST  24  /  ALT  10  /  AlkPhos  67  08-18      Urinalysis Basic - ( 18 Aug 2020 11:40 )    Color: Yellow / Appearance: Clear / S.020 / pH: x  Gluc: x / Ketone: Negative  / Bili: Negative / Urobili: <2 mg/dL   Blood: x / Protein: Trace / Nitrite: Negative   Leuk Esterase: Negative / RBC: x / WBC x   Sq Epi: x / Non Sq Epi: x / Bacteria: x                        20 @ 07:01  -  20 @ 07:00  --------------------------------------------------------  IN: 1080 mL / OUT: 400 mL / NET: 680 mL        MICROBIOLOGY:      MEDICATIONS  (STANDING):  ALBUTerol    90 MICROgram(s) HFA Inhaler 1 Puff(s) Inhalation every 6 hours  aspirin  chewable 81 milliGRAM(s) Oral daily  atorvastatin 40 milliGRAM(s) Oral at bedtime  budesonide 160 MICROgram(s)/formoterol 4.5 MICROgram(s) Inhaler 2 Puff(s) Inhalation two times a day  chlorhexidine 4% Liquid 1 Application(s) Topical <User Schedule>  dronedarone 400 milliGRAM(s) Oral two times a day  enoxaparin Injectable 40 milliGRAM(s) SubCutaneous daily  levothyroxine 88 MICROGram(s) Oral daily  melatonin 5 milliGRAM(s) Oral at bedtime  melatonin 5 milliGRAM(s) Oral at bedtime  metoprolol succinate ER 25 milliGRAM(s) Oral daily  pantoprazole    Tablet 40 milliGRAM(s) Oral before breakfast  senna 2 Tablet(s) Oral at bedtime  tiotropium 18 MICROgram(s) Capsule 1 Capsule(s) Inhalation daily    MEDICATIONS  (PRN):  albuterol/ipratropium for Nebulization 3 milliLiter(s) Nebulizer every 6 hours PRN Shortness of Breath and/or Wheezing  bisacodyl Suppository 10 milliGRAM(s) Rectal daily PRN If no bowel movement  ondansetron Injectable 4 milliGRAM(s) IV Push every 6 hours PRN Nausea and/or Vomiting  oxyCODONE    IR 5 milliGRAM(s) Oral every 6 hours PRN Severe Pain (7 - 10)      RADIOLOGY & ADDITIONAL STUDIES:

## 2020-08-19 NOTE — PROGRESS NOTE ADULT - ASSESSMENT
Impression:    Severe COPD on home O2   Left comminuted intertrochanteric fracture sp fall s/p sx  Right posterior 10-11th rib fx  A Fib    Recommendations:    Incentive spirometry  cardio f/up  Neb as needed  SYMBICORT/ spiriva  Pain control  DVT prophylaxis

## 2020-08-19 NOTE — PROGRESS NOTE ADULT - SUBJECTIVE AND OBJECTIVE BOX
Patient is a 89y old  Female who presents with a chief complaint of Rehab of Multitrauma, left hip fx orif, c1 fx j collar, rt rib fx (18 Aug 2020 07:39)      HPI:  89 y/o right hand dominant female with PMH COPD on 3-4L NC, hypothyroidism presenting to the ED s/p mechanical fall Per the patient she had gotten up to check on her albuterol treatment, started to sit back down on her recliner but missed, falling on her left side. She denies LOC, HT. She was unable to ambulate after the fall and complained of L hip pain. Found have multiple injuries including a nondisplaced C1 fx, aged indeterminant T5 fx, right posterior 10-11 rib fractures, and left intertroch fx. After clearance by both pulmonology and cardiology, she went to OR with orthopedics for Left hip IMN on . Post operatively her course was complicated with new onset atrial fibrillation and post operative anemia for which she was given 1 units pRBCs. Hgb over the past 48 hours has been stable at 8.2. For her atrial fibrillation cardiology was consulted and she was started on Eliquis, metoprolol and multaq and is currently in NSR. In regards to her nondisplaced C1 fx she was in a [place] J which was subsequently cleared by neurosurgery. pt shama by physiatry deemed a good acute rehab candidate to improve function for safe . DC home CAN tolerate and benefit from 3 hrs  ofd acute rehab     Patient was evaluated by physiatry to be a good candidate for rehab and will be admitted to  to undergo intensive 3 hour/day rehabilitation (17 Aug 2020 11:27)      I examined the patient and reviewed the chart. There have been no significant changes since my history and physical except where documented below.    TODAY'S SUBJECTIVE & REVIEW OF SYMPTOMS unchanged    No acute overnight events. VSS. Patients BS wnl this morning. She denies any lightheadedness or dizziness      PHYSICAL EXAM    Vital Signs Last 24 Hrs  T(C): 36.4 (19 Aug 2020 04:36), Max: 36.4 (19 Aug 2020 04:36)  T(F): 97.6 (19 Aug 2020 04:36), Max: 97.6 (19 Aug 2020 04:36)  HR: 72 (19 Aug 2020 04:36) (72 - 77)  BP: 105/44 (19 Aug 2020 04:36) (103/53 - 112/53)  BP(mean): --  RR: 18 (19 Aug 2020 04:36) (18 - 18)  SpO2: 94% (19 Aug 2020 04:36) (94% - 94%)    Constitutional - NAD, Comfortable OOB to WC, says slept well, o2 via NC  Chest - CTAB  Cardiovascular - RRR  Abdomen - Soft, NTND  Extremities - No C/C/E, No calf tenderness   Neurologic Exam -                    Cognitive - Awake, Alert, AAO to self, place, date, year, situation     Communication - Fluent, No dysarthria  Motor     LEFT    UE: [X] WNL  	RIGHT UE:  [X] WNL   	LEFT    LE:  [X] other: Decrease strength with hip flexion and knee extension due to FX hip    RIGHT LE:  [X] WNLNo focal deficits   Sensory - Intact to LT  Reflexes - wnl/ symmetric  Psychiatric - Mood stable, Affect WNL    ALBUTerol    90 MICROgram(s) HFA Inhaler 1 Puff(s) Inhalation every 6 hours  albuterol/ipratropium for Nebulization 3 milliLiter(s) Nebulizer every 6 hours PRN  aspirin  chewable 81 milliGRAM(s) Oral daily  atorvastatin 40 milliGRAM(s) Oral at bedtime  bisacodyl Suppository 10 milliGRAM(s) Rectal daily PRN  budesonide 160 MICROgram(s)/formoterol 4.5 MICROgram(s) Inhaler 2 Puff(s) Inhalation two times a day  chlorhexidine 4% Liquid 1 Application(s) Topical <User Schedule>  dronedarone 400 milliGRAM(s) Oral two times a day  enoxaparin Injectable 40 milliGRAM(s) SubCutaneous daily  levothyroxine 88 MICROGram(s) Oral daily  melatonin 5 milliGRAM(s) Oral at bedtime  metoprolol succinate ER 25 milliGRAM(s) Oral daily  ondansetron Injectable 4 milliGRAM(s) IV Push every 6 hours PRN  oxyCODONE    IR 5 milliGRAM(s) Oral every 6 hours PRN  pantoprazole    Tablet 40 milliGRAM(s) Oral before breakfast  senna 2 Tablet(s) Oral at bedtime  tiotropium 18 MICROgram(s) Capsule 1 Capsule(s) Inhalation daily      RECENT LABS/IMAGING                        7.9    6.50  )-----------( 217      ( 18 Aug 2020 07:57 )             25.9     08-18    141  |  101  |  28<H>  ----------------------------<  213<H>  5.3<H>   |  30  |  0.9    Ca    8.4<L>      18 Aug 2020 10:00  Mg     2.0         TPro  5.0<L>  /  Alb  3.2<L>  /  TBili  0.5  /  DBili  x   /  AST  24  /  ALT  10  /  AlkPhos  67        Urinalysis Basic - ( 18 Aug 2020 11:40 )    Color: Yellow / Appearance: Clear / S.020 / pH: x  Gluc: x / Ketone: Negative  / Bili: Negative / Urobili: <2 mg/dL   Blood: x / Protein: Trace / Nitrite: Negative   Leuk Esterase: Negative / RBC: x / WBC x   Sq Epi: x / Non Sq Epi: x / Bacteria: x    Post operative anemia, stable Patient is a 89y old  Female who presents with a chief complaint of Rehab of Multitrauma, left hip fx orif, c1 fx j collar, rt rib fx (18 Aug 2020 07:39)      HPI:  89 y/o right hand dominant female with PMH COPD on 3-4L NC, hypothyroidism presenting to the ED s/p mechanical fall Per the patient she had gotten up to check on her albuterol treatment, started to sit back down on her recliner but missed, falling on her left side. She denies LOC, HT. She was unable to ambulate after the fall and complained of L hip pain. Found have multiple injuries including a nondisplaced C1 fx, aged indeterminant T5 fx, right posterior 10-11 rib fractures, and left intertroch fx. After clearance by both pulmonology and cardiology, she went to OR with orthopedics for Left hip IMN on . Post operatively her course was complicated with new onset atrial fibrillation and post operative anemia for which she was given 1 units pRBCs. Hgb over the past 48 hours has been stable at 8.2. For her atrial fibrillation cardiology was consulted and she was started on Eliquis, metoprolol and multaq and is currently in NSR. In regards to her nondisplaced C1 fx she was in a [place] J which was subsequently cleared by neurosurgery. pt eval by physiatry deemed a good acute rehab candidate to improve function for safe . DC home CAN tolerate and benefit from 3 hrs  ofd acute rehab     Patient was evaluated by physiatry to be a good candidate for rehab and will be admitted to  to undergo intensive 3 hour/day rehabilitation (17 Aug 2020 11:27)      TODAY'S SUBJECTIVE & REVIEW OF SYMPTOMS unchanged    No acute overnight events. VSS. Patients BS wnl this morning. She denies any lightheadedness or dizziness      PHYSICAL EXAM    Vital Signs Last 24 Hrs  T(C): 36.4 (19 Aug 2020 04:36), Max: 36.4 (19 Aug 2020 04:36)  T(F): 97.6 (19 Aug 2020 04:36), Max: 97.6 (19 Aug 2020 04:36)  HR: 72 (19 Aug 2020 04:36) (72 - 77)  BP: 105/44 (19 Aug 2020 04:36) (103/53 - 112/53)  BP(mean): --  RR: 18 (19 Aug 2020 04:36) (18 - 18)  SpO2: 94% (19 Aug 2020 04:36) (94% - 94%)    Constitutional - NAD, Comfortable OOB to WC, says slept well, o2 via NC  Chest - CTAB  Cardiovascular - RRR  Abdomen - Soft, NTND  Extremities - No C/C/E, No calf tenderness   Neurologic Exam -                    Cognitive - Awake, Alert, AAO to self, place, date, year, situation     Communication - Fluent, No dysarthria  Motor     LEFT    UE: [X] WNL  	RIGHT UE:  [X] WNL   	LEFT    LE:  [X] other: Decrease strength with hip flexion and knee extension due to FX hip    RIGHT LE:  [X] WNLNo focal deficits   Sensory - Intact to LT  Reflexes - wnl/ symmetric  Psychiatric - Mood stable, Affect WNL    ALBUTerol    90 MICROgram(s) HFA Inhaler 1 Puff(s) Inhalation every 6 hours  albuterol/ipratropium for Nebulization 3 milliLiter(s) Nebulizer every 6 hours PRN  aspirin  chewable 81 milliGRAM(s) Oral daily  atorvastatin 40 milliGRAM(s) Oral at bedtime  bisacodyl Suppository 10 milliGRAM(s) Rectal daily PRN  budesonide 160 MICROgram(s)/formoterol 4.5 MICROgram(s) Inhaler 2 Puff(s) Inhalation two times a day  chlorhexidine 4% Liquid 1 Application(s) Topical <User Schedule>  dronedarone 400 milliGRAM(s) Oral two times a day  enoxaparin Injectable 40 milliGRAM(s) SubCutaneous daily  levothyroxine 88 MICROGram(s) Oral daily  melatonin 5 milliGRAM(s) Oral at bedtime  metoprolol succinate ER 25 milliGRAM(s) Oral daily  ondansetron Injectable 4 milliGRAM(s) IV Push every 6 hours PRN  oxyCODONE    IR 5 milliGRAM(s) Oral every 6 hours PRN  pantoprazole    Tablet 40 milliGRAM(s) Oral before breakfast  senna 2 Tablet(s) Oral at bedtime  tiotropium 18 MICROgram(s) Capsule 1 Capsule(s) Inhalation daily      RECENT LABS/IMAGING                        7.9    6.50  )-----------( 217      ( 18 Aug 2020 07:57 )             25.9     08-18    141  |  101  |  28<H>  ----------------------------<  213<H>  5.3<H>   |  30  |  0.9    Ca    8.4<L>      18 Aug 2020 10:00  Mg     2.0         TPro  5.0<L>  /  Alb  3.2<L>  /  TBili  0.5  /  DBili  x   /  AST  24  /  ALT  10  /  AlkPhos  67        Urinalysis Basic - ( 18 Aug 2020 11:40 )    Color: Yellow / Appearance: Clear / S.020 / pH: x  Gluc: x / Ketone: Negative  / Bili: Negative / Urobili: <2 mg/dL   Blood: x / Protein: Trace / Nitrite: Negative   Leuk Esterase: Negative / RBC: x / WBC x   Sq Epi: x / Non Sq Epi: x / Bacteria: x    Post operative anemia, stable

## 2020-08-19 NOTE — PROGRESS NOTE ADULT - ASSESSMENT
Rehab of multiple trauma c1 fx,  left HIP FT rib FX's  Patient requires 3 hrs daily of interdisciplinary inpatient rehab.    89 y/o female with hx of COPD on 3-4 L O2 nasal canula, hypothyroidism and new onset atrial fibriliation admitted to  for rehab of multitrauma after fall with injuries including a nondisplaced C1 fx, aged indeterminant T5 fx, right posterior 10-11 rib fractures, and left intertroch fx. She is WBAT for her LLE.    Level of function PTA: Independent with ambulation without assistive device and ADLs,    Rehab of multitrauma statr 3 hrs of acute interdisciplinary rehab  #Left hip IMN - WBAT LLE  #C1 fx - c-collar cleared by ortho, f/u outpatient  #T5 fx - f/u outpatient  #Right posterior 10-11 fx   resp- Incentive spirometer     -Pain control: Tylenol PRN, oxycodone prn    -GI/Bowel Mgmt -  senna, miralax prn    -Bladder management: not indicated     -Skin: left hip surgical wound incisions C/D/I  -No active issues at this time    -FEN   - Diet -  Regular  - Dysphagia  - None    Medical Comorbidities    paroxsymal Atrial fibrillation  -Eliquis discontinued, patient now only on ASA after discussion with patient and son regarding risks and benefits  -Metoprolol ER 25 POD  -Multaq 400 BID    #COPD  -2-4 L O2 NC  -Continue home meds    #Hypothyroidism  -Levothyroxine 88 mcg daily   osteoporosis by FX= add calcium = vid D check level    #HLD  Atorvastatin 40mg po qhs    Precautions / PROPHYLAXIS:      - Falls    - Ortho: Weight bearing status: WBAT LLE      - DVT prophylaxis: Lovenox  -Metoprolol ER 25 POD  -Multaq 400 BID    #COPD  -2-4 L O2 NC  -Continue home meds    #Hypothyroidism  -Levothyroxine 88 mcg daily   osteoporosis by FX= add calcium = vid D check level  #HLD  Atorvastatin 40mg po qhs    Precautions / PROPHYLAXIS:      - Falls    - Ortho: Weight bearing status: WBAT LLE      - DVT prophylaxis: Lovenox    Patient seen and discussed with my attending, Dr. Min

## 2020-08-20 DIAGNOSIS — M48.54XA COLLAPSED VERTEBRA, NOT ELSEWHERE CLASSIFIED, THORACIC REGION, INITIAL ENCOUNTER FOR FRACTURE: ICD-10-CM

## 2020-08-20 DIAGNOSIS — E03.9 HYPOTHYROIDISM, UNSPECIFIED: ICD-10-CM

## 2020-08-20 DIAGNOSIS — S72.142A DISPLACED INTERTROCHANTERIC FRACTURE OF LEFT FEMUR, INITIAL ENCOUNTER FOR CLOSED FRACTURE: ICD-10-CM

## 2020-08-20 DIAGNOSIS — I10 ESSENTIAL (PRIMARY) HYPERTENSION: ICD-10-CM
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
## 2020-08-20 DIAGNOSIS — E78.5 HYPERLIPIDEMIA, UNSPECIFIED: ICD-10-CM

## 2020-08-20 DIAGNOSIS — I48.0 PAROXYSMAL ATRIAL FIBRILLATION: ICD-10-CM
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
## 2020-08-20 DIAGNOSIS — Z66 DO NOT RESUSCITATE: ICD-10-CM

## 2020-08-20 DIAGNOSIS — D62 ACUTE POSTHEMORRHAGIC ANEMIA: ICD-10-CM

## 2020-08-20 DIAGNOSIS — I97.191 OTHER POSTPROCEDURAL CARDIAC FUNCTIONAL DISTURBANCES FOLLOWING OTHER SURGERY: ICD-10-CM

## 2020-08-20 DIAGNOSIS — Z99.81 DEPENDENCE ON SUPPLEMENTAL OXYGEN: ICD-10-CM

## 2020-08-20 DIAGNOSIS — J44.9 CHRONIC OBSTRUCTIVE PULMONARY DISEASE, UNSPECIFIED: ICD-10-CM

## 2020-08-20 DIAGNOSIS — S22.41XA MULTIPLE FRACTURES OF RIBS, RIGHT SIDE, INITIAL ENCOUNTER FOR CLOSED FRACTURE: ICD-10-CM
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
## 2020-08-20 DIAGNOSIS — S12.001A UNSPECIFIED NONDISPLACED FRACTURE OF FIRST CERVICAL VERTEBRA, INITIAL ENCOUNTER FOR CLOSED FRACTURE: ICD-10-CM
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
## 2020-08-20 LAB
GLUCOSE BLDC GLUCOMTR-MCNC: 114 MG/DL — HIGH (ref 70–99)
GLUCOSE BLDC GLUCOMTR-MCNC: 97 MG/DL — SIGNIFICANT CHANGE UP (ref 70–99)
GLUCOSE BLDC GLUCOMTR-MCNC: 99 MG/DL — SIGNIFICANT CHANGE UP (ref 70–99)

## 2020-08-20 RX ORDER — POLYETHYLENE GLYCOL 3350 17 G/17G
17 POWDER, FOR SOLUTION ORAL
Refills: 0 | Status: DISCONTINUED | OUTPATIENT
Start: 2020-08-20 | End: 2020-08-31

## 2020-08-20 RX ADMIN — ALBUTEROL 1 PUFF(S): 90 AEROSOL, METERED ORAL at 07:53

## 2020-08-20 RX ADMIN — ALBUTEROL 1 PUFF(S): 90 AEROSOL, METERED ORAL at 13:18

## 2020-08-20 RX ADMIN — DRONEDARONE 400 MILLIGRAM(S): 400 TABLET, FILM COATED ORAL at 06:14

## 2020-08-20 RX ADMIN — Medication 5 MILLIGRAM(S): at 21:40

## 2020-08-20 RX ADMIN — DRONEDARONE 400 MILLIGRAM(S): 400 TABLET, FILM COATED ORAL at 17:25

## 2020-08-20 RX ADMIN — Medication 81 MILLIGRAM(S): at 12:09

## 2020-08-20 RX ADMIN — Medication 25 MILLIGRAM(S): at 06:14

## 2020-08-20 RX ADMIN — ALBUTEROL 1 PUFF(S): 90 AEROSOL, METERED ORAL at 19:47

## 2020-08-20 RX ADMIN — CHLORHEXIDINE GLUCONATE 1 APPLICATION(S): 213 SOLUTION TOPICAL at 06:14

## 2020-08-20 RX ADMIN — BUDESONIDE AND FORMOTEROL FUMARATE DIHYDRATE 2 PUFF(S): 160; 4.5 AEROSOL RESPIRATORY (INHALATION) at 12:10

## 2020-08-20 RX ADMIN — ENOXAPARIN SODIUM 40 MILLIGRAM(S): 100 INJECTION SUBCUTANEOUS at 12:10

## 2020-08-20 RX ADMIN — SENNA PLUS 2 TABLET(S): 8.6 TABLET ORAL at 21:40

## 2020-08-20 RX ADMIN — POLYETHYLENE GLYCOL 3350 17 GRAM(S): 17 POWDER, FOR SOLUTION ORAL at 17:25

## 2020-08-20 RX ADMIN — Medication 88 MICROGRAM(S): at 06:15

## 2020-08-20 RX ADMIN — PANTOPRAZOLE SODIUM 40 MILLIGRAM(S): 20 TABLET, DELAYED RELEASE ORAL at 06:14

## 2020-08-20 RX ADMIN — ATORVASTATIN CALCIUM 40 MILLIGRAM(S): 80 TABLET, FILM COATED ORAL at 21:40

## 2020-08-20 NOTE — PROGRESS NOTE ADULT - SUBJECTIVE AND OBJECTIVE BOX
OVERNIGHT EVENTS: events noted, doing well    Vital Signs Last 24 Hrs  T(C): 36.6 (20 Aug 2020 05:53), Max: 37 (19 Aug 2020 14:38)  T(F): 97.8 (20 Aug 2020 05:53), Max: 98.6 (19 Aug 2020 14:38)  HR: 74 (20 Aug 2020 05:53) (72 - 79)  BP: 138/63 (20 Aug 2020 05:53) (100/53 - 138/63)  RR: 18 (20 Aug 2020 05:53) (18 - 18)  SpO2: 100% (19 Aug 2020 14:38) (100% - 100%)    PHYSICAL EXAMINATION:    GENERAL: The patient is awake and alert in no apparent distress.     HEENT: Head is normocephalic and atraumatic. Extraocular muscles are intact. Mucous membranes are moist.    NECK: Supple.    LUNGS: Clear to auscultation without wheezing    HEART: MANUEL 3.6    ABDOMEN: Soft, nontender, and nondistended.      EXTREMITIES: Without any cyanosis, clubbing, rash, lesions or edema.    NEUROLOGIC: Grossly intact.    SKIN: No ulceration or induration present.      LABS:                        7.9    6.50  )-----------( 217      ( 18 Aug 2020 07:57 )             25.9     08-18    141  |  101  |  28<H>  ----------------------------<  213<H>  5.3<H>   |  30  |  0.9    Ca    8.4<L>      18 Aug 2020 10:00  Mg     2.0         TPro  5.0<L>  /  Alb  3.2<L>  /  TBili  0.5  /  DBili  x   /  AST  24  /  ALT  10  /  AlkPhos  67  08-18      Urinalysis Basic - ( 18 Aug 2020 11:40 )    Color: Yellow / Appearance: Clear / S.020 / pH: x  Gluc: x / Ketone: Negative  / Bili: Negative / Urobili: <2 mg/dL   Blood: x / Protein: Trace / Nitrite: Negative   Leuk Esterase: Negative / RBC: x / WBC x   Sq Epi: x / Non Sq Epi: x / Bacteria: x                        20 @ 07:01  -  20 @ 07:00  --------------------------------------------------------  IN: 0 mL / OUT: 400 mL / NET: -400 mL        MICROBIOLOGY:      MEDICATIONS  (STANDING):  ALBUTerol    90 MICROgram(s) HFA Inhaler 1 Puff(s) Inhalation every 6 hours  aspirin  chewable 81 milliGRAM(s) Oral daily  atorvastatin 40 milliGRAM(s) Oral at bedtime  budesonide 160 MICROgram(s)/formoterol 4.5 MICROgram(s) Inhaler 2 Puff(s) Inhalation two times a day  chlorhexidine 4% Liquid 1 Application(s) Topical <User Schedule>  dronedarone 400 milliGRAM(s) Oral two times a day  enoxaparin Injectable 40 milliGRAM(s) SubCutaneous daily  levothyroxine 88 MICROGram(s) Oral daily  melatonin 5 milliGRAM(s) Oral at bedtime  metoprolol succinate ER 25 milliGRAM(s) Oral daily  pantoprazole    Tablet 40 milliGRAM(s) Oral before breakfast  senna 2 Tablet(s) Oral at bedtime  tiotropium 18 MICROgram(s) Capsule 1 Capsule(s) Inhalation daily    MEDICATIONS  (PRN):  albuterol/ipratropium for Nebulization 3 milliLiter(s) Nebulizer every 6 hours PRN Shortness of Breath and/or Wheezing  bisacodyl Suppository 10 milliGRAM(s) Rectal daily PRN If no bowel movement  ondansetron Injectable 4 milliGRAM(s) IV Push every 6 hours PRN Nausea and/or Vomiting  oxyCODONE    IR 5 milliGRAM(s) Oral every 6 hours PRN Severe Pain (7 - 10)      RADIOLOGY & ADDITIONAL STUDIES:

## 2020-08-20 NOTE — PROGRESS NOTE ADULT - ASSESSMENT
Rehab of multiple trauma c1 fx,  left HIP FT rib FX's  Patient requires 3 hrs daily of interdisciplinary inpatient rehab.    89 y/o female with hx of COPD on 3-4 L O2 nasal canula, hypothyroidism and new onset atrial fibriliation admitted to  for rehab of multitrauma after fall with injuries including a nondisplaced C1 fx, aged indeterminant T5 fx, right posterior 10-11 rib fractures, and left intertroch fx. She is WBAT for her LLE.    Level of function PTA: Independent with ambulation without assistive device and ADLs,    Rehab of multitrauma statr 3 hrs of acute interdisciplinary rehab  #Left hip IMN - WBAT LLE  #C1 fx - c-collar cleared by ortho, f/u outpatient  #T5 fx - f/u outpatient  #Right posterior 10-11 fx   resp- Incentive spirometer     -Pain control: Tylenol PRN, oxycodone prn    -GI/Bowel Mgmt -  senna, miralax prn    -Bladder management: not indicated     -Skin: left hip surgical wound incisions C/D/I  -No active issues at this time    -FEN   - Diet -  Regular  - Dysphagia  - None    Medical Comorbidities    paroxsymal Atrial fibrillation  -Eliquis discontinued, patient now only on ASA after discussion with patient and son regarding risks and benefits  -Metoprolol ER 25 POD  -Multaq 400 BID    #COPD  -2-4 L O2 NC  -Continue home meds    #Hypothyroidism  -Levothyroxine 88 mcg daily   osteoporosis by FX= add calcium = vid D check level    #HLD  Atorvastatin 40mg po qhs    Precautions / PROPHYLAXIS:      - Falls    - Ortho: Weight bearing status: WBAT LLE      - DVT prophylaxis: Lovenox  -Metoprolol ER 25 POD  -Multaq 400 BID    #COPD  -2-4 L O2 NC  -Continue home meds    #Hypothyroidism  -Levothyroxine 88 mcg daily   osteoporosis by FX= add calcium = vid D check level  #HLD  Atorvastatin 40mg po qhs    Precautions / PROPHYLAXIS:      - Falls    - Ortho: Weight bearing status: WBAT LLE      - DVT prophylaxis: Lovenox    Patient seen and discussed with my attending, Dr. Spears 89 y/o female with hx of COPD on 3-4 L O2 nasal canula, hypothyroidism and new onset atrial fibriliation admitted to  for rehab of multitrauma after fall with injuries including a nondisplaced C1 fx, aged indeterminant T5 fx, right posterior 10-11 rib fractures, and left intertroch fx. She is WBAT for her LLE. Continue acute rehab program.    Anemia- post op. Stable. Monitor     -Pain control: Tylenol PRN, oxycodone prn    -GI/Bowel Mgmt -  senna, miralax prn for constipation     -Skin: left hip surgical wound incisions C/D/I    -FEN   - Diet -  Regular    Medical Comorbidities    paroxsymal Atrial fibrillation  -Eliquis discontinued, patient now only on ASA after discussion with patient and son regarding risks and benefits  -Metoprolol ER 25 POD  -Multaq 400 BID    #COPD  -2-4 L O2 NC  -Continue home meds    #Hypothyroidism  -Levothyroxine 88 mcg daily   osteoporosis by FX= add calcium = vid D check level    #HLD  Atorvastatin 40mg po qhs    Precautions / PROPHYLAXIS:      - Falls    - Ortho: Weight bearing status: WBAT LLE      - DVT prophylaxis: Lovenox    #Hypothyroidism  -Levothyroxine 88 mcg daily    Presumed osteoporosis- add calcium = vit D check level    Precautions / PROPHYLAXIS:      - Falls    - Ortho: Weight bearing status: WBAT LLE      - DVT prophylaxis: Lovenox    Patient seen and discussed with my attending, Dr. Spears

## 2020-08-20 NOTE — PROGRESS NOTE ADULT - SUBJECTIVE AND OBJECTIVE BOX
Patient is a 89y old  Female who presents with a chief complaint of Rehab of Multitrauma, left hip fx orif, c1 fx j collar, rt rib fx (18 Aug 2020 07:39)      HPI:  89 y/o right hand dominant female with PMH COPD on 3-4L NC, hypothyroidism presenting to the ED s/p mechanical fall Per the patient she had gotten up to check on her albuterol treatment, started to sit back down on her recliner but missed, falling on her left side. She denies LOC, HT. She was unable to ambulate after the fall and complained of L hip pain. Found have multiple injuries including a nondisplaced C1 fx, aged indeterminant T5 fx, right posterior 10-11 rib fractures, and left intertroch fx. After clearance by both pulmonology and cardiology, she went to OR with orthopedics for Left hip IMN on . Post operatively her course was complicated with new onset atrial fibrillation and post operative anemia for which she was given 1 units pRBCs. Hgb over the past 48 hours has been stable at 8.2. For her atrial fibrillation cardiology was consulted and she was started on Eliquis, metoprolol and multaq and is currently in NSR. In regards to her nondisplaced C1 fx she was in a [place] [facility] which was subsequently cleared by neurosurgery. pt eval by physiatry deemed a good acute rehab candidate to improve function for safe . DC home CAN tolerate and benefit from 3 hrs  ofd acute rehab     Patient was evaluated by physiatry to be a good candidate for rehab and will be admitted to  to undergo intensive 3 hour/day rehabilitation (17 Aug 2020 11:27)      TODAY'S SUBJECTIVE & REVIEW OF SYMPTOMS     No acute overnight events. VSS. Patients BS wnl this morning. She denies any lightheadedness or dizziness      PHYSICAL EXAM    Vital Signs Last 24 Hrs  T(C): 36.6 (20 Aug 2020 05:53), Max: 37 (19 Aug 2020 14:38)  T(F): 97.8 (20 Aug 2020 05:53), Max: 98.6 (19 Aug 2020 14:38)  HR: 74 (20 Aug 2020 05:53) (72 - 79)  BP: 138/63 (20 Aug 2020 05:53) (100/53 - 138/63)  BP(mean): --  RR: 18 (20 Aug 2020 05:53) (18 - 18)  SpO2: 100% (19 Aug 2020 14:38) (100% - 100%)    Constitutional - NAD, Comfortable OOB to WC, says slept well, o2 via NC  Chest - CTAB  Cardiovascular - RRR  Abdomen - Soft, NTND  Extremities - No C/C/E, No calf tenderness   Neurologic Exam -                    Cognitive - Awake, Alert, AAO to self, place, date, year, situation     Communication - Fluent, No dysarthria  Motor     LEFT    UE: [X] WNL  	RIGHT UE:  [X] WNL   	LEFT    LE:  [X] other: Decrease strength with hip flexion and knee extension due to FX hip    RIGHT LE:  [X] WNLNo focal deficits   Sensory - Intact to LT  Reflexes - wnl/ symmetric  Psychiatric - Mood stable, Affect WNL    ALBUTerol    90 MICROgram(s) HFA Inhaler 1 Puff(s) Inhalation every 6 hours  albuterol/ipratropium for Nebulization 3 milliLiter(s) Nebulizer every 6 hours PRN  aspirin  chewable 81 milliGRAM(s) Oral daily  atorvastatin 40 milliGRAM(s) Oral at bedtime  bisacodyl Suppository 10 milliGRAM(s) Rectal daily PRN  budesonide 160 MICROgram(s)/formoterol 4.5 MICROgram(s) Inhaler 2 Puff(s) Inhalation two times a day  chlorhexidine 4% Liquid 1 Application(s) Topical <User Schedule>  dronedarone 400 milliGRAM(s) Oral two times a day  enoxaparin Injectable 40 milliGRAM(s) SubCutaneous daily  levothyroxine 88 MICROGram(s) Oral daily  melatonin 5 milliGRAM(s) Oral at bedtime  metoprolol succinate ER 25 milliGRAM(s) Oral daily  ondansetron Injectable 4 milliGRAM(s) IV Push every 6 hours PRN  oxyCODONE    IR 5 milliGRAM(s) Oral every 6 hours PRN  pantoprazole    Tablet 40 milliGRAM(s) Oral before breakfast  senna 2 Tablet(s) Oral at bedtime  tiotropium 18 MICROgram(s) Capsule 1 Capsule(s) Inhalation daily      RECENT LABS/IMAGING                        7.9    6.50  )-----------( 217      ( 18 Aug 2020 07:57 )             25.9     08-18    141  |  101  |  28<H>  ----------------------------<  213<H>  5.3<H>   |  30  |  0.9    Ca    8.4<L>      18 Aug 2020 10:00  Mg     2.0         TPro  5.0<L>  /  Alb  3.2<L>  /  TBili  0.5  /  DBili  x   /  AST  24  /  ALT  10  /  AlkPhos  67        Urinalysis Basic - ( 18 Aug 2020 11:40 )    Color: Yellow / Appearance: Clear / S.020 / pH: x  Gluc: x / Ketone: Negative  / Bili: Negative / Urobili: <2 mg/dL   Blood: x / Protein: Trace / Nitrite: Negative   Leuk Esterase: Negative / RBC: x / WBC x   Sq Epi: x / Non Sq Epi: x / Bacteria: x    Post operative anemia, stable Patient is a 89y old  Female who presents with a chief complaint of Rehab of Multitrauma, left hip fx orif, c1 fx j collar, rt rib fx (18 Aug 2020 07:39)      HPI:  89 y/o right hand dominant female with PMH COPD on 3-4L NC, hypothyroidism presenting to the ED s/p mechanical fall Per the patient she had gotten up to check on her albuterol treatment, started to sit back down on her recliner but missed, falling on her left side. She denies LOC, HT. She was unable to ambulate after the fall and complained of L hip pain. Found have multiple injuries including a nondisplaced C1 fx, aged indeterminant T5 fx, right posterior 10-11 rib fractures, and left intertroch fx. After clearance by both pulmonology and cardiology, she went to OR with orthopedics for Left hip IMN on . Post operatively her course was complicated with new onset atrial fibrillation and post operative anemia for which she was given 1 units pRBCs. Hgb over the past 48 hours has been stable at 8.2. For her atrial fibrillation cardiology was consulted and she was started on Eliquis, metoprolol and multaq and is currently in NSR. In regards to her nondisplaced C1 fx she was in a [place] J which was subsequently cleared by neurosurgery. pt eval by physiatry deemed a good acute rehab candidate to improve function for safe . DC home CAN tolerate and benefit from 3 hrs  ofd acute rehab     Patient was evaluated by physiatry to be a good candidate for rehab and will be admitted to  to undergo intensive 3 hour/day rehabilitation (17 Aug 2020 11:27)      TODAY'S SUBJECTIVE & REVIEW OF SYMPTOMS     No acute overnight events. VSS. Patients BS wnl this morning. She denies any lightheadedness or dizziness  ROS otherwise unchanged from previous note    PHYSICAL EXAM    Vital Signs Last 24 Hrs  T(C): 36.6 (20 Aug 2020 05:53), Max: 37 (19 Aug 2020 14:38)  T(F): 97.8 (20 Aug 2020 05:53), Max: 98.6 (19 Aug 2020 14:38)  HR: 74 (20 Aug 2020 05:53) (72 - 79)  BP: 138/63 (20 Aug 2020 05:53) (100/53 - 138/63)  BP(mean): --  RR: 18 (20 Aug 2020 05:53) (18 - 18)  SpO2: 100% (19 Aug 2020 14:38) (100% - 100%)    Constitutional - NAD, Comfortable OOB to WC, says slept well, o2 via NC  Chest - CTAB  Cardiovascular - RRR  Abdomen - Soft, NTND  Extremities - No C/C/E, No calf tenderness   Neurologic Exam -                    Cognitive - Awake, Alert, AAO to self, place, date, year, situation     Communication - Fluent, No dysarthria  Motor     LEFT    UE: [X] WNL  	RIGHT UE:  [X] WNL   	LEFT    LE:  [X] other: Decrease strength with hip flexion and knee extension due to FX hip    RIGHT LE:  [X] WNLNo focal deficits   Sensory - Intact to LT  Reflexes - wnl/ symmetric  Psychiatric - Mood stable, Affect WNL    ALBUTerol    90 MICROgram(s) HFA Inhaler 1 Puff(s) Inhalation every 6 hours  albuterol/ipratropium for Nebulization 3 milliLiter(s) Nebulizer every 6 hours PRN  aspirin  chewable 81 milliGRAM(s) Oral daily  atorvastatin 40 milliGRAM(s) Oral at bedtime  bisacodyl Suppository 10 milliGRAM(s) Rectal daily PRN  budesonide 160 MICROgram(s)/formoterol 4.5 MICROgram(s) Inhaler 2 Puff(s) Inhalation two times a day  chlorhexidine 4% Liquid 1 Application(s) Topical <User Schedule>  dronedarone 400 milliGRAM(s) Oral two times a day  enoxaparin Injectable 40 milliGRAM(s) SubCutaneous daily  levothyroxine 88 MICROGram(s) Oral daily  melatonin 5 milliGRAM(s) Oral at bedtime  metoprolol succinate ER 25 milliGRAM(s) Oral daily  ondansetron Injectable 4 milliGRAM(s) IV Push every 6 hours PRN  oxyCODONE    IR 5 milliGRAM(s) Oral every 6 hours PRN  pantoprazole    Tablet 40 milliGRAM(s) Oral before breakfast  senna 2 Tablet(s) Oral at bedtime  tiotropium 18 MICROgram(s) Capsule 1 Capsule(s) Inhalation daily      RECENT LABS/IMAGING                        7.9    6.50  )-----------( 217      ( 18 Aug 2020 07:57 )             25.9     08-18    141  |  101  |  28<H>  ----------------------------<  213<H>  5.3<H>   |  30  |  0.9    Ca    8.4<L>      18 Aug 2020 10:00  Mg     2.0         TPro  5.0<L>  /  Alb  3.2<L>  /  TBili  0.5  /  DBili  x   /  AST  24  /  ALT  10  /  AlkPhos  67        Urinalysis Basic - ( 18 Aug 2020 11:40 )    Color: Yellow / Appearance: Clear / S.020 / pH: x  Gluc: x / Ketone: Negative  / Bili: Negative / Urobili: <2 mg/dL   Blood: x / Protein: Trace / Nitrite: Negative   Leuk Esterase: Negative / RBC: x / WBC x   Sq Epi: x / Non Sq Epi: x / Bacteria: x    Post operative anemia, stable

## 2020-08-21 LAB
ANION GAP SERPL CALC-SCNC: 7 MMOL/L — SIGNIFICANT CHANGE UP (ref 7–14)
BASOPHILS # BLD AUTO: 0.01 K/UL — SIGNIFICANT CHANGE UP (ref 0–0.2)
BASOPHILS NFR BLD AUTO: 0.1 % — SIGNIFICANT CHANGE UP (ref 0–1)
BUN SERPL-MCNC: 23 MG/DL — HIGH (ref 10–20)
CALCIUM SERPL-MCNC: 8.7 MG/DL — SIGNIFICANT CHANGE UP (ref 8.5–10.1)
CHLORIDE SERPL-SCNC: 97 MMOL/L — LOW (ref 98–110)
CO2 SERPL-SCNC: 32 MMOL/L — SIGNIFICANT CHANGE UP (ref 17–32)
CREAT SERPL-MCNC: 0.9 MG/DL — SIGNIFICANT CHANGE UP (ref 0.7–1.5)
EOSINOPHIL # BLD AUTO: 0.46 K/UL — SIGNIFICANT CHANGE UP (ref 0–0.7)
EOSINOPHIL NFR BLD AUTO: 4.2 % — SIGNIFICANT CHANGE UP (ref 0–8)
GLUCOSE BLDC GLUCOMTR-MCNC: 135 MG/DL — HIGH (ref 70–99)
GLUCOSE BLDC GLUCOMTR-MCNC: 87 MG/DL — SIGNIFICANT CHANGE UP (ref 70–99)
GLUCOSE BLDC GLUCOMTR-MCNC: 95 MG/DL — SIGNIFICANT CHANGE UP (ref 70–99)
GLUCOSE SERPL-MCNC: 141 MG/DL — HIGH (ref 70–99)
HCT VFR BLD CALC: 26.7 % — LOW (ref 37–47)
HGB BLD-MCNC: 8.1 G/DL — LOW (ref 12–16)
IMM GRANULOCYTES NFR BLD AUTO: 0.6 % — HIGH (ref 0.1–0.3)
LYMPHOCYTES # BLD AUTO: 2.79 K/UL — SIGNIFICANT CHANGE UP (ref 1.2–3.4)
LYMPHOCYTES # BLD AUTO: 25.4 % — SIGNIFICANT CHANGE UP (ref 20.5–51.1)
MAGNESIUM SERPL-MCNC: 1.9 MG/DL — SIGNIFICANT CHANGE UP (ref 1.8–2.4)
MCHC RBC-ENTMCNC: 30.3 G/DL — LOW (ref 32–37)
MCHC RBC-ENTMCNC: 30.5 PG — SIGNIFICANT CHANGE UP (ref 27–31)
MCV RBC AUTO: 100.4 FL — HIGH (ref 81–99)
MONOCYTES # BLD AUTO: 0.77 K/UL — HIGH (ref 0.1–0.6)
MONOCYTES NFR BLD AUTO: 7 % — SIGNIFICANT CHANGE UP (ref 1.7–9.3)
NEUTROPHILS # BLD AUTO: 6.89 K/UL — HIGH (ref 1.4–6.5)
NEUTROPHILS NFR BLD AUTO: 62.7 % — SIGNIFICANT CHANGE UP (ref 42.2–75.2)
NRBC # BLD: 0 /100 WBCS — SIGNIFICANT CHANGE UP (ref 0–0)
PLATELET # BLD AUTO: 328 K/UL — SIGNIFICANT CHANGE UP (ref 130–400)
POTASSIUM SERPL-MCNC: 5.3 MMOL/L — HIGH (ref 3.5–5)
POTASSIUM SERPL-SCNC: 5.3 MMOL/L — HIGH (ref 3.5–5)
RBC # BLD: 2.66 M/UL — LOW (ref 4.2–5.4)
RBC # FLD: 15.4 % — HIGH (ref 11.5–14.5)
SODIUM SERPL-SCNC: 136 MMOL/L — SIGNIFICANT CHANGE UP (ref 135–146)
WBC # BLD: 10.99 K/UL — HIGH (ref 4.8–10.8)
WBC # FLD AUTO: 10.99 K/UL — HIGH (ref 4.8–10.8)

## 2020-08-21 RX ORDER — ONDANSETRON 8 MG/1
4 TABLET, FILM COATED ORAL EVERY 8 HOURS
Refills: 0 | Status: DISCONTINUED | OUTPATIENT
Start: 2020-08-21 | End: 2020-08-31

## 2020-08-21 RX ORDER — ACETAMINOPHEN 500 MG
650 TABLET ORAL EVERY 6 HOURS
Refills: 0 | Status: DISCONTINUED | OUTPATIENT
Start: 2020-08-21 | End: 2020-08-28

## 2020-08-21 RX ADMIN — DRONEDARONE 400 MILLIGRAM(S): 400 TABLET, FILM COATED ORAL at 06:19

## 2020-08-21 RX ADMIN — ALBUTEROL 1 PUFF(S): 90 AEROSOL, METERED ORAL at 13:02

## 2020-08-21 RX ADMIN — ATORVASTATIN CALCIUM 40 MILLIGRAM(S): 80 TABLET, FILM COATED ORAL at 21:44

## 2020-08-21 RX ADMIN — Medication 81 MILLIGRAM(S): at 13:13

## 2020-08-21 RX ADMIN — DRONEDARONE 400 MILLIGRAM(S): 400 TABLET, FILM COATED ORAL at 17:39

## 2020-08-21 RX ADMIN — ALBUTEROL 1 PUFF(S): 90 AEROSOL, METERED ORAL at 07:23

## 2020-08-21 RX ADMIN — ENOXAPARIN SODIUM 40 MILLIGRAM(S): 100 INJECTION SUBCUTANEOUS at 13:14

## 2020-08-21 RX ADMIN — ONDANSETRON 4 MILLIGRAM(S): 8 TABLET, FILM COATED ORAL at 08:52

## 2020-08-21 RX ADMIN — ALBUTEROL 1 PUFF(S): 90 AEROSOL, METERED ORAL at 19:52

## 2020-08-21 RX ADMIN — OXYCODONE HYDROCHLORIDE 5 MILLIGRAM(S): 5 TABLET ORAL at 04:14

## 2020-08-21 RX ADMIN — PANTOPRAZOLE SODIUM 40 MILLIGRAM(S): 20 TABLET, DELAYED RELEASE ORAL at 06:19

## 2020-08-21 RX ADMIN — Medication 88 MICROGRAM(S): at 06:19

## 2020-08-21 RX ADMIN — Medication 5 MILLIGRAM(S): at 21:44

## 2020-08-21 RX ADMIN — POLYETHYLENE GLYCOL 3350 17 GRAM(S): 17 POWDER, FOR SOLUTION ORAL at 17:39

## 2020-08-21 RX ADMIN — BUDESONIDE AND FORMOTEROL FUMARATE DIHYDRATE 2 PUFF(S): 160; 4.5 AEROSOL RESPIRATORY (INHALATION) at 07:23

## 2020-08-21 RX ADMIN — BUDESONIDE AND FORMOTEROL FUMARATE DIHYDRATE 2 PUFF(S): 160; 4.5 AEROSOL RESPIRATORY (INHALATION) at 21:43

## 2020-08-21 RX ADMIN — SENNA PLUS 2 TABLET(S): 8.6 TABLET ORAL at 21:43

## 2020-08-21 RX ADMIN — CHLORHEXIDINE GLUCONATE 1 APPLICATION(S): 213 SOLUTION TOPICAL at 06:16

## 2020-08-21 RX ADMIN — Medication 25 MILLIGRAM(S): at 06:19

## 2020-08-21 NOTE — PROGRESS NOTE ADULT - SUBJECTIVE AND OBJECTIVE BOX
Patient is a 89y old  Female who presents with a chief complaint of Rehab of Multitrauma, left hip fx orif, c1 fx j collar, rt rib fx (18 Aug 2020 07:39)      HPI:  89 y/o right hand dominant female with PMH COPD on 3-4L NC, hypothyroidism presenting to the ED s/p mechanical fall Per the patient she had gotten up to check on her albuterol treatment, started to sit back down on her recliner but missed, falling on her left side. She denies LOC, HT. She was unable to ambulate after the fall and complained of L hip pain. Found have multiple injuries including a nondisplaced C1 fx, aged indeterminant T5 fx, right posterior 10-11 rib fractures, and left intertroch fx. After clearance by both pulmonology and cardiology, she went to OR with orthopedics for Left hip IMN on 813/2020. Post operatively her course was complicated with new onset atrial fibrillation and post operative anemia for which she was given 1 units pRBCs. Hgb over the past 48 hours has been stable at 8.2. For her atrial fibrillation cardiology was consulted and she was started on Eliquis, metoprolol and multaq and is currently in NSR. In regards to her nondisplaced C1 fx she was in a [place] J which was subsequently cleared by neurosurgery. pt eval by physiatry deemed a good acute rehab candidate to improve function for safe . DC home CAN tolerate and benefit from 3 hrs  ofd acute rehab     Patient was evaluated by physiatry to be a good candidate for rehab and will be admitted to  to undergo intensive 3 hour/day rehabilitation (17 Aug 2020 11:27)      TODAY'S SUBJECTIVE & REVIEW OF SYMPTOMS     No acute overnight events. VSS. c/o slight cough, reports has had this cough for mulitple years. denies fever, chills, or chest pain  ROS otherwise unchanged from previous note      PHYSICAL EXAM    Vital Signs Last 24 Hrs  T(C): 35.9 (21 Aug 2020 05:53), Max: 37.1 (20 Aug 2020 14:20)  T(F): 96.7 (21 Aug 2020 05:53), Max: 98.7 (20 Aug 2020 14:20)  HR: 77 (21 Aug 2020 05:53) (77 - 82)  BP: 116/57 (21 Aug 2020 05:53) (116/57 - 125/58)  BP(mean): --  RR: 18 (21 Aug 2020 05:53) (16 - 18)  SpO2: --    Constitutional - NAD, Comfortable OOB to WC, says slept well, o2 via NC  Chest - CTAB  Cardiovascular - RRR  Abdomen - Soft, NTND  Extremities - No C/C/E, No calf tenderness   Neurologic Exam -                    Cognitive - Awake, Alert, AAO to self, place, date, year, situation     Communication - Fluent, No dysarthria  Motor     LEFT    UE: [X] WNL  	RIGHT UE:  [X] WNL   	LEFT    LE:  [X] other: Decrease strength with hip flexion and knee extension due to FX hip    RIGHT LE:  [X] WNLNo focal deficits   Sensory - Intact to LT  Reflexes - wnl/ symmetric  Psychiatric - Mood stable, Affect WNL      acetaminophen   Tablet .. 650 milliGRAM(s) Oral every 6 hours PRN  ALBUTerol    90 MICROgram(s) HFA Inhaler 1 Puff(s) Inhalation every 6 hours  albuterol/ipratropium for Nebulization 3 milliLiter(s) Nebulizer every 6 hours PRN  aspirin  chewable 81 milliGRAM(s) Oral daily  atorvastatin 40 milliGRAM(s) Oral at bedtime  bisacodyl Suppository 10 milliGRAM(s) Rectal daily PRN  budesonide 160 MICROgram(s)/formoterol 4.5 MICROgram(s) Inhaler 2 Puff(s) Inhalation two times a day  chlorhexidine 4% Liquid 1 Application(s) Topical <User Schedule>  dronedarone 400 milliGRAM(s) Oral two times a day  enoxaparin Injectable 40 milliGRAM(s) SubCutaneous daily  levothyroxine 88 MICROGram(s) Oral daily  melatonin 5 milliGRAM(s) Oral at bedtime  metoprolol succinate ER 25 milliGRAM(s) Oral daily  ondansetron   Disintegrating Tablet 4 milliGRAM(s) Oral every 8 hours PRN  oxyCODONE    IR 5 milliGRAM(s) Oral every 6 hours PRN  pantoprazole    Tablet 40 milliGRAM(s) Oral before breakfast  polyethylene glycol 3350 17 Gram(s) Oral <User Schedule>  senna 2 Tablet(s) Oral at bedtime  tiotropium 18 MICROgram(s) Capsule 1 Capsule(s) Inhalation daily      RECENT LABS/IMAGING                        8.1    10.99 )-----------( 328      ( 21 Aug 2020 08:06 )             26.7     08-21    136  |  97<L>  |  23<H>  ----------------------------<  141<H>  5.3<H>   |  32  |  0.9    Ca    8.7      21 Aug 2020 08:06  Mg     1.9     08-21 Patient is a 89y old  Female who presents with a chief complaint of Rehab of Multitrauma, left hip fx orif, c1 fx j collar, rt rib fx (18 Aug 2020 07:39)      HPI:  89 y/o right hand dominant female with PMH COPD on 3-4L NC, hypothyroidism presenting to the ED s/p mechanical fall Per the patient she had gotten up to check on her albuterol treatment, started to sit back down on her recliner but missed, falling on her left side. She denies LOC, HT. She was unable to ambulate after the fall and complained of L hip pain. Found have multiple injuries including a nondisplaced C1 fx, aged indeterminant T5 fx, right posterior 10-11 rib fractures, and left intertroch fx. After clearance by both pulmonology and cardiology, she went to OR with orthopedics for Left hip IMN on 813/2020. Post operatively her course was complicated with new onset atrial fibrillation and post operative anemia for which she was given 1 units pRBCs. Hgb over the past 48 hours has been stable at 8.2. For her atrial fibrillation cardiology was consulted and she was started on Eliquis, metoprolol and multaq and is currently in NSR. In regards to her nondisplaced C1 fx she was in a [place] J which was subsequently cleared by neurosurgery. pt eval by physiatry deemed a good acute rehab candidate to improve function for safe . DC home CAN tolerate and benefit from 3 hrs  ofd acute rehab     Patient was evaluated by physiatry to be a good candidate for rehab and will be admitted to  to undergo intensive 3 hour/day rehabilitation (17 Aug 2020 11:27)    pulmonary noted  TODAY'S SUBJECTIVE & REVIEW OF SYMPTOMS     No acute overnight events. VSS. c/o slight cough, reports has had this cough for mulitple years. denies fever, chills, or chest pain  ROS otherwise unchanged from previous note, less pain      PHYSICAL EXAM    Vital Signs Last 24 Hrs=avss  T(C): 35.9 (21 Aug 2020 05:53), Max: 37.1 (20 Aug 2020 14:20)  T(F): 96.7 (21 Aug 2020 05:53), Max: 98.7 (20 Aug 2020 14:20)  HR: 77 (21 Aug 2020 05:53) (77 - 82)  BP: 116/57 (21 Aug 2020 05:53) (116/57 - 125/58)  BP(mean): --  RR: 18 (21 Aug 2020 05:53) (16 - 18)  SpO2: --    Constitutional - NAD, Comfortable OOB to WC, says slept well, o2 via NC  Chest - CTAB  Cardiovascular - RRR  Abdomen - Soft, NTND  Extremities - No C/C/E, No calf tenderness   Neurologic Exam -                    Cognitive - Awake, Alert, AAO to self, place, date, year, situation     Communication - Fluent, No dysarthria  Motor     LEFT    UE: [X] WNL  	RIGHT UE:  [X] WNL   	LEFT    LE:  [X] other: Decrease strength with hip flexion and knee extension due to FX hip    RIGHT LE:  [X] WNLNo focal deficits   Sensory - Intact to LT  Reflexes - wnl/ symmetric  Psychiatric - Mood stable, Affect WNL      acetaminophen   Tablet .. 650 milliGRAM(s) Oral every 6 hours PRN  ALBUTerol    90 MICROgram(s) HFA Inhaler 1 Puff(s) Inhalation every 6 hours  albuterol/ipratropium for Nebulization 3 milliLiter(s) Nebulizer every 6 hours PRN  aspirin  chewable 81 milliGRAM(s) Oral daily  atorvastatin 40 milliGRAM(s) Oral at bedtime  bisacodyl Suppository 10 milliGRAM(s) Rectal daily PRN  budesonide 160 MICROgram(s)/formoterol 4.5 MICROgram(s) Inhaler 2 Puff(s) Inhalation two times a day  chlorhexidine 4% Liquid 1 Application(s) Topical <User Schedule>  dronedarone 400 milliGRAM(s) Oral two times a day  enoxaparin Injectable 40 milliGRAM(s) SubCutaneous daily  levothyroxine 88 MICROGram(s) Oral daily  melatonin 5 milliGRAM(s) Oral at bedtime  metoprolol succinate ER 25 milliGRAM(s) Oral daily  ondansetron   Disintegrating Tablet 4 milliGRAM(s) Oral every 8 hours PRN  oxyCODONE    IR 5 milliGRAM(s) Oral every 6 hours PRN  pantoprazole    Tablet 40 milliGRAM(s) Oral before breakfast  polyethylene glycol 3350 17 Gram(s) Oral <User Schedule>  senna 2 Tablet(s) Oral at bedtime  tiotropium 18 MICROgram(s) Capsule 1 Capsule(s) Inhalation daily      RECENT LABS/IMAGING                        8.1    10.99 )-----------( 328      ( 21 Aug 2020 08:06 )             26.7     08-21    136  |  97<L>  |  23<H>  ----------------------------<  141<H>  5.3<H>   |  32  |  0.9    Ca    8.7      21 Aug 2020 08:06  Mg     1.9     08-21    anemia

## 2020-08-21 NOTE — CHART NOTE - NSCHARTNOTEFT_GEN_A_CORE
The patient is a 89 yo F with severe COPD, on home O2 2 to 4LPM, also on nebulizers and inhalers, hypothyroid, who fell at home and sustained multi-trauma:  fracture left hip s/p ORIF 8/13/2020  fracture right posterior ribs #10 and 11  and T5 compression fx of undetermined age  She is WBAT LLE.  The patient developed post-op new onset rapid a fib, and is now on metoprolol and Multaq. Eliquis 2.5mg po q12h was also started, but her son does not want her to take this medication, so it was stopped and  she is now taking ASA 81mg po daily and is on Lovenox for DVT prophylaxis.    She vomited x1 this AM after breakfast.  She said she was nauseous before she ate, has had nausea for the past 2 or 3 days.  She's been constipated, on senna q hs, had a small bm yesterday. MiraLax was added daily with dinner on 8/20/2020.  She also took oxycodone at 4AM today on an empty stomach, so there are a few reasons why she may feel nausea.  She took zofran 4mg ODT with relief, and she was able to eat her lunch.  She has a chronic cough, no changes, and says her breathing is good, denies SOB.    MEDICATIONS  (STANDING):  ALBUTerol    90 MICROgram(s) HFA Inhaler 1 Puff(s) Inhalation every 6 hours  aspirin  chewable 81 milliGRAM(s) Oral daily  atorvastatin 40 milliGRAM(s) Oral at bedtime  budesonide 160 MICROgram(s)/formoterol 4.5 MICROgram(s) Inhaler 2 Puff(s) Inhalation two times a day  chlorhexidine 4% Liquid 1 Application(s) Topical <User Schedule>  dronedarone 400 milliGRAM(s) Oral two times a day  enoxaparin Injectable 40 milliGRAM(s) SubCutaneous daily  levothyroxine 88 MICROGram(s) Oral daily  melatonin 5 milliGRAM(s) Oral at bedtime  metoprolol succinate ER 25 milliGRAM(s) Oral daily  pantoprazole    Tablet 40 milliGRAM(s) Oral before breakfast  polyethylene glycol 3350 17 Gram(s) Oral <User Schedule>  senna 2 Tablet(s) Oral at bedtime  tiotropium 18 MICROgram(s) Capsule 1 Capsule(s) Inhalation daily    MEDICATIONS  (PRN):  acetaminophen   Tablet .. 650 milliGRAM(s) Oral every 6 hours PRN Temp greater or equal to 38C (100.4F), Mild Pain (1 - 3), Moderate Pain (4 - 6)  albuterol/ipratropium for Nebulization 3 milliLiter(s) Nebulizer every 6 hours PRN Shortness of Breath and/or Wheezing  bisacodyl Suppository 10 milliGRAM(s) Rectal daily PRN If no bowel movement  ondansetron   Disintegrating Tablet 4 milliGRAM(s) Oral every 8 hours PRN Nausea and/or Vomiting  oxyCODONE    IR 5 milliGRAM(s) Oral every 6 hours PRN Severe Pain (7 - 10)    Vital Signs Last 24 Hrs  T(C): 35.9 (21 Aug 2020 05:53), Max: 37.1 (20 Aug 2020 14:20)  T(F): 96.7 (21 Aug 2020 05:53), Max: 98.7 (20 Aug 2020 14:20)  HR: 77 (21 Aug 2020 05:53) (77 - 82)  BP: 116/57 (21 Aug 2020 05:53) (116/57 - 125/58)  BP(mean): --  RR: 18 (21 Aug 2020 05:53) (16 - 18)  SpO2: --    PE--the patient is alert, occasionally coughs and sounds congested but in NAD  Lungs--coarse rales spencer mid and lower lung fields posteriorly  Heart--RR  Abdomen--soft, bs+, non-tender  Exremities--no CCE      LABS:                8.1    10.99 )-----------( 328      ( 21 Aug 2020 08:06 )             26.7     08-21    136  |  97<L>  |  23<H>  ----------------------------<  141<H>  5.3<H>   |  32  |  0.9    Ca    8.7      21 Aug 2020 08:06  Mg     1.9     08-21    Labs are stable today    Assessment/Plans:    -- Multi-trauma:       - continue Rehab program       - pain control       - WBAT LLE     -- severe COPD:      - pulmonary follow up appreciated      - continue symbicort, prn nebs, O2, monitor pulse ox, VS, labs      - incentive spirometer        -- new onset PAF:      - continue Toprol XL, Multaq, ASA      - monitor labs, VS     -- hypothyroid:      - continue synthroid      - check TFT's with next labs on 8/24     -- anemia:      - work-up ordered with Monday's labs 8/24         --GI-nausea/constipation:     - Zofran ODT 4mg q8h prn     - bowel regimen The patient is a 89 yo F with severe COPD, on home O2 2 to 4LPM, also on nebulizers and inhalers, hypothyroid, who came to ED on 8/12/2020 because she fell at home and sustained multi-trauma:  fracture left hip s/p ORIF 8/13/2020--received IV solumedrol pre-op and post-op, was also put on Bipap post-op  fracture right posterior ribs #10 and 11  and T5 compression fx of undetermined age  She is WBAT LLE.  The patient developed post-op new onset rapid a fib, and is now on metoprolol succinate and Multaq. Eliquis 2.5mg po q12h was also started, but her son does not want her to take this medication, so it was stopped and she is now taking ASA 81mg po daily and is on Lovenox for DVT prophylaxis.    She was admitted to Rehab medicine service on 8/17/2020.  COVID swab was NEGATIVE on 8/12/2020.    She vomited x1 this AM after breakfast.  She said she was nauseous before she ate, has had nausea for the past 2 or 3 days.  She's been constipated, on senna q hs, had a small bm yesterday. MiraLax was added daily with dinner on 8/20/2020.  She also took oxycodone at 4AM today on an empty stomach, so there are a few reasons why she may feel nausea.  She took zofran 4mg ODT with relief, and she was able to eat her lunch.  She has a chronic cough, no changes, and says her breathing is good, denies SOB.    MEDICATIONS  (STANDING):  ALBUTerol    90 MICROgram(s) HFA Inhaler 1 Puff(s) Inhalation every 6 hours  aspirin  chewable 81 milliGRAM(s) Oral daily  atorvastatin 40 milliGRAM(s) Oral at bedtime  budesonide 160 MICROgram(s)/formoterol 4.5 MICROgram(s) Inhaler 2 Puff(s) Inhalation two times a day  chlorhexidine 4% Liquid 1 Application(s) Topical <User Schedule>  dronedarone 400 milliGRAM(s) Oral two times a day  enoxaparin Injectable 40 milliGRAM(s) SubCutaneous daily  levothyroxine 88 MICROGram(s) Oral daily  melatonin 5 milliGRAM(s) Oral at bedtime  metoprolol succinate ER 25 milliGRAM(s) Oral daily  pantoprazole    Tablet 40 milliGRAM(s) Oral before breakfast  polyethylene glycol 3350 17 Gram(s) Oral <User Schedule>  senna 2 Tablet(s) Oral at bedtime  tiotropium 18 MICROgram(s) Capsule 1 Capsule(s) Inhalation daily    MEDICATIONS  (PRN):  acetaminophen   Tablet .. 650 milliGRAM(s) Oral every 6 hours PRN Temp greater or equal to 38C (100.4F), Mild Pain (1 - 3), Moderate Pain (4 - 6)  albuterol/ipratropium for Nebulization 3 milliLiter(s) Nebulizer every 6 hours PRN Shortness of Breath and/or Wheezing  bisacodyl Suppository 10 milliGRAM(s) Rectal daily PRN If no bowel movement  ondansetron   Disintegrating Tablet 4 milliGRAM(s) Oral every 8 hours PRN Nausea and/or Vomiting  oxyCODONE    IR 5 milliGRAM(s) Oral every 6 hours PRN Severe Pain (7 - 10)    Vital Signs Last 24 Hrs  T(C): 35.9 (21 Aug 2020 05:53), Max: 37.1 (20 Aug 2020 14:20)  T(F): 96.7 (21 Aug 2020 05:53), Max: 98.7 (20 Aug 2020 14:20)  HR: 77 (21 Aug 2020 05:53) (77 - 82)  BP: 116/57 (21 Aug 2020 05:53) (116/57 - 125/58)  BP(mean): --  RR: 18 (21 Aug 2020 05:53) (16 - 18)  SpO2: --    PE--the patient is alert, occasionally coughs and sounds congested but in NAD  Lungs--coarse rales spencer mid and lower lung fields posteriorly  Heart--RR  Abdomen--soft, bs+, non-tender  Exremities--no CCE      LABS:                8.1    10.99 )-----------( 328      ( 21 Aug 2020 08:06 )             26.7     08-21    136  |  97<L>  |  23<H>  ----------------------------<  141<H>  5.3<H>   |  32  |  0.9    Ca    8.7      21 Aug 2020 08:06  Mg     1.9     08-21    Labs are stable today    Assessment/Plans:    -- Multi-trauma:       - continue Rehab program       - pain control       - WBAT LLE     -- severe COPD:      - pulmonary follow up appreciated      - continue symbicort, prn nebs, O2, monitor pulse ox, VS, labs      - incentive spirometer        -- new onset PAF:      - continue Toprol XL, Multaq, ASA      - monitor labs, VS      - Cardiology follow up out-patient, sooner if there are any acute changes     -- hypothyroid:      - continue synthroid      - check TFT's with next labs on 8/24     -- anemia:      - work-up ordered with Monday's labs 8/24         --GI-nausea/constipation:     - Zofran ODT 4mg q8h prn     - bowel regimen     - Protonix for GI prophylaxis

## 2020-08-21 NOTE — PROGRESS NOTE ADULT - ASSESSMENT
89 y/o female with hx of COPD on 3-4 L O2 nasal canula, hypothyroidism and new onset atrial fibrillation admitted to  for rehab of multitrauma after fall with injuries including a nondisplaced C1 fx, aged indeterminant T5 fx, right posterior 10-11 rib fractures, and left intertroch fx. She is WBAT for her LLE. .    Rehab of multiple trauma c1 fx,  left HIP FT rib FX's    Patient requires 3 hrs daily of interdisciplinary inpatient rehab.     -Pain control: Tylenol PRN, oxycodone prn    -GI/Bowel Mgmt -  senna, miralax prn for constipation     -Skin: left hip surgical wound incisions C/D/I    -FEN   - Diet -  Regular    Medical Comorbidities    #paroxsymal Atrial fibrillation  -Eliquis discontinued, patient now only on ASA after discussion with patient and son regarding risks and benefits  -Metoprolol ER 25 POD  -Multaq 400 BID    #COPD  -2-4 L O2 NC  -Continue home meds    #Hypothyroidism  -Levothyroxine 88 mcg daily   osteoporosis by FX= add calcium = vid D check level    #HLD  Atorvastatin 40mg po qhs    #Hypothyroidism  -Levothyroxine 88 mcg daily    #Presumed osteoporosis- add calcium = vit D check level    #Post op anemia  - stable, hgb 8.1  - will continue to monitor    Precautions / PROPHYLAXIS:      - Falls    - Ortho: Weight bearing status: WBAT LLE      - DVT prophylaxis: Lovenox    Precautions / PROPHYLAXIS:      - Falls    - Ortho: Weight bearing status: WBAT LLE      - DVT prophylaxis: Lovenox    Patient seen and discussed with my attending, Dr. Min 89 y/o female with hx of COPD on 3-4 L O2 nasal canula, hypothyroidism and new onset atrial fibrillation admitted to  for rehab of multitrauma after fall with injuries including a nondisplaced C1 fx, aged indeterminant T5 fx, right posterior 10-11 rib fractures, and left intertroch fx. She is WBAT for her LLE. .    Rehab of multiple trauma c1 fx,  left HIP FT rib FX's    Patient requires 3 hrs daily of interdisciplinary inpatient rehab.     -Pain control: Tylenol PRN, oxycodone prn    -GI/Bowel Mgmt -  senna, miralax prn for constipation     -Skin: left hip surgical wound incisions C/D/I    -FEN   - Diet -  Regular    Medical Comorbidities    #paroxsymal Atrial fibrillation  -Eliquis discontinued, patient now only on ASA after discussion with patient and son regarding risks and benefits  -Metoprolol ER 25 POD  -Multaq 400 BID    #COPD  -2-4 L O2 NC  -Continue home meds    #Hypothyroidism  -Levothyroxine 88 mcg daily   osteoporosis by FX= add calcium = vid D check level    #HLD  Atorvastatin 40mg po qhs    #Hypothyroidism  -Levothyroxine 88 mcg daily    #Presumed osteoporosis- add calcium = vit D check level    #Post op anemia  - stable, hgb 8.1  - will continue to monitor    Precautions / PROPHYLAXIS:      - Falls    - Ortho: Weight bearing status: WBAT LLE      - DVT prophylaxis: Lovenox          -  Patient seen and discussed with my attending, Dr. Min

## 2020-08-21 NOTE — PROGRESS NOTE ADULT - ASSESSMENT
Impression:    Severe COPD on home O2   Left comminuted intertrochanteric fracture sp fall s/p sx  Right posterior 10-11th rib fx  A Fib  N ? related to pain killer    Recommendations:    Incentive spirometry  cardio f/up  Neb as needed  SYMBICORT/ spiriva  DVT prophylaxis  ambulate  recall as needed

## 2020-08-21 NOTE — PROGRESS NOTE ADULT - SUBJECTIVE AND OBJECTIVE BOX
OVERNIGHT EVENTS: events noted, s/p N, occ cough    Vital Signs Last 24 Hrs  T(C): 35.9 (21 Aug 2020 05:53), Max: 37.1 (20 Aug 2020 14:20)  T(F): 96.7 (21 Aug 2020 05:53), Max: 98.7 (20 Aug 2020 14:20)  HR: 77 (21 Aug 2020 05:53) (77 - 82)  BP: 116/57 (21 Aug 2020 05:53) (116/57 - 125/58)  RR: 18 (21 Aug 2020 05:53) (16 - 18)  SpO2: 94%    PHYSICAL EXAMINATION:    GENERAL: The patient is awake and alert in no apparent distress.     HEENT: Head is normocephalic and atraumatic. Extraocular muscles are intact. Mucous membranes are moist.    NECK: Supple.    LUNGS: dec bs both bases    HEART: Regular rate and rhythm without murmur.    ABDOMEN: Soft, nontender, and nondistended.      EXTREMITIES: Without any cyanosis, clubbing, rash, lesions or edema.    NEUROLOGIC: Grossly intact.    SKIN: No ulceration or induration present.      LABS:                                08-20-20 @ 07:01  -  08-21-20 @ 07:00  --------------------------------------------------------  IN: 600 mL / OUT: 250 mL / NET: 350 mL        MICROBIOLOGY:      MEDICATIONS  (STANDING):  ALBUTerol    90 MICROgram(s) HFA Inhaler 1 Puff(s) Inhalation every 6 hours  aspirin  chewable 81 milliGRAM(s) Oral daily  atorvastatin 40 milliGRAM(s) Oral at bedtime  budesonide 160 MICROgram(s)/formoterol 4.5 MICROgram(s) Inhaler 2 Puff(s) Inhalation two times a day  chlorhexidine 4% Liquid 1 Application(s) Topical <User Schedule>  dronedarone 400 milliGRAM(s) Oral two times a day  enoxaparin Injectable 40 milliGRAM(s) SubCutaneous daily  levothyroxine 88 MICROGram(s) Oral daily  melatonin 5 milliGRAM(s) Oral at bedtime  metoprolol succinate ER 25 milliGRAM(s) Oral daily  pantoprazole    Tablet 40 milliGRAM(s) Oral before breakfast  polyethylene glycol 3350 17 Gram(s) Oral <User Schedule>  senna 2 Tablet(s) Oral at bedtime  tiotropium 18 MICROgram(s) Capsule 1 Capsule(s) Inhalation daily    MEDICATIONS  (PRN):  albuterol/ipratropium for Nebulization 3 milliLiter(s) Nebulizer every 6 hours PRN Shortness of Breath and/or Wheezing  bisacodyl Suppository 10 milliGRAM(s) Rectal daily PRN If no bowel movement  ondansetron Injectable 4 milliGRAM(s) IV Push every 6 hours PRN Nausea and/or Vomiting  oxyCODONE    IR 5 milliGRAM(s) Oral every 6 hours PRN Severe Pain (7 - 10)      RADIOLOGY & ADDITIONAL STUDIES:

## 2020-08-21 NOTE — CHART NOTE - NSCHARTNOTEFT_GEN_A_CORE
Registered Dietitian Follow-Up     Patient Profile Reviewed                           Yes [x]   No []     Nutrition History Previously Obtained        Yes [x]  No []       Pertinent Subjective Information: Pt reports she does not like hospital food, eats <50% of her meal tray. Drinks 1 Ensure Enlive per day. Expressed concern for her elevated blood glucose when drinking supplement, re-assured pt her POCT BG have been WNL x1-2 days. Pt was receptive, demonstrated understanding. Pt likes the soups. Ordered for prosourc gelatin but states she has never received on meal tray, CA made aware. Discussed with pt menu ordering protocol, discussed the "always available" section on the menu.      Pertinent Medical Interventions: Rehab of Multitrauma, left hip fx orif, c1 fx j collar, rt rib fx        Diet order: dysphagia 3 soft nectar      Anthropometrics:  - Ht.   - Wt.   - %wt change  - BMI  - IBW     Pertinent Lab Data: (8/21) H/H 8.1/26.7 (8/18) K 5.3, BUN 28, , eGFR 56  CAPILLARY BLOOD GLUCOSE  POCT Blood Glucose.: 95 mg/dL (21 Aug 2020 07:14)  POCT Blood Glucose.: 114 mg/dL (20 Aug 2020 16:07)  POCT Blood Glucose.: 97 mg/dL (20 Aug 2020 11:49)       Pertinent Meds: lovenox, aspirin, miralax, lipitor, dulcolax, synthroid, protonix, senna      Physical Findings:  - Appearance: Alert and oriented. Hard of haring, has hearing aid. No edema noted per RN flow sheets  - GI function: States she feels nauseous every morning. Vomited this morning. Does not have anti-emetic prescribed. Will discuss with RN. No c/o diarrhea but notes she has not had a BM in 1-2 days, on bowel regimen. RN aware.   - Tubes: N/A   - Oral/Mouth cavity: No chewing/swallowing difficulties reported by staff   - Skin: WDL     Nutrition Requirements  Weight Used:      Estimated Energy Needs    Continue []  Adjust []  Adjusted Energy Recommendations:   kcal/day        Estimated Protein Needs    Continue []  Adjust []  Adjusted Protein Recommendations:   gm/day        Estimated Fluid Needs        Continue []  Adjust []  Adjusted Fluid Recommendations:   mL/day     Nutrient Intake: <50% per pt         [x] Previous Nutrition Diagnosis: inadequate oral intake             [x] Ongoing          [] Resolved       Nutrition Intervention: meals and snacks, medical food supplements      Goal/Expected Outcome: Pt to meet greater than 50% of estimated needs within 4 days      Indicator/Monitoring: energy intake, body comp, NFPF    Recommendations:  1. Continue current diet order as tolerated  2. Continue to provide Ensure Enlive BID  3. Continue to provide prosource gelatin once daily   4. Encourage po intake during meal time Registered Dietitian Follow-Up     Patient Profile Reviewed                           Yes [x]   No []     Nutrition History Previously Obtained        Yes [x]  No []       Pertinent Subjective Information: Pt reports she does not like hospital food, eats <50% of her meal tray. Drinks 1 Ensure Enlive per day. Expressed concern for her elevated blood glucose when drinking supplement, re-assured pt her POCT BG have been WNL x1-2 days. Pt was receptive, demonstrated understanding. Pt likes the soups. Ordered for prosource gelatin but states she has never received on meal tray, CA made aware. Discussed with pt menu ordering protocol, discussed the "always available" section on the menu.     Per PA note, pt was given Zofran and tolerated having her lunch today.      Pertinent Medical Interventions: Rehab of Multitrauma, left hip fx orif, c1 fx j collar, rt rib fx        Diet order: dysphagia 3 soft nectar      Anthropometrics:  - Ht. 57"  - Wt.  116.4lbs (8/15) -- no new wts in EMR. Pt out of bed in chair at time of f/u. Staff aware to document most recent wt in EMR  - %wt change  - BMI 25.2 -- based on 57"  - IBW 94lbs     Pertinent Lab Data: (8/21) H/H 8.1/26.7 (8/18) K 5.3, BUN 28, , eGFR 56  CAPILLARY BLOOD GLUCOSE  POCT Blood Glucose.: 95 mg/dL (21 Aug 2020 07:14)  POCT Blood Glucose.: 114 mg/dL (20 Aug 2020 16:07)  POCT Blood Glucose.: 97 mg/dL (20 Aug 2020 11:49)       Pertinent Meds: lovenox, aspirin, miralax, lipitor, dulcolax, synthroid, protonix, senna, zofran      Physical Findings:  - Appearance: Alert and oriented. Hard of haring, has hearing aid. No edema noted per RN flow sheets  - GI function: States she feels nauseous every morning. Vomited this morning. Does not have anti-emetic prescribed. Will discuss with RN. No c/o diarrhea but notes she has not had a BM in 1-2 days, on bowel regimen. RN aware.   - Tubes: N/A   - Oral/Mouth cavity: No chewing/swallowing difficulties reported by staff   - Skin: WDL     Nutrition Requirements  Weight Used: 45.8kg  -- continued from initial RD assessment (8/18)     Estimated Energy Needs    Continue [x]  Adjust []  984-1066kcal (MSJ x 1.2-1.3 AF)         Estimated Protein Needs    Continue [x]  Adjust []  53-64g (1-1.2g/kg dosing wt)        Estimated Fluid Needs        Continue [x]  Adjust []  1ml/kcal or per LIP      Nutrient Intake: <50% per pt         [x] Previous Nutrition Diagnosis: inadequate oral intake             [x] Ongoing          [] Resolved       Nutrition Intervention: meals and snacks, medical food supplements      Goal/Expected Outcome: Pt to meet greater than 50% of estimated needs within 4 days      Indicator/Monitoring: energy intake, body comp, NFPF    Recommendations:  1. Continue current diet order as tolerated  2. Continue to provide Ensure Enlive BID  3. Continue to provide prosource gelatin once daily   4. Encourage po intake during meal time

## 2020-08-22 LAB
GLUCOSE BLDC GLUCOMTR-MCNC: 79 MG/DL — SIGNIFICANT CHANGE UP (ref 70–99)
GLUCOSE BLDC GLUCOMTR-MCNC: 85 MG/DL — SIGNIFICANT CHANGE UP (ref 70–99)
GLUCOSE BLDC GLUCOMTR-MCNC: 92 MG/DL — SIGNIFICANT CHANGE UP (ref 70–99)

## 2020-08-22 RX ADMIN — Medication 25 MILLIGRAM(S): at 06:08

## 2020-08-22 RX ADMIN — SENNA PLUS 2 TABLET(S): 8.6 TABLET ORAL at 21:10

## 2020-08-22 RX ADMIN — ALBUTEROL 1 PUFF(S): 90 AEROSOL, METERED ORAL at 20:42

## 2020-08-22 RX ADMIN — CHLORHEXIDINE GLUCONATE 1 APPLICATION(S): 213 SOLUTION TOPICAL at 06:09

## 2020-08-22 RX ADMIN — ENOXAPARIN SODIUM 40 MILLIGRAM(S): 100 INJECTION SUBCUTANEOUS at 12:39

## 2020-08-22 RX ADMIN — ATORVASTATIN CALCIUM 40 MILLIGRAM(S): 80 TABLET, FILM COATED ORAL at 21:10

## 2020-08-22 RX ADMIN — DRONEDARONE 400 MILLIGRAM(S): 400 TABLET, FILM COATED ORAL at 06:08

## 2020-08-22 RX ADMIN — BUDESONIDE AND FORMOTEROL FUMARATE DIHYDRATE 2 PUFF(S): 160; 4.5 AEROSOL RESPIRATORY (INHALATION) at 08:13

## 2020-08-22 RX ADMIN — Medication 5 MILLIGRAM(S): at 21:10

## 2020-08-22 RX ADMIN — Medication 81 MILLIGRAM(S): at 12:39

## 2020-08-22 RX ADMIN — POLYETHYLENE GLYCOL 3350 17 GRAM(S): 17 POWDER, FOR SOLUTION ORAL at 18:03

## 2020-08-22 RX ADMIN — DRONEDARONE 400 MILLIGRAM(S): 400 TABLET, FILM COATED ORAL at 18:01

## 2020-08-22 RX ADMIN — Medication 88 MICROGRAM(S): at 06:08

## 2020-08-22 RX ADMIN — PANTOPRAZOLE SODIUM 40 MILLIGRAM(S): 20 TABLET, DELAYED RELEASE ORAL at 06:08

## 2020-08-22 RX ADMIN — BUDESONIDE AND FORMOTEROL FUMARATE DIHYDRATE 2 PUFF(S): 160; 4.5 AEROSOL RESPIRATORY (INHALATION) at 19:56

## 2020-08-22 NOTE — CONSULT NOTE ADULT - SUBJECTIVE AND OBJECTIVE BOX
T(C): 36.1 (08-22-20 @ 06:13), Max: 36.5 (08-21-20 @ 14:49)  HR: 68 (08-22-20 @ 06:13) (63 - 72)  BP: 110/54 (08-22-20 @ 06:13) (82/42 - 129/60)  RR: 18 (08-22-20 @ 06:13) (18 - 18)  SpO2: 95% (08-21-20 @ 20:46) (95% - 95%)      Patient was stable overnight and expresses no new complaints     PE: ptn seen and exam  oob  to  wc no  new comp  bp  110/54 no  dizzness    Alert   LUNGS- clear  COR- RRR  ABD- SOFT, NT  EXTR- w/o edema  NEURO- stable    08-21    136  |  97<L>  |  23<H>  ----------------------------<  141<H>  5.3<H>   |  32  |  0.9    Ca    8.7      21 Aug 2020 08:06  Mg     1.9     08-21                              8.1    10.99 )-----------( 328      ( 21 Aug 2020 08:06 )             26.7           Assess:  Rehab of lt  hip  fx multi  truma  sp orif       Continue acute rehab program. pt ot and  current care

## 2020-08-22 NOTE — PROGRESS NOTE ADULT - SUBJECTIVE AND OBJECTIVE BOX
T(C): 36.6 (08-22-20 @ 13:00), Max: 36.6 (08-22-20 @ 13:00)  HR: 66 (08-22-20 @ 13:00) (66 - 72)  BP: 131/58 (08-22-20 @ 13:00) (82/42 - 131/58)  RR: 18 (08-22-20 @ 13:00) (18 - 18)  SpO2: 95% (08-21-20 @ 20:46) (95% - 95%)      Patient was stable overnight and expresses fno new complaints     PE: ptn  seen  and  examed  nad doing  oky    Alert   LUNGS- clear  COR- RRR  ABD- SOFT, NT  EXTR- w/o edema  NEURO- stable    08-21    136  |  97<L>  |  23<H>  ----------------------------<  141<H>  5.3<H>   |  32  |  0.9    Ca    8.7      21 Aug 2020 08:06  Mg     1.9     08-21                              8.1    10.99 )-----------( 328      ( 21 Aug 2020 08:06 )             26.7           Assess:  Rehab of multi  truma        Continue acute rehab program. pt ot and current care

## 2020-08-23 LAB
GLUCOSE BLDC GLUCOMTR-MCNC: 92 MG/DL — SIGNIFICANT CHANGE UP (ref 70–99)
GLUCOSE BLDC GLUCOMTR-MCNC: 96 MG/DL — SIGNIFICANT CHANGE UP (ref 70–99)
GLUCOSE BLDC GLUCOMTR-MCNC: 99 MG/DL — SIGNIFICANT CHANGE UP (ref 70–99)

## 2020-08-23 RX ADMIN — Medication 5 MILLIGRAM(S): at 21:08

## 2020-08-23 RX ADMIN — POLYETHYLENE GLYCOL 3350 17 GRAM(S): 17 POWDER, FOR SOLUTION ORAL at 17:29

## 2020-08-23 RX ADMIN — ALBUTEROL 1 PUFF(S): 90 AEROSOL, METERED ORAL at 20:19

## 2020-08-23 RX ADMIN — Medication 88 MICROGRAM(S): at 05:06

## 2020-08-23 RX ADMIN — DRONEDARONE 400 MILLIGRAM(S): 400 TABLET, FILM COATED ORAL at 05:06

## 2020-08-23 RX ADMIN — BUDESONIDE AND FORMOTEROL FUMARATE DIHYDRATE 2 PUFF(S): 160; 4.5 AEROSOL RESPIRATORY (INHALATION) at 07:57

## 2020-08-23 RX ADMIN — CHLORHEXIDINE GLUCONATE 1 APPLICATION(S): 213 SOLUTION TOPICAL at 05:06

## 2020-08-23 RX ADMIN — Medication 81 MILLIGRAM(S): at 12:56

## 2020-08-23 RX ADMIN — BUDESONIDE AND FORMOTEROL FUMARATE DIHYDRATE 2 PUFF(S): 160; 4.5 AEROSOL RESPIRATORY (INHALATION) at 19:58

## 2020-08-23 RX ADMIN — ATORVASTATIN CALCIUM 40 MILLIGRAM(S): 80 TABLET, FILM COATED ORAL at 21:07

## 2020-08-23 RX ADMIN — Medication 3 MILLILITER(S): at 15:10

## 2020-08-23 RX ADMIN — DRONEDARONE 400 MILLIGRAM(S): 400 TABLET, FILM COATED ORAL at 17:29

## 2020-08-23 RX ADMIN — PANTOPRAZOLE SODIUM 40 MILLIGRAM(S): 20 TABLET, DELAYED RELEASE ORAL at 05:06

## 2020-08-23 RX ADMIN — ENOXAPARIN SODIUM 40 MILLIGRAM(S): 100 INJECTION SUBCUTANEOUS at 12:57

## 2020-08-23 RX ADMIN — Medication 25 MILLIGRAM(S): at 05:06

## 2020-08-23 RX ADMIN — SENNA PLUS 2 TABLET(S): 8.6 TABLET ORAL at 21:08

## 2020-08-23 NOTE — PROGRESS NOTE ADULT - SUBJECTIVE AND OBJECTIVE BOX
T(C): 36.2 (08-23-20 @ 13:13), Max: 36.2 (08-23-20 @ 13:13)  HR: 78 (08-23-20 @ 13:13) (69 - 78)  BP: 115/56 (08-23-20 @ 13:13) (104/54 - 115/56)  RR: 18 (08-23-20 @ 13:13) (18 - 20)  SpO2: --      Patient was stable overnight and expresses no new complaints     PE:    Alert   LUNGS- clear  COR- RRR  ABD- SOFT, NT  EXTR- w/o edema  NEURO- stable                      Rehab of multitrauma    Continue acute rehab program.

## 2020-08-24 LAB
24R-OH-CALCIDIOL SERPL-MCNC: 42 NG/ML — SIGNIFICANT CHANGE UP (ref 30–80)
ALBUMIN SERPL ELPH-MCNC: 3.2 G/DL — LOW (ref 3.5–5.2)
ALP SERPL-CCNC: 77 U/L — SIGNIFICANT CHANGE UP (ref 30–115)
ALT FLD-CCNC: 12 U/L — SIGNIFICANT CHANGE UP (ref 0–41)
ANION GAP SERPL CALC-SCNC: 8 MMOL/L — SIGNIFICANT CHANGE UP (ref 7–14)
AST SERPL-CCNC: 24 U/L — SIGNIFICANT CHANGE UP (ref 0–41)
BILIRUB SERPL-MCNC: 1 MG/DL — SIGNIFICANT CHANGE UP (ref 0.2–1.2)
BUN SERPL-MCNC: 25 MG/DL — HIGH (ref 10–20)
CALCIUM SERPL-MCNC: 8.7 MG/DL — SIGNIFICANT CHANGE UP (ref 8.5–10.1)
CHLORIDE SERPL-SCNC: 98 MMOL/L — SIGNIFICANT CHANGE UP (ref 98–110)
CO2 SERPL-SCNC: 34 MMOL/L — HIGH (ref 17–32)
CREAT SERPL-MCNC: 1.1 MG/DL — SIGNIFICANT CHANGE UP (ref 0.7–1.5)
FERRITIN SERPL-MCNC: 292 NG/ML — HIGH (ref 15–150)
FOLATE SERPL-MCNC: 8.5 NG/ML — SIGNIFICANT CHANGE UP
GLUCOSE BLDC GLUCOMTR-MCNC: 172 MG/DL — HIGH (ref 70–99)
GLUCOSE BLDC GLUCOMTR-MCNC: 89 MG/DL — SIGNIFICANT CHANGE UP (ref 70–99)
GLUCOSE BLDC GLUCOMTR-MCNC: 95 MG/DL — SIGNIFICANT CHANGE UP (ref 70–99)
GLUCOSE SERPL-MCNC: 93 MG/DL — SIGNIFICANT CHANGE UP (ref 70–99)
HCT VFR BLD CALC: 26.2 % — LOW (ref 37–47)
HGB BLD-MCNC: 7.9 G/DL — LOW (ref 12–16)
IRON SATN MFR SERPL: 20 % — SIGNIFICANT CHANGE UP (ref 15–50)
IRON SATN MFR SERPL: 51 UG/DL — SIGNIFICANT CHANGE UP (ref 35–150)
MAGNESIUM SERPL-MCNC: 2 MG/DL — SIGNIFICANT CHANGE UP (ref 1.8–2.4)
MCHC RBC-ENTMCNC: 30.2 G/DL — LOW (ref 32–37)
MCHC RBC-ENTMCNC: 30.9 PG — SIGNIFICANT CHANGE UP (ref 27–31)
MCV RBC AUTO: 102.3 FL — HIGH (ref 81–99)
NRBC # BLD: 0 /100 WBCS — SIGNIFICANT CHANGE UP (ref 0–0)
PLATELET # BLD AUTO: 339 K/UL — SIGNIFICANT CHANGE UP (ref 130–400)
POTASSIUM SERPL-MCNC: 5.3 MMOL/L — HIGH (ref 3.5–5)
POTASSIUM SERPL-SCNC: 5.3 MMOL/L — HIGH (ref 3.5–5)
PROT SERPL-MCNC: 5.3 G/DL — LOW (ref 6–8)
RBC # BLD: 2.56 M/UL — LOW (ref 4.2–5.4)
RBC # FLD: 16.3 % — HIGH (ref 11.5–14.5)
SODIUM SERPL-SCNC: 140 MMOL/L — SIGNIFICANT CHANGE UP (ref 135–146)
T4 FREE SERPL-MCNC: 0.9 NG/DL — SIGNIFICANT CHANGE UP (ref 0.9–1.8)
TIBC SERPL-MCNC: 257 UG/DL — SIGNIFICANT CHANGE UP (ref 220–430)
TSH SERPL-MCNC: 17.1 UIU/ML — HIGH (ref 0.27–4.2)
UIBC SERPL-MCNC: 206 UG/DL — SIGNIFICANT CHANGE UP (ref 110–370)
VIT B12 SERPL-MCNC: 746 PG/ML — SIGNIFICANT CHANGE UP (ref 232–1245)
WBC # BLD: 7.55 K/UL — SIGNIFICANT CHANGE UP (ref 4.8–10.8)
WBC # FLD AUTO: 7.55 K/UL — SIGNIFICANT CHANGE UP (ref 4.8–10.8)

## 2020-08-24 PROCEDURE — 93970 EXTREMITY STUDY: CPT | Mod: 26

## 2020-08-24 RX ADMIN — SENNA PLUS 2 TABLET(S): 8.6 TABLET ORAL at 21:26

## 2020-08-24 RX ADMIN — Medication 81 MILLIGRAM(S): at 12:17

## 2020-08-24 RX ADMIN — ALBUTEROL 1 PUFF(S): 90 AEROSOL, METERED ORAL at 13:23

## 2020-08-24 RX ADMIN — PANTOPRAZOLE SODIUM 40 MILLIGRAM(S): 20 TABLET, DELAYED RELEASE ORAL at 05:51

## 2020-08-24 RX ADMIN — DRONEDARONE 400 MILLIGRAM(S): 400 TABLET, FILM COATED ORAL at 05:51

## 2020-08-24 RX ADMIN — BUDESONIDE AND FORMOTEROL FUMARATE DIHYDRATE 2 PUFF(S): 160; 4.5 AEROSOL RESPIRATORY (INHALATION) at 07:54

## 2020-08-24 RX ADMIN — POLYETHYLENE GLYCOL 3350 17 GRAM(S): 17 POWDER, FOR SOLUTION ORAL at 17:37

## 2020-08-24 RX ADMIN — Medication 25 MILLIGRAM(S): at 05:51

## 2020-08-24 RX ADMIN — ATORVASTATIN CALCIUM 40 MILLIGRAM(S): 80 TABLET, FILM COATED ORAL at 21:26

## 2020-08-24 RX ADMIN — CHLORHEXIDINE GLUCONATE 1 APPLICATION(S): 213 SOLUTION TOPICAL at 05:51

## 2020-08-24 RX ADMIN — BUDESONIDE AND FORMOTEROL FUMARATE DIHYDRATE 2 PUFF(S): 160; 4.5 AEROSOL RESPIRATORY (INHALATION) at 21:26

## 2020-08-24 RX ADMIN — ENOXAPARIN SODIUM 40 MILLIGRAM(S): 100 INJECTION SUBCUTANEOUS at 12:17

## 2020-08-24 RX ADMIN — Medication 5 MILLIGRAM(S): at 21:26

## 2020-08-24 RX ADMIN — Medication 650 MILLIGRAM(S): at 02:49

## 2020-08-24 RX ADMIN — ALBUTEROL 1 PUFF(S): 90 AEROSOL, METERED ORAL at 19:41

## 2020-08-24 RX ADMIN — DRONEDARONE 400 MILLIGRAM(S): 400 TABLET, FILM COATED ORAL at 17:37

## 2020-08-24 RX ADMIN — Medication 88 MICROGRAM(S): at 05:51

## 2020-08-24 RX ADMIN — Medication 650 MILLIGRAM(S): at 03:03

## 2020-08-24 NOTE — PROGRESS NOTE ADULT - SUBJECTIVE AND OBJECTIVE BOX
OVERNIGHT EVENTS: events noted, feels better, occ cough    Vital Signs Last 24 Hrs  T(C): 36.6 (24 Aug 2020 06:01), Max: 36.6 (24 Aug 2020 06:01)  T(F): 97.8 (24 Aug 2020 06:01), Max: 97.8 (24 Aug 2020 06:01)  HR: 102 (24 Aug 2020 06:01) (65 - 102)  BP: 107/53 (24 Aug 2020 06:01) (107/53 - 120/56)  RR: 18 (24 Aug 2020 06:01) (18 - 18)  SpO2: 96% 3 l NC    PHYSICAL EXAMINATION:    GENERAL: The patient is awake and alert in no apparent distress.     HEENT: Head is normocephalic and atraumatic.     NECK: Supple.    LUNGS: Dec BS both bases    HEART: EM 3.6    ABDOMEN: Soft, nontender, and nondistended.      EXTREMITIES: Without any cyanosis, clubbing, rash, lesions or edema.    NEUROLOGIC: Grossly intact.    SKIN: No ulceration or induration present.      LABS:                                  MICROBIOLOGY:      MEDICATIONS  (STANDING):  ALBUTerol    90 MICROgram(s) HFA Inhaler 1 Puff(s) Inhalation every 6 hours  aspirin  chewable 81 milliGRAM(s) Oral daily  atorvastatin 40 milliGRAM(s) Oral at bedtime  budesonide 160 MICROgram(s)/formoterol 4.5 MICROgram(s) Inhaler 2 Puff(s) Inhalation two times a day  chlorhexidine 4% Liquid 1 Application(s) Topical <User Schedule>  dronedarone 400 milliGRAM(s) Oral two times a day  enoxaparin Injectable 40 milliGRAM(s) SubCutaneous daily  levothyroxine 88 MICROGram(s) Oral daily  melatonin 5 milliGRAM(s) Oral at bedtime  metoprolol succinate ER 25 milliGRAM(s) Oral daily  pantoprazole    Tablet 40 milliGRAM(s) Oral before breakfast  polyethylene glycol 3350 17 Gram(s) Oral <User Schedule>  senna 2 Tablet(s) Oral at bedtime  tiotropium 18 MICROgram(s) Capsule 1 Capsule(s) Inhalation daily    MEDICATIONS  (PRN):  acetaminophen   Tablet .. 650 milliGRAM(s) Oral every 6 hours PRN Temp greater or equal to 38C (100.4F), Mild Pain (1 - 3), Moderate Pain (4 - 6)  albuterol/ipratropium for Nebulization 3 milliLiter(s) Nebulizer every 6 hours PRN Shortness of Breath and/or Wheezing  bisacodyl Suppository 10 milliGRAM(s) Rectal daily PRN If no bowel movement  ondansetron   Disintegrating Tablet 4 milliGRAM(s) Oral every 8 hours PRN Nausea and/or Vomiting  oxyCODONE    IR 5 milliGRAM(s) Oral every 6 hours PRN Severe Pain (7 - 10)      RADIOLOGY & ADDITIONAL STUDIES:

## 2020-08-24 NOTE — PROGRESS NOTE ADULT - SUBJECTIVE AND OBJECTIVE BOX
Patient is a 89y old  Female who presents with a chief complaint of Rehab of Multitrauma, left hip fx orif, c1 fx j collar, rt rib fx (18 Aug 2020 07:39)      HPI:  89 y/o right hand dominant female with PMH COPD on 3-4L NC, hypothyroidism presenting to the ED s/p mechanical fall Per the patient she had gotten up to check on her albuterol treatment, started to sit back down on her recliner but missed, falling on her left side. She denies LOC, HT. She was unable to ambulate after the fall and complained of L hip pain. Found have multiple injuries including a nondisplaced C1 fx, aged indeterminant T5 fx, right posterior 10-11 rib fractures, and left intertroch fx. After clearance by both pulmonology and cardiology, she went to OR with orthopedics for Left hip IMN on 813/2020. Post operatively her course was complicated with new onset atrial fibrillation and post operative anemia for which she was given 1 units pRBCs. Hgb over the past 48 hours has been stable at 8.2. For her atrial fibrillation cardiology was consulted and she was started on Eliquis, metoprolol and multaq and is currently in NSR. In regards to her nondisplaced C1 fx she was in a [place] J which was subsequently cleared by neurosurgery. pt eval by physiatry deemed a good acute rehab candidate to improve function for safe . DC home CAN tolerate and benefit from 3 hrs  ofd acute rehab     Patient was evaluated by physiatry to be a good candidate for rehab and will be admitted to  to undergo intensive 3 hour/day rehabilitation (17 Aug 2020 11:27)    pulmonary noted  TODAY'S SUBJECTIVE & REVIEW OF SYMPTOMS     No acute overnight events. VSS. Tolerating therapy well.     PHYSICAL EXAM    Vital Signs Last 24 Hrs  T(C): 36.6 (24 Aug 2020 06:01), Max: 36.6 (24 Aug 2020 06:01)  T(F): 97.8 (24 Aug 2020 06:01), Max: 97.8 (24 Aug 2020 06:01)  HR: 102 (24 Aug 2020 06:01) (65 - 102)  BP: 107/53 (24 Aug 2020 06:01) (107/53 - 120/56)  BP(mean): --  RR: 18 (24 Aug 2020 06:01) (18 - 18)  SpO2: --    Constitutional - NAD, Comfortable OOB to WC, says slept well, o2 via NC  Chest - CTAB  Cardiovascular - RRR  Abdomen - Soft, NTND  Extremities - No C/C/E, No calf tenderness   Neurologic Exam -                    Cognitive - Awake, Alert, AAO to self, place, date, year, situation     Communication - Fluent, No dysarthria  Motor     LEFT    UE: [X] WNL  	RIGHT UE:  [X] WNL   	LEFT    LE:  [X] other: Decrease strength with hip flexion and knee extension due to FX hip    RIGHT LE:  [X] WNLNo focal deficits   Sensory - Intact to LT  Reflexes - wnl/ symmetric  Psychiatric - Mood stable, Affect WNL      acetaminophen   Tablet .. 650 milliGRAM(s) Oral every 6 hours PRN  ALBUTerol    90 MICROgram(s) HFA Inhaler 1 Puff(s) Inhalation every 6 hours  albuterol/ipratropium for Nebulization 3 milliLiter(s) Nebulizer every 6 hours PRN  aspirin  chewable 81 milliGRAM(s) Oral daily  atorvastatin 40 milliGRAM(s) Oral at bedtime  bisacodyl Suppository 10 milliGRAM(s) Rectal daily PRN  budesonide 160 MICROgram(s)/formoterol 4.5 MICROgram(s) Inhaler 2 Puff(s) Inhalation two times a day  chlorhexidine 4% Liquid 1 Application(s) Topical <User Schedule>  dronedarone 400 milliGRAM(s) Oral two times a day  enoxaparin Injectable 40 milliGRAM(s) SubCutaneous daily  levothyroxine 88 MICROGram(s) Oral daily  melatonin 5 milliGRAM(s) Oral at bedtime  metoprolol succinate ER 25 milliGRAM(s) Oral daily  ondansetron   Disintegrating Tablet 4 milliGRAM(s) Oral every 8 hours PRN  oxyCODONE    IR 5 milliGRAM(s) Oral every 6 hours PRN  pantoprazole    Tablet 40 milliGRAM(s) Oral before breakfast  polyethylene glycol 3350 17 Gram(s) Oral <User Schedule>  senna 2 Tablet(s) Oral at bedtime  tiotropium 18 MICROgram(s) Capsule 1 Capsule(s) Inhalation daily      RECENT LABS/IMAGING                                   7.9    7.55  )-----------( 339      ( 24 Aug 2020 07:09 )             26.2     08-24    140  |  98  |  25<H>  ----------------------------<  93  5.3<H>   |  34<H>  |  1.1    Ca    8.7      24 Aug 2020 07:09  Mg     2.0     08-24    TPro  5.3<L>  /  Alb  3.2<L>  /  TBili  1.0  /  DBili  x   /  AST  24  /  ALT  12  /  AlkPhos  77  08-24

## 2020-08-24 NOTE — PROGRESS NOTE ADULT - ASSESSMENT
Impression:    Severe COPD on home O2 now on 3 l nc stable  Left comminuted intertrochanteric fracture sp fall s/p sx  Right posterior 10-11th rib fx  A Fib    Recommendations:    Incentive spirometry  Neb as needed  SYMBICORT/ spiriva  DVT prophylaxis  ambulate  recall as needed

## 2020-08-24 NOTE — PROGRESS NOTE ADULT - ASSESSMENT
89 y/o female with hx of COPD on 3-4 L O2 nasal canula, hypothyroidism and new onset atrial fibrillation admitted to  for rehab of multitrauma after fall with injuries including a nondisplaced C1 fx, aged indeterminant T5 fx, right posterior 10-11 rib fractures, and left intertroch fx. She is WBAT for her LLE. .    Rehab of multiple trauma c1 fx,  left HIP FT rib FX's    Patient requires 3 hrs daily of interdisciplinary inpatient rehab.     -Pain control: Tylenol PRN, oxycodone prn    -GI/Bowel Mgmt -  senna, miralax prn for constipation     -Skin: left hip surgical wound incisions C/D/I    -FEN   - Diet -  Regular    Medical Comorbidities    #paroxsymal Atrial fibrillation  -Eliquis discontinued, patient now only on ASA after discussion with patient and son regarding risks and benefits  -Metoprolol ER 25 POD  -Multaq 400 BID    #COPD  -2-4 L O2 NC  -Continue home meds    #Hypothyroidism  -Levothyroxine 88 mcg daily   osteoporosis by FX= add calcium = vid D check level    #HLD  Atorvastatin 40mg po qhs    #Hypothyroidism  -Levothyroxine 88 mcg daily    #Presumed osteoporosis- add calcium = vit D check level    #Post op anemia  - stable, hgb 8.1  - will continue to monitor    Precautions / PROPHYLAXIS:      - Falls    - Ortho: Weight bearing status: WBAT LLE      - DVT prophylaxis: Lovenox       Patient seen and discussed with my attending, Dr. Diaz

## 2020-08-25 LAB
ANION GAP SERPL CALC-SCNC: 5 MMOL/L — LOW (ref 7–14)
BUN SERPL-MCNC: 20 MG/DL — SIGNIFICANT CHANGE UP (ref 10–20)
CALCIUM SERPL-MCNC: 8.6 MG/DL — SIGNIFICANT CHANGE UP (ref 8.5–10.1)
CHLORIDE SERPL-SCNC: 100 MMOL/L — SIGNIFICANT CHANGE UP (ref 98–110)
CO2 SERPL-SCNC: 35 MMOL/L — HIGH (ref 17–32)
CREAT SERPL-MCNC: 0.9 MG/DL — SIGNIFICANT CHANGE UP (ref 0.7–1.5)
GLUCOSE BLDC GLUCOMTR-MCNC: 102 MG/DL — HIGH (ref 70–99)
GLUCOSE BLDC GLUCOMTR-MCNC: 83 MG/DL — SIGNIFICANT CHANGE UP (ref 70–99)
GLUCOSE BLDC GLUCOMTR-MCNC: 93 MG/DL — SIGNIFICANT CHANGE UP (ref 70–99)
GLUCOSE SERPL-MCNC: 95 MG/DL — SIGNIFICANT CHANGE UP (ref 70–99)
POTASSIUM SERPL-MCNC: 5.1 MMOL/L — HIGH (ref 3.5–5)
POTASSIUM SERPL-SCNC: 5.1 MMOL/L — HIGH (ref 3.5–5)
SODIUM SERPL-SCNC: 140 MMOL/L — SIGNIFICANT CHANGE UP (ref 135–146)

## 2020-08-25 RX ORDER — LEVOTHYROXINE SODIUM 125 MCG
112 TABLET ORAL DAILY
Refills: 0 | Status: DISCONTINUED | OUTPATIENT
Start: 2020-08-25 | End: 2020-08-31

## 2020-08-25 RX ADMIN — SENNA PLUS 2 TABLET(S): 8.6 TABLET ORAL at 21:25

## 2020-08-25 RX ADMIN — DRONEDARONE 400 MILLIGRAM(S): 400 TABLET, FILM COATED ORAL at 06:03

## 2020-08-25 RX ADMIN — CHLORHEXIDINE GLUCONATE 1 APPLICATION(S): 213 SOLUTION TOPICAL at 06:03

## 2020-08-25 RX ADMIN — BUDESONIDE AND FORMOTEROL FUMARATE DIHYDRATE 2 PUFF(S): 160; 4.5 AEROSOL RESPIRATORY (INHALATION) at 21:25

## 2020-08-25 RX ADMIN — PANTOPRAZOLE SODIUM 40 MILLIGRAM(S): 20 TABLET, DELAYED RELEASE ORAL at 06:03

## 2020-08-25 RX ADMIN — BUDESONIDE AND FORMOTEROL FUMARATE DIHYDRATE 2 PUFF(S): 160; 4.5 AEROSOL RESPIRATORY (INHALATION) at 07:51

## 2020-08-25 RX ADMIN — POLYETHYLENE GLYCOL 3350 17 GRAM(S): 17 POWDER, FOR SOLUTION ORAL at 17:24

## 2020-08-25 RX ADMIN — ALBUTEROL 1 PUFF(S): 90 AEROSOL, METERED ORAL at 07:51

## 2020-08-25 RX ADMIN — Medication 3 MILLILITER(S): at 08:05

## 2020-08-25 RX ADMIN — ALBUTEROL 1 PUFF(S): 90 AEROSOL, METERED ORAL at 13:23

## 2020-08-25 RX ADMIN — Medication 81 MILLIGRAM(S): at 12:08

## 2020-08-25 RX ADMIN — ENOXAPARIN SODIUM 40 MILLIGRAM(S): 100 INJECTION SUBCUTANEOUS at 12:08

## 2020-08-25 RX ADMIN — Medication 25 MILLIGRAM(S): at 06:03

## 2020-08-25 RX ADMIN — Medication 5 MILLIGRAM(S): at 21:25

## 2020-08-25 RX ADMIN — ATORVASTATIN CALCIUM 40 MILLIGRAM(S): 80 TABLET, FILM COATED ORAL at 21:25

## 2020-08-25 RX ADMIN — Medication 88 MICROGRAM(S): at 06:03

## 2020-08-25 RX ADMIN — ALBUTEROL 1 PUFF(S): 90 AEROSOL, METERED ORAL at 19:46

## 2020-08-25 RX ADMIN — DRONEDARONE 400 MILLIGRAM(S): 400 TABLET, FILM COATED ORAL at 17:24

## 2020-08-25 NOTE — PROGRESS NOTE ADULT - ASSESSMENT
Impression:    Severe COPD on home O2 now on 3 l nc stable  Left comminuted intertrochanteric fracture sp fall s/p sx  Right posterior 10-11th rib fx  A Fib    Recommendations:    Incentive spirometry  F/UP LE doppler  Neb as needed  SYMBICORT/ spiriva  DVT prophylaxis  will follow

## 2020-08-25 NOTE — CHART NOTE - NSCHARTNOTEFT_GEN_A_CORE
Registered Dietitian Follow-Up     Patient Profile Reviewed                           Yes [x]   No []     Nutrition History Previously Obtained        Yes [x]  No []       Pertinent Subjective Information: Pt reports appetite is the same because she does not like the food served. Son at bedside, reports he brings her food from home. Drinks 1 Glucerna daily, has not tried the Prosource gelatin plus yet. It was on pt's table, she states she will try it later today.     CA aware to provide prune juice and double portions of soups at meal times.      Pertinent Medical Interventions: Rehab of Multitrauma, left hip fx orif, c1 fx j collar, rt rib fx. Noted K 5.1     Diet order: Regular + Ensure Ensure BID + Prosource gelatin plus once daily      Anthropometrics:  - Ht. 57"  - Wt.  116.4lbs (8/15) -- no new wts in EMR. Pt out of bed in chair at time of f/u. Staff aware to document most recent wt in EMR  - %wt change  - BMI 25.2 -- based on 57"  - IBW 94lbs     Pertinent Lab Data: (8/25) K 5.1, eGFR 56  CAPILLARY BLOOD GLUCOSE  POCT Blood Glucose.: 83 mg/dL (25 Aug 2020 11:51)  POCT Blood Glucose.: 93 mg/dL (25 Aug 2020 07:35)  POCT Blood Glucose.: 89 mg/dL (24 Aug 2020 16:17)       Pertinent Meds: lovenox, aspirin, zofran, miralax, lipitor, dulcolax, synthroid, protonix, senna      Physical Findings:  - Appearance: Alert and oriented. No edema noted per RN flow sheets  - GI function: Last BM (8/24) Was constipated before, given suppository yesterday morning.   - Tubes: N/A  - Oral/Mouth cavity: No chewing/swallowing difficulties reported by staff  - Skin: WDL     Nutrition Requirements  Weight Used: 45.8kg  -- continued from initial RD assessment (8/18)       Estimated Energy Needs    Continue [x]  Adjust []  984-1066kcal (MSJ x 1.2-1.3 AF)         Estimated Protein Needs    Continue [x]  Adjust []  53-64g (1-1.2g/kg dosing wt)        Estimated Fluid Needs        Continue [x]  Adjust []  1ml/kcal or per LIP            Nutrient Intake: 50% po intake + 1 glucerna + family brings food from home         [x] Previous Nutrition Diagnosis: Inadequate oral intake             [x] Ongoing          [] Resolved       Nutrition Intervention: meals and snacks, medical food supplements      Goal/Expected Outcome: Pt to meet 50-75% of estimated needs within7 days      Indicator/Monitoring: energy intake, body comp, NFPF, lyte profile    Recommendations:   1. Continue current diet order as tolerated  2. Continue to provide Ensure Enlive BID  3. Continue to provide prosource gelatin once daily   4. Encourage po intake during meal time.

## 2020-08-25 NOTE — PROGRESS NOTE ADULT - ASSESSMENT
89 y/o female with hx of COPD on 3-4 L O2 nasal canula, hypothyroidism and new onset atrial fibrillation admitted to  for rehab of multitrauma after fall with injuries including a nondisplaced C1 fx, aged indeterminant T5 fx, right posterior 10-11 rib fractures, and left intertroch fx. She is WBAT for her LLE. .    Rehab of multiple trauma c1 fx,  left HIP FT rib FX's    Patient requires 3 hrs daily of interdisciplinary inpatient rehab.     -Pain control: Tylenol PRN, oxycodone prn    -GI/Bowel Mgmt -  senna, miralax prn for constipation     -Skin: left hip surgical wound incisions C/D/I    -FEN   - Diet -  Regular    Medical Comorbidities    #paroxsymal Atrial fibrillation  -Eliquis discontinued, patient now only on ASA after discussion with patient and son regarding risks and benefits  -Metoprolol ER 25 POD  -Multaq 400 BID    #COPD  -2-4 L O2 NC  -Continue home meds    #Hypothyroidism  -Levothyroxine 88 mcg daily   osteoporosis by FX= add calcium = vid D check level    #HLD  Atorvastatin 40mg po qhs    #Hypothyroidism  -Levothyroxine 88 mcg daily    #Presumed osteoporosis- add calcium = vit D check level    #Post op anemia  - stable, hgb 8.1  - will continue to monitor    #Lower extremity swelling  -likely 2/2 recent surgery  -venous duplex negative for dvt    Precautions / PROPHYLAXIS:      - Falls    - Ortho: Weight bearing status: WBAT LLE      - DVT prophylaxis: Lovenox       Patient seen and discussed with my attending, Dr. Diaz 89 y/o female with hx of COPD on 3-4 L O2 nasal canula, hypothyroidism and new onset atrial fibrillation admitted to  for rehab of multitrauma after fall with injuries including a nondisplaced C1 fx, aged indeterminant T5 fx, right posterior 10-11 rib fractures, and left intertroch fx. She is WBAT for her LLE. .    Rehab of multiple trauma c1 fx,  left HIP FT rib FX's    Patient requires 3 hrs daily of interdisciplinary inpatient rehab.     -Pain control: Tylenol PRN, oxycodone prn    -GI/Bowel Mgmt -  senna, miralax prn for constipation     -Skin: left hip surgical wound incisions C/D/I    -FEN   - Diet -  Regular    Medical Comorbidities    #paroxsymal Atrial fibrillation  -Eliquis discontinued, patient now only on ASA after discussion with patient and son regarding risks and benefits  -Metoprolol ER 25 POD  -Multaq 400 BID    #COPD  -2-4 L O2 NC  -Continue home meds    #osteoporosis by FX= On calcium = vit D 25O$check level    #HLD  Atorvastatin 40mg po qhs    #Hypothyroidism  -Levothyroxine increased to 112 mcg daily with her increased TSH. Will need recheck in 6-8 weeks.    #Presumed osteoporosis- add calcium = vit D check level    #Post op anemia  - stable, hgb 8.1  - will continue to monitor    #Lower extremity swelling  -likely 2/2 recent surgery  -venous duplex negative for dvt    Precautions / PROPHYLAXIS:      - Falls    - Ortho: Weight bearing status: WBAT LLE      - DVT prophylaxis: Lovenox       Patient seen and discussed with my attending, Dr. Diaz 89 y/o female with hx of COPD on 3-4 L O2 nasal canula, hypothyroidism and new onset atrial fibrillation admitted to  for rehab of multitrauma after fall with injuries including a nondisplaced C1 fx, aged indeterminant T5 fx, right posterior 10-11 rib fractures, and left intertroch fx. She is WBAT for her LLE. .    Rehab of multiple trauma c1 fx,  left HIP FT rib FX's    Patient requires 3 hrs daily of interdisciplinary inpatient rehab.     -Pain control: Tylenol PRN, oxycodone prn    -GI/Bowel Mgmt -  senna, miralax prn for constipation     -Skin: left hip surgical wound incisions C/D/I    -FEN   - Diet -  Regular    Medical Comorbidities    #paroxsymal Atrial fibrillation  -Eliquis discontinued, patient now only on ASA after discussion with patient and son regarding risks and benefits  -Metoprolol ER 25 POD  -Multaq 400 BID    #COPD  -2-4 L O2 NC  -Continue home meds    #HLD  Atorvastatin 40mg po qhs    #Hypothyroidism  -Levothyroxine increased to 112 mcg daily with her increased TSH. Will need recheck in 6-8 weeks.    #Presumed osteoporosis on calcium; vit D 25 OH was checked and WNL's.    #Post op anemia  - stable, hgb 8.1  - will continue to monitor    #Lower extremity swelling  -likely 2/2 recent surgery  -venous duplex negative for dvt    Precautions / PROPHYLAXIS:      - Falls    - Ortho: Weight bearing status: WBAT LLE      - DVT prophylaxis: Lovenox       Patient seen and discussed with my attending, Dr. Diaz

## 2020-08-25 NOTE — PROGRESS NOTE ADULT - SUBJECTIVE AND OBJECTIVE BOX
Patient is a 89y old  Female who presents with a chief complaint of Rehab of Multitrauma, left hip fx orif, c1 fx j collar, rt rib fx (18 Aug 2020 07:39)      HPI:  89 y/o right hand dominant female with PMH COPD on 3-4L NC, hypothyroidism presenting to the ED s/p mechanical fall Per the patient she had gotten up to check on her albuterol treatment, started to sit back down on her recliner but missed, falling on her left side. She denies LOC, HT. She was unable to ambulate after the fall and complained of L hip pain. Found have multiple injuries including a nondisplaced C1 fx, aged indeterminant T5 fx, right posterior 10-11 rib fractures, and left intertroch fx. After clearance by both pulmonology and cardiology, she went to OR with orthopedics for Left hip IMN on 813/2020. Post operatively her course was complicated with new onset atrial fibrillation and post operative anemia for which she was given 1 units pRBCs. Hgb over the past 48 hours has been stable at 8.2. For her atrial fibrillation cardiology was consulted and she was started on Eliquis, metoprolol and multaq and is currently in NSR. In regards to her nondisplaced C1 fx she was in a [place] J which was subsequently cleared by neurosurgery. pt eval by physiatry deemed a good acute rehab candidate to improve function for safe . DC home CAN tolerate and benefit from 3 hrs  ofd acute rehab     Patient was evaluated by physiatry to be a good candidate for rehab and will be admitted to  to undergo intensive 3 hour/day rehabilitation (17 Aug 2020 11:27)    pulmonary noted  TODAY'S SUBJECTIVE & REVIEW OF SYMPTOMS     No acute overnight events. VSS. Tolerating therapy well. Venous doppler negative for DVT b/l    PHYSICAL EXAM  Vital Signs Last 24 Hrs  T(C): 35.7 (25 Aug 2020 05:19), Max: 36.8 (24 Aug 2020 14:10)  T(F): 96.2 (25 Aug 2020 05:19), Max: 98.3 (24 Aug 2020 14:10)  HR: 72 (25 Aug 2020 05:19) (72 - 72)  BP: 107/59 (25 Aug 2020 05:19) (107/59 - 129/63)  BP(mean): --  RR: 18 (24 Aug 2020 21:12) (18 - 18)  SpO2: 95% (24 Aug 2020 21:35) (95% - 95%)    Constitutional - NAD, Comfortable OOB to WC, o2 via NC  Chest - CTAB  Cardiovascular - RRR  Abdomen - Soft, NTND  Extremities - No C/C/E, No calf tenderness   Neurologic Exam -                    Cognitive - Awake, Alert, AAO to self, place, date, year, situation     Communication - Fluent, No dysarthria  Motor     LEFT    UE: [X] WNL  	RIGHT UE:  [X] WNL   	LEFT    LE:  [X] other: Decrease strength with hip flexion and knee extension due to FX hip    RIGHT LE:  [X] WNLNo focal deficits   Sensory - Intact to LT  Reflexes - wnl/ symmetric  Psychiatric - Mood stable, Affect WNL      acetaminophen   Tablet .. 650 milliGRAM(s) Oral every 6 hours PRN  ALBUTerol    90 MICROgram(s) HFA Inhaler 1 Puff(s) Inhalation every 6 hours  albuterol/ipratropium for Nebulization 3 milliLiter(s) Nebulizer every 6 hours PRN  aspirin  chewable 81 milliGRAM(s) Oral daily  atorvastatin 40 milliGRAM(s) Oral at bedtime  bisacodyl Suppository 10 milliGRAM(s) Rectal daily PRN  budesonide 160 MICROgram(s)/formoterol 4.5 MICROgram(s) Inhaler 2 Puff(s) Inhalation two times a day  chlorhexidine 4% Liquid 1 Application(s) Topical <User Schedule>  dronedarone 400 milliGRAM(s) Oral two times a day  enoxaparin Injectable 40 milliGRAM(s) SubCutaneous daily  levothyroxine 88 MICROGram(s) Oral daily  melatonin 5 milliGRAM(s) Oral at bedtime  metoprolol succinate ER 25 milliGRAM(s) Oral daily  ondansetron   Disintegrating Tablet 4 milliGRAM(s) Oral every 8 hours PRN  oxyCODONE    IR 5 milliGRAM(s) Oral every 6 hours PRN  pantoprazole    Tablet 40 milliGRAM(s) Oral before breakfast  polyethylene glycol 3350 17 Gram(s) Oral <User Schedule>  senna 2 Tablet(s) Oral at bedtime  tiotropium 18 MICROgram(s) Capsule 1 Capsule(s) Inhalation daily      RECENT LABS/IMAGING                                   7.9    7.55  )-----------( 339      ( 24 Aug 2020 07:09 )             26.2     08-24    140  |  98  |  25<H>  ----------------------------<  93  5.3<H>   |  34<H>  |  1.1    Ca    8.7      24 Aug 2020 07:09  Mg     2.0     08-24    TPro  5.3<L>  /  Alb  3.2<L>  /  TBili  1.0  /  DBili  x   /  AST  24  /  ALT  12  /  AlkPhos  77  08-24

## 2020-08-25 NOTE — PROGRESS NOTE ADULT - SUBJECTIVE AND OBJECTIVE BOX
OVERNIGHT EVENTS: co SOB this am, s/p LE doppler    Vital Signs Last 24 Hrs  T(C): 35.7 (25 Aug 2020 05:19), Max: 36.8 (24 Aug 2020 14:10)  T(F): 96.2 (25 Aug 2020 05:19), Max: 98.3 (24 Aug 2020 14:10)  HR: 72 (25 Aug 2020 05:19) (72 - 72)  BP: 107/59 (25 Aug 2020 05:19) (107/59 - 129/63)  RR: 18 (24 Aug 2020 21:12) (18 - 18)  SpO2: 95% (24 Aug 2020 21:35) (95% - 95%)    PHYSICAL EXAMINATION:    GENERAL: The patient is awake and alert in no apparent distress.     HEENT: Head is normocephalic and atraumatic. Extraocular muscles are intact.    NECK: Supple.    LUNGS: dec bs both bases    HEART: Regular rate and rhythm without murmur.    ABDOMEN: Soft, nontender, and nondistended.      EXTREMITIES: Without any cyanosis, clubbing, rash, lesions or edema.    NEUROLOGIC: Grossly intact.    SKIN: No ulceration or induration present.      LABS:                        7.9    7.55  )-----------( 339      ( 24 Aug 2020 07:09 )             26.2     08-25    140  |  100  |  20  ----------------------------<  95  5.1<H>   |  35<H>  |  0.9    Ca    8.6      25 Aug 2020 06:57  Mg     2.0     08-24    TPro  5.3<L>  /  Alb  3.2<L>  /  TBili  1.0  /  DBili  x   /  AST  24  /  ALT  12  /  AlkPhos  77  08-24 08-24-20 @ 07:01  -  08-25-20 @ 07:00  --------------------------------------------------------  IN: 120 mL / OUT: 0 mL / NET: 120 mL        MICROBIOLOGY:      MEDICATIONS  (STANDING):  ALBUTerol    90 MICROgram(s) HFA Inhaler 1 Puff(s) Inhalation every 6 hours  aspirin  chewable 81 milliGRAM(s) Oral daily  atorvastatin 40 milliGRAM(s) Oral at bedtime  budesonide 160 MICROgram(s)/formoterol 4.5 MICROgram(s) Inhaler 2 Puff(s) Inhalation two times a day  chlorhexidine 4% Liquid 1 Application(s) Topical <User Schedule>  dronedarone 400 milliGRAM(s) Oral two times a day  enoxaparin Injectable 40 milliGRAM(s) SubCutaneous daily  levothyroxine 88 MICROGram(s) Oral daily  melatonin 5 milliGRAM(s) Oral at bedtime  metoprolol succinate ER 25 milliGRAM(s) Oral daily  pantoprazole    Tablet 40 milliGRAM(s) Oral before breakfast  polyethylene glycol 3350 17 Gram(s) Oral <User Schedule>  senna 2 Tablet(s) Oral at bedtime  tiotropium 18 MICROgram(s) Capsule 1 Capsule(s) Inhalation daily    MEDICATIONS  (PRN):  acetaminophen   Tablet .. 650 milliGRAM(s) Oral every 6 hours PRN Temp greater or equal to 38C (100.4F), Mild Pain (1 - 3), Moderate Pain (4 - 6)  albuterol/ipratropium for Nebulization 3 milliLiter(s) Nebulizer every 6 hours PRN Shortness of Breath and/or Wheezing  bisacodyl Suppository 10 milliGRAM(s) Rectal daily PRN If no bowel movement  ondansetron   Disintegrating Tablet 4 milliGRAM(s) Oral every 8 hours PRN Nausea and/or Vomiting      RADIOLOGY & ADDITIONAL STUDIES:

## 2020-08-26 LAB
GLUCOSE BLDC GLUCOMTR-MCNC: 102 MG/DL — HIGH (ref 70–99)
GLUCOSE BLDC GLUCOMTR-MCNC: 115 MG/DL — HIGH (ref 70–99)
GLUCOSE BLDC GLUCOMTR-MCNC: 68 MG/DL — LOW (ref 70–99)
GLUCOSE BLDC GLUCOMTR-MCNC: 76 MG/DL — SIGNIFICANT CHANGE UP (ref 70–99)
GLUCOSE BLDC GLUCOMTR-MCNC: 99 MG/DL — SIGNIFICANT CHANGE UP (ref 70–99)

## 2020-08-26 RX ADMIN — Medication 650 MILLIGRAM(S): at 16:13

## 2020-08-26 RX ADMIN — Medication 650 MILLIGRAM(S): at 11:38

## 2020-08-26 RX ADMIN — ATORVASTATIN CALCIUM 40 MILLIGRAM(S): 80 TABLET, FILM COATED ORAL at 21:17

## 2020-08-26 RX ADMIN — PANTOPRAZOLE SODIUM 40 MILLIGRAM(S): 20 TABLET, DELAYED RELEASE ORAL at 06:01

## 2020-08-26 RX ADMIN — ENOXAPARIN SODIUM 40 MILLIGRAM(S): 100 INJECTION SUBCUTANEOUS at 11:37

## 2020-08-26 RX ADMIN — POLYETHYLENE GLYCOL 3350 17 GRAM(S): 17 POWDER, FOR SOLUTION ORAL at 17:29

## 2020-08-26 RX ADMIN — Medication 5 MILLIGRAM(S): at 21:17

## 2020-08-26 RX ADMIN — CHLORHEXIDINE GLUCONATE 1 APPLICATION(S): 213 SOLUTION TOPICAL at 06:00

## 2020-08-26 RX ADMIN — DRONEDARONE 400 MILLIGRAM(S): 400 TABLET, FILM COATED ORAL at 06:01

## 2020-08-26 RX ADMIN — Medication 650 MILLIGRAM(S): at 22:14

## 2020-08-26 RX ADMIN — Medication 25 MILLIGRAM(S): at 06:01

## 2020-08-26 RX ADMIN — Medication 3 MILLILITER(S): at 19:32

## 2020-08-26 RX ADMIN — DRONEDARONE 400 MILLIGRAM(S): 400 TABLET, FILM COATED ORAL at 17:28

## 2020-08-26 RX ADMIN — Medication 3 MILLILITER(S): at 13:16

## 2020-08-26 RX ADMIN — BUDESONIDE AND FORMOTEROL FUMARATE DIHYDRATE 2 PUFF(S): 160; 4.5 AEROSOL RESPIRATORY (INHALATION) at 07:45

## 2020-08-26 RX ADMIN — ALBUTEROL 1 PUFF(S): 90 AEROSOL, METERED ORAL at 07:48

## 2020-08-26 RX ADMIN — SENNA PLUS 2 TABLET(S): 8.6 TABLET ORAL at 21:18

## 2020-08-26 RX ADMIN — Medication 112 MICROGRAM(S): at 06:01

## 2020-08-26 RX ADMIN — Medication 650 MILLIGRAM(S): at 08:44

## 2020-08-26 RX ADMIN — Medication 81 MILLIGRAM(S): at 11:37

## 2020-08-26 RX ADMIN — Medication 650 MILLIGRAM(S): at 17:30

## 2020-08-26 NOTE — PROGRESS NOTE ADULT - ASSESSMENT
89 y/o female with hx of COPD on 3-4 L O2 nasal canula, hypothyroidism and new onset atrial fibrillation admitted to  for rehab of multitrauma after fall with injuries including a nondisplaced C1 fx, aged indeterminant T5 fx, right posterior 10-11 rib fractures, and left intertroch fx. She is WBAT for her LLE. .    Rehab of multiple trauma c1 fx,  left HIP FT rib FX's    Patient requires 3 hrs daily of interdisciplinary inpatient rehab.     -Pain control: Tylenol PRN, oxycodone prn    -GI/Bowel Mgmt -  senna, miralax prn for constipation     -Skin: left hip surgical wound incisions C/D/I    -FEN   - Diet -  Regular    Medical Comorbidities    #paroxsymal Atrial fibrillation  -Eliquis discontinued, patient now only on ASA after discussion with patient and son regarding risks and benefits  -Metoprolol ER 25 POD  -Multaq 400 BID    #COPD  -2-4 L O2 NC  -Continue home meds    #HLD  Atorvastatin 40mg po qhs    #Hypothyroidism  -Levothyroxine increased to 112 mcg daily with her increased TSH. Will need recheck in 6-8 weeks.    #Presumed osteoporosis on calcium; vit D 25 OH was checked and WNL's.    #Post op anemia  - stable, hgb 8.1  - will continue to monitor    #Lower extremity swelling  -likely 2/2 recent surgery  -venous duplex negative for dvt    Precautions / PROPHYLAXIS:      - Falls    - Ortho: Weight bearing status: WBAT LLE      - DVT prophylaxis: Lovenox       Patient seen and discussed with my attending, Dr. Diaz

## 2020-08-26 NOTE — PROGRESS NOTE ADULT - ASSESSMENT
Impression:    Severe COPD on home O2 now on 3 l nc stable  Left comminuted intertrochanteric fracture sp fall s/p sx  Right posterior 10-11th rib fx  A Fib    Recommendations:    Incentive spirometry  Neb as needed  SYMBICORT/ spiriva  DVT prophylaxis  will follow

## 2020-08-26 NOTE — PROGRESS NOTE ADULT - SUBJECTIVE AND OBJECTIVE BOX
Patient is a 89y old  Female who presents with a chief complaint of Rehab of Multitrauma, left hip fx orif, c1 fx j collar, rt rib fx (18 Aug 2020 07:39)      HPI:  89 y/o right hand dominant female with PMH COPD on 3-4L NC, hypothyroidism presenting to the ED s/p mechanical fall Per the patient she had gotten up to check on her albuterol treatment, started to sit back down on her recliner but missed, falling on her left side. She denies LOC, HT. She was unable to ambulate after the fall and complained of L hip pain. Found have multiple injuries including a nondisplaced C1 fx, aged indeterminant T5 fx, right posterior 10-11 rib fractures, and left intertroch fx. After clearance by both pulmonology and cardiology, she went to OR with orthopedics for Left hip IMN on 813/2020. Post operatively her course was complicated with new onset atrial fibrillation and post operative anemia for which she was given 1 units pRBCs. Hgb over the past 48 hours has been stable at 8.2. For her atrial fibrillation cardiology was consulted and she was started on Eliquis, metoprolol and multaq and is currently in NSR. In regards to her nondisplaced C1 fx she was in a [place] J which was subsequently cleared by neurosurgery. pt eval by physiatry deemed a good acute rehab candidate to improve function for safe . DC home CAN tolerate and benefit from 3 hrs  ofd acute rehab     Patient was evaluated by physiatry to be a good candidate for rehab and will be admitted to  to undergo intensive 3 hour/day rehabilitation (17 Aug 2020 11:27)    pulmonary noted  TODAY'S SUBJECTIVE & REVIEW OF SYMPTOMS     No acute overnight events. VSS. Tolerating therapy well.     CLOF: Supervision for BM and transfers. Ambulates 100ft with RW, step through gait    PHYSICAL EXAM  Vital Signs Last 24 Hrs  T(C): 37 (26 Aug 2020 06:13), Max: 37 (26 Aug 2020 06:13)  T(F): 98.6 (26 Aug 2020 06:13), Max: 98.6 (26 Aug 2020 06:13)  HR: 98 (26 Aug 2020 06:13) (69 - 98)  BP: 124/60 (26 Aug 2020 06:13) (96/63 - 124/60)  BP(mean): --  RR: 18 (26 Aug 2020 06:13) (18 - 18)  SpO2: 96% (26 Aug 2020 06:13) (96% - 97%)    Constitutional - NAD, Comfortable OOB to WC, o2 via NC  Chest - CTAB  Cardiovascular - RRR  Abdomen - Soft, NTND  Extremities - No C/C/E, No calf tenderness   Neurologic Exam -                    Cognitive - Awake, Alert, AAO to self, place, date, year, situation     Communication - Fluent, No dysarthria  Motor     LEFT    UE: [X] WNL  	RIGHT UE:  [X] WNL   	LEFT    LE:  [X] other: Decrease strength with hip flexion and knee extension due to FX hip    RIGHT LE:  [X] WNLNo focal deficits   Sensory - Intact to LT  Reflexes - wnl/ symmetric  Psychiatric - Mood stable, Affect WNL      acetaminophen   Tablet .. 650 milliGRAM(s) Oral every 6 hours PRN  ALBUTerol    90 MICROgram(s) HFA Inhaler 1 Puff(s) Inhalation every 6 hours  albuterol/ipratropium for Nebulization 3 milliLiter(s) Nebulizer every 6 hours PRN  aspirin  chewable 81 milliGRAM(s) Oral daily  atorvastatin 40 milliGRAM(s) Oral at bedtime  bisacodyl Suppository 10 milliGRAM(s) Rectal daily PRN  budesonide 160 MICROgram(s)/formoterol 4.5 MICROgram(s) Inhaler 2 Puff(s) Inhalation two times a day  chlorhexidine 4% Liquid 1 Application(s) Topical <User Schedule>  dronedarone 400 milliGRAM(s) Oral two times a day  enoxaparin Injectable 40 milliGRAM(s) SubCutaneous daily  levothyroxine 88 MICROGram(s) Oral daily  melatonin 5 milliGRAM(s) Oral at bedtime  metoprolol succinate ER 25 milliGRAM(s) Oral daily  ondansetron   Disintegrating Tablet 4 milliGRAM(s) Oral every 8 hours PRN  oxyCODONE    IR 5 milliGRAM(s) Oral every 6 hours PRN  pantoprazole    Tablet 40 milliGRAM(s) Oral before breakfast  polyethylene glycol 3350 17 Gram(s) Oral <User Schedule>  senna 2 Tablet(s) Oral at bedtime  tiotropium 18 MICROgram(s) Capsule 1 Capsule(s) Inhalation daily      RECENT LABS/IMAGING                                   7.9    7.55  )-----------( 339      ( 24 Aug 2020 07:09 )             26.2     08-24    140  |  98  |  25<H>  ----------------------------<  93  5.3<H>   |  34<H>  |  1.1    Ca    8.7      24 Aug 2020 07:09  Mg     2.0     08-24    TPro  5.3<L>  /  Alb  3.2<L>  /  TBili  1.0  /  DBili  x   /  AST  24  /  ALT  12  /  AlkPhos  77  08-24 Patient is a 89y old  Female who presents with a chief complaint of Rehab of Multitrauma, left hip fx orif, c1 fx j collar, rt rib fx (18 Aug 2020 07:39)      HPI:  89 y/o right hand dominant female with PMH COPD on 3-4L NC, hypothyroidism presenting to the ED s/p mechanical fall Per the patient she had gotten up to check on her albuterol treatment, started to sit back down on her recliner but missed, falling on her left side. She denies LOC, HT. She was unable to ambulate after the fall and complained of L hip pain. Found have multiple injuries including a nondisplaced C1 fx, aged indeterminant T5 fx, right posterior 10-11 rib fractures, and left intertroch fx. After clearance by both pulmonology and cardiology, she went to OR with orthopedics for Left hip IMN on 813/2020. Post operatively her course was complicated with new onset atrial fibrillation and post operative anemia for which she was given 1 units pRBCs. Hgb over the past 48 hours has been stable at 8.2. For her atrial fibrillation cardiology was consulted and she was started on Eliquis, metoprolol and multaq and is currently in NSR. In regards to her nondisplaced C1 fx she was in a [place] J which was subsequently cleared by neurosurgery. pt eval by physiatry deemed a good acute rehab candidate to improve function for safe . DC home CAN tolerate and benefit from 3 hrs  ofd acute rehab     Patient was evaluated by physiatry to be a good candidate for rehab and will be admitted to  to undergo intensive 3 hour/day rehabilitation (17 Aug 2020 11:27)    pulmonary noted  TODAY'S SUBJECTIVE & REVIEW OF SYMPTOMS     No acute overnight events. VSS. Tolerating therapy well.   I suggested she take Tylenol at lunch time before PT at 1 pm to help with hip pain.  CLOF: Supervision for BM and transfers. Ambulates 100ft with RW, step through gait    PHYSICAL EXAM  Vital Signs Last 24 Hrs  T(C): 37 (26 Aug 2020 06:13), Max: 37 (26 Aug 2020 06:13)  T(F): 98.6 (26 Aug 2020 06:13), Max: 98.6 (26 Aug 2020 06:13)  HR: 98 (26 Aug 2020 06:13) (69 - 98)  BP: 124/60 (26 Aug 2020 06:13) (96/63 - 124/60)  BP(mean): --  RR: 18 (26 Aug 2020 06:13) (18 - 18)  SpO2: 96% (26 Aug 2020 06:13) (96% - 97%)    Constitutional - NAD, Comfortable OOB to WC, o2 via NC  Chest - CTAB  Cardiovascular - RRR  Abdomen - Soft, NTND  Extremities - No C/C/E, No calf tenderness   Neurologic Exam -                    Cognitive - Awake, Alert, AAO to self, place, date, year, situation     Communication - Fluent, No dysarthria  Motor     LEFT    UE: [X] WNL  	RIGHT UE:  [X] WNL   	LEFT    LE:  [X] other: Decrease strength with hip flexion and knee extension due to FX hip    RIGHT LE:  [X] WNLNo focal deficits   Sensory - Intact to LT  Reflexes - wnl/ symmetric  Psychiatric - Mood stable, Affect WNL      acetaminophen   Tablet .. 650 milliGRAM(s) Oral every 6 hours PRN  ALBUTerol    90 MICROgram(s) HFA Inhaler 1 Puff(s) Inhalation every 6 hours  albuterol/ipratropium for Nebulization 3 milliLiter(s) Nebulizer every 6 hours PRN  aspirin  chewable 81 milliGRAM(s) Oral daily  atorvastatin 40 milliGRAM(s) Oral at bedtime  bisacodyl Suppository 10 milliGRAM(s) Rectal daily PRN  budesonide 160 MICROgram(s)/formoterol 4.5 MICROgram(s) Inhaler 2 Puff(s) Inhalation two times a day  chlorhexidine 4% Liquid 1 Application(s) Topical <User Schedule>  dronedarone 400 milliGRAM(s) Oral two times a day  enoxaparin Injectable 40 milliGRAM(s) SubCutaneous daily  levothyroxine 88 MICROGram(s) Oral daily  melatonin 5 milliGRAM(s) Oral at bedtime  metoprolol succinate ER 25 milliGRAM(s) Oral daily  ondansetron   Disintegrating Tablet 4 milliGRAM(s) Oral every 8 hours PRN  oxyCODONE    IR 5 milliGRAM(s) Oral every 6 hours PRN  pantoprazole    Tablet 40 milliGRAM(s) Oral before breakfast  polyethylene glycol 3350 17 Gram(s) Oral <User Schedule>  senna 2 Tablet(s) Oral at bedtime  tiotropium 18 MICROgram(s) Capsule 1 Capsule(s) Inhalation daily      RECENT LABS/IMAGING                                   7.9    7.55  )-----------( 339      ( 24 Aug 2020 07:09 )             26.2     08-24    140  |  98  |  25<H>  ----------------------------<  93  5.3<H>   |  34<H>  |  1.1    Ca    8.7      24 Aug 2020 07:09  Mg     2.0     08-24    TPro  5.3<L>  /  Alb  3.2<L>  /  TBili  1.0  /  DBili  x   /  AST  24  /  ALT  12  /  AlkPhos  77  08-24

## 2020-08-26 NOTE — PROGRESS NOTE ADULT - SUBJECTIVE AND OBJECTIVE BOX
OVERNIGHT EVENTS: events noted, co leg swelling, want to go home, LE doppler neg    Vital Signs Last 24 Hrs  T(C): 37 (26 Aug 2020 06:13), Max: 37 (26 Aug 2020 06:13)  T(F): 98.6 (26 Aug 2020 06:13), Max: 98.6 (26 Aug 2020 06:13)  HR: 98 (26 Aug 2020 06:13) (69 - 98)  BP: 124/60 (26 Aug 2020 06:13) (96/63 - 124/60)  RR: 18 (26 Aug 2020 06:13) (18 - 18)  SpO2: 96% (26 Aug 2020 06:13) (96% - 97%) on 3 l NC    PHYSICAL EXAMINATION:    GENERAL: The patient is awake and alert in no apparent distress.     HEENT: Head is normocephalic and atraumatic.    NECK: Supple.    LUNGS: Dec BS both bases    HEART: Regular rate and rhythm without murmur.    ABDOMEN: Soft, nontender, and nondistended.      EXTREMITIES: Without any cyanosis, clubbing, rash, lesions or edema.    NEUROLOGIC: Grossly intact.    SKIN: No ulceration or induration present.      LABS:    08-25    140  |  100  |  20  ----------------------------<  95  5.1<H>   |  35<H>  |  0.9    Ca    8.6      25 Aug 2020 06:57                            08-25-20 @ 07:01  -  08-26-20 @ 07:00  --------------------------------------------------------  IN: 240 mL / OUT: 500 mL / NET: -260 mL        MICROBIOLOGY:      MEDICATIONS  (STANDING):  ALBUTerol    90 MICROgram(s) HFA Inhaler 1 Puff(s) Inhalation every 6 hours  aspirin  chewable 81 milliGRAM(s) Oral daily  atorvastatin 40 milliGRAM(s) Oral at bedtime  budesonide 160 MICROgram(s)/formoterol 4.5 MICROgram(s) Inhaler 2 Puff(s) Inhalation two times a day  chlorhexidine 4% Liquid 1 Application(s) Topical <User Schedule>  dronedarone 400 milliGRAM(s) Oral two times a day  enoxaparin Injectable 40 milliGRAM(s) SubCutaneous daily  levothyroxine 112 MICROGram(s) Oral daily  melatonin 5 milliGRAM(s) Oral at bedtime  metoprolol succinate ER 25 milliGRAM(s) Oral daily  pantoprazole    Tablet 40 milliGRAM(s) Oral before breakfast  polyethylene glycol 3350 17 Gram(s) Oral <User Schedule>  senna 2 Tablet(s) Oral at bedtime  tiotropium 18 MICROgram(s) Capsule 1 Capsule(s) Inhalation daily    MEDICATIONS  (PRN):  acetaminophen   Tablet .. 650 milliGRAM(s) Oral every 6 hours PRN Temp greater or equal to 38C (100.4F), Mild Pain (1 - 3), Moderate Pain (4 - 6)  albuterol/ipratropium for Nebulization 3 milliLiter(s) Nebulizer every 6 hours PRN Shortness of Breath and/or Wheezing  bisacodyl Suppository 10 milliGRAM(s) Rectal daily PRN If no bowel movement  ondansetron   Disintegrating Tablet 4 milliGRAM(s) Oral every 8 hours PRN Nausea and/or Vomiting      RADIOLOGY & ADDITIONAL STUDIES:

## 2020-08-27 LAB
GLUCOSE BLDC GLUCOMTR-MCNC: 107 MG/DL — HIGH (ref 70–99)
GLUCOSE BLDC GLUCOMTR-MCNC: 91 MG/DL — SIGNIFICANT CHANGE UP (ref 70–99)
GLUCOSE BLDC GLUCOMTR-MCNC: 99 MG/DL — SIGNIFICANT CHANGE UP (ref 70–99)

## 2020-08-27 PROCEDURE — 73502 X-RAY EXAM HIP UNI 2-3 VIEWS: CPT | Mod: 26,LT

## 2020-08-27 PROCEDURE — 71045 X-RAY EXAM CHEST 1 VIEW: CPT | Mod: 26

## 2020-08-27 RX ADMIN — ALBUTEROL 1 PUFF(S): 90 AEROSOL, METERED ORAL at 08:20

## 2020-08-27 RX ADMIN — CHLORHEXIDINE GLUCONATE 1 APPLICATION(S): 213 SOLUTION TOPICAL at 05:45

## 2020-08-27 RX ADMIN — Medication 650 MILLIGRAM(S): at 12:31

## 2020-08-27 RX ADMIN — ATORVASTATIN CALCIUM 40 MILLIGRAM(S): 80 TABLET, FILM COATED ORAL at 21:54

## 2020-08-27 RX ADMIN — Medication 25 MILLIGRAM(S): at 05:45

## 2020-08-27 RX ADMIN — ENOXAPARIN SODIUM 40 MILLIGRAM(S): 100 INJECTION SUBCUTANEOUS at 12:27

## 2020-08-27 RX ADMIN — Medication 3 MILLILITER(S): at 19:55

## 2020-08-27 RX ADMIN — ALBUTEROL 1 PUFF(S): 90 AEROSOL, METERED ORAL at 13:06

## 2020-08-27 RX ADMIN — PANTOPRAZOLE SODIUM 40 MILLIGRAM(S): 20 TABLET, DELAYED RELEASE ORAL at 06:24

## 2020-08-27 RX ADMIN — SENNA PLUS 2 TABLET(S): 8.6 TABLET ORAL at 21:55

## 2020-08-27 RX ADMIN — Medication 81 MILLIGRAM(S): at 12:27

## 2020-08-27 RX ADMIN — DRONEDARONE 400 MILLIGRAM(S): 400 TABLET, FILM COATED ORAL at 05:45

## 2020-08-27 RX ADMIN — Medication 650 MILLIGRAM(S): at 17:53

## 2020-08-27 RX ADMIN — BUDESONIDE AND FORMOTEROL FUMARATE DIHYDRATE 2 PUFF(S): 160; 4.5 AEROSOL RESPIRATORY (INHALATION) at 07:52

## 2020-08-27 RX ADMIN — DRONEDARONE 400 MILLIGRAM(S): 400 TABLET, FILM COATED ORAL at 17:50

## 2020-08-27 RX ADMIN — POLYETHYLENE GLYCOL 3350 17 GRAM(S): 17 POWDER, FOR SOLUTION ORAL at 17:50

## 2020-08-27 RX ADMIN — Medication 5 MILLIGRAM(S): at 21:54

## 2020-08-27 RX ADMIN — Medication 112 MICROGRAM(S): at 05:45

## 2020-08-27 RX ADMIN — BUDESONIDE AND FORMOTEROL FUMARATE DIHYDRATE 2 PUFF(S): 160; 4.5 AEROSOL RESPIRATORY (INHALATION) at 21:55

## 2020-08-27 NOTE — PROGRESS NOTE ADULT - ASSESSMENT
Impression:    Severe COPD on home O2 now on 3 l nc stable/ CO sob NO CHANGE IN PAX  Anemia  Left comminuted intertrochanteric fracture sp fall s/p sx  Right posterior 10-11th rib fx  A Fib    Recommendations:    please repeat CBC evaluate trend of hb  cxr  Incentive spirometry  Neb as needed  SYMBICORT/ spiriva  DVT prophylaxis  will follow

## 2020-08-27 NOTE — PROGRESS NOTE ADULT - ASSESSMENT
89 y/o female with hx of COPD on 3-4 L O2 nasal canula, hypothyroidism and new onset atrial fibrillation admitted to  for rehab of multitrauma after fall with injuries including a nondisplaced C1 fx, aged indeterminant T5 fx, right posterior 10-11 rib fractures, and left intertroch fx. She is WBAT for her LLE. .    Rehab of multiple trauma c1 fx,  left HIP FT rib FX's    Patient requires 3 hrs daily of interdisciplinary inpatient rehab.     -Pain control: Tylenol PRN, oxycodone prn    -GI/Bowel Mgmt -  senna, miralax prn for constipation     -Skin: left hip surgical wound incisions C/D/I    -FEN   - Diet -  Regular    Medical Comorbidities    #paroxsymal Atrial fibrillation  -Eliquis discontinued, patient now only on ASA after discussion with patient and son regarding risks and benefits  -Metoprolol ER 25 POD  -Multaq 400 BID    #COPD  -2-4 L O2 NC  -Continue home meds  -As per pulm 8/27, f/u CXR    #HLD  Atorvastatin 40mg po qhs    #Hypothyroidism  -Levothyroxine increased to 112 mcg daily with her increased TSH. Will need recheck in 6-8 weeks.    #Presumed osteoporosis on calcium; vit D 25 OH was checked and WNL's.    #Post op anemia  - stable, hgb 8.1  - will continue to monitor  -F/u cbc    #Lower extremity swelling  -likely 2/2 recent surgery  -venous duplex negative for dvt    Precautions / PROPHYLAXIS:      - Falls    - Ortho: Weight bearing status: WBAT LLE      - DVT prophylaxis: Lovenox       Patient seen and discussed with my attending, Dr. Diaz

## 2020-08-27 NOTE — PROGRESS NOTE ADULT - SUBJECTIVE AND OBJECTIVE BOX
OVERNIGHT EVENTS: events noted, co sob, occ cough, still on 3 l NC 96%    Vital Signs Last 24 Hrs  T(C): 36.7 (27 Aug 2020 04:38), Max: 36.7 (26 Aug 2020 12:50)  T(F): 98.1 (27 Aug 2020 04:38), Max: 98.1 (26 Aug 2020 12:50)  HR: 78 (27 Aug 2020 04:38) (76 - 78)  BP: 110/60 (27 Aug 2020 04:38) (98/53 - 112/53)  RR: 18 (27 Aug 2020 04:38) (18 - 18)  SpO2: 96% (27 Aug 2020 04:38) (96% - 96%)    PHYSICAL EXAMINATION:    GENERAL: The patient is awake and alert in no apparent distress.     HEENT: Head is normocephalic and atraumatic.     NECK: Supple.    LUNGS: DEC BS BOTH BASES    HEART: MANUEL 3/6    ABDOMEN: Soft, nontender, and nondistended.      EXTREMITIES: Without any cyanosis, clubbing, rash, lesions or edema.    NEUROLOGIC: Grossly intact.    SKIN: No ulceration or induration present.      LABS:                                08-26-20 @ 07:01  -  08-27-20 @ 07:00  --------------------------------------------------------  IN: 240 mL / OUT: 0 mL / NET: 240 mL        MICROBIOLOGY:      MEDICATIONS  (STANDING):  ALBUTerol    90 MICROgram(s) HFA Inhaler 1 Puff(s) Inhalation every 6 hours  aspirin  chewable 81 milliGRAM(s) Oral daily  atorvastatin 40 milliGRAM(s) Oral at bedtime  budesonide 160 MICROgram(s)/formoterol 4.5 MICROgram(s) Inhaler 2 Puff(s) Inhalation two times a day  chlorhexidine 4% Liquid 1 Application(s) Topical <User Schedule>  dronedarone 400 milliGRAM(s) Oral two times a day  enoxaparin Injectable 40 milliGRAM(s) SubCutaneous daily  levothyroxine 112 MICROGram(s) Oral daily  melatonin 5 milliGRAM(s) Oral at bedtime  metoprolol succinate ER 25 milliGRAM(s) Oral daily  pantoprazole    Tablet 40 milliGRAM(s) Oral before breakfast  polyethylene glycol 3350 17 Gram(s) Oral <User Schedule>  senna 2 Tablet(s) Oral at bedtime  tiotropium 18 MICROgram(s) Capsule 1 Capsule(s) Inhalation daily    MEDICATIONS  (PRN):  acetaminophen   Tablet .. 650 milliGRAM(s) Oral every 6 hours PRN Temp greater or equal to 38C (100.4F), Mild Pain (1 - 3), Moderate Pain (4 - 6)  albuterol/ipratropium for Nebulization 3 milliLiter(s) Nebulizer every 6 hours PRN Shortness of Breath and/or Wheezing  bisacodyl Suppository 10 milliGRAM(s) Rectal daily PRN If no bowel movement  ondansetron   Disintegrating Tablet 4 milliGRAM(s) Oral every 8 hours PRN Nausea and/or Vomiting      RADIOLOGY & ADDITIONAL STUDIES:

## 2020-08-27 NOTE — PROGRESS NOTE ADULT - SUBJECTIVE AND OBJECTIVE BOX
Patient is a 89y old  Female who presents with a chief complaint of Rehab of Multitrauma, left hip fx orif, c1 fx j collar, rt rib fx (18 Aug 2020 07:39)      HPI:  89 y/o right hand dominant female with PMH COPD on 3-4L NC, hypothyroidism presenting to the ED s/p mechanical fall Per the patient she had gotten up to check on her albuterol treatment, started to sit back down on her recliner but missed, falling on her left side. She denies LOC, HT. She was unable to ambulate after the fall and complained of L hip pain. Found have multiple injuries including a nondisplaced C1 fx, aged indeterminant T5 fx, right posterior 10-11 rib fractures, and left intertroch fx. After clearance by both pulmonology and cardiology, she went to OR with orthopedics for Left hip IMN on 813/2020. Post operatively her course was complicated with new onset atrial fibrillation and post operative anemia for which she was given 1 units pRBCs. Hgb over the past 48 hours has been stable at 8.2. For her atrial fibrillation cardiology was consulted and she was started on Eliquis, metoprolol and multaq and is currently in NSR. In regards to her nondisplaced C1 fx she was in a [place] J which was subsequently cleared by neurosurgery. pt eval by physiatry deemed a good acute rehab candidate to improve function for safe . DC home CAN tolerate and benefit from 3 hrs  ofd acute rehab     Patient was evaluated by physiatry to be a good candidate for rehab and will be admitted to  to undergo intensive 3 hour/day rehabilitation (17 Aug 2020 11:27)    pulmonary noted  TODAY'S SUBJECTIVE & REVIEW OF SYMPTOMS     No acute overnight events. VSS. Tolerating therapy well. Slighty SBO after therapy. Pulm recs appreciated  I suggested she take Tylenol at lunch time before PT at 1 pm to help with hip pain.  CLOF: Supervision for BM and transfers. Ambulates 100ft with RW, step through gait    PHYSICAL EXAM  Vital Signs Last 24 Hrs  T(C): 36.7 (27 Aug 2020 04:38), Max: 36.7 (26 Aug 2020 12:50)  T(F): 98.1 (27 Aug 2020 04:38), Max: 98.1 (26 Aug 2020 12:50)  HR: 78 (27 Aug 2020 04:38) (76 - 78)  BP: 110/60 (27 Aug 2020 04:38) (98/53 - 112/53)  BP(mean): --  RR: 18 (27 Aug 2020 04:38) (18 - 18)  SpO2: 96% (27 Aug 2020 04:38) (96% - 96%)    Constitutional - NAD, Comfortable OOB to WC, o2 via NC  Chest - CTAB  Cardiovascular - RRR  Abdomen - Soft, NTND  Extremities - No C/C/E, No calf tenderness   Neurologic Exam -                    Cognitive - Awake, Alert, AAO to self, place, date, year, situation     Communication - Fluent, No dysarthria  Motor     LEFT    UE: [X] WNL  	RIGHT UE:  [X] WNL   	LEFT    LE:  [X] other: Decrease strength with hip flexion and knee extension due to FX hip    RIGHT LE:  [X] WNLNo focal deficits   Sensory - Intact to LT  Reflexes - wnl/ symmetric  Psychiatric - Mood stable, Affect WNL      acetaminophen   Tablet .. 650 milliGRAM(s) Oral every 6 hours PRN  ALBUTerol    90 MICROgram(s) HFA Inhaler 1 Puff(s) Inhalation every 6 hours  albuterol/ipratropium for Nebulization 3 milliLiter(s) Nebulizer every 6 hours PRN  aspirin  chewable 81 milliGRAM(s) Oral daily  atorvastatin 40 milliGRAM(s) Oral at bedtime  bisacodyl Suppository 10 milliGRAM(s) Rectal daily PRN  budesonide 160 MICROgram(s)/formoterol 4.5 MICROgram(s) Inhaler 2 Puff(s) Inhalation two times a day  chlorhexidine 4% Liquid 1 Application(s) Topical <User Schedule>  dronedarone 400 milliGRAM(s) Oral two times a day  enoxaparin Injectable 40 milliGRAM(s) SubCutaneous daily  levothyroxine 88 MICROGram(s) Oral daily  melatonin 5 milliGRAM(s) Oral at bedtime  metoprolol succinate ER 25 milliGRAM(s) Oral daily  ondansetron   Disintegrating Tablet 4 milliGRAM(s) Oral every 8 hours PRN  oxyCODONE    IR 5 milliGRAM(s) Oral every 6 hours PRN  pantoprazole    Tablet 40 milliGRAM(s) Oral before breakfast  polyethylene glycol 3350 17 Gram(s) Oral <User Schedule>  senna 2 Tablet(s) Oral at bedtime  tiotropium 18 MICROgram(s) Capsule 1 Capsule(s) Inhalation daily      RECENT LABS/IMAGING                                   7.9    7.55  )-----------( 339      ( 24 Aug 2020 07:09 )             26.2     08-24    140  |  98  |  25<H>  ----------------------------<  93  5.3<H>   |  34<H>  |  1.1    Ca    8.7      24 Aug 2020 07:09  Mg     2.0     08-24    TPro  5.3<L>  /  Alb  3.2<L>  /  TBili  1.0  /  DBili  x   /  AST  24  /  ALT  12  /  AlkPhos  77  08-24

## 2020-08-27 NOTE — PROGRESS NOTE ADULT - SUBJECTIVE AND OBJECTIVE BOX
Asked to be seen by OT.  Much pain in left with PT and OT.  Pain is severe today.  Yesterday pain was mild and she walked 100 ft yesterday.  Prox left hip incission some mild erythema.  Warrants Xrays and ortho f/U.  There is some mild left thigh swelling.  on Lovenox for DVT prophylaxis.  had neg duplex recently.

## 2020-08-28 LAB
GLUCOSE BLDC GLUCOMTR-MCNC: 101 MG/DL — HIGH (ref 70–99)
GLUCOSE BLDC GLUCOMTR-MCNC: 83 MG/DL — SIGNIFICANT CHANGE UP (ref 70–99)
HCT VFR BLD CALC: 27.8 % — LOW (ref 37–47)
HGB BLD-MCNC: 8.3 G/DL — LOW (ref 12–16)
MCHC RBC-ENTMCNC: 29.9 G/DL — LOW (ref 32–37)
MCHC RBC-ENTMCNC: 31.2 PG — HIGH (ref 27–31)
MCV RBC AUTO: 104.5 FL — HIGH (ref 81–99)
NRBC # BLD: 0 /100 WBCS — SIGNIFICANT CHANGE UP (ref 0–0)
PLATELET # BLD AUTO: 320 K/UL — SIGNIFICANT CHANGE UP (ref 130–400)
RBC # BLD: 2.66 M/UL — LOW (ref 4.2–5.4)
RBC # FLD: 17.4 % — HIGH (ref 11.5–14.5)
WBC # BLD: 6.87 K/UL — SIGNIFICANT CHANGE UP (ref 4.8–10.8)
WBC # FLD AUTO: 6.87 K/UL — SIGNIFICANT CHANGE UP (ref 4.8–10.8)

## 2020-08-28 RX ORDER — IBUPROFEN 200 MG
400 TABLET ORAL EVERY 8 HOURS
Refills: 0 | Status: DISCONTINUED | OUTPATIENT
Start: 2020-08-28 | End: 2020-08-28

## 2020-08-28 RX ORDER — IBUPROFEN 200 MG
200 TABLET ORAL EVERY 8 HOURS
Refills: 0 | Status: DISCONTINUED | OUTPATIENT
Start: 2020-08-28 | End: 2020-08-31

## 2020-08-28 RX ORDER — ACETAMINOPHEN 500 MG
975 TABLET ORAL EVERY 8 HOURS
Refills: 0 | Status: DISCONTINUED | OUTPATIENT
Start: 2020-08-28 | End: 2020-08-31

## 2020-08-28 RX ORDER — FUROSEMIDE 40 MG
20 TABLET ORAL DAILY
Refills: 0 | Status: DISCONTINUED | OUTPATIENT
Start: 2020-08-28 | End: 2020-08-31

## 2020-08-28 RX ADMIN — Medication 650 MILLIGRAM(S): at 02:48

## 2020-08-28 RX ADMIN — Medication 112 MICROGRAM(S): at 05:42

## 2020-08-28 RX ADMIN — ALBUTEROL 1 PUFF(S): 90 AEROSOL, METERED ORAL at 07:33

## 2020-08-28 RX ADMIN — Medication 650 MILLIGRAM(S): at 08:41

## 2020-08-28 RX ADMIN — Medication 975 MILLIGRAM(S): at 22:31

## 2020-08-28 RX ADMIN — Medication 200 MILLIGRAM(S): at 22:32

## 2020-08-28 RX ADMIN — Medication 650 MILLIGRAM(S): at 02:17

## 2020-08-28 RX ADMIN — CHLORHEXIDINE GLUCONATE 1 APPLICATION(S): 213 SOLUTION TOPICAL at 05:43

## 2020-08-28 RX ADMIN — BUDESONIDE AND FORMOTEROL FUMARATE DIHYDRATE 2 PUFF(S): 160; 4.5 AEROSOL RESPIRATORY (INHALATION) at 08:08

## 2020-08-28 RX ADMIN — SENNA PLUS 2 TABLET(S): 8.6 TABLET ORAL at 22:31

## 2020-08-28 RX ADMIN — PANTOPRAZOLE SODIUM 40 MILLIGRAM(S): 20 TABLET, DELAYED RELEASE ORAL at 05:43

## 2020-08-28 RX ADMIN — Medication 81 MILLIGRAM(S): at 12:08

## 2020-08-28 RX ADMIN — POLYETHYLENE GLYCOL 3350 17 GRAM(S): 17 POWDER, FOR SOLUTION ORAL at 18:09

## 2020-08-28 RX ADMIN — DRONEDARONE 400 MILLIGRAM(S): 400 TABLET, FILM COATED ORAL at 18:09

## 2020-08-28 RX ADMIN — Medication 25 MILLIGRAM(S): at 05:42

## 2020-08-28 RX ADMIN — BUDESONIDE AND FORMOTEROL FUMARATE DIHYDRATE 2 PUFF(S): 160; 4.5 AEROSOL RESPIRATORY (INHALATION) at 22:33

## 2020-08-28 RX ADMIN — Medication 975 MILLIGRAM(S): at 14:41

## 2020-08-28 RX ADMIN — ATORVASTATIN CALCIUM 40 MILLIGRAM(S): 80 TABLET, FILM COATED ORAL at 22:32

## 2020-08-28 RX ADMIN — Medication 20 MILLIGRAM(S): at 14:40

## 2020-08-28 RX ADMIN — ENOXAPARIN SODIUM 40 MILLIGRAM(S): 100 INJECTION SUBCUTANEOUS at 12:08

## 2020-08-28 RX ADMIN — Medication 5 MILLIGRAM(S): at 22:31

## 2020-08-28 RX ADMIN — DRONEDARONE 400 MILLIGRAM(S): 400 TABLET, FILM COATED ORAL at 05:42

## 2020-08-28 NOTE — PROGRESS NOTE ADULT - SUBJECTIVE AND OBJECTIVE BOX
pod15 s/p orif left hip   rpt xrays no interval change   d/c staples   continue with therapy pod15 s/p orif left hip   rpt xrays no interval change   d/c staples   continue with therapy   pt seen and examined   staples out  mild swelling   xrays look good  continue PT

## 2020-08-28 NOTE — PROGRESS NOTE ADULT - SUBJECTIVE AND OBJECTIVE BOX
OVERNIGHT EVENTS: events noted, CXR, hil x ray reviewed, afebrile    Vital Signs Last 24 Hrs  T(C): 36.5 (28 Aug 2020 05:44), Max: 36.5 (28 Aug 2020 05:44)  T(F): 97.7 (28 Aug 2020 05:44), Max: 97.7 (28 Aug 2020 05:44)  HR: 73 (28 Aug 2020 05:44) (71 - 77)  BP: 109/54 (28 Aug 2020 05:44) (109/54 - 115/56)  RR: 18 (28 Aug 2020 05:44) (18 - 20)  SpO2: 96%    PHYSICAL EXAMINATION:    GENERAL: comfortable, pale      HEENT: Head is normocephalic and atraumatic. Extraocular muscles are intact. Mucous membranes are moist.    NECK: Supple.    LUNGS: dec bs both bases    HEART: MANUEL 3/6    ABDOMEN: Soft, nontender, and nondistended.      EXTREMITIES: hip swelling    NEUROLOGIC: Grossly intact.    SKIN: No ulceration or induration present.      LABS:                                08-27-20 @ 07:01  -  08-28-20 @ 07:00  --------------------------------------------------------  IN: 120 mL / OUT: 0 mL / NET: 120 mL        MICROBIOLOGY:      MEDICATIONS  (STANDING):  ALBUTerol    90 MICROgram(s) HFA Inhaler 1 Puff(s) Inhalation every 6 hours  aspirin  chewable 81 milliGRAM(s) Oral daily  atorvastatin 40 milliGRAM(s) Oral at bedtime  budesonide 160 MICROgram(s)/formoterol 4.5 MICROgram(s) Inhaler 2 Puff(s) Inhalation two times a day  chlorhexidine 4% Liquid 1 Application(s) Topical <User Schedule>  dronedarone 400 milliGRAM(s) Oral two times a day  enoxaparin Injectable 40 milliGRAM(s) SubCutaneous daily  levothyroxine 112 MICROGram(s) Oral daily  melatonin 5 milliGRAM(s) Oral at bedtime  metoprolol succinate ER 25 milliGRAM(s) Oral daily  pantoprazole    Tablet 40 milliGRAM(s) Oral before breakfast  polyethylene glycol 3350 17 Gram(s) Oral <User Schedule>  senna 2 Tablet(s) Oral at bedtime  tiotropium 18 MICROgram(s) Capsule 1 Capsule(s) Inhalation daily    MEDICATIONS  (PRN):  acetaminophen   Tablet .. 650 milliGRAM(s) Oral every 6 hours PRN Temp greater or equal to 38C (100.4F), Mild Pain (1 - 3), Moderate Pain (4 - 6)  albuterol/ipratropium for Nebulization 3 milliLiter(s) Nebulizer every 6 hours PRN Shortness of Breath and/or Wheezing  bisacodyl Suppository 10 milliGRAM(s) Rectal daily PRN If no bowel movement  ondansetron   Disintegrating Tablet 4 milliGRAM(s) Oral every 8 hours PRN Nausea and/or Vomiting      RADIOLOGY & ADDITIONAL STUDIES:

## 2020-08-28 NOTE — PROGRESS NOTE ADULT - ASSESSMENT
89 y/o female with hx of COPD on 3-4 L O2 nasal canula, hypothyroidism and new onset atrial fibrillation admitted to  for rehab of multitrauma after fall with injuries including a nondisplaced C1 fx, aged indeterminant T5 fx, right posterior 10-11 rib fractures, and left intertroch fx. She is WBAT for her LLE. .    Rehab of multiple trauma c1 fx,  left HIP FT rib FX's    Patient requires 3 hrs daily of interdisciplinary inpatient rehab.     -Pain control: Tylenol PRN, oxycodone prn    -GI/Bowel Mgmt -  senna, miralax prn for constipation     -Skin: left hip surgical wound incisions C/D/I    -FEN   - Diet -  Regular    Medical Comorbidities    #paroxsymal Atrial fibrillation  -Eliquis discontinued, patient now only on ASA after discussion with patient and son regarding risks and benefits  -Metoprolol ER 25 POD  -Multaq 400 BID    #COPD  -2-4 L O2 NC  -Continue home meds  -As per pulm 8/27, f/u CXR    #HLD  Atorvastatin 40mg po qhs    #Hypothyroidism  -Levothyroxine increased to 112 mcg daily with her increased TSH. Will need recheck in 6-8 weeks.    #Presumed osteoporosis on calcium; vit D 25 OH was checked and WNL's.    #Post op anemia  - stable, hgb 8.1  - will continue to monitor  -F/u cbc    #Lower extremity swelling  -likely 2/2 recent surgery  -venous duplex negative for dvt  -Follow ortho recs  -Follow up hip x rays    Precautions / PROPHYLAXIS:      - Falls    - Ortho: Weight bearing status: WBAT LLE      - DVT prophylaxis: Lovenox       Patient seen and discussed with my attending, Dr. Diaz

## 2020-08-28 NOTE — PROGRESS NOTE ADULT - ASSESSMENT
Impression:    Severe COPD on home O2 now on 3 l nc stable/ CO sob NO CHANGE IN POX/ CXR stable  Anemia ( ro hematoma hip)  Left comminuted intertrochanteric fracture sp fall s/p sx  Right posterior 10-11th rib fx  A Fib    Recommendations:    please repeat CBC evaluate trend of hb ( if dec will explain SOB)  Incentive spirometry  Neb as needed  SYMBICORT/ spiriva  DVT prophylaxis  will follow Impression:    Severe COPD on home O2 now on 3 l nc stable/ CO sob NO CHANGE IN POX/ CXR reviewed  Anemia ( ro hematoma hip)  Left comminuted intertrochanteric fracture sp fall s/p sx  Right posterior 10-11th rib fx  A Fib    Recommendations:    please repeat CBC evaluate trend of hb ( if dec will explain SOB)  BNP  Lasix po  Incentive spirometry  Neb as needed  SYMBICORT/ spiriva  DVT prophylaxis  will follow

## 2020-08-28 NOTE — CHART NOTE - NSCHARTNOTEFT_GEN_A_CORE
89 yo F with multitrauma due to a fall at home, s/p ORIF left hip fx 8/13, was seen by Ortho today because of increased left hip pain;  she was previously able to ambulate 100 feet but only 15 to 30 feet for the past 2 days. Xrays of left hip were done yesterday,  and showed good alignment, no new fractures; she has some staple erythema proximally, but otherwise healing well.  Staples will be removed today, okay to d/c as per Ortho. Spoke to the patient's son, who wants to talk to the orthopedist.  Per Ortho ELOISA Montaño, Dr. Lynn will see the patient later today on rounds, and if her son isn't there, Dr. Lynn will call him. Her son was notified  of this and is agreeable.    Dr. Diaz wanted to start ibuprofen 400mg po q8h x 7 days for her left hip pain, but after discussing this with him, it was decided to   give her very low dose of 200mg po q8h x 5 days, since she is fragile, 89 years old, on ASA 81mg and Lovenox 40mg sc daily, and also with multiple co-morbidities. She is on Protonix for GI prophylaxis.  Tylenol was also increased to 975mg po q8h standing dose.    The patient also has severe COPD and is O2 dependent and on nebulizers and inhalers. She is followed often by Pulmonary  Dr. Manuel. He reviewed her CXR from yesterday, which showed pulmonary vascular congestion, and she is having  more SOB. Dr. Manuel recommended starting lasix, so began 20mg po daily today, since she is small and BP was   only 109/54  this AM.  Per Dr. Manuel's recommendation, will get BNP with tomorrow's labs. Also  ordered daily weights and I's and O's.    Vital Signs Last 24 Hrs:  T(C): 36.2 (28 Aug 2020 14:13), Max: 36.5 (28 Aug 2020 05:44)  T(F): 97.2 (28 Aug 2020 14:13), Max: 97.7 (28 Aug 2020 05:44)  HR: 70 (28 Aug 2020 14:13) (70 - 73)  BP: 115/58 (28 Aug 2020 14:13) (109/54 - 115/58)  BP(mean): --  RR: 20 (28 Aug 2020 14:13) (18 - 20)  SpO2: -- 96% on 3LPM 8/27    The patient was also found to have a fib this admission, and is being treated with Multaq and metoprolol.  Her son did not want her to take apixaban, so she is taking aspirin 81mg po daily instead.  She was seen by Dr. Kay and Dr. Hermosillo while in hospital, however her son stated that her  cardiologist is Dr. Gray, and he would like her to follow up with him after discharge, since   he knows her and has all her records.    The patient's synthroid was also increased from 88 to 112mcg po daily on 8/25, due to TSH = 17.1; free T4 = 0.9.    LABS: (CBC is stable today):                8.3    6.87  )-----------( 320      ( 28 Aug 2020 07:02 )              27.8       Continue to monitor VS, labs, I's and O's, weights closely  Check BMP, Mg++ and BNP tomorrow and all labs on 8/31  Pulmonary and Ortho follow ups appreciated  D/C left hip staples today    Discussed with Dr. Diaz

## 2020-08-29 LAB
ANION GAP SERPL CALC-SCNC: 10 MMOL/L — SIGNIFICANT CHANGE UP (ref 7–14)
BUN SERPL-MCNC: 28 MG/DL — HIGH (ref 10–20)
CALCIUM SERPL-MCNC: 8.7 MG/DL — SIGNIFICANT CHANGE UP (ref 8.5–10.1)
CHLORIDE SERPL-SCNC: 99 MMOL/L — SIGNIFICANT CHANGE UP (ref 98–110)
CO2 SERPL-SCNC: 32 MMOL/L — SIGNIFICANT CHANGE UP (ref 17–32)
CREAT SERPL-MCNC: 1.1 MG/DL — SIGNIFICANT CHANGE UP (ref 0.7–1.5)
GLUCOSE BLDC GLUCOMTR-MCNC: 108 MG/DL — HIGH (ref 70–99)
GLUCOSE BLDC GLUCOMTR-MCNC: 124 MG/DL — HIGH (ref 70–99)
GLUCOSE BLDC GLUCOMTR-MCNC: 66 MG/DL — LOW (ref 70–99)
GLUCOSE SERPL-MCNC: 85 MG/DL — SIGNIFICANT CHANGE UP (ref 70–99)
MAGNESIUM SERPL-MCNC: 2 MG/DL — SIGNIFICANT CHANGE UP (ref 1.8–2.4)
NT-PROBNP SERPL-SCNC: 439 PG/ML — HIGH (ref 0–300)
POTASSIUM SERPL-MCNC: 5.1 MMOL/L — HIGH (ref 3.5–5)
POTASSIUM SERPL-SCNC: 5.1 MMOL/L — HIGH (ref 3.5–5)
SODIUM SERPL-SCNC: 141 MMOL/L — SIGNIFICANT CHANGE UP (ref 135–146)

## 2020-08-29 RX ADMIN — Medication 200 MILLIGRAM(S): at 22:43

## 2020-08-29 RX ADMIN — POLYETHYLENE GLYCOL 3350 17 GRAM(S): 17 POWDER, FOR SOLUTION ORAL at 17:28

## 2020-08-29 RX ADMIN — BUDESONIDE AND FORMOTEROL FUMARATE DIHYDRATE 2 PUFF(S): 160; 4.5 AEROSOL RESPIRATORY (INHALATION) at 07:40

## 2020-08-29 RX ADMIN — Medication 112 MICROGRAM(S): at 06:12

## 2020-08-29 RX ADMIN — Medication 81 MILLIGRAM(S): at 12:12

## 2020-08-29 RX ADMIN — BUDESONIDE AND FORMOTEROL FUMARATE DIHYDRATE 2 PUFF(S): 160; 4.5 AEROSOL RESPIRATORY (INHALATION) at 21:35

## 2020-08-29 RX ADMIN — Medication 3 MILLILITER(S): at 08:01

## 2020-08-29 RX ADMIN — Medication 975 MILLIGRAM(S): at 06:13

## 2020-08-29 RX ADMIN — Medication 200 MILLIGRAM(S): at 06:12

## 2020-08-29 RX ADMIN — DRONEDARONE 400 MILLIGRAM(S): 400 TABLET, FILM COATED ORAL at 17:27

## 2020-08-29 RX ADMIN — Medication 25 MILLIGRAM(S): at 06:12

## 2020-08-29 RX ADMIN — PANTOPRAZOLE SODIUM 40 MILLIGRAM(S): 20 TABLET, DELAYED RELEASE ORAL at 06:12

## 2020-08-29 RX ADMIN — DRONEDARONE 400 MILLIGRAM(S): 400 TABLET, FILM COATED ORAL at 06:13

## 2020-08-29 RX ADMIN — Medication 3 MILLILITER(S): at 13:37

## 2020-08-29 RX ADMIN — ENOXAPARIN SODIUM 40 MILLIGRAM(S): 100 INJECTION SUBCUTANEOUS at 12:12

## 2020-08-29 RX ADMIN — Medication 975 MILLIGRAM(S): at 13:54

## 2020-08-29 RX ADMIN — Medication 975 MILLIGRAM(S): at 22:43

## 2020-08-29 RX ADMIN — Medication 200 MILLIGRAM(S): at 21:38

## 2020-08-29 RX ADMIN — Medication 5 MILLIGRAM(S): at 21:37

## 2020-08-29 RX ADMIN — Medication 975 MILLIGRAM(S): at 21:37

## 2020-08-29 RX ADMIN — ATORVASTATIN CALCIUM 40 MILLIGRAM(S): 80 TABLET, FILM COATED ORAL at 21:37

## 2020-08-29 RX ADMIN — Medication 200 MILLIGRAM(S): at 13:54

## 2020-08-29 RX ADMIN — Medication 975 MILLIGRAM(S): at 11:33

## 2020-08-29 RX ADMIN — Medication 20 MILLIGRAM(S): at 06:12

## 2020-08-29 RX ADMIN — Medication 975 MILLIGRAM(S): at 12:12

## 2020-08-29 NOTE — PROGRESS NOTE ADULT - SUBJECTIVE AND OBJECTIVE BOX
T(C): 36.4 (08-29-20 @ 14:17), Max: 36.4 (08-29-20 @ 14:17)  HR: 74 (08-29-20 @ 14:17) (74 - 84)  BP: 112/56 (08-29-20 @ 14:17) (112/56 - 131/60)  RR: 18 (08-29-20 @ 14:17) (18 - 18)  SpO2: --      Patient was stable overnight and expresses no new complaints. She has had severe pain in the left hip on Thurs. and Fri. Xrays were OK. She is on Advil 200 mg po q8h for 4 more out of 5 days. Better after lasix yesterday. Antic D/c home Mon.     PE:    Alert   LUNGS- clear  COR- RRR  ABD- SOFT, NT  EXTR- w/o edema  NEURO- stable    08-29    141  |  99  |  28<H>  ----------------------------<  85  5.1<H>   |  32  |  1.1    Ca    8.7      29 Aug 2020 06:34  Mg     2.0     08-29                              8.3    6.87  )-----------( 320      ( 28 Aug 2020 07:02 )             27.8             Rehab of Left hip Fx, s/p gamma nail.  Improving after pain flare up on Thurs and Fri.  Continue acute rehab program.  Low K diet.

## 2020-08-30 LAB
ANION GAP SERPL CALC-SCNC: 10 MMOL/L — SIGNIFICANT CHANGE UP (ref 7–14)
BUN SERPL-MCNC: 29 MG/DL — HIGH (ref 10–20)
CALCIUM SERPL-MCNC: 8.5 MG/DL — SIGNIFICANT CHANGE UP (ref 8.5–10.1)
CHLORIDE SERPL-SCNC: 99 MMOL/L — SIGNIFICANT CHANGE UP (ref 98–110)
CO2 SERPL-SCNC: 33 MMOL/L — HIGH (ref 17–32)
CREAT SERPL-MCNC: 1.1 MG/DL — SIGNIFICANT CHANGE UP (ref 0.7–1.5)
GLUCOSE BLDC GLUCOMTR-MCNC: 110 MG/DL — HIGH (ref 70–99)
GLUCOSE BLDC GLUCOMTR-MCNC: 131 MG/DL — HIGH (ref 70–99)
GLUCOSE BLDC GLUCOMTR-MCNC: 85 MG/DL — SIGNIFICANT CHANGE UP (ref 70–99)
GLUCOSE BLDC GLUCOMTR-MCNC: 93 MG/DL — SIGNIFICANT CHANGE UP (ref 70–99)
GLUCOSE SERPL-MCNC: 92 MG/DL — SIGNIFICANT CHANGE UP (ref 70–99)
POTASSIUM SERPL-MCNC: 5.4 MMOL/L — HIGH (ref 3.5–5)
POTASSIUM SERPL-SCNC: 5.4 MMOL/L — HIGH (ref 3.5–5)
SODIUM SERPL-SCNC: 142 MMOL/L — SIGNIFICANT CHANGE UP (ref 135–146)

## 2020-08-30 RX ORDER — SODIUM ZIRCONIUM CYCLOSILICATE 10 G/10G
5 POWDER, FOR SUSPENSION ORAL
Refills: 0 | Status: COMPLETED | OUTPATIENT
Start: 2020-08-30 | End: 2020-08-31

## 2020-08-30 RX ADMIN — PANTOPRAZOLE SODIUM 40 MILLIGRAM(S): 20 TABLET, DELAYED RELEASE ORAL at 06:10

## 2020-08-30 RX ADMIN — BUDESONIDE AND FORMOTEROL FUMARATE DIHYDRATE 2 PUFF(S): 160; 4.5 AEROSOL RESPIRATORY (INHALATION) at 07:24

## 2020-08-30 RX ADMIN — Medication 200 MILLIGRAM(S): at 13:54

## 2020-08-30 RX ADMIN — ATORVASTATIN CALCIUM 40 MILLIGRAM(S): 80 TABLET, FILM COATED ORAL at 21:57

## 2020-08-30 RX ADMIN — Medication 200 MILLIGRAM(S): at 21:58

## 2020-08-30 RX ADMIN — ENOXAPARIN SODIUM 40 MILLIGRAM(S): 100 INJECTION SUBCUTANEOUS at 12:05

## 2020-08-30 RX ADMIN — Medication 975 MILLIGRAM(S): at 12:05

## 2020-08-30 RX ADMIN — Medication 975 MILLIGRAM(S): at 13:54

## 2020-08-30 RX ADMIN — Medication 200 MILLIGRAM(S): at 06:09

## 2020-08-30 RX ADMIN — Medication 81 MILLIGRAM(S): at 12:05

## 2020-08-30 RX ADMIN — Medication 112 MICROGRAM(S): at 06:10

## 2020-08-30 RX ADMIN — Medication 3 MILLILITER(S): at 08:05

## 2020-08-30 RX ADMIN — Medication 200 MILLIGRAM(S): at 12:05

## 2020-08-30 RX ADMIN — DRONEDARONE 400 MILLIGRAM(S): 400 TABLET, FILM COATED ORAL at 17:21

## 2020-08-30 RX ADMIN — Medication 5 MILLIGRAM(S): at 21:57

## 2020-08-30 RX ADMIN — Medication 975 MILLIGRAM(S): at 06:10

## 2020-08-30 RX ADMIN — Medication 975 MILLIGRAM(S): at 21:56

## 2020-08-30 RX ADMIN — DRONEDARONE 400 MILLIGRAM(S): 400 TABLET, FILM COATED ORAL at 06:10

## 2020-08-30 RX ADMIN — Medication 25 MILLIGRAM(S): at 06:10

## 2020-08-30 RX ADMIN — Medication 200 MILLIGRAM(S): at 06:39

## 2020-08-30 RX ADMIN — SENNA PLUS 2 TABLET(S): 8.6 TABLET ORAL at 21:57

## 2020-08-30 RX ADMIN — Medication 975 MILLIGRAM(S): at 06:39

## 2020-08-30 RX ADMIN — POLYETHYLENE GLYCOL 3350 17 GRAM(S): 17 POWDER, FOR SOLUTION ORAL at 17:21

## 2020-08-30 RX ADMIN — Medication 975 MILLIGRAM(S): at 21:58

## 2020-08-30 RX ADMIN — Medication 20 MILLIGRAM(S): at 06:10

## 2020-08-30 RX ADMIN — SODIUM ZIRCONIUM CYCLOSILICATE 5 GRAM(S): 10 POWDER, FOR SUSPENSION ORAL at 15:21

## 2020-08-30 NOTE — PROGRESS NOTE ADULT - SUBJECTIVE AND OBJECTIVE BOX
T(C): 35.7 (08-30-20 @ 05:55), Max: 36.4 (08-29-20 @ 14:17)  HR: 72 (08-30-20 @ 05:55) (72 - 74)  BP: 118/62 (08-30-20 @ 05:55) (112/56 - 118/62)  RR: 18 (08-30-20 @ 05:55) (18 - 18)  SpO2: 98% (08-30-20 @ 05:55) (97% - 98%)      Patient was stable overnight and expresses fno new complaints     PE: nad  aox3  doing  well      Alert   LUNGS- clear  COR- RRR  ABD- SOFT, NT  EXTR- w/o edema  NEURO- stable    08-30    142  |  99  |  29<H>  ----------------------------<  92  5.4<H>   |  33<H>  |  1.1    Ca    8.5      30 Aug 2020 06:52  Mg     2.0     08-29                Assess:  Rehab of multi  traum         Continue acute rehab program. pt  ot and current care

## 2020-08-31 ENCOUNTER — TRANSCRIPTION ENCOUNTER (OUTPATIENT)
Age: 85
End: 2020-08-31

## 2020-08-31 VITALS
RESPIRATION RATE: 18 BRPM | SYSTOLIC BLOOD PRESSURE: 126 MMHG | WEIGHT: 110.45 LBS | HEART RATE: 71 BPM | DIASTOLIC BLOOD PRESSURE: 58 MMHG | OXYGEN SATURATION: 100 % | TEMPERATURE: 97 F

## 2020-08-31 LAB
ALBUMIN SERPL ELPH-MCNC: 3.7 G/DL — SIGNIFICANT CHANGE UP (ref 3.5–5.2)
ALP SERPL-CCNC: 114 U/L — SIGNIFICANT CHANGE UP (ref 30–115)
ALT FLD-CCNC: 22 U/L — SIGNIFICANT CHANGE UP (ref 0–41)
ANION GAP SERPL CALC-SCNC: 12 MMOL/L — SIGNIFICANT CHANGE UP (ref 7–14)
AST SERPL-CCNC: 39 U/L — SIGNIFICANT CHANGE UP (ref 0–41)
BILIRUB SERPL-MCNC: 0.3 MG/DL — SIGNIFICANT CHANGE UP (ref 0.2–1.2)
BUN SERPL-MCNC: 28 MG/DL — HIGH (ref 10–20)
CALCIUM SERPL-MCNC: 8.7 MG/DL — SIGNIFICANT CHANGE UP (ref 8.5–10.1)
CHLORIDE SERPL-SCNC: 97 MMOL/L — LOW (ref 98–110)
CO2 SERPL-SCNC: 32 MMOL/L — SIGNIFICANT CHANGE UP (ref 17–32)
CREAT SERPL-MCNC: 1.2 MG/DL — SIGNIFICANT CHANGE UP (ref 0.7–1.5)
GLUCOSE BLDC GLUCOMTR-MCNC: 130 MG/DL — HIGH (ref 70–99)
GLUCOSE BLDC GLUCOMTR-MCNC: 70 MG/DL — SIGNIFICANT CHANGE UP (ref 70–99)
GLUCOSE BLDC GLUCOMTR-MCNC: 99 MG/DL — SIGNIFICANT CHANGE UP (ref 70–99)
GLUCOSE SERPL-MCNC: 88 MG/DL — SIGNIFICANT CHANGE UP (ref 70–99)
HCT VFR BLD CALC: 29.1 % — LOW (ref 37–47)
HGB BLD-MCNC: 8.4 G/DL — LOW (ref 12–16)
MAGNESIUM SERPL-MCNC: 2 MG/DL — SIGNIFICANT CHANGE UP (ref 1.8–2.4)
MCHC RBC-ENTMCNC: 28.9 G/DL — LOW (ref 32–37)
MCHC RBC-ENTMCNC: 31 PG — SIGNIFICANT CHANGE UP (ref 27–31)
MCV RBC AUTO: 107.4 FL — HIGH (ref 81–99)
NRBC # BLD: 0 /100 WBCS — SIGNIFICANT CHANGE UP (ref 0–0)
NT-PROBNP SERPL-SCNC: 511 PG/ML — HIGH (ref 0–300)
PLATELET # BLD AUTO: 274 K/UL — SIGNIFICANT CHANGE UP (ref 130–400)
POTASSIUM SERPL-MCNC: 5.5 MMOL/L — HIGH (ref 3.5–5)
POTASSIUM SERPL-SCNC: 5.5 MMOL/L — HIGH (ref 3.5–5)
PROT SERPL-MCNC: 5.9 G/DL — LOW (ref 6–8)
RBC # BLD: 2.71 M/UL — LOW (ref 4.2–5.4)
RBC # FLD: 17.4 % — HIGH (ref 11.5–14.5)
SODIUM SERPL-SCNC: 141 MMOL/L — SIGNIFICANT CHANGE UP (ref 135–146)
WBC # BLD: 6.23 K/UL — SIGNIFICANT CHANGE UP (ref 4.8–10.8)
WBC # FLD AUTO: 6.23 K/UL — SIGNIFICANT CHANGE UP (ref 4.8–10.8)

## 2020-08-31 RX ORDER — LEVOTHYROXINE SODIUM 125 MCG
1 TABLET ORAL
Qty: 30 | Refills: 0
Start: 2020-08-31 | End: 2020-09-29

## 2020-08-31 RX ORDER — ASPIRIN/CALCIUM CARB/MAGNESIUM 324 MG
1 TABLET ORAL
Qty: 100 | Refills: 0
Start: 2020-08-31

## 2020-08-31 RX ORDER — PANTOPRAZOLE SODIUM 20 MG/1
1 TABLET, DELAYED RELEASE ORAL
Qty: 30 | Refills: 0
Start: 2020-08-31 | End: 2020-09-29

## 2020-08-31 RX ORDER — POLYETHYLENE GLYCOL 3350 17 G/17G
17 POWDER, FOR SOLUTION ORAL
Qty: 0 | Refills: 0 | DISCHARGE
Start: 2020-08-31

## 2020-08-31 RX ORDER — METOPROLOL TARTRATE 50 MG
1 TABLET ORAL
Qty: 30 | Refills: 0
Start: 2020-08-31 | End: 2020-09-29

## 2020-08-31 RX ORDER — FLUTICASONE FUROATE, UMECLIDINIUM BROMIDE AND VILANTEROL TRIFENATATE 200; 62.5; 25 UG/1; UG/1; UG/1
1 POWDER RESPIRATORY (INHALATION)
Qty: 1 | Refills: 0
Start: 2020-08-31

## 2020-08-31 RX ORDER — FLUTICASONE FUROATE, UMECLIDINIUM BROMIDE AND VILANTEROL TRIFENATATE 200; 62.5; 25 UG/1; UG/1; UG/1
1 POWDER RESPIRATORY (INHALATION)
Qty: 0 | Refills: 0 | DISCHARGE

## 2020-08-31 RX ORDER — ALBUTEROL 90 UG/1
3 AEROSOL, METERED ORAL
Qty: 0 | Refills: 0 | DISCHARGE

## 2020-08-31 RX ORDER — ATORVASTATIN CALCIUM 80 MG/1
1 TABLET, FILM COATED ORAL
Qty: 30 | Refills: 0
Start: 2020-08-31 | End: 2020-09-29

## 2020-08-31 RX ORDER — FUROSEMIDE 40 MG
1 TABLET ORAL
Qty: 30 | Refills: 0
Start: 2020-08-31 | End: 2020-09-29

## 2020-08-31 RX ORDER — ACETAMINOPHEN 500 MG
3 TABLET ORAL
Qty: 0 | Refills: 0 | DISCHARGE
Start: 2020-08-31

## 2020-08-31 RX ORDER — DRONEDARONE 400 MG/1
1 TABLET, FILM COATED ORAL
Qty: 60 | Refills: 0
Start: 2020-08-31 | End: 2020-09-29

## 2020-08-31 RX ORDER — LANOLIN ALCOHOL/MO/W.PET/CERES
1 CREAM (GRAM) TOPICAL
Qty: 0 | Refills: 0 | DISCHARGE
Start: 2020-08-31

## 2020-08-31 RX ADMIN — Medication 200 MILLIGRAM(S): at 12:56

## 2020-08-31 RX ADMIN — Medication 200 MILLIGRAM(S): at 05:14

## 2020-08-31 RX ADMIN — Medication 81 MILLIGRAM(S): at 10:53

## 2020-08-31 RX ADMIN — Medication 3 MILLILITER(S): at 05:38

## 2020-08-31 RX ADMIN — BUDESONIDE AND FORMOTEROL FUMARATE DIHYDRATE 2 PUFF(S): 160; 4.5 AEROSOL RESPIRATORY (INHALATION) at 07:41

## 2020-08-31 RX ADMIN — Medication 975 MILLIGRAM(S): at 05:12

## 2020-08-31 RX ADMIN — ENOXAPARIN SODIUM 40 MILLIGRAM(S): 100 INJECTION SUBCUTANEOUS at 10:53

## 2020-08-31 RX ADMIN — PANTOPRAZOLE SODIUM 40 MILLIGRAM(S): 20 TABLET, DELAYED RELEASE ORAL at 06:02

## 2020-08-31 RX ADMIN — Medication 20 MILLIGRAM(S): at 05:13

## 2020-08-31 RX ADMIN — Medication 25 MILLIGRAM(S): at 05:13

## 2020-08-31 RX ADMIN — Medication 975 MILLIGRAM(S): at 12:56

## 2020-08-31 RX ADMIN — DRONEDARONE 400 MILLIGRAM(S): 400 TABLET, FILM COATED ORAL at 05:13

## 2020-08-31 RX ADMIN — Medication 112 MICROGRAM(S): at 05:13

## 2020-08-31 RX ADMIN — Medication 975 MILLIGRAM(S): at 05:14

## 2020-08-31 RX ADMIN — SODIUM ZIRCONIUM CYCLOSILICATE 5 GRAM(S): 10 POWDER, FOR SUSPENSION ORAL at 07:33

## 2020-08-31 NOTE — PROGRESS NOTE ADULT - PROVIDER SPECIALTY LIST ADULT
Orthopedics
Orthopedics
Physiatry
Pulmonology
Rehab Medicine

## 2020-08-31 NOTE — DISCHARGE NOTE PROVIDER - CARE PROVIDER_API CALL
Veronica Henson)  Surgery of the Hand  [address] [place] [address]  Phone: [phone]  Fax: [phone]  Follow Up Time: 2 weeks    Jose Ramon Jose)  Critical Care Medicine; Internal Medicine; Pulmonary Disease; Sleep Medicine  [address] [place] [address]  Phone: [phone]  Fax: [phone]  Established Patient  Follow Up Time: 1 week    Micah Gray  CARDIOVASCULAR DISEASE  [facility] [facility], [place] [address]  Phone: [phone]  Fax: [phone]  Established Patient  Follow Up Time: 1 week    Chan, Yeoman K (MD)  Family Medicine  [address] [place] [address], [address]  [place] [address]  Phone: [phone]  Fax: [phone]  Established Patient  Follow Up Time: 1 week

## 2020-08-31 NOTE — DISCHARGE NOTE PROVIDER - NSDCCPCAREPLAN_GEN_ALL_CORE_FT
PRINCIPAL DISCHARGE DIAGNOSIS  Diagnosis: Fracture of left hip requiring operative repair  Assessment and Plan of Treatment: Due to a fall at home, you sustained multiple trauma, including a fracture of your left hip, which was surgically repaired on 8/13/2020; you are able to weight-bear as tolerated on your left leg and ambulate with the rolling walker and close supervision/assistance. You also have a fracture of 2 ribs on your right side, and a small fracture in one of the bones (vertebrae/spine) in your neck and a fracture in one of the bones (vertebrae/spine) in your upper back. You were seen by the Neurosurgeon, and no treatment is necessary at this time. It is important to use the incentive spirometer to remind you to take deep breaths and cough to keep your lungs open and clear and filled with air. Physical therapy will continue at home. Please follow up with your Orthopedic Surgeon, Dr. Veronica Henson, in about 2 weeks, also follow up if needed with the Neurosurgeon, Dr. Villeda, and with your primary care provider (PCP) Dr. Nobles, in one week. Continue to take aspirin 81mg with food twice a day until 9/24/2020 (total 6 weeks post-op), then decrease aspirin to 81mg once a day starting 9/25/2020.      SECONDARY DISCHARGE DIAGNOSES  Diagnosis: Paroxysmal atrial fibrillation  Assessment and Plan of Treatment: You developed an irregular hearbeat called atrial fibrillation, after your surgery, for which you are now taking new heart medications. You are also taking low-dose aspirin 81mg as a blood-thinner to help prevent a stroke. Please take all medications as prescribed and continue a low salt heart healthy diet. You are also taking a small dose of a water pill, to help reduce fluid build-up in your lungs from mild heart failure. Please follow up with your PCP and with your Cardiologist, Dr. Gray, in one week.    Diagnosis: Hypothyroid  Assessment and Plan of Treatment: Your thyroid tests showed that your thyroid is underactive, so the dose of your thyroid hormone, levothyroxine (Synthroid) was increased on 8/ /2020. Please continue this new dose, take it on an empty stomach one hour before breakfast daily, and Dr. Nobles will need to repeat your thyroid blood tests in 4 to 6 weeks, to evaluate if your dose needs further adjustment.    Diagnosis: Chronic obstructive pulmonary disease (COPD)  Assessment and Plan of Treatment: Continue inhalers and nebulizers as instructed, avoid smoking including second-hand smoke, and follow up often with Dr. Manuel. PRINCIPAL DISCHARGE DIAGNOSIS  Diagnosis: Fracture of left hip requiring operative repair  Assessment and Plan of Treatment: Due to a fall at home, you sustained multiple trauma, including a fracture of your left hip, which was surgically repaired on 8/13/2020; you are able to weight-bear as tolerated on your left leg and ambulate with the rolling walker and close supervision/assistance. You also have a fracture of 2 ribs on your right side, and a small fracture in one of the bones (vertebrae/spine) in your neck and a fracture in one of the bones (vertebrae/spine) in your upper back. You were seen by the Neurosurgeon, and no treatment is necessary at this time. It is important to use the incentive spirometer to remind you to take deep breaths and cough to keep your lungs open and clear and filled with air. Physical therapy will continue at home. Please follow up with your Orthopedic Surgeon, Dr. Veronica Henson, in about 2 weeks, also follow up if needed with the Neurosurgeon, Dr. Villeda, and with your primary care provider (PCP) Dr. Nobles, in one week. Continue to take aspirin 81mg with food twice a day until 9/24/2020 (total 6 weeks post-op), then decrease aspirin to 81mg once a day starting 9/25/2020.      SECONDARY DISCHARGE DIAGNOSES  Diagnosis: Paroxysmal atrial fibrillation  Assessment and Plan of Treatment: You developed an irregular hearbeat called atrial fibrillation, after your surgery, for which you are now taking new heart medications. You are also taking low-dose aspirin 81mg as a blood-thinner to help prevent a stroke. Please take all medications as prescribed and continue a low salt heart healthy diet. You are also taking a small dose of a water pill, to help reduce fluid build-up in your lungs from mild heart failure. Please follow up with your PCP and with your Cardiologist, Dr. Gray, in one week.    Diagnosis: Hypothyroid  Assessment and Plan of Treatment: Your thyroid tests showed that your thyroid is underactive, so the dose of your thyroid hormone, levothyroxine (Synthroid) was increased on 8/26 /2020. Please continue this new dose, take it on an empty stomach one hour before breakfast daily, and Dr. Nobles will need to repeat your thyroid blood tests in 4 to 6 weeks, to evaluate if your dose needs further adjustment.    Diagnosis: Chronic obstructive pulmonary disease (COPD)  Assessment and Plan of Treatment: Continue inhalers and nebulizers as instructed, avoid smoking including second-hand smoke, and follow up often with Dr. Manuel.

## 2020-08-31 NOTE — DISCHARGE NOTE PROVIDER - REASON FOR ADMISSION
Rehab of Multitrauma, left hip fx orif, c1 fx, rt rib fx, T5 fracture Rehab of Multitrauma, left hip fx orif, c1 fx j collar, rt rib fx Rehab of Multitrauma, left hip fx orif, c1  and T5 fractures, rt rib fx Rehab of Multitrauma, left hip fx orif, c1 and T5 fx, rt rib fx Contraindicated

## 2020-08-31 NOTE — DISCHARGE NOTE PROVIDER - HOSPITAL COURSE
The patient is a 89 yo F with severe COPD, on home O2 2 to 4LPM, also on nebulizers and inhalers, hypothyroid, who came to ED on 8/12/2020 because she fell at home and sustained multi-trauma:    fracture left hip s/p ORIF 8/13/2020--received IV solumedrol pre-op and post-op, was also put on Bipap post-op.    fracture right posterior ribs #10 and 11 and T5 compression fx of undetermined age, and nondisplaced fx traversing right posterior arch of C1 and extending into right transverse foramen. She was evaluated by neurosurgery, no intervention necessary.    She is WBAT LLE.    The patient developed post-op new onset rapid a fib, and is now on metoprolol succinate and Multaq. Eliquis 2.5mg po q12h was also started, but her son does not want her to take this medication, so it was stopped and she is now taking ASA 81mg po daily and is on Lovenox for DVT prophylaxis.        COVID swab was NEGATIVE on 8/12/2020.        She was admitted to Rehab medicine service on 8/17/2020, where she participated in 3 hours of therapy daily for at least 5 days a week.    She currently ambulates 100 feet with the rolling walker and contact guard assistance.    She also needs touching assistance for transfers, is set up for upper body grooming/dressing, partial assist for showering, steady assist for toileting, and independent with    eating and oral hygiene (while seated).        She was followed daily by Pulmonary, and was found to have mild CHF on CXR 8/27, when she c/o more SOB. Lasix 20mg po daily was added with improvement.        TFT's were checked since the patient is on Synthroid 88mcg po daily, TSH was elevated at 17.1, her dose of synthroid was increased to 112mcg po daily, will need to have TFT's followed up in 4 to 6 weeks.        Staples left hip were removed on 8/28, 3 small incisions healed well.        She is being discharged home with home care on 8/31/2020. She will need to follow up with Pulmonary Dr. Jose Ramon Manuel, also with her Cardiologist Dr. Gray and with Ortho Dr. Veronica Henson, in 1 to 2 weeks. The patient is a 89 yo F with severe COPD, on home O2 2 to 4LPM, also on nebulizers and inhalers, hypothyroid, who came to ED on 8/12/2020 because she fell at home and sustained multi-trauma:    --fracture left hip s/p ORIF 8/13/2020--received IV solumedrol pre-op and post-op, was also put on Bipap post-op. She is WBAT LLE.    --fracture right posterior ribs #10 and 11 and     --T5 compression fx of undetermined age, and nondisplaced fx traversing right posterior arch of C1 and extending into right transverse foramen. She was evaluated by neurosurgery, no intervention necessary.        The patient developed post-op new onset rapid a fib, and is now on metoprolol succinate and Multaq. Eliquis 2.5mg po q12h was also started, but her son does not want her to take this medication, so it was stopped and she is now taking ASA 81mg po daily and is on Lovenox for DVT prophylaxis.        COVID swab was NEGATIVE on 8/12/2020.        She was admitted to Rehab medicine service on 8/17/2020, where she participated in 3 hours of therapy daily for at least 5 days a week.    She currently ambulates 100 feet with the rolling walker and contact guard assistance.    She needs touching assistance for transfers, needs set up for upper body grooming/dressing, partial assist for showering, steady assist for toileting, and is independent with    eating and oral hygiene (while seated).        She was followed daily by Pulmonary, and was found to have mild CHF on CXR 8/27, when she c/o more SOB. Lasix 20mg po daily was added with improvement.        TFT's were checked since the patient is on Synthroid 88mcg po daily, TSH was elevated at 17.1; her dose of synthroid was increased to 112mcg po daily, and she will need to have TFT's followed up in 4 to 6 weeks.        Staples left hip were removed on 8/28; 3 small incisions healed well.        She is being discharged home with home care on 8/31/2020. She was advised to continue ASA 81mg twice a day with meals until 9/24/2020 (total 6 weeks post-op); then on 9/25 she can resume usual dose of 81mg daily with food.    She will need to follow up with Pulmonary Dr. Jose Ramon Manuel, also with her Cardiologist Dr. Gray and with Ortho Dr. Veronica Henson, in 1 to 2 weeks,    as well as with her PCP Dr. Yeoman Chan. The patient is a 89 yo F with severe COPD, on home O2 2 to 4LPM, also on nebulizers and inhalers, hypothyroid, who came to ED on 8/12/2020 because she fell at home and sustained multi-trauma:    --fracture left hip s/p ORIF 8/13/2020--received IV solumedrol pre-op and post-op, was also put on Bipap post-op. She is WBAT LLE.    --fracture right posterior ribs #10 and 11 and     --T5 compression fx of undetermined age, and nondisplaced fx traversing right posterior arch of C1 and extending into right transverse foramen. She was evaluated by neurosurgery, no intervention necessary.        The patient developed post-op new onset rapid a fib, and is now on metoprolol succinate and Multaq. Eliquis 2.5mg po q12h was also started, but her son does not want her to take this medication, so it was stopped and she is now taking ASA 81mg po daily and is on Lovenox for DVT prophylaxis.        COVID swab was NEGATIVE on 8/12/2020.        She was admitted to Rehab medicine service on 8/17/2020, where she participated in 3 hours of therapy daily for at least 5 days a week.    She currently ambulates 100 feet with the rolling walker and contact guard assistance.    She needs touching assistance for transfers, needs set up for upper body grooming/dressing, partial assist for showering, steady assist for toileting, and is independent with    eating and oral hygiene (while seated).        She was followed daily by Pulmonary, and was found to have mild CHF on CXR 8/27, when she c/o more SOB. Lasix 20mg po daily was added with improvement.        TFT's were checked since the patient is on Synthroid 88mcg po daily, TSH was elevated at 17.1; her dose of synthroid was increased to 112mcg po daily, and she will need to have TFT's followed up in 4 to 6 weeks.        Staples left hip were removed on 8/28; 3 small incisions healed well. Left hip Xrays were done on 8/27/2020 due to increased pain and decreased ability to ambulate; the xrays showed good alignment    and she was seen by Ortho as well on 8/28. Low-dose ibuprofen 200mg po q8h was added to tylenol around the clock, and her pain and function improved over the weekend.         She is being discharged home with home care on 8/31/2020. She was advised to continue ASA 81mg twice a day with meals until 9/24/2020 (total 6 weeks post-op); then on 9/25 she can resume usual dose of 81mg daily with food.    She will need to follow up with Pulmonary Dr. Jose Ramon Manuel, also with her Cardiologist Dr. Gray and with Ortho Dr. Veronica Henson, in 1 to 2 weeks,    as well as with her PCP Dr. Yeoman Chan, to monitor labs, VS and repeat TFT's in 4 to 6 weeks. The patient is a 89 yo F with severe COPD, on home O2 2 to 4LPM, also on nebulizers and inhalers, hypothyroid, who came to ED on 8/12/2020 because she fell at home and sustained multi-trauma:    --fracture left hip s/p ORIF 8/13/2020--received IV solumedrol pre-op and post-op, was also put on Bipap post-op. She is WBAT LLE.    --fracture right posterior ribs #10 and 11 and     --T5 compression fx of undetermined age, and nondisplaced fx traversing right posterior arch of C1 and extending into right transverse foramen. She was evaluated by neurosurgery, no intervention necessary.        The patient developed post-op new onset rapid a fib, and is now on metoprolol succinate and Multaq. Eliquis 2.5mg po q12h was also started, but her son does not want her to take this medication, so it was stopped and she is now taking ASA 81mg po daily and is on Lovenox for DVT prophylaxis.        COVID swab was NEGATIVE on 8/12/2020.        She was admitted to Rehab medicine service on 8/17/2020, where she participated in 3 hours of therapy daily for at least 5 days a week.    She currently ambulates 100 feet with the rolling walker and contact guard assistance.    She needs touching assistance for transfers, needs set up for upper body grooming/dressing, partial assist for showering, steady assist for toileting, and is independent with    eating and oral hygiene (while seated).        She was followed daily by Pulmonary, and was found to have mild CHF on CXR 8/27, when she c/o more SOB. Lasix 20mg po daily was added with improvement.        TFT's were checked since the patient is on Synthroid 88mcg po daily, TSH was elevated at 17.1; her dose of synthroid was increased to 112mcg po daily starting 8/26, and she will need to have TFT's followed up in 4 to 6 weeks.        Staples left hip were removed on 8/28; 3 small incisions healed well. Left hip Xrays were done on 8/27/2020 due to increased pain and decreased ability to ambulate; the xrays showed good alignment    and she was seen by Ortho as well on 8/28. Low-dose ibuprofen 200mg po q8h was added to tylenol around the clock, and her pain and function improved over the weekend.         She is being discharged home with home care on 8/31/2020. She was advised to continue ASA 81mg twice a day with meals until 9/24/2020 (total 6 weeks post-op); then on 9/25 she can resume usual dose of 81mg daily with food.    She will need to follow up with Pulmonary Dr. Jose Ramon Manuel, also with her Cardiologist Dr. Gray and with Ortho Dr. Veronica Henson, in 1 to 2 weeks,    as well as with her PCP Dr. Yeoman Chan, to monitor labs, VS and repeat TFT's in 4 to 6 weeks.

## 2020-08-31 NOTE — DISCHARGE NOTE PROVIDER - NSDCMRMEDTOKEN_GEN_ALL_CORE_FT
albuterol 2.5 mg/3 mL (0.083%) inhalation solution: 3 milliliter(s) inhaled every 6 hours  aspirin 81 mg oral tablet, chewable: 1 tab(s) orally once a day  atorvastatin 40 mg oral tablet: 1 tab(s) orally once a day (at bedtime)  bisacodyl 10 mg rectal suppository: 1 suppository(ies) rectal once a day, As needed, If no bowel movement  dronedarone 400 mg oral tablet: 1 tab(s) orally 2 times a day  levothyroxine 88 mcg (0.088 mg) oral tablet: 1 tab(s) orally once a day  melatonin 5 mg oral tablet: 1 tab(s) orally once a day (at bedtime)  metoprolol succinate 25 mg oral tablet, extended release: 1 tab(s) orally once a day  oxyCODONE 5 mg oral tablet: 1 tab(s) orally every 6 hours, As needed, Severe Pain (7 - 10)  pantoprazole 40 mg oral delayed release tablet: 1 tab(s) orally once a day (before a meal)  senna oral tablet: 2 tab(s) orally once a day (at bedtime)  Trelegy Ellipta inhalation powder: 1 puff(s) inhaled once a day acetaminophen 325 mg oral tablet: 2 or 3 tab(s) orally every 8 hours as needed, for pain or fever  albuterol 2.5 mg/3 mL (0.083%) inhalation solution: 3 milliliter(s) inhaled every 6 hours, As Needed for wheezing, shortness of breath  aspirin 81 mg oral tablet, chewable: 1 tab(s) orally 2 times a day (with meals) until Sept 24, then on Sept 25 decrease to 81mg orally once a day with food  atorvastatin 40 mg oral tablet: 1 tab(s) orally once a day (at bedtime)  dronedarone 400 mg oral tablet: 1 tab(s) orally 2 times a day  furosemide 20 mg oral tablet: 1 tab(s) orally once a day in the morning  levothyroxine 112 mcg (0.112 mg) oral tablet: 1 tab(s) orally once a day, take one hour before breakfast (on an empty stomach)  melatonin 5 mg oral tablet: 1 tab(s) orally once a day (at bedtime), As Needed, to help you sleep at night  metoprolol succinate 25 mg oral tablet, extended release: 1 tab(s) orally once a day  pantoprazole 40 mg oral delayed release tablet: 1 tab(s) orally once a day (before a meal); take 30 minutes before breakfast  polyethylene glycol 3350 oral powder for reconstitution: 17 gram(s) orally once a day, As Needed, after dinner  senna oral tablet: take 1 or 2 tab(s) orally once a day (at bedtime), if needed, for constipation  Trelegy Ellipta inhalation powder: 1 puff(s) inhaled once a day

## 2020-08-31 NOTE — DISCHARGE NOTE NURSING/CASE MANAGEMENT/SOCIAL WORK - PATIENT PORTAL LINK FT
You can access the FollowMyHealth Patient Portal offered by [facility] by registering at the following website: [email or web address]. By joining [facility] FollowMyHealth portal, you will also be able to view your health information using other applications (apps) compatible with our system.

## 2020-08-31 NOTE — DISCHARGE NOTE PROVIDER - NSDCFUADDINST_GEN_ALL_CORE_FT
***PLEASE SHOW THESE DISCHARGE PAPERS TO ALL  YOUR DOCTORS AND CAREGIVERS***    ***AMBULATE WITH THE ROLLING WALKER AND ASSISTANCE/SUPERVISION FOR YOUR SAFETY; YOU ARE WEIGHT-BEARING AS TOLERATED ON YOUR LEFT LOWER EXTREMITY***

## 2020-08-31 NOTE — PROGRESS NOTE ADULT - ASSESSMENT
89 y/o female with hx of COPD on 3-4 L O2 nasal canula, hypothyroidism and new onset atrial fibrillation admitted to  for rehab of multitrauma after fall with injuries including a nondisplaced C1 fx, aged indeterminant T5 fx, right posterior 10-11 rib fractures, and left intertroch fx. She is WBAT for her LLE. .    Rehab of multiple trauma c1 fx,  left HIP FT rib FX's    Patient requires 3 hrs daily of interdisciplinary inpatient rehab.     -Pain control: Tylenol PRN, oxycodone prn    -GI/Bowel Mgmt -  senna, miralax prn for constipation     -Skin: left hip surgical wound incisions C/D/I    -FEN   - Diet -  Regular    Medical Comorbidities    #paroxsymal Atrial fibrillation  -Eliquis discontinued, patient now only on ASA after discussion with patient and son regarding risks and benefits  -Metoprolol ER 25 POD  -Multaq 400 BID    #COPD  -2-4 L O2 NC  -Continue home meds  -As per pulm 8/27, f/u CXR    #HLD  Atorvastatin 40mg po qhs    #Hypothyroidism  -Levothyroxine increased to 112 mcg daily with her increased TSH. Will need recheck in 6-8 weeks.    #Presumed osteoporosis on calcium; vit D 25 OH was checked and WNL's.    #Post op anemia  - stable, hgb 8.1  - will continue to monitor  -F/u cbc    #Lower extremity swelling  -likely 2/2 recent surgery  -venous duplex negative for dvt      Precautions / PROPHYLAXIS:      - Falls    - Ortho: Weight bearing status: WBAT LLE      - DVT prophylaxis: Lovenox 89 y/o female with hx of COPD on 3-4 L O2 nasal canula, hypothyroidism and new onset atrial fibrillation admitted to  for rehab of multitrauma after fall with injuries including a nondisplaced C1 fx, aged indeterminant T5 fx, right posterior 10-11 rib fractures, and left intertroch fx. She is WBAT for her LLE. .    Rehab of multiple trauma c1 fx,  left HIP FT rib FX's    completed 3 hrs daily of interdisciplinary inpatient rehab., DC today     -Pain control: Tylenol PRN, oxycodone prn    -GI/Bowel Mgmt -  stable     -Skin: left hip surgical wound incisions C/D/I    -FEN   - Diet -  Regular    Medical Comorbidities    #paroxsymal Atrial fibrillation  -Eliquis discontinued, patient now only on ASA after discussion with patient and son regarding risks and benefits  -Metoprolol ER 25 POD  -Multaq 400 BID    #COPD  -2-4 L O2 NC  -Continue home meds  -As per pulm 8/27, f/u CXR    #HLD  Atorvastatin 40mg po qhs    #Hypothyroidism  -Levothyroxine increased to 112 mcg daily with her increased TSH. Will need recheck in 6-8 weeks.    #Presumed osteoporosis on calcium; vit D 25 OH was checked and WNL's.    #Post op anemia  - stable, hgb 8.1  -   #Lower extremity swelling  -likely 2/2 recent surgery  -venous duplex negative for dvt      Precautions / PROPHYLAXIS:      - Falls    - Ortho: Weight bearing status: WBAT LLE      - DVT prophylaxis: ambulates + send home w ASA 81 mg BID

## 2020-08-31 NOTE — DISCHARGE NOTE PROVIDER - CARE PROVIDERS DIRECT ADDRESSES
[email or web address]_Unknown,DirectAddress_Unknown,DirectAddress_Unknown

## 2020-08-31 NOTE — DISCHARGE NOTE PROVIDER - INSTRUCTIONS
Low salt low potassium heart healthy (low fat low cholesterol), high fiber; you can drink Ensure twice a day for additional nutrition, protein and calories

## 2020-08-31 NOTE — PROGRESS NOTE ADULT - SUBJECTIVE AND OBJECTIVE BOX
Patient is a 89y old  Female who presents with a chief complaint of Rehab of Multitrauma, left hip fx orif, c1 fx j collar, rt rib fx       HPI:  89 y/o right hand dominant female with PMH COPD on 3-4L NC, hypothyroidism presenting to the ED s/p mechanical fall Per the patient she had gotten up to check on her albuterol treatment, started to sit back down on her recliner but missed, falling on her left side. She denies LOC, HT. She was unable to ambulate after the fall and complained of L hip pain. Found have multiple injuries including a nondisplaced C1 fx, aged indeterminant T5 fx, right posterior 10-11 rib fractures, and left intertroch fx. After clearance by both pulmonology and cardiology, she went to OR with orthopedics for Left hip IMN on 813/2020. Post operatively her course was complicated with new onset atrial fibrillation and post operative anemia for which she was given 1 units pRBCs. Hgb over the past 48 hours has been stable at 8.2. For her atrial fibrillation cardiology was consulted and she was started on Eliquis, metoprolol and multaq and is currently in NSR. In regards to her nondisplaced C1 fx she was in a [place] J which was subsequently cleared by neurosurgery. pt eval by physiatry deemed a good acute rehab candidate to improve function for safe . DC home CAN tolerate and benefit from 3 hrs  ofd acute rehab     Patient was initially evaluated by physiatry to be a good candidate for rehab and will be admitted to  to undergo intensive 3 hour/day rehabilitation     TODAY'S SUBJECTIVE & REVIEW OF SYMPTOMS     No acute overnight events. VSS. Therapists noted that patient has increased pain during therapy. Pulmonology originally consulted and provided recommendation accordingly. Orthopedics consulted as well, desired hip x rays; which were performed discussed with ortho team, they recommended to continue PT as patient is doing well. The Hip X-Ray 2-3 views of the left side impressions are listed below.      Patient was informed about discharging her home today and she was agreeable. She informed the team that her pharmacy is the "[facility] on [address]"     CLOF: Supervision for BM and transfers. Ambulates 100ft with RW, step through gait.    PHYSICAL EXAM    Vital Signs Last 24 Hrs  T(C): 36.1 (31 Aug 2020 05:00), Max: 36.7 (30 Aug 2020 14:07)  T(F): 97 (31 Aug 2020 05:00), Max: 98 (30 Aug 2020 14:07)  HR: 71 (31 Aug 2020 05:00) (71 - 75)  BP: 126/58 (31 Aug 2020 05:00) (113/56 - 126/58)  BP(mean): --  RR: 18 (31 Aug 2020 05:00) (18 - 20)  SpO2: 100% (31 Aug 2020 05:00) (94% - 100%)    Constitutional - NAD, Comfortable OOB to WC, o2 via NC  Chest - CTAB  Cardiovascular - RRR  Abdomen - Soft, NTND  Extremities - No C/C/E, No calf tenderness   Neurologic Exam -                    Cognitive - Awake, Alert, AAO to self, place, date, year, situation     Communication - Fluent, No dysarthria  Motor     LEFT    UE: [X] WNL  	RIGHT UE:  [X] WNL   	LEFT    LE:  [X] other: Decrease strength with hip flexion and knee extension due to FX hip, surgical site c/d/i    RIGHT LE:  [X] WNLNo focal deficits     Sensory - Intact to LT  Reflexes - wnl/ symmetric  Psychiatric - Mood stable, Affect WNL    MEDICATIONS  (STANDING):  acetaminophen   Tablet .. 975 milliGRAM(s) Oral every 8 hours  ALBUTerol    90 MICROgram(s) HFA Inhaler 1 Puff(s) Inhalation every 6 hours  aspirin  chewable 81 milliGRAM(s) Oral daily  atorvastatin 40 milliGRAM(s) Oral at bedtime  budesonide 160 MICROgram(s)/formoterol 4.5 MICROgram(s) Inhaler 2 Puff(s) Inhalation two times a day  dronedarone 400 milliGRAM(s) Oral two times a day  enoxaparin Injectable 40 milliGRAM(s) SubCutaneous daily  furosemide    Tablet 20 milliGRAM(s) Oral daily  ibuprofen  Tablet. 200 milliGRAM(s) Oral every 8 hours  levothyroxine 112 MICROGram(s) Oral daily  melatonin 5 milliGRAM(s) Oral at bedtime  metoprolol succinate ER 25 milliGRAM(s) Oral daily  pantoprazole    Tablet 40 milliGRAM(s) Oral before breakfast  polyethylene glycol 3350 17 Gram(s) Oral <User Schedule>  senna 2 Tablet(s) Oral at bedtime  tiotropium 18 MICROgram(s) Capsule 1 Capsule(s) Inhalation daily    MEDICATIONS  (PRN):  albuterol/ipratropium for Nebulization 3 milliLiter(s) Nebulizer every 6 hours PRN Shortness of Breath and/or Wheezing  bisacodyl Suppository 10 milliGRAM(s) Rectal daily PRN If no bowel movement  ondansetron   Disintegrating Tablet 4 milliGRAM(s) Oral every 8 hours PRN Nausea and/or Vomiting                          8.4    6.23  )-----------( 274      ( 31 Aug 2020 07:27 )             29.1     08-31    141  |  97<L>  |  28<H>  ----------------------------<  88  5.5<H>   |  32  |  1.2    Ca    8.7      31 Aug 2020 07:27  Mg     2.0     08-31    TPro  5.9<L>  /  Alb  3.7  /  TBili  0.3  /  DBili  x   /  AST  39  /  ALT  22  /  AlkPhos  114  08-31    X-ray Hip 2-3 views of Left side    Patient is status post gamma nail fixation of the left intertrochanteric fracture, in stable alignment compared to the intraoperative films. No radiographic evidence of hardware complication.  No evidence of new fractures or dislocation. Moderate degenerative changes of the bilateral hips. Degenerative changes are also noted sacroiliac joints and lower lumbar spine. Surgical clips noted over the pelvis. Patient is a 89y old  Female who presents with a chief complaint of Rehab of Multitrauma, left hip fx orif, c1 fx j collar, rt rib fx       HPI:  89 y/o right hand dominant female with PMH COPD on 3-4L NC, hypothyroidism presenting to the ED s/p mechanical fall Per the patient she had gotten up to check on her albuterol treatment, started to sit back down on her recliner but missed, falling on her left side. She denies LOC, HT. She was unable to ambulate after the fall and complained of L hip pain. Found have multiple injuries including a nondisplaced C1 fx, aged indeterminant T5 fx, right posterior 10-11 rib fractures, and left intertroch fx. After clearance by both pulmonology and cardiology, she went to OR with orthopedics for Left hip IMN on 813/2020. Post operatively her course was complicated with new onset atrial fibrillation and post operative anemia for which she was given 1 units pRBCs. Hgb over the past 48 hours has been stable at 8.2. For her atrial fibrillation cardiology was consulted and she was started on Eliquis, metoprolol and multaq and is currently in NSR. In regards to her nondisplaced C1 fx she was in a [place] J which was subsequently cleared by neurosurgery. pt eval by physiatry deemed a good acute rehab candidate to improve function for safe . DC home CAN tolerate and benefit from 3 hrs  ofd acute rehab     Patient was initially evaluated by physiatry to be a good candidate for rehab and will be admitted to  to undergo intensive 3 hour/day rehabilitation     TODAY'S SUBJECTIVE & REVIEW OF SYMPTOMS less pain today    No acute overnight events. VSS. Therapists noted that patient has increased pain during therapy. Pulmonology originally consulted and provided recommendation accordingly. Orthopedics consulted as well, desired hip x rays; which were performed discussed with ortho team, they recommended to continue PT as patient is doing well. The Hip X-Ray 2-3 views of the left side impressions are listed below.      Patient was informed about discharging her home today and she was agreeable. She informed the team that her pharmacy is the "[facility] on [address]"     CLOF: Supervision for BM and transfers. Ambulates 100ft with RW, step through gait.    PHYSICAL EXAM    Vital Signs Last 24 Hrs  T(C): 36.1 (31 Aug 2020 05:00), Max: 36.7 (30 Aug 2020 14:07)  T(F): 97 (31 Aug 2020 05:00), Max: 98 (30 Aug 2020 14:07)  HR: 71 (31 Aug 2020 05:00) (71 - 75)  BP: 126/58 (31 Aug 2020 05:00) (113/56 - 126/58)  BP(mean): --  RR: 18 (31 Aug 2020 05:00) (18 - 20)  SpO2: 100% (31 Aug 2020 05:00) (94% - 100%)    Constitutional - NAD, Comfortable OOB to WC, o2 via NC  Chest - CTAB  Cardiovascular - RRR  Abdomen - Soft, NTND  Extremities - No C/C/E, No calf tenderness , LLE ecchymosis and swelling  Neurologic Exam -                    Cognitive - Awake, Alert, AAO to self, place, date, year, situation     Communication - Fluent, No dysarthria  Motor     LEFT    UE: [X] WNL  	RIGHT UE:  [X] WNL   	LEFT    LE:  [X] other: Decrease strength with hip flexion and knee extension due to FX hip, surgical site c/d/i    RIGHT LE:  [X] WNLNo focal deficits     Sensory - Intact to LT  Reflexes - wnl/ symmetric  Psychiatric - Mood stable, Affect WNL    MEDICATIONS  (STANDING):  acetaminophen   Tablet .. 975 milliGRAM(s) Oral every 8 hours  ALBUTerol    90 MICROgram(s) HFA Inhaler 1 Puff(s) Inhalation every 6 hours  aspirin  chewable 81 milliGRAM(s) Oral daily  atorvastatin 40 milliGRAM(s) Oral at bedtime  budesonide 160 MICROgram(s)/formoterol 4.5 MICROgram(s) Inhaler 2 Puff(s) Inhalation two times a day  dronedarone 400 milliGRAM(s) Oral two times a day  enoxaparin Injectable 40 milliGRAM(s) SubCutaneous daily  furosemide    Tablet 20 milliGRAM(s) Oral daily  ibuprofen  Tablet. 200 milliGRAM(s) Oral every 8 hours  levothyroxine 112 MICROGram(s) Oral daily  melatonin 5 milliGRAM(s) Oral at bedtime  metoprolol succinate ER 25 milliGRAM(s) Oral daily  pantoprazole    Tablet 40 milliGRAM(s) Oral before breakfast  polyethylene glycol 3350 17 Gram(s) Oral <User Schedule>  senna 2 Tablet(s) Oral at bedtime  tiotropium 18 MICROgram(s) Capsule 1 Capsule(s) Inhalation daily    MEDICATIONS  (PRN):  albuterol/ipratropium for Nebulization 3 milliLiter(s) Nebulizer every 6 hours PRN Shortness of Breath and/or Wheezing  bisacodyl Suppository 10 milliGRAM(s) Rectal daily PRN If no bowel movement  ondansetron   Disintegrating Tablet 4 milliGRAM(s) Oral every 8 hours PRN Nausea and/or Vomiting                          8.4    6.23  )-----------( 274      ( 31 Aug 2020 07:27 )             29.1     08-31    141  |  97<L>  |  28<H>  ----------------------------<  88  5.5<H>   |  32  |  1.2    Ca    8.7      31 Aug 2020 07:27  Mg     2.0     08-31  post op anemia  TPro  5.9<L>  /  Alb  3.7  /  TBili  0.3  /  DBili  x   /  AST  39  /  ALT  22  /  AlkPhos  114  08-31    X-ray Hip 2-3 views of Left side    Patient is status post gamma nail fixation of the left intertrochanteric fracture, in stable alignment compared to the intraoperative films. No radiographic evidence of hardware complication.  No evidence of new fractures or dislocation. Moderate degenerative changes of the bilateral hips. Degenerative changes are also noted sacroiliac joints and lower lumbar spine. Surgical clips noted over the pelvis.

## 2020-08-31 NOTE — DISCHARGE NOTE PROVIDER - PROVIDER TOKENS
PROVIDER:[TOKEN:[[id number]],FOLLOWUP:[2 weeks]],PROVIDER:[TOKEN:[[id number]],FOLLOWUP:[1 week],ESTABLISHEDPATIENT:[T]],PROVIDER:[TOKEN:[[id number]],FOLLOWUP:[1 week],ESTABLISHEDPATIENT:[T]],PROVIDER:[TOKEN:[[id number]],FOLLOWUP:[1 week],ESTABLISHEDPATIENT:[T]]

## 2020-09-08 DIAGNOSIS — M81.0 AGE-RELATED OSTEOPOROSIS WITHOUT CURRENT PATHOLOGICAL FRACTURE: ICD-10-CM

## 2020-09-08 DIAGNOSIS — I48.0 PAROXYSMAL ATRIAL FIBRILLATION: ICD-10-CM

## 2020-09-08 DIAGNOSIS — S12.9XXD FRACTURE OF NECK, UNSPECIFIED, SUBSEQUENT ENCOUNTER: ICD-10-CM
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
## 2020-09-08 DIAGNOSIS — J96.10 CHRONIC RESPIRATORY FAILURE, UNSPECIFIED WHETHER WITH HYPOXIA OR HYPERCAPNIA: ICD-10-CM

## 2020-09-08 DIAGNOSIS — Z51.89 ENCOUNTER FOR OTHER SPECIFIED AFTERCARE: ICD-10-CM

## 2020-09-08 DIAGNOSIS — S22.41XD MULTIPLE FRACTURES OF RIBS, RIGHT SIDE, SUBSEQUENT ENCOUNTER FOR FRACTURE WITH ROUTINE HEALING: ICD-10-CM

## 2020-09-08 DIAGNOSIS — E78.5 HYPERLIPIDEMIA, UNSPECIFIED: ICD-10-CM

## 2020-09-08 DIAGNOSIS — S72.142D DISPLACED INTERTROCHANTERIC FRACTURE OF LEFT FEMUR, SUBSEQUENT ENCOUNTER FOR CLOSED FRACTURE WITH ROUTINE HEALING: ICD-10-CM

## 2020-09-08 DIAGNOSIS — Z87.891 PERSONAL HISTORY OF NICOTINE DEPENDENCE: ICD-10-CM

## 2020-09-08 DIAGNOSIS — M79.89 OTHER SPECIFIED SOFT TISSUE DISORDERS: ICD-10-CM

## 2020-09-08 DIAGNOSIS — S22.059D UNSPECIFIED FRACTURE OF T5-T6 VERTEBRA, SUBSEQUENT ENCOUNTER FOR FRACTURE WITH ROUTINE HEALING: ICD-10-CM

## 2020-09-08 DIAGNOSIS — E03.9 HYPOTHYROIDISM, UNSPECIFIED: ICD-10-CM

## 2020-09-08 DIAGNOSIS — J44.9 CHRONIC OBSTRUCTIVE PULMONARY DISEASE, UNSPECIFIED: ICD-10-CM
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
## 2020-09-08 DIAGNOSIS — D64.9 ANEMIA, UNSPECIFIED: ICD-10-CM

## 2020-09-08 DIAGNOSIS — Z99.81 DEPENDENCE ON SUPPLEMENTAL OXYGEN: ICD-10-CM

## 2020-09-28 ENCOUNTER — INPATIENT (INPATIENT)
Facility: HOSPITAL | Age: 85
LOS: 8 days | Discharge: HOME HEALTH | End: 2020-10-07
Attending: HOSPITALIST | Admitting: HOSPITALIST
Payer: MEDICARE

## 2020-09-28 VITALS
RESPIRATION RATE: 18 BRPM | WEIGHT: 111.99 LBS | TEMPERATURE: 98 F | DIASTOLIC BLOOD PRESSURE: 51 MMHG | HEART RATE: 71 BPM | SYSTOLIC BLOOD PRESSURE: 114 MMHG | HEIGHT: 57 IN | OXYGEN SATURATION: 94 %

## 2020-09-28 LAB
ALBUMIN SERPL ELPH-MCNC: 3.8 G/DL — SIGNIFICANT CHANGE UP (ref 3.5–5.2)
ALP SERPL-CCNC: 135 U/L — HIGH (ref 30–115)
ALT FLD-CCNC: 20 U/L — SIGNIFICANT CHANGE UP (ref 0–41)
ANION GAP SERPL CALC-SCNC: 10 MMOL/L — SIGNIFICANT CHANGE UP (ref 7–14)
AST SERPL-CCNC: 38 U/L — SIGNIFICANT CHANGE UP (ref 0–41)
BASE EXCESS BLDV CALC-SCNC: 3.1 MMOL/L — HIGH (ref -2–2)
BASOPHILS # BLD AUTO: 0.03 K/UL — SIGNIFICANT CHANGE UP (ref 0–0.2)
BASOPHILS NFR BLD AUTO: 0.3 % — SIGNIFICANT CHANGE UP (ref 0–1)
BILIRUB SERPL-MCNC: 0.4 MG/DL — SIGNIFICANT CHANGE UP (ref 0.2–1.2)
BUN SERPL-MCNC: 34 MG/DL — HIGH (ref 10–20)
CA-I SERPL-SCNC: 1.21 MMOL/L — SIGNIFICANT CHANGE UP (ref 1.12–1.3)
CALCIUM SERPL-MCNC: 9.2 MG/DL — SIGNIFICANT CHANGE UP (ref 8.5–10.1)
CHLORIDE SERPL-SCNC: 94 MMOL/L — LOW (ref 98–110)
CO2 SERPL-SCNC: 31 MMOL/L — SIGNIFICANT CHANGE UP (ref 17–32)
CREAT SERPL-MCNC: 1.7 MG/DL — HIGH (ref 0.7–1.5)
EOSINOPHIL # BLD AUTO: 0.16 K/UL — SIGNIFICANT CHANGE UP (ref 0–0.7)
EOSINOPHIL NFR BLD AUTO: 1.7 % — SIGNIFICANT CHANGE UP (ref 0–8)
GAS PNL BLDV: 134 MMOL/L — LOW (ref 136–145)
GAS PNL BLDV: SIGNIFICANT CHANGE UP
GLUCOSE SERPL-MCNC: 113 MG/DL — HIGH (ref 70–99)
HCO3 BLDV-SCNC: 31 MMOL/L — HIGH (ref 22–29)
HCT VFR BLD CALC: 32.7 % — LOW (ref 37–47)
HCT VFR BLDA CALC: 32.9 % — LOW (ref 34–44)
HGB BLD CALC-MCNC: 10.7 G/DL — LOW (ref 14–18)
HGB BLD-MCNC: 9.8 G/DL — LOW (ref 12–16)
IMM GRANULOCYTES NFR BLD AUTO: 0.3 % — SIGNIFICANT CHANGE UP (ref 0.1–0.3)
LACTATE BLDV-MCNC: 0.8 MMOL/L — SIGNIFICANT CHANGE UP (ref 0.5–1.6)
LIDOCAIN IGE QN: 17 U/L — SIGNIFICANT CHANGE UP (ref 7–60)
LYMPHOCYTES # BLD AUTO: 0.8 K/UL — LOW (ref 1.2–3.4)
LYMPHOCYTES # BLD AUTO: 8.5 % — LOW (ref 20.5–51.1)
MCHC RBC-ENTMCNC: 30 G/DL — LOW (ref 32–37)
MCHC RBC-ENTMCNC: 31.2 PG — HIGH (ref 27–31)
MCV RBC AUTO: 104.1 FL — HIGH (ref 81–99)
MONOCYTES # BLD AUTO: 0.72 K/UL — HIGH (ref 0.1–0.6)
MONOCYTES NFR BLD AUTO: 7.7 % — SIGNIFICANT CHANGE UP (ref 1.7–9.3)
NEUTROPHILS # BLD AUTO: 7.63 K/UL — HIGH (ref 1.4–6.5)
NEUTROPHILS NFR BLD AUTO: 81.5 % — HIGH (ref 42.2–75.2)
NRBC # BLD: 0 /100 WBCS — SIGNIFICANT CHANGE UP (ref 0–0)
PCO2 BLDV: 65 MMHG — HIGH (ref 41–51)
PH BLDV: 7.29 — SIGNIFICANT CHANGE UP (ref 7.26–7.43)
PLATELET # BLD AUTO: 182 K/UL — SIGNIFICANT CHANGE UP (ref 130–400)
PO2 BLDV: 32 MMHG — SIGNIFICANT CHANGE UP (ref 20–40)
POTASSIUM BLDV-SCNC: 4.9 MMOL/L — SIGNIFICANT CHANGE UP (ref 3.3–5.6)
POTASSIUM SERPL-MCNC: 5.1 MMOL/L — HIGH (ref 3.5–5)
POTASSIUM SERPL-SCNC: 5.1 MMOL/L — HIGH (ref 3.5–5)
PROT SERPL-MCNC: 6.6 G/DL — SIGNIFICANT CHANGE UP (ref 6–8)
RBC # BLD: 3.14 M/UL — LOW (ref 4.2–5.4)
RBC # FLD: 14.9 % — HIGH (ref 11.5–14.5)
SAO2 % BLDV: 57 % — SIGNIFICANT CHANGE UP
SODIUM SERPL-SCNC: 135 MMOL/L — SIGNIFICANT CHANGE UP (ref 135–146)
WBC # BLD: 9.37 K/UL — SIGNIFICANT CHANGE UP (ref 4.8–10.8)
WBC # FLD AUTO: 9.37 K/UL — SIGNIFICANT CHANGE UP (ref 4.8–10.8)

## 2020-09-28 PROCEDURE — 71045 X-RAY EXAM CHEST 1 VIEW: CPT | Mod: 26

## 2020-09-28 PROCEDURE — 99285 EMERGENCY DEPT VISIT HI MDM: CPT | Mod: CS

## 2020-09-28 PROCEDURE — 93010 ELECTROCARDIOGRAM REPORT: CPT

## 2020-09-28 RX ORDER — SODIUM CHLORIDE 9 MG/ML
1550 INJECTION, SOLUTION INTRAVENOUS ONCE
Refills: 0 | Status: COMPLETED | OUTPATIENT
Start: 2020-09-28 | End: 2020-09-28

## 2020-09-28 RX ORDER — METOCLOPRAMIDE HCL 10 MG
10 TABLET ORAL ONCE
Refills: 0 | Status: COMPLETED | OUTPATIENT
Start: 2020-09-28 | End: 2020-09-28

## 2020-09-28 RX ADMIN — Medication 10 MILLIGRAM(S): at 23:10

## 2020-09-28 RX ADMIN — SODIUM CHLORIDE 1550 MILLILITER(S): 9 INJECTION, SOLUTION INTRAVENOUS at 23:10

## 2020-09-28 NOTE — ED PROVIDER NOTE - NS ED ROS FT
Constitutional:  See HPI.   Eyes:  No visual changes, eye pain or discharge.  ENMT:  No hearing changes, pain, discharge or infections. No neck pain or stiffness.  Cardiac:  No chest pain, SOB or edema. No chest pain with exertion.  Respiratory:  No cough or respiratory distress. No hemoptysis.  GI:  No abdominal pain.  :  No dysuria, frequency, hematuria  MS:  No joint pain or back pain.  Neuro:  No LOC. No headache or weakness.    Skin:  No skin rash.  Except as in HPI, all other review of systems is negative

## 2020-09-28 NOTE — ED PROVIDER NOTE - CLINICAL SUMMARY MEDICAL DECISION MAKING FREE TEXT BOX
89yo F history of COPD 2-4L NC home O2, recent L hip sx, Hypothyroidism, AFib no anticoagulation, presenting with diarrhea x2d, foul smelling, 6+ episodes daily. Today developed nausea, retching, no vomiting. No abdominal pain, chest pain, shortness of breath, fevers/chills, cough, dysuria/hematuria, back pain, numbness/focal weakness. No recent abx, travel. Sx gradual onset, moderate. Tried immodium with minimal relief. +hx hysterectomy, no other hx abdominal surgeries. labs ekg imaging reviewed. 89yo F history of COPD 2-4L NC home O2, recent L hip sx, Hypothyroidism, AFib no anticoagulation, presenting with diarrhea x2d, foul smelling, 6+ episodes daily. Today developed nausea, retching, no vomiting. No abdominal pain, chest pain, shortness of breath, fevers/chills, cough, dysuria/hematuria, back pain, numbness/focal weakness. No recent abx, travel. Sx gradual onset, moderate. Tried immodium with minimal relief. +hx hysterectomy, no other hx abdominal surgeries. labs ekg imaging reviewed. +MAURI, colitis. presenting with globally weak. will admit for further eval

## 2020-09-28 NOTE — ED PROVIDER NOTE - OBJECTIVE STATEMENT
89yo F history of COPD 2-4L NC home O2, recent L hip sx, Hypothyroidism, AFib no anticoagulation, presenting with diarrhea x2d, foul smelling, 6+ episodes daily. Today developed nausea, retching, no vomiting. No abdominal pain, chest pain, shortness of breath, fevers/chills, cough, dysuria/hematuria, back pain, numbness/focal weakness. No recent abx, travel. Sx gradual onset, moderate. Tried immodium with minimal relief. +hx hysterectomy, no other hx abdominal surgeries

## 2020-09-28 NOTE — ED PROVIDER NOTE - PHYSICAL EXAMINATION
Constitutional: chronically ill appearing Non toxic.   Eyes: PERRLA. Extraocular movements intact, no entrapment. Conjunctiva normal.   ENT: No nasal discharge. dry mucus membranes.  Neck: Supple, non tender, full range of motion.  CV: RRR no murmurs, rubs, or gallops. +S1S2.   Pulm: Clear to auscultation bilaterally. Normal work of breathing.  Abd: soft mild diffuse ttp no rebound no guarding ND +BS.   Ext: Warm and well perfused x4, moving all extremities, no edema.   Psy: Cooperative, appropriate.   Skin: Warm, dry, no rash  Neuro: CN2-12 grossly intact no sensory or motor deficits throughout, no drift, no ataxia

## 2020-09-29 LAB
ANION GAP SERPL CALC-SCNC: 9 MMOL/L — SIGNIFICANT CHANGE UP (ref 7–14)
APPEARANCE UR: ABNORMAL
BACTERIA # UR AUTO: NEGATIVE — SIGNIFICANT CHANGE UP
BASOPHILS # BLD AUTO: 0.01 K/UL — SIGNIFICANT CHANGE UP (ref 0–0.2)
BASOPHILS NFR BLD AUTO: 0.1 % — SIGNIFICANT CHANGE UP (ref 0–1)
BILIRUB UR-MCNC: NEGATIVE — SIGNIFICANT CHANGE UP
BUN SERPL-MCNC: 27 MG/DL — HIGH (ref 10–20)
CALCIUM SERPL-MCNC: 8.6 MG/DL — SIGNIFICANT CHANGE UP (ref 8.5–10.1)
CHLORIDE SERPL-SCNC: 101 MMOL/L — SIGNIFICANT CHANGE UP (ref 98–110)
CO2 SERPL-SCNC: 24 MMOL/L — SIGNIFICANT CHANGE UP (ref 17–32)
COLOR SPEC: YELLOW — SIGNIFICANT CHANGE UP
CREAT SERPL-MCNC: 1.3 MG/DL — SIGNIFICANT CHANGE UP (ref 0.7–1.5)
DIFF PNL FLD: ABNORMAL
EOSINOPHIL # BLD AUTO: 0.22 K/UL — SIGNIFICANT CHANGE UP (ref 0–0.7)
EOSINOPHIL NFR BLD AUTO: 2.8 % — SIGNIFICANT CHANGE UP (ref 0–8)
EPI CELLS # UR: 5 /HPF — SIGNIFICANT CHANGE UP (ref 0–5)
GLUCOSE SERPL-MCNC: 110 MG/DL — HIGH (ref 70–99)
GLUCOSE UR QL: NEGATIVE — SIGNIFICANT CHANGE UP
HCT VFR BLD CALC: 31.9 % — LOW (ref 37–47)
HGB BLD-MCNC: 9.6 G/DL — LOW (ref 12–16)
HYALINE CASTS # UR AUTO: 33 /LPF — HIGH (ref 0–7)
IMM GRANULOCYTES NFR BLD AUTO: 0.5 % — HIGH (ref 0.1–0.3)
KETONES UR-MCNC: NEGATIVE — SIGNIFICANT CHANGE UP
LEUKOCYTE ESTERASE UR-ACNC: NEGATIVE — SIGNIFICANT CHANGE UP
LYMPHOCYTES # BLD AUTO: 0.78 K/UL — LOW (ref 1.2–3.4)
LYMPHOCYTES # BLD AUTO: 9.8 % — LOW (ref 20.5–51.1)
MAGNESIUM SERPL-MCNC: 1.5 MG/DL — LOW (ref 1.8–2.4)
MCHC RBC-ENTMCNC: 30.1 G/DL — LOW (ref 32–37)
MCHC RBC-ENTMCNC: 31.2 PG — HIGH (ref 27–31)
MCV RBC AUTO: 103.6 FL — HIGH (ref 81–99)
MONOCYTES # BLD AUTO: 0.78 K/UL — HIGH (ref 0.1–0.6)
MONOCYTES NFR BLD AUTO: 9.8 % — HIGH (ref 1.7–9.3)
NEUTROPHILS # BLD AUTO: 6.13 K/UL — SIGNIFICANT CHANGE UP (ref 1.4–6.5)
NEUTROPHILS NFR BLD AUTO: 77 % — HIGH (ref 42.2–75.2)
NITRITE UR-MCNC: NEGATIVE — SIGNIFICANT CHANGE UP
NRBC # BLD: 0 /100 WBCS — SIGNIFICANT CHANGE UP (ref 0–0)
PH UR: 5.5 — SIGNIFICANT CHANGE UP (ref 5–8)
PLATELET # BLD AUTO: 187 K/UL — SIGNIFICANT CHANGE UP (ref 130–400)
POTASSIUM SERPL-MCNC: 4.6 MMOL/L — SIGNIFICANT CHANGE UP (ref 3.5–5)
POTASSIUM SERPL-SCNC: 4.6 MMOL/L — SIGNIFICANT CHANGE UP (ref 3.5–5)
PROT UR-MCNC: ABNORMAL
RBC # BLD: 3.08 M/UL — LOW (ref 4.2–5.4)
RBC # FLD: 14.7 % — HIGH (ref 11.5–14.5)
RBC CASTS # UR COMP ASSIST: 1 /HPF — SIGNIFICANT CHANGE UP (ref 0–4)
SARS-COV-2 RNA SPEC QL NAA+PROBE: SIGNIFICANT CHANGE UP
SODIUM SERPL-SCNC: 134 MMOL/L — LOW (ref 135–146)
SP GR SPEC: 1.02 — SIGNIFICANT CHANGE UP (ref 1.01–1.03)
UROBILINOGEN FLD QL: SIGNIFICANT CHANGE UP
WBC # BLD: 7.96 K/UL — SIGNIFICANT CHANGE UP (ref 4.8–10.8)
WBC # FLD AUTO: 7.96 K/UL — SIGNIFICANT CHANGE UP (ref 4.8–10.8)
WBC UR QL: 2 /HPF — SIGNIFICANT CHANGE UP (ref 0–5)

## 2020-09-29 PROCEDURE — 74177 CT ABD & PELVIS W/CONTRAST: CPT | Mod: 26

## 2020-09-29 PROCEDURE — 99223 1ST HOSP IP/OBS HIGH 75: CPT

## 2020-09-29 RX ORDER — SODIUM CHLORIDE 9 MG/ML
1000 INJECTION INTRAMUSCULAR; INTRAVENOUS; SUBCUTANEOUS ONCE
Refills: 0 | Status: COMPLETED | OUTPATIENT
Start: 2020-09-29 | End: 2020-09-29

## 2020-09-29 RX ORDER — MAGNESIUM SULFATE 500 MG/ML
2 VIAL (ML) INJECTION ONCE
Refills: 0 | Status: COMPLETED | OUTPATIENT
Start: 2020-09-29 | End: 2020-09-29

## 2020-09-29 RX ORDER — ATORVASTATIN CALCIUM 80 MG/1
40 TABLET, FILM COATED ORAL AT BEDTIME
Refills: 0 | Status: DISCONTINUED | OUTPATIENT
Start: 2020-09-29 | End: 2020-10-07

## 2020-09-29 RX ORDER — METRONIDAZOLE 500 MG
500 TABLET ORAL ONCE
Refills: 0 | Status: COMPLETED | OUTPATIENT
Start: 2020-09-29 | End: 2020-09-29

## 2020-09-29 RX ORDER — BUDESONIDE AND FORMOTEROL FUMARATE DIHYDRATE 160; 4.5 UG/1; UG/1
2 AEROSOL RESPIRATORY (INHALATION)
Refills: 0 | Status: DISCONTINUED | OUTPATIENT
Start: 2020-09-29 | End: 2020-10-07

## 2020-09-29 RX ORDER — CIPROFLOXACIN LACTATE 400MG/40ML
400 VIAL (ML) INTRAVENOUS EVERY 12 HOURS
Refills: 0 | Status: DISCONTINUED | OUTPATIENT
Start: 2020-09-29 | End: 2020-09-29

## 2020-09-29 RX ORDER — LEVOTHYROXINE SODIUM 125 MCG
112 TABLET ORAL DAILY
Refills: 0 | Status: DISCONTINUED | OUTPATIENT
Start: 2020-09-29 | End: 2020-10-07

## 2020-09-29 RX ORDER — CIPROFLOXACIN LACTATE 400MG/40ML
400 VIAL (ML) INTRAVENOUS ONCE
Refills: 0 | Status: COMPLETED | OUTPATIENT
Start: 2020-09-29 | End: 2020-09-29

## 2020-09-29 RX ORDER — HEPARIN SODIUM 5000 [USP'U]/ML
5000 INJECTION INTRAVENOUS; SUBCUTANEOUS EVERY 8 HOURS
Refills: 0 | Status: DISCONTINUED | OUTPATIENT
Start: 2020-09-29 | End: 2020-10-07

## 2020-09-29 RX ORDER — ASPIRIN/CALCIUM CARB/MAGNESIUM 324 MG
81 TABLET ORAL DAILY
Refills: 0 | Status: DISCONTINUED | OUTPATIENT
Start: 2020-09-29 | End: 2020-10-07

## 2020-09-29 RX ORDER — METOPROLOL TARTRATE 50 MG
25 TABLET ORAL DAILY
Refills: 0 | Status: DISCONTINUED | OUTPATIENT
Start: 2020-09-29 | End: 2020-10-07

## 2020-09-29 RX ORDER — ACETAMINOPHEN 500 MG
650 TABLET ORAL EVERY 6 HOURS
Refills: 0 | Status: DISCONTINUED | OUTPATIENT
Start: 2020-09-29 | End: 2020-10-07

## 2020-09-29 RX ORDER — IPRATROPIUM/ALBUTEROL SULFATE 18-103MCG
3 AEROSOL WITH ADAPTER (GRAM) INHALATION EVERY 6 HOURS
Refills: 0 | Status: DISCONTINUED | OUTPATIENT
Start: 2020-09-29 | End: 2020-09-29

## 2020-09-29 RX ORDER — IPRATROPIUM/ALBUTEROL SULFATE 18-103MCG
3 AEROSOL WITH ADAPTER (GRAM) INHALATION EVERY 6 HOURS
Refills: 0 | Status: DISCONTINUED | OUTPATIENT
Start: 2020-09-29 | End: 2020-10-01

## 2020-09-29 RX ORDER — CHLORHEXIDINE GLUCONATE 213 G/1000ML
1 SOLUTION TOPICAL ONCE
Refills: 0 | Status: COMPLETED | OUTPATIENT
Start: 2020-09-29 | End: 2020-10-06

## 2020-09-29 RX ORDER — METRONIDAZOLE 500 MG
500 TABLET ORAL EVERY 8 HOURS
Refills: 0 | Status: DISCONTINUED | OUTPATIENT
Start: 2020-09-29 | End: 2020-10-01

## 2020-09-29 RX ORDER — LANOLIN ALCOHOL/MO/W.PET/CERES
5 CREAM (GRAM) TOPICAL AT BEDTIME
Refills: 0 | Status: DISCONTINUED | OUTPATIENT
Start: 2020-09-29 | End: 2020-10-07

## 2020-09-29 RX ORDER — PANTOPRAZOLE SODIUM 20 MG/1
40 TABLET, DELAYED RELEASE ORAL
Refills: 0 | Status: DISCONTINUED | OUTPATIENT
Start: 2020-09-29 | End: 2020-10-07

## 2020-09-29 RX ORDER — DRONEDARONE 400 MG/1
400 TABLET, FILM COATED ORAL
Refills: 0 | Status: DISCONTINUED | OUTPATIENT
Start: 2020-09-29 | End: 2020-10-07

## 2020-09-29 RX ORDER — FUROSEMIDE 40 MG
20 TABLET ORAL DAILY
Refills: 0 | Status: DISCONTINUED | OUTPATIENT
Start: 2020-09-29 | End: 2020-09-29

## 2020-09-29 RX ORDER — ALBUTEROL 90 UG/1
2.5 AEROSOL, METERED ORAL ONCE
Refills: 0 | Status: DISCONTINUED | OUTPATIENT
Start: 2020-09-29 | End: 2020-09-29

## 2020-09-29 RX ORDER — SODIUM CHLORIDE 9 MG/ML
1000 INJECTION, SOLUTION INTRAVENOUS
Refills: 0 | Status: DISCONTINUED | OUTPATIENT
Start: 2020-09-29 | End: 2020-10-01

## 2020-09-29 RX ADMIN — BUDESONIDE AND FORMOTEROL FUMARATE DIHYDRATE 2 PUFF(S): 160; 4.5 AEROSOL RESPIRATORY (INHALATION) at 11:31

## 2020-09-29 RX ADMIN — Medication 200 MILLIGRAM(S): at 03:30

## 2020-09-29 RX ADMIN — HEPARIN SODIUM 5000 UNIT(S): 5000 INJECTION INTRAVENOUS; SUBCUTANEOUS at 21:41

## 2020-09-29 RX ADMIN — SODIUM CHLORIDE 75 MILLILITER(S): 9 INJECTION, SOLUTION INTRAVENOUS at 06:51

## 2020-09-29 RX ADMIN — Medication 1 TABLET(S): at 11:31

## 2020-09-29 RX ADMIN — Medication 112 MICROGRAM(S): at 06:50

## 2020-09-29 RX ADMIN — Medication 100 MILLIGRAM(S): at 03:30

## 2020-09-29 RX ADMIN — Medication 25 GRAM(S): at 18:58

## 2020-09-29 RX ADMIN — Medication 100 MILLIGRAM(S): at 21:41

## 2020-09-29 RX ADMIN — HEPARIN SODIUM 5000 UNIT(S): 5000 INJECTION INTRAVENOUS; SUBCUTANEOUS at 06:50

## 2020-09-29 RX ADMIN — SODIUM CHLORIDE 75 MILLILITER(S): 9 INJECTION, SOLUTION INTRAVENOUS at 17:42

## 2020-09-29 RX ADMIN — Medication 100 MILLIGRAM(S): at 13:32

## 2020-09-29 RX ADMIN — BUDESONIDE AND FORMOTEROL FUMARATE DIHYDRATE 2 PUFF(S): 160; 4.5 AEROSOL RESPIRATORY (INHALATION) at 21:36

## 2020-09-29 RX ADMIN — Medication 81 MILLIGRAM(S): at 11:32

## 2020-09-29 RX ADMIN — HEPARIN SODIUM 5000 UNIT(S): 5000 INJECTION INTRAVENOUS; SUBCUTANEOUS at 13:33

## 2020-09-29 RX ADMIN — ATORVASTATIN CALCIUM 40 MILLIGRAM(S): 80 TABLET, FILM COATED ORAL at 21:41

## 2020-09-29 RX ADMIN — SODIUM CHLORIDE 1000 MILLILITER(S): 9 INJECTION INTRAMUSCULAR; INTRAVENOUS; SUBCUTANEOUS at 02:53

## 2020-09-29 RX ADMIN — Medication 5 MILLIGRAM(S): at 21:41

## 2020-09-29 RX ADMIN — DRONEDARONE 400 MILLIGRAM(S): 400 TABLET, FILM COATED ORAL at 17:31

## 2020-09-29 NOTE — H&P ADULT - ATTENDING COMMENTS
Patient seen and examined independently. Agree with resident note/ history / physical exam and plan of care with following exceptions/additions/updates. Case discussed with patient/pt decision maker, house-staff and nursing.     pt still has abd pain and tenderness  Vital Signs Last 24 Hrs  T(C): 36.2 (29 Sep 2020 07:50), Max: 36.9 (29 Sep 2020 05:24)  T(F): 97.1 (29 Sep 2020 07:50), Max: 98.4 (29 Sep 2020 05:24)  HR: 72 (29 Sep 2020 07:50) (71 - 80)  BP: 98/51 (29 Sep 2020 07:50) (98/51 - 114/51)  BP(mean): --  RR: 19 (29 Sep 2020 07:50) (18 - 19)  SpO2: 99% (29 Sep 2020 07:50) (83% - 99%)    Physical exam:   constitutional NAD, AAOX3, Respiratory  lungs CTA, CVS heart RRR, GI: abdomen diffuse tenderness, ND, BS+, skin: intact  neuro exam non focal. limited exam                         9.8    9.37  )-----------( 182      ( 28 Sep 2020 22:33 )             32.7   09-28    135  |  94<L>  |  34<H>  ----------------------------<  113<H>  5.1<H>   |  31  |  1.7<H>    Ca    9.2      28 Sep 2020 22:33    TPro  6.6  /  Alb  3.8  /  TBili  0.4  /  DBili  x   /  AST  38  /  ALT  20  /  AlkPhos  135<H>  09-28    < from: CT Abdomen and Pelvis w/ IV Cont (09.29.20 @ 02:56) >    1. Findings consistent with acute pancolitis; no evidence of abscess.  2. Small abdominopelvic ascites.    < end of copied text >    a/p  #Colitis, cont abx, fu gi pcr, id consult. cont hydration  #MAURI, on ckd, probably pre renal, cont fluids, repeat labs  #hx of Hypothyroid cont meds  #COPD stable cont mes  #hx of hip surgery, limited gait. PT eval

## 2020-09-29 NOTE — H&P ADULT - NSHPPHYSICALEXAM_GEN_ALL_CORE
LOS:     VITALS:   T(C): 36.6 (09-28-20 @ 21:39), Max: 36.6 (09-28-20 @ 21:39)  HR: 71 (09-28-20 @ 21:39) (71 - 71)  BP: 114/51 (09-28-20 @ 21:39) (114/51 - 114/51)  RR: 18 (09-28-20 @ 21:39) (18 - 18)  SpO2: 94% (09-28-20 @ 21:39) (94% - 94%)    GENERAL: NAD, lying in bed comfortably  HEAD:  Atraumatic, Normocephalic  EYES: EOMI, PERRLA, conjunctiva and sclera clear  ENT: Moist mucous membranes  NECK: Supple, No JVD  CHEST/LUNG: Clear to auscultation bilaterally; No rales, rhonchi, wheezing, or rubs. Unlabored respirations  HEART: Regular rate and rhythm; No murmurs, rubs, or gallops  ABDOMEN: BSx4; Soft, nontender, nondistended  EXTREMITIES:  2+ Peripheral Pulses, brisk capillary refill. No clubbing, cyanosis, or edema  NERVOUS SYSTEM:  A&Ox3, no focal deficits   SKIN: No rashes or lesions

## 2020-09-29 NOTE — H&P ADULT - NSHPLABSRESULTS_GEN_ALL_CORE
< from: CT Abdomen and Pelvis w/ IV Cont (09.29.20 @ 02:56) >      IMPRESSION:  1. Findings consistent with acute pancolitis; no evidence of abscess.  2. Small abdominopelvic ascites.      < end of copied text >

## 2020-09-29 NOTE — ED ADULT NURSE REASSESSMENT NOTE - NS ED NURSE REASSESS COMMENT FT1
Pt lethargic and not aware enough to swallow pills. Synthroid given and RN watched it dissolve on patient's tongue. Dronedarone, Toprol, and Protonix held, MD [phone] aware.

## 2020-09-29 NOTE — H&P ADULT - HISTORY OF PRESENT ILLNESS
This is a 89 years old female with PMHx of hypothyroidism, COPD ( on 3.5L of oxygen at home), who was brought to ED by the son for increased lethargy and diarrhea for one day.    Collateral history obtained from son who reports that since yesterday patient has been having diarrhea. About 5-6 episodes daily, consisting of loose stools, associated with increased sleepiness. She also complained of nausea today but did not throw up. Son gave her imodium yesterday that helped with the diarrhea temporarily. Denies fever chills, chest pain, abdominal pain, headache, SOB, palpitations, dysuria, at home.    In ED her vitals were   T(C): 36.6 (09-28-20 @ 21:39), Max: 36.6 (09-28-20 @ 21:39)  HR: 71 (09-28-20 @ 21:39) (71 - 71)  BP: 114/51 (09-28-20 @ 21:39) (114/51 - 114/51)  RR: 18 (09-28-20 @ 21:39) (18 - 18)  SpO2: 94% (09-28-20 @ 21:39) (94% - 94%)    In ED patient had CT abdomen that was consistent with acute pancolitis; no evidence of abscess. There was also mild abdomino-pelvic ascites. Patient was started on IVF aout 2.5 L and she was started on IV flagyl and IV cipro.

## 2020-09-29 NOTE — H&P ADULT - NSHPSOCIALHISTORY_GEN_ALL_CORE
Non-smoker  non-alcohol  lives with the son who takes care of her  AAO x 3 at baseline , walks with the help of a walker

## 2020-09-29 NOTE — H&P ADULT - ASSESSMENT
This is a 89 years old female with PMHx of hypothyroidism, COPD ( on 3.5L of oxygen at home), who was brought to ED by the son for increased lethargy and diarrhea for one day.    Immpression :    # Pancolitis  #COPD  #AFIB    Plan :    # Pancolitis This is a 89 years old female with PMHx of hypothyroidism, COPD ( on 3.5L of oxygen at home), who was brought to ED by the son for increased lethargy and diarrhea for one day.    Immpression :    # Pancolitis  # MAURI  #COPD  #AFIB    Plan :    # Pancolitis  - CT evidence of Pancolitis  - abdominal pain on deep palpation more in lower quadrants  - No fever, leukocytosis, vitals normal  - start IV cipro and Flagel  - Bowel rest, NPO except meds  - Gentle fluid hydration, avoid overload  - send GI PCR if patient continues to have diarrhea    # MAURI ( most likely pre-renal sec to dehydration)  - baseline SCr ~ 0.9, on admission ~1.7, UA positive for hyaline cast  - patient has already received 2.5L IVF in ED, urine lytes would be futile  - monitor BMP for improvement in function  - avoid nephrotoxic meds  - c/w gentle IVF hydration    #COPD  - stable on 2.5L (same as home)  - c/w symbicort/spiriva  - Nebs prn    #paroxsymal Atrial fibrillation  - Eliquis was discontinued, patient now only on ASA after discussion with patient and son regarding risks and benefits  - Metoprolol ER 25 POD  - Multaq 400 BID    #HLD  - Atorvastatin 40mg po qhs    # Hypothyroidism  - Levothyroxine increased to 112 mcg daily with her increased TSH in August. Will need recheck in 6-8 weeks.    # Macrocytic Anemia  - stable, hgb 9.8 mg/dl   - Vit B12 and Folate studies were checked and were normal in August    DVT ppx : heparin  GI ppx : protonix  Diet NPO except meds  Ambulate w/ assistance     This is a 89 years old female with PMHx of hypothyroidism, COPD ( on 3.5L of oxygen at home), who was brought to ED by the son for increased lethargy and diarrhea for one day.    Immpression :    # Pancolitis  # MAURI  #COPD  #AFIB    Plan :    # Pancolitis  - CT evidence of Pancolitis  - abdominal pain on deep palpation more in lower quadrants  - No fever, leukocytosis, vitals normal  - start IV Cipro and Flagyl ( Cipro known to prolong QT , pt already on Dronaderon , current EKG shows QTc ~ 456) avoid adding other QTc prolonging agent  - Bowel rest, NPO except meds  - Gentle fluid hydration, avoid overload  - send GI PCR     # MAURI ( most likely pre-renal sec to dehydration)  - baseline SCr ~ 0.9, on admission ~1.7, UA positive for hyaline cast  - patient has already received 2.5L IVF in ED, urine lytes would be futile  - monitor BMP for improvement in function  - avoid nephrotoxic meds  - c/w gentle IVF hydration    #COPD  - stable on 2.5L (same as home)  - c/w symbicort/spiriva  - Nebs prn    #paroxsymal Atrial fibrillation  - Eliquis was discontinued, patient now only on ASA after discussion with patient and son regarding risks and benefits  - Metoprolol ER 25 POD  - Multaq 400 BID    #HLD  - Atorvastatin 40mg po qhs    # Hypothyroidism  - Levothyroxine increased to 112 mcg daily with her increased TSH in August. Will need recheck in 6-8 weeks.    # Macrocytic Anemia  - stable, hgb 9.8 mg/dl   - Vit B12 and Folate studies were checked and were normal in August    DVT ppx : heparin  GI ppx : protonix  Diet NPO except meds  Ambulate w/ assistance     This is a 89 years old female with PMHx of hypothyroidism, COPD ( on 3.5L of oxygen at home), who was brought to ED by the son for increased lethargy and diarrhea for one day.    Immpression :    # Pancolitis  # MAURI  #COPD  #AFIB    Plan :    # Pancolitis  - CT evidence of Pancolitis  - abdominal pain on deep palpation more in lower quadrants  - No fever, leukocytosis, vitals normal  - start IV Cipro and Flagyl ( Cipro known to prolong QT , pt already on Dronaderon , current EKG shows QTc ~ 456) avoid adding other QTc prolonging agent  - Bowel rest, NPO except meds  - Gentle fluid hydration, avoid overload  - send GI PCR     # MAURI ( most likely pre-renal sec to dehydration)  - baseline SCr ~ 0.9, on admission ~1.7, UA positive for hyaline cast  - patient has already received 2.5L IVF in ED, urine lytes would be futile  - monitor BMP for improvement in function  - avoid nephrotoxic meds, patient is on lasix 20 mg. HOLD  - c/w gentle IVF hydration    #COPD  - stable on 2.5L (same as home)  - c/w symbicort/spiriva  - Nebs prn    #paroxsymal Atrial fibrillation  - Eliquis was discontinued, patient now only on ASA after discussion with patient and son regarding risks and benefits  - Metoprolol ER 25 POD  - Multaq 400 BID    #HLD  - Atorvastatin 40mg po qhs    # Hypothyroidism  - Levothyroxine increased to 112 mcg daily with her increased TSH in August. Will need recheck in 6-8 weeks.    # Macrocytic Anemia  - stable, hgb 9.8 mg/dl   - Vit B12 and Folate studies were checked and were normal in August    DVT ppx : heparin  GI ppx : protonix  Diet NPO except meds  Ambulate w/ assistance     This is a 89 years old female with PMHx of hypothyroidism, COPD ( on 3.5L of oxygen at home), who was brought to ED by the son for increased lethargy and diarrhea for one day.    Immpression :    # Pancolitis  # MAURI  #COPD  #AFIB    Plan :    # Pancolitis  - CT evidence of Pancolitis  - abdominal pain on deep palpation more in lower quadrants  - No fever, leukocytosis, vitals normal  - start IV Cipro and Flagyl ( Cipro known to prolong QT , pt already on Dronaderon , current EKG shows QTc ~ 456) avoid adding other QTc prolonging agent  - Bowel rest, NPO except meds  - Gentle fluid hydration, avoid overload  - send GI PCR     # MAURI ( most likely pre-renal sec to dehydration)  - baseline SCr ~ 0.9, on admission ~1.7, UA positive for hyaline cast  - patient has already received 2.5L IVF in ED, urine lytes would be futile  - monitor BMP for improvement in function  - avoid nephrotoxic meds, patient is on lasix 20 mg. HOLD  - c/w gentle IVF hydration    #COPD  - stable on 2.5L (same as home)  - c/w symbicort/spiriva  - Nebs prn    #paroxsymal Atrial fibrillation  - Eliquis was discontinued, patient now only on ASA after discussion with patient and son regarding risks and benefits  - Metoprolol ER 25 POD  - Multaq 400 BID    #HLD  - Atorvastatin 40mg po qhs    # Hypothyroidism  - Levothyroxine increased to 112 mcg daily with her increased TSH in August. Will need recheck in 6-8 weeks.    # Macrocytic Anemia  - stable, hgb 9.8 mg/dl   - Vit B12 and Folate studies were checked and were normal in August    DVT ppx : heparin  GI ppx : protonix  Diet NPO except meds  Ambulate w/ assistance    Repeat labs ordered for 11am

## 2020-09-29 NOTE — PATIENT PROFILE ADULT - VISION (WITH CORRECTIVE LENSES IF THE PATIENT USUALLY WEARS THEM):
The patient is a 89y Female complaining of headache. Normal vision: sees adequately in most situations; can see medication labels, newsprint

## 2020-09-30 LAB
ALBUMIN SERPL ELPH-MCNC: 3.2 G/DL — LOW (ref 3.5–5.2)
ALP SERPL-CCNC: 102 U/L — SIGNIFICANT CHANGE UP (ref 30–115)
ALT FLD-CCNC: 14 U/L — SIGNIFICANT CHANGE UP (ref 0–41)
ANION GAP SERPL CALC-SCNC: 10 MMOL/L — SIGNIFICANT CHANGE UP (ref 7–14)
AST SERPL-CCNC: 23 U/L — SIGNIFICANT CHANGE UP (ref 0–41)
BASOPHILS # BLD AUTO: 0.02 K/UL — SIGNIFICANT CHANGE UP (ref 0–0.2)
BASOPHILS NFR BLD AUTO: 0.4 % — SIGNIFICANT CHANGE UP (ref 0–1)
BILIRUB SERPL-MCNC: 0.2 MG/DL — SIGNIFICANT CHANGE UP (ref 0.2–1.2)
BUN SERPL-MCNC: 16 MG/DL — SIGNIFICANT CHANGE UP (ref 10–20)
CALCIUM SERPL-MCNC: 8.4 MG/DL — LOW (ref 8.5–10.1)
CHLORIDE SERPL-SCNC: 106 MMOL/L — SIGNIFICANT CHANGE UP (ref 98–110)
CO2 SERPL-SCNC: 24 MMOL/L — SIGNIFICANT CHANGE UP (ref 17–32)
CREAT SERPL-MCNC: 1 MG/DL — SIGNIFICANT CHANGE UP (ref 0.7–1.5)
CULTURE RESULTS: NO GROWTH — SIGNIFICANT CHANGE UP
EOSINOPHIL # BLD AUTO: 0.3 K/UL — SIGNIFICANT CHANGE UP (ref 0–0.7)
EOSINOPHIL NFR BLD AUTO: 5.4 % — SIGNIFICANT CHANGE UP (ref 0–8)
GLUCOSE SERPL-MCNC: 102 MG/DL — HIGH (ref 70–99)
HCT VFR BLD CALC: 29.9 % — LOW (ref 37–47)
HGB BLD-MCNC: 9.1 G/DL — LOW (ref 12–16)
IMM GRANULOCYTES NFR BLD AUTO: 0.2 % — SIGNIFICANT CHANGE UP (ref 0.1–0.3)
LACTATE SERPL-SCNC: 1.9 MMOL/L — SIGNIFICANT CHANGE UP (ref 0.7–2)
LYMPHOCYTES # BLD AUTO: 1.18 K/UL — LOW (ref 1.2–3.4)
LYMPHOCYTES # BLD AUTO: 21.1 % — SIGNIFICANT CHANGE UP (ref 20.5–51.1)
MAGNESIUM SERPL-MCNC: 2.1 MG/DL — SIGNIFICANT CHANGE UP (ref 1.8–2.4)
MCHC RBC-ENTMCNC: 30.4 G/DL — LOW (ref 32–37)
MCHC RBC-ENTMCNC: 31.6 PG — HIGH (ref 27–31)
MCV RBC AUTO: 103.8 FL — HIGH (ref 81–99)
MONOCYTES # BLD AUTO: 0.45 K/UL — SIGNIFICANT CHANGE UP (ref 0.1–0.6)
MONOCYTES NFR BLD AUTO: 8.1 % — SIGNIFICANT CHANGE UP (ref 1.7–9.3)
NEUTROPHILS # BLD AUTO: 3.62 K/UL — SIGNIFICANT CHANGE UP (ref 1.4–6.5)
NEUTROPHILS NFR BLD AUTO: 64.8 % — SIGNIFICANT CHANGE UP (ref 42.2–75.2)
NRBC # BLD: 0 /100 WBCS — SIGNIFICANT CHANGE UP (ref 0–0)
PLATELET # BLD AUTO: 217 K/UL — SIGNIFICANT CHANGE UP (ref 130–400)
POTASSIUM SERPL-MCNC: 4.7 MMOL/L — SIGNIFICANT CHANGE UP (ref 3.5–5)
POTASSIUM SERPL-SCNC: 4.7 MMOL/L — SIGNIFICANT CHANGE UP (ref 3.5–5)
PROT SERPL-MCNC: 5.2 G/DL — LOW (ref 6–8)
RBC # BLD: 2.88 M/UL — LOW (ref 4.2–5.4)
RBC # FLD: 14.6 % — HIGH (ref 11.5–14.5)
SODIUM SERPL-SCNC: 140 MMOL/L — SIGNIFICANT CHANGE UP (ref 135–146)
SPECIMEN SOURCE: SIGNIFICANT CHANGE UP
WBC # BLD: 5.58 K/UL — SIGNIFICANT CHANGE UP (ref 4.8–10.8)
WBC # FLD AUTO: 5.58 K/UL — SIGNIFICANT CHANGE UP (ref 4.8–10.8)

## 2020-09-30 PROCEDURE — 99233 SBSQ HOSP IP/OBS HIGH 50: CPT

## 2020-09-30 RX ORDER — CEFTRIAXONE 500 MG/1
1000 INJECTION, POWDER, FOR SOLUTION INTRAMUSCULAR; INTRAVENOUS EVERY 24 HOURS
Refills: 0 | Status: DISCONTINUED | OUTPATIENT
Start: 2020-09-30 | End: 2020-10-01

## 2020-09-30 RX ADMIN — SODIUM CHLORIDE 75 MILLILITER(S): 9 INJECTION, SOLUTION INTRAVENOUS at 11:40

## 2020-09-30 RX ADMIN — DRONEDARONE 400 MILLIGRAM(S): 400 TABLET, FILM COATED ORAL at 17:15

## 2020-09-30 RX ADMIN — BUDESONIDE AND FORMOTEROL FUMARATE DIHYDRATE 2 PUFF(S): 160; 4.5 AEROSOL RESPIRATORY (INHALATION) at 12:15

## 2020-09-30 RX ADMIN — DRONEDARONE 400 MILLIGRAM(S): 400 TABLET, FILM COATED ORAL at 06:11

## 2020-09-30 RX ADMIN — HEPARIN SODIUM 5000 UNIT(S): 5000 INJECTION INTRAVENOUS; SUBCUTANEOUS at 21:38

## 2020-09-30 RX ADMIN — Medication 5 MILLIGRAM(S): at 21:38

## 2020-09-30 RX ADMIN — Medication 3 MILLILITER(S): at 17:52

## 2020-09-30 RX ADMIN — CEFTRIAXONE 100 MILLIGRAM(S): 500 INJECTION, POWDER, FOR SOLUTION INTRAMUSCULAR; INTRAVENOUS at 11:40

## 2020-09-30 RX ADMIN — BUDESONIDE AND FORMOTEROL FUMARATE DIHYDRATE 2 PUFF(S): 160; 4.5 AEROSOL RESPIRATORY (INHALATION) at 20:36

## 2020-09-30 RX ADMIN — Medication 100 MILLIGRAM(S): at 06:12

## 2020-09-30 RX ADMIN — HEPARIN SODIUM 5000 UNIT(S): 5000 INJECTION INTRAVENOUS; SUBCUTANEOUS at 13:24

## 2020-09-30 RX ADMIN — Medication 100 MILLIGRAM(S): at 21:37

## 2020-09-30 RX ADMIN — Medication 1 TABLET(S): at 11:41

## 2020-09-30 RX ADMIN — Medication 81 MILLIGRAM(S): at 11:41

## 2020-09-30 RX ADMIN — Medication 1 TABLET(S): at 12:15

## 2020-09-30 RX ADMIN — Medication 112 MICROGRAM(S): at 06:11

## 2020-09-30 RX ADMIN — HEPARIN SODIUM 5000 UNIT(S): 5000 INJECTION INTRAVENOUS; SUBCUTANEOUS at 06:12

## 2020-09-30 RX ADMIN — PANTOPRAZOLE SODIUM 40 MILLIGRAM(S): 20 TABLET, DELAYED RELEASE ORAL at 06:11

## 2020-09-30 RX ADMIN — ATORVASTATIN CALCIUM 40 MILLIGRAM(S): 80 TABLET, FILM COATED ORAL at 21:37

## 2020-09-30 RX ADMIN — Medication 25 MILLIGRAM(S): at 06:11

## 2020-09-30 RX ADMIN — Medication 100 MILLIGRAM(S): at 13:22

## 2020-09-30 NOTE — CONSULT NOTE ADULT - ASSESSMENT
89 years old female with PMHx of hypothyroidism, COPD ( on 3.5L of oxygen at home), who was brought to ED by the son for increased lethargy and diarrhea for one day.    IMPRESSION;  Pancolitis : doubt infectious etiology as no fevers, WBC 7.9, no abdominal , no pain on PE, presently no diarrhea  Given Afib would include mesenteric ischemia in DDX  WBC 7.9  CT 9/29 : pancolitis   Clinically no evidence of CD colitis    RECOMMENDATIONS;  Stool guaiac  No ABx  No ABx

## 2020-09-30 NOTE — CONSULT NOTE ADULT - SUBJECTIVE AND OBJECTIVE BOX
TANNA MCCRACKEN  89y, Female  Allergy: No Known Allergies      All historical available data reviewed.    HPI:  This is a 89 years old female with PMHx of hypothyroidism, COPD ( on 3.5L of oxygen at home), who was brought to ED by the son for increased lethargy and diarrhea for one day.    Collateral history obtained from son who reports that since yesterday patient has been having diarrhea. About 5-6 episodes daily, consisting of loose stools, associated with increased sleepiness. She also complained of nausea today but did not throw up. Son gave her imodium yesterday that helped with the diarrhea temporarily. Denies fever chills, chest pain, abdominal pain, headache, SOB, palpitations, dysuria, at home.    In ED her vitals were   T(C): 36.6 (20 @ 21:39), Max: 36.6 (20 @ 21:39)  HR: 71 (20 @ 21:39) (71 - 71)  BP: 114/51 (20 @ 21:39) (114/51 - 114/51)  RR: 18 (20 @ 21:39) (18 - 18)  SpO2: 94% (20 @ 21:39) (94% - 94%)    In ED patient had CT abdomen that was consistent with acute pancolitis; no evidence of abscess. There was also mild abdomino-pelvic ascites. Patient was started on IVF aout 2.5 L and she was started on IV flagyl and IV cipro.   (29 Sep 2020 04:02)    ID called for cloitis    FAMILY HISTORY:    PAST MEDICAL & SURGICAL HISTORY:  Hypothyroid    COPD (chronic obstructive pulmonary disease)          VITALS:  T(F): 97.8, Max: 98.6 (20 @ 13:37)  HR: 75  BP: 112/52  RR: 18Vital Signs Last 24 Hrs  T(C): 36.6 (29 Sep 2020 21:06), Max: 37 (29 Sep 2020 13:37)  T(F): 97.8 (29 Sep 2020 21:06), Max: 98.6 (29 Sep 2020 13:37)  HR: 75 (29 Sep 2020 21:06) (72 - 77)  BP: 112/52 (29 Sep 2020 21:06) (98/51 - 113/54)  BP(mean): --  RR: 18 (29 Sep 2020 13:37) (18 - 19)  SpO2: 99% (29 Sep 2020 21:06) (98% - 99%)    TESTS & MEASUREMENTS:                        9.6    7.96  )-----------( 187      ( 29 Sep 2020 11:10 )             31.9         134<L>  |  101  |  27<H>  ----------------------------<  110<H>  4.6   |  24  |  1.3    Ca    8.6      29 Sep 2020 11:10  Mg     1.5         TPro  6.6  /  Alb  3.8  /  TBili  0.4  /  DBili  x   /  AST  38  /  ALT  20  /  AlkPhos  135<H>      LIVER FUNCTIONS - ( 28 Sep 2020 22:33 )  Alb: 3.8 g/dL / Pro: 6.6 g/dL / ALK PHOS: 135 U/L / ALT: 20 U/L / AST: 38 U/L / GGT: x             Culture - Urine (collected 20 @ 00:55)  Source: .Urine Clean Catch (Midstream)  Final Report (20 @ 04:43):    No growth      Urinalysis Basic - ( 29 Sep 2020 00:55 )    Color: Yellow / Appearance: Slightly Turbid / S.018 / pH: x  Gluc: x / Ketone: Negative  / Bili: Negative / Urobili: <2 mg/dL   Blood: x / Protein: 30 mg/dL / Nitrite: Negative   Leuk Esterase: Negative / RBC: 1 /HPF / WBC 2 /HPF   Sq Epi: x / Non Sq Epi: 5 /HPF / Bacteria: Negative          RADIOLOGY & ADDITIONAL TESTS:  Personal review of radiological diagnostics performed  Echo and EKG results noted when applicable.     MEDICATIONS:  acetaminophen   Tablet .. 650 milliGRAM(s) Oral every 6 hours PRN  albuterol/ipratropium for Nebulization 3 milliLiter(s) Nebulizer every 6 hours PRN  aspirin  chewable 81 milliGRAM(s) Oral daily  atorvastatin 40 milliGRAM(s) Oral at bedtime  budesonide  80 MICROgram(s)/formoterol 4.5 MICROgram(s) Inhaler 2 Puff(s) Inhalation two times a day  chlorhexidine 4% Liquid 1 Application(s) Topical once  dextrose 5% + sodium chloride 0.9%. 1000 milliLiter(s) IV Continuous <Continuous>  dronedarone 400 milliGRAM(s) Oral two times a day  heparin   Injectable 5000 Unit(s) SubCutaneous every 8 hours  levothyroxine 112 MICROGram(s) Oral daily  melatonin 5 milliGRAM(s) Oral at bedtime  metoprolol succinate ER 25 milliGRAM(s) Oral daily  metroNIDAZOLE  IVPB 500 milliGRAM(s) IV Intermittent every 8 hours  multivitamin 1 Tablet(s) Oral daily  pantoprazole    Tablet 40 milliGRAM(s) Oral before breakfast      ANTIBIOTICS:  metroNIDAZOLE  IVPB 500 milliGRAM(s) IV Intermittent every 8 hours

## 2020-09-30 NOTE — PHYSICAL THERAPY INITIAL EVALUATION ADULT - CRITERIA FOR SKILLED THERAPEUTIC INTERVENTIONS
risk reduction/prevention/rehab potential/impairments found/therapy frequency/functional limitations in following categories/predicted duration of therapy intervention/anticipated equipment needs at discharge

## 2020-09-30 NOTE — PHYSICAL THERAPY INITIAL EVALUATION ADULT - GENERAL OBSERVATIONS, REHAB EVAL
11:00-11:32. Pt encountered semifowler in bed in NAD,+ O2 3 lpm via NC, agreeable to PT and eager for PT and OOB activity. 11:00-11:32. Pt encountered semifowler in bed in NAD,+ O2 3 lpm via NC, +sukhdev, agreeable to PT and eager for PT and OOB activity.

## 2020-09-30 NOTE — PROGRESS NOTE ADULT - SUBJECTIVE AND OBJECTIVE BOX
TANNA MCCRACKEN 89y Female  MRN#: [id number]   Hospital Day: 1d    SUBJECTIVE  Patient is a 89y old Female who presents with a chief complaint of Diarrhea (30 Sep 2020 05:54)  Currently admitted to medicine with the primary diagnosis of Colitis      INTERVAL HPI AND OVERNIGHT EVENTS:  Patient was examined and seen at bedside. This morning she is resting comfortably in bed and reports no issues or overnight events. she is alert to person but not to place or time. She is currently not in any pain. patient denies headache, chest pain, shortness of breath, nausea, and vomiting. she denies abdominal pain this morning.     REVIEW OF SYMPTOMS:  CONSTITUTIONAL: No weakness, fevers or chills; No headaches  EYES: No visual changes, eye pain, or discharge  ENT: No vertigo; No ear pain or change in hearing; No sore throat or difficulty swallowing  NECK: No pain or stiffness  RESPIRATORY: No cough, wheezing, or hemoptysis; No shortness of breath  CARDIOVASCULAR: No chest pain or palpitations  GASTROINTESTINAL: No abdominal or epigastric pain; No nausea, vomiting, or hematemesis; No diarrhea or constipation; No melena or hematochezia  GENITOURINARY: No dysuria, frequency or hematuria  MUSCULOSKELETAL: No joint pain, no muscle pain, no weakness  NEUROLOGICAL: No numbness or weakness  SKIN: No itching or rashes    OBJECTIVE  PAST MEDICAL & SURGICAL HISTORY  Hypothyroid    COPD (chronic obstructive pulmonary disease) on 3.5L home oxygen      ALLERGIES:  No Known Allergies    MEDICATIONS:  STANDING MEDICATIONS  aspirin  chewable 81 milliGRAM(s) Oral daily  atorvastatin 40 milliGRAM(s) Oral at bedtime  budesonide  80 MICROgram(s)/formoterol 4.5 MICROgram(s) Inhaler 2 Puff(s) Inhalation two times a day  cefTRIAXone   IVPB 1000 milliGRAM(s) IV Intermittent every 24 hours  chlorhexidine 4% Liquid 1 Application(s) Topical once  dextrose 5% + sodium chloride 0.9%. 1000 milliLiter(s) IV Continuous <Continuous>  dronedarone 400 milliGRAM(s) Oral two times a day  heparin   Injectable 5000 Unit(s) SubCutaneous every 8 hours  levothyroxine 112 MICROGram(s) Oral daily  melatonin 5 milliGRAM(s) Oral at bedtime  metoprolol succinate ER 25 milliGRAM(s) Oral daily  metroNIDAZOLE  IVPB 500 milliGRAM(s) IV Intermittent every 8 hours  multivitamin 1 Tablet(s) Oral daily  Nephro-billy 1 Tablet(s) Oral daily  pantoprazole    Tablet 40 milliGRAM(s) Oral before breakfast    PRN MEDICATIONS  acetaminophen   Tablet .. 650 milliGRAM(s) Oral every 6 hours PRN  albuterol/ipratropium for Nebulization 3 milliLiter(s) Nebulizer every 6 hours PRN      VITAL SIGNS: Last 24 Hours  T(C): 35.9 (30 Sep 2020 05:24), Max: 37 (29 Sep 2020 13:37)  T(F): 96.6 (30 Sep 2020 05:24), Max: 98.6 (29 Sep 2020 13:37)  HR: 76 (30 Sep 2020 06:06) (75 - 80)  BP: 106/55 (30 Sep 2020 06:06) (106/55 - 113/54)  BP(mean): --  RR: 18 (30 Sep 2020 08:08) (18 - 18)  SpO2: 97% (30 Sep 2020 08:08) (97% - 100%)    LABS:                        9.1    5.58  )-----------( 217      ( 30 Sep 2020 06:36 )             29.9     09-30    140  |  106  |  16  ----------------------------<  102<H>  4.7   |  24  |  1.0    Ca    8.4<L>      30 Sep 2020 06:36  Mg     2.1     09-30    TPro  5.2<L>  /  Alb  3.2<L>  /  TBili  0.2  /  DBili  x   /  AST  23  /  ALT  14  /  AlkPhos  102  09-30      Urinalysis Basic - ( 29 Sep 2020 00:55 )    Color: Yellow / Appearance: Slightly Turbid / S.018 / pH: x  Gluc: x / Ketone: Negative  / Bili: Negative / Urobili: <2 mg/dL   Blood: x / Protein: 30 mg/dL / Nitrite: Negative   Leuk Esterase: Negative / RBC: 1 /HPF / WBC 2 /HPF   Sq Epi: x / Non Sq Epi: 5 /HPF / Bacteria: Negative      Culture - Urine (collected 29 Sep 2020 00:55)  Source: .Urine Clean Catch (Midstream)  Final Report (30 Sep 2020 04:43):    No growth          RADIOLOGY:  < from: CT Abdomen and Pelvis w/ IV Cont (20 @ 02:56) >  IMPRESSION:  1. Findings consistent with acute pancolitis; no evidence of abscess.  2. Small abdominopelvic ascites.    < end of copied text >    < from: Xray Chest 1 View- PORTABLE-Urgent (20 @ 23:49) >  Impression:    No focal consolidation.    < end of copied text >          PHYSICAL EXAM:  CONSTITUTIONAL: No acute distress, well-developed, well-groomed, AAOx1 (person)  HEAD: Atraumatic, normocephalic  EYES: EOM intact, PERRLA, conjunctiva and sclera clear  ENT: Hard of hearing, neck supple, no masses, no thyromegaly, no bruits, no JVD; moist mucous membranes  PULMONARY: Clear to auscultation bilaterally; no wheezes, rales, or rhonchi  CARDIOVASCULAR: Regular rate and rhythm; no murmurs, rubs, or gallops  GASTROINTESTINAL: Soft, non-tender, non-distended; +BS  MUSCULOSKELETAL: 2+ peripheral pulses; no clubbing, no cyanosis, no edema  NEUROLOGY: non-focal  SKIN: No rashes or lesions; warm and dry    ASSESSMENT & PLAN    PAST MEDICAL & SURGICAL HISTORY:  Hypothyroid    COPD (chronic obstructive pulmonary disease)      This is a 89 years old female with PMHx of hypothyroidism, COPD ( on 3.5L of oxygen at home), who was brought to ED by the son for increased lethargy and diarrhea for one day.    Immpression :  #Pancolitis  #MAURI  #COPD  #AFIB    Plan :    # Colitis  - CT evidence of Pancolitis  - abdominal pain on deep palpation more in lower quadrants - currently resolved  - No fever, leukocytosis, vitals normal  - Continue IV Flagyl  - Start ceftriaxone 1gm QD  - Advance to clear liquid diet  - avoid fluid overload  - send GI PCR with next bowel movement    # MAURI ( most likely pre-renal sec to dehydration)  - baseline SCr ~ 0.9, on admission ~1.7, UA positive for hyaline cast  - SCr 1.3 this AM  - patient received 2.5L IVF in ED, urine lytes would be futile  - monitor BMP for improvement in function  - avoid nephrotoxic meds, patient is on lasix 20 mg. HOLD  - c/w gentle IVF hydration    #COPD  - saturating at 97% on 3.5L nasal canula (same as home)  - c/w symbicort/spiriva  - Nebs prn    #paroxsymal Atrial fibrillation  - Eliquis was discontinued, patient now only on ASA after discussion with patient and son regarding risks and benefits  - Metoprolol ER 25 POD  - Multaq 400 BID    #HLD  - Atorvastatin 40mg po qhs    # Hypothyroidism  - Levothyroxine increased to 112 mcg daily with her increased TSH in August. Will need recheck in 6-8 weeks.    # Macrocytic Anemia  - stable, hgb 9.6 mg/dl   - Vit B12 and Folate studies were checked and were normal in August. Recheck now    DVT ppx : heparin 5000u sc  GI ppx : protonix PO  Diet NPO except meds  Ambulate w/ assistance    Repeat labs ordered for 11am

## 2020-09-30 NOTE — PHYSICAL THERAPY INITIAL EVALUATION ADULT - PERTINENT HX OF CURRENT PROBLEM, REHAB EVAL
This is a 89 years old female with PMHx of hypothyroidism, COPD ( on 3.5L of oxygen at home), who was brought to ED by the son for increased lethargy and diarrhea for one day.

## 2020-10-01 LAB
ALBUMIN SERPL ELPH-MCNC: 3.6 G/DL — SIGNIFICANT CHANGE UP (ref 3.5–5.2)
ALP SERPL-CCNC: 125 U/L — HIGH (ref 30–115)
ALT FLD-CCNC: 16 U/L — SIGNIFICANT CHANGE UP (ref 0–41)
ANION GAP SERPL CALC-SCNC: 9 MMOL/L — SIGNIFICANT CHANGE UP (ref 7–14)
AST SERPL-CCNC: 25 U/L — SIGNIFICANT CHANGE UP (ref 0–41)
BASE EXCESS BLDA CALC-SCNC: -1.7 MMOL/L — SIGNIFICANT CHANGE UP (ref -2–2)
BASE EXCESS BLDA CALC-SCNC: -2.9 MMOL/L — LOW (ref -2–2)
BASE EXCESS BLDA CALC-SCNC: -3.4 MMOL/L — LOW (ref -2–2)
BILIRUB SERPL-MCNC: <0.2 MG/DL — SIGNIFICANT CHANGE UP (ref 0.2–1.2)
BUN SERPL-MCNC: 17 MG/DL — SIGNIFICANT CHANGE UP (ref 10–20)
CALCIUM SERPL-MCNC: 8.5 MG/DL — SIGNIFICANT CHANGE UP (ref 8.5–10.1)
CHLORIDE SERPL-SCNC: 104 MMOL/L — SIGNIFICANT CHANGE UP (ref 98–110)
CO2 SERPL-SCNC: 26 MMOL/L — SIGNIFICANT CHANGE UP (ref 17–32)
CREAT SERPL-MCNC: 1.3 MG/DL — SIGNIFICANT CHANGE UP (ref 0.7–1.5)
GLUCOSE BLDC GLUCOMTR-MCNC: 92 MG/DL — SIGNIFICANT CHANGE UP (ref 70–99)
GLUCOSE SERPL-MCNC: 95 MG/DL — SIGNIFICANT CHANGE UP (ref 70–99)
HCO3 BLDA-SCNC: 25 MMOL/L — SIGNIFICANT CHANGE UP (ref 21–29)
HCO3 BLDA-SCNC: 25 MMOL/L — SIGNIFICANT CHANGE UP (ref 23–27)
HCO3 BLDA-SCNC: 26 MMOL/L — SIGNIFICANT CHANGE UP (ref 23–27)
HCT VFR BLD CALC: 31.6 % — LOW (ref 37–47)
HGB BLD-MCNC: 9.5 G/DL — LOW (ref 12–16)
HOROWITZ INDEX BLDA+IHG-RTO: 40 — SIGNIFICANT CHANGE UP
MAGNESIUM SERPL-MCNC: 2 MG/DL — SIGNIFICANT CHANGE UP (ref 1.8–2.4)
MCHC RBC-ENTMCNC: 30.1 G/DL — LOW (ref 32–37)
MCHC RBC-ENTMCNC: 31.1 PG — HIGH (ref 27–31)
MCV RBC AUTO: 103.6 FL — HIGH (ref 81–99)
NRBC # BLD: 0 /100 WBCS — SIGNIFICANT CHANGE UP (ref 0–0)
PCO2 BLDA: 50 MMHG — HIGH (ref 38–42)
PCO2 BLDA: 62 MMHG — CRITICAL HIGH (ref 38–42)
PCO2 BLDA: 65 MMHG — CRITICAL HIGH (ref 38–42)
PH BLDA: 7.21 — LOW (ref 7.38–7.42)
PH BLDA: 7.21 — LOW (ref 7.38–7.42)
PH BLDA: 7.31 — LOW (ref 7.38–7.42)
PLATELET # BLD AUTO: 225 K/UL — SIGNIFICANT CHANGE UP (ref 130–400)
PO2 BLDA: 102 MMHG — HIGH (ref 78–95)
PO2 BLDA: 123 MMHG — HIGH (ref 78–95)
PO2 BLDA: 93 MMHG — SIGNIFICANT CHANGE UP (ref 78–95)
POTASSIUM SERPL-MCNC: 4.4 MMOL/L — SIGNIFICANT CHANGE UP (ref 3.5–5)
POTASSIUM SERPL-SCNC: 4.4 MMOL/L — SIGNIFICANT CHANGE UP (ref 3.5–5)
PROT SERPL-MCNC: 5.7 G/DL — LOW (ref 6–8)
RBC # BLD: 3.05 M/UL — LOW (ref 4.2–5.4)
RBC # FLD: 14.9 % — HIGH (ref 11.5–14.5)
SAO2 % BLDA: 97 % — SIGNIFICANT CHANGE UP (ref 94–98)
SAO2 % BLDA: 98 % — HIGH (ref 92–96)
SAO2 % BLDA: 99 % — HIGH (ref 94–98)
SODIUM SERPL-SCNC: 139 MMOL/L — SIGNIFICANT CHANGE UP (ref 135–146)
WBC # BLD: 5.87 K/UL — SIGNIFICANT CHANGE UP (ref 4.8–10.8)
WBC # FLD AUTO: 5.87 K/UL — SIGNIFICANT CHANGE UP (ref 4.8–10.8)

## 2020-10-01 PROCEDURE — 71045 X-RAY EXAM CHEST 1 VIEW: CPT | Mod: 26

## 2020-10-01 PROCEDURE — 93010 ELECTROCARDIOGRAM REPORT: CPT

## 2020-10-01 PROCEDURE — 99233 SBSQ HOSP IP/OBS HIGH 50: CPT

## 2020-10-01 RX ORDER — CEFTRIAXONE 500 MG/1
1000 INJECTION, POWDER, FOR SOLUTION INTRAMUSCULAR; INTRAVENOUS EVERY 24 HOURS
Refills: 0 | Status: DISCONTINUED | OUTPATIENT
Start: 2020-10-01 | End: 2020-10-02

## 2020-10-01 RX ORDER — IPRATROPIUM/ALBUTEROL SULFATE 18-103MCG
3 AEROSOL WITH ADAPTER (GRAM) INHALATION EVERY 6 HOURS
Refills: 0 | Status: DISCONTINUED | OUTPATIENT
Start: 2020-10-01 | End: 2020-10-07

## 2020-10-01 RX ORDER — ONDANSETRON 8 MG/1
4 TABLET, FILM COATED ORAL ONCE
Refills: 0 | Status: COMPLETED | OUTPATIENT
Start: 2020-10-01 | End: 2020-10-01

## 2020-10-01 RX ORDER — METRONIDAZOLE 500 MG
500 TABLET ORAL EVERY 8 HOURS
Refills: 0 | Status: DISCONTINUED | OUTPATIENT
Start: 2020-10-01 | End: 2020-10-02

## 2020-10-01 RX ADMIN — SODIUM CHLORIDE 75 MILLILITER(S): 9 INJECTION, SOLUTION INTRAVENOUS at 01:07

## 2020-10-01 RX ADMIN — Medication 3 MILLILITER(S): at 19:19

## 2020-10-01 RX ADMIN — Medication 1 TABLET(S): at 11:35

## 2020-10-01 RX ADMIN — DRONEDARONE 400 MILLIGRAM(S): 400 TABLET, FILM COATED ORAL at 05:12

## 2020-10-01 RX ADMIN — BUDESONIDE AND FORMOTEROL FUMARATE DIHYDRATE 2 PUFF(S): 160; 4.5 AEROSOL RESPIRATORY (INHALATION) at 08:20

## 2020-10-01 RX ADMIN — Medication 80 MILLIGRAM(S): at 13:34

## 2020-10-01 RX ADMIN — Medication 3 MILLILITER(S): at 08:04

## 2020-10-01 RX ADMIN — Medication 100 MILLIGRAM(S): at 22:08

## 2020-10-01 RX ADMIN — Medication 100 MILLIGRAM(S): at 17:55

## 2020-10-01 RX ADMIN — Medication 100 MILLIGRAM(S): at 05:12

## 2020-10-01 RX ADMIN — ONDANSETRON 4 MILLIGRAM(S): 8 TABLET, FILM COATED ORAL at 08:05

## 2020-10-01 RX ADMIN — HEPARIN SODIUM 5000 UNIT(S): 5000 INJECTION INTRAVENOUS; SUBCUTANEOUS at 05:13

## 2020-10-01 RX ADMIN — Medication 112 MICROGRAM(S): at 05:12

## 2020-10-01 RX ADMIN — HEPARIN SODIUM 5000 UNIT(S): 5000 INJECTION INTRAVENOUS; SUBCUTANEOUS at 13:40

## 2020-10-01 RX ADMIN — HEPARIN SODIUM 5000 UNIT(S): 5000 INJECTION INTRAVENOUS; SUBCUTANEOUS at 22:08

## 2020-10-01 RX ADMIN — PANTOPRAZOLE SODIUM 40 MILLIGRAM(S): 20 TABLET, DELAYED RELEASE ORAL at 05:13

## 2020-10-01 RX ADMIN — CEFTRIAXONE 100 MILLIGRAM(S): 500 INJECTION, POWDER, FOR SOLUTION INTRAMUSCULAR; INTRAVENOUS at 17:55

## 2020-10-01 RX ADMIN — Medication 81 MILLIGRAM(S): at 11:35

## 2020-10-01 RX ADMIN — Medication 60 MILLIGRAM(S): at 17:57

## 2020-10-01 RX ADMIN — Medication 3 MILLILITER(S): at 13:28

## 2020-10-01 NOTE — DIETITIAN INITIAL EVALUATION ADULT. - CONTINUE CURRENT NUTRITION CARE PLAN
yes/Meals/snacks, nutrition related medications yes/Meals/snacks, vitamin/mineral supplementation, coordination of care

## 2020-10-01 NOTE — DIETITIAN INITIAL EVALUATION ADULT. - RD TO REMAIN AVAILABLE
yes/RD to monitor dietary intake, body composition, diet order, NFPE yes/RD to monitor energy intake, body composition, diet order, NFPE

## 2020-10-01 NOTE — PROGRESS NOTE ADULT - SUBJECTIVE AND OBJECTIVE BOX
TANNA MCCRACKEN 89y Female  MRN#: [id number]   Hospital Day: 2d    SUBJECTIVE  Patient is a 89y old Female who presents with a chief complaint of Diarrhea (30 Sep 2020 10:57)  Currently admitted to medicine with the primary diagnosis of Colitis      INTERVAL HPI AND OVERNIGHT EVENTS:  Patient was examined and seen at bedside. This morning she is comfortably in bed and was complaining of some mild nausea and shortness of breath. she is A/O x1 . she is currently not in any pain. patient denies headache, abdominal pain and chest pain this morning.    REVIEW OF SYMPTOMS:   CONSTITUTIONAL: No weakness, fevers or chills; No headaches  EYES: No visual changes, eye pain, or discharge  ENT: No vertigo; No ear pain or change in hearing; No sore throat or difficulty swallowing  NECK: No pain or stiffness  RESPIRATORY: No cough, no hemoptysis;  + wheezing, + shortness of breath  CARDIOVASCULAR: No chest pain or palpitations  GASTROINTESTINAL: No abdominal or epigastric pain; + nausea, no vomiting, or hematemesis; No diarrhea or constipation; No melena or hematochezia  GENITOURINARY: No dysuria, frequency or hematuria  MUSCULOSKELETAL: No joint pain, no muscle pain, no weakness  NEUROLOGICAL: No numbness or weakness  SKIN: No itching or rashes    OBJECTIVE  PAST MEDICAL & SURGICAL HISTORY  Hypothyroid    COPD (chronic obstructive pulmonary disease)      ALLERGIES:  No Known Allergies    MEDICATIONS:  STANDING MEDICATIONS  albuterol/ipratropium for Nebulization 3 milliLiter(s) Nebulizer every 6 hours  aspirin  chewable 81 milliGRAM(s) Oral daily  atorvastatin 40 milliGRAM(s) Oral at bedtime  budesonide  80 MICROgram(s)/formoterol 4.5 MICROgram(s) Inhaler 2 Puff(s) Inhalation two times a day  cefTRIAXone   IVPB 1000 milliGRAM(s) IV Intermittent every 24 hours  chlorhexidine 4% Liquid 1 Application(s) Topical once  dextrose 5% + sodium chloride 0.9%. 1000 milliLiter(s) IV Continuous <Continuous>  dronedarone 400 milliGRAM(s) Oral two times a day  heparin   Injectable 5000 Unit(s) SubCutaneous every 8 hours  levothyroxine 112 MICROGram(s) Oral daily  melatonin 5 milliGRAM(s) Oral at bedtime  metoprolol succinate ER 25 milliGRAM(s) Oral daily  metroNIDAZOLE  IVPB 500 milliGRAM(s) IV Intermittent every 8 hours  Nephro-billy 1 Tablet(s) Oral daily  pantoprazole    Tablet 40 milliGRAM(s) Oral before breakfast    PRN MEDICATIONS  acetaminophen   Tablet .. 650 milliGRAM(s) Oral every 6 hours PRN      VITAL SIGNS: Last 24 Hours  T(C): 36.2 (01 Oct 2020 05:41), Max: 36.3 (30 Sep 2020 21:01)  T(F): 97.1 (01 Oct 2020 05:41), Max: 97.3 (30 Sep 2020 21:01)  HR: 69 (01 Oct 2020 05:41) (64 - 69)  BP: 90/55 (01 Oct 2020 05:41) (90/55 - 100/49)  BP(mean): --  RR: 18 (01 Oct 2020 05:41) (18 - 18)  SpO2: 96% (01 Oct 2020 07:48) (96% - 98%)    LABS:                        9.5    5.87  )-----------( 225      ( 01 Oct 2020 04:30 )             31.6     10-01    139  |  104  |  17  ----------------------------<  95  4.4   |  26  |  1.3    Ca    8.5      01 Oct 2020 04:30  Mg     2.0     10-01    TPro  5.7<L>  /  Alb  3.6  /  TBili  <0.2  /  DBili  x   /  AST  25  /  ALT  16  /  AlkPhos  125<H>  10-01          Lactate, Blood: 1.9 mmol/L (09-30-20 @ 17:27)      Culture - Urine (collected 29 Sep 2020 00:55)  Source: .Urine Clean Catch (Midstream)  Final Report (30 Sep 2020 04:43):    No growth      RADIOLOGY:  < from: CT Abdomen and Pelvis w/ IV Cont (09.29.20 @ 02:56) >  IMPRESSION:  1. Findings consistent with acute pancolitis; no evidence of abscess.  2. Small abdominopelvic ascites.    < end of copied text >      PHYSICAL EXAM:  CONSTITUTIONAL: No acute distress,  A/Ox1  HEAD: Atraumatic, normocephalic  EYES: EOM intact, PERRLA, conjunctiva and sclera clear  ENT: heard of hearing Supple, no masses, no thyromegaly, no bruits, no JVD; moist mucous membranes  PULMONARY: diffuse wheezes bilaterally, worse in R upper lobe, no rales, or rhonchi  CARDIOVASCULAR: Regular rate and rhythm; no murmurs, rubs, or gallops  GASTROINTESTINAL: Soft, non-tender, non-distended; bowel sounds present  MUSCULOSKELETAL: 2+ peripheral pulses; no clubbing, no cyanosis, no edema  NEUROLOGY: non-focal  SKIN: No rashes or lesions; dry, extremities cool to the touch    ASSESSMENT & PLAN  #This is a 89 years old female with PMHx of hypothyroidism, COPD ( on 3.5L of oxygen at home), who was brought to ED by the son for increased lethargy and diarrhea for one day.    Immpression :  #Pancolitis  #MAURI  #COPD  #AFIB    Plan :    # Colitis  - CT evidence of Pancolitis on admission  - abdominal pain  - currently resolved  - No fever, leukocytosis, vitals normal  - Continue IV Flagyl 500mg Q8hrs  - Continue ceftriaxone 1gm QD  - Advance diet  - avoid fluid overload  - send GI PCR with next bowel movement    # MAURI ( most likely pre-renal sec to dehydration)  - baseline SCr ~ 0.9, on admission ~1.7, UA positive for hyaline cast  - SCr 1.0 this AM down from 1.3  - patient received 2.5L IVF in ED, urine lytes would be futile  - monitor BMP for improvement in function  - avoid nephrotoxic meds, patient is on lasix 20 mg. on HOLD  - c/w gentle IVF hydration    #COPD  - saturating at 97% on 3.5L nasal canula (same as home)  - c/w symbicort/spiriva  - Nebs prn    #paroxsymal Atrial fibrillation  - Eliquis was discontinued, patient now only on ASA after discussion with patient and son regarding risks and benefits  - Metoprolol ER 25 PO QD  - Multaq 400 BID  - lactate 1.9 (9/30)    #HLD  - Atorvastatin 40mg po qhs    # Hypothyroidism  - Levothyroxine increased to 112 mcg daily with her increased TSH in August. Will need recheck in 6-8 weeks.    # Macrocytic Anemia  - stable, hgb 9.6 mg/dl   - Vit B12 and Folate studies were checked and were normal in August. ordered recheck    DVT ppx : heparin 5000u sc  GI ppx : protonix PO  Diet NPO except meds  Ambulate w/ assistance    PAST MEDICAL & SURGICAL HISTORY:  Hypothyroid    COPD (chronic obstructive pulmonary disease)       TANNA MCCRACKEN 89y Female  MRN#: [id number]   Hospital Day: 2d    SUBJECTIVE  Patient is a 89y old Female who presents with a chief complaint of Diarrhea (30 Sep 2020 10:57)  Currently admitted to medicine with the primary diagnosis of Colitis      INTERVAL HPI AND OVERNIGHT EVENTS:  Patient was examined and seen at bedside. This morning she is comfortably in bed and was complaining of some mild nausea and shortness of breath. she is A/O x1 . she is currently not in any pain. patient denies headache, abdominal pain and chest pain this morning.    REVIEW OF SYMPTOMS:   CONSTITUTIONAL: No weakness, fevers or chills; No headaches  EYES: No visual changes, eye pain, or discharge  ENT: No vertigo; No ear pain or change in hearing; No sore throat or difficulty swallowing  NECK: No pain or stiffness  RESPIRATORY: No cough, no hemoptysis;  + wheezing, + shortness of breath  CARDIOVASCULAR: No chest pain or palpitations  GASTROINTESTINAL: No abdominal or epigastric pain; + nausea, no vomiting, or hematemesis; No diarrhea or constipation; No melena or hematochezia  GENITOURINARY: No dysuria, frequency or hematuria  MUSCULOSKELETAL: No joint pain, no muscle pain, no weakness  NEUROLOGICAL: No numbness or weakness  SKIN: No itching or rashes    OBJECTIVE  PAST MEDICAL & SURGICAL HISTORY  Hypothyroid    COPD (chronic obstructive pulmonary disease)      ALLERGIES:  No Known Allergies    MEDICATIONS:  STANDING MEDICATIONS  albuterol/ipratropium for Nebulization 3 milliLiter(s) Nebulizer every 6 hours  aspirin  chewable 81 milliGRAM(s) Oral daily  atorvastatin 40 milliGRAM(s) Oral at bedtime  budesonide  80 MICROgram(s)/formoterol 4.5 MICROgram(s) Inhaler 2 Puff(s) Inhalation two times a day  cefTRIAXone   IVPB 1000 milliGRAM(s) IV Intermittent every 24 hours  chlorhexidine 4% Liquid 1 Application(s) Topical once  dextrose 5% + sodium chloride 0.9%. 1000 milliLiter(s) IV Continuous <Continuous>  dronedarone 400 milliGRAM(s) Oral two times a day  heparin   Injectable 5000 Unit(s) SubCutaneous every 8 hours  levothyroxine 112 MICROGram(s) Oral daily  melatonin 5 milliGRAM(s) Oral at bedtime  metoprolol succinate ER 25 milliGRAM(s) Oral daily  metroNIDAZOLE  IVPB 500 milliGRAM(s) IV Intermittent every 8 hours  Nephro-billy 1 Tablet(s) Oral daily  pantoprazole    Tablet 40 milliGRAM(s) Oral before breakfast    PRN MEDICATIONS  acetaminophen   Tablet .. 650 milliGRAM(s) Oral every 6 hours PRN      VITAL SIGNS: Last 24 Hours  T(C): 36.2 (01 Oct 2020 05:41), Max: 36.3 (30 Sep 2020 21:01)  T(F): 97.1 (01 Oct 2020 05:41), Max: 97.3 (30 Sep 2020 21:01)  HR: 69 (01 Oct 2020 05:41) (64 - 69)  BP: 90/55 (01 Oct 2020 05:41) (90/55 - 100/49)  BP(mean): --  RR: 18 (01 Oct 2020 05:41) (18 - 18)  SpO2: 96% (01 Oct 2020 07:48) (96% - 98%)    LABS:                        9.5    5.87  )-----------( 225      ( 01 Oct 2020 04:30 )             31.6     10-01    139  |  104  |  17  ----------------------------<  95  4.4   |  26  |  1.3    Ca    8.5      01 Oct 2020 04:30  Mg     2.0     10-01    TPro  5.7<L>  /  Alb  3.6  /  TBili  <0.2  /  DBili  x   /  AST  25  /  ALT  16  /  AlkPhos  125<H>  10-01          Lactate, Blood: 1.9 mmol/L (09-30-20 @ 17:27)      Culture - Urine (collected 29 Sep 2020 00:55)  Source: .Urine Clean Catch (Midstream)  Final Report (30 Sep 2020 04:43):    No growth      RADIOLOGY:  < from: CT Abdomen and Pelvis w/ IV Cont (09.29.20 @ 02:56) >  IMPRESSION:  1. Findings consistent with acute pancolitis; no evidence of abscess.  2. Small abdominopelvic ascites.    < end of copied text >      PHYSICAL EXAM:  CONSTITUTIONAL: No acute distress,  A/Ox1  HEAD: Atraumatic, normocephalic  EYES: EOM intact, PERRLA, conjunctiva and sclera clear  ENT: heard of hearing Supple, no masses, no thyromegaly, no bruits, no JVD; moist mucous membranes  PULMONARY: diffuse wheezes bilaterally, worse in R upper lobe, no rales, or rhonchi  CARDIOVASCULAR: Regular rate and rhythm; no murmurs, rubs, or gallops  GASTROINTESTINAL: Soft, non-tender, non-distended; bowel sounds present  MUSCULOSKELETAL: 2+ peripheral pulses; no clubbing, no cyanosis, no edema  NEUROLOGY: non-focal  SKIN: No rashes or lesions; dry, extremities cool to the touch    ASSESSMENT & PLAN  #This is a 89 years old female with PMHx of hypothyroidism, COPD ( on 3.5L of oxygen at home), who was brought to ED by the son for increased lethargy and diarrhea for one day.    Immpression :  #Pancolitis  #MAURI  #COPD  #AFIB    Plan :    # Colitis  - CT evidence of Pancolitis on admission  - abdominal pain - currently resolved  - No fever, leukocytosis, vitals normal  - Continue IV Flagyl 500mg Q8hrs  - Continue ceftriaxone 1gm QD  - mild nausea this morning.  Zofran ordered  - Advance diet  - avoid fluid overload  - send GI PCR with next bowel movement    # MAURI ( most likely pre-renal sec to dehydration)  - baseline SCr ~ 0.9, on admission ~1.7, UA positive for hyaline cast  - SCr 1.0 this AM down from 1.3  - patient received 2.5L IVF in ED, urine lytes would be futile  - monitor BMP for improvement in function  - avoid nephrotoxic meds, patient is on lasix 20 mg. on HOLD  - c/w gentle IVF hydration    #COPD  - saturating at 97% on 3.5L nasal canula (same as home)  - c/w symbicort/spiriva  - pt was wheezing this morning on exam  - duonebs q6hrs standing dose    #paroxsymal Atrial fibrillation  - Eliquis was discontinued, patient now only on ASA after discussion with patient and son regarding risks and benefits  - Metoprolol ER 25 PO QD  - Multaq 400 BID  - lactate 1.9 (9/30)    #HLD  - Atorvastatin 40mg po qhs    # Hypothyroidism  - Levothyroxine increased to 112 mcg daily with her increased TSH in August. Will need recheck in 6-8 weeks.    # Macrocytic Anemia  - stable, hgb 9.6 mg/dl   - Vit B12 and Folate studies were checked and were normal in August. ordered recheck    DVT ppx : heparin 5000u sc  GI ppx : protonix PO  Diet NPO except meds  Ambulate w/ assistance    PAST MEDICAL & SURGICAL HISTORY:  Hypothyroid    COPD (chronic obstructive pulmonary disease)

## 2020-10-01 NOTE — DIETITIAN INITIAL EVALUATION ADULT. - ADD RECOMMEND
Change diet order to low-fiber diet, continue medication and MVI Change diet order to low-fiber diet. Continue Nephrovite. Provide meal assistance prn

## 2020-10-01 NOTE — DIETITIAN INITIAL EVALUATION ADULT. - ENERGY NEEDS
Calorie needs: 1,345-1,681 kcal (MSJ x AF 1.2-1.5 for BMI)  Protein needs: 50.6-60.72 g (1.0-1.2 g/kg for BMI)  Fluid needs: 1,345-1,681 mL (1 ml/kcal) Calorie needs: 1,345-1,460 kcals (MSJ x AF 1.2-1.3)  Protein needs: 51-61 g (1.0-1.2 g/kg)  Fluid needs: 1,345-1,460 mL (1 ml/kcal) Calorie needs: 1,156-1,253 kcals (MSJ x AF 1.2-1.3)  Protein needs: 51-61 g (1.0-1.2 g/kg)  Fluid needs: 1,345-1,460 mL (1 ml/kcal) Calorie needs: 1,156-1,253 kcals (MSJ x AF 1.2-1.3)  Protein needs: 51-61 g (1.0-1.2 g/kg)  Fluid needs: 1,156-1,253 mL (1 ml/kcal)

## 2020-10-01 NOTE — DIETITIAN INITIAL EVALUATION ADULT. - PERTINENT MEDS FT
Heparin, rocephin, lipitor, Nephro-billy Heparin, rocephin, lipitor, symbicort, nephro-billy, protonix Heparin, D5+ NS, solumderol, lipitor, symbicort, nephro-billy, protonix Heparin, D5+ NS, solumedrol, lipitor, symbicort, nephro-billy, protonix

## 2020-10-01 NOTE — PROGRESS NOTE ADULT - ASSESSMENT
89 years old female with PMHx of hypothyroidism, COPD ( on 3.5L of oxygen at home), who was brought to ED by the son for increased lethargy and diarrhea for one day.    IMPRESSION;  Pancolitis : no infectious etiology as no fevers, WBC 5.8, minimal  abdominal pain on PE,  no diarrhea  Given Afib would include mesenteric ischemia in DDX  WBC 5.8  CT 9/29 : pancolitis   Clinically no evidence of CD colitis    RECOMMENDATIONS;  No ABx  No ABx   recall prn please

## 2020-10-01 NOTE — PROGRESS NOTE ADULT - SUBJECTIVE AND OBJECTIVE BOX
TANNA MCCRACKEN  89y, Female    All available historical data reviewed    OVERNIGHT EVENTS:  no fevers  no BM  has abdominal pain    ROS:  General: Denies rigors, nightsweats  HEENT: Denies headache, rhinorrhea, sore throat, eye pain  CV: Denies CP, palpitations  PULM: Denies wheezing, hemoptysis  GI: Denies hematemesis, hematochezia, melena  : Denies discharge, hematuria  MSK: Denies arthralgias, myalgias  SKIN: Denies rash, lesions  NEURO: Denies paresthesias, weakness  PSYCH: Denies depression, anxiety    VITALS:  T(F): 97.1, Max: 97.3 (09-30-20 @ 21:01)  HR: 69  BP: 90/55  RR: 18Vital Signs Last 24 Hrs  T(C): 36.2 (01 Oct 2020 05:41), Max: 36.3 (30 Sep 2020 21:01)  T(F): 97.1 (01 Oct 2020 05:41), Max: 97.3 (30 Sep 2020 21:01)  HR: 69 (01 Oct 2020 05:41) (64 - 69)  BP: 90/55 (01 Oct 2020 05:41) (90/55 - 100/49)  BP(mean): --  RR: 18 (01 Oct 2020 05:41) (18 - 18)  SpO2: 96% (01 Oct 2020 07:48) (96% - 98%)    TESTS & MEASUREMENTS:                        9.5    5.87  )-----------( 225      ( 01 Oct 2020 04:30 )             31.6     10-01    139  |  104  |  17  ----------------------------<  95  4.4   |  26  |  1.3    Ca    8.5      01 Oct 2020 04:30  Mg     2.0     10-01    TPro  5.7<L>  /  Alb  3.6  /  TBili  <0.2  /  DBili  x   /  AST  25  /  ALT  16  /  AlkPhos  125<H>  10-01    LIVER FUNCTIONS - ( 01 Oct 2020 04:30 )  Alb: 3.6 g/dL / Pro: 5.7 g/dL / ALK PHOS: 125 U/L / ALT: 16 U/L / AST: 25 U/L / GGT: x             Culture - Urine (collected 09-29-20 @ 00:55)  Source: .Urine Clean Catch (Midstream)  Final Report (09-30-20 @ 04:43):    No growth            RADIOLOGY & ADDITIONAL TESTS:  Personal review of radiological diagnostics performed  Echo and EKG results noted when applicable.     MEDICATIONS:  acetaminophen   Tablet .. 650 milliGRAM(s) Oral every 6 hours PRN  albuterol/ipratropium for Nebulization 3 milliLiter(s) Nebulizer every 6 hours  aspirin  chewable 81 milliGRAM(s) Oral daily  atorvastatin 40 milliGRAM(s) Oral at bedtime  budesonide  80 MICROgram(s)/formoterol 4.5 MICROgram(s) Inhaler 2 Puff(s) Inhalation two times a day  cefTRIAXone   IVPB 1000 milliGRAM(s) IV Intermittent every 24 hours  chlorhexidine 4% Liquid 1 Application(s) Topical once  dextrose 5% + sodium chloride 0.9%. 1000 milliLiter(s) IV Continuous <Continuous>  dronedarone 400 milliGRAM(s) Oral two times a day  heparin   Injectable 5000 Unit(s) SubCutaneous every 8 hours  levothyroxine 112 MICROGram(s) Oral daily  melatonin 5 milliGRAM(s) Oral at bedtime  metoprolol succinate ER 25 milliGRAM(s) Oral daily  metroNIDAZOLE  IVPB 500 milliGRAM(s) IV Intermittent every 8 hours  Nephro-billy 1 Tablet(s) Oral daily  pantoprazole    Tablet 40 milliGRAM(s) Oral before breakfast      ANTIBIOTICS:  cefTRIAXone   IVPB 1000 milliGRAM(s) IV Intermittent every 24 hours  metroNIDAZOLE  IVPB 500 milliGRAM(s) IV Intermittent every 8 hours

## 2020-10-01 NOTE — CHART NOTE - NSCHARTNOTEFT_GEN_A_CORE
12:30 Code Gray: Patient evaluated at the bedside. Son upset and verbally aggressive with nursing staff. Security at the bedside.    Called by RN for patient desaturating to ~80% on 3.5L. Oxygen increased to 4.5 L with little improvement. STAT Duonebs given. CXR and EKG ordered. EKG NSR. Patient wheezing worse in the right lung field. STAT solumedrol 80 mg given. ABG showing pH 7.2 and CO2 retention ~60. Due to CO2 retention and acute change in mental status, patient was started on BiPAP 12/6, rate 14, FIO2 40%. Repeat ABG 2 hours later with no change in CO2 or pH. BiPAP setting changed to inspiratory pressure of 14.       At the time of decompensation son was at the bedside. I spoke with him in length regarding prognosis and treatment of COPD exacerbation. He stated the patient did not want intubation or chest compression in case of declining clinical status. He signed DNR/DNI forms and would like to continue full medical management. DNR/DNI forms signed and in the chart, DNR/DNI orders entered.       Plan:   c/w IV solumedrol 60 mg q12  DuoNeb q 6 hours standing   continue with BiPAP until clinical improvement or improvement with ABG  If clinically improved on BiPAP, can switch to ventimask if able to tolerate Called by RN at 2:00 pm for patient desaturating to ~80% on 3.5L. Oxygen increased to 4.5 L with little improvement. STAT Duonebs given. CXR and EKG ordered. EKG NSR (hx of afib). Patient wheezing worse in the right lung field. STAT solumedrol 80 mg IV given. ABG showing pH 7.2 and CO2 retention ~60. Due to CO2 retention and acute change in mental status, patient was started on BiPAP 12/6, rate 14, FIO2 40%. Repeat ABG 2 hours later with no change in CO2 or pH. BiPAP setting changed to inspiratory pressure of 14 from 12. Repeat duonebs treatment at 8:00pm, examined at bedside, saturating 99%, no wheezing on exam, good air entry bilaterally, clear to ascultation, repeat ABG pending for 23:30. Will follow closely, detailed sign out given to on call intern.     At the time of decompensation son, Andrzej, was at the bedside. I spoke with him in length regarding prognosis and treatment of COPD exacerbation. He stated the patient did not want intubation or chest compression in case of declining clinical status. He signed DNR/DNI forms and would like to continue full medical management. DNR/DNI forms signed and in the chart, DNR/DNI orders entered.       Plan:   c/w IV solumedrol 60 mg q12  DuoNeb q 6 hours standing   continue with BiPAP for now   Repeat ABG at 23:30, monitor pH, CO2, lactate      Blood Gas Profile - Arterial (10.01.20 @ 20:17)    pH, Arterial: 7.31    pCO2, Arterial: 50 mmHg    pO2, Arterial: 123 mmHg    HCO3, Arterial: 25 mmoL/L    Base Excess, Arterial: -1.7 mmoL/L    Oxygen Saturation, Arterial: 99 % Called by RN at 2:00 pm for patient desaturating to ~80% on 3.5L. Oxygen increased to 4.5 L with little improvement. STAT Duonebs given. CXR and EKG ordered. EKG NSR (hx of afib). Patient wheezing worse in the right lung field. STAT solumedrol 80 mg IV given. ABG showing pH 7.2 and CO2 retention ~60. Due to CO2 retention and acute change in mental status, patient was started on BiPAP 12/6, rate 14, FIO2 40%. Repeat ABG 2 hours later with no change in CO2 or pH. BiPAP setting changed to inspiratory pressure of 14 from 12. Repeat duonebs treatment at 8:00pm, examined at bedside, saturating 99%, no wheezing on exam, good air entry bilaterally, clear to ascultation. Patient developed COPD exacerbation during this hospitalization, exacerbation not present on admission.     At the time of decompensation son, Andrzej, was at the bedside. I spoke with him in length regarding prognosis and treatment of COPD exacerbation. He stated the patient did not want intubation or chest compression in case of declining clinical status. He signed DNR/DNI forms and would like to continue full medical management. DNR/DNI forms signed and in the chart, DNR/DNI orders entered.       Plan:   c/w IV solumedrol 60 mg q12  DuoNeb q 6 hours standing   continue with BiPAP for now   Repeat ABG at 23:30, monitor pH, CO2, lactate      Blood Gas Profile - Arterial (10.01.20 @ 20:17)    pH, Arterial: 7.31    pCO2, Arterial: 50 mmHg    pO2, Arterial: 123 mmHg    HCO3, Arterial: 25 mmoL/L    Base Excess, Arterial: -1.7 mmoL/L    Oxygen Saturation, Arterial: 99 %

## 2020-10-01 NOTE — DIETITIAN INITIAL EVALUATION ADULT. - FEEDING SKILL
minimal assistance/supervision minimal assistance/supervision/total assistance supervision/minimal assistance

## 2020-10-01 NOTE — DIETITIAN INITIAL EVALUATION ADULT. - OTHER INFO
Pt confused, spoke to son:    advanced to DASH/TLC diet Pt advanced to DASH/TLC diet since last seen by RD, persistent diarrhea (9/30), pt is unresponsive, contacted son: prepare meals at home with family, she typically eats 3 meals/day, NKFA,-115#, no swallowing/chewing problems, no cultural/Yazidism dietary restrictions, no supplementation at this time.    Medical Interventions: 89 y/o female admitted with diarrhea and lethargy one day PTA, diagnosed with colitis, pmxh of hypothyroidism, COPD Pt advanced to DASH/TLC diet since last seen by RD, persistent diarrhea (9/30), pt is alert + confused, contacted son: prepare meals at home with family, she typically eats 3 meals/day, NKFA,-115#, no swallowing/chewing problems, no cultural/Quaker dietary restrictions, no supplementation at this time.    Medical Interventions: 89 y/o female admitted with diarrhea and lethargy one day PTA, diagnosed with colitis, abdominal pain (resolved), pmxh of hypothyroidism, COPD, reported MAURI due dehydration (sCR 1.0 10/1), paroxsymal atrial fibrillation, macrocytic anemia (pending reorder vit b12+folate check) Observed untouched clear liquid tray for breakfast. Pt advanced to DASH/TLC diet after breakfast today. persistent diarrhea (9/30), pt is alert + confused, contacted pt's son via phone call: prepare meals at home with family, she typically eats 3 meals/day, Denies poor po PTA. NKFA, no swallowing/chewing problems, no cultural/[employer] dietary restrictions, no nutrition supplementation use PTA.    Medical Interventions: 89 y/o female admitted with diarrhea and lethargy one day PTA, diagnosed with colitis - diet advanced today. abdominal pain (resolved). COPD. MAURI due dehydration. Paroxsymal atrial fibrillation. Macrocytic anemia (pending reorder vit b12+folate check).

## 2020-10-01 NOTE — DIETITIAN INITIAL EVALUATION ADULT. - PHYSICAL APPEARANCE
well nourished well nourished/-115# x 1 month ago. Weight stable since PTA using CBW. well nourished/BMI: 24 -115# x 1 month ago. Weight stable since PTA using CBW. well nourished/BMI: 24 IBW: 42 kg; -115# x 1 month ago. Weight stable since PTA using CBW. skin intact, no edema, no physical signs of malnutrition

## 2020-10-02 LAB
ALBUMIN SERPL ELPH-MCNC: 3.9 G/DL — SIGNIFICANT CHANGE UP (ref 3.5–5.2)
ALP SERPL-CCNC: 156 U/L — HIGH (ref 30–115)
ALT FLD-CCNC: 22 U/L — SIGNIFICANT CHANGE UP (ref 0–41)
ANION GAP SERPL CALC-SCNC: 11 MMOL/L — SIGNIFICANT CHANGE UP (ref 7–14)
AST SERPL-CCNC: 41 U/L — SIGNIFICANT CHANGE UP (ref 0–41)
BASE EXCESS BLDA CALC-SCNC: -2.8 MMOL/L — LOW (ref -2–2)
BILIRUB SERPL-MCNC: 0.4 MG/DL — SIGNIFICANT CHANGE UP (ref 0.2–1.2)
BUN SERPL-MCNC: 21 MG/DL — HIGH (ref 10–20)
CALCIUM SERPL-MCNC: 8.8 MG/DL — SIGNIFICANT CHANGE UP (ref 8.5–10.1)
CHLORIDE SERPL-SCNC: 106 MMOL/L — SIGNIFICANT CHANGE UP (ref 98–110)
CO2 SERPL-SCNC: 21 MMOL/L — SIGNIFICANT CHANGE UP (ref 17–32)
CREAT SERPL-MCNC: 1.4 MG/DL — SIGNIFICANT CHANGE UP (ref 0.7–1.5)
GAS PNL BLDA: SIGNIFICANT CHANGE UP
GLUCOSE SERPL-MCNC: 127 MG/DL — HIGH (ref 70–99)
HCO3 BLDA-SCNC: 25 MMOL/L — SIGNIFICANT CHANGE UP (ref 23–27)
HCT VFR BLD CALC: 34.6 % — LOW (ref 37–47)
HGB BLD-MCNC: 10 G/DL — LOW (ref 12–16)
HOROWITZ INDEX BLDA+IHG-RTO: 38 — SIGNIFICANT CHANGE UP
MAGNESIUM SERPL-MCNC: 1.9 MG/DL — SIGNIFICANT CHANGE UP (ref 1.8–2.4)
MCHC RBC-ENTMCNC: 28.9 G/DL — LOW (ref 32–37)
MCHC RBC-ENTMCNC: 30.6 PG — SIGNIFICANT CHANGE UP (ref 27–31)
MCV RBC AUTO: 105.8 FL — HIGH (ref 81–99)
NRBC # BLD: 0 /100 WBCS — SIGNIFICANT CHANGE UP (ref 0–0)
PCO2 BLDA: 57 MMHG — HIGH (ref 38–42)
PH BLDA: 7.25 — LOW (ref 7.38–7.42)
PLATELET # BLD AUTO: 293 K/UL — SIGNIFICANT CHANGE UP (ref 130–400)
PO2 BLDA: 83 MMHG — SIGNIFICANT CHANGE UP (ref 78–95)
POTASSIUM SERPL-MCNC: 5.3 MMOL/L — HIGH (ref 3.5–5)
POTASSIUM SERPL-SCNC: 5.3 MMOL/L — HIGH (ref 3.5–5)
PROT SERPL-MCNC: 6.4 G/DL — SIGNIFICANT CHANGE UP (ref 6–8)
RBC # BLD: 3.27 M/UL — LOW (ref 4.2–5.4)
RBC # FLD: 14.7 % — HIGH (ref 11.5–14.5)
SAO2 % BLDA: 96 % — SIGNIFICANT CHANGE UP (ref 94–98)
SODIUM SERPL-SCNC: 138 MMOL/L — SIGNIFICANT CHANGE UP (ref 135–146)
WBC # BLD: 3.97 K/UL — LOW (ref 4.8–10.8)
WBC # FLD AUTO: 3.97 K/UL — LOW (ref 4.8–10.8)

## 2020-10-02 PROCEDURE — 99233 SBSQ HOSP IP/OBS HIGH 50: CPT

## 2020-10-02 RX ORDER — METRONIDAZOLE 500 MG
500 TABLET ORAL EVERY 8 HOURS
Refills: 0 | Status: DISCONTINUED | OUTPATIENT
Start: 2020-10-02 | End: 2020-10-03

## 2020-10-02 RX ORDER — CEFUROXIME AXETIL 250 MG
500 TABLET ORAL EVERY 12 HOURS
Refills: 0 | Status: DISCONTINUED | OUTPATIENT
Start: 2020-10-02 | End: 2020-10-02

## 2020-10-02 RX ORDER — CEFUROXIME AXETIL 250 MG
250 TABLET ORAL EVERY 12 HOURS
Refills: 0 | Status: DISCONTINUED | OUTPATIENT
Start: 2020-10-02 | End: 2020-10-03

## 2020-10-02 RX ADMIN — Medication 3 MILLILITER(S): at 21:01

## 2020-10-02 RX ADMIN — Medication 500 MILLIGRAM(S): at 13:07

## 2020-10-02 RX ADMIN — Medication 1 TABLET(S): at 13:06

## 2020-10-02 RX ADMIN — BUDESONIDE AND FORMOTEROL FUMARATE DIHYDRATE 2 PUFF(S): 160; 4.5 AEROSOL RESPIRATORY (INHALATION) at 21:49

## 2020-10-02 RX ADMIN — DRONEDARONE 400 MILLIGRAM(S): 400 TABLET, FILM COATED ORAL at 17:11

## 2020-10-02 RX ADMIN — Medication 5 MILLIGRAM(S): at 21:48

## 2020-10-02 RX ADMIN — Medication 3 MILLILITER(S): at 13:12

## 2020-10-02 RX ADMIN — HEPARIN SODIUM 5000 UNIT(S): 5000 INJECTION INTRAVENOUS; SUBCUTANEOUS at 13:08

## 2020-10-02 RX ADMIN — HEPARIN SODIUM 5000 UNIT(S): 5000 INJECTION INTRAVENOUS; SUBCUTANEOUS at 05:51

## 2020-10-02 RX ADMIN — Medication 500 MILLIGRAM(S): at 21:47

## 2020-10-02 RX ADMIN — Medication 250 MILLIGRAM(S): at 17:11

## 2020-10-02 RX ADMIN — Medication 60 MILLIGRAM(S): at 05:49

## 2020-10-02 RX ADMIN — ATORVASTATIN CALCIUM 40 MILLIGRAM(S): 80 TABLET, FILM COATED ORAL at 21:47

## 2020-10-02 RX ADMIN — Medication 100 MILLIGRAM(S): at 05:50

## 2020-10-02 RX ADMIN — Medication 81 MILLIGRAM(S): at 13:06

## 2020-10-02 RX ADMIN — HEPARIN SODIUM 5000 UNIT(S): 5000 INJECTION INTRAVENOUS; SUBCUTANEOUS at 21:48

## 2020-10-02 NOTE — PROGRESS NOTE ADULT - SUBJECTIVE AND OBJECTIVE BOX
TANNA MCCRACKEN 89y Female  MRN#: [id number]   Hospital Day: 3d    SUBJECTIVE  Patient is a 89y old Female who presents with a chief complaint of Diarrhea (01 Oct 2020 11:09)  Currently admitted to medicine with the primary diagnosis of Colitis      INTERVAL HPI AND OVERNIGHT EVENTS:  Patient was examined and seen at bedside. This morning she is resting comfortably in bed eating breakfast. Last night she notes she had a very hard time breathing prior to her breathing treatment. This morning she is feeling much better and breathing is much improved. she denies wheezing. patient also denies chest pain, nausea, vomiting and diarrhea    pt desaturated to ~80% on 3.5L @2pm yesterday. Oxygen increased to 4.5 L with little improvement. STAT Duonebs given. CXR and EKG ordered. EKG NSR (hx of afib). Patient wheezing worse in the right lung field. STAT solumedrol 80 mg IV given. ABG showing pH 7.2 and CO2 retention ~60. Due to CO2 retention and acute change in mental status, patient was started on BiPAP 12/6, rate 14, FIO2 40%. Repeat ABG 2 hours later with no change in CO2 or pH. BiPAP setting changed to inspiratory pressure of 14 from 12. Repeat duonebs treatment at 8:00pm, examined at bedside, saturating 99%, no wheezing on exam, good air entry bilaterally, clear to ascultation. Patient developed COPD exacerbation during this hospitalization, exacerbation not present on admission.     REVIEW OF SYMPTOMS:  CONSTITUTIONAL: No weakness, fevers or chills; No headaches  EYES: No visual changes, eye pain, or discharge  ENT: No vertigo; No ear pain or change in hearing; No sore throat or difficulty swallowing  NECK: No pain or stiffness  RESPIRATORY: No cough, no wheezing, or hemoptysis; No shortness of breath  CARDIOVASCULAR: No chest pain or palpitations  GASTROINTESTINAL: No abdominal or epigastric pain; No nausea, vomiting, or hematemesis; No diarrhea or constipation; No melena or hematochezia  GENITOURINARY: No dysuria, frequency or hematuria  MUSCULOSKELETAL: No joint pain, no muscle pain, no weakness  NEUROLOGICAL: No numbness or weakness  SKIN: No itching or rashes    OBJECTIVE  PAST MEDICAL & SURGICAL HISTORY  Hypothyroid    COPD (chronic obstructive pulmonary disease)      ALLERGIES:  No Known Allergies    MEDICATIONS:  STANDING MEDICATIONS  albuterol/ipratropium for Nebulization 3 milliLiter(s) Nebulizer every 6 hours  aspirin  chewable 81 milliGRAM(s) Oral daily  atorvastatin 40 milliGRAM(s) Oral at bedtime  budesonide  80 MICROgram(s)/formoterol 4.5 MICROgram(s) Inhaler 2 Puff(s) Inhalation two times a day  cefuroxime   Tablet 250 milliGRAM(s) Oral every 12 hours  chlorhexidine 4% Liquid 1 Application(s) Topical once  dronedarone 400 milliGRAM(s) Oral two times a day  heparin   Injectable 5000 Unit(s) SubCutaneous every 8 hours  levothyroxine 112 MICROGram(s) Oral daily  melatonin 5 milliGRAM(s) Oral at bedtime  metoprolol succinate ER 25 milliGRAM(s) Oral daily  metroNIDAZOLE    Tablet 500 milliGRAM(s) Oral every 8 hours  Nephro-billy 1 Tablet(s) Oral daily  pantoprazole    Tablet 40 milliGRAM(s) Oral before breakfast    PRN MEDICATIONS  acetaminophen   Tablet .. 650 milliGRAM(s) Oral every 6 hours PRN      VITAL SIGNS: Last 24 Hours  T(C): 35.7 (02 Oct 2020 05:23), Max: 35.7 (02 Oct 2020 05:23)  T(F): 96.2 (02 Oct 2020 05:23), Max: 96.2 (02 Oct 2020 05:23)  HR: 72 (02 Oct 2020 05:23) (72 - 72)  BP: 109/76 (02 Oct 2020 05:23) (109/76 - 117/55)  BP(mean): --  RR: 19 (02 Oct 2020 05:23) (19 - 19)  SpO2: 94% (02 Oct 2020 07:55) (90% - 99%)    LABS:                        10.0   3.97  )-----------( 293      ( 02 Oct 2020 08:03 )             34.6     10-02    138  |  106  |  21<H>  ----------------------------<  127<H>  5.3<H>   |  21  |  1.4    Ca    8.8      02 Oct 2020 08:03  Mg     1.9     10-02    TPro  6.4  /  Alb  3.9  /  TBili  0.4  /  DBili  x   /  AST  41  /  ALT  22  /  AlkPhos  156<H>  10-02        ABG  Blood Gas Profile - Arterial (10.02.20 @ 08:32)    pH, Arterial: 7.25    pCO2, Arterial: 57 mmHg    pO2, Arterial: 83 mmHg    HCO3, Arterial: 25 mmoL/L    Base Excess, Arterial: -2.8 mmoL/L    Oxygen Saturation, Arterial: 96 %    FIO2, Arterial: 38    Blood Gas Source Arterial: Arterial    Blood Gas Profile - Arterial (10.01.20 @ 20:17)    pH, Arterial: 7.31    pCO2, Arterial: 50 mmHg    pO2, Arterial: 123 mmHg    HCO3, Arterial: 25 mmoL/L    Base Excess, Arterial: -1.7 mmoL/L    Oxygen Saturation, Arterial: 99 %    Blood Gas Profile - Arterial (10.01.20 @ 16:44)    pH, Arterial: 7.21: critical values notfied to Dr. Law @ 17:40 vis spectra    pCO2, Arterial: 65 mmHg    pO2, Arterial: 93 mmHg    HCO3, Arterial: 26 mmoL/L    Base Excess, Arterial: -2.9 mmoL/L    Oxygen Saturation, Arterial: 97 %    FIO2, Arterial: 40    Blood Gas Profile - Arterial (10.01.20 @ 13:42)    pH, Arterial: 7.21    pCO2, Arterial: 62 mmHg    pO2, Arterial: 102 mmHg    HCO3, Arterial: 25 mmoL/L    Base Excess, Arterial: -3.4 mmoL/L    Oxygen Saturation, Arterial: 98 %    RADIOLOGY:    Xray Chest 1 View- PORTABLE-Urgent (Xray Chest 1 View- PORTABLE-Urgent .) (10.01.20 @ 16:14) >    Impression:  No focal consolidation.    Increased left costophrenic fullness which may represent small pleural effusion.    PHYSICAL EXAM:   CONSTITUTIONAL: No acute distress, well-developed, well-groomed, AAOx3  HEAD: Atraumatic, normocephalic  EYES: EOM intact, PERRLA, conjunctiva and sclera clear  ENT: Supple, no masses, no thyromegaly, no bruits, no JVD; moist mucous membranes  PULMONARY: Clear to auscultation bilaterally; faint wheezes, rales, or rhonchi  CARDIOVASCULAR: Regular rate and rhythm; no murmurs, rubs, or gallops  GASTROINTESTINAL: Soft, non-tender, non-distended; bowel sounds present  MUSCULOSKELETAL: 2+ peripheral pulses; no clubbing, no cyanosis, no edema  NEUROLOGY: non-focal  SKIN: No rashes or lesions; warm and dry    ASSESSMENT & PLAN  ##This is a 89 years old female with PMHx of hypothyroidism, COPD ( on 3.5L of oxygen at home), who was brought to ED by the son for increased lethargy and diarrhea for one day.    Immpression :  #Pancolitis  #COPD exacerbation  #MAURI  #AFIB    Plan :    # Colitis  - CT evidence of Pancolitis on admission  - abdominal pain - currently resolved  - No fever, leukocytosis, vitals normal  - IV Flagyl 500mg Q8hrs and ceftriaxone 1gm QD stopped today  - Start Cefuroximine 250mg PO Q12hrs (day 1/4)  - Start Flagyl 500mg PO Q8hrs (day 1/4)  - mild nausea 10/1 -  Zofran ordered x1  - Advanced diet  - avoid fluid overload  - send FOBT with next bowel movement    #COPD exacerbation  - one dose methylprednisolone 80mg IV given yesterday  - start methylprednisolone 60mg IV Q12hrs  - Bipap used last night   - currently saturating at 97% on 4L nasal canula (3.5L home)  - c/w symbicort/spiriva  - duonebs q6hrs standing dose    # MAURI ( most likely pre-renal sec to dehydration)  - baseline SCr ~ 0.9, on admission ~1.7, UA positive for hyaline cast  - SCr 1.0 this AM down from 1.3  - patient received 2.5L IVF in ED, urine lytes would be futile  - monitor BMP for improvement in function  - avoid nephrotoxic meds, patient is on lasix 20 mg at home. on HOLD  - c/w gentle IVF hydration    #paroxsymal Atrial fibrillation  - Eliquis was discontinued, patient now only on ASA after discussion with patient and son regarding risks and benefits  - Metoprolol ER 25 PO QD  - Multaq 400 BID  - blood gas arterial lactate 1.0 (10/2)    #HLD  - Atorvastatin 40mg po qhs    # Hypothyroidism  - Levothyroxine increased to 112 mcg daily with her increased TSH in August. Will need recheck in 6-8 weeks.    # Macrocytic Anemia  - stable, hgb 10 mg/dl today   - MCV today 105.8  - Vit B12 and Folate studies were checked and were normal in August. ordered recheck    DVT ppx : heparin 5000u sc  GI ppx : protonix PO  Diet Dash/ Low fiber  Ambulate w/ assistance    PAST MEDICAL & SURGICAL HISTORY:  Hypothyroid    COPD (chronic obstructive pulmonary disease)   TANNA MCCRACKEN 89y Female  MRN#: [id number]   Hospital Day: 3d    SUBJECTIVE  Patient is a 89y old Female who presents with a chief complaint of Diarrhea (01 Oct 2020 11:09)  Currently admitted to medicine with the primary diagnosis of Colitis      INTERVAL HPI AND OVERNIGHT EVENTS:  Patient was examined and seen at bedside. This morning she is resting comfortably in bed eating breakfast. Last night she notes she had a very hard time breathing prior to her breathing treatment. This morning she is feeling much better and breathing is much improved. she denies wheezing. patient also denies chest pain, nausea, vomiting and diarrhea    pt desaturated to ~80% on 3.5L @2pm yesterday. Oxygen increased to 4.5 L with little improvement. STAT Duonebs given. CXR and EKG ordered. EKG NSR (hx of afib). Patient wheezing worse in the right lung field. STAT solumedrol 80 mg IV given. ABG showing pH 7.2 and CO2 retention ~60. Due to CO2 retention and acute change in mental status, patient was started on BiPAP 12/6, rate 14, FIO2 40%. Repeat ABG 2 hours later with no change in CO2 or pH. BiPAP setting changed to inspiratory pressure of 14 from 12. Repeat duonebs treatment at 8:00pm, examined at bedside, saturating 99%, no wheezing on exam, good air entry bilaterally, clear to ascultation. Patient developed COPD exacerbation during this hospitalization, exacerbation not present on admission.     REVIEW OF SYMPTOMS:  CONSTITUTIONAL: No weakness, fevers or chills; No headaches  EYES: No visual changes, eye pain, or discharge  ENT: No vertigo; No ear pain or change in hearing; No sore throat or difficulty swallowing  NECK: No pain or stiffness  RESPIRATORY: No cough, no wheezing, or hemoptysis; No shortness of breath  CARDIOVASCULAR: No chest pain or palpitations  GASTROINTESTINAL: No abdominal or epigastric pain; No nausea, vomiting, or hematemesis; No diarrhea or constipation; No melena or hematochezia  GENITOURINARY: No dysuria, frequency or hematuria  MUSCULOSKELETAL: No joint pain, no muscle pain, no weakness  NEUROLOGICAL: No numbness or weakness  SKIN: No itching or rashes    OBJECTIVE  PAST MEDICAL & SURGICAL HISTORY  Hypothyroid    COPD (chronic obstructive pulmonary disease)      ALLERGIES:  No Known Allergies    MEDICATIONS:  STANDING MEDICATIONS  albuterol/ipratropium for Nebulization 3 milliLiter(s) Nebulizer every 6 hours  aspirin  chewable 81 milliGRAM(s) Oral daily  atorvastatin 40 milliGRAM(s) Oral at bedtime  budesonide  80 MICROgram(s)/formoterol 4.5 MICROgram(s) Inhaler 2 Puff(s) Inhalation two times a day  cefuroxime   Tablet 250 milliGRAM(s) Oral every 12 hours  chlorhexidine 4% Liquid 1 Application(s) Topical once  dronedarone 400 milliGRAM(s) Oral two times a day  heparin   Injectable 5000 Unit(s) SubCutaneous every 8 hours  levothyroxine 112 MICROGram(s) Oral daily  melatonin 5 milliGRAM(s) Oral at bedtime  metoprolol succinate ER 25 milliGRAM(s) Oral daily  metroNIDAZOLE    Tablet 500 milliGRAM(s) Oral every 8 hours  Nephro-billy 1 Tablet(s) Oral daily  pantoprazole    Tablet 40 milliGRAM(s) Oral before breakfast    PRN MEDICATIONS  acetaminophen   Tablet .. 650 milliGRAM(s) Oral every 6 hours PRN      VITAL SIGNS: Last 24 Hours  T(C): 35.7 (02 Oct 2020 05:23), Max: 35.7 (02 Oct 2020 05:23)  T(F): 96.2 (02 Oct 2020 05:23), Max: 96.2 (02 Oct 2020 05:23)  HR: 72 (02 Oct 2020 05:23) (72 - 72)  BP: 109/76 (02 Oct 2020 05:23) (109/76 - 117/55)  BP(mean): --  RR: 19 (02 Oct 2020 05:23) (19 - 19)  SpO2: 94% (02 Oct 2020 07:55) (90% - 99%)    LABS:                        10.0   3.97  )-----------( 293      ( 02 Oct 2020 08:03 )             34.6     10-02    138  |  106  |  21<H>  ----------------------------<  127<H>  5.3<H>   |  21  |  1.4    Ca    8.8      02 Oct 2020 08:03  Mg     1.9     10-02    TPro  6.4  /  Alb  3.9  /  TBili  0.4  /  DBili  x   /  AST  41  /  ALT  22  /  AlkPhos  156<H>  10-02        ABG  Blood Gas Profile - Arterial (10.02.20 @ 08:32)    pH, Arterial: 7.25    pCO2, Arterial: 57 mmHg    pO2, Arterial: 83 mmHg    HCO3, Arterial: 25 mmoL/L    Base Excess, Arterial: -2.8 mmoL/L    Oxygen Saturation, Arterial: 96 %    FIO2, Arterial: 38    Blood Gas Source Arterial: Arterial    Blood Gas Profile - Arterial (10.01.20 @ 20:17)    pH, Arterial: 7.31    pCO2, Arterial: 50 mmHg    pO2, Arterial: 123 mmHg    HCO3, Arterial: 25 mmoL/L    Base Excess, Arterial: -1.7 mmoL/L    Oxygen Saturation, Arterial: 99 %    Blood Gas Profile - Arterial (10.01.20 @ 16:44)    pH, Arterial: 7.21: critical values notfied to Dr. Law @ 17:40 vis spectra    pCO2, Arterial: 65 mmHg    pO2, Arterial: 93 mmHg    HCO3, Arterial: 26 mmoL/L    Base Excess, Arterial: -2.9 mmoL/L    Oxygen Saturation, Arterial: 97 %    FIO2, Arterial: 40    Blood Gas Profile - Arterial (10.01.20 @ 13:42)    pH, Arterial: 7.21    pCO2, Arterial: 62 mmHg    pO2, Arterial: 102 mmHg    HCO3, Arterial: 25 mmoL/L    Base Excess, Arterial: -3.4 mmoL/L    Oxygen Saturation, Arterial: 98 %    RADIOLOGY:    Xray Chest 1 View- PORTABLE-Urgent (Xray Chest 1 View- PORTABLE-Urgent .) (10.01.20 @ 16:14) >    Impression:  No focal consolidation.    Increased left costophrenic fullness which may represent small pleural effusion.    PHYSICAL EXAM:   CONSTITUTIONAL: No acute distress, well-developed, well-groomed, AAOx3  HEAD: Atraumatic, normocephalic  EYES: EOM intact, PERRLA, conjunctiva and sclera clear  ENT: Supple, no masses, no thyromegaly, no bruits, no JVD; moist mucous membranes  PULMONARY: Clear to auscultation bilaterally; faint wheezes, rales, or rhonchi  CARDIOVASCULAR: Regular rate and rhythm; no murmurs, rubs, or gallops  GASTROINTESTINAL: Soft, non-tender, non-distended; bowel sounds present  MUSCULOSKELETAL: 2+ peripheral pulses; no clubbing, no cyanosis, no edema  NEUROLOGY: non-focal  SKIN: No rashes or lesions; warm and dry    ASSESSMENT & PLAN  This is a 89 years old female with PMHx of hypothyroidism, COPD ( on 3.5L of oxygen at home), who was brought to ED by the son for increased lethargy and diarrhea for one day.    Impression :  #Pancolitis  #COPD exacerbation  #MAURI  #AFIB      # Colitis  - CT evidence of Pancolitis on admission  - abdominal pain - currently resolved  - No fever, leukocytosis, vitals normal  - IV Flagyl 500mg Q8hrs and ceftriaxone 1gm QD stopped today  - Start Cefuroximine 250mg PO Q12hrs (day 1/4)  - Start Flagyl 500mg PO Q8hrs (day 1/4)  - mild nausea 10/1 -  Zofran ordered x1  - Advanced diet  - avoid fluid overload  - send FOBT with next bowel movement  - continue on PO antibiotics and PO steroids for now, if worsening respiratory status can restart IV steroids       #COPD exacerbation  - one dose methylprednisolone 80mg IV given yesterday  - start methylprednisolone 60mg IV Q12hrs  - Bipap used last night   - currently saturating at 97% on 4L nasal canula (3.5L home)  - c/w symbicort/spiriva  - duonebs q6hrs standing dose      # MAURI ( most likely pre-renal sec to dehydration)  - baseline SCr ~ 0.9, on admission ~1.7, UA positive for hyaline cast  - SCr 1.0 this AM down from 1.3  - patient received 2.5L IVF in ED, urine lytes would be futile  - monitor BMP for improvement in function  - avoid nephrotoxic meds, patient is on lasix 20 mg at home. on HOLD  - c/w gentle IVF hydration      #paroxsymal Atrial fibrillation  - Eliquis was discontinued, patient now only on ASA after discussion with patient and son regarding risks and benefits  - Metoprolol ER 25 PO QD  - Multaq 400 BID  - blood gas arterial lactate 1.0 (10/2)      #HLD  - Atorvastatin 40mg po qhs      # Hypothyroidism  - Levothyroxine increased to 112 mcg daily with her increased TSH in August. Will need recheck in 6-8 weeks.      # Macrocytic Anemia  - stable, hgb 10 mg/dl today   - MCV today 105.8  - Vit B12 and Folate studies were checked and were normal in August. ordered recheck      DVT ppx : heparin 5000u sc  GI ppx : protonix PO  Diet Dash/ Low fiber  Ambulate w/ assistance

## 2020-10-02 NOTE — CDI QUERY NOTE - [id number]_HH
______________________________________________________________________________________________________________________________________    89 F, who presents with Diagnosis:  Pancolitis  Clinical Indicators;  (+) history of COPD on home 02,  ABG: 10/1/2020:  pH=7.21,  pCO2=62, u53=569   Chart Note: patient desaturating to ~80% on 3.5L. (+) wheezing worse in the right lung field, Oxygen increased to 4.5 L with little improvement. STAT Duonebs given.  Diagnosis: 10/1/2020  COPD exacerbation   Meds: BIPAP, IV solumedrol, duoneb    [ ] Acute on Chronic Hypoxic Respiratory Failure  [ ] Other, please specify ad document   [ ] Unable to clinically determine    Thank you.

## 2020-10-03 LAB
ANION GAP SERPL CALC-SCNC: 9 MMOL/L — SIGNIFICANT CHANGE UP (ref 7–14)
BASE EXCESS BLDA CALC-SCNC: -0.6 MMOL/L — SIGNIFICANT CHANGE UP (ref -2–2)
BUN SERPL-MCNC: 29 MG/DL — HIGH (ref 10–20)
CALCIUM SERPL-MCNC: 8.4 MG/DL — LOW (ref 8.5–10.1)
CHLORIDE SERPL-SCNC: 106 MMOL/L — SIGNIFICANT CHANGE UP (ref 98–110)
CO2 SERPL-SCNC: 25 MMOL/L — SIGNIFICANT CHANGE UP (ref 17–32)
CREAT SERPL-MCNC: 1.5 MG/DL — SIGNIFICANT CHANGE UP (ref 0.7–1.5)
FOLATE SERPL-MCNC: 18.1 NG/ML — SIGNIFICANT CHANGE UP
GLUCOSE SERPL-MCNC: 109 MG/DL — HIGH (ref 70–99)
HCO3 BLDA-SCNC: 26 MMOL/L — SIGNIFICANT CHANGE UP (ref 23–27)
HCT VFR BLD CALC: 31 % — LOW (ref 37–47)
HGB BLD-MCNC: 9.2 G/DL — LOW (ref 12–16)
MAGNESIUM SERPL-MCNC: 1.8 MG/DL — SIGNIFICANT CHANGE UP (ref 1.8–2.4)
MCHC RBC-ENTMCNC: 29.7 G/DL — LOW (ref 32–37)
MCHC RBC-ENTMCNC: 31 PG — SIGNIFICANT CHANGE UP (ref 27–31)
MCV RBC AUTO: 104.4 FL — HIGH (ref 81–99)
NRBC # BLD: 0 /100 WBCS — SIGNIFICANT CHANGE UP (ref 0–0)
PCO2 BLDA: 52 MMHG — HIGH (ref 38–42)
PH BLDA: 7.31 — LOW (ref 7.38–7.42)
PLATELET # BLD AUTO: 279 K/UL — SIGNIFICANT CHANGE UP (ref 130–400)
PO2 BLDA: 90 MMHG — SIGNIFICANT CHANGE UP (ref 78–95)
POTASSIUM SERPL-MCNC: 5.1 MMOL/L — HIGH (ref 3.5–5)
POTASSIUM SERPL-SCNC: 5.1 MMOL/L — HIGH (ref 3.5–5)
RBC # BLD: 2.97 M/UL — LOW (ref 4.2–5.4)
RBC # FLD: 15 % — HIGH (ref 11.5–14.5)
SAO2 % BLDA: 97 % — SIGNIFICANT CHANGE UP (ref 94–98)
SODIUM SERPL-SCNC: 140 MMOL/L — SIGNIFICANT CHANGE UP (ref 135–146)
VIT B12 SERPL-MCNC: 1137 PG/ML — SIGNIFICANT CHANGE UP (ref 232–1245)
WBC # BLD: 7.77 K/UL — SIGNIFICANT CHANGE UP (ref 4.8–10.8)
WBC # FLD AUTO: 7.77 K/UL — SIGNIFICANT CHANGE UP (ref 4.8–10.8)

## 2020-10-03 PROCEDURE — 71045 X-RAY EXAM CHEST 1 VIEW: CPT | Mod: 26

## 2020-10-03 PROCEDURE — 99233 SBSQ HOSP IP/OBS HIGH 50: CPT

## 2020-10-03 RX ORDER — CEFUROXIME AXETIL 250 MG
250 TABLET ORAL EVERY 12 HOURS
Refills: 0 | Status: COMPLETED | OUTPATIENT
Start: 2020-10-03 | End: 2020-10-05

## 2020-10-03 RX ORDER — POLYETHYLENE GLYCOL 3350 17 G/17G
17 POWDER, FOR SOLUTION ORAL DAILY
Refills: 0 | Status: DISCONTINUED | OUTPATIENT
Start: 2020-10-03 | End: 2020-10-07

## 2020-10-03 RX ADMIN — Medication 3 MILLILITER(S): at 02:30

## 2020-10-03 RX ADMIN — Medication 3 MILLILITER(S): at 21:56

## 2020-10-03 RX ADMIN — Medication 5 MILLIGRAM(S): at 21:38

## 2020-10-03 RX ADMIN — Medication 3 MILLILITER(S): at 08:12

## 2020-10-03 RX ADMIN — Medication 500 MILLIGRAM(S): at 13:44

## 2020-10-03 RX ADMIN — BUDESONIDE AND FORMOTEROL FUMARATE DIHYDRATE 2 PUFF(S): 160; 4.5 AEROSOL RESPIRATORY (INHALATION) at 08:11

## 2020-10-03 RX ADMIN — Medication 650 MILLIGRAM(S): at 23:52

## 2020-10-03 RX ADMIN — Medication 500 MILLIGRAM(S): at 05:43

## 2020-10-03 RX ADMIN — Medication 81 MILLIGRAM(S): at 11:25

## 2020-10-03 RX ADMIN — Medication 600 MILLIGRAM(S): at 17:32

## 2020-10-03 RX ADMIN — Medication 1 TABLET(S): at 11:25

## 2020-10-03 RX ADMIN — Medication 112 MICROGRAM(S): at 05:43

## 2020-10-03 RX ADMIN — Medication 3 MILLILITER(S): at 14:08

## 2020-10-03 RX ADMIN — PANTOPRAZOLE SODIUM 40 MILLIGRAM(S): 20 TABLET, DELAYED RELEASE ORAL at 05:43

## 2020-10-03 RX ADMIN — Medication 40 MILLIGRAM(S): at 05:43

## 2020-10-03 RX ADMIN — HEPARIN SODIUM 5000 UNIT(S): 5000 INJECTION INTRAVENOUS; SUBCUTANEOUS at 13:44

## 2020-10-03 RX ADMIN — DRONEDARONE 400 MILLIGRAM(S): 400 TABLET, FILM COATED ORAL at 17:31

## 2020-10-03 RX ADMIN — Medication 40 MILLIGRAM(S): at 17:32

## 2020-10-03 RX ADMIN — DRONEDARONE 400 MILLIGRAM(S): 400 TABLET, FILM COATED ORAL at 05:43

## 2020-10-03 RX ADMIN — Medication 250 MILLIGRAM(S): at 05:43

## 2020-10-03 RX ADMIN — ATORVASTATIN CALCIUM 40 MILLIGRAM(S): 80 TABLET, FILM COATED ORAL at 21:38

## 2020-10-03 RX ADMIN — Medication 650 MILLIGRAM(S): at 23:22

## 2020-10-03 RX ADMIN — HEPARIN SODIUM 5000 UNIT(S): 5000 INJECTION INTRAVENOUS; SUBCUTANEOUS at 21:38

## 2020-10-03 RX ADMIN — HEPARIN SODIUM 5000 UNIT(S): 5000 INJECTION INTRAVENOUS; SUBCUTANEOUS at 05:44

## 2020-10-03 RX ADMIN — Medication 25 MILLIGRAM(S): at 05:43

## 2020-10-03 RX ADMIN — Medication 250 MILLIGRAM(S): at 17:32

## 2020-10-03 NOTE — PROGRESS NOTE ADULT - SUBJECTIVE AND OBJECTIVE BOX
TANNA MCCRACKEN 89y Female  MRN#: [id number]   Hospital Day: 4d    SUBJECTIVE  Patient is a 89y old Female who presents with a chief complaint of Diarrhea (02 Oct 2020 13:44)  Currently admitted to medicine with the primary diagnosis of Colitis      INTERVAL HPI AND OVERNIGHT EVENTS:  Patient was examined and seen at bedside.  Patient also denies chest pain, nausea, vomiting and diarrhea    On Thursday 10/1: pt desaturated to ~80% on 3.5L @2pm yesterday. Oxygen increased to 4.5 L with little improvement. STAT Duonebs given. CXR and EKG ordered. EKG NSR (hx of afib). Patient wheezing worse in the right lung field. STAT solumedrol 80 mg IV given. ABG showing pH 7.2 and CO2 retention ~60. Due to CO2 retention and acute change in mental status, patient was started on BiPAP 12/6, rate 14, FIO2 40%. Repeat ABG 2 hours later with no change in CO2 or pH. BiPAP setting changed to inspiratory pressure of 14 from 12. Repeat duonebs treatment at 8:00pm, examined at bedside, saturating 99%, no wheezing on exam, good air entry bilaterally, clear to ascultation. Patient developed COPD exacerbation during this hospitalization, exacerbation not present on admission.     REVIEW OF SYMPTOMS:  CONSTITUTIONAL: No weakness, fevers or chills; No headaches  EYES: No visual changes, eye pain, or discharge  ENT: No vertigo; No ear pain or change in hearing; No sore throat or difficulty swallowing  NECK: No pain or stiffness  RESPIRATORY: No cough, no wheezing, or hemoptysis; No shortness of breath  CARDIOVASCULAR: No chest pain or palpitations  GASTROINTESTINAL: No abdominal or epigastric pain; No nausea, vomiting, or hematemesis; No diarrhea or constipation; No melena or hematochezia  GENITOURINARY: No dysuria, frequency or hematuria  MUSCULOSKELETAL: No joint pain, no muscle pain, no weakness  NEUROLOGICAL: No numbness or weakness  SKIN: No itching or rashes    OBJECTIVE  PAST MEDICAL & SURGICAL HISTORY  Hypothyroid  COPD (chronic obstructive pulmonary disease)      ALLERGIES:  No Known Allergies    MEDICATIONS:  STANDING MEDICATIONS  albuterol/ipratropium for Nebulization 3 milliLiter(s) Nebulizer every 6 hours  aspirin  chewable 81 milliGRAM(s) Oral daily  atorvastatin 40 milliGRAM(s) Oral at bedtime  budesonide  80 MICROgram(s)/formoterol 4.5 MICROgram(s) Inhaler 2 Puff(s) Inhalation two times a day  cefuroxime   Tablet 250 milliGRAM(s) Oral every 12 hours  chlorhexidine 4% Liquid 1 Application(s) Topical once  dronedarone 400 milliGRAM(s) Oral two times a day  heparin   Injectable 5000 Unit(s) SubCutaneous every 8 hours  levothyroxine 112 MICROGram(s) Oral daily  melatonin 5 milliGRAM(s) Oral at bedtime  metoprolol succinate ER 25 milliGRAM(s) Oral daily  metroNIDAZOLE    Tablet 500 milliGRAM(s) Oral every 8 hours  Nephro-billy 1 Tablet(s) Oral daily  pantoprazole    Tablet 40 milliGRAM(s) Oral before breakfast  predniSONE   Tablet 40 milliGRAM(s) Oral daily    PRN MEDICATIONS  acetaminophen   Tablet .. 650 milliGRAM(s) Oral every 6 hours PRN      VITAL SIGNS: Last 24 Hours  T(C): 35.7 (03 Oct 2020 05:39), Max: 36.2 (02 Oct 2020 14:34)  T(F): 96.3 (03 Oct 2020 05:39), Max: 97.2 (02 Oct 2020 14:34)  HR: 95 (03 Oct 2020 05:39) (83 - 95)  BP: 122/63 (03 Oct 2020 05:39) (102/50 - 122/63)  BP(mean): --  RR: 18 (03 Oct 2020 05:39) (1 - 18)  SpO2: 94% (02 Oct 2020 07:55) (90% - 94%)    LABS:                        10.0   3.97  )-----------( 293      ( 02 Oct 2020 08:03 )             34.6     10-02    138  |  106  |  21<H>  ----------------------------<  127<H>  5.3<H>   |  21  |  1.4    Ca    8.8      02 Oct 2020 08:03  Mg     1.9     10-02    TPro  6.4  /  Alb  3.9  /  TBili  0.4  /  DBili  x   /  AST  41  /  ALT  22  /  AlkPhos  156<H>  10-02        ABG - ( 02 Oct 2020 08:32 )  pH, Arterial: 7.25  pH, Blood: x     /  pCO2: 57    /  pO2: 83    / HCO3: 25    / Base Excess: -2.8  /  SaO2: 96          PHYSICAL EXAM:  CONSTITUTIONAL: No acute distress, well-developed, well-groomed, AAOx3  HEAD: Atraumatic, normocephalic  EYES: EOM intact, PERRLA, conjunctiva and sclera clear  ENT: Supple, no masses, no thyromegaly, no bruits, no JVD; moist mucous membranes  PULMONARY: Clear to auscultation bilaterally; faint wheezes, rales, or rhonchi  CARDIOVASCULAR: Regular rate and rhythm; no murmurs, rubs, or gallops  GASTROINTESTINAL: Soft, non-tender, non-distended; bowel sounds present  MUSCULOSKELETAL: 2+ peripheral pulses; no clubbing, no cyanosis, no edema  NEUROLOGY: non-focal  SKIN: No rashes or lesions; warm and dry    ASSESSMENT & PLAN  This is a 89 years old female with PMHx of hypothyroidism, COPD ( on 3.5L of oxygen at home), who was brought to ED by the son for increased lethargy and diarrhea for one day.    Impression :  #Pancolitis  #COPD exacerbation  #MAURI  #AFIB      # Colitis  - CT evidence of Pancolitis on admission  - abdominal pain - currently resolved  - No fever, leukocytosis, vitals normal  - IV Flagyl 500mg Q8hrs and ceftriaxone 1gm QD stopped today  - Start Cefuroximine 250mg PO Q12hrs (day 1/4)  - Start Flagyl 500mg PO Q8hrs (day 1/4)  - mild nausea 10/1 -  Zofran ordered x1  - Advanced diet  - avoid fluid overload  - send FOBT with next bowel movement  - continue on PO antibiotics and PO steroids for now, if worsening respiratory status can restart IV steroids       #COPD exacerbation  - one dose methylprednisolone 80mg IV given yesterday  - d/c methylprednisolone 60mg IV Q12hrs and switched to prednisone 40mg qd PO   - Bipap at night   - currently saturating at 97% on 4L nasal canula (3.5L home)  - c/w symbicort/spiriva  - duonebs q6hrs standing dose      # MAURI ( most likely pre-renal sec to dehydration)  - baseline SCr ~ 0.9, on admission ~1.7, UA positive for hyaline cast  - SCr 1.0 this AM down from 1.3  - patient received 2.5L IVF in ED, urine lytes would be futile  - avoid nephrotoxic meds, patient is on lasix 20 mg at home. on HOLD      #paroxsymal Atrial fibrillation  - Eliquis was discontinued, patient now only on ASA after discussion with patient and son regarding risks and benefits  - Metoprolol ER 25 PO QD  - Multaq 400 BID  - blood gas arterial lactate 1.0 (10/2)      #HLD  - Atorvastatin 40mg po qhs      # Hypothyroidism  - Levothyroxine increased to 112 mcg daily with her increased TSH in August. Will need recheck in 6-8 weeks.      # Macrocytic Anemia  - stable, hgb 10 mg/dl today   - MCV today 105.8  - Vit B12 and Folate studies were checked and were normal in August  - pending repeat B12 &  Folate levels      DVT ppx : heparin 5000u sc  GI ppx : protonix PO  Diet Dash/ Low fiber  Ambulate w/ assistance TANNA MCCRACKEN 89y Female  MRN#: [id number]   Hospital Day: 4d    SUBJECTIVE  Patient is a 89y old Female who presents with a chief complaint of Diarrhea (02 Oct 2020 13:44)  Currently admitted to medicine with the primary diagnosis of Colitis      INTERVAL HPI AND OVERNIGHT EVENTS:  Patient was examined and seen at bedside.  Patient also denies chest pain, nausea, vomiting and diarrhea.     On Thursday 10/1: pt desaturated to ~80% on 3.5L @2pm yesterday. Oxygen increased to 4.5 L with little improvement. STAT Duonebs given. CXR and EKG ordered. EKG NSR (hx of afib). Patient wheezing worse in the right lung field. STAT solumedrol 80 mg IV given. ABG showing pH 7.2 and CO2 retention ~60. Due to CO2 retention and acute change in mental status, patient was started on BiPAP 12/6, rate 14, FIO2 40%. Repeat ABG 2 hours later with no change in CO2 or pH. BiPAP setting changed to inspiratory pressure of 14 from 12. Repeat duonebs treatment at 8:00pm, examined at bedside, saturating 99%, no wheezing on exam, good air entry bilaterally, clear to ascultation. Patient developed COPD exacerbation during this hospitalization, exacerbation not present on admission.     REVIEW OF SYMPTOMS:  CONSTITUTIONAL: No weakness, fevers or chills; No headaches  EYES: No visual changes, eye pain, or discharge  ENT: No vertigo; No ear pain or change in hearing; No sore throat or difficulty swallowing  NECK: No pain or stiffness  RESPIRATORY: No cough, no wheezing, or hemoptysis; No shortness of breath  CARDIOVASCULAR: No chest pain or palpitations  GASTROINTESTINAL: No abdominal or epigastric pain; No nausea, vomiting, or hematemesis; No diarrhea or constipation; No melena or hematochezia  GENITOURINARY: No dysuria, frequency or hematuria  MUSCULOSKELETAL: No joint pain, no muscle pain, no weakness  NEUROLOGICAL: No numbness or weakness  SKIN: No itching or rashes    OBJECTIVE  PAST MEDICAL & SURGICAL HISTORY  Hypothyroid  COPD (chronic obstructive pulmonary disease)      ALLERGIES:  No Known Allergies    MEDICATIONS:  STANDING MEDICATIONS  albuterol/ipratropium for Nebulization 3 milliLiter(s) Nebulizer every 6 hours  aspirin  chewable 81 milliGRAM(s) Oral daily  atorvastatin 40 milliGRAM(s) Oral at bedtime  budesonide  80 MICROgram(s)/formoterol 4.5 MICROgram(s) Inhaler 2 Puff(s) Inhalation two times a day  cefuroxime   Tablet 250 milliGRAM(s) Oral every 12 hours  chlorhexidine 4% Liquid 1 Application(s) Topical once  dronedarone 400 milliGRAM(s) Oral two times a day  heparin   Injectable 5000 Unit(s) SubCutaneous every 8 hours  levothyroxine 112 MICROGram(s) Oral daily  melatonin 5 milliGRAM(s) Oral at bedtime  metoprolol succinate ER 25 milliGRAM(s) Oral daily  metroNIDAZOLE    Tablet 500 milliGRAM(s) Oral every 8 hours  Nephro-billy 1 Tablet(s) Oral daily  pantoprazole    Tablet 40 milliGRAM(s) Oral before breakfast  predniSONE   Tablet 40 milliGRAM(s) Oral daily    PRN MEDICATIONS  acetaminophen   Tablet .. 650 milliGRAM(s) Oral every 6 hours PRN      VITAL SIGNS: Last 24 Hours  T(C): 35.7 (03 Oct 2020 05:39), Max: 36.2 (02 Oct 2020 14:34)  T(F): 96.3 (03 Oct 2020 05:39), Max: 97.2 (02 Oct 2020 14:34)  HR: 95 (03 Oct 2020 05:39) (83 - 95)  BP: 122/63 (03 Oct 2020 05:39) (102/50 - 122/63)  BP(mean): --  RR: 18 (03 Oct 2020 05:39) (1 - 18)  SpO2: 94% (02 Oct 2020 07:55) (90% - 94%)    LABS:                        10.0   3.97  )-----------( 293      ( 02 Oct 2020 08:03 )             34.6     10-02    138  |  106  |  21<H>  ----------------------------<  127<H>  5.3<H>   |  21  |  1.4    Ca    8.8      02 Oct 2020 08:03  Mg     1.9     10-02    TPro  6.4  /  Alb  3.9  /  TBili  0.4  /  DBili  x   /  AST  41  /  ALT  22  /  AlkPhos  156<H>  10-02        ABG - ( 02 Oct 2020 08:32 )  pH, Arterial: 7.25  pH, Blood: x     /  pCO2: 57    /  pO2: 83    / HCO3: 25    / Base Excess: -2.8  /  SaO2: 96          PHYSICAL EXAM:  CONSTITUTIONAL: No acute distress, well-developed, well-groomed, AAOx3  HEAD: Atraumatic, normocephalic  EYES: EOM intact, PERRLA, conjunctiva and sclera clear  ENT: Supple, no masses, no thyromegaly, no bruits, no JVD; moist mucous membranes  PULMONARY: Clear to auscultation bilaterally; faint wheezes, rales, or rhonchi  CARDIOVASCULAR: Regular rate and rhythm; no murmurs, rubs, or gallops  GASTROINTESTINAL: Soft, non-tender, non-distended; bowel sounds present  MUSCULOSKELETAL: 2+ peripheral pulses; no clubbing, no cyanosis, no edema  NEUROLOGY: non-focal  SKIN: No rashes or lesions; warm and dry      ASSESSMENT & PLAN  Ms. Mccracken is 89 years old female with PMHx of hypothyroidism, COPD ( on 3.5L of oxygen at home), who was brought to ED by the son for increased lethargy and diarrhea for one day.      # Colitis  - CT evidence of Pancolitis on admission  - abdominal pain - currently resolved  - No fever, leukocytosis, vitals normal  - IV Flagyl 500mg Q8hrs and ceftriaxone 1gm QD stopped today  - Start Cefuroximine 250mg PO Q12hrs (day 1/4)  - Start Flagyl 500mg PO Q8hrs (day 1/4)  - mild nausea 10/1 -  Zofran ordered x1  - Advanced diet  - avoid fluid overload  - send FOBT with next bowel movement  - continue on PO antibiotics and PO steroids for now, if worsening respiratory status can restart IV steroids       #COPD exacerbation  - one dose methylprednisolone 80mg IV given yesterday  - d/c methylprednisolone 60mg IV Q12hrs and switched to prednisone 40mg qd PO   - Bipap at night (patient refused overnight)   - currently saturating at 97% on 4L nasal canula (3.5L home)  - c/w symbicort/spiriva  - duonebs q6hrs standing dose   - ABG pending for this morning, if worsened, will need to start Bipap      # MAURI ( most likely pre-renal sec to dehydration)  - baseline SCr ~ 0.9, on admission ~1.7, UA positive for hyaline cast  - SCr 1.0 this AM down from 1.3  - patient received 2.5L IVF in ED, urine lytes would be futile  - avoid nephrotoxic meds, patient is on lasix 20 mg at home. on HOLD      #paroxsymal Atrial fibrillation  - Eliquis was discontinued, patient now only on ASA after discussion with patient and son regarding risks and benefits  - Metoprolol ER 25 PO QD  - Multaq 400 BID  - blood gas arterial lactate 1.0 (10/2)      #HLD  - Atorvastatin 40mg po qhs      # Hypothyroidism  - Levothyroxine increased to 112 mcg daily with her increased TSH in August. Will need recheck in 6-8 weeks.      # Macrocytic Anemia  - stable, hgb 10 mg/dl today   - MCV today 105.8  - Vit B12 and Folate studies were checked and were normal in August  - pending repeat B12 &  Folate levels      DVT ppx : heparin 5000u sc  GI ppx : protonix PO  Diet Dash/ Low fiber  Ambulate w/ assistance

## 2020-10-04 PROCEDURE — 99233 SBSQ HOSP IP/OBS HIGH 50: CPT

## 2020-10-04 RX ORDER — FUROSEMIDE 40 MG
20 TABLET ORAL DAILY
Refills: 0 | Status: DISCONTINUED | OUTPATIENT
Start: 2020-10-04 | End: 2020-10-06

## 2020-10-04 RX ADMIN — Medication 3 MILLILITER(S): at 20:04

## 2020-10-04 RX ADMIN — Medication 1 TABLET(S): at 11:16

## 2020-10-04 RX ADMIN — Medication 250 MILLIGRAM(S): at 06:19

## 2020-10-04 RX ADMIN — Medication 3 MILLILITER(S): at 13:58

## 2020-10-04 RX ADMIN — Medication 600 MILLIGRAM(S): at 17:23

## 2020-10-04 RX ADMIN — Medication 112 MICROGRAM(S): at 06:19

## 2020-10-04 RX ADMIN — Medication 25 MILLIGRAM(S): at 06:19

## 2020-10-04 RX ADMIN — Medication 650 MILLIGRAM(S): at 11:17

## 2020-10-04 RX ADMIN — DRONEDARONE 400 MILLIGRAM(S): 400 TABLET, FILM COATED ORAL at 17:23

## 2020-10-04 RX ADMIN — Medication 650 MILLIGRAM(S): at 11:42

## 2020-10-04 RX ADMIN — Medication 3 MILLILITER(S): at 08:25

## 2020-10-04 RX ADMIN — ATORVASTATIN CALCIUM 40 MILLIGRAM(S): 80 TABLET, FILM COATED ORAL at 21:33

## 2020-10-04 RX ADMIN — DRONEDARONE 400 MILLIGRAM(S): 400 TABLET, FILM COATED ORAL at 06:19

## 2020-10-04 RX ADMIN — Medication 81 MILLIGRAM(S): at 11:16

## 2020-10-04 RX ADMIN — HEPARIN SODIUM 5000 UNIT(S): 5000 INJECTION INTRAVENOUS; SUBCUTANEOUS at 21:34

## 2020-10-04 RX ADMIN — HEPARIN SODIUM 5000 UNIT(S): 5000 INJECTION INTRAVENOUS; SUBCUTANEOUS at 13:06

## 2020-10-04 RX ADMIN — Medication 600 MILLIGRAM(S): at 06:19

## 2020-10-04 RX ADMIN — PANTOPRAZOLE SODIUM 40 MILLIGRAM(S): 20 TABLET, DELAYED RELEASE ORAL at 06:20

## 2020-10-04 RX ADMIN — Medication 5 MILLIGRAM(S): at 21:33

## 2020-10-04 RX ADMIN — Medication 40 MILLIGRAM(S): at 06:19

## 2020-10-04 RX ADMIN — HEPARIN SODIUM 5000 UNIT(S): 5000 INJECTION INTRAVENOUS; SUBCUTANEOUS at 06:19

## 2020-10-04 RX ADMIN — Medication 60 MILLIGRAM(S): at 17:23

## 2020-10-04 RX ADMIN — POLYETHYLENE GLYCOL 3350 17 GRAM(S): 17 POWDER, FOR SOLUTION ORAL at 11:16

## 2020-10-04 RX ADMIN — BUDESONIDE AND FORMOTEROL FUMARATE DIHYDRATE 2 PUFF(S): 160; 4.5 AEROSOL RESPIRATORY (INHALATION) at 08:57

## 2020-10-04 RX ADMIN — Medication 250 MILLIGRAM(S): at 17:23

## 2020-10-04 NOTE — PROGRESS NOTE ADULT - SUBJECTIVE AND OBJECTIVE BOX
TANNA MCCRACKEN  [facility] 028 A ([facility])            Patient was evaluated and examined  by bedside, feeling better today, no dyspnea at rest.          REVIEW OF SYSTEMS:  please see pertinent positives mentioned above, all other 12 ROS negative      T(C): , Max: 36.4 (10-04-20 @ 14:35)  HR: 71 (10-04-20 @ 14:35)  BP: 112/55 (10-04-20 @ 14:35)  RR: 18 (10-04-20 @ 14:35)  SpO2: 98% (10-04-20 @ 06:48)  CAPILLARY BLOOD GLUCOSE          PHYSICAL EXAM:  General: NAD, AAOX3, patient is sitting  comfortably in  a chair  HEENT: AT, NC, Supple, NO JVD, NO CB  Lungs: decreased breath sounds B/L, mild expiratory wheezing, no rhonchi  CVS: normal S1, S2, RRR, NO M/G/R  Abdomen: soft, bowel sounds present, non-tender, non-distended  Extremities: no edema, no clubbing, no cyanosis, positive peripheral pulses b/l  Neuro: no acute focal neurological deficits  Skin: no rash, no ecchymosis      LAB  CBC  Date: 10-03-20 @ 08:20  Mean cell Jqvkrvumwn06.0  Mean cell Hemoglobin Conc29.7  Mean cell Volum 104.4  Platelet count-Automate 279  RBC Count 2.97  Red Cell Distrib Width15.0  WBC Count7.77  % Albumin, Urine--  Hematocrit 31.0  Hemoglobin 9.2  CBC  Date: 10-02-20 @ 08:03  Mean cell Mlnicgrchn13.6  Mean cell Hemoglobin Conc28.9  Mean cell Volum 105.8  Platelet count-Automate 293  RBC Count 3.27  Red Cell Distrib Width14.7  WBC Count3.97  % Albumin, Urine--  Hematocrit 34.6  Hemoglobin 10.0  CBC  Date: 10-01-20 @ 04:30  Mean cell Phwhzjnqgk95.1  Mean cell Hemoglobin Conc30.1  Mean cell Volum 103.6  Platelet count-Automate 225  RBC Count 3.05  Red Cell Distrib Width14.9  WBC Count5.87  % Albumin, Urine--  Hematocrit 31.6  Hemoglobin 9.5  CBC  Date: 09-30-20 @ 06:36  Mean cell Dyhasxicln12.6  Mean cell Hemoglobin Conc30.4  Mean cell Volum 103.8  Platelet count-Automate 217  RBC Count 2.88  Red Cell Distrib Width14.6  WBC Count5.58  % Albumin, Urine--  Hematocrit 29.9  Hemoglobin 9.1  CBC  Date: 09-29-20 @ 11:10  Mean cell Cqbbfkirbw02.2  Mean cell Hemoglobin Conc30.1  Mean cell Volum 103.6  Platelet count-Automate 187  RBC Count 3.08  Red Cell Distrib Width14.7  WBC Count7.96  % Albumin, Urine--  Hematocrit 31.9  Hemoglobin 9.6  CBC  Date: 09-28-20 @ 22:33  Mean cell Laorsvdcnu69.2  Mean cell Hemoglobin Conc30.0  Mean cell Volum 104.1  Platelet count-Automate 182  RBC Count 3.14  Red Cell Distrib Width14.9  WBC Count9.37  % Albumin, Urine--  Hematocrit 32.7  Hemoglobin 9.8    [facility]  10-03-20 @ 08:20  Blood Gas Arterial-Calcium,Ionized--  Blood Urea Nitrogen, Serum 29 mg/dL<H> [10 - 20]  Carbon Dioxide, Serum25 mmol/L [17 - 32]  Chloride, Vyfyi437 mmol/L [98 - 110]  Creatinie, Serum1.5 mg/dL [0.7 - 1.5]  Glucose, Lqyve872 mg/dL<H> [70 - 99]  Potassium, Serum5.1 mmol/L<H> [3.5 - 5.0]  Sodium, Serum 140 mmol/L [135 - 146]  [facility]  10-02-20 @ 08:03  Blood Gas Arterial-Calcium,Ionized--  Blood Urea Nitrogen, Serum 21 mg/dL<H> [10 - 20]  Carbon Dioxide, Serum21 mmol/L [17 - 32]  Chloride, Epiqd437 mmol/L [98 - 110]  Creatinie, Serum1.4 mg/dL [0.7 - 1.5]  Glucose, Cmtyr197 mg/dL<H> [70 - 99]  Potassium, Serum5.3 mmol/L<H> [3.5 - 5.0]  Sodium, Serum 138 mmol/L [135 - 146]  [facility]  10-01-20 @ 04:30  Blood Gas Arterial-Calcium,Ionized--  Blood Urea Nitrogen, Serum 17 mg/dL [10 - 20]  Carbon Dioxide, Serum26 mmol/L [17 - 32]  Chloride, Lhmqc281 mmol/L [98 - 110]  Creatinie, Serum1.3 mg/dL [0.7 - 1.5]  Glucose, Serum95 mg/dL [70 - 99]  Potassium, Serum4.4 mmol/L [3.5 - 5.0]  Sodium, Serum 139 mmol/L [135 - 146]  BMP  09-30-20 @ 06:36  Blood Gas Arterial-Calcium,Ionized--  Blood Urea Nitrogen, Serum 16 mg/dL [10 - 20]  Carbon Dioxide, Serum24 mmol/L [17 - 32]  Chloride, Gobek788 mmol/L [98 - 110]  Creatinie, Serum1.0 mg/dL [0.7 - 1.5]  Glucose, Ewlgl171 mg/dL<H> [70 - 99]  Potassium, Serum4.7 mmol/L [3.5 - 5.0] [Slighty Hemolyzed use with Caution]  Sodium, Serum 140 mmol/L [135 - 146]              Microbiology:    Culture - Urine (collected 09-29-20 @ 00:55)  Source: .Urine Clean Catch (Midstream)  Final Report (09-30-20 @ 04:43):    No growth        Medications:  acetaminophen   Tablet .. 650 milliGRAM(s) Oral every 6 hours PRN  albuterol/ipratropium for Nebulization 3 milliLiter(s) Nebulizer every 6 hours  aspirin  chewable 81 milliGRAM(s) Oral daily  atorvastatin 40 milliGRAM(s) Oral at bedtime  budesonide  80 MICROgram(s)/formoterol 4.5 MICROgram(s) Inhaler 2 Puff(s) Inhalation two times a day  cefuroxime   Tablet 250 milliGRAM(s) Oral every 12 hours  chlorhexidine 4% Liquid 1 Application(s) Topical once  dronedarone 400 milliGRAM(s) Oral two times a day  furosemide    Tablet 20 milliGRAM(s) Oral daily  guaiFENesin  milliGRAM(s) Oral every 12 hours  heparin   Injectable 5000 Unit(s) SubCutaneous every 8 hours  levothyroxine 112 MICROGram(s) Oral daily  melatonin 5 milliGRAM(s) Oral at bedtime  methylPREDNISolone sodium succinate Injectable 60 milliGRAM(s) IV Push every 12 hours  metoprolol succinate ER 25 milliGRAM(s) Oral daily  Nephro-billy 1 Tablet(s) Oral daily  pantoprazole    Tablet 40 milliGRAM(s) Oral before breakfast  polyethylene glycol 3350 17 Gram(s) Oral daily        Assessment and Plan:  # acute pancolitis- switched from  IV Rocephin/ IV Flagyl- to PO ceftin/flagyl to complete total of 7 days course (6/7)  -no diarrhea  - outpatient  GI f/up  - patient tolerating diet well    # Chronic hypoxic resp. failure due to COPD with mild COPD exacerbation- retaining CO2, continue on bipap tx, nebs, inhalers tx. pulse ox monitoring  -continue  IV solumedrol tx. 60 mg twice daily for next 24 hours    # Macrocytic anemia- daily MVI tx    # Hypothyroidism- on synthroid tx.    # Hypomagnesemia- supplemented    # Parox. afib- resumed on home meds    #Progress Note Handoff:  continue  IV solumedrol , BIPAP tx.,  nebs tx.  Family discussion: yes Disposition: Home___once medically stable .

## 2020-10-05 ENCOUNTER — TRANSCRIPTION ENCOUNTER (OUTPATIENT)
Age: 85
End: 2020-10-05

## 2020-10-05 LAB
ANION GAP SERPL CALC-SCNC: 8 MMOL/L — SIGNIFICANT CHANGE UP (ref 7–14)
BASOPHILS # BLD AUTO: 0 K/UL — SIGNIFICANT CHANGE UP (ref 0–0.2)
BASOPHILS NFR BLD AUTO: 0 % — SIGNIFICANT CHANGE UP (ref 0–1)
BUN SERPL-MCNC: 26 MG/DL — HIGH (ref 10–20)
CALCIUM SERPL-MCNC: 8.7 MG/DL — SIGNIFICANT CHANGE UP (ref 8.5–10.1)
CHLORIDE SERPL-SCNC: 105 MMOL/L — SIGNIFICANT CHANGE UP (ref 98–110)
CO2 SERPL-SCNC: 27 MMOL/L — SIGNIFICANT CHANGE UP (ref 17–32)
CREAT SERPL-MCNC: 1.2 MG/DL — SIGNIFICANT CHANGE UP (ref 0.7–1.5)
EOSINOPHIL # BLD AUTO: 0 K/UL — SIGNIFICANT CHANGE UP (ref 0–0.7)
EOSINOPHIL NFR BLD AUTO: 0 % — SIGNIFICANT CHANGE UP (ref 0–8)
GLUCOSE SERPL-MCNC: 145 MG/DL — HIGH (ref 70–99)
HCT VFR BLD CALC: 31.8 % — LOW (ref 37–47)
HGB BLD-MCNC: 9.7 G/DL — LOW (ref 12–16)
IMM GRANULOCYTES NFR BLD AUTO: 0.6 % — HIGH (ref 0.1–0.3)
LYMPHOCYTES # BLD AUTO: 0.44 K/UL — LOW (ref 1.2–3.4)
LYMPHOCYTES # BLD AUTO: 8.9 % — LOW (ref 20.5–51.1)
MAGNESIUM SERPL-MCNC: 1.6 MG/DL — LOW (ref 1.8–2.4)
MCHC RBC-ENTMCNC: 30.5 G/DL — LOW (ref 32–37)
MCHC RBC-ENTMCNC: 30.8 PG — SIGNIFICANT CHANGE UP (ref 27–31)
MCV RBC AUTO: 101 FL — HIGH (ref 81–99)
MONOCYTES # BLD AUTO: 0.14 K/UL — SIGNIFICANT CHANGE UP (ref 0.1–0.6)
MONOCYTES NFR BLD AUTO: 2.8 % — SIGNIFICANT CHANGE UP (ref 1.7–9.3)
NEUTROPHILS # BLD AUTO: 4.36 K/UL — SIGNIFICANT CHANGE UP (ref 1.4–6.5)
NEUTROPHILS NFR BLD AUTO: 87.7 % — HIGH (ref 42.2–75.2)
NRBC # BLD: 0 /100 WBCS — SIGNIFICANT CHANGE UP (ref 0–0)
PHOSPHATE SERPL-MCNC: 2.7 MG/DL — SIGNIFICANT CHANGE UP (ref 2.1–4.9)
PLATELET # BLD AUTO: 348 K/UL — SIGNIFICANT CHANGE UP (ref 130–400)
POTASSIUM SERPL-MCNC: 5 MMOL/L — SIGNIFICANT CHANGE UP (ref 3.5–5)
POTASSIUM SERPL-SCNC: 5 MMOL/L — SIGNIFICANT CHANGE UP (ref 3.5–5)
RBC # BLD: 3.15 M/UL — LOW (ref 4.2–5.4)
RBC # FLD: 15.2 % — HIGH (ref 11.5–14.5)
SODIUM SERPL-SCNC: 140 MMOL/L — SIGNIFICANT CHANGE UP (ref 135–146)
WBC # BLD: 4.97 K/UL — SIGNIFICANT CHANGE UP (ref 4.8–10.8)
WBC # FLD AUTO: 4.97 K/UL — SIGNIFICANT CHANGE UP (ref 4.8–10.8)

## 2020-10-05 PROCEDURE — 99233 SBSQ HOSP IP/OBS HIGH 50: CPT

## 2020-10-05 PROCEDURE — 93010 ELECTROCARDIOGRAM REPORT: CPT

## 2020-10-05 RX ORDER — MAGNESIUM SULFATE 500 MG/ML
2 VIAL (ML) INJECTION ONCE
Refills: 0 | Status: COMPLETED | OUTPATIENT
Start: 2020-10-05 | End: 2020-10-05

## 2020-10-05 RX ADMIN — Medication 60 MILLIGRAM(S): at 17:28

## 2020-10-05 RX ADMIN — Medication 3 MILLILITER(S): at 07:34

## 2020-10-05 RX ADMIN — Medication 112 MICROGRAM(S): at 05:17

## 2020-10-05 RX ADMIN — BUDESONIDE AND FORMOTEROL FUMARATE DIHYDRATE 2 PUFF(S): 160; 4.5 AEROSOL RESPIRATORY (INHALATION) at 08:24

## 2020-10-05 RX ADMIN — HEPARIN SODIUM 5000 UNIT(S): 5000 INJECTION INTRAVENOUS; SUBCUTANEOUS at 05:16

## 2020-10-05 RX ADMIN — HEPARIN SODIUM 5000 UNIT(S): 5000 INJECTION INTRAVENOUS; SUBCUTANEOUS at 21:43

## 2020-10-05 RX ADMIN — Medication 50 GRAM(S): at 12:14

## 2020-10-05 RX ADMIN — HEPARIN SODIUM 5000 UNIT(S): 5000 INJECTION INTRAVENOUS; SUBCUTANEOUS at 14:20

## 2020-10-05 RX ADMIN — POLYETHYLENE GLYCOL 3350 17 GRAM(S): 17 POWDER, FOR SOLUTION ORAL at 11:46

## 2020-10-05 RX ADMIN — Medication 650 MILLIGRAM(S): at 17:17

## 2020-10-05 RX ADMIN — ATORVASTATIN CALCIUM 40 MILLIGRAM(S): 80 TABLET, FILM COATED ORAL at 21:42

## 2020-10-05 RX ADMIN — PANTOPRAZOLE SODIUM 40 MILLIGRAM(S): 20 TABLET, DELAYED RELEASE ORAL at 05:20

## 2020-10-05 RX ADMIN — Medication 650 MILLIGRAM(S): at 11:16

## 2020-10-05 RX ADMIN — DRONEDARONE 400 MILLIGRAM(S): 400 TABLET, FILM COATED ORAL at 05:16

## 2020-10-05 RX ADMIN — Medication 5 MILLIGRAM(S): at 21:42

## 2020-10-05 RX ADMIN — Medication 3 MILLILITER(S): at 13:57

## 2020-10-05 RX ADMIN — DRONEDARONE 400 MILLIGRAM(S): 400 TABLET, FILM COATED ORAL at 17:28

## 2020-10-05 RX ADMIN — Medication 650 MILLIGRAM(S): at 16:47

## 2020-10-05 RX ADMIN — Medication 20 MILLIGRAM(S): at 05:16

## 2020-10-05 RX ADMIN — BUDESONIDE AND FORMOTEROL FUMARATE DIHYDRATE 2 PUFF(S): 160; 4.5 AEROSOL RESPIRATORY (INHALATION) at 21:46

## 2020-10-05 RX ADMIN — Medication 650 MILLIGRAM(S): at 10:46

## 2020-10-05 RX ADMIN — Medication 25 MILLIGRAM(S): at 05:20

## 2020-10-05 RX ADMIN — Medication 1 TABLET(S): at 12:14

## 2020-10-05 RX ADMIN — Medication 250 MILLIGRAM(S): at 05:15

## 2020-10-05 RX ADMIN — Medication 60 MILLIGRAM(S): at 05:17

## 2020-10-05 RX ADMIN — Medication 600 MILLIGRAM(S): at 05:16

## 2020-10-05 RX ADMIN — Medication 3 MILLILITER(S): at 19:45

## 2020-10-05 RX ADMIN — Medication 600 MILLIGRAM(S): at 17:28

## 2020-10-05 RX ADMIN — Medication 81 MILLIGRAM(S): at 11:45

## 2020-10-05 NOTE — PROGRESS NOTE ADULT - SUBJECTIVE AND OBJECTIVE BOX
TANNA MCCRACKEN  [facility] T2-3A 028 A ([facility] T2-3A)            Patient was evaluated and examined  by bedside, no dyspnea at rest, c/o groin pain, no dysuria, no falls, no hip pain, no diarrhea        REVIEW OF SYSTEMS:  please see pertinent positives mentioned above, all other 12 ROS negative      T(C): , Max: 36.6 (10-04-20 @ 20:00)  HR: 68 (10-05-20 @ 14:03)  BP: 134/61 (10-05-20 @ 14:03)  RR: 18 (10-05-20 @ 14:03)  SpO2: 97% (10-05-20 @ 14:03)  CAPILLARY BLOOD GLUCOSE          PHYSICAL EXAM:  General: NAD, AAOX3, patient is sitting  comfortably in  a chair  HEENT: AT, NC, Supple, NO JVD, NO CB  Lungs: decreased breath sounds B/L, mild expiratory wheezing, no rhonchi  CVS: normal S1, S2, RRR, NO M/G/R  Abdomen: soft, bowel sounds present, non-tender, non-distended  Extremities: no edema, no clubbing, no cyanosis, positive peripheral pulses b/l  Neuro: no acute focal neurological deficits, generalized  body weakness, gait not tested  Skin: no rash, no ecchymosis        LAB  CBC  Date: 10-05-20 @ 10:40  Mean cell Qasgupkrxc51.8  Mean cell Hemoglobin Conc30.5  Mean cell Volum 101.0  Platelet count-Automate 348  RBC Count 3.15  Red Cell Distrib Width15.2  WBC Count4.97  % Albumin, Urine--  Hematocrit 31.8  Hemoglobin 9.7  CBC  Date: 10-03-20 @ 08:20  Mean cell Phtjlkzrfn59.0  Mean cell Hemoglobin Conc29.7  Mean cell Volum 104.4  Platelet count-Automate 279  RBC Count 2.97  Red Cell Distrib Width15.0  WBC Count7.77  % Albumin, Urine--  Hematocrit 31.0  Hemoglobin 9.2  CBC  Date: 10-02-20 @ 08:03  Mean cell Udbpeplrjr76.6  Mean cell Hemoglobin Conc28.9  Mean cell Volum 105.8  Platelet count-Automate 293  RBC Count 3.27  Red Cell Distrib Width14.7  WBC Count3.97  % Albumin, Urine--  Hematocrit 34.6  Hemoglobin 10.0  CBC  Date: 10-01-20 @ 04:30  Mean cell Gcvpxevfgj06.1  Mean cell Hemoglobin Conc30.1  Mean cell Volum 103.6  Platelet count-Automate 225  RBC Count 3.05  Red Cell Distrib Width14.9  WBC Count5.87  % Albumin, Urine--  Hematocrit 31.6  Hemoglobin 9.5  CBC  Date: 09-30-20 @ 06:36  Mean cell Zikmyezugp08.6  Mean cell Hemoglobin Conc30.4  Mean cell Volum 103.8  Platelet count-Automate 217  RBC Count 2.88  Red Cell Distrib Width14.6  WBC Count5.58  % Albumin, Urine--  Hematocrit 29.9  Hemoglobin 9.1  CBC  Date: 09-29-20 @ 11:10  Mean cell Abetlatgva05.2  Mean cell Hemoglobin Conc30.1  Mean cell Volum 103.6  Platelet count-Automate 187  RBC Count 3.08  Red Cell Distrib Width14.7  WBC Count7.96  % Albumin, Urine--  Hematocrit 31.9  Hemoglobin 9.6  CBC  Date: 09-28-20 @ 22:33  Mean cell Cjwyrumikb21.2  Mean cell Hemoglobin Conc30.0  Mean cell Volum 104.1  Platelet count-Automate 182  RBC Count 3.14  Red Cell Distrib Width14.9  WBC Count9.37  % Albumin, Urine--  Hematocrit 32.7  Hemoglobin 9.8    BMP  10-05-20 @ 10:40  Blood Gas Arterial-Calcium,Ionized--  Blood Urea Nitrogen, Serum 26 mg/dL<H> [10 - 20]  Carbon Dioxide, Serum27 mmol/L [17 - 32]  Chloride, Mlkun389 mmol/L [98 - 110]  Creatinie, Serum1.2 mg/dL [0.7 - 1.5]  Glucose, Obexj907 mg/dL<H> [70 - 99]  Potassium, Serum5.0 mmol/L [3.5 - 5.0]  Sodium, Serum 140 mmol/L [135 - 146]  [facility]  10-03-20 @ 08:20  Blood Gas Arterial-Calcium,Ionized--  Blood Urea Nitrogen, Serum 29 mg/dL<H> [10 - 20]  Carbon Dioxide, Serum25 mmol/L [17 - 32]  Chloride, Eesrg337 mmol/L [98 - 110]  Creatinie, Serum1.5 mg/dL [0.7 - 1.5]  Glucose, Sosco655 mg/dL<H> [70 - 99]  Potassium, Serum5.1 mmol/L<H> [3.5 - 5.0]  Sodium, Serum 140 mmol/L [135 - 146]      Microbiology:    Culture - Urine (collected 09-29-20 @ 00:55)  Source: .Urine Clean Catch (Midstream)  Final Report (09-30-20 @ 04:43):    No growth        Medications:  acetaminophen   Tablet .. 650 milliGRAM(s) Oral every 6 hours PRN  albuterol/ipratropium for Nebulization 3 milliLiter(s) Nebulizer every 6 hours  aspirin  chewable 81 milliGRAM(s) Oral daily  atorvastatin 40 milliGRAM(s) Oral at bedtime  budesonide  80 MICROgram(s)/formoterol 4.5 MICROgram(s) Inhaler 2 Puff(s) Inhalation two times a day  chlorhexidine 4% Liquid 1 Application(s) Topical once  dronedarone 400 milliGRAM(s) Oral two times a day  furosemide    Tablet 20 milliGRAM(s) Oral daily  guaiFENesin  milliGRAM(s) Oral every 12 hours  heparin   Injectable 5000 Unit(s) SubCutaneous every 8 hours  levothyroxine 112 MICROGram(s) Oral daily  melatonin 5 milliGRAM(s) Oral at bedtime  methylPREDNISolone sodium succinate Injectable 60 milliGRAM(s) IV Push every 12 hours  metoprolol succinate ER 25 milliGRAM(s) Oral daily  Nephro-billy 1 Tablet(s) Oral daily  pantoprazole    Tablet 40 milliGRAM(s) Oral before breakfast  polyethylene glycol 3350 17 Gram(s) Oral daily        Assessment and Plan:  # acute pancolitis- patient has completed total of 7 days course.  -no diarrhea  - outpatient  GI f/up  - patient tolerating diet well    # Acute on chronic  hypoxic/hypercapneic  resp. failure due to COPD exacerbation- retaining CO2, continue on bipap tx, nebs, inhalers tx. pulse ox monitoring  -continue  IV solumedrol tx. 60 mg twice daily for next 24 hours    # Macrocytic anemia- daily MVI tx    # Hypothyroidism- on synthroid tx.    # Hypomagnesemia- supplemented    # Parox. afib- resumed on home meds    #Progress Note Handoff:  continue  IV solumedrol , BIPAP tx.,  nebs tx.  Family discussion: yes Disposition: Home___once medically stable .

## 2020-10-05 NOTE — CHART NOTE - NSCHARTNOTEFT_GEN_A_CORE
Registered Dietitian Follow-Up     Patient Profile Reviewed                           Yes [x]   No []     Nutrition History Previously Obtained        Yes [x]  No []       Pertinent Subjective Information: Pt on 1:1 feed. Consumed ~50% of meal trays. Pt's son at b/s - receptive to trial Ensure      Pertinent Medical Interventions: COPD exacerbation. Colitis - abdominal pain resolved. MAURI     Diet order: DASH/TLC      Anthropometrics:  - Ht. 144.8 cm   - Wt. 50.8 kg (9/28) no new weights  - %wt change  - BMI 24  - IBW     Pertinent Lab Data: (10/5) H/H 9.7/31.8  BUN 26  glucose 145 Mg 1.6      Pertinent Meds: heparin, lasix, miralax, solumedrol, lipitor, protonix     Physical Findings:  - Appearance: confused, no edema   - GI function: constipated, LBM 10/4 small   - Tubes:  - Oral/Mouth cavity: No issues  - Skin: intact     Nutrition Requirements  Weight Used: 50.8 kg cont from RD initial      Calorie needs: 1,156-1,253 kcals (MSJ x AF 1.2-1.3)  Protein needs: 51-61 g (1.0-1.2 g/kg)  Fluid needs: 1,156-1,253 mL (1 ml/kcal)    Nutrient Intake: not meeting needs        [] Previous Nutrition Diagnosis: Inadequate Energy Intake            [x] Ongoing          [] Resolved    [] No active nutrition diagnosis identified at this time     Nutrition Intervention meal/snack, med food supplement, vitamin supplement      Goal/Expected Outcome: Pt to consume >50% of meal tray in 3 days      Indicator/Monitoring: RD to monitor energy intake, diet order, body comp, NFPF, lyte profile     Recommendation:  - Order Ensure Enlive q24hr  - Monitor need for no concentrate potassium diet modifier  - Continue nephrovite daily. Spoke w/ MD Skinner

## 2020-10-05 NOTE — PROVIDER CONTACT NOTE (OTHER) - SITUATION
Lab unable to get AM blood draw on pt in room 3A-28 Day. Attempted to notify you, but you are in procedure.

## 2020-10-05 NOTE — PROGRESS NOTE ADULT - ASSESSMENT
#COPD exacerbation  - IV methylprednisode 60 mg q12h  - Bipap at night (patient refused overnight)   - currently saturating at 97% on 4L nasal canula (3.5L home)  - c/w symbicort/spiriva  - duonebs q6hrs standing dose     # Colitis  - CT evidence of Pancolitis on admission  - abdominal pain - currently resolved  - No fever, leukocytosis, vitals normal  - mild nausea 10/1 -  Zofran ordered x1  - Advanced diet  - avoid fluid overload  - send FOBT with next bowel movement    # MAURI ( most likely pre-renal sec to dehydration)  - baseline SCr ~ 0.9, on admission ~1.7, UA positive for hyaline cast  - SCr 10/5 =  - patient received 2.5L IVF in ED, urine lytes would be futile  - avoid nephrotoxic meds, patient is on lasix 20 mg at home. on HOLD      #paroxsymal Atrial fibrillation  - Eliquis was discontinued, patient now only on ASA after discussion with patient and son regarding risks and benefits  - Metoprolol ER 25 PO QD  - Multaq 400 BID  - blood gas arterial lactate 0.8 (10/3)  - EKG f/u      #HLD  - Atorvastatin 40mg po qhs      # Hypothyroidism  - Levothyroxine increased to 112 mcg daily with her increased TSH in August. Will need recheck in 6-8 weeks.      # Macrocytic Anemia  - stable, hgb 9.7 mg/dl today (10/5).  - MCV today 101 (10/5).  - Vit B12 and Folate studies were checked and were normal in August  - B12 1137, folate 18.1 (10/3).      DVT ppx : heparin 5000u sc  GI ppx : protonix PO  Diet Dash/ Low fiber  Ambulate w/ assistance   #COPD exacerbation  - IV methylprednisode 60 mg q12h  - Bipap at night (patient refused overnight)   - currently saturating at 97% on 4L nasal canula (3.5L home)  - c/w symbicort/spiriva  - duonebs q6hrs standing dose     # Colitis  - CT evidence of Pancolitis on admission  - abdominal pain - currently resolved  - No fever, leukocytosis, vitals normal  - mild nausea 10/1 -  Zofran ordered x1  - Advanced diet  - avoid fluid overload  - send FOBT with next bowel movement    # MAURI ( most likely pre-renal sec to dehydration)  - baseline SCr ~ 0.9, on admission ~1.7, UA positive for hyaline cast  - SCr = 1.2, BUN= 26, GFR = 39. (10/5).  - patient received 2.5L IVF in ED, urine lytes would be futile  - avoid nephrotoxic meds, patient is on lasix 20 mg at home. on HOLD      #paroxsymal Atrial fibrillation  - Eliquis was discontinued, patient now only on ASA after discussion with patient and son regarding risks and benefits  - Metoprolol ER 25 PO QD  - Multaq 400 BID  - blood gas arterial lactate 0.8 (10/3)  - EKG f/u      #HLD  - Atorvastatin 40mg po qhs      # Hypothyroidism  - Levothyroxine increased to 112 mcg daily with her increased TSH in August. Will need recheck in 6-8 weeks.      # Macrocytic Anemia  - stable, hgb 9.7 mg/dl today (10/5).  - MCV today 101 (10/5).  - Vit B12 and Folate studies were checked and were normal in August  - B12 1137, folate 18.1 (10/3).      DVT ppx : heparin 5000u sc  GI ppx : protonix PO  Diet Dash/ Low fiber  Ambulate w/ assistance   #COPD exacerbation  - IV methylprednisode 60 mg q12h  - Bipap at night (patient refused overnight)   - currently on 3L NC O2   - c/w symbicort/spiriva  - duonebs q6hrs standing dose     # Colitis  - CT evidence of Pancolitis on admission  - abdominal pain - currently resolved  - No fever, leukocytosis, vitals normal  - mild nausea 10/1 -  Zofran ordered x1  - Advanced diet  - avoid fluid overload  - send FOBT with next bowel movement    # MAURI ( most likely pre-renal sec to dehydration)  - baseline SCr ~ 0.9, on admission ~1.7, UA positive for hyaline cast  - SCr = 1.2, BUN= 26, GFR = 39. (10/5), Mg2+ = 1.6.  - patient received 2.5L IVF in ED, urine lytes would be futile  - avoid nephrotoxic meds, patient is on lasix 20 mg at home. on HOLD      #paroxsymal Atrial fibrillation  - Eliquis was discontinued, patient now only on ASA after discussion with patient and son regarding risks and benefits  - Metoprolol ER 25 PO QD  - Multaq 400 BID  - blood gas arterial lactate 0.8 (10/3)  - EKG f/u      #HLD  - Atorvastatin 40mg po qhs      # Hypothyroidism  - Levothyroxine increased to 112 mcg daily with her increased TSH in August. Will need recheck in 6-8 weeks.      # Macrocytic Anemia  - stable, hgb 9.7 mg/dl today (10/5).  - MCV today 101 (10/5).  - Vit B12 and Folate studies were checked and were normal in August  - B12 1137, folate 18.1 (10/3).    # Hyperkalemia  - as of 10/5, K+ = 5.0.  - continue to monitor K+.    # Hypomagnesemia  - Mg2+ on 10/5 is 1.6 - repleting  -f/u Mg2+ levels 10/6 AM       DVT ppx : heparin 5000u sc  GI ppx : protonix PO  Diet Dash/ Low fiber  Ambulate w/ assistance

## 2020-10-05 NOTE — DISCHARGE NOTE NURSING/CASE MANAGEMENT/SOCIAL WORK - PATIENT PORTAL LINK FT
You can access the FollowMyHealth Patient Portal offered by [facility] by registering at the following website: [email or web address]. By joining [employer]’s FollowMyHealth portal, you will also be able to view your health information using other applications (apps) compatible with our system.

## 2020-10-05 NOTE — PROGRESS NOTE ADULT - SUBJECTIVE AND OBJECTIVE BOX
DAILY PROGRESS NOTE  ===========================================================    Patient Information:  TANNA MCCRACKEN  /  89y  /  Female  /  MRN#: [id number]    Hospital Day: 6d     No acute events overnight. This morning the patient was walking to and from the bathroom with the walker and a PCA. She was sitting comfortably in the chair eating breakfast. She started complaining of an intermittent left leg pain and shaking.     |:::::::::::::::::::::::::::| SUBJECTIVE |:::::::::::::::::::::::::::|    OVERNIGHT EVENTS:   TODAY: Patient was seen today at bedside. Review of systems is otherwise negative.    |:::::::::::::::::::::::::::| OBJECTIVE |:::::::::::::::::::::::::::|    VITAL SIGNS: Last 24 Hours  T(C): 36 (05 Oct 2020 05:06), Max: 36.6 (04 Oct 2020 20:00)  T(F): 96.8 (05 Oct 2020 05:06), Max: 97.8 (04 Oct 2020 20:00)  HR: 75 (05 Oct 2020 05:06) (69 - 75)  BP: 117/55 (05 Oct 2020 05:06) (112/55 - 117/55)  BP(mean): --  RR: 18 (05 Oct 2020 05:06) (18 - 18)  SpO2: 97% (05 Oct 2020 07:45) (96% - 99%)    10-04-20 @ 07:01  -  10-05-20 @ 07:00  --------------------------------------------------------  IN: 0 mL / OUT: 350 mL / NET: -350 mL      PHYSICAL EXAM:  GENERAL:   Awake, alert; NAD.  HEENT:  Head NC/AT; Conjunctivae pink, Sclera anicteric; Oral mucosa moist.  CARDIO:   Regular rate; Regular rhythm; S1 & S2.  RESP:   Bilateral wheezing and decreased breath sounds.  GI:   Soft; NT/ND; BS; No guarding; No rebound tenderness.  EXT:  No edema.   SKIN:   Intact.    LAB RESULTS:                        9.7    4.97  )-----------( 348      ( 05 Oct 2020 10:40 )             31.8                       MICROBIOLOGY:    RADIOLOGY:    ALLERGIES:  No Known Allergies      ===========================================================     DAILY PROGRESS NOTE  ===========================================================    Patient Information:  TANNA MCCRACKEN  /  89y  /  Female  /  MRN#: [id number]    Hospital Day: 6d     No acute events overnight. This morning the patient was walking to and from the bathroom with the walker and a PCA. She was sitting comfortably in the chair eating breakfast. She started complaining of an intermittent left leg pain and shaking.     |:::::::::::::::::::::::::::| SUBJECTIVE |:::::::::::::::::::::::::::|    OVERNIGHT EVENTS:   TODAY: Patient was seen today at bedside. Review of systems is otherwise negative.    |:::::::::::::::::::::::::::| OBJECTIVE |:::::::::::::::::::::::::::|    VITAL SIGNS: Last 24 Hours  T(C): 36 (05 Oct 2020 05:06), Max: 36.6 (04 Oct 2020 20:00)  T(F): 96.8 (05 Oct 2020 05:06), Max: 97.8 (04 Oct 2020 20:00)  HR: 75 (05 Oct 2020 05:06) (69 - 75)  BP: 117/55 (05 Oct 2020 05:06) (112/55 - 117/55)  BP(mean): --  RR: 18 (05 Oct 2020 05:06) (18 - 18)  SpO2: 97% (05 Oct 2020 07:45) (96% - 99%)    10-04-20 @ 07:01  -  10-05-20 @ 07:00  --------------------------------------------------------  IN: 0 mL / OUT: 350 mL / NET: -350 mL      PHYSICAL EXAM:  GENERAL:   Awake, alert; NAD.  HEENT:  Head NC/AT; Conjunctivae pink, Sclera anicteric; Oral mucosa moist.  CARDIO:   Regular rate; Regular rhythm; S1 & S2.  RESP:   Bilateral wheezing and decreased breath sounds.  GI:   Soft; NT/ND; BS; No guarding; No rebound tenderness.  EXT:  No edema, no hip tenderness to palpation, good sensation, equal bilateral strength, no erythema over the hips.   SKIN:   Intact.  NEURO: No focal deficits.    LAB RESULTS:                        9.7    4.97  )-----------( 348      ( 05 Oct 2020 10:40 )             31.8                       MICROBIOLOGY:    RADIOLOGY:    ALLERGIES:  No Known Allergies      ===========================================================

## 2020-10-05 NOTE — CONSULT NOTE ADULT - SUBJECTIVE AND OBJECTIVE BOX
Patient is a 89y old  Female who presents with a chief complaint of Diarrhea (05 Oct 2020 15:33)      HPI:  This is a 89 years old female with PMHx of hypothyroidism, COPD ( on 3.5L of oxygen at home), who was brought to ED by the son for increased lethargy and diarrhea for one day.    Collateral history obtained from son who reports that since yesterday patient has been having diarrhea. About 5-6 episodes daily, consisting of loose stools, associated with increased sleepiness. She also complained of nausea today but did not throw up. Son gave her imodium yesterday that helped with the diarrhea temporarily. Denies fever chills, chest pain, abdominal pain, headache, SOB, palpitations, dysuria, at home.    In ED her vitals were   T(C): 36.6 (09-28-20 @ 21:39), Max: 36.6 (09-28-20 @ 21:39)  HR: 71 (09-28-20 @ 21:39) (71 - 71)  BP: 114/51 (09-28-20 @ 21:39) (114/51 - 114/51)  RR: 18 (09-28-20 @ 21:39) (18 - 18)  SpO2: 94% (09-28-20 @ 21:39) (94% - 94%)    In ED patient had CT abdomen that was consistent with acute pancolitis; no evidence of abscess. There was also mild abdomino-pelvic ascites. Patient was started on IVF aout 2.5 L and she was started on IV flagyl and IV cipro. Called to evaluate for COPD, reports non productive cough   (29 Sep 2020 04:02)      PAST MEDICAL & SURGICAL HISTORY:  Hypothyroid    COPD (chronic obstructive pulmonary disease)        SOCIAL HX:   Smoking  neg        REVIEW OF SYSTEMS see hpi        Allergies    No Known Allergies    Intolerances        acetaminophen   Tablet .. 650 milliGRAM(s) Oral every 6 hours PRN  albuterol/ipratropium for Nebulization 3 milliLiter(s) Nebulizer every 6 hours  aspirin  chewable 81 milliGRAM(s) Oral daily  atorvastatin 40 milliGRAM(s) Oral at bedtime  budesonide  80 MICROgram(s)/formoterol 4.5 MICROgram(s) Inhaler 2 Puff(s) Inhalation two times a day  chlorhexidine 4% Liquid 1 Application(s) Topical once  dronedarone 400 milliGRAM(s) Oral two times a day  furosemide    Tablet 20 milliGRAM(s) Oral daily  guaiFENesin  milliGRAM(s) Oral every 12 hours  heparin   Injectable 5000 Unit(s) SubCutaneous every 8 hours  levothyroxine 112 MICROGram(s) Oral daily  melatonin 5 milliGRAM(s) Oral at bedtime  methylPREDNISolone sodium succinate Injectable 60 milliGRAM(s) IV Push every 12 hours  metoprolol succinate ER 25 milliGRAM(s) Oral daily  Nephro-billy 1 Tablet(s) Oral daily  pantoprazole    Tablet 40 milliGRAM(s) Oral before breakfast  polyethylene glycol 3350 17 Gram(s) Oral daily  : Home Meds:      PHYSICAL EXAM    ICU Vital Signs Last 24 Hrs  T(C): 36.6 (05 Oct 2020 14:03), Max: 36.6 (04 Oct 2020 20:00)  T(F): 97.9 (05 Oct 2020 14:03), Max: 97.9 (05 Oct 2020 14:03)  HR: 68 (05 Oct 2020 14:03) (68 - 75)  BP: 134/61 (05 Oct 2020 14:03) (115/60 - 134/61)    RR: 18 (05 Oct 2020 14:03) (18 - 18)  SpO2: 97% (05 Oct 2020 14:03) (97% - 99%)      General: ill looking, cushinoid appearance  HEENT:  ASH              Lymph Nodes: No cervical LN   Lungs: Bilateral BS, dec both bases  Cardiovascular: MANUEL 3/6  Abdomen: Soft, Positive BS  Extremities: No clubbing  Skin: Warm  Neurological: Non focal       10-04-20 @ 07:01  -  10-05-20 @ 07:00  --------------------------------------------------------  IN:  Total IN: 0 mL    OUT:    Voided (mL): 350 mL  Total OUT: 350 mL    Total NET: -350 mL      10-05-20 @ 07:01  -  10-05-20 @ 15:57  --------------------------------------------------------  IN:    Oral Fluid: 354 mL  Total IN: 354 mL    OUT:  Total OUT: 0 mL    Total NET: 354 mL          LABS:                          9.7    4.97  )-----------( 348      ( 05 Oct 2020 10:40 )             31.8                                               10-05    140  |  105  |  26<H>  ----------------------------<  145<H>  5.0   |  27  |  1.2    Ca    8.7      05 Oct 2020 10:40  Phos  2.7     10-05  Mg     1.6     10-05                                                                                                                                                                                                                           X-Rays                                                                                     ECHO    MEDICATIONS  (STANDING):  albuterol/ipratropium for Nebulization 3 milliLiter(s) Nebulizer every 6 hours  aspirin  chewable 81 milliGRAM(s) Oral daily  atorvastatin 40 milliGRAM(s) Oral at bedtime  budesonide  80 MICROgram(s)/formoterol 4.5 MICROgram(s) Inhaler 2 Puff(s) Inhalation two times a day  chlorhexidine 4% Liquid 1 Application(s) Topical once  dronedarone 400 milliGRAM(s) Oral two times a day  furosemide    Tablet 20 milliGRAM(s) Oral daily  guaiFENesin  milliGRAM(s) Oral every 12 hours  heparin   Injectable 5000 Unit(s) SubCutaneous every 8 hours  levothyroxine 112 MICROGram(s) Oral daily  melatonin 5 milliGRAM(s) Oral at bedtime  methylPREDNISolone sodium succinate Injectable 60 milliGRAM(s) IV Push every 12 hours  metoprolol succinate ER 25 milliGRAM(s) Oral daily  Nephro-billy 1 Tablet(s) Oral daily  pantoprazole    Tablet 40 milliGRAM(s) Oral before breakfast  polyethylene glycol 3350 17 Gram(s) Oral daily    MEDICATIONS  (PRN):  acetaminophen   Tablet .. 650 milliGRAM(s) Oral every 6 hours PRN Temp greater or equal to 38C (100.4F), Mild Pain (1 - 3)

## 2020-10-05 NOTE — CONSULT NOTE ADULT - NUTRITIONAL ASSESSMENT
Impression:    - Severe COPD now stable  - Pancolitis acute  - s/ p recent ORIF      Plan    - change solumedrol to prednisone 40 daily for 3 days than 20  - Neb as needed  - OOB to chair  - lasix 40 mg po daily  - will follow  - poor prongosis

## 2020-10-06 LAB
ALBUMIN SERPL ELPH-MCNC: 3.3 G/DL — LOW (ref 3.5–5.2)
ALP SERPL-CCNC: 112 U/L — SIGNIFICANT CHANGE UP (ref 30–115)
ALT FLD-CCNC: 17 U/L — SIGNIFICANT CHANGE UP (ref 0–41)
ANION GAP SERPL CALC-SCNC: 10 MMOL/L — SIGNIFICANT CHANGE UP (ref 7–14)
ANION GAP SERPL CALC-SCNC: 12 MMOL/L — SIGNIFICANT CHANGE UP (ref 7–14)
APPEARANCE UR: CLEAR — SIGNIFICANT CHANGE UP
AST SERPL-CCNC: 21 U/L — SIGNIFICANT CHANGE UP (ref 0–41)
BASOPHILS # BLD AUTO: 0 K/UL — SIGNIFICANT CHANGE UP (ref 0–0.2)
BASOPHILS NFR BLD AUTO: 0 % — SIGNIFICANT CHANGE UP (ref 0–1)
BILIRUB SERPL-MCNC: <0.2 MG/DL — SIGNIFICANT CHANGE UP (ref 0.2–1.2)
BILIRUB UR-MCNC: NEGATIVE — SIGNIFICANT CHANGE UP
BUN SERPL-MCNC: 29 MG/DL — HIGH (ref 10–20)
BUN SERPL-MCNC: 32 MG/DL — HIGH (ref 10–20)
CALCIUM SERPL-MCNC: 8.5 MG/DL — SIGNIFICANT CHANGE UP (ref 8.5–10.1)
CALCIUM SERPL-MCNC: 8.8 MG/DL — SIGNIFICANT CHANGE UP (ref 8.5–10.1)
CHLORIDE SERPL-SCNC: 103 MMOL/L — SIGNIFICANT CHANGE UP (ref 98–110)
CHLORIDE SERPL-SCNC: 104 MMOL/L — SIGNIFICANT CHANGE UP (ref 98–110)
CHLORIDE UR-SCNC: 105 — SIGNIFICANT CHANGE UP
CO2 SERPL-SCNC: 26 MMOL/L — SIGNIFICANT CHANGE UP (ref 17–32)
CO2 SERPL-SCNC: 27 MMOL/L — SIGNIFICANT CHANGE UP (ref 17–32)
COLOR SPEC: SIGNIFICANT CHANGE UP
CREAT ?TM UR-MCNC: 44 MG/DL — SIGNIFICANT CHANGE UP
CREAT ?TM UR-MCNC: 44 MG/DL — SIGNIFICANT CHANGE UP
CREAT SERPL-MCNC: 1.3 MG/DL — SIGNIFICANT CHANGE UP (ref 0.7–1.5)
CREAT SERPL-MCNC: 1.4 MG/DL — SIGNIFICANT CHANGE UP (ref 0.7–1.5)
DIFF PNL FLD: NEGATIVE — SIGNIFICANT CHANGE UP
EOSINOPHIL # BLD AUTO: 0 K/UL — SIGNIFICANT CHANGE UP (ref 0–0.7)
EOSINOPHIL NFR BLD AUTO: 0 % — SIGNIFICANT CHANGE UP (ref 0–8)
GLUCOSE SERPL-MCNC: 109 MG/DL — HIGH (ref 70–99)
GLUCOSE SERPL-MCNC: 169 MG/DL — HIGH (ref 70–99)
GLUCOSE UR QL: NEGATIVE — SIGNIFICANT CHANGE UP
HCT VFR BLD CALC: 32.1 % — LOW (ref 37–47)
HGB BLD-MCNC: 9.7 G/DL — LOW (ref 12–16)
IMM GRANULOCYTES NFR BLD AUTO: 0.9 % — HIGH (ref 0.1–0.3)
KETONES UR-MCNC: NEGATIVE — SIGNIFICANT CHANGE UP
LEUKOCYTE ESTERASE UR-ACNC: NEGATIVE — SIGNIFICANT CHANGE UP
LYMPHOCYTES # BLD AUTO: 0.61 K/UL — LOW (ref 1.2–3.4)
LYMPHOCYTES # BLD AUTO: 13.7 % — LOW (ref 20.5–51.1)
MAGNESIUM SERPL-MCNC: 2.1 MG/DL — SIGNIFICANT CHANGE UP (ref 1.8–2.4)
MCHC RBC-ENTMCNC: 30.2 G/DL — LOW (ref 32–37)
MCHC RBC-ENTMCNC: 30.8 PG — SIGNIFICANT CHANGE UP (ref 27–31)
MCV RBC AUTO: 101.9 FL — HIGH (ref 81–99)
MONOCYTES # BLD AUTO: 0.24 K/UL — SIGNIFICANT CHANGE UP (ref 0.1–0.6)
MONOCYTES NFR BLD AUTO: 5.4 % — SIGNIFICANT CHANGE UP (ref 1.7–9.3)
NEUTROPHILS # BLD AUTO: 3.55 K/UL — SIGNIFICANT CHANGE UP (ref 1.4–6.5)
NEUTROPHILS NFR BLD AUTO: 80 % — HIGH (ref 42.2–75.2)
NITRITE UR-MCNC: NEGATIVE — SIGNIFICANT CHANGE UP
NRBC # BLD: 0 /100 WBCS — SIGNIFICANT CHANGE UP (ref 0–0)
OSMOLALITY UR: 393 MOS/KG — SIGNIFICANT CHANGE UP (ref 50–1200)
PH UR: 6 — SIGNIFICANT CHANGE UP (ref 5–8)
PHOSPHATE SERPL-MCNC: 3.4 MG/DL — SIGNIFICANT CHANGE UP (ref 2.1–4.9)
PLATELET # BLD AUTO: 316 K/UL — SIGNIFICANT CHANGE UP (ref 130–400)
POTASSIUM SERPL-MCNC: 4.4 MMOL/L — SIGNIFICANT CHANGE UP (ref 3.5–5)
POTASSIUM SERPL-MCNC: 5.7 MMOL/L — HIGH (ref 3.5–5)
POTASSIUM SERPL-SCNC: 4.4 MMOL/L — SIGNIFICANT CHANGE UP (ref 3.5–5)
POTASSIUM SERPL-SCNC: 5.7 MMOL/L — HIGH (ref 3.5–5)
POTASSIUM UR-SCNC: 32 MMOL/L — SIGNIFICANT CHANGE UP
PROT ?TM UR-MCNC: 13 MG/DLG/24H — SIGNIFICANT CHANGE UP
PROT ?TM UR-MCNC: 13 MG/DLG/24H — SIGNIFICANT CHANGE UP
PROT SERPL-MCNC: 5.5 G/DL — LOW (ref 6–8)
PROT UR-MCNC: SIGNIFICANT CHANGE UP
PROT/CREAT UR-RTO: 0.3 RATIO — HIGH (ref 0–0.2)
RBC # BLD: 3.15 M/UL — LOW (ref 4.2–5.4)
RBC # FLD: 15.4 % — HIGH (ref 11.5–14.5)
SODIUM SERPL-SCNC: 141 MMOL/L — SIGNIFICANT CHANGE UP (ref 135–146)
SODIUM SERPL-SCNC: 141 MMOL/L — SIGNIFICANT CHANGE UP (ref 135–146)
SODIUM UR-SCNC: 79 MMOL/L — SIGNIFICANT CHANGE UP
SP GR SPEC: 1.01 — SIGNIFICANT CHANGE UP (ref 1.01–1.03)
UROBILINOGEN FLD QL: SIGNIFICANT CHANGE UP
UUN UR-MCNC: 361 MG/DL — SIGNIFICANT CHANGE UP
WBC # BLD: 4.44 K/UL — LOW (ref 4.8–10.8)
WBC # FLD AUTO: 4.44 K/UL — LOW (ref 4.8–10.8)

## 2020-10-06 PROCEDURE — 99233 SBSQ HOSP IP/OBS HIGH 50: CPT

## 2020-10-06 PROCEDURE — 76775 US EXAM ABDO BACK WALL LIM: CPT | Mod: 26

## 2020-10-06 PROCEDURE — 76770 US EXAM ABDO BACK WALL COMP: CPT | Mod: 26

## 2020-10-06 RX ORDER — SODIUM POLYSTYRENE SULFONATE 4.1 MEQ/G
30 POWDER, FOR SUSPENSION ORAL ONCE
Refills: 0 | Status: COMPLETED | OUTPATIENT
Start: 2020-10-06 | End: 2020-10-06

## 2020-10-06 RX ORDER — FUROSEMIDE 40 MG
40 TABLET ORAL DAILY
Refills: 0 | Status: DISCONTINUED | OUTPATIENT
Start: 2020-10-06 | End: 2020-10-07

## 2020-10-06 RX ADMIN — PANTOPRAZOLE SODIUM 40 MILLIGRAM(S): 20 TABLET, DELAYED RELEASE ORAL at 06:00

## 2020-10-06 RX ADMIN — Medication 3 MILLILITER(S): at 19:10

## 2020-10-06 RX ADMIN — BUDESONIDE AND FORMOTEROL FUMARATE DIHYDRATE 2 PUFF(S): 160; 4.5 AEROSOL RESPIRATORY (INHALATION) at 21:21

## 2020-10-06 RX ADMIN — DRONEDARONE 400 MILLIGRAM(S): 400 TABLET, FILM COATED ORAL at 17:17

## 2020-10-06 RX ADMIN — CHLORHEXIDINE GLUCONATE 1 APPLICATION(S): 213 SOLUTION TOPICAL at 05:55

## 2020-10-06 RX ADMIN — HEPARIN SODIUM 5000 UNIT(S): 5000 INJECTION INTRAVENOUS; SUBCUTANEOUS at 21:21

## 2020-10-06 RX ADMIN — Medication 112 MICROGRAM(S): at 05:59

## 2020-10-06 RX ADMIN — Medication 3 MILLILITER(S): at 13:51

## 2020-10-06 RX ADMIN — Medication 25 MILLIGRAM(S): at 06:00

## 2020-10-06 RX ADMIN — HEPARIN SODIUM 5000 UNIT(S): 5000 INJECTION INTRAVENOUS; SUBCUTANEOUS at 05:58

## 2020-10-06 RX ADMIN — Medication 40 MILLIGRAM(S): at 21:20

## 2020-10-06 RX ADMIN — Medication 5 MILLIGRAM(S): at 21:20

## 2020-10-06 RX ADMIN — SODIUM POLYSTYRENE SULFONATE 30 GRAM(S): 4.1 POWDER, FOR SUSPENSION ORAL at 11:59

## 2020-10-06 RX ADMIN — Medication 60 MILLIGRAM(S): at 05:59

## 2020-10-06 RX ADMIN — Medication 81 MILLIGRAM(S): at 11:59

## 2020-10-06 RX ADMIN — HEPARIN SODIUM 5000 UNIT(S): 5000 INJECTION INTRAVENOUS; SUBCUTANEOUS at 13:46

## 2020-10-06 RX ADMIN — Medication 1 TABLET(S): at 11:59

## 2020-10-06 RX ADMIN — DRONEDARONE 400 MILLIGRAM(S): 400 TABLET, FILM COATED ORAL at 05:55

## 2020-10-06 RX ADMIN — ATORVASTATIN CALCIUM 40 MILLIGRAM(S): 80 TABLET, FILM COATED ORAL at 21:20

## 2020-10-06 RX ADMIN — BUDESONIDE AND FORMOTEROL FUMARATE DIHYDRATE 2 PUFF(S): 160; 4.5 AEROSOL RESPIRATORY (INHALATION) at 08:25

## 2020-10-06 RX ADMIN — Medication 3 MILLILITER(S): at 07:43

## 2020-10-06 RX ADMIN — Medication 40 MILLIGRAM(S): at 17:17

## 2020-10-06 RX ADMIN — Medication 3 MILLILITER(S): at 04:22

## 2020-10-06 RX ADMIN — Medication 600 MILLIGRAM(S): at 05:57

## 2020-10-06 RX ADMIN — Medication 20 MILLIGRAM(S): at 05:57

## 2020-10-06 NOTE — CONSULT NOTE ADULT - ASSESSMENT
89 yo female with PMHx of hypothyroidism adn COPD on 3.5L home O2, admitted for acute pancolitis and COPD exacerbation. Nephrology consulted for worsening CKD.     #MAURI on CKD2  -Cr 1.3; Baseline Cr 0.8; BUN/Cr ratio 22  -Likely prerenal  -Check urinalysis, urine lytes, urine Cr and urine prot/Cr ratio  -US renal ordered- f/u results   -Hb noted. No indication for transfusion. Check iron studies  -Phos noted- no need for binders  -Low K diet. Start Lokelma 10mg [id number] if K >5.5  -No urgent indication for RRT at this time    **INCOMPLETE NOTE**         89 yo female with PMHx of hypothyroidism adn COPD on 3.5L home O2, admitted for acute pancolitis and COPD exacerbation. Nephrology consulted for worsening CKD.     #MAURI on CKD2  -Cr 1.3; Baseline Cr 0.8; BUN/Cr ratio 22  -Likely prerenal in nature  -Check urinalysis, urine lytes, urine Cr, urine urea nitrogen and urine prot/Cr ratio  -US renal performed- f/u results   -Hb noted. No indication for transfusion. Check iron studies  -Phos noted- no need for binders  -Low K diet. Start Lokelma 10mg [id number] if K >5.5  -No urgent indication for RRT at this time    **INCOMPLETE NOTE**         89 yo female with PMHx of hypothyroidism adn COPD on 3.5L home O2, admitted for acute pancolitis and COPD exacerbation. Nephrology consulted for worsening CKD.     #MAURI on CKD2  -Cr 1.3; Baseline Cr 0.8; BUN/Cr ratio 22  -Likely prerenal in nature   -Check urinalysis, urine lytes, urine Cr, urine urea nitrogen and urine prot/Cr ratio  -US renal performed- f/u results   -c/w Lasix 40mg daily for now. Maintain euvolemia.   -Can check 2D echo.  -Hb noted. No indication for transfusion. Check iron studies  -Phos noted- no need for binders  -Low K diet. Start Lokelma 10mg [id number] if K >5.5  -No urgent indication for RRT at this time           89 yo female with PMHx of hypothyroidism and COPD on 3.5L home O2, admitted for acute pancolitis and COPD exacerbation. Nephrology consulted for worsening CKD.     #MAURI on CKD2  -Cr 1.3; Baseline Cr 0.8; BUN/Cr ratio 22  -Likely prerenal in nature   -Check urinalysis, urine lytes, urine Cr, urine urea nitrogen and urine prot/Cr ratio  -US renal performed- f/u results   -c/w Lasix 40mg daily for now. Maintain euvolemia.   -Can check 2D echo.  -Hb noted. No indication for transfusion. Check iron studies  -Phos noted- no need for binders  -Low K diet. Start Lokelma 10mg [id number] if K >5.5

## 2020-10-06 NOTE — PROGRESS NOTE ADULT - SUBJECTIVE AND OBJECTIVE BOX
OVERNIGHT EVENTS: events noted on NC, CXR 1/13 reviewed    Vital Signs Last 24 Hrs  T(C): 36.7 (06 Oct 2020 05:13), Max: 36.7 (06 Oct 2020 05:13)  T(F): 98.1 (06 Oct 2020 05:13), Max: 98.1 (06 Oct 2020 05:13)  HR: 85 (06 Oct 2020 05:13) (68 - 85)  BP: 133/60 (06 Oct 2020 05:13) (117/58 - 134/61)  RR: 18 (06 Oct 2020 05:13) (16 - 18)  SpO2: 90% (06 Oct 2020 07:38) (90% - 97%)    PHYSICAL EXAMINATION:    GENERAL: chronically ill looking     HEENT: Head is normocephalic and atraumatic. Extraocular muscles are intact. Mucous membranes are moist.    NECK: Supple.    LUNGS: dec bs both bases    HEART: MANUEL 3/6    ABDOMEN: Soft, nontender, and nondistended.      EXTREMITIES: Without any cyanosis, clubbing, rash, lesions or edema.    NEUROLOGIC: Grossly intact.    SKIN: No ulceration or induration present.      LABS:                        9.7    4.44  )-----------( 316      ( 06 Oct 2020 06:05 )             32.1     10-05    140  |  105  |  26<H>  ----------------------------<  145<H>  5.0   |  27  |  1.2    Ca    8.7      05 Oct 2020 10:40  Phos  2.7     10-05  Mg     1.6     10-05                            10-05-20 @ 07:01  -  10-06-20 @ 07:00  --------------------------------------------------------  IN: 354 mL / OUT: 0 mL / NET: 354 mL        MICROBIOLOGY:      MEDICATIONS  (STANDING):  albuterol/ipratropium for Nebulization 3 milliLiter(s) Nebulizer every 6 hours  aspirin  chewable 81 milliGRAM(s) Oral daily  atorvastatin 40 milliGRAM(s) Oral at bedtime  budesonide  80 MICROgram(s)/formoterol 4.5 MICROgram(s) Inhaler 2 Puff(s) Inhalation two times a day  dronedarone 400 milliGRAM(s) Oral two times a day  furosemide    Tablet 40 milliGRAM(s) Oral daily  guaiFENesin  milliGRAM(s) Oral every 12 hours  heparin   Injectable 5000 Unit(s) SubCutaneous every 8 hours  levothyroxine 112 MICROGram(s) Oral daily  melatonin 5 milliGRAM(s) Oral at bedtime  metoprolol succinate ER 25 milliGRAM(s) Oral daily  Nephro-billy 1 Tablet(s) Oral daily  pantoprazole    Tablet 40 milliGRAM(s) Oral before breakfast  polyethylene glycol 3350 17 Gram(s) Oral daily  predniSONE   Tablet 40 milliGRAM(s) Oral daily    MEDICATIONS  (PRN):  acetaminophen   Tablet .. 650 milliGRAM(s) Oral every 6 hours PRN Temp greater or equal to 38C (100.4F), Mild Pain (1 - 3)      RADIOLOGY & ADDITIONAL STUDIES:

## 2020-10-06 NOTE — PROGRESS NOTE ADULT - ASSESSMENT
#COPD exacerbation  - IV solumedrol 60 mg q12h  - Bipap at night (patient refused overnight)   - currently on 2L NC O2 saturating at 94%.  - c/w symbicort/spiriva  - duonebs q6hrs standing dose   - Pulm following  - increased lasix 40mg (10/6)    # Colitis (Resolved)  - CT evidence of Pancolitis on admission  - abdominal pain - currently resolved  - No fever, leukocytosis, vitals normal  - mild nausea 10/1 -  Zofran ordered x1  - Advanced diet  - avoid fluid overload  - 2 normal BM 10/5, not loose.     # MAURI vs CKD  - baseline SCr ~ 0.9, on admission ~1.7, UA positive for hyaline cast  - SCr = 1.2, BUN= 26, GFR = 39. (10/5), Mg2+ = 1.6.  - avoid nephrotoxic meds  - nephrology consulted - pending  - f/u urine studies  - f/u renal ultrasound      #paroxsymal Atrial fibrillation  - Eliquis was discontinued, patient now only on ASA after discussion with patient and son regarding risks and benefits  - Metoprolol ER 25 PO QD  - Multaq 400 BID  - blood gas arterial lactate 0.8 (10/3)  - EKG f/u      #HLD  - Atorvastatin 40mg po qhs      # Hypothyroidism  - Levothyroxine increased to 112 mcg daily with her increased TSH in August. Will need recheck in 6-8 weeks.      # Macrocytic Anemia  - stable, hgb 9.7 mg/dl today (10/5).  - MCV today 101 (10/5).  - Vit B12 and Folate studies were checked and were normal in August  - B12 1137, folate 18.1 (10/3).    # Hyperkalemia  - as of 10/6, K+ = 5.7.  - one dose kayexalate (10/6), f/u BMP.    # Hypomagnesemia (resolved)  - Mg normal 10/6       DVT ppx : heparin 5000u sc  GI ppx : protonix PO  Diet: Renal  Ambulate w/ assistance

## 2020-10-06 NOTE — PROGRESS NOTE ADULT - ATTENDING COMMENTS
I saw and evaluated patient  by bedside, no c/o abdominal pain , no diarrhea, no fever   All labs, radiology studies, VS was reviewed  I have reviewed the resident's note and agree with documented findings and  plan of care.  a/p:  # acute pancolitis- IV Rocephin/ IV Flagyl- f/up stool cxs, blood cxs, lactic acid level  - GI f/up  -IVF  - clear liquid diet trial as tolerated    # Macrocytic anemia- daily MVI tx    # Hypothyroidism- on synthroid tx.    # Hypomagnesemia- supplemented    # Parox. afib- resumed on home meds    #Progress Note Handoff: continue iv abx, f/up stool cxs, f/up GI, monitor cbc, advance diet as tolerated  Family discussion: yes Disposition: Home___once medically stable
I saw and evaluated patient  by bedside, developed mild wheezing today, nausea, slight confusion.    All labs, radiology studies, VS was reviewed  I have reviewed the resident's note and agree with documented findings and  plan of care.  a/p  # acute pancolitis- switch from  IV Rocephin/ IV Flagyl- to PO ceftin/flagyl to complete total of 7 days course,  f/up stool cxs, blood cxs, lactic acid level  - GI f/up  -IVF  - advance diet as tolerated    # Chronic hypoxic resp. failure due to COPD- retaining CO2, will place on bipap tx, nebs, inhalers tx. pulse ox monitoring    # Macrocytic anemia- daily MVI tx    # Hypothyroidism- on synthroid tx.    # Hypomagnesemia- supplemented    # Parox. afib- resumed on home meds    #Progress Note Handoff: continue iv abx, f/up stool cxs, f/up GI, monitor cbc, advance diet as tolerated, place on bipap  Family discussion: yes Disposition: Home___once medically stable
I saw and evaluated patient  by bedside, developed mild wheezing today, nausea, slight confusion.    All labs, radiology studies, VS was reviewed  I have reviewed the resident's note and agree with documented findings and  plan of care.  a/p  # acute pancolitis- switched from  IV Rocephin/ IV Flagyl- to PO ceftin/flagyl to complete total of 7 days course  -no diarrhea  - outpatient  GI f/up  - patient tolerating diet well    # Chronic hypoxic resp. failure due to COPD with mild exacerbation- retaining CO2, continue on bipap tx, nebs, inhalers tx. pulse ox monitoring  - taper to po prednisone tx.    # Macrocytic anemia- daily MVI tx    # Hypothyroidism- on synthroid tx.    # Hypomagnesemia- supplemented    # Parox. afib- resumed on home meds    #Progress Note Handoff:  placed on bipap, taper prednisone  Family discussion: yes Disposition: Home___once medically stable .
I saw and evaluated patient  by bedside, feeling better today, decreased wheezing today, patient has chronic decreased breath sound b/l on PE , no legs edema, tolerating diet well.  refused bipap at night   All labs, radiology studies, VS was reviewed  I have reviewed the resident's note and agree with documented findings and  plan of care.  a/p:  # Acute on chronic  hypoxic/hypercapneic  resp. failure due to COPD exacerbation- retaining CO2, refused to use bipap tx in last 24 hours ,   -continue nebs, inhalers tx. pulse ox monitoring  -continue  IV solumedrol tx. 40 mg twice daily for next 24 hours- if remains stable, switch to PO prednisone tx. taper tomorrow.      # acute pancolitis- patient has completed total of 7 days course.  -no diarrhea  - outpatient  GI f/up   - patient tolerating diet well    # MAURI on CKD stage 2 - consulted Renal team , renal US- normal, U/A normal , FENA- 1.7% c/w intrinsic problem  -f/up BMP in am  -avoid nephrotoxic agents  - patient is on lasix tx. now.    # Hyperkalemia due to CKD- started on renal diet, given 1 dose of kayexalate 30 grams today.   -f/up BMP in am    # Macrocytic anemia- daily MVI tx    # Hypothyroidism- on synthroid tx.    # Hypomagnesemia- supplemented    # Parox. afib- resumed on home meds    #Progress Note Handoff:  taper down the dose of steroids IV, taper to PO prednisone if remains stable , monitor renal function  Family discussion: yes Disposition: Home_ in 24 hours .
I saw and evaluated patient  by bedside, slightly lethargic, placed back on BIPAP in am.    All labs, radiology studies, VS was reviewed  I have reviewed the resident's note and agree with documented findings and  plan of care.  a/p  # acute pancolitis- switched from  IV Rocephin/ IV Flagyl- to PO ceftin/flagyl to complete total of 7 days course (5/7)  -no diarrhea  - outpatient  GI f/up  - patient tolerating diet well    # Chronic hypoxic resp. failure due to COPD with mild exacerbation- retaining CO2, continue on bipap tx, nebs, inhalers tx. pulse ox monitoring  -switch back to IV solumedrol tx.    # Macrocytic anemia- daily MVI tx    # Hypothyroidism- on synthroid tx.    # Hypomagnesemia- supplemented    # Parox. afib- resumed on home meds    #Progress Note Handoff:  placed on bipap, clinically patient is worse today, placed back on IV solumedrol , nebs tx.  Family discussion: yes Disposition: Home___once medically stable .

## 2020-10-06 NOTE — PROGRESS NOTE ADULT - SUBJECTIVE AND OBJECTIVE BOX
DAILY PROGRESS NOTE  ===========================================================    Patient Information:  TANNA MCCRACKEN  /  89y  /  Female  /  MRN#: [id number]    Hospital Day: 7d     No acute events overnight. Sitting comfortably in bed this morning, she no longer reports left leg pain.   |:::::::::::::::::::::::::::| SUBJECTIVE |:::::::::::::::::::::::::::|    OVERNIGHT EVENTS:   TODAY: Patient was seen today at bedside. Review of systems is otherwise negative.    |:::::::::::::::::::::::::::| OBJECTIVE |:::::::::::::::::::::::::::|    VITAL SIGNS: Last 24 Hours  T(C): 36.7 (06 Oct 2020 05:13), Max: 36.7 (06 Oct 2020 05:13)  T(F): 98.1 (06 Oct 2020 05:13), Max: 98.1 (06 Oct 2020 05:13)  HR: 85 (06 Oct 2020 05:13) (68 - 85)  BP: 133/60 (06 Oct 2020 05:13) (117/58 - 134/61)  BP(mean): --  RR: 18 (06 Oct 2020 05:13) (16 - 18)  SpO2: 90% (06 Oct 2020 07:38) (90% - 97%)    10-05-20 @ 07:01  -  10-06-20 @ 07:00  --------------------------------------------------------  IN: 354 mL / OUT: 0 mL / NET: 354 mL      PHYSICAL EXAM:  GENERAL:   Awake, alert; NAD.  HEENT:  Head NC/AT; Conjunctivae pink, Sclera anicteric; Oral mucosa moist. Decreased hearing.  CARDIO:   Regular rate; Regular rhythm; S1 & S2.  RESP:  Bilateral decreased breath sounds and wheezing, wheezing has improved since yesterday but decreased breath sounds have not improved.  GI:   Soft; NT/ND; BS; No guarding; No rebound tenderness.  EXT:   No edema, cyanosis.   SKIN:   Intact.  VASCULAR: 2+ peripheral pulses.  NEURO: No focal deficits.    LAB RESULTS:                        9.7    4.44  )-----------( 316      ( 06 Oct 2020 06:05 )             32.1     10-06    141  |  103  |  29<H>  ----------------------------<  109<H>  5.7<H>   |  26  |  1.3    Ca    8.8      06 Oct 2020 06:05  Phos  3.4     10-06  Mg     2.1     10-06    TPro  5.5<L>  /  Alb  3.3<L>  /  TBili  <0.2  /  DBili  x   /  AST  21  /  ALT  17  /  AlkPhos  112  10-06                MICROBIOLOGY:    RADIOLOGY:    ALLERGIES:  No Known Allergies      ===========================================================

## 2020-10-06 NOTE — CONSULT NOTE ADULT - SUBJECTIVE AND OBJECTIVE BOX
NEPHROLOGY CONSULTATION NOTE    Patient is a 89y Female whom presented to the hospital with diarrhea, and lethargy.     HPI:  This is a 89 years old female with PMHx of hypothyroidism, COPD ( on 3.5L of oxygen at home), who was brought to ED by the son for increased lethargy and diarrhea for one day.  Collateral history obtained from son who reports that since yesterday patient has been having diarrhea. About 5-6 episodes daily, consisting of loose stools, associated with increased sleepiness. She also complained of nausea today but did not throw up. Son gave her imodium yesterday that helped with the diarrhea temporarily. Denies fever chills, chest pain, abdominal pain, headache, SOB, palpitations, dysuria, at home.  In ED her vitals were   T(C): 36.6 (09-28-20 @ 21:39), Max: 36.6 (09-28-20 @ 21:39)  HR: 71 (09-28-20 @ 21:39) (71 - 71)  BP: 114/51 (09-28-20 @ 21:39) (114/51 - 114/51)  RR: 18 (09-28-20 @ 21:39) (18 - 18)  SpO2: 94% (09-28-20 @ 21:39) (94% - 94%)  In ED patient had CT abdomen that was consistent with acute pancolitis; no evidence of abscess. There was also mild abdomino-pelvic ascites. Patient was started on IVF aout 2.5 L and she was started on IV flagyl and IV cipro.   (29 Sep 2020 04:02)    Hospital Course: Pt was admitted on 29 Sept 2020, and has been treated for acute colitis and COPD exacerbation.   Pt was on Rocephin and Flagyl and then Cefuroxime for colitis. Pt received 1550mL of LR bolus in the ER, then 1L NS bolus and was on IVF D5+0.9%NS from Sept 29- Oct 1.   Pt is on Solumedrol for COPD exacerbation. Pt was started on PO Lasix 40mg daily today, and was on 20mg Lasix daily PO from Oct 2-6.     PAST MEDICAL & SURGICAL HISTORY:  Hypothyroid  COPD (chronic obstructive pulmonary disease)    Allergies:  No Known Allergies    Home Medications   acetaminophen 325 mg oral tablet: 2 or 3 tab(s) orally every 8 hours as needed, for pain or fever (31 Aug 2020 12:23)  albuterol 2.5 mg/3 mL (0.083%) inhalation solution: 3 milliliter(s) inhaled every 6 hours, As Needed for wheezing, shortness of breath (31 Aug 2020 12:23)  melatonin 5 mg oral tablet: 1 tab(s) orally once a day (at bedtime), As Needed, to help you sleep at night (31 Aug 2020 12:23)  polyethylene glycol 3350 oral powder for reconstitution: 17 gram(s) orally once a day, As Needed, after dinner (31 Aug 2020 12:23)  senna oral tablet: take 1 or 2 tab(s) orally once a day (at bedtime), if needed, for constipation (31 Aug 2020 12:23)    Hospital Medications:   albuterol/ipratropium for Nebulization 3 milliLiter(s) Nebulizer every 6 hours  aspirin  chewable 81 milliGRAM(s) Oral daily  atorvastatin 40 milliGRAM(s) Oral at bedtime  budesonide  80 MICROgram(s)/formoterol 4.5 MICROgram(s) Inhaler 2 Puff(s) Inhalation two times a day  dronedarone 400 milliGRAM(s) Oral two times a day  furosemide    Tablet 40 milliGRAM(s) Oral daily  heparin   Injectable 5000 Unit(s) SubCutaneous every 8 hours  levothyroxine 112 MICROGram(s) Oral daily  melatonin 5 milliGRAM(s) Oral at bedtime  methylPREDNISolone sodium succinate Injectable 40 milliGRAM(s) IV Push every 12 hours  metoprolol succinate ER 25 milliGRAM(s) Oral daily  Nephro-billy 1 Tablet(s) Oral daily  pantoprazole    Tablet 40 milliGRAM(s) Oral before breakfast  polyethylene glycol 3350 17 Gram(s) Oral daily  sodium polystyrene sulfonate Suspension 30 Gram(s) Oral once      SOCIAL HISTORY:  Denies ETOH, Smoking    FAMILY HISTORY:  Not available    REVIEW OF SYSTEMS:  CONSTITUTIONAL: No weakness, fevers or chills  EYES/ENT: No visual changes;  No vertigo or throat pain   NECK: No pain or stiffness  RESPIRATORY: No cough, wheezing, hemoptysis; No shortness of breath  CARDIOVASCULAR: No chest pain or palpitations.  GASTROINTESTINAL: No abdominal or epigastric pain. No nausea, vomiting, or hematemesis; No diarrhea or constipation. No melena or hematochezia.  GENITOURINARY: No dysuria, frequency, foamy urine, urinary urgency, incontinence or hematuria  NEUROLOGICAL: No numbness or weakness  SKIN: No itching, burning, rashes, or lesions   VASCULAR: No bilateral lower extremity edema.   All other review of systems is negative unless indicated above.    VITALS:  T(F): 98.1 (10-06-20 @ 05:13), Max: 98.1 (10-06-20 @ 05:13)  HR: 85 (10-06-20 @ 05:13)  BP: 133/60 (10-06-20 @ 05:13)  RR: 18 (10-06-20 @ 05:13)  SpO2: 90% (10-06-20 @ 07:38)    10-05 @ 07:01  -  10-06 @ 07:00  --------------------------------------------------------  IN: 354 mL / OUT: 0 mL / NET: 354 mL        I&O's Detail    05 Oct 2020 07:01  -  06 Oct 2020 07:00  --------------------------------------------------------  IN:    Oral Fluid: 354 mL  Total IN: 354 mL    OUT:  Total OUT: 0 mL    Total NET: 354 mL      PHYSICAL EXAM:  Constitutional: NAD  HEENT: anicteric sclera, oropharynx clear, MMM  Neck: No JVD  Respiratory: CTAB, no wheezes, rales or rhonchi  Cardiovascular: S1, S2, RRR  Gastrointestinal: BS+, soft, NT/ND  Extremities: No cyanosis or clubbing. No peripheral edema  Neurological: A/O x 3, no focal deficits  Psychiatric: Normal mood, normal affect  : No CVA tenderness. No santizo.   Skin: No rashes  Vascular Access:    LABS:  10-06    141  |  103  |  29<H>  ----------------------------<  109<H>  5.7<H>   |  26  |  1.3    eGFR: 36    Ca    8.8      06 Oct 2020 06:05  Phos  3.4     10-06  Mg     2.1     10-06    TPro  5.5<L>  /  Alb  3.3<L>  /  TBili  <0.2  /  DBili      /  AST  21  /  ALT  17  /  AlkPhos  112  10-06    Creatinine Trend: 1.3 <--, 1.2 <--, 1.5 <--, 1.4 <--, 1.3 <--, 1.0 <--, 1.3 <--, 1.7    Baseline Cr --> 0.8                        9.7    4.44  )-----------( 316      ( 06 Oct 2020 06:05 )             32.1     Urine Studies:  None available.       RADIOLOGY & ADDITIONAL STUDIES:    Xray Chest 1 View- PORTABLE-Urgent (Xray Chest 1 View- PORTABLE-Urgent .) (10.03.20 @ 09:03) >  Impression:    Prominent pulmonary vascular markings and cardiomegaly. No definite focal consolidation. Possible trace effusions. No pneumothorax    CT Abdomen and Pelvis w/ IV Cont (09.29.20 @ 02:56) >    FINDINGS:    LOWER CHEST: Bilateral dependent and subsegmental atelectasis.    HEPATOBILIARY: Unremarkable.  SPLEEN: Unremarkable.  PANCREAS: Unremarkable.  ADRENAL GLANDS: Unremarkable.    KIDNEYS: Symmetric renal enhancement. No hydronephrosis. Bilateral renal cysts measuring upto 7.9 cm on the right, and additional subcentimeter hypodensities, too small to further characterize.    ABDOMINOPELVIC NODES: No lymphadenopathy.    PELVIC ORGANS: Santizo catheter within the urinary bladder, with adjacent locules of air likely secondary to instrumentation. Limited evaluation due to streak artifact from metallic clips.    PERITONEUM/MESENTERY/BOWEL: Diffuse colorectal bowel wall thickening/edema with mucosal hyperenhancement compatible with pancolitis; no evidence of abscess. Limited evaluation due to streak artifact from metallic clips. No bowel obstruction or pneumoperitoneum. Small volume abdominopelvic ascites. Diverticulosis without evidence of diverticulitis.    BONES/SOFT TISSUES: Status post right femoral neck gamma nail fixation. Chronic right-sided healing rib fracture deformities. No acute osseous abnormality. Degenerative changes of the spine.    OTHER: Aortic atherosclerosis.    IMPRESSION:  1. Findings consistent with acute pancolitis; no evidence of abscess.  2. Small abdominopelvic ascites.     NEPHROLOGY CONSULTATION NOTE    Patient is a 89y Female whom presented to the hospital with diarrhea, and lethargy.     HPI:  This is a 89 years old female with PMHx of hypothyroidism, COPD ( on 3.5L of oxygen at home), who was brought to ED by the son for increased lethargy and diarrhea for one day.  Collateral history obtained from son who reports that since yesterday patient has been having diarrhea. About 5-6 episodes daily, consisting of loose stools, associated with increased sleepiness. She also complained of nausea today but did not throw up. Son gave her imodium yesterday that helped with the diarrhea temporarily. Denies fever chills, chest pain, abdominal pain, headache, SOB, palpitations, dysuria, at home.  In ED her vitals were   T(C): 36.6 (09-28-20 @ 21:39), Max: 36.6 (09-28-20 @ 21:39)  HR: 71 (09-28-20 @ 21:39) (71 - 71)  BP: 114/51 (09-28-20 @ 21:39) (114/51 - 114/51)  RR: 18 (09-28-20 @ 21:39) (18 - 18)  SpO2: 94% (09-28-20 @ 21:39) (94% - 94%)  In ED patient had CT abdomen that was consistent with acute pancolitis; no evidence of abscess. There was also mild abdomino-pelvic ascites. Patient was started on IVF aout 2.5 L and she was started on IV flagyl and IV cipro.   (29 Sep 2020 04:02)    Hospital Course: Pt was admitted on 29 Sept 2020, and has been treated for acute colitis and COPD exacerbation.   Pt was on Rocephin and Flagyl and then Cefuroxime for colitis. Pt received 1550mL of LR bolus in the ER, then 1L NS bolus and was on IVF D5+0.9%NS from Sept 29- Oct 1.   Pt is on Solumedrol for COPD exacerbation. Pt was started on PO Lasix 40mg daily today, and was on 20mg Lasix daily PO from Oct 2-6.     PAST MEDICAL & SURGICAL HISTORY:  Hypothyroid  COPD (chronic obstructive pulmonary disease)    Allergies:  No Known Allergies    Home Medications   acetaminophen 325 mg oral tablet: 2 or 3 tab(s) orally every 8 hours as needed, for pain or fever (31 Aug 2020 12:23)  albuterol 2.5 mg/3 mL (0.083%) inhalation solution: 3 milliliter(s) inhaled every 6 hours, As Needed for wheezing, shortness of breath (31 Aug 2020 12:23)  melatonin 5 mg oral tablet: 1 tab(s) orally once a day (at bedtime), As Needed, to help you sleep at night (31 Aug 2020 12:23)  polyethylene glycol 3350 oral powder for reconstitution: 17 gram(s) orally once a day, As Needed, after dinner (31 Aug 2020 12:23)  senna oral tablet: take 1 or 2 tab(s) orally once a day (at bedtime), if needed, for constipation (31 Aug 2020 12:23)    Hospital Medications:   albuterol/ipratropium for Nebulization 3 milliLiter(s) Nebulizer every 6 hours  aspirin  chewable 81 milliGRAM(s) Oral daily  atorvastatin 40 milliGRAM(s) Oral at bedtime  budesonide  80 MICROgram(s)/formoterol 4.5 MICROgram(s) Inhaler 2 Puff(s) Inhalation two times a day  dronedarone 400 milliGRAM(s) Oral two times a day  furosemide    Tablet 40 milliGRAM(s) Oral daily  heparin   Injectable 5000 Unit(s) SubCutaneous every 8 hours  levothyroxine 112 MICROGram(s) Oral daily  melatonin 5 milliGRAM(s) Oral at bedtime  methylPREDNISolone sodium succinate Injectable 40 milliGRAM(s) IV Push every 12 hours  metoprolol succinate ER 25 milliGRAM(s) Oral daily  Nephro-billy 1 Tablet(s) Oral daily  pantoprazole    Tablet 40 milliGRAM(s) Oral before breakfast  polyethylene glycol 3350 17 Gram(s) Oral daily  sodium polystyrene sulfonate Suspension 30 Gram(s) Oral once    SOCIAL HISTORY:  Denies ETOH, Smoking    FAMILY HISTORY:  Not available    REVIEW OF SYSTEMS:  CONSTITUTIONAL: No weakness, fevers or chills  EYES/ENT: No visual changes;  No vertigo or throat pain   NECK: No pain or stiffness  RESPIRATORY: No cough, wheezing, hemoptysis; No shortness of breath  CARDIOVASCULAR: No chest pain or palpitations.  GASTROINTESTINAL: No abdominal or epigastric pain. No nausea, vomiting, or hematemesis; No diarrhea or constipation. No melena or hematochezia.  GENITOURINARY: No dysuria, frequency, foamy urine, urinary urgency, incontinence or hematuria  NEUROLOGICAL: No numbness or weakness  SKIN: No itching, burning, rashes, or lesions   VASCULAR: No bilateral lower extremity edema.   All other review of systems is negative unless indicated above.    VITALS:  T(F): 98.1 (10-06-20 @ 05:13), Max: 98.1 (10-06-20 @ 05:13)  HR: 85 (10-06-20 @ 05:13)  BP: 133/60 (10-06-20 @ 05:13)  RR: 18 (10-06-20 @ 05:13)  SpO2: 90% (10-06-20 @ 07:38)    10-05 @ 07:01  -  10-06 @ 07:00  --------------------------------------------------------  IN: 354 mL / OUT: 0 mL / NET: 354 mL        I&O's Detail    05 Oct 2020 07:01  -  06 Oct 2020 07:00  --------------------------------------------------------  IN:    Oral Fluid: 354 mL  Total IN: 354 mL    OUT:  Total OUT: 0 mL    Total NET: 354 mL      PHYSICAL EXAM:  Constitutional: NAD  HEENT: anicteric sclera, oropharynx clear, MMM  Neck: No JVD  Respiratory: CTAB, no wheezes, rales or rhonchi  Cardiovascular: S1, S2, RRR  Gastrointestinal: BS+, soft, NT/ND  Extremities: No cyanosis or clubbing. No peripheral edema  Neurological: A/O x 3, no focal deficits  Psychiatric: Normal mood, normal affect  : No CVA tenderness. No santizo.   Skin: No rashes  Vascular Access: None    LABS:  10-06    141  |  103  |  29<H>  ----------------------------<  109<H>  5.7<H>   |  26  |  1.3    eGFR: 36    Ca    8.8      06 Oct 2020 06:05  Phos  3.4     10-06  Mg     2.1     10-06    TPro  5.5<L>  /  Alb  3.3<L>  /  TBili  <0.2  /  DBili      /  AST  21  /  ALT  17  /  AlkPhos  112  10-06    Creatinine Trend: 1.3 <--, 1.2 <--, 1.5 <--, 1.4 <--, 1.3 <--, 1.0 <--, 1.3 <--, 1.7    Baseline Cr --> 0.8                        9.7    4.44  )-----------( 316      ( 06 Oct 2020 06:05 )             32.1     Urine Studies:  None available.       RADIOLOGY & ADDITIONAL STUDIES:    Xray Chest 1 View- PORTABLE-Urgent (Xray Chest 1 View- PORTABLE-Urgent .) (10.03.20 @ 09:03) >  Impression:    Prominent pulmonary vascular markings and cardiomegaly. No definite focal consolidation. Possible trace effusions. No pneumothorax    CT Abdomen and Pelvis w/ IV Cont (09.29.20 @ 02:56) >    FINDINGS:    LOWER CHEST: Bilateral dependent and subsegmental atelectasis.    HEPATOBILIARY: Unremarkable.  SPLEEN: Unremarkable.  PANCREAS: Unremarkable.  ADRENAL GLANDS: Unremarkable.    KIDNEYS: Symmetric renal enhancement. No hydronephrosis. Bilateral renal cysts measuring upto 7.9 cm on the right, and additional subcentimeter hypodensities, too small to further characterize.    ABDOMINOPELVIC NODES: No lymphadenopathy.    PELVIC ORGANS: Santizo catheter within the urinary bladder, with adjacent locules of air likely secondary to instrumentation. Limited evaluation due to streak artifact from metallic clips.    PERITONEUM/MESENTERY/BOWEL: Diffuse colorectal bowel wall thickening/edema with mucosal hyperenhancement compatible with pancolitis; no evidence of abscess. Limited evaluation due to streak artifact from metallic clips. No bowel obstruction or pneumoperitoneum. Small volume abdominopelvic ascites. Diverticulosis without evidence of diverticulitis.    BONES/SOFT TISSUES: Status post right femoral neck gamma nail fixation. Chronic right-sided healing rib fracture deformities. No acute osseous abnormality. Degenerative changes of the spine.    OTHER: Aortic atherosclerosis.    IMPRESSION:  1. Findings consistent with acute pancolitis; no evidence of abscess.  2. Small abdominopelvic ascites.     NEPHROLOGY CONSULTATION NOTE    Patient is a 89y Female whom presented to the hospital with diarrhea, and lethargy.     HPI:  This is a 89 years old female with PMHx of hypothyroidism, COPD ( on 3.5L of oxygen at home), who was brought to ED by the son for increased lethargy and diarrhea for one day.  Collateral history obtained from son who reports that since yesterday patient has been having diarrhea. About 5-6 episodes daily, consisting of loose stools, associated with increased sleepiness. She also complained of nausea today but did not throw up. Son gave her imodium yesterday that helped with the diarrhea temporarily. Denies fever chills, chest pain, abdominal pain, headache, SOB, palpitations, dysuria, at home.  In ED her vitals were   T(C): 36.6 (09-28-20 @ 21:39), Max: 36.6 (09-28-20 @ 21:39)  HR: 71 (09-28-20 @ 21:39) (71 - 71)  BP: 114/51 (09-28-20 @ 21:39) (114/51 - 114/51)  RR: 18 (09-28-20 @ 21:39) (18 - 18)  SpO2: 94% (09-28-20 @ 21:39) (94% - 94%)  In ED patient had CT abdomen that was consistent with acute pancolitis; no evidence of abscess. There was also mild abdomino-pelvic ascites. Patient was started on IVF aout 2.5 L and she was started on IV flagyl and IV cipro.   (29 Sep 2020 04:02)    Hospital Course: Pt was admitted on 29 Sept 2020, and has been treated for acute colitis and COPD exacerbation.   Pt was on Rocephin and Flagyl and then Cefuroxime for colitis. Pt received 1550mL of LR bolus in the ER, then 1L NS bolus and was on IVF D5+0.9%NS from Sept 29- Oct 1.   Pt is on Solumedrol for COPD exacerbation. Pt was started on PO Lasix 40mg daily today, and was on 20mg Lasix daily PO from Oct 2-6.     PAST MEDICAL & SURGICAL HISTORY:  Hypothyroid  COPD (chronic obstructive pulmonary disease)    Allergies:  No Known Allergies    Home Medications   acetaminophen 325 mg oral tablet: 2 or 3 tab(s) orally every 8 hours as needed, for pain or fever (31 Aug 2020 12:23)  albuterol 2.5 mg/3 mL (0.083%) inhalation solution: 3 milliliter(s) inhaled every 6 hours, As Needed for wheezing, shortness of breath (31 Aug 2020 12:23)  melatonin 5 mg oral tablet: 1 tab(s) orally once a day (at bedtime), As Needed, to help you sleep at night (31 Aug 2020 12:23)  polyethylene glycol 3350 oral powder for reconstitution: 17 gram(s) orally once a day, As Needed, after dinner (31 Aug 2020 12:23)  senna oral tablet: take 1 or 2 tab(s) orally once a day (at bedtime), if needed, for constipation (31 Aug 2020 12:23)    Hospital Medications:   albuterol/ipratropium for Nebulization 3 milliLiter(s) Nebulizer every 6 hours  aspirin  chewable 81 milliGRAM(s) Oral daily  atorvastatin 40 milliGRAM(s) Oral at bedtime  budesonide  80 MICROgram(s)/formoterol 4.5 MICROgram(s) Inhaler 2 Puff(s) Inhalation two times a day  dronedarone 400 milliGRAM(s) Oral two times a day  furosemide    Tablet 40 milliGRAM(s) Oral daily  heparin   Injectable 5000 Unit(s) SubCutaneous every 8 hours  levothyroxine 112 MICROGram(s) Oral daily  melatonin 5 milliGRAM(s) Oral at bedtime  methylPREDNISolone sodium succinate Injectable 40 milliGRAM(s) IV Push every 12 hours  metoprolol succinate ER 25 milliGRAM(s) Oral daily  Nephro-billy 1 Tablet(s) Oral daily  pantoprazole    Tablet 40 milliGRAM(s) Oral before breakfast  polyethylene glycol 3350 17 Gram(s) Oral daily  sodium polystyrene sulfonate Suspension 30 Gram(s) Oral once    SOCIAL HISTORY:  Denies ETOH, Smoking    FAMILY HISTORY:  Not available    REVIEW OF SYSTEMS:  CONSTITUTIONAL: No weakness, fevers or chills  EYES/ENT: No visual changes;  No vertigo or throat pain   NECK: No pain or stiffness  RESPIRATORY: No cough, wheezing, hemoptysis; shortness of breath improved  CARDIOVASCULAR: No chest pain or palpitations.  GASTROINTESTINAL: No abdominal or epigastric pain. No nausea, vomiting, or hematemesis; No diarrhea or constipation. No melena or hematochezia.  GENITOURINARY: No dysuria, frequency, foamy urine, urinary urgency, incontinence or hematuria  NEUROLOGICAL: No numbness or weakness  SKIN: No itching, burning, rashes, or lesions   VASCULAR: No bilateral lower extremity edema.   All other review of systems is negative unless indicated above.    VITALS:  T(F): 98.1 (10-06-20 @ 05:13), Max: 98.1 (10-06-20 @ 05:13)  HR: 85 (10-06-20 @ 05:13)  BP: 133/60 (10-06-20 @ 05:13)  RR: 18 (10-06-20 @ 05:13)  SpO2: 90% (10-06-20 @ 07:38)    10-05 @ 07:01  -  10-06 @ 07:00  --------------------------------------------------------  IN: 354 mL / OUT: 0 mL / NET: 354 mL        I&O's Detail    05 Oct 2020 07:01  -  06 Oct 2020 07:00  --------------------------------------------------------  IN:    Oral Fluid: 354 mL  Total IN: 354 mL    OUT:  Total OUT: 0 mL    Total NET: 354 mL      PHYSICAL EXAM:  Constitutional: NAD  HEENT: anicteric sclera, oropharynx clear, MMM  Neck: No JVD  Respiratory: Dec BS b/l ; no wheezes, rales or rhonchi  Cardiovascular: S1, S2, RRR  Gastrointestinal: BS+, soft, NT/ND  Extremities: No cyanosis or clubbing. No peripheral edema  Neurological: A/O x 3, no focal deficits  Psychiatric: Normal mood, normal affect  : No CVA tenderness. No Tobar  Skin: No rashes  Vascular Access: None    LABS:  10-06    141  |  103  |  29<H>  ----------------------------<  109<H>  5.7<H>   |  26  |  1.3    eGFR: 36    Ca    8.8      06 Oct 2020 06:05  Phos  3.4     10-06  Mg     2.1     10-06    TPro  5.5<L>  /  Alb  3.3<L>  /  TBili  <0.2  /  DBili      /  AST  21  /  ALT  17  /  AlkPhos  112  10-06    Creatinine Trend: 1.3 <--, 1.2 <--, 1.5 <--, 1.4 <--, 1.3 <--, 1.0 <--, 1.3 <-- 1.7    Baseline Cr  0.8                        9.7    4.44  )-----------( 316      ( 06 Oct 2020 06:05 )             32.1     Urine Studies:  None available.     Blood Gas Profile - Arterial (10.03.20 @ 09:35)    pH, Arterial: 7.31    pCO2, Arterial: 52 mmHg    pO2, Arterial: 90 mmHg    HCO3, Arterial: 26 mmoL/L    Base Excess, Arterial: -0.6 mmoL/L    Oxygen Saturation, Arterial: 97 %      RADIOLOGY & ADDITIONAL STUDIES:  Blood Gas Arterial, Lactate: 0.8 mmoL/L (10.03.20 @ 09:35)      Xray Chest 1 View- PORTABLE-Urgent (Xray Chest 1 View- PORTABLE-Urgent .) (10.03.20 @ 09:03) >  Impression:    Prominent pulmonary vascular markings and cardiomegaly. No definite focal consolidation. Possible trace effusions. No pneumothorax    CT Abdomen and Pelvis w/ IV Cont (09.29.20 @ 02:56) >    FINDINGS:    LOWER CHEST: Bilateral dependent and subsegmental atelectasis.    HEPATOBILIARY: Unremarkable.  SPLEEN: Unremarkable.  PANCREAS: Unremarkable.  ADRENAL GLANDS: Unremarkable.    KIDNEYS: Symmetric renal enhancement. No hydronephrosis. Bilateral renal cysts measuring upto 7.9 cm on the right, and additional subcentimeter hypo-densities, too small to further characterize.    ABDOMINOPELVIC NODES: No lymphadenopathy.    PELVIC ORGANS: Tobar catheter within the urinary bladder, with adjacent locules of air likely secondary to instrumentation. Limited evaluation due to streak artifact from metallic clips.    PERITONEUM/MESENTERY/BOWEL: Diffuse colorectal bowel wall thickening/edema with mucosal hyper enhancement compatible with pancolitis; no evidence of abscess. Limited evaluation due to streak artifact from metallic clips. No bowel obstruction or pneumoperitoneum. Small volume abdominopelvic ascites. Diverticulosis without evidence of diverticulitis.    BONES/SOFT TISSUES: Status post right femoral neck gamma nail fixation. Chronic right-sided healing rib fracture deformities. No acute osseous abnormality. Degenerative changes of the spine.    OTHER: Aortic atherosclerosis.    IMPRESSION:  1. Findings consistent with acute pancolitis; no evidence of abscess.  2. Small abdominopelvic ascites.     NEPHROLOGY CONSULTATION NOTE    Patient is a 89y Female whom presented to the hospital with diarrhea, and lethargy.     HPI:  This is a 89 years old female with PMHx of hypothyroidism, COPD ( on 3.5L of oxygen at home), who was brought to ED by the son for increased lethargy and diarrhea for one day.  Collateral history obtained from son who reports that since yesterday patient has been having diarrhea. About 5-6 episodes daily, consisting of loose stools, associated with increased sleepiness. She also complained of nausea today but did not throw up. Son gave her imodium yesterday that helped with the diarrhea temporarily. Denies fever chills, chest pain, abdominal pain, headache, SOB, palpitations, dysuria, at home.  In ED her vitals were   T(C): 36.6 (09-28-20 @ 21:39), Max: 36.6 (09-28-20 @ 21:39)  HR: 71 (09-28-20 @ 21:39) (71 - 71)  BP: 114/51 (09-28-20 @ 21:39) (114/51 - 114/51)  RR: 18 (09-28-20 @ 21:39) (18 - 18)  SpO2: 94% (09-28-20 @ 21:39) (94% - 94%)  In ED patient had CT abdomen that was consistent with acute pancolitis; no evidence of abscess. There was also mild abdomino-pelvic ascites. Patient was started on IVF aout 2.5 L and she was started on IV flagyl and IV cipro.   (29 Sep 2020 04:02)    Hospital Course: Pt was admitted on 29 Sept 2020, and has been treated for acute colitis and COPD exacerbation.   Pt was on Rocephin and Flagyl and then Cefuroxime for colitis. Pt received 1550mL of LR bolus in the ER, then 1L NS bolus and was on IVF D5+0.9%NS from Sept 29- Oct 1.   Pt is on Solumedrol for COPD exacerbation. Pt was started on PO Lasix 40mg daily today, and was on 20mg Lasix daily PO from Oct 2-6.     PAST MEDICAL & SURGICAL HISTORY:  Hypothyroid  COPD (chronic obstructive pulmonary disease)    Allergies:  No Known Allergies    Home Medications   acetaminophen 325 mg oral tablet: 2 or 3 tab(s) orally every 8 hours as needed, for pain or fever (31 Aug 2020 12:23)  albuterol 2.5 mg/3 mL (0.083%) inhalation solution: 3 milliliter(s) inhaled every 6 hours, As Needed for wheezing, shortness of breath (31 Aug 2020 12:23)  melatonin 5 mg oral tablet: 1 tab(s) orally once a day (at bedtime), As Needed, to help you sleep at night (31 Aug 2020 12:23)  polyethylene glycol 3350 oral powder for reconstitution: 17 gram(s) orally once a day, As Needed, after dinner (31 Aug 2020 12:23)  senna oral tablet: take 1 or 2 tab(s) orally once a day (at bedtime), if needed, for constipation (31 Aug 2020 12:23)    Hospital Medications:   albuterol/ipratropium for Nebulization 3 milliLiter(s) Nebulizer every 6 hours  aspirin  chewable 81 milliGRAM(s) Oral daily  atorvastatin 40 milliGRAM(s) Oral at bedtime  budesonide  80 MICROgram(s)/formoterol 4.5 MICROgram(s) Inhaler 2 Puff(s) Inhalation two times a day  dronedarone 400 milliGRAM(s) Oral two times a day  furosemide    Tablet 40 milliGRAM(s) Oral daily  heparin   Injectable 5000 Unit(s) SubCutaneous every 8 hours  levothyroxine 112 MICROGram(s) Oral daily  melatonin 5 milliGRAM(s) Oral at bedtime  methylPREDNISolone sodium succinate Injectable 40 milliGRAM(s) IV Push every 12 hours  metoprolol succinate ER 25 milliGRAM(s) Oral daily  Nephro-billy 1 Tablet(s) Oral daily  pantoprazole    Tablet 40 milliGRAM(s) Oral before breakfast  polyethylene glycol 3350 17 Gram(s) Oral daily  sodium polystyrene sulfonate Suspension 30 Gram(s) Oral once    SOCIAL HISTORY:  Denies ETOH, Smoking    FAMILY HISTORY:  Not available    REVIEW OF SYSTEMS:  CONSTITUTIONAL: No weakness, fevers or chills  EYES/ENT: No visual changes;  No vertigo or throat pain   NECK: No pain or stiffness  RESPIRATORY: No cough, wheezing, hemoptysis; shortness of breath improved  CARDIOVASCULAR: No chest pain or palpitations.  GASTROINTESTINAL: No abdominal or epigastric pain. No nausea, vomiting, or hematemesis; No diarrhea or constipation. No melena or hematochezia.  GENITOURINARY: No dysuria, frequency, foamy urine, urinary urgency, incontinence or hematuria  NEUROLOGICAL: No numbness or weakness  SKIN: No itching, burning, rashes, or lesions   VASCULAR: No bilateral lower extremity edema.   All other review of systems is negative unless indicated above.    VITALS:  T(F): 98.1 (10-06-20 @ 05:13), Max: 98.1 (10-06-20 @ 05:13)  HR: 85 (10-06-20 @ 05:13)  BP: 133/60 (10-06-20 @ 05:13)  RR: 18 (10-06-20 @ 05:13)  SpO2: 90% (10-06-20 @ 07:38)    10-05 @ 07:01  -  10-06 @ 07:00  --------------------------------------------------------  IN: 354 mL / OUT: 0 mL / NET: 354 mL        I&O's Detail    05 Oct 2020 07:01  -  06 Oct 2020 07:00  --------------------------------------------------------  IN:    Oral Fluid: 354 mL  Total IN: 354 mL    OUT:  Total OUT: 0 mL    Total NET: 354 mL      PHYSICAL EXAM:  Constitutional: NAD  HEENT: anicteric sclera, oropharynx clear, MMM  Neck: No JVD  Respiratory: Dec BS b/l ; mild crackles bibasilar  Cardiovascular: S1, S2, RRR  Gastrointestinal: BS+, soft, NT/ND  Extremities: No cyanosis or clubbing. No peripheral edema  Neurological: A/O x 3, no focal deficits  Psychiatric: Normal mood, normal affect  : No CVA tenderness. No Tobar  Skin: No rashes  Vascular Access: None    LABS:  10-06    141  |  103  |  29<H>  ----------------------------<  109<H>  5.7<H>   |  26  |  1.3    eGFR: 36    Ca    8.8      06 Oct 2020 06:05  Phos  3.4     10-06  Mg     2.1     10-06    TPro  5.5<L>  /  Alb  3.3<L>  /  TBili  <0.2  /  DBili      /  AST  21  /  ALT  17  /  AlkPhos  112  10-06    Creatinine Trend: 1.3 <--, 1.2 <--, 1.5 <--, 1.4 <--, 1.3 <--, 1.0 <--, 1.3 <-- 1.7    Baseline Cr  0.8                        9.7    4.44  )-----------( 316      ( 06 Oct 2020 06:05 )             32.1     Urine Studies:  None available.     Blood Gas Profile - Arterial (10.03.20 @ 09:35)    pH, Arterial: 7.31    pCO2, Arterial: 52 mmHg    pO2, Arterial: 90 mmHg    HCO3, Arterial: 26 mmoL/L    Base Excess, Arterial: -0.6 mmoL/L    Oxygen Saturation, Arterial: 97 %      RADIOLOGY & ADDITIONAL STUDIES:  Blood Gas Arterial, Lactate: 0.8 mmoL/L (10.03.20 @ 09:35)      Xray Chest 1 View- PORTABLE-Urgent (Xray Chest 1 View- PORTABLE-Urgent .) (10.03.20 @ 09:03) >  Impression:    Prominent pulmonary vascular markings and cardiomegaly. No definite focal consolidation. Possible trace effusions. No pneumothorax    CT Abdomen and Pelvis w/ IV Cont (09.29.20 @ 02:56) >    FINDINGS:    LOWER CHEST: Bilateral dependent and subsegmental atelectasis.    HEPATOBILIARY: Unremarkable.  SPLEEN: Unremarkable.  PANCREAS: Unremarkable.  ADRENAL GLANDS: Unremarkable.    KIDNEYS: Symmetric renal enhancement. No hydronephrosis. Bilateral renal cysts measuring upto 7.9 cm on the right, and additional subcentimeter hypo-densities, too small to further characterize.    ABDOMINOPELVIC NODES: No lymphadenopathy.    PELVIC ORGANS: Tobar catheter within the urinary bladder, with adjacent locules of air likely secondary to instrumentation. Limited evaluation due to streak artifact from metallic clips.    PERITONEUM/MESENTERY/BOWEL: Diffuse colorectal bowel wall thickening/edema with mucosal hyper enhancement compatible with pancolitis; no evidence of abscess. Limited evaluation due to streak artifact from metallic clips. No bowel obstruction or pneumoperitoneum. Small volume abdominopelvic ascites. Diverticulosis without evidence of diverticulitis.    BONES/SOFT TISSUES: Status post right femoral neck gamma nail fixation. Chronic right-sided healing rib fracture deformities. No acute osseous abnormality. Degenerative changes of the spine.    OTHER: Aortic atherosclerosis.    IMPRESSION:  1. Findings consistent with acute pancolitis; no evidence of abscess.  2. Small abdominopelvic ascites.

## 2020-10-06 NOTE — CONSULT NOTE ADULT - ATTENDING COMMENTS
Pt seen examined  Agree with above  Pt with MAURI on CKD2, COPD on home O2, admitted with diarrhea, pancolitis, was on Rocephin and Flagyl  MAURI with hyperkalemia, no met acidosis - likely due to prerenal state initially (received IVF for 2 days) creat peaked at 1.7 and is trending down 1.3 mg% today (baseline 0.8 gm%)   HYperkalaemia - cont low K diet, lasix for fluid overload and Lokelma 10 grx1 if K >5.5  Obtain urine electrolytes, creat, UA  strict Is and os

## 2020-10-06 NOTE — PROGRESS NOTE ADULT - ASSESSMENT
Impression:    - Severe COPD now stable  - mild CHF  - Pancolitis acute  - s/ p recent ORIF      Plan    - prednisone 40 daily for 3 days than 20  - Neb as needed  - OOB to chair  - lasix 40 mg po daily  - will follow  - poor prognosis  - pul standpoint OP f/up

## 2020-10-07 ENCOUNTER — TRANSCRIPTION ENCOUNTER (OUTPATIENT)
Age: 85
End: 2020-10-07

## 2020-10-07 VITALS — OXYGEN SATURATION: 94 % | HEART RATE: 89 BPM

## 2020-10-07 LAB
ALBUMIN SERPL ELPH-MCNC: 3.8 G/DL — SIGNIFICANT CHANGE UP (ref 3.5–5.2)
ALP SERPL-CCNC: 130 U/L — HIGH (ref 30–115)
ALT FLD-CCNC: 21 U/L — SIGNIFICANT CHANGE UP (ref 0–41)
ANION GAP SERPL CALC-SCNC: 15 MMOL/L — HIGH (ref 7–14)
AST SERPL-CCNC: 22 U/L — SIGNIFICANT CHANGE UP (ref 0–41)
BASOPHILS # BLD AUTO: 0 K/UL — SIGNIFICANT CHANGE UP (ref 0–0.2)
BASOPHILS NFR BLD AUTO: 0 % — SIGNIFICANT CHANGE UP (ref 0–1)
BILIRUB SERPL-MCNC: 0.3 MG/DL — SIGNIFICANT CHANGE UP (ref 0.2–1.2)
BUN SERPL-MCNC: 33 MG/DL — HIGH (ref 10–20)
CALCIUM SERPL-MCNC: 8.8 MG/DL — SIGNIFICANT CHANGE UP (ref 8.5–10.1)
CHLORIDE SERPL-SCNC: 100 MMOL/L — SIGNIFICANT CHANGE UP (ref 98–110)
CO2 SERPL-SCNC: 28 MMOL/L — SIGNIFICANT CHANGE UP (ref 17–32)
CREAT SERPL-MCNC: 1.4 MG/DL — SIGNIFICANT CHANGE UP (ref 0.7–1.5)
EOSINOPHIL # BLD AUTO: 0 K/UL — SIGNIFICANT CHANGE UP (ref 0–0.7)
EOSINOPHIL NFR BLD AUTO: 0 % — SIGNIFICANT CHANGE UP (ref 0–8)
GLUCOSE SERPL-MCNC: 119 MG/DL — HIGH (ref 70–99)
HCT VFR BLD CALC: 38.1 % — SIGNIFICANT CHANGE UP (ref 37–47)
HGB BLD-MCNC: 11.6 G/DL — LOW (ref 12–16)
IMM GRANULOCYTES NFR BLD AUTO: 0.5 % — HIGH (ref 0.1–0.3)
LYMPHOCYTES # BLD AUTO: 0.71 K/UL — LOW (ref 1.2–3.4)
LYMPHOCYTES # BLD AUTO: 11.1 % — LOW (ref 20.5–51.1)
MAGNESIUM SERPL-MCNC: 1.8 MG/DL — SIGNIFICANT CHANGE UP (ref 1.8–2.4)
MCHC RBC-ENTMCNC: 30.4 G/DL — LOW (ref 32–37)
MCHC RBC-ENTMCNC: 30.5 PG — SIGNIFICANT CHANGE UP (ref 27–31)
MCV RBC AUTO: 100.3 FL — HIGH (ref 81–99)
MONOCYTES # BLD AUTO: 0.36 K/UL — SIGNIFICANT CHANGE UP (ref 0.1–0.6)
MONOCYTES NFR BLD AUTO: 5.6 % — SIGNIFICANT CHANGE UP (ref 1.7–9.3)
NEUTROPHILS # BLD AUTO: 5.28 K/UL — SIGNIFICANT CHANGE UP (ref 1.4–6.5)
NEUTROPHILS NFR BLD AUTO: 82.8 % — HIGH (ref 42.2–75.2)
NRBC # BLD: 0 /100 WBCS — SIGNIFICANT CHANGE UP (ref 0–0)
PHOSPHATE SERPL-MCNC: 3.8 MG/DL — SIGNIFICANT CHANGE UP (ref 2.1–4.9)
PLATELET # BLD AUTO: 378 K/UL — SIGNIFICANT CHANGE UP (ref 130–400)
POTASSIUM SERPL-MCNC: 4 MMOL/L — SIGNIFICANT CHANGE UP (ref 3.5–5)
POTASSIUM SERPL-SCNC: 4 MMOL/L — SIGNIFICANT CHANGE UP (ref 3.5–5)
PROT SERPL-MCNC: 6.1 G/DL — SIGNIFICANT CHANGE UP (ref 6–8)
RBC # BLD: 3.8 M/UL — LOW (ref 4.2–5.4)
RBC # FLD: 15.6 % — HIGH (ref 11.5–14.5)
SODIUM SERPL-SCNC: 143 MMOL/L — SIGNIFICANT CHANGE UP (ref 135–146)
WBC # BLD: 6.38 K/UL — SIGNIFICANT CHANGE UP (ref 4.8–10.8)
WBC # FLD AUTO: 6.38 K/UL — SIGNIFICANT CHANGE UP (ref 4.8–10.8)

## 2020-10-07 PROCEDURE — 99239 HOSP IP/OBS DSCHRG MGMT >30: CPT

## 2020-10-07 PROCEDURE — 71045 X-RAY EXAM CHEST 1 VIEW: CPT | Mod: 26

## 2020-10-07 RX ORDER — SODIUM CHLORIDE 9 MG/ML
6 INJECTION INTRAMUSCULAR; INTRAVENOUS; SUBCUTANEOUS ONCE
Refills: 0 | Status: DISCONTINUED | OUTPATIENT
Start: 2020-10-07 | End: 2020-10-07

## 2020-10-07 RX ORDER — ASPIRIN/CALCIUM CARB/MAGNESIUM 324 MG
1 TABLET ORAL
Qty: 0 | Refills: 0 | DISCHARGE
Start: 2020-10-07

## 2020-10-07 RX ORDER — FUROSEMIDE 40 MG
1 TABLET ORAL
Qty: 30 | Refills: 0
Start: 2020-10-07 | End: 2020-11-05

## 2020-10-07 RX ORDER — FUROSEMIDE 40 MG
1 TABLET ORAL
Qty: 30 | Refills: 0
Start: 2020-10-07

## 2020-10-07 RX ADMIN — Medication 25 MILLIGRAM(S): at 05:40

## 2020-10-07 RX ADMIN — Medication 3 MILLILITER(S): at 01:32

## 2020-10-07 RX ADMIN — Medication 81 MILLIGRAM(S): at 11:27

## 2020-10-07 RX ADMIN — HEPARIN SODIUM 5000 UNIT(S): 5000 INJECTION INTRAVENOUS; SUBCUTANEOUS at 13:18

## 2020-10-07 RX ADMIN — HEPARIN SODIUM 5000 UNIT(S): 5000 INJECTION INTRAVENOUS; SUBCUTANEOUS at 05:41

## 2020-10-07 RX ADMIN — BUDESONIDE AND FORMOTEROL FUMARATE DIHYDRATE 2 PUFF(S): 160; 4.5 AEROSOL RESPIRATORY (INHALATION) at 09:26

## 2020-10-07 RX ADMIN — Medication 3 MILLILITER(S): at 07:40

## 2020-10-07 RX ADMIN — Medication 1 TABLET(S): at 12:08

## 2020-10-07 RX ADMIN — Medication 40 MILLIGRAM(S): at 05:40

## 2020-10-07 RX ADMIN — Medication 112 MICROGRAM(S): at 05:40

## 2020-10-07 RX ADMIN — Medication 3 MILLILITER(S): at 13:08

## 2020-10-07 RX ADMIN — PANTOPRAZOLE SODIUM 40 MILLIGRAM(S): 20 TABLET, DELAYED RELEASE ORAL at 05:41

## 2020-10-07 RX ADMIN — DRONEDARONE 400 MILLIGRAM(S): 400 TABLET, FILM COATED ORAL at 05:40

## 2020-10-07 RX ADMIN — Medication 40 MILLIGRAM(S): at 05:43

## 2020-10-07 NOTE — PROGRESS NOTE ADULT - SUBJECTIVE AND OBJECTIVE BOX
Nephrology progress note    THIS IS AN INCOMPLETE NOTE . FULL NOTE TO FOLLOW SHORTLY    Patient is seen and examined, events over the last 24 h noted .    Allergies:  No Known Allergies    Hospital Medications:   MEDICATIONS  (STANDING):  albuterol/ipratropium for Nebulization 3 milliLiter(s) Nebulizer every 6 hours  aspirin  chewable 81 milliGRAM(s) Oral daily  atorvastatin 40 milliGRAM(s) Oral at bedtime  budesonide  80 MICROgram(s)/formoterol 4.5 MICROgram(s) Inhaler 2 Puff(s) Inhalation two times a day  dronedarone 400 milliGRAM(s) Oral two times a day  furosemide    Tablet 40 milliGRAM(s) Oral daily  heparin   Injectable 5000 Unit(s) SubCutaneous every 8 hours  levothyroxine 112 MICROGram(s) Oral daily  melatonin 5 milliGRAM(s) Oral at bedtime  methylPREDNISolone sodium succinate Injectable 40 milliGRAM(s) IV Push every 12 hours  metoprolol succinate ER 25 milliGRAM(s) Oral daily  Nephro-billy 1 Tablet(s) Oral daily  pantoprazole    Tablet 40 milliGRAM(s) Oral before breakfast  polyethylene glycol 3350 17 Gram(s) Oral daily        VITALS:  T(F): 96.7 (10-07-20 @ 05:44), Max: 98.6 (10-06-20 @ 14:00)  HR: 91 (10-07-20 @ 05:44)  BP: 119/63 (10-07-20 @ 05:44)  RR: 18 (10-07-20 @ 05:44)  SpO2: 94% (10-07-20 @ 07:45)  Wt(kg): --    10-05 @ 07:01  -  10-06 @ 07:00  --------------------------------------------------------  IN: 354 mL / OUT: 0 mL / NET: 354 mL          PHYSICAL EXAM:  Constitutional: NAD  HEENT: anicteric sclera, oropharynx clear, MMM  Neck: No JVD  Respiratory: CTAB, no wheezes, rales or rhonchi  Cardiovascular: S1, S2, RRR  Gastrointestinal: BS+, soft, NT/ND  Extremities: No cyanosis or clubbing. No peripheral edema  :  No santizo.   Skin: No rashes    LABS:  10-07    143  |  100  |  33<H>  ----------------------------<  119<H>  4.0   |  28  |  1.4    Ca    8.8      07 Oct 2020 08:11  Phos  3.8     10-07  Mg     1.8     10-07    TPro  6.1  /  Alb  3.8  /  TBili  0.3  /  DBili      /  AST  22  /  ALT  21  /  AlkPhos  130<H>  10-07                          11.6   6.38  )-----------( 378      ( 07 Oct 2020 08:11 )             38.1       Urine Studies:  Urinalysis Basic - ( 06 Oct 2020 14:00 )    Color: Light Yellow / Appearance: Clear / S.013 / pH:   Gluc:  / Ketone: Negative  / Bili: Negative / Urobili: <2 mg/dL   Blood:  / Protein: Trace / Nitrite: Negative   Leuk Esterase: Negative / RBC:  / WBC    Sq Epi:  / Non Sq Epi:  / Bacteria:       Creatinine, Random Urine: 44 mg/dL (10-06 @ 14:00)  Creatinine, Random Urine: 44 mg/dL (10-06 @ 14:00)  Protein/Creatinine Ratio Calculation: 0.3 Ratio (10-06 @ 14:00)  Chloride, Random Urine: 105 (10-06 @ 14:00)  Sodium, Random Urine: 79.0 mmoL/L (10-06 @ 14:00)  Osmolality, Random Urine: 393 mos/kg (10-06 @ 14:00)  Potassium, Random Urine: 32 mmol/L (10-06 @ 14:00)    RADIOLOGY & ADDITIONAL STUDIES:   Nephrology progress note  Patient is seen and examined, events over the last 24 h noted .  lying in bed comfortable  denied chest pain no SOB     Allergies:  No Known Allergies    Hospital Medications:   MEDICATIONS  (STANDING):  albuterol/ipratropium for Nebulization 3 milliLiter(s) Nebulizer every 6 hours  aspirin  chewable 81 milliGRAM(s) Oral daily  atorvastatin 40 milliGRAM(s) Oral at bedtime  budesonide  80 MICROgram(s)/formoterol 4.5 MICROgram(s) Inhaler 2 Puff(s) Inhalation two times a day  dronedarone 400 milliGRAM(s) Oral two times a day  furosemide    Tablet 40 milliGRAM(s) Oral daily  heparin   Injectable 5000 Unit(s) SubCutaneous every 8 hours  levothyroxine 112 MICROGram(s) Oral daily  melatonin 5 milliGRAM(s) Oral at bedtime  methylPREDNISolone sodium succinate Injectable 40 milliGRAM(s) IV Push every 12 hours  metoprolol succinate ER 25 milliGRAM(s) Oral daily  Nephro-billy 1 Tablet(s) Oral daily  pantoprazole    Tablet 40 milliGRAM(s) Oral before breakfast  polyethylene glycol 3350 17 Gram(s) Oral daily        VITALS:  T(F): 96.7 (10-07-20 @ 05:44), Max: 98.6 (10-06-20 @ 14:00)  HR: 91 (10-07-20 @ 05:44)  BP: 119/63 (10-07-20 @ 05:44)  RR: 18 (10-07-20 @ 05:44)  SpO2: 94% (10-07-20 @ 07:45)      10-05 @ 07:01  -  10-06 @ 07:00  --------------------------------------------------------  IN: 354 mL / OUT: 0 mL / NET: 354 mL          PHYSICAL EXAM:  Constitutional: NAD  Neck: No JVD  Respiratory: CTAB,   Cardiovascular: S1, S2, RRR  Gastrointestinal: BS+, soft, NT/ND  Extremities: No cyanosis or clubbing. plus one edema lower ext   :  No santizo.   Skin: No rashes    LABS:  10-07    143  |  100  |  33<H>  ----------------------------<  119<H>  4.0   |  28  |  1.4  Creatinine Trend: 1.4<--, 1.4<--, 1.3<--, 1.2<--, 1.5<--, 1.4<--  Potassium Trend: 4.0<--, 4.4<--, 5.7<--, 5.0<--, 5.1<--  Ca    8.8      07 Oct 2020 08:11  Phos  3.8     10-07  Mg     1.8     10-07    TPro  6.1  /  Alb  3.8  /  TBili  0.3  /  DBili      /  AST  22  /  ALT  21  /  AlkPhos  130<H>  10-07                          11.6   6.38  )-----------( 378      ( 07 Oct 2020 08:11 )             38.1       Urine Studies:  Urinalysis Basic - ( 06 Oct 2020 14:00 )    Color: Light Yellow / Appearance: Clear / S.013 / pH:   Gluc:  / Ketone: Negative  / Bili: Negative / Urobili: <2 mg/dL   Blood:  / Protein: Trace / Nitrite: Negative   Leuk Esterase: Negative / RBC:  / WBC    Sq Epi:  / Non Sq Epi:  / Bacteria:       Creatinine, Random Urine: 44 mg/dL (10-06 @ 14:00)  Creatinine, Random Urine: 44 mg/dL (10-06 @ 14:00)  Protein/Creatinine Ratio Calculation: 0.3 Ratio (10-06 @ 14:00)  Chloride, Random Urine: 105 (10-06 @ 14:00)  Sodium, Random Urine: 79.0 mmoL/L (10-06 @ 14:00)  Osmolality, Random Urine: 393 mos/kg (10-06 @ 14:00)  Potassium, Random Urine: 32 mmol/L (10-06 @ 14:00)    RADIOLOGY & ADDITIONAL STUDIES:

## 2020-10-07 NOTE — DISCHARGE NOTE PROVIDER - NSDCMRMEDTOKEN_GEN_ALL_CORE_FT
albuterol 2.5 mg/3 mL (0.083%) inhalation solution: 3 milliliter(s) inhaled every 6 hours, As Needed for wheezing, shortness of breath  aspirin 81 mg oral tablet, chewable: 1 tab(s) orally once a day  atorvastatin 40 mg oral tablet: 1 tab(s) orally once a day (at bedtime)  dronedarone 400 mg oral tablet: 1 tab(s) orally 2 times a day  furosemide 40 mg oral tablet: 1 tab(s) orally once a day  levothyroxine 112 mcg (0.112 mg) oral tablet: 1 tab(s) orally once a day, take one hour before breakfast (on an empty stomach)  melatonin 5 mg oral tablet: 1 tab(s) orally once a day (at bedtime), As Needed, to help you sleep at night  metoprolol succinate 25 mg oral tablet, extended release: 1 tab(s) orally once a day  pantoprazole 40 mg oral delayed release tablet: 1 tab(s) orally once a day (before a meal); take 30 minutes before breakfast  polyethylene glycol 3350 oral powder for reconstitution: 17 gram(s) orally once a day, As Needed, after dinner  predniSONE 20 mg oral tablet: 2 tab(s) orally once a day for 3 days then 1 tab orally for 3 days then stop   senna oral tablet: take 1 or 2 tab(s) orally once a day (at bedtime), if needed, for constipation  Trelegy Ellipta inhalation powder: 1 puff(s) inhaled once a day

## 2020-10-07 NOTE — PROGRESS NOTE ADULT - SUBJECTIVE AND OBJECTIVE BOX
HOSPITALIST ATTENDING NOTE    NINAJACKIEPAMELATANNA  89y Female  [id number]    INTERVAL HPI/OVERNIGHT EVENTS: feels well - no sob, chest pain, ambulated , mild loose stool sp Kayexalate and mirilax     T(C): 35.9 (10-07-20 @ 05:44), Max: 37 (10-06-20 @ 14:00)  HR: 91 (10-07-20 @ 05:44) (79 - 96)  BP: 119/63 (10-07-20 @ 05:44) (113/55 - 119/63)  RR: 18 (10-07-20 @ 05:44) (17 - 18)  SpO2: 94% (10-07-20 @ 07:45) (92% - 94%)  Wt(kg): --      PHYSICAL EXAM:  GENERAL: NAD  HEAD:  Atraumatic, Normocephalic  EYES: EOMI, PERRLA, conjunctiva and sclera clear  ENMT: No tonsillar erythema, exudates, or enlargement  NECK: Supple, No JVD, Normal thyroid  NERVOUS SYSTEM:  Alert & Oriented X3, Good concentration; Non-focal- Motor Strength 5/5 B/L upper and lower extremities;  CHEST/LUNG: dec bs bilat, no wheeze,   HEART: Regular rate and rhythm; No murmurs, rubs, or gallops  ABDOMEN: Soft, Nontender, Nondistended; Bowel sounds present  EXTREMITIES : + edema bilat    Consultant(s) Notes Reviewed:  [x ] YES  [ ] NO  Care Discussed with Consultants/Other Providers/ Housestaff [ x] YES  [ ] NO    LABS:                        11.6   6.38  )-----------( 378      ( 07 Oct 2020 08:11 )             38.1     10-07    143  |  100  |  33<H>  ----------------------------<  119<H>  4.0   |  28  |  1.4    Ca    8.8      07 Oct 2020 08:11  Phos  3.8     10-07  Mg     1.8     10-07    TPro  6.1  /  Alb  3.8  /  TBili  0.3  /  DBili  x   /  AST  22  /  ALT  21  /  AlkPhos  130<H>  10-07          RADIOLOGY & ADDITIONAL TESTS:    Imaging or report Personally Reviewed:  [ ] YES  [ ] NO    Case discussed with resident    Care discussed with pt/family      HEALTH ISSUES - PROBLEM Dx:

## 2020-10-07 NOTE — DISCHARGE NOTE PROVIDER - CARE PROVIDER_API CALL
Chan, Yeoman K (MD)  Family Medicine  [address] [place] [address], Floor [address]  [place] [address]  Phone: [phone]  Fax: [phone]  Established Patient  Follow Up Time: 2 weeks    Jose Ramon Jsoe)  Critical Care Medicine; Internal Medicine; Pulmonary Disease; Sleep Medicine  [address] [place] [address]  Phone: [phone]  Fax: [phone]  Established Patient  Follow Up Time: 2 weeks    Yony Galvan)  Internal Medicine; Nephrology  [address] [place] [address]  Phone: [phone]  Fax: [phone]  Established Patient  Follow Up Time: 2 weeks   Chan, Yeoman K (MD)  Family Medicine  [address] [place] [address], Floor [address]  [place] [address]  Phone: [phone]  Fax: [phone]  Established Patient  Follow Up Time: 2 weeks    Jose Ramon Jose)  Critical Care Medicine; Internal Medicine; Pulmonary Disease; Sleep Medicine  [address] [place] [address]  Phone: [phone]  Fax: [phone]  Established Patient  Follow Up Time: 2 weeks    Yony Galvan)  Internal Medicine; Nephrology  [address] [place] [address]  Phone: [phone]  Fax: [phone]  Established Patient  Follow Up Time: 2 weeks    [facility] [place]  CARDIOVASCULAR DISEASE  [address] [place] [address], [place] [address]  Phone: [phone]  Fax: [phone]  Established Patient  Follow Up Time: 2 weeks

## 2020-10-07 NOTE — PROGRESS NOTE ADULT - PROVIDER SPECIALTY LIST ADULT
Hospitalist
Infectious Disease
Internal Medicine
Nephrology
Pulmonology
Pulmonology
Internal Medicine

## 2020-10-07 NOTE — DISCHARGE NOTE PROVIDER - HOSPITAL COURSE
This is a 89 year old female who was brought to the ED on 9/28 by her son with watery diarrhea for one day and lethargy. She has a past medical history of COPD on 3.5L of oxygen at home, paroxysmal atrial fibrillation, and hypothyroidism. While in the ED, she received a CT scan of the abdomen that showed pancolitis. She was treated with a full course of Rocephin and Flagyl, and her diarrhea resolved a few days later. On 10/1, she had an acute on chronic COPD exacerbation with hypercapnia. She was treated with oxygen, IV solumedrol, BiPAP, duonebs, budesonide, and albuterol. Her breathing started to improve and her carbon dioxide started to go back down towards normal values. We titrated her oxygen back down to 2.5 L. She also had hyperkalemia while here and was given Kayexalate, which brought her potassium back down to the normal range. During her hospital stay, it was discovered that she had acute kidney injury on top of chronic kidney disease most likely due to dehydration, so she was put on a renal diet and nephrology was consulted. This is a 89 year old female who was brought to the ED on 9/28 by her son with watery diarrhea for one day and lethargy. She has a past medical history of COPD on 3.5L of oxygen at home, paroxysmal atrial fibrillation, and hypothyroidism. While in the ED, she received a CT scan of the abdomen that showed pancolitis. She was treated with a course of Rocephin and Flagyl, and her diarrhea resolved. On 10/1, she had an acute on chronic COPD exacerbation with hypercapnia. She was treated with oxygen, IV solumedrol, BiPAP, duonebs, budesonide, and albuterol. Her breathing started to improve and her carbon dioxide started to go back down towards normal values. We titrated her oxygen back down to 2.5 L. She also had hyperkalemia while here and was given Kayexalate, which brought her potassium back down to the normal range. During her hospital stay, it was discovered that she had acute kidney injury on top of chronic kidney disease most likely due to dehydration, so she was put on a renal diet and nephrology was consulted. This is a 89 year old female who was brought to the ED on 9/28 by her son with watery diarrhea for one day and lethargy. She has a past medical history of COPD on 3.5L of oxygen at home, paroxysmal atrial fibrillation, and hypothyroidism. While in the ED, she received a CT scan of the abdomen that showed pancolitis. She was treated with a course of Rocephin and Flagyl, and her diarrhea resolved. On 10/1, she had an acute on chronic COPD exacerbation with hypercapnia. She was treated with oxygen, IV solumedrol, BiPAP, duonebs, budesonide, and albuterol. Breathing and hypercapnea improved. Oxygen titrated  down to 2.5 L. Pt with MAURI on CKD2, COPD on home O2, admitted with diarrhea, pancolitis, was on Rocephin and FlagylAKI with hyperkalemia, no met acidosis - likely due to prerenal state initially (received IVF for 2 days) creat peaked at 1.7 and is trending down 1.4    #COPD exacerbation  - IV solumedrol 60 mg q12h  - Bipap at night (patient refused overnight)   - currently on 2L NC O2 saturating at 94%.  - c/w symbicort/spiriva  - duonebs q6hrs standing dose   - Pulm following  - increased lasix 40mg (10/6)    # Colitis (Resolved)  - CT evidence of Pancolitis on admission  - abdominal pain - currently resolved  - No fever, leukocytosis, vitals normal  - mild nausea 10/1 -  Zofran ordered x1  - Advanced diet  - avoid fluid overload  - 2 normal BM 10/5, not loose.     # MAURI vs CKD  - baseline SCr ~ 0.9, on admission ~1.7, UA positive for hyaline cast  - SCr = 1.2, BUN= 26, GFR = 39. (10/5), Mg2+ = 1.6.  - avoid nephrotoxic meds  - nephrology consulted - pending  - f/u urine studies  - f/u renal ultrasound      #paroxsymal Atrial fibrillation  - Eliquis was discontinued, patient now only on ASA after discussion with patient and son regarding risks and benefits  - Metoprolol ER 25 PO QD  - Multaq 400 BID  - blood gas arterial lactate 0.8 (10/3)  - EKG f/u      #HLD  - Atorvastatin 40mg po qhs      # Hypothyroidism  - Levothyroxine increased to 112 mcg daily with her increased TSH in August. Will need recheck in 6-8 weeks.      # Macrocytic Anemia  - stable, hgb 9.7 mg/dl today (10/5).  - MCV today 101 (10/5).  - Vit B12 and Folate studies were checked and were normal in August  - B12 1137, folate 18.1 (10/3).    # Hyperkalemia  - as of 10/6, K+ = 5.7.  - one dose kayexalate (10/6), f/u BMP.    # Hypomagnesemia (resolved)  - Mg normal 10/6        This is a 89 year old female who was brought to the ED on 9/28 by her son with watery diarrhea for one day and lethargy. She has a past medical history of COPD on 3.5L of oxygen at home, paroxysmal atrial fibrillation, and hypothyroidism. While in the ED, she received a CT scan of the abdomen that showed pancolitis. She was treated with a course of Rocephin and Flagyl, and her diarrhea resolved. On 10/1, she had an acute on chronic COPD exacerbation with hypercapnia. She was treated with oxygen, IV solumedrol, BiPAP, duonebs, budesonide, and albuterol. Breathing and hypercapnea improved. Oxygen titrated  down to 2.5 L. Pt with MAURI on CKD2, COPD on home O2, admitted with diarrhea, pancolitis, was on Rocephin and FlagylAKI with hyperkalemia, no met acidosis - likely due to prerenal state initially (received IVF for 2 days) creat peaked at 1.7 and is trending down 1.4      #COPD exacerbation/ acute on chronic resp failure resolved   - IV solumedrol 60 mg q12h - seen by pulm - change to po prednisone   - Bipap at night   - currently on 2L NC O2 saturating at 94%. (baseline home o2 level 2-3L)  - c/w home inhalers upon dc   - duonebs q6hrs standing dose   - increased lasix 40mg (10/6)    # Colitis (Resolved)  - CT evidence of Pancolitis on admission  - abdominal pain - currently resolved  - No fever, leukocytosis, vitals normal      # MAURI on  CKD III - due to increased lasix dosing   - baseline SCr ~ 0.9, on admission ~1.7, UA positive for hyaline cast  - scr stale around 1.4  - avoid nephrotoxic meds      #paroxsymal Atrial fibrillation  - Eliquis was discontinued, patient now only on ASA after discussion with patient and son regarding risks and benefits  - Metoprolol ER 25 PO QD  - Multaq 400 BID      # Hypothyroidism  - Levothyroxine increased to 112 mcg daily with her increased TSH in August. Will need recheck in 6-8 weeks.      # Hyperkalemia resolved sp kayexlate    Stable to dc home - has home o2, lives with son Andrzej  home with home care

## 2020-10-07 NOTE — DISCHARGE NOTE PROVIDER - CARE PROVIDERS DIRECT ADDRESSES
,DirectAddress_Unknown,DirectAddress_Unknown,[email or web address] ,DirectAddress_Unknown,DirectAddress_Unknown,[email or web address]

## 2020-10-07 NOTE — PROGRESS NOTE ADULT - ASSESSMENT
#COPD exacerbation/ acute on chronic resp failure resolved   - IV solumedrol 60 mg q12h - seen by pulm - change to po prednisone   - Bipap at night (patient refused overnight)   - currently on 2L NC O2 saturating at 94%. (baseline home o2 level 2-3L_)  - c/w home inhalers upon dc   - duonebs q6hrs standing dose   - increased lasix 40mg (10/6)    # Colitis (Resolved)  - CT evidence of Pancolitis on admission  - abdominal pain - currently resolved  - No fever, leukocytosis, vitals normal      # MAURI on  CKD III - due to increased lasix dosing   - baseline SCr ~ 0.9, on admission ~1.7, UA positive for hyaline cast  -scr stale around 1.4  - avoid nephrotoxic meds      #paroxsymal Atrial fibrillation  - Eliquis was discontinued, patient now only on ASA after discussion with patient and son regarding risks and benefits  - Metoprolol ER 25 PO QD  - Multaq 400 BID      # Hypothyroidism  - Levothyroxine increased to 112 mcg daily with her increased TSH in August. Will need recheck in 6-8 weeks.      # Hyperkalemia resolved sp vivianxlate        stable to dc home - spoke with son deondre over phone - wants to take her home - no SNF - has home o2- will come pick her up   [facility] set up home care  med rec reviwed  pt and son counselled   discussed with team   time spent 35 mins

## 2020-10-07 NOTE — DISCHARGE NOTE PROVIDER - NSDCCPCAREPLAN_GEN_ALL_CORE_FT
PRINCIPAL DISCHARGE DIAGNOSIS  Diagnosis: COPD exacerbation  Assessment and Plan of Treatment: Chronic Obstructive Pulmonary Disease  Chronic obstructive pulmonary disease (COPD) is a lung condition in which airflow from the lungs is limited. Causes include smoking, secondhand smoke exposure, genetics, or recurrent infections. Take all medicines (inhaled or pills) as directed by your health care provider. Avoid exposure to irritants such as smoke, chemicals, and fumes that aggravate your breathing.  If you are a smoker, the most important thing that you can do is stop smoking. Continuing to smoke will cause further lung damage and breathing trouble. Ask your health care provider for help with quitting smoking.  SEEK IMMEDIATE MEDICAL CARE IF YOU HAVE ANY OF THE FOLLOWING SYMPTOMS: shortness of breath at rest or when talking, bluish discoloration of lips, skin, fever, worsening cough, unexplained chest pain, or lightheadedness/dizziness.        SECONDARY DISCHARGE DIAGNOSES  Diagnosis: Colitis  Assessment and Plan of Treatment: You were treated with a course of antibiotics for your abdominal pain and diarrhea which resolved.   Diarrhea is frequent loose or watery bowel movements that has many causes. Diarrhea can make you feel weak and cause you to become dehydrated. Diarrhea typically lasts 2–3 days, but can last longer if it is a sign of something more serious. Drink clear fluids to prevent dehydration. Eat bland, easy-to-digest foods as tolerated.   SEEK IMMEDIATE MEDICAL CARE IF YOU HAVE ANY OF THE FOLLOWING SYMPTOMS: high fevers, lightheadedness/dizziness, chest pain, black or bloody stools, shortness of breath, severe abdominal or back pain, or any signs of dehydration.

## 2020-10-07 NOTE — DISCHARGE NOTE PROVIDER - PROVIDER TOKENS
PROVIDER:[TOKEN:[[id number]],FOLLOWUP:[2 weeks],ESTABLISHEDPATIENT:[T]],PROVIDER:[TOKEN:[[id number]],FOLLOWUP:[2 weeks],ESTABLISHEDPATIENT:[T]],PROVIDER:[TOKEN:[[id number]],FOLLOWUP:[2 weeks],ESTABLISHEDPATIENT:[T]] PROVIDER:[TOKEN:[[id number]],FOLLOWUP:[2 weeks],ESTABLISHEDPATIENT:[T]],PROVIDER:[TOKEN:[[id number]],FOLLOWUP:[2 weeks],ESTABLISHEDPATIENT:[T]],PROVIDER:[TOKEN:[[id number]],FOLLOWUP:[2 weeks],ESTABLISHEDPATIENT:[T]],PROVIDER:[TOKEN:[[id number]],FOLLOWUP:[2 weeks],ESTABLISHEDPATIENT:[T]]

## 2020-10-07 NOTE — PROGRESS NOTE ADULT - SUBJECTIVE AND OBJECTIVE BOX
OVERNIGHT EVENTS: feels better, intermittent cough    Vital Signs Last 24 Hrs  T(C): 35.9 (07 Oct 2020 05:44), Max: 37 (06 Oct 2020 14:00)  T(F): 96.7 (07 Oct 2020 05:44), Max: 98.6 (06 Oct 2020 14:00)  HR: 91 (07 Oct 2020 05:44) (79 - 96)  BP: 119/63 (07 Oct 2020 05:44) (113/55 - 119/63)  RR: 18 (07 Oct 2020 05:44) (17 - 18)  SpO2: 93% (06 Oct 2020 16:37) (90% - 93%)    PHYSICAL EXAMINATION:    GENERAL: The patient is awake and alert in no apparent distress.     HEENT: Head is normocephalic and atraumatic. Extraocular muscles are intact. Mucous membranes are moist.    NECK: Supple.    LUNGS: mild exp wheezing    HEART: Regular rate and rhythm without murmur.    ABDOMEN: Soft, nontender, and nondistended.      EXTREMITIES: Without any cyanosis, clubbing, rash, lesions or edema.    NEUROLOGIC: Grossly intact.    SKIN: No ulceration or induration present.      LABS:                        9.7    4.44  )-----------( 316      ( 06 Oct 2020 06:05 )             32.1     10-06    141  |  104  |  32<H>  ----------------------------<  169<H>  4.4   |  27  |  1.4    Ca    8.5      06 Oct 2020 17:54  Phos  3.4     10-06  Mg     2.1     10-06    TPro  5.5<L>  /  Alb  3.3<L>  /  TBili  <0.2  /  DBili  x   /  AST  21  /  ALT  17  /  AlkPhos  112  10-06      Urinalysis Basic - ( 06 Oct 2020 14:00 )    Color: Light Yellow / Appearance: Clear / S.013 / pH: x  Gluc: x / Ketone: Negative  / Bili: Negative / Urobili: <2 mg/dL   Blood: x / Protein: Trace / Nitrite: Negative   Leuk Esterase: Negative / RBC: x / WBC x   Sq Epi: x / Non Sq Epi: x / Bacteria: x                        10-05-20 @ 07:01  -  10-06-20 @ 07:00  --------------------------------------------------------  IN: 354 mL / OUT: 0 mL / NET: 354 mL        MICROBIOLOGY:      MEDICATIONS  (STANDING):  albuterol/ipratropium for Nebulization 3 milliLiter(s) Nebulizer every 6 hours  aspirin  chewable 81 milliGRAM(s) Oral daily  atorvastatin 40 milliGRAM(s) Oral at bedtime  budesonide  80 MICROgram(s)/formoterol 4.5 MICROgram(s) Inhaler 2 Puff(s) Inhalation two times a day  dronedarone 400 milliGRAM(s) Oral two times a day  furosemide    Tablet 40 milliGRAM(s) Oral daily  heparin   Injectable 5000 Unit(s) SubCutaneous every 8 hours  levothyroxine 112 MICROGram(s) Oral daily  melatonin 5 milliGRAM(s) Oral at bedtime  methylPREDNISolone sodium succinate Injectable 40 milliGRAM(s) IV Push every 12 hours  metoprolol succinate ER 25 milliGRAM(s) Oral daily  Nephro-billy 1 Tablet(s) Oral daily  pantoprazole    Tablet 40 milliGRAM(s) Oral before breakfast  polyethylene glycol 3350 17 Gram(s) Oral daily    MEDICATIONS  (PRN):  acetaminophen   Tablet .. 650 milliGRAM(s) Oral every 6 hours PRN Temp greater or equal to 38C (100.4F), Mild Pain (1 - 3)      RADIOLOGY & ADDITIONAL STUDIES:

## 2020-10-07 NOTE — PROGRESS NOTE ADULT - ASSESSMENT
Impression:    - Severe COPD s/p exacerbation ( cxr reviewed)  - mild CHF  - Pancolitis acute  - s/ p recent ORIF      Plan    - change solumedrol to prednisone 40 daily for 3 days than 20  - Neb as needed  - OOB to chair  - lasix 40 mg po daily  - will follow  - poor prognosis  - pul standpoint OP f/up

## 2020-10-07 NOTE — PROGRESS NOTE ADULT - ASSESSMENT
Pt with MAURI on CKD2, COPD on home O2, admitted with diarrhea, pancolitis, was on Rocephin and Flagyl  MAURI with hyperkalemia, no met acidosis - likely due to prerenal state initially (received IVF for 2 days) creat peaked at 1.7 and is trending down 1.4 mg% today (baseline 0.8 gm%)   continue lasix for now   HYperkalaemia - cont low K diet, last K noted ok   urine electrolytes, creat, UA Fe Na> 1% but patient on lasix   strict Is and os .   will sign off recall if any question please

## 2020-10-15 DIAGNOSIS — N17.9 ACUTE KIDNEY FAILURE, UNSPECIFIED: ICD-10-CM

## 2020-10-15 DIAGNOSIS — E03.9 HYPOTHYROIDISM, UNSPECIFIED: ICD-10-CM

## 2020-10-15 DIAGNOSIS — Z66 DO NOT RESUSCITATE: ICD-10-CM

## 2020-10-15 DIAGNOSIS — J96.11 CHRONIC RESPIRATORY FAILURE WITH HYPOXIA: ICD-10-CM

## 2020-10-15 DIAGNOSIS — K51.00 ULCERATIVE (CHRONIC) PANCOLITIS WITHOUT COMPLICATIONS: ICD-10-CM

## 2020-10-15 DIAGNOSIS — E87.5 HYPERKALEMIA: ICD-10-CM

## 2020-10-15 DIAGNOSIS — J44.1 CHRONIC OBSTRUCTIVE PULMONARY DISEASE WITH (ACUTE) EXACERBATION: ICD-10-CM

## 2020-10-15 DIAGNOSIS — N18.30 CHRONIC KIDNEY DISEASE, STAGE 3 UNSPECIFIED: ICD-10-CM

## 2020-10-15 DIAGNOSIS — K52.9 NONINFECTIVE GASTROENTERITIS AND COLITIS, UNSPECIFIED: ICD-10-CM

## 2020-10-15 DIAGNOSIS — I48.0 PAROXYSMAL ATRIAL FIBRILLATION: ICD-10-CM

## 2020-10-15 DIAGNOSIS — Z79.82 LONG TERM (CURRENT) USE OF ASPIRIN: ICD-10-CM

## 2020-10-15 DIAGNOSIS — Z99.81 DEPENDENCE ON SUPPLEMENTAL OXYGEN: ICD-10-CM

## 2020-10-15 DIAGNOSIS — E86.0 DEHYDRATION: ICD-10-CM

## 2020-10-15 DIAGNOSIS — J96.21 ACUTE AND CHRONIC RESPIRATORY FAILURE WITH HYPOXIA: ICD-10-CM

## 2020-10-15 DIAGNOSIS — D53.9 NUTRITIONAL ANEMIA, UNSPECIFIED: ICD-10-CM

## 2020-10-18 ENCOUNTER — RESULT REVIEW (OUTPATIENT)
Age: 85
End: 2020-10-18

## 2020-10-18 ENCOUNTER — OUTPATIENT (OUTPATIENT)
Dept: OUTPATIENT SERVICES | Facility: HOSPITAL | Age: 85
LOS: 1 days | Discharge: HOME | End: 2020-10-18
Payer: MEDICARE

## 2020-10-18 DIAGNOSIS — R22.43 LOCALIZED SWELLING, MASS AND LUMP, LOWER LIMB, BILATERAL: ICD-10-CM

## 2020-10-18 DIAGNOSIS — R06.00 DYSPNEA, UNSPECIFIED: ICD-10-CM

## 2020-10-18 PROCEDURE — 93970 EXTREMITY STUDY: CPT | Mod: 26

## 2020-11-01 ENCOUNTER — EMERGENCY (EMERGENCY)
Facility: HOSPITAL | Age: 85
LOS: 0 days | Discharge: HOME | End: 2020-11-01
Attending: STUDENT IN AN ORGANIZED HEALTH CARE EDUCATION/TRAINING PROGRAM | Admitting: FAMILY MEDICINE
Payer: MEDICARE

## 2020-11-01 VITALS
SYSTOLIC BLOOD PRESSURE: 116 MMHG | OXYGEN SATURATION: 99 % | TEMPERATURE: 98 F | DIASTOLIC BLOOD PRESSURE: 57 MMHG | HEART RATE: 68 BPM | RESPIRATION RATE: 17 BRPM | HEIGHT: 57 IN

## 2020-11-01 VITALS — DIASTOLIC BLOOD PRESSURE: 53 MMHG | HEART RATE: 73 BPM | SYSTOLIC BLOOD PRESSURE: 113 MMHG

## 2020-11-01 DIAGNOSIS — R19.7 DIARRHEA, UNSPECIFIED: ICD-10-CM

## 2020-11-01 DIAGNOSIS — I12.9 HYPERTENSIVE CHRONIC KIDNEY DISEASE WITH STAGE 1 THROUGH STAGE 4 CHRONIC KIDNEY DISEASE, OR UNSPECIFIED CHRONIC KIDNEY DISEASE: ICD-10-CM

## 2020-11-01 DIAGNOSIS — Z79.82 LONG TERM (CURRENT) USE OF ASPIRIN: ICD-10-CM

## 2020-11-01 DIAGNOSIS — Z79.01 LONG TERM (CURRENT) USE OF ANTICOAGULANTS: ICD-10-CM

## 2020-11-01 DIAGNOSIS — Z79.899 OTHER LONG TERM (CURRENT) DRUG THERAPY: ICD-10-CM

## 2020-11-01 DIAGNOSIS — Z79.51 LONG TERM (CURRENT) USE OF INHALED STEROIDS: ICD-10-CM

## 2020-11-01 DIAGNOSIS — N18.9 CHRONIC KIDNEY DISEASE, UNSPECIFIED: ICD-10-CM

## 2020-11-01 DIAGNOSIS — E78.5 HYPERLIPIDEMIA, UNSPECIFIED: ICD-10-CM

## 2020-11-01 LAB
ALBUMIN SERPL ELPH-MCNC: 3.6 G/DL — SIGNIFICANT CHANGE UP (ref 3.5–5.2)
ALBUMIN SERPL ELPH-MCNC: 3.9 G/DL — SIGNIFICANT CHANGE UP (ref 3.5–5.2)
ALP SERPL-CCNC: 376 U/L — HIGH (ref 30–115)
ALP SERPL-CCNC: 393 U/L — HIGH (ref 30–115)
ALT FLD-CCNC: 116 U/L — HIGH (ref 0–41)
ALT FLD-CCNC: 124 U/L — HIGH (ref 0–41)
ANION GAP SERPL CALC-SCNC: 12 MMOL/L — SIGNIFICANT CHANGE UP (ref 7–14)
ANION GAP SERPL CALC-SCNC: 13 MMOL/L — SIGNIFICANT CHANGE UP (ref 7–14)
APPEARANCE UR: CLEAR — SIGNIFICANT CHANGE UP
AST SERPL-CCNC: 94 U/L — HIGH (ref 0–41)
AST SERPL-CCNC: 98 U/L — HIGH (ref 0–41)
BASOPHILS # BLD AUTO: 0.03 K/UL — SIGNIFICANT CHANGE UP (ref 0–0.2)
BASOPHILS NFR BLD AUTO: 0.5 % — SIGNIFICANT CHANGE UP (ref 0–1)
BILIRUB SERPL-MCNC: 0.3 MG/DL — SIGNIFICANT CHANGE UP (ref 0.2–1.2)
BILIRUB SERPL-MCNC: 0.3 MG/DL — SIGNIFICANT CHANGE UP (ref 0.2–1.2)
BILIRUB UR-MCNC: NEGATIVE — SIGNIFICANT CHANGE UP
BUN SERPL-MCNC: 38 MG/DL — HIGH (ref 10–20)
BUN SERPL-MCNC: 38 MG/DL — HIGH (ref 10–20)
CALCIUM SERPL-MCNC: 9 MG/DL — SIGNIFICANT CHANGE UP (ref 8.5–10.1)
CALCIUM SERPL-MCNC: 9.1 MG/DL — SIGNIFICANT CHANGE UP (ref 8.5–10.1)
CHLORIDE SERPL-SCNC: 93 MMOL/L — LOW (ref 98–110)
CHLORIDE SERPL-SCNC: 94 MMOL/L — LOW (ref 98–110)
CO2 SERPL-SCNC: 31 MMOL/L — SIGNIFICANT CHANGE UP (ref 17–32)
CO2 SERPL-SCNC: 32 MMOL/L — SIGNIFICANT CHANGE UP (ref 17–32)
COLOR SPEC: SIGNIFICANT CHANGE UP
CREAT SERPL-MCNC: 1.3 MG/DL — SIGNIFICANT CHANGE UP (ref 0.7–1.5)
CREAT SERPL-MCNC: 1.4 MG/DL — SIGNIFICANT CHANGE UP (ref 0.7–1.5)
DIFF PNL FLD: NEGATIVE — SIGNIFICANT CHANGE UP
EOSINOPHIL # BLD AUTO: 0.3 K/UL — SIGNIFICANT CHANGE UP (ref 0–0.7)
EOSINOPHIL NFR BLD AUTO: 5.4 % — SIGNIFICANT CHANGE UP (ref 0–8)
GLUCOSE SERPL-MCNC: 100 MG/DL — HIGH (ref 70–99)
GLUCOSE SERPL-MCNC: 89 MG/DL — SIGNIFICANT CHANGE UP (ref 70–99)
GLUCOSE UR QL: NEGATIVE — SIGNIFICANT CHANGE UP
HCT VFR BLD CALC: 32.7 % — LOW (ref 37–47)
HGB BLD-MCNC: 10.2 G/DL — LOW (ref 12–16)
IMM GRANULOCYTES NFR BLD AUTO: 0.4 % — HIGH (ref 0.1–0.3)
KETONES UR-MCNC: NEGATIVE — SIGNIFICANT CHANGE UP
LACTATE SERPL-SCNC: 2 MMOL/L — SIGNIFICANT CHANGE UP (ref 0.7–2)
LEUKOCYTE ESTERASE UR-ACNC: NEGATIVE — SIGNIFICANT CHANGE UP
LIDOCAIN IGE QN: 40 U/L — SIGNIFICANT CHANGE UP (ref 7–60)
LYMPHOCYTES # BLD AUTO: 0.79 K/UL — LOW (ref 1.2–3.4)
LYMPHOCYTES # BLD AUTO: 14.1 % — LOW (ref 20.5–51.1)
MAGNESIUM SERPL-MCNC: 1.8 MG/DL — SIGNIFICANT CHANGE UP (ref 1.8–2.4)
MCHC RBC-ENTMCNC: 31.2 G/DL — LOW (ref 32–37)
MCHC RBC-ENTMCNC: 31.2 PG — HIGH (ref 27–31)
MCV RBC AUTO: 100 FL — HIGH (ref 81–99)
MONOCYTES # BLD AUTO: 0.63 K/UL — HIGH (ref 0.1–0.6)
MONOCYTES NFR BLD AUTO: 11.3 % — HIGH (ref 1.7–9.3)
NEUTROPHILS # BLD AUTO: 3.82 K/UL — SIGNIFICANT CHANGE UP (ref 1.4–6.5)
NEUTROPHILS NFR BLD AUTO: 68.3 % — SIGNIFICANT CHANGE UP (ref 42.2–75.2)
NITRITE UR-MCNC: NEGATIVE — SIGNIFICANT CHANGE UP
NRBC # BLD: 0 /100 WBCS — SIGNIFICANT CHANGE UP (ref 0–0)
PH UR: 6 — SIGNIFICANT CHANGE UP (ref 5–8)
PLATELET # BLD AUTO: 251 K/UL — SIGNIFICANT CHANGE UP (ref 130–400)
POTASSIUM SERPL-MCNC: 4.5 MMOL/L — SIGNIFICANT CHANGE UP (ref 3.5–5)
POTASSIUM SERPL-MCNC: 4.7 MMOL/L — SIGNIFICANT CHANGE UP (ref 3.5–5)
POTASSIUM SERPL-SCNC: 4.5 MMOL/L — SIGNIFICANT CHANGE UP (ref 3.5–5)
POTASSIUM SERPL-SCNC: 4.7 MMOL/L — SIGNIFICANT CHANGE UP (ref 3.5–5)
PROT SERPL-MCNC: 6.3 G/DL — SIGNIFICANT CHANGE UP (ref 6–8)
PROT SERPL-MCNC: 6.5 G/DL — SIGNIFICANT CHANGE UP (ref 6–8)
PROT UR-MCNC: NEGATIVE — SIGNIFICANT CHANGE UP
RBC # BLD: 3.27 M/UL — LOW (ref 4.2–5.4)
RBC # FLD: 15.3 % — HIGH (ref 11.5–14.5)
SODIUM SERPL-SCNC: 136 MMOL/L — SIGNIFICANT CHANGE UP (ref 135–146)
SODIUM SERPL-SCNC: 139 MMOL/L — SIGNIFICANT CHANGE UP (ref 135–146)
SP GR SPEC: 1.01 — SIGNIFICANT CHANGE UP (ref 1.01–1.03)
UROBILINOGEN FLD QL: SIGNIFICANT CHANGE UP
WBC # BLD: 5.59 K/UL — SIGNIFICANT CHANGE UP (ref 4.8–10.8)
WBC # FLD AUTO: 5.59 K/UL — SIGNIFICANT CHANGE UP (ref 4.8–10.8)

## 2020-11-01 PROCEDURE — 74176 CT ABD & PELVIS W/O CONTRAST: CPT | Mod: 26

## 2020-11-01 PROCEDURE — 99284 EMERGENCY DEPT VISIT MOD MDM: CPT

## 2020-11-01 PROCEDURE — 76705 ECHO EXAM OF ABDOMEN: CPT | Mod: 26

## 2020-11-01 NOTE — ED PROVIDER NOTE - PATIENT PORTAL LINK FT
You can access the FollowMyHealth Patient Portal offered by [facility] by registering at the following website: [email or web address]. By joining [facility] [employer]’s FollowMyHealth portal, you will also be able to view your health information using other applications (apps) compatible with our system.

## 2020-11-01 NOTE — ED ADULT NURSE NOTE - NSIMPLEMENTINTERV_GEN_ALL_ED
Implemented All Fall with Harm Risk Interventions:  [place] to call system. Call bell, personal items and telephone within reach. Instruct patient to call for assistance. Room bathroom lighting operational. Non-slip footwear when patient is off stretcher. Physically safe environment: no spills, clutter or unnecessary equipment. Stretcher in lowest position, wheels locked, appropriate side rails in place. Provide visual cue, wrist band, yellow gown, etc. Monitor gait and stability. Monitor for mental status changes and reorient to person, place, and time. Review medications for side effects contributing to fall risk. Reinforce activity limits and safety measures with patient and family. Provide visual clues: red socks.

## 2020-11-01 NOTE — ED PROVIDER NOTE - PHYSICAL EXAMINATION
CONST: NAD  EYES: Sclera and conjunctiva clear.   ENT: No nasal discharge. Oropharynx normal appearing  NECK: Non-tender, no meningeal signs. normal ROM. supple   CARD: S1 S2; No jvd  RESP: Equal BS B/L, No wheezes, rhonchi or rales. No distress  GI: Soft, non-tender, non-distended. no cva tenderness. normal BS  MS: Normal ROM in all extremities. pulses 2 +. no calf tenderness or swelling  SKIN: Warm, dry, no acute rashes. Good turgor  NEURO: A&Ox3

## 2020-11-01 NOTE — ED PROVIDER NOTE - CLINICAL SUMMARY MEDICAL DECISION MAKING FREE TEXT BOX
89 yr old f that presents with diarrhea, imaging, labs obtained. on ultrasound CBD, noted to be 9 mm (upper limit of normal for age). pt not tender on exam, no diarrhea or vomiting in the ed. pt not in any distress. pt instructed to follow up with GI and also given strict return precautions. pt also informed of all findings on ct and labs. pt and son agreed with plan and verbalized understanding.

## 2020-11-01 NOTE — ED PROVIDER NOTE - NSFOLLOWUPINSTRUCTIONS_ED_ALL_ED_FT
Follow up with PMD and GI in 1-2 days.    Diarrhea    Diarrhea is frequent loose or watery bowel movements that has many causes. Diarrhea can make you feel weak and cause you to become dehydrated. Diarrhea typically lasts 2–3 days, but can last longer if it is a sign of something more serious. Drink clear fluids to prevent dehydration. Eat bland, easy-to-digest foods as tolerated.     SEEK IMMEDIATE MEDICAL CARE IF YOU HAVE ANY OF THE FOLLOWING SYMPTOMS: high fevers, lightheadedness/dizziness, chest pain, black or bloody stools, shortness of breath, severe abdominal or back pain, or any signs of dehydration.

## 2020-11-01 NOTE — ED PROVIDER NOTE - OBJECTIVE STATEMENT
89y F pmh copd, hld, htn, hypothyroid, ckd presents for eval of diarrhea. Pt has had 6 episodes of nonbloody diarrhea since last night. Pt had similar episode x6wks ago related to colitis. Pt denies pain, fever, ha, cp, sob, weakness, numbness, dysuria, hematuria, n/v

## 2020-11-01 NOTE — ED ADULT TRIAGE NOTE - CHIEF COMPLAINT QUOTE
Pt with hx of colitis complaining of diarrhea since last night. Pt denies abdominal pain. Pt on home 02 for COPD.

## 2020-11-01 NOTE — ED PROVIDER NOTE - NS ED ROS FT
Constitutional: (-) fever  Eyes/ENT: (-) blurry vision, (-) epistaxis  Cardiovascular: (-) chest pain, (-) syncope  Respiratory: (-) cough, (-) shortness of breath  Gastrointestinal: (+) diarrhea, (-) vomiting  : (-) dysuria, (-) hematuria  Musculoskeletal: (-) neck pain, (-) back pain, (-) joint pain  Integumentary: (-) rash, (-) edema  Neurological: (-) headache, (-) altered mental status  Allergic/Immunologic: (-) pruritus

## 2020-11-01 NOTE — ED PROVIDER NOTE - CARE PROVIDER_API CALL
Lazara Amaro (MD)  Gastroenterology  [address] [place] [address]  Phone: [phone]  Fax: [phone]  Follow Up Time:

## 2020-11-01 NOTE — ED ADULT NURSE NOTE - PMH
Colitis    COPD (chronic obstructive pulmonary disease)    High cholesterol    HTN (hypertension)    Hypothyroid     CKD (chronic kidney disease)    Colitis    COPD (chronic obstructive pulmonary disease)    High cholesterol    HTN (hypertension)    Hypothyroid

## 2020-11-01 NOTE — ED PROVIDER NOTE - PMH
Colitis    COPD (chronic obstructive pulmonary disease)    High cholesterol    HTN (hypertension)    Hypothyroid

## 2020-11-01 NOTE — ED PROVIDER NOTE - ATTENDING CONTRIBUTION TO CARE
89y F pmh copd, hld, htn, hypothyroid, ckd presents for eval of diarrhea. As per son and pt, pt has had ~ 6 episodes of diarrhea since yesterday. Pt and son both deny any blood in the stool. Pt also denies any other medical complaints.   Pt denies any fevers, chills, nausea, vomiting, chest pain, SOB, or any other complaints.     Review of Systems    Constitutional: (-) fever  Cardiovascular: (-) chest pain, (-) syncope  Respiratory: (-) cough, (-) shortness of breath  Gastrointestinal: (-) vomiting, (+) diarrhea, (-) abdominal pain  Musculoskeletal: (-) neck pain, (-) back pain, (-) joint pain  Integumentary: (-) rash, (-) edema  Neurological: (-) headache, (-) altered mental status    Except as documented in the HPI, all other systems are negative.    VITAL SIGNS: I have reviewed nursing notes and confirm.  CONSTITUTIONAL: non-toxic, well appearing  SKIN: no rash, no petechiae.  EYES: PERRL, EOMI, pink conjunctiva, anicteric  ENT: tongue midline, no exudates, MMM  NECK: Supple; no meningismus, no JVD  CARD: RRR, no murmurs, equal radial pulses bilaterally 2+  RESP: CTAB, no respiratory distress  ABD: Soft, non-tender, non-distended, no peritoneal signs, no HSM, no CVA tenderness  EXT: Normal ROM x4. No edema. No calves tenderness  NEURO: Alert, oriented. CN2-12 intact, equal strength bilaterally.  PSYCH: Cooperative, appropriate.    a/p  89 yr old f that presents with diarrhea, no abdominal pain, fevers, chills or decrease in PO   -labs  -ct w/o (due to pmh significant for CKD)  -ua  -dispo as per above

## 2020-11-01 NOTE — ED ADULT NURSE NOTE - OBJECTIVE STATEMENT
pt presents to ED from home c/o 8 episodes of diarrhea since last night, dx with colitis x 1 month ago. Denies abdominal pain, denies n/v, denies fevers or chills. Denies blood in stool.

## 2020-11-02 LAB
CULTURE RESULTS: SIGNIFICANT CHANGE UP
SPECIMEN SOURCE: SIGNIFICANT CHANGE UP

## 2020-11-04 ENCOUNTER — EMERGENCY (EMERGENCY)
Facility: HOSPITAL | Age: 85
LOS: 0 days | Discharge: HOME | End: 2020-11-04
Attending: STUDENT IN AN ORGANIZED HEALTH CARE EDUCATION/TRAINING PROGRAM | Admitting: STUDENT IN AN ORGANIZED HEALTH CARE EDUCATION/TRAINING PROGRAM
Payer: MEDICARE

## 2020-11-04 VITALS
RESPIRATION RATE: 18 BRPM | OXYGEN SATURATION: 92 % | TEMPERATURE: 98 F | HEART RATE: 74 BPM | DIASTOLIC BLOOD PRESSURE: 63 MMHG | HEIGHT: 57 IN | SYSTOLIC BLOOD PRESSURE: 131 MMHG | WEIGHT: 106.04 LBS

## 2020-11-04 DIAGNOSIS — I12.9 HYPERTENSIVE CHRONIC KIDNEY DISEASE WITH STAGE 1 THROUGH STAGE 4 CHRONIC KIDNEY DISEASE, OR UNSPECIFIED CHRONIC KIDNEY DISEASE: ICD-10-CM

## 2020-11-04 DIAGNOSIS — J44.9 CHRONIC OBSTRUCTIVE PULMONARY DISEASE, UNSPECIFIED: ICD-10-CM

## 2020-11-04 DIAGNOSIS — R19.7 DIARRHEA, UNSPECIFIED: ICD-10-CM

## 2020-11-04 DIAGNOSIS — N18.9 CHRONIC KIDNEY DISEASE, UNSPECIFIED: ICD-10-CM

## 2020-11-04 DIAGNOSIS — E78.5 HYPERLIPIDEMIA, UNSPECIFIED: ICD-10-CM

## 2020-11-04 DIAGNOSIS — E03.9 HYPOTHYROIDISM, UNSPECIFIED: ICD-10-CM

## 2020-11-04 PROBLEM — E78.00 PURE HYPERCHOLESTEROLEMIA, UNSPECIFIED: Chronic | Status: ACTIVE | Noted: 2020-11-01

## 2020-11-04 PROBLEM — I10 ESSENTIAL (PRIMARY) HYPERTENSION: Chronic | Status: ACTIVE | Noted: 2020-11-01

## 2020-11-04 PROBLEM — K52.9 NONINFECTIVE GASTROENTERITIS AND COLITIS, UNSPECIFIED: Chronic | Status: ACTIVE | Noted: 2020-11-01

## 2020-11-04 LAB
ALBUMIN SERPL ELPH-MCNC: 4 G/DL — SIGNIFICANT CHANGE UP (ref 3.5–5.2)
ALP SERPL-CCNC: 357 U/L — HIGH (ref 30–115)
ALT FLD-CCNC: 95 U/L — HIGH (ref 0–41)
ANION GAP SERPL CALC-SCNC: 10 MMOL/L — SIGNIFICANT CHANGE UP (ref 7–14)
APPEARANCE UR: CLEAR — SIGNIFICANT CHANGE UP
AST SERPL-CCNC: 78 U/L — HIGH (ref 0–41)
BASOPHILS # BLD AUTO: 0.04 K/UL — SIGNIFICANT CHANGE UP (ref 0–0.2)
BASOPHILS NFR BLD AUTO: 0.9 % — SIGNIFICANT CHANGE UP (ref 0–1)
BILIRUB DIRECT SERPL-MCNC: <0.2 MG/DL — SIGNIFICANT CHANGE UP (ref 0–0.2)
BILIRUB INDIRECT FLD-MCNC: >0.2 MG/DL — SIGNIFICANT CHANGE UP (ref 0.2–1.2)
BILIRUB SERPL-MCNC: 0.4 MG/DL — SIGNIFICANT CHANGE UP (ref 0.2–1.2)
BILIRUB UR-MCNC: NEGATIVE — SIGNIFICANT CHANGE UP
BUN SERPL-MCNC: 25 MG/DL — HIGH (ref 10–20)
CALCIUM SERPL-MCNC: 9.3 MG/DL — SIGNIFICANT CHANGE UP (ref 8.5–10.1)
CHLORIDE SERPL-SCNC: 96 MMOL/L — LOW (ref 98–110)
CO2 SERPL-SCNC: 34 MMOL/L — HIGH (ref 17–32)
COLOR SPEC: SIGNIFICANT CHANGE UP
CREAT SERPL-MCNC: 1.1 MG/DL — SIGNIFICANT CHANGE UP (ref 0.7–1.5)
DIFF PNL FLD: NEGATIVE — SIGNIFICANT CHANGE UP
EOSINOPHIL # BLD AUTO: 0.4 K/UL — SIGNIFICANT CHANGE UP (ref 0–0.7)
EOSINOPHIL NFR BLD AUTO: 9.3 % — HIGH (ref 0–8)
GLUCOSE SERPL-MCNC: 82 MG/DL — SIGNIFICANT CHANGE UP (ref 70–99)
GLUCOSE UR QL: NEGATIVE — SIGNIFICANT CHANGE UP
HCT VFR BLD CALC: 35.2 % — LOW (ref 37–47)
HGB BLD-MCNC: 10.8 G/DL — LOW (ref 12–16)
IMM GRANULOCYTES NFR BLD AUTO: 0.2 % — SIGNIFICANT CHANGE UP (ref 0.1–0.3)
KETONES UR-MCNC: NEGATIVE — SIGNIFICANT CHANGE UP
LACTATE SERPL-SCNC: 0.6 MMOL/L — LOW (ref 0.7–2)
LEUKOCYTE ESTERASE UR-ACNC: NEGATIVE — SIGNIFICANT CHANGE UP
LIDOCAIN IGE QN: 42 U/L — SIGNIFICANT CHANGE UP (ref 7–60)
LYMPHOCYTES # BLD AUTO: 0.92 K/UL — LOW (ref 1.2–3.4)
LYMPHOCYTES # BLD AUTO: 21.3 % — SIGNIFICANT CHANGE UP (ref 20.5–51.1)
MAGNESIUM SERPL-MCNC: 1.6 MG/DL — LOW (ref 1.8–2.4)
MCHC RBC-ENTMCNC: 30.7 G/DL — LOW (ref 32–37)
MCHC RBC-ENTMCNC: 31.1 PG — HIGH (ref 27–31)
MCV RBC AUTO: 101.4 FL — HIGH (ref 81–99)
MONOCYTES # BLD AUTO: 0.47 K/UL — SIGNIFICANT CHANGE UP (ref 0.1–0.6)
MONOCYTES NFR BLD AUTO: 10.9 % — HIGH (ref 1.7–9.3)
NEUTROPHILS # BLD AUTO: 2.48 K/UL — SIGNIFICANT CHANGE UP (ref 1.4–6.5)
NEUTROPHILS NFR BLD AUTO: 57.4 % — SIGNIFICANT CHANGE UP (ref 42.2–75.2)
NITRITE UR-MCNC: NEGATIVE — SIGNIFICANT CHANGE UP
NRBC # BLD: 0 /100 WBCS — SIGNIFICANT CHANGE UP (ref 0–0)
PH UR: 6.5 — SIGNIFICANT CHANGE UP (ref 5–8)
PLATELET # BLD AUTO: 295 K/UL — SIGNIFICANT CHANGE UP (ref 130–400)
POTASSIUM SERPL-MCNC: 4.1 MMOL/L — SIGNIFICANT CHANGE UP (ref 3.5–5)
POTASSIUM SERPL-SCNC: 4.1 MMOL/L — SIGNIFICANT CHANGE UP (ref 3.5–5)
PROT SERPL-MCNC: 6.6 G/DL — SIGNIFICANT CHANGE UP (ref 6–8)
PROT UR-MCNC: SIGNIFICANT CHANGE UP
RBC # BLD: 3.47 M/UL — LOW (ref 4.2–5.4)
RBC # FLD: 15.1 % — HIGH (ref 11.5–14.5)
SODIUM SERPL-SCNC: 140 MMOL/L — SIGNIFICANT CHANGE UP (ref 135–146)
SP GR SPEC: 1.01 — SIGNIFICANT CHANGE UP (ref 1.01–1.03)
UROBILINOGEN FLD QL: SIGNIFICANT CHANGE UP
WBC # BLD: 4.32 K/UL — LOW (ref 4.8–10.8)
WBC # FLD AUTO: 4.32 K/UL — LOW (ref 4.8–10.8)

## 2020-11-04 PROCEDURE — 99284 EMERGENCY DEPT VISIT MOD MDM: CPT

## 2020-11-04 PROCEDURE — 74176 CT ABD & PELVIS W/O CONTRAST: CPT | Mod: 26

## 2020-11-04 RX ORDER — MAGNESIUM SULFATE 500 MG/ML
2 VIAL (ML) INJECTION ONCE
Refills: 0 | Status: COMPLETED | OUTPATIENT
Start: 2020-11-04 | End: 2020-11-04

## 2020-11-04 RX ORDER — IOHEXOL 300 MG/ML
30 INJECTION, SOLUTION INTRAVENOUS ONCE
Refills: 0 | Status: COMPLETED | OUTPATIENT
Start: 2020-11-04 | End: 2020-11-04

## 2020-11-04 RX ADMIN — IOHEXOL 30 MILLILITER(S): 300 INJECTION, SOLUTION INTRAVENOUS at 08:00

## 2020-11-04 RX ADMIN — Medication 30 MILLILITER(S): at 08:00

## 2020-11-04 RX ADMIN — Medication 50 GRAM(S): at 09:22

## 2020-11-04 NOTE — ED ADULT NURSE NOTE - OBJECTIVE STATEMENT
89 y/o female came in for diarrhea for 2 weeks. pt denies any n/v/fever. pt denies any bloody stools.

## 2020-11-04 NOTE — ED ADULT NURSE NOTE - NSSUHOSCREENINGYN_ED_ALL_ED
Type of surgery: Laparoscopic bilateral inguinal hernia repair  Location of surgery: [place] [facility]  Date and time of surgery: 6/12/18 @ 0730  Surgeon: Dr Gadiel Roblero  Pre-Op Appt Date: TBD   Post-Op Appt Date: TBD   Packet sent out: Yes  Pre-cert/Authorization completed:  Not Applicable  Date: 4/11/18    Cha Cabrera MA       Yes - the patient is able to be screened

## 2020-11-04 NOTE — ED ADULT NURSE NOTE - SUICIDE SCREENING QUESTION 2
"Geriatric Progress Note:  6/20/2019  Chief Complaint: Traumatic Subarachnoid Hemorrhage following syncope while on apixaban and plavix.  New onset aphasia    S: Broca's aphasia persists but modestly improved. Comprehension much improved compared with yesterday. Delirium symptoms describes in yesterday's note no longer present. Patient able to understand, retain attention, responding to questions appropriately although with difficulty. LOC also appears stable now as well.     Review of MRI Brain revealed scattered punctate infarcts, more old in nature, with acute stroke in Broca's vicinity - and significant infarcts in cerebellum R>L (chronic). Discussed results with family and patient. Patient initially asked \"Am I going to die\" and later said that she does not want to live like this and would rather die. Prolonged discussion about rehab potential and the real possibility of significant recovery of speech over time. Described most improvement seen within first 3 months, some more by the 6 month point, and some have further mild improvements over next 1-2 years. Following discussion, patient described that she would like to fight and push herself to 'relearn' how to speak.     Patient intermittently emotional, at other times, playful with staff. No fevers in last couple of days. No other complaints. Patient eager to discharge hospital or at least get outside of room / go outside. Very excited about being given the time to go outside for fresh air / sunlight today with RN and wheelchair.    ROS: a 14 pt ROS was negative other than as stated above.     O:/70   Pulse (!) 56   Temp 36.4 °C (97.6 °F) (Temporal)   Resp 18   Ht 1.791 m (5' 10.5\")   Wt 81.6 kg (179 lb 14.3 oz)   SpO2 93%   Breastfeeding? No   BMI 25.45 kg/m²   General: Appears alert, behavior more appropriate than yesterday (meaning no longer flirtatious), r  Psychiatric: esponding appropriately, occasionally tearful given situation, " occasionally frustrated trying to pronounce certain portions of sentences.  Patient appreciative of daughters. Oriented to June 20th and that daughter's birthday is in two days. Much more attentive, much less fluctuation, organized thoughts although difficulty expressing some words but moderately improved compared with yesterday's expressive aphasia. Was able to spell her first name and name both daughters (also improved)    CV: RRR, with ectopy, 2+ left radial pulse - HR approx 60-65 BPM.   Lungs: normal effort, dry cough no longer present  Delirium screen: Much resolved, as above.Perhaps slight appreciation of inattention, disorganized thought but difficult to say given broca's.    Neuro: [id number]=43/15 (15/15 if taking account Broca's deficit). right lower facial droop (chronic), Expressive aphasia- receptive seems intact. Responded to questions appropriately. New very slight right sided pronator drift, and bicep strength on right was 4+/5 compared with 5/5 on left side (in other words, I was able to break bicep force on right compared to left). Hip flexors bilaterally 5/5.     Pupils 3mm bilaterally, EOM grossly intact. Moved to command.  Did 3 point instructions read on paper without any prompts. Gait and cerebellum not assessed.    Ext: arthric changes noted to hands bilaterally, well groomed.       Labs/Imaging/EKG: mild hypokalemia persists requiring daily replacement.  IV MgS provided yesterday.     U/A many bateria, WBC 20-50 and leukocyte Esterase - moderate.    (GFR 58 today, Cr 0.93)    MRI Brain June 19 5pm:     FINDINGS:    There are multifocal punctate areas of increased T2 and diffusion-weighted signal intensity near the vertex in the frontal parietal regions also in the cerebellar hemispheres inferiorly right greater than left there is subtle increased T2 and   diffusion-weighted signal intensity in the left posterior parasylvian region and adjacent temporal parietal regions. There is also a punctate  focus of acute infarction in the left occipital pole region.    There is also increased T2 signal intensity in the sulci in a posterior temporal and occipital regions as well as the sylvian fissures left greater than right    The calvariae are normal.  There is a minimal curvilinear region of increased T2 signal intensity in the subdural space adjacent to the right superior lateral frontal lobe    The ventricular system is mild to moderately prominent. There is mild-to-moderate prominence of the cortical sulci and gyri.  There are punctate, patchy , and confluent areas of increased T2 signal intensity in the periventricular and juxtacortical white   matter.  There are no mass effects or shift of midline structures.    The brainstem and posterior fossa structures are unremarkable.    Vascular flow voids in the carotid and vertebrobasilar arteries, [place], and dural venous sinuses are intact.    The paranasal sinuses and mastoid air cells are free of mucosal inflammatory change.     Impression         1. Age-related cerebral atrophy.    2. Moderate periventricular and juxtacortical white matter changes consistent with chronic microvascular ischemic gliosis.    3. Scattered multifocal punctate areas of deep white matter infarction involving the frontal parietal regions near the vertex and cerebellar hemispheres inferiorly right greater than left. Also there is a punctate area of infarction in the left occipital   pole region.    4. Subtle recent moderate-sized gyriform area of infarction involving the left posterior parasylvian region and adjacent temporal parietal region.    5. Diffuse acute subarachnoid hemorrhage.    6. Minimal acute subdural hematoma adjacent to the right superior lateral frontal lobe.   Reading Provider Reading Date   Ghulam Ordaz M.D. Jun 20, 2019       U/S carotids (select portion of report) :  Incidental finding: Vertebral artery lies outside of vertebrae until the    mid-distal  segment. Antegrade flow is observed. Dampened, slightly resistive flow is observed in the vertebral artery    consistent with a possible more distal obstruction.    CTA Head (not neck) (select portion of report) : The posterior circulation shows the distal vertebral arteries to be patent. The vertebrobasilar confluence is intact. The basilar artery is patent. No aneurysm or occlusive lesion is evident.      Assessment: 78 y/o F with recent PE and MI s/p 2 stents here after having syncope and resultant TBI / tSAH. Cause of delirium in last 2 days likely 2/2 stroke and perhaps addition of probable UTI with catheter.     Plan:     Acute Stroke and numerous chronic punctate and larger cerebellar strokes  Patient had been off apixaban close to a week. Restarted two mornings ago which then followed first episode of aphasia late that afternoon and apixaban was held that evening. Apixaban restarted next morning. Patient currently on apixaban and plavix.  Discussion with radiology suggested these acute and remote (vs subacute) strokes appear related to AF but cannot say for certain.   - Discussion with primary team - Neurology consultation   - Reassessment by [facility] re[facility]   - Continue apixaban - neuro to consider whether other strokes may have occurred while on apixaban over last 6 weeks prior to admission (on it for unprovoked PE).    Syncope  -unclear etiology, concern for cardiac and CNS, cardiology feels unlikely cause of syncope without EKG evidence of significant enough block.    ECHO with EF of 40% which is worse with ectopy.   - orthostatic hypotension had been occurring.  - Radiologist stated CTA neck could be done but does not think it would  and unlikely to be abnormal apart from what U/S Doppler and CTA head showed.  - EEG nonspecific  - Given MRI findings, certainly at very least increases risk of syncope or fall given cerebellar infarcts.   -Cardio wanting stress test before transfer to  rehab.    Orthostatic hypotension  -Continue fluid intake / PO diet without restricting salt intake   - consider TSH, t4, AM cortisol if orthostasis continues.    Delirium  - Resolved, though leukocytosis with Temp spike two days ago and yesterday repeated U/A was positive (with sx of urinary retention)  - Placed Urine Culture. Primary team initiated Nitrofurantoin   - Patient did not take the prescribed Cefdinir on discharge at [facility]. Symptoms per patient resolved when she was home and nervous to take medication given sulfa allergy.   - blood cultures currently are negative.  - Leukocytes still persist though slightly lower again.   - CXR negative.      Prolong QTc  Depression  Anxiety  -improved QTc (manually <440 - ignore automatic reading given PVCs may falsely elevate)  -restart 40 cymbalta tomorrow June 21 2019    Traumatic SAH  -prophylactic keppra x7 days per neurosurgery (completed)  - back on clopidogrel and apixaban  - EEG did not show epileptiform activity    R facial droop    -not new per patient   - MRI brain shows numerous punctate infarcts that could explain     Recent PE  Recent MI  - monitor HR with metoprolol now on board.  - Continue plavix and apixaban  - Monitor telemetry.  - Unknown cause of unprovoked PE (hx of heavy smoking use and weight loss 25-30 lbs)     Hypokalemia with borderline HypoMg  - K supplement likely needed.  Monitor Mg (3g IV MgS) given yesterday.      Mobility:  As discussed with team and RN, please have patient on chair / up with meals and when possible, please provide patient some time to get fresh air outside. Mobilize with walker as soon as felt to be safe.    Continue delirium precautions.     Thank you for including us in the care of this patient. We will follow along tomorrow.     Jose Daniel Gayle M.D.  Geriatric Fellow  Can be reached at extension: [phone]  Monday - Friday 8-5     No

## 2020-11-04 NOTE — ED PROVIDER NOTE - ATTENDING CONTRIBUTION TO CARE
89y F pmh copd on 3L, hld, htn, hypothyroid, ckd presents with diarrhea. pt had similar episode in sept and was dx w/ colitis. pt was in ED 3 days ago for diarrhea for same diarrhea.  no black/bloody stools. no fever/chills. no nausea/vomiting. pt eating/drinking well.  pt eats same food as son who does not have sx. no urinary complaints.    vss  gen- NAD, aaox3  card-rrr  lungs-ctab, no wheezing or rhonchi  abd-sntnd, no guarding or rebound  neuro- full str/sensation, cn ii-xii grossly intact, normal coordination    will get screening labs, stool samples  assess for brittney/lyte abnormality, r/o cdiff

## 2020-11-04 NOTE — ED PROVIDER NOTE - PATIENT PORTAL LINK FT
You can access the FollowMyHealth Patient Portal offered by [facility] by registering at the following website: [email or web address]. By joining [employer]’s FollowMyHealth portal, you will also be able to view your health information using other applications (apps) compatible with our system.

## 2020-11-04 NOTE — ED PROVIDER NOTE - PROGRESS NOTE DETAILS
pt has not had diarrhea during ed visit. I checked pt diapers. unable to obtain stool sample. rx maalox. discussed radiology and lab results with pt and son at bedside. advised of return precautions. advised to f/u with dr. figueroa. agreeable to dc. pt has not had diarrhea, or any bowel movement during ed visit. I checked pt diapers. no diarrhea. unable to obtain stool sample. rx maalox. discussed radiology and lab results with pt and son at bedside. advised of return precautions. advised to f/u with dr. figueroa. agreeable to dc.

## 2020-11-04 NOTE — ED PROVIDER NOTE - CLINICAL SUMMARY MEDICAL DECISION MAKING FREE TEXT BOX
repeat w/u benign. labs improving. ctap w/ oral con w/o acute findings. no significant diarrhea in ED. will dc w/ supportive care measures

## 2020-11-04 NOTE — ED PROVIDER NOTE - PHYSICAL EXAMINATION
Physical Exam    Vital Signs: I have reviewed the initial vital signs.  Constitutional: well-nourished, appears stated age, no acute distress  Eyes: Conjunctiva pink, Sclera clear  Cardiovascular: S1 and S2, regular rate, regular rhythm, well-perfused extremities, radial pulses equal and 2+ b/l.   Respiratory: unlabored respiratory effort, clear to auscultation bilaterally no wheezing, rales and rhonchi. pt is speaking full sentences. no accessory muscle use.   Gastrointestinal: soft, non-tender, nondistended abdomen, no pulsatile mass, normal bowl sounds, no rebound, no guarding, negative psoas, negative obturator, negative murphys. no cva tenderness.   Musculoskeletal: supple neck, no lower extremity edema, no calf tenderness, no midline tenderness, no palpable spinal step offs  Integumentary: warm, dry, no rash  Neurologic: awake, alert  Psychiatric: appropriate mood, appropriate affect

## 2020-11-04 NOTE — ED PROVIDER NOTE - CARE PROVIDER_API CALL
Lazara Amaro (MD)  Gastroenterology  [address] [place] [address]  Phone: [phone]  Fax: [phone]  Follow Up Time: 1-3 Days

## 2020-11-04 NOTE — ED ADULT NURSE NOTE - PMH
CKD (chronic kidney disease)    Colitis    COPD (chronic obstructive pulmonary disease)    High cholesterol    HTN (hypertension)    Hypothyroid

## 2020-11-04 NOTE — ED PROVIDER NOTE - NS ED ROS FT
CONST: No fever, chills or bodyaches  EYES: No pain, redness, drainage or visual changes.  ENT: No ear pain or discharge, nasal discharge or congestion. No sore throat  CARD: No chest pain, palpitations  RESP: No SOB, cough, hemoptysis. No hx of asthma or COPD  GI: No abdominal pain, n/v. (+) nonbloody diarrhea.   : No urinary symptoms  MS: No joint pain, back pain or extremity pain/injury  SKIN: No rashes  NEURO: No headache, dizziness, paresthesias or LOC

## 2020-11-04 NOTE — ED PROVIDER NOTE - OBJECTIVE STATEMENT
No significant past surgical history 89 y/o female with a PMH of COPD on 3L or O2, hypothyroidism, HTN, HLD, CKD, JORDON, and colitis presents to the ED for evaluation of intermittent nonbloody diarrhea x 2 weeks. as per son at bedside, pt was seen in the ED 09/28/2020 and admitted for colitis and MAURI, then seen in the ED 11/01/2020 for diarrhea, CT without contrast and d/c home with gi follow. as per son, pt never made an appt with gi dr. figueroa because diarrhea resolved for 5 days; however returned this morning. pt has had 3 episodes of nonbloody diarrhea. pt reports she get an intermittent cramping feeling in his lower abdomen then has an episode of diarrhea. pt lives at home with son, and they eat the same food, son is asymptomatic. pt denies fever, blood in the stool, dark stools, chills, abdominal pain, nausea, vomiting, burning or pain with urination, blood in the urine, chest pain, sob, recent trauma, recent abx use, use of laxatives, sb2sawv colonscopy, headache, dizziness, or weakness.

## 2021-03-03 NOTE — ED PROVIDER NOTE - NS ED MD DISPO DIVISION
From: Margo Pardo  To:  Mustapha Abebe MD  Sent: 3/3/2021 4:13 PM CST  Subject: Test Results Question    I have a question about URINE CULTURE, ROUTINE resulted on 3/3/21, 3:14 PM.    I visited the ER in [place] because I was vomiting blood (due
[facility]

## 2021-03-23 ENCOUNTER — NON-APPOINTMENT (OUTPATIENT)
Age: 86
End: 2021-03-23

## 2021-03-23 DIAGNOSIS — Z87.891 PERSONAL HISTORY OF NICOTINE DEPENDENCE: ICD-10-CM

## 2021-04-19 ENCOUNTER — APPOINTMENT (OUTPATIENT)
Dept: PULMONOLOGY | Facility: CLINIC | Age: 86
End: 2021-04-19
Payer: MEDICARE

## 2021-04-19 VITALS
DIASTOLIC BLOOD PRESSURE: 70 MMHG | BODY MASS INDEX: 20.96 KG/M2 | HEIGHT: 59 IN | WEIGHT: 104 LBS | SYSTOLIC BLOOD PRESSURE: 120 MMHG

## 2021-04-19 PROCEDURE — 99213 OFFICE O/P EST LOW 20 MIN: CPT

## 2021-04-19 NOTE — PHYSICAL EXAM
[No Acute Distress] : no acute distress [Normal Oropharynx] : normal oropharynx [Normal Appearance] : normal appearance [No Neck Mass] : no neck mass [Normal Rate/Rhythm] : normal rate/rhythm [Normal S1, S2] : normal s1, s2 [No Murmurs] : no murmurs [Benign] : benign [Normal Gait] : normal gait [No Clubbing] : no clubbing [No Cyanosis] : no cyanosis [No Edema] : no edema [FROM] : FROM [Normal Color/ Pigmentation] : normal color/ pigmentation [No Focal Deficits] : no focal deficits [Oriented x3] : oriented x3 [Normal Affect] : normal affect [TextBox_68] : DEC BOTH BASES

## 2021-04-28 NOTE — DISCHARGE NOTE NURSING/CASE MANAGEMENT/SOCIAL WORK - NSDCPETBCESMAN_GEN_ALL_CORE
If you are a smoker, it is important for your health to stop smoking. Please be aware that second hand smoke is also harmful.
Negative

## 2021-06-09 NOTE — ED PROVIDER NOTE - DISPOSITION TYPE
You can access the FollowMyHealth Patient Portal offered by [facility] by registering at the following website: [email or web address]. By joining [employer]’s FollowMyHealth portal, you will also be able to view your health information using other applications (apps) compatible with our system.
DISCHARGE

## 2021-07-19 ENCOUNTER — APPOINTMENT (OUTPATIENT)
Age: 86
End: 2021-07-19
Payer: MEDICARE

## 2021-07-19 VITALS
RESPIRATION RATE: 12 BRPM | HEART RATE: 139 BPM | BODY MASS INDEX: 20.96 KG/M2 | WEIGHT: 104 LBS | HEIGHT: 59 IN | OXYGEN SATURATION: 93 % | SYSTOLIC BLOOD PRESSURE: 110 MMHG | DIASTOLIC BLOOD PRESSURE: 70 MMHG

## 2021-07-19 PROCEDURE — 99212 OFFICE O/P EST SF 10 MIN: CPT

## 2021-07-19 NOTE — DISCUSSION/SUMMARY
[FreeTextEntry1] : SEVERE COPD/ OXYGEN DEPENDANT STABLE\par KEEP INHALERS\par NEB AS NEEDED\par POOR PROGNOSIS

## 2021-10-19 ENCOUNTER — APPOINTMENT (OUTPATIENT)
Age: 86
End: 2021-10-19
Payer: MEDICARE

## 2021-10-19 VITALS
HEART RATE: 110 BPM | RESPIRATION RATE: 12 BRPM | WEIGHT: 107 LBS | SYSTOLIC BLOOD PRESSURE: 126 MMHG | HEIGHT: 59 IN | BODY MASS INDEX: 21.57 KG/M2 | DIASTOLIC BLOOD PRESSURE: 78 MMHG

## 2021-10-19 PROCEDURE — 99213 OFFICE O/P EST LOW 20 MIN: CPT

## 2021-10-19 NOTE — DISCUSSION/SUMMARY
[FreeTextEntry1] : SEVERE COPD/ OXYGEN DEPENDANT STABLE HER POX WAS 88% RA AT REST NEED TO BE ON OXYGEN\par KEEP INHALERS\par NEB AS NEEDED\par POOR PROGNOSIS

## 2021-11-30 ENCOUNTER — APPOINTMENT (OUTPATIENT)
Age: 86
End: 2021-11-30
Payer: MEDICARE

## 2021-11-30 PROCEDURE — 99441: CPT | Mod: 95

## 2021-11-30 NOTE — REASON FOR VISIT
[Home] : at home, [unfilled] , at the time of the visit. [Medical Office: ([facility])___] : at the medical office located in  [Other:____] : [unfilled] [Verbal consent obtained from patient] : the patient, [unfilled] [COPD] : COPD

## 2021-12-08 ENCOUNTER — INPATIENT (INPATIENT)
Facility: HOSPITAL | Age: 86
LOS: 5 days | Discharge: HOME HEALTH | End: 2021-12-14
Attending: HOSPITALIST | Admitting: HOSPITALIST
Payer: MEDICARE

## 2021-12-08 VITALS
OXYGEN SATURATION: 75 % | DIASTOLIC BLOOD PRESSURE: 57 MMHG | SYSTOLIC BLOOD PRESSURE: 119 MMHG | TEMPERATURE: 99 F | RESPIRATION RATE: 17 BRPM | HEIGHT: 57 IN | HEART RATE: 58 BPM

## 2021-12-08 LAB
ALBUMIN SERPL ELPH-MCNC: 4.3 G/DL — SIGNIFICANT CHANGE UP (ref 3.5–5.2)
ALP SERPL-CCNC: 112 U/L — SIGNIFICANT CHANGE UP (ref 30–115)
ALT FLD-CCNC: 25 U/L — SIGNIFICANT CHANGE UP (ref 0–41)
ANION GAP SERPL CALC-SCNC: 21 MMOL/L — HIGH (ref 7–14)
APPEARANCE UR: CLEAR — SIGNIFICANT CHANGE UP
AST SERPL-CCNC: 50 U/L — HIGH (ref 0–41)
BACTERIA # UR AUTO: NEGATIVE — SIGNIFICANT CHANGE UP
BASE EXCESS BLDV CALC-SCNC: 7.7 MMOL/L — HIGH (ref -2–3)
BASE EXCESS BLDV CALC-SCNC: 9.2 MMOL/L — HIGH (ref -2–3)
BASOPHILS # BLD AUTO: 0.04 K/UL — SIGNIFICANT CHANGE UP (ref 0–0.2)
BASOPHILS NFR BLD AUTO: 0.5 % — SIGNIFICANT CHANGE UP (ref 0–1)
BILIRUB SERPL-MCNC: 0.3 MG/DL — SIGNIFICANT CHANGE UP (ref 0.2–1.2)
BILIRUB UR-MCNC: NEGATIVE — SIGNIFICANT CHANGE UP
BUN SERPL-MCNC: 61 MG/DL — CRITICAL HIGH (ref 10–20)
CA-I SERPL-SCNC: 1.14 MMOL/L — LOW (ref 1.15–1.33)
CA-I SERPL-SCNC: 1.15 MMOL/L — SIGNIFICANT CHANGE UP (ref 1.15–1.33)
CALCIUM SERPL-MCNC: 9.6 MG/DL — SIGNIFICANT CHANGE UP (ref 8.5–10.1)
CHLORIDE SERPL-SCNC: 97 MMOL/L — LOW (ref 98–110)
CO2 SERPL-SCNC: 26 MMOL/L — SIGNIFICANT CHANGE UP (ref 17–32)
COLOR SPEC: YELLOW — SIGNIFICANT CHANGE UP
CREAT SERPL-MCNC: 1.7 MG/DL — HIGH (ref 0.7–1.5)
DIFF PNL FLD: SIGNIFICANT CHANGE UP
EOSINOPHIL # BLD AUTO: 0.02 K/UL — SIGNIFICANT CHANGE UP (ref 0–0.7)
EOSINOPHIL NFR BLD AUTO: 0.3 % — SIGNIFICANT CHANGE UP (ref 0–8)
EPI CELLS # UR: 1 /HPF — SIGNIFICANT CHANGE UP (ref 0–5)
FLUAV AG NPH QL: POSITIVE
FLUBV AG NPH QL: NEGATIVE — SIGNIFICANT CHANGE UP
GAS PNL BLDA: SIGNIFICANT CHANGE UP
GAS PNL BLDV: 136 MMOL/L — SIGNIFICANT CHANGE UP (ref 136–145)
GAS PNL BLDV: 140 MMOL/L — SIGNIFICANT CHANGE UP (ref 136–145)
GAS PNL BLDV: SIGNIFICANT CHANGE UP
GAS PNL BLDV: SIGNIFICANT CHANGE UP
GLUCOSE SERPL-MCNC: 83 MG/DL — SIGNIFICANT CHANGE UP (ref 70–99)
GLUCOSE UR QL: NEGATIVE — SIGNIFICANT CHANGE UP
HCO3 BLDV-SCNC: 36 MMOL/L — HIGH (ref 22–29)
HCO3 BLDV-SCNC: 38 MMOL/L — HIGH (ref 22–29)
HCT VFR BLD CALC: 40 % — SIGNIFICANT CHANGE UP (ref 37–47)
HCT VFR BLDA CALC: 36 % — SIGNIFICANT CHANGE UP (ref 34.5–46.5)
HCT VFR BLDA CALC: 37 % — SIGNIFICANT CHANGE UP (ref 34.5–46.5)
HGB BLD CALC-MCNC: 12.1 G/DL — SIGNIFICANT CHANGE UP (ref 11.7–16.1)
HGB BLD CALC-MCNC: 12.3 G/DL — SIGNIFICANT CHANGE UP (ref 11.7–16.1)
HGB BLD-MCNC: 11.9 G/DL — LOW (ref 12–16)
HYALINE CASTS # UR AUTO: 2 /LPF — SIGNIFICANT CHANGE UP (ref 0–7)
IMM GRANULOCYTES NFR BLD AUTO: 0.4 % — HIGH (ref 0.1–0.3)
KETONES UR-MCNC: NEGATIVE — SIGNIFICANT CHANGE UP
LACTATE BLDV-MCNC: 0.7 MMOL/L — SIGNIFICANT CHANGE UP (ref 0.5–2)
LACTATE BLDV-MCNC: 1 MMOL/L — SIGNIFICANT CHANGE UP (ref 0.5–2)
LEUKOCYTE ESTERASE UR-ACNC: NEGATIVE — SIGNIFICANT CHANGE UP
LYMPHOCYTES # BLD AUTO: 1.84 K/UL — SIGNIFICANT CHANGE UP (ref 1.2–3.4)
LYMPHOCYTES # BLD AUTO: 25.1 % — SIGNIFICANT CHANGE UP (ref 20.5–51.1)
MAGNESIUM SERPL-MCNC: 1.8 MG/DL — SIGNIFICANT CHANGE UP (ref 1.8–2.4)
MCHC RBC-ENTMCNC: 29.1 PG — SIGNIFICANT CHANGE UP (ref 27–31)
MCHC RBC-ENTMCNC: 29.8 G/DL — LOW (ref 32–37)
MCV RBC AUTO: 97.8 FL — SIGNIFICANT CHANGE UP (ref 81–99)
MONOCYTES # BLD AUTO: 0.64 K/UL — HIGH (ref 0.1–0.6)
MONOCYTES NFR BLD AUTO: 8.7 % — SIGNIFICANT CHANGE UP (ref 1.7–9.3)
NEUTROPHILS # BLD AUTO: 4.75 K/UL — SIGNIFICANT CHANGE UP (ref 1.4–6.5)
NEUTROPHILS NFR BLD AUTO: 65 % — SIGNIFICANT CHANGE UP (ref 42.2–75.2)
NITRITE UR-MCNC: NEGATIVE — SIGNIFICANT CHANGE UP
NRBC # BLD: 0 /100 WBCS — SIGNIFICANT CHANGE UP (ref 0–0)
NT-PROBNP SERPL-SCNC: 1015 PG/ML — HIGH (ref 0–300)
PCO2 BLDV: 72 MMHG — HIGH (ref 39–42)
PCO2 BLDV: 78 MMHG — HIGH (ref 39–42)
PH BLDV: 7.3 — LOW (ref 7.32–7.43)
PH BLDV: 7.31 — LOW (ref 7.32–7.43)
PH UR: 6 — SIGNIFICANT CHANGE UP (ref 5–8)
PLATELET # BLD AUTO: 254 K/UL — SIGNIFICANT CHANGE UP (ref 130–400)
PO2 BLDV: 23 MMHG — SIGNIFICANT CHANGE UP
PO2 BLDV: 38 MMHG — SIGNIFICANT CHANGE UP
POTASSIUM BLDV-SCNC: 4.1 MMOL/L — SIGNIFICANT CHANGE UP (ref 3.5–5.1)
POTASSIUM BLDV-SCNC: 4.4 MMOL/L — SIGNIFICANT CHANGE UP (ref 3.5–5.1)
POTASSIUM SERPL-MCNC: 4.7 MMOL/L — SIGNIFICANT CHANGE UP (ref 3.5–5)
POTASSIUM SERPL-SCNC: 4.7 MMOL/L — SIGNIFICANT CHANGE UP (ref 3.5–5)
PROT SERPL-MCNC: 7.8 G/DL — SIGNIFICANT CHANGE UP (ref 6–8)
PROT UR-MCNC: ABNORMAL
RBC # BLD: 4.09 M/UL — LOW (ref 4.2–5.4)
RBC # FLD: 14.4 % — SIGNIFICANT CHANGE UP (ref 11.5–14.5)
RBC CASTS # UR COMP ASSIST: 2 /HPF — SIGNIFICANT CHANGE UP (ref 0–4)
RSV RNA NPH QL NAA+NON-PROBE: NEGATIVE — SIGNIFICANT CHANGE UP
SAO2 % BLDV: 40.2 % — SIGNIFICANT CHANGE UP
SAO2 % BLDV: 69.7 % — SIGNIFICANT CHANGE UP
SARS-COV-2 RNA SPEC QL NAA+PROBE: NEGATIVE — SIGNIFICANT CHANGE UP
SODIUM SERPL-SCNC: 144 MMOL/L — SIGNIFICANT CHANGE UP (ref 135–146)
SP GR SPEC: 1.02 — SIGNIFICANT CHANGE UP (ref 1.01–1.03)
TROPONIN T SERPL-MCNC: 0.03 NG/ML — CRITICAL HIGH
UROBILINOGEN FLD QL: SIGNIFICANT CHANGE UP
WBC # BLD: 7.32 K/UL — SIGNIFICANT CHANGE UP (ref 4.8–10.8)
WBC # FLD AUTO: 7.32 K/UL — SIGNIFICANT CHANGE UP (ref 4.8–10.8)
WBC UR QL: 3 /HPF — SIGNIFICANT CHANGE UP (ref 0–5)

## 2021-12-08 PROCEDURE — 71045 X-RAY EXAM CHEST 1 VIEW: CPT | Mod: 26

## 2021-12-08 PROCEDURE — 99223 1ST HOSP IP/OBS HIGH 75: CPT

## 2021-12-08 PROCEDURE — 93010 ELECTROCARDIOGRAM REPORT: CPT

## 2021-12-08 PROCEDURE — 99291 CRITICAL CARE FIRST HOUR: CPT | Mod: CS

## 2021-12-08 RX ORDER — IPRATROPIUM/ALBUTEROL SULFATE 18-103MCG
3 AEROSOL WITH ADAPTER (GRAM) INHALATION EVERY 6 HOURS
Refills: 0 | Status: DISCONTINUED | OUTPATIENT
Start: 2021-12-08 | End: 2021-12-14

## 2021-12-08 RX ORDER — ATORVASTATIN CALCIUM 80 MG/1
40 TABLET, FILM COATED ORAL AT BEDTIME
Refills: 0 | Status: DISCONTINUED | OUTPATIENT
Start: 2021-12-08 | End: 2021-12-14

## 2021-12-08 RX ORDER — CEFEPIME 1 G/1
2000 INJECTION, POWDER, FOR SOLUTION INTRAMUSCULAR; INTRAVENOUS ONCE
Refills: 0 | Status: COMPLETED | OUTPATIENT
Start: 2021-12-08 | End: 2021-12-08

## 2021-12-08 RX ORDER — ACETAMINOPHEN 500 MG
650 TABLET ORAL ONCE
Refills: 0 | Status: COMPLETED | OUTPATIENT
Start: 2021-12-08 | End: 2021-12-08

## 2021-12-08 RX ORDER — IPRATROPIUM/ALBUTEROL SULFATE 18-103MCG
3 AEROSOL WITH ADAPTER (GRAM) INHALATION ONCE
Refills: 0 | Status: COMPLETED | OUTPATIENT
Start: 2021-12-08 | End: 2021-12-08

## 2021-12-08 RX ORDER — CEFTRIAXONE 500 MG/1
1000 INJECTION, POWDER, FOR SOLUTION INTRAMUSCULAR; INTRAVENOUS EVERY 24 HOURS
Refills: 0 | Status: COMPLETED | OUTPATIENT
Start: 2021-12-08 | End: 2021-12-12

## 2021-12-08 RX ORDER — LEVOTHYROXINE SODIUM 125 MCG
112 TABLET ORAL DAILY
Refills: 0 | Status: DISCONTINUED | OUTPATIENT
Start: 2021-12-08 | End: 2021-12-14

## 2021-12-08 RX ORDER — ASPIRIN/CALCIUM CARB/MAGNESIUM 324 MG
81 TABLET ORAL DAILY
Refills: 0 | Status: DISCONTINUED | OUTPATIENT
Start: 2021-12-08 | End: 2021-12-14

## 2021-12-08 RX ORDER — PANTOPRAZOLE SODIUM 20 MG/1
40 TABLET, DELAYED RELEASE ORAL
Refills: 0 | Status: DISCONTINUED | OUTPATIENT
Start: 2021-12-08 | End: 2021-12-14

## 2021-12-08 RX ORDER — HEPARIN SODIUM 5000 [USP'U]/ML
5000 INJECTION INTRAVENOUS; SUBCUTANEOUS EVERY 12 HOURS
Refills: 0 | Status: DISCONTINUED | OUTPATIENT
Start: 2021-12-08 | End: 2021-12-14

## 2021-12-08 RX ORDER — FUROSEMIDE 40 MG
40 TABLET ORAL DAILY
Refills: 0 | Status: DISCONTINUED | OUTPATIENT
Start: 2021-12-08 | End: 2021-12-08

## 2021-12-08 RX ORDER — DRONEDARONE 400 MG/1
400 TABLET, FILM COATED ORAL
Refills: 0 | Status: DISCONTINUED | OUTPATIENT
Start: 2021-12-08 | End: 2021-12-14

## 2021-12-08 RX ORDER — CHLORHEXIDINE GLUCONATE 213 G/1000ML
1 SOLUTION TOPICAL
Refills: 0 | Status: DISCONTINUED | OUTPATIENT
Start: 2021-12-08 | End: 2021-12-14

## 2021-12-08 RX ORDER — METOPROLOL TARTRATE 50 MG
25 TABLET ORAL DAILY
Refills: 0 | Status: DISCONTINUED | OUTPATIENT
Start: 2021-12-08 | End: 2021-12-14

## 2021-12-08 RX ORDER — SENNA PLUS 8.6 MG/1
2 TABLET ORAL AT BEDTIME
Refills: 0 | Status: DISCONTINUED | OUTPATIENT
Start: 2021-12-08 | End: 2021-12-14

## 2021-12-08 RX ADMIN — ATORVASTATIN CALCIUM 40 MILLIGRAM(S): 80 TABLET, FILM COATED ORAL at 22:40

## 2021-12-08 RX ADMIN — Medication 60 MILLIGRAM(S): at 17:20

## 2021-12-08 RX ADMIN — Medication 3 MILLILITER(S): at 08:56

## 2021-12-08 RX ADMIN — Medication 3 MILLILITER(S): at 15:54

## 2021-12-08 RX ADMIN — Medication 125 MILLIGRAM(S): at 08:53

## 2021-12-08 RX ADMIN — CEFTRIAXONE 100 MILLIGRAM(S): 500 INJECTION, POWDER, FOR SOLUTION INTRAMUSCULAR; INTRAVENOUS at 15:53

## 2021-12-08 RX ADMIN — HEPARIN SODIUM 5000 UNIT(S): 5000 INJECTION INTRAVENOUS; SUBCUTANEOUS at 17:20

## 2021-12-08 RX ADMIN — CEFEPIME 2000 MILLIGRAM(S): 1 INJECTION, POWDER, FOR SOLUTION INTRAMUSCULAR; INTRAVENOUS at 09:30

## 2021-12-08 RX ADMIN — DRONEDARONE 400 MILLIGRAM(S): 400 TABLET, FILM COATED ORAL at 17:21

## 2021-12-08 RX ADMIN — Medication 650 MILLIGRAM(S): at 22:40

## 2021-12-08 RX ADMIN — Medication 75 MILLIGRAM(S): at 15:52

## 2021-12-08 RX ADMIN — CEFEPIME 100 MILLIGRAM(S): 1 INJECTION, POWDER, FOR SOLUTION INTRAMUSCULAR; INTRAVENOUS at 08:53

## 2021-12-08 NOTE — ED PROVIDER NOTE - CLINICAL SUMMARY MEDICAL DECISION MAKING FREE TEXT BOX
I personally evaluated the patient. I reviewed the Resident’s or Physician Assistant’s note (as assigned above), and agree with the findings and plan except as documented in my note. Patient evaluated for sob. Labs, ekg, cxr, viral panel performed in ED. Pt started on bipap upon arrival to the ED due to hypoxia even on supplemental oxygen. Oxygenation improved on BIPAP. Pt able to be weaned off of bipap while in the ED. Given abx. Admitted to telemetry for further evaluation and treatment.

## 2021-12-08 NOTE — H&P ADULT - ASSESSMENT
89 y.o female w/ hx of COPD on home O2 2L, HTN, HLD,  hypothyroidism, CKD, presented to the ED for evaluation for cough x 2-3 weeks. The patient had seen Dr. Jose 11/30 and was started on doxycyline but over past week, patient experienced increased work of breathing, increasing home o2 to 3.5 L. Progressively, over past day she became more and more tachypneic and hypoxic prompting visit to the ED.The symptoms were associated with low grade fevers. Patient denied any chest pain, edema of lower extremities, calf pain, hemoptysis, abd pain.      #Hypoxia and shortness of breath secondary to COPD exacerbation secondary to viral pneumonia with possible superimposed bacterial infection.   -Sepsis ruled out on admission  -MAURI and elevated troponin from baseline, flu A was positive on nasal swab  -O2 to keep sat > 88  -continue and complete course of tamiflu for Flu A  -will cover with ceftriaxone for now, not entirely convinced with bacterial pneumonia as CXR changes are stable. Patient is on dronedarone which has interaction with Azithromycin, please be cautious in ordering.   -CXR reviewed  -continue inhalers and steroids IV for now  -will place pulmonary medicine consult    MAURI-1.7 Cr  -Baseline creatinine 1.3   -Likely 2/2 to prerenal causes  -Monitor strict I/O  -Monitor BUN/Cr  -Send urine creatinine, urea and lytes  -Avoid nephrotoxic agents, renally dose meds  -Renal us if not improved, bilateral renal cysts    #paroxsymal Atrial fibrillation  - on last admission, eliquis was discontinued, ASA resumed after discussion with patient and son regarding risks and benefits  - Metoprolol ER 25 PO QD  - Multaq 400 BID      # Hypothyroidism  - Levothyroxine 112 mcg     DASH DIET  CHG  AIT  DVT PPX  Droplet precautions for influenza

## 2021-12-08 NOTE — ED ADULT NURSE REASSESSMENT NOTE - NS ED NURSE REASSESS COMMENT FT1
report received, care assumed. Pt. placed on cardiac monitor and O2 via NC at 3L. Pt. appears comfortable. Breathing spontaneous and unlabored. Safety and comfort maintained.

## 2021-12-08 NOTE — ED PROVIDER NOTE - NS ED ROS FT
Constitutional: See HPI.  Eyes: No visual changes, eye pain or discharge.   ENMT: No hearing changes, pain, discharge or infections.   Cardiac: No SOB or edema. No chest pain with exertion.  Respiratory: + cough, + dyspnea. No respiratory distress. No hemoptysis. No history of asthma or RAD.  GI: No nausea, vomiting, diarrhea or abdominal pain.  : No dysuria, frequency or burning. No Discharge  MS: No myalgia, muscle weakness, joint pain or back pain.  Neuro: No headache or weakness.   Skin: No skin rash.  Except as documented in the HPI, all other systems are negative.

## 2021-12-08 NOTE — ED PROVIDER NOTE - CARE PLAN
Principal Discharge DX:	Acute respiratory failure with hypoxia and hypercarbia  Secondary Diagnosis:	MAURI (acute kidney injury)  Secondary Diagnosis:	Influenza A  Secondary Diagnosis:	Acute respiratory failure with hypoxia and hypercarbia  Secondary Diagnosis:	COPD exacerbation  Secondary Diagnosis:	Elevated troponin   1

## 2021-12-08 NOTE — PATIENT PROFILE ADULT - FALL HARM RISK - HARM RISK INTERVENTIONS

## 2021-12-08 NOTE — ED PROVIDER NOTE - PHYSICAL EXAMINATION
CONST: + resp distress.   EYES: Sclera and conjunctiva clear.  CARD: Normal S1 S2; Normal rate and rhythm  RESP: poor air entery bilateral, + rhonchi bilaterally, + tachypneic, + hypoxic, + accessory muscle use, cannot speak in full sentences  GI: Soft, non-tender, non-distended.  MS: Normal ROM in all extremities. No edema of lower extremities, no calf pain, radial pulses 2+ bilaterally  SKIN: Warm, dry, no acute rashes. Good turgor  NEURO: A&Ox3, No focal deficits. Strength 5/5 with no sensory deficits CONST: + resp distress.   EYES: Sclera and conjunctiva clear.  CARD: Normal S1 S2; Normal rate and rhythm  RESP: poor air entry bilateral, + rhonchi bilaterally, + tachypneic, + hypoxic, + accessory muscle use, cannot speak in full sentences  GI: Soft, non-tender, non-distended.  MS: Normal ROM in all extremities. No edema of lower extremities, no calf pain, radial pulses 2+ bilaterally  SKIN: Warm, dry, no acute rashes. Good turgor  NEURO: A&Ox3, No focal deficits. Strength 5/5 with no sensory deficits

## 2021-12-08 NOTE — CONSULT NOTE ADULT - ASSESSMENT
IMPRESSION:    Acute on chronic hypercapnic and hypoxemic respiratory failure on home O2  COPD exacerbation  FLU A positive  MAURI on CKD    RECOMMEND:    NIV prn and at bedtime  Start Tamiflu  Droplet precautions  Solumedrol 60mg q12 IV for now  Nebs q4h and prn  Home inhalers  Check Dimer and procal  venous duplex LE  Rocephin and Azithro for now  Transfer to step down unit for closer monitoring   IMPRESSION:    Acute on chronic hypercapneic and hypoxemic respiratory failure on home O2  COPD exacerbation  FLU A positive  MAURI on CKD    RECOMMEND:    NIV prn and at bedtime  Tamiflu  Droplet precautions  Solumedrol 60mg q12  hold lasix  Nebs q4h and prn  Home inhalers  Check Dimer and procal  venous duplex LE  Transfer to step down unit for closer monitoring

## 2021-12-08 NOTE — H&P ADULT - HISTORY OF PRESENT ILLNESS
89 y.o female w/ hx of COPD on home O2 2L, HTN, HLD,  hypothyroidism, CKD, presented to the ED for evaluation for cough x 2-3 weeks. The patient had seen Dr. Jose 11/30 and was started on doxycyline but over past week, patient experienced increased work of breathing, increasing home o2 to 3.5 L. Progressively, over past day she became more and more tachypneic and hypoxic prompting visit to the ED.The symptoms were associated with low grade fevers. Patient denied any chest pain, edema of lower extremities, calf pain, hemoptysis, abd pain.    Vital Signs (24 Hrs):  T(C): 36.6 (12-08-21 @ 13:50), Max: 37.7 (12-08-21 @ 08:56)  HR: 81 (12-08-21 @ 13:50) (58 - 81)  BP: 117/56 (12-08-21 @ 13:50) (117/55 - 162/66)  RR: 18 (12-08-21 @ 13:50) (16 - 20)  SpO2: 93% (12-08-21 @ 13:50) (75% - 99%)    Patient was received hypoxic in ER. Blood work showed MAURI and elevated troponin from baseline, flu A was positive on nasal swab. Patient is being admitted for COPD Exacerbation secondary to viral pneumonia with possible superimposed bacterial infection.

## 2021-12-08 NOTE — ED ADULT NURSE NOTE - NSIMPLEMENTINTERV_GEN_ALL_ED
Implemented All Fall with Harm Risk Interventions:  [place] to call system. Call bell, personal items and telephone within reach. Instruct patient to call for assistance. Room bathroom lighting operational. Non-slip footwear when patient is off stretcher. Physically safe environment: no spills, clutter or unnecessary equipment. Stretcher in lowest position, wheels locked, appropriate side rails in place. Provide visual cue, wrist band, yellow gown, etc. Monitor gait and stability. Monitor for mental status changes and reorient to person, place, and time. Review medications for side effects contributing to fall risk. Reinforce activity limits and safety measures with patient and family. Provide visual clues: red socks.

## 2021-12-08 NOTE — ED PROVIDER NOTE - WR ORDER STATUS 1
After Visit Summary   2/27/2017    Zaina Tan    MRN: [id number]           Patient Information     Date Of Birth          1984        Visit Information        Provider Department      2/27/2017 2:00 PM Kalie Mendoza DO [facility] Maternal Fetal Medicine - [place]        Today's Diagnoses     First trimester screening    -  1    Previous pregnancy with congenital heart defect, currently pregnant, unspecified trimester        Family history of congenital heart defect           Follow-ups after your visit        Your next 10 appointments already scheduled     Mar 24, 2017  2:15 PM CDT   RETURN OB with Dee Troncoso CNM   [facility] ([facility])    [place] [facility]  [address]  [address]  [facility] [address] [phone]            Apr 17, 2017  1:30 PM CDT   LEONEL US COMP with [id number]   [facility] Ultrasound - [place] ([facility])    [address]  [facility] [address] [phone]           Wear comfortable clothes and leave your valuables at home.            Apr 17, 2017  2:00 PM CDT   Radiology MD with UR ANGELO C[place]AND   [facility] [place] ([facility])    [address]  [facility] [address] [phone]           Please arrive at the time given for your first appointment.  This visit is used internally to schedule the physician's time during your ultrasound.            May 05, 2017  2:00 PM CDT   Ech Fetal Complete* with [id number]   [facility] Echo/EKG ([facility])    [address] [place] [address]  [facility] [address]                 Future tests that were ordered for you today     Open Future Orders        Priority Expected Expires Ordered    Echo Fetal Complete-Peds Cardiology Routine 5/8/2017 2/27/2018 2/27/2017            Who to contact     If you  have questions or need follow up information about today's clinic visit or your schedule please contact [facility] directly at [phone].  Normal or non-critical lab and imaging results will be communicated to you by MyChart, letter or phone within 4 business days after the clinic has received the results. If you do not hear from us within 7 days, please contact the clinic through [employer] or phone. If you have a critical or abnormal lab result, we will notify you by phone as soon as possible.  Submit refill requests through [employer] or call your pharmacy and they will forward the refill request to us. Please allow 3 business days for your refill to be completed.          Additional Information About Your Visit        [employer] Information     [employer] gives you secure access to your electronic health record. If you see a primary care provider, you can also send messages to your care team and make appointments. If you have questions, please call your primary care clinic.  If you do not have a primary care provider, please call [phone] and they will assist you.        Care EveryWhere ID     This is your Care EveryWhere ID. This could be used by other organizations to access your [place] medical records  [id number]         Blood Pressure from Last 3 Encounters:   02/20/17 117/75   01/30/17 129/78   01/25/16 128/68    Weight from Last 3 Encounters:   02/20/17 64.4 kg (142 lb)   01/30/17 68.8 kg (151 lb 9.6 oz)   01/25/16 65.3 kg (144 lb)              We Performed the Following     First Trimester Screen- NTD Labs        Primary Care Provider    Physician No Ref-Primary       No address on file        Thank you!     Thank you for choosing [facility]  for your care. Our goal is always to provide you with excellent care. Hearing back from our patients is one way we can continue to improve our services. Please take a few minutes to complete the written  survey that you may receive in the mail after your visit with us. Thank you!             Your Updated Medication List - Protect others around you: Learn how to safely use, store and throw away your medicines at www.disposemymeds.org.          This list is accurate as of: 2/27/17  2:37 PM.  Always use your most recent med list.                   Brand Name Dispense Instructions for use    cholecalciferol 5000 UNITS Caps capsule    vitamin D3    90 capsule    Take 1 capsule (5,000 Units) by mouth daily Take one capsule daily.       ondansetron 4 MG tablet    ZOFRAN    30 tablet    Take 1 tablet (4 mg) by mouth every 8 hours as needed for nausea       prenatal multivitamin  plus iron 27-0.8 MG Tabs per tablet      Take 1 tablet by mouth daily       pyridOXINE 25 MG tablet    vitamin B-6    90 tablet    Take 1 tablet (25 mg) by mouth 3 times daily          Resulted

## 2021-12-08 NOTE — CONSULT NOTE ADULT - SUBJECTIVE AND OBJECTIVE BOX
Patient is a 89y old  Female who presents with a chief complaint of SOB (08 Dec 2021 14:07)      HPI:  89 y.o female w/ hx of COPD on home O2 2L, HTN, HLD,  hypothyroidism, CKD, presented to the ED for evaluation for cough x 2-3 weeks. The patient had seen Dr. Jose  and was started on doxycyline but over past week, patient experienced increased work of breathing, increasing home o2 to 3.5 L. Progressively, over past day she became more and more tachypneic and hypoxic prompting visit to the ED.The symptoms were associated with low grade fevers. Patient denied any chest pain, edema of lower extremities, calf pain, hemoptysis, abd pain.    Vital Signs (24 Hrs):  T(C): 36.6 (21 @ 13:50), Max: 37.7 (21 @ 08:56)  HR: 81 (21 @ 13:50) (58 - 81)  BP: 117/56 (21 @ 13:50) (117/55 - 162/66)  RR: 18 (21 @ 13:50) (16 - 20)  SpO2: 93% (21 @ 13:50) (75% - 99%)    Patient was received hypoxic in ER. Blood work showed MAURI and elevated troponin from baseline, flu A was positive on nasal swab. Patient is being admitted for COPD Exacerbation secondary to viral pneumonia with possible superimposed bacterial infection.  (08 Dec 2021 14:07)      PAST MEDICAL & SURGICAL HISTORY:  COPD (chronic obstructive pulmonary disease)    Hypothyroid    HTN (hypertension)    High cholesterol    Colitis    CKD (chronic kidney disease)    No significant past surgical history        SOCIAL HX:   former smoker, no etoh    FAMILY HISTORY:  .  No cardiovascular or pulmonary family history     Review of System:  See HPI    Allergies    No Known Allergies    Intolerances          PHYSICAL EXAM  Vital Signs Last 24 Hrs  T(C): 36.6 (08 Dec 2021 13:50), Max: 37.7 (08 Dec 2021 08:56)  T(F): 97.9 (08 Dec 2021 13:50), Max: 99.8 (08 Dec 2021 08:56)  HR: 81 (08 Dec 2021 13:50) (58 - 81)  BP: 117/56 (08 Dec 2021 13:50) (117/55 - 162/66)  BP(mean): 81 (08 Dec 2021 13:50) (77 - 92)  RR: 18 (08 Dec 2021 13:50) (16 - 20)  SpO2: 93% (08 Dec 2021 13:50) (75% - 99%)    General: In NAD  HEENT: ASH             Lymphatic system: No cervical LN     Lungs: Bilateral wheezing. no crackles. speaking in full sentences  Cardiovascular: Regular  Gastrointestinal: Soft.  + BS   Musculoskeletal: No Clubbing.  Full range of motion.. Moves all extremities  Skin: Warm.  Intact  Neurological: No motor or sensory deficit       LABS:                          11.9   7.32  )-----------( 254      ( 08 Dec 2021 08:44 )             40.0                                               1208    144  |  97<L>  |  61<HH>  ----------------------------<  83  4.7   |  26  |  1.7<H>    Ca    9.6      08 Dec 2021 08:44  Mg     1.8     12-08    TPro  7.8  /  Alb  4.3  /  TBili  0.3  /  DBili  x   /  AST  50<H>  /  ALT  25  /  AlkPhos  112  12-08                                             Urinalysis Basic - ( 08 Dec 2021 08:44 )    Color: Yellow / Appearance: Clear / S.017 / pH: x  Gluc: x / Ketone: Negative  / Bili: Negative / Urobili: <2 mg/dL   Blood: x / Protein: 30 mg/dL / Nitrite: Negative   Leuk Esterase: Negative / RBC: 2 /HPF / WBC 3 /HPF   Sq Epi: x / Non Sq Epi: 1 /HPF / Bacteria: Negative        CARDIAC MARKERS ( 08 Dec 2021 08:44 )  x     / 0.03 ng/mL / x     / x     / x                                                LIVER FUNCTIONS - ( 08 Dec 2021 08:44 )  Alb: 4.3 g/dL / Pro: 7.8 g/dL / ALK PHOS: 112 U/L / ALT: 25 U/L / AST: 50 U/L / GGT: x                                                                                            ABG - ( 08 Dec 2021 11:22 )  pH, Arterial: 7.35  pH, Blood: x     /  pCO2: 66    /  pO2: 72    / HCO3: 36    / Base Excess: 9.0   /  SaO2: 96.1                MEDICATIONS  (STANDING):  albuterol/ipratropium for Nebulization 3 milliLiter(s) Nebulizer every 6 hours  aspirin  chewable 81 milliGRAM(s) Oral daily  atorvastatin 40 milliGRAM(s) Oral at bedtime  cefTRIAXone   IVPB 1000 milliGRAM(s) IV Intermittent every 24 hours  chlorhexidine 4% Liquid 1 Application(s) Topical <User Schedule>  dronedarone 400 milliGRAM(s) Oral two times a day  furosemide    Tablet 40 milliGRAM(s) Oral daily  heparin   Injectable 5000 Unit(s) SubCutaneous every 12 hours  levothyroxine 112 MICROGram(s) Oral daily  methylPREDNISolone sodium succinate Injectable 60 milliGRAM(s) IV Push every 12 hours  metoprolol succinate ER 25 milliGRAM(s) Oral daily  oseltamivir 75 milliGRAM(s) Oral once  oseltamivir 75 milliGRAM(s) Oral two times a day  pantoprazole    Tablet 40 milliGRAM(s) Oral before breakfast    MEDICATIONS  (PRN):  senna 2 Tablet(s) Oral at bedtime PRN Constipation       Patient is a 89y old  Female who presents with a chief complaint of SOB (08 Dec 2021 14:07)      HPI:  89 y.o female w/ hx of COPD on home O2 2L, HTN, HLD,  hypothyroidism, CKD, presented to the ED for evaluation for cough x 2-3 weeks. The patient had seen Dr. Jose  and was started on doxycyline but over past week, patient experienced increased work of breathing, increasing home o2 to 3.5 L. Progressively, over past day she became more and more tachypneic and hypoxic prompting visit to the ED.The symptoms were associated with low grade fevers. Patient denied any chest pain, edema of lower extremities, calf pain, hemoptysis, abd pain.    Vital Signs (24 Hrs):  T(C): 36.6 (21 @ 13:50), Max: 37.7 (21 @ 08:56)  HR: 81 (21 @ 13:50) (58 - 81)  BP: 117/56 (21 @ 13:50) (117/55 - 162/66)  RR: 18 (21 @ 13:50) (16 - 20)  SpO2: 93% (21 @ 13:50) (75% - 99%)    Patient was received hypoxic in ER. Blood work showed MAURI and elevated troponin from baseline, flu A was positive on nasal swab. Patient is being admitted for COPD Exacerbation secondary to viral pneumonia with possible superimposed bacterial infection.  called to evaluate      PAST MEDICAL & SURGICAL HISTORY:  COPD (chronic obstructive pulmonary disease)    Hypothyroid    HTN (hypertension)    High cholesterol    Colitis    CKD (chronic kidney disease)    No significant past surgical history        SOCIAL HX:   former smoker, no etoh    FAMILY HISTORY:  .  No cardiovascular or pulmonary family history     Review of System:  See HPI    Allergies    No Known Allergies    Intolerances          PHYSICAL EXAM  Vital Signs Last 24 Hrs  T(C): 36.6 (08 Dec 2021 13:50), Max: 37.7 (08 Dec 2021 08:56)  T(F): 97.9 (08 Dec 2021 13:50), Max: 99.8 (08 Dec 2021 08:56)  HR: 81 (08 Dec 2021 13:50) (58 - 81)  BP: 117/56 (08 Dec 2021 13:50) (117/55 - 162/66)  BP(mean): 81 (08 Dec 2021 13:50) (77 - 92)  RR: 18 (08 Dec 2021 13:50) (16 - 20)  SpO2: 93% (08 Dec 2021 13:50) (75% - 99%)    General: ILL looking  HEENT: ASH             Lymphatic system: No cervical LN     Lungs: Bilateral wheezing. no crackles. speaking in full sentences  Cardiovascular: Regular  Gastrointestinal: Soft.  + BS   Musculoskeletal: No Clubbing.  Full range of motion.. Moves all extremities  Skin: Warm.  Intact  Neurological: No motor or sensory deficit       LABS:                          11.9   7.32  )-----------( 254      ( 08 Dec 2021 08:44 )             40.0                                               12    144  |  97<L>  |  61<HH>  ----------------------------<  83  4.7   |  26  |  1.7<H>    Ca    9.6      08 Dec 2021 08:44  Mg     1.8     1208    TPro  7.8  /  Alb  4.3  /  TBili  0.3  /  DBili  x   /  AST  50<H>  /  ALT  25  /  AlkPhos  112  12-08                                             Urinalysis Basic - ( 08 Dec 2021 08:44 )    Color: Yellow / Appearance: Clear / S.017 / pH: x  Gluc: x / Ketone: Negative  / Bili: Negative / Urobili: <2 mg/dL   Blood: x / Protein: 30 mg/dL / Nitrite: Negative   Leuk Esterase: Negative / RBC: 2 /HPF / WBC 3 /HPF   Sq Epi: x / Non Sq Epi: 1 /HPF / Bacteria: Negative        CARDIAC MARKERS ( 08 Dec 2021 08:44 )  x     / 0.03 ng/mL / x     / x     / x                                                LIVER FUNCTIONS - ( 08 Dec 2021 08:44 )  Alb: 4.3 g/dL / Pro: 7.8 g/dL / ALK PHOS: 112 U/L / ALT: 25 U/L / AST: 50 U/L / GGT: x                                                                                            ABG - ( 08 Dec 2021 11:22 )  pH, Arterial: 7.35  pH, Blood: x     /  pCO2: 66    /  pO2: 72    / HCO3: 36    / Base Excess: 9.0   /  SaO2: 96.1                MEDICATIONS  (STANDING):  albuterol/ipratropium for Nebulization 3 milliLiter(s) Nebulizer every 6 hours  aspirin  chewable 81 milliGRAM(s) Oral daily  atorvastatin 40 milliGRAM(s) Oral at bedtime  cefTRIAXone   IVPB 1000 milliGRAM(s) IV Intermittent every 24 hours  chlorhexidine 4% Liquid 1 Application(s) Topical <User Schedule>  dronedarone 400 milliGRAM(s) Oral two times a day  furosemide    Tablet 40 milliGRAM(s) Oral daily  heparin   Injectable 5000 Unit(s) SubCutaneous every 12 hours  levothyroxine 112 MICROGram(s) Oral daily  methylPREDNISolone sodium succinate Injectable 60 milliGRAM(s) IV Push every 12 hours  metoprolol succinate ER 25 milliGRAM(s) Oral daily  oseltamivir 75 milliGRAM(s) Oral once  oseltamivir 75 milliGRAM(s) Oral two times a day  pantoprazole    Tablet 40 milliGRAM(s) Oral before breakfast    MEDICATIONS  (PRN):  senna 2 Tablet(s) Oral at bedtime PRN Constipation

## 2021-12-08 NOTE — CONSULT NOTE ADULT - ATTENDING COMMENTS
events noted, Acute on chronic hypercapneic resp failure/ COPD exacerbation, doubt bacterial superinfection/ IV steroids, tamiflu, bipap at night, SDU

## 2021-12-08 NOTE — ED ADULT NURSE NOTE - OBJECTIVE STATEMENT
as per son, patient has "declined over the past couple of days" Pt now /SOB with congestion upper respiratory. no blle edema noted/ Pty a&ox3 from home. Pt uses home 02. Pt denies pains /Pt has low grade fever

## 2021-12-08 NOTE — ED PROVIDER NOTE - SECONDARY DIAGNOSIS.
Acute respiratory failure with hypoxia and hypercarbia COPD exacerbation MAURI (acute kidney injury) Elevated troponin Influenza A

## 2021-12-08 NOTE — H&P ADULT - NSHPPHYSICALEXAM_GEN_ALL_CORE
EYES: Sclera and conjunctiva clear.  CARD: Normal S1 S2; Normal rate and rhythm  RESP: poor air entry on both sides+ rhonchi bilaterally, + tachypneic, + hypoxic, + accessory muscle use  GI: Soft, non-tender, non-distended.  MS: Normal ROM in all extremities. No edema of lower extremities  SKIN: Warm, dry, no acute rashes. Good turgor  NEURO: A&Ox3, No focal deficits. Strength 5/5 with no sensory deficits

## 2021-12-08 NOTE — ED ADULT TRIAGE NOTE - CHIEF COMPLAINT QUOTE
As per daughter, Pt has been weak and congested over the past couple days. Pt has COPD on home 02 3L. 02 sat in triage in 60s.

## 2021-12-08 NOTE — H&P ADULT - NSHPLABSRESULTS_GEN_ALL_CORE
.  LABS:                         11.9   7.32  )-----------( 254      ( 08 Dec 2021 08:44 )             40.0         144  |  97<L>  |  61<HH>  ----------------------------<  83  4.7   |  26  |  1.7<H>    Ca    9.6      08 Dec 2021 08:44  Mg     1.8         TPro  7.8  /  Alb  4.3  /  TBili  0.3  /  DBili  x   /  AST  50<H>  /  ALT  25  /  AlkPhos  112        Urinalysis Basic - ( 08 Dec 2021 08:44 )    Color: Yellow / Appearance: Clear / S.017 / pH: x  Gluc: x / Ketone: Negative  / Bili: Negative / Urobili: <2 mg/dL   Blood: x / Protein: 30 mg/dL / Nitrite: Negative   Leuk Esterase: Negative / RBC: 2 /HPF / WBC 3 /HPF   Sq Epi: x / Non Sq Epi: 1 /HPF / Bacteria: Negative      Serum Pro-Brain Natriuretic Peptide: 1015 pg/mL ( @ 08:44)        RADIOLOGY, EKG & ADDITIONAL TESTS: Reviewed.

## 2021-12-08 NOTE — H&P ADULT - ATTENDING COMMENTS
incomplete note 89 y.o female w/ hx of COPD on home O2 2L, HTN, HLD,  hypothyroidism, CKD, presented to the ED for evaluation for cough x 2-3 weeks. The patient had seen Dr. Jose 11/30 and was started on doxycyline but over past week, patient experienced increased work of breathing, increasing home o2 to 3.5 L. Progressively, over past day she became more and more tachypneic and hypoxic prompting visit to the ED.The symptoms were associated with low grade fevers. Patient denied any chest pain, edema of lower extremities, calf pain, hemoptysis, abd pain.  Patient was received hypoxic in ER. Blood work showed MAURI and elevated troponin from baseline, flu A was positive on nasal swab. Patient is being admitted for COPD Exacerbation secondary to viral pneumonia with possible superimposed bacterial infection.     PHYSICAL EXAM:  GENERAL: lying in bed comfortably  HEAD:  Atraumatic, Normocephalic  EYES: conjunctiva and sclera clear  ENT: Moist mucous membranes  NECK: Supple, No JVD  CHEST/LUNG: Clear to auscultation bilaterally, wheeze b/l   HEART: Regular rate and rhythm  ABDOMEN: Soft, Nontender, Nondistended. No hepatomegally  EXTREMITIES:  No clubbing, cyanosis, or edema  NERVOUS SYSTEM:  Alert & Oriented X3, speech clear. No deficits   SKIN: No rashes or lesions    # Hypoxia and shortness of breath secondary to COPD exacerbation secondary to viral pneumonia with possible superimposed bacterial infection   -Sepsis ruled out on admission  -MAURI and elevated troponin from baseline, flu A was positive on nasal swab  -O2 to keep sat > 88  -procalcitonin   -continue and complete course of Tamiflu for Flu A  -will cover with ceftriaxone for now, not entirely convinced with bacterial pneumonia as CXR changes are stable. Patient is on dronedarone which has interaction with Azithromycin, please be cautious in ordering.   < from: Xray Chest 1 View- PORTABLE-Urgent (12.08.21 @ 09:59) >    Impression:  Bibasilar interstitial opacities, unchanged.  Bones as above.  < end of copied text >  -continue inhalers and steroids IV for now  -pulmonary rec noted   - c/w rocephin and azithro   - pan cultures     # MAURI-1.7 Cr  -Baseline creatinine 1.3   -Likely 2/2 to prerenal causes  -Monitor strict I/O  -Monitor BUN/Cr  -Send urine creatinine, urea and lytes  -Avoid nephrotoxic agents, renally dose meds  -Renal us if not improved, bilateral renal cysts  -hold Lasix     #Paroxsymal Atrial fibrillation  - on last admission, Eliquis was discontinued, ASA resumed after discussion with patient and son regarding risks and benefits  - Metoprolol ER 25 PO QD  - Multaq 400 BID    # Hypothyroidism  - Levothyroxine 112 mcg     #Progress Note Handoff  Pending (specify): resolution of respiratory failure   Family discussion: Martin Donnelly received updated at [phone]  Disposition: To be determined. DNR

## 2021-12-08 NOTE — ED ADULT NURSE NOTE - NSICDXPASTMEDICALHX_GEN_ALL_CORE_FT
PAST MEDICAL HISTORY:  CKD (chronic kidney disease)     Colitis     COPD (chronic obstructive pulmonary disease)     High cholesterol     HTN (hypertension)     Hypothyroid

## 2021-12-08 NOTE — ED PROVIDER NOTE - ATTENDING CONTRIBUTION TO CARE
89 yo F pmh copd on home o2, htn, hld, hypothyroidism, ckd pw cough. Cough for the past few weeks, recently began taking doxycycline with no improvement. SOB with increased work of breathing progressively for the past 1 week. No cp, no LE edema, no n/v/d, no hemoptysis, no abd pain, no back pain, no urinary sx, no ha, no neck pain/stiffness.     CONSTITUTIONAL: Well-developed; well-nourished; in no acute distress. Sitting up and providing appropriate history and physical examination  SKIN: skin exam is warm and dry, no acute rash.  HEAD: Normocephalic; atraumatic.  EYES: PERRL, 3 mm bilateral, no nystagmus, EOM intact; conjunctiva and sclera clear.  ENT: No nasal discharge; airway clear.  NECK: Supple; non tender. + full passive ROM in all directions. No JVD  CARD: S1, S2 normal; no murmurs, gallops, or rubs. Regular rate and rhythm. + Symmetric Strong Pulses  RESP: +tachypnea, +hypoxia on nasal cannula--started on bipap upon arrival with improvement in sx.   ABD: soft; non-distended; non-tender. No Rebound, No Guarding, No signs of peritonitis, No CVA tenderness. No pulsatile abdominal mass. + Strong and Symmetric Pulses  EXT: Normal ROM. No clubbing, cyanosis or edema. Dp and Pt Pulses intact. Cap refill less than 3 seconds  NEURO: CN 2-12 intact, normal finger to nose, normal romberg, no sensory or motor deficits, Alert, oriented, grossly unremarkable. No Focal deficits. GCS 15. NIH 0  PSYCH: Cooperative, appropriate.

## 2021-12-08 NOTE — ED PROVIDER NOTE - OBJECTIVE STATEMENT
89 y.o female w/ hx of COPD on home O2, HTN, HLD,  hypothyroidism, CKD, presents to the ED for evaluation.  Per family cough x 2-3 weeks.  Saw Dr. Jose 11/30 and was started on doxycyline.  Over past week increased work of breathing, increasing home o2 to 3.5 L.  Over past day tachypneic, hypoxic prompting visit to the ED. Admits to fever at home.  No chest pain, edema of lower extremities, calf pain, hemoptysis, abd pain.  NO further complaints.

## 2021-12-09 LAB
ALBUMIN SERPL ELPH-MCNC: 3.6 G/DL — SIGNIFICANT CHANGE UP (ref 3.5–5.2)
ALP SERPL-CCNC: 89 U/L — SIGNIFICANT CHANGE UP (ref 30–115)
ALT FLD-CCNC: 18 U/L — SIGNIFICANT CHANGE UP (ref 0–41)
ANION GAP SERPL CALC-SCNC: 16 MMOL/L — HIGH (ref 7–14)
AST SERPL-CCNC: 34 U/L — SIGNIFICANT CHANGE UP (ref 0–41)
BASOPHILS # BLD AUTO: 0 K/UL — SIGNIFICANT CHANGE UP (ref 0–0.2)
BASOPHILS NFR BLD AUTO: 0 % — SIGNIFICANT CHANGE UP (ref 0–1)
BILIRUB SERPL-MCNC: 0.2 MG/DL — SIGNIFICANT CHANGE UP (ref 0.2–1.2)
BUN SERPL-MCNC: 65 MG/DL — CRITICAL HIGH (ref 10–20)
CALCIUM SERPL-MCNC: 8.9 MG/DL — SIGNIFICANT CHANGE UP (ref 8.5–10.1)
CHLORIDE SERPL-SCNC: 99 MMOL/L — SIGNIFICANT CHANGE UP (ref 98–110)
CO2 SERPL-SCNC: 27 MMOL/L — SIGNIFICANT CHANGE UP (ref 17–32)
CREAT SERPL-MCNC: 1.7 MG/DL — HIGH (ref 0.7–1.5)
CULTURE RESULTS: NO GROWTH — SIGNIFICANT CHANGE UP
EOSINOPHIL # BLD AUTO: 0 K/UL — SIGNIFICANT CHANGE UP (ref 0–0.7)
EOSINOPHIL NFR BLD AUTO: 0 % — SIGNIFICANT CHANGE UP (ref 0–8)
GLUCOSE SERPL-MCNC: 161 MG/DL — HIGH (ref 70–99)
HCT VFR BLD CALC: 37 % — SIGNIFICANT CHANGE UP (ref 37–47)
HGB BLD-MCNC: 11.3 G/DL — LOW (ref 12–16)
IMM GRANULOCYTES NFR BLD AUTO: 0.8 % — HIGH (ref 0.1–0.3)
LYMPHOCYTES # BLD AUTO: 0.53 K/UL — LOW (ref 1.2–3.4)
LYMPHOCYTES # BLD AUTO: 21.3 % — SIGNIFICANT CHANGE UP (ref 20.5–51.1)
MCHC RBC-ENTMCNC: 29.4 PG — SIGNIFICANT CHANGE UP (ref 27–31)
MCHC RBC-ENTMCNC: 30.5 G/DL — LOW (ref 32–37)
MCV RBC AUTO: 96.4 FL — SIGNIFICANT CHANGE UP (ref 81–99)
MONOCYTES # BLD AUTO: 0.09 K/UL — LOW (ref 0.1–0.6)
MONOCYTES NFR BLD AUTO: 3.6 % — SIGNIFICANT CHANGE UP (ref 1.7–9.3)
NEUTROPHILS # BLD AUTO: 1.85 K/UL — SIGNIFICANT CHANGE UP (ref 1.4–6.5)
NEUTROPHILS NFR BLD AUTO: 74.3 % — SIGNIFICANT CHANGE UP (ref 42.2–75.2)
NRBC # BLD: 0 /100 WBCS — SIGNIFICANT CHANGE UP (ref 0–0)
PLATELET # BLD AUTO: 237 K/UL — SIGNIFICANT CHANGE UP (ref 130–400)
POTASSIUM SERPL-MCNC: 4.4 MMOL/L — SIGNIFICANT CHANGE UP (ref 3.5–5)
POTASSIUM SERPL-SCNC: 4.4 MMOL/L — SIGNIFICANT CHANGE UP (ref 3.5–5)
PROT SERPL-MCNC: 6.4 G/DL — SIGNIFICANT CHANGE UP (ref 6–8)
RBC # BLD: 3.84 M/UL — LOW (ref 4.2–5.4)
RBC # FLD: 14.3 % — SIGNIFICANT CHANGE UP (ref 11.5–14.5)
SODIUM SERPL-SCNC: 142 MMOL/L — SIGNIFICANT CHANGE UP (ref 135–146)
SPECIMEN SOURCE: SIGNIFICANT CHANGE UP
TROPONIN T SERPL-MCNC: <0.01 NG/ML — SIGNIFICANT CHANGE UP
TROPONIN T SERPL-MCNC: <0.01 NG/ML — SIGNIFICANT CHANGE UP
WBC # BLD: 2.49 K/UL — LOW (ref 4.8–10.8)
WBC # FLD AUTO: 2.49 K/UL — LOW (ref 4.8–10.8)

## 2021-12-09 PROCEDURE — 99233 SBSQ HOSP IP/OBS HIGH 50: CPT

## 2021-12-09 RX ORDER — SODIUM CHLORIDE 9 MG/ML
1000 INJECTION, SOLUTION INTRAVENOUS
Refills: 0 | Status: DISCONTINUED | OUTPATIENT
Start: 2021-12-09 | End: 2021-12-10

## 2021-12-09 RX ADMIN — Medication 25 MILLIGRAM(S): at 05:52

## 2021-12-09 RX ADMIN — Medication 3 MILLILITER(S): at 15:27

## 2021-12-09 RX ADMIN — HEPARIN SODIUM 5000 UNIT(S): 5000 INJECTION INTRAVENOUS; SUBCUTANEOUS at 05:52

## 2021-12-09 RX ADMIN — Medication 81 MILLIGRAM(S): at 11:12

## 2021-12-09 RX ADMIN — Medication 3 MILLILITER(S): at 10:38

## 2021-12-09 RX ADMIN — Medication 75 MILLIGRAM(S): at 16:32

## 2021-12-09 RX ADMIN — DRONEDARONE 400 MILLIGRAM(S): 400 TABLET, FILM COATED ORAL at 05:52

## 2021-12-09 RX ADMIN — Medication 75 MILLIGRAM(S): at 05:52

## 2021-12-09 RX ADMIN — CEFTRIAXONE 100 MILLIGRAM(S): 500 INJECTION, POWDER, FOR SOLUTION INTRAMUSCULAR; INTRAVENOUS at 16:10

## 2021-12-09 RX ADMIN — HEPARIN SODIUM 5000 UNIT(S): 5000 INJECTION INTRAVENOUS; SUBCUTANEOUS at 16:29

## 2021-12-09 RX ADMIN — Medication 60 MILLIGRAM(S): at 16:28

## 2021-12-09 RX ADMIN — Medication 60 MILLIGRAM(S): at 05:51

## 2021-12-09 RX ADMIN — DRONEDARONE 400 MILLIGRAM(S): 400 TABLET, FILM COATED ORAL at 16:29

## 2021-12-09 RX ADMIN — PANTOPRAZOLE SODIUM 40 MILLIGRAM(S): 20 TABLET, DELAYED RELEASE ORAL at 06:23

## 2021-12-09 RX ADMIN — ATORVASTATIN CALCIUM 40 MILLIGRAM(S): 80 TABLET, FILM COATED ORAL at 21:04

## 2021-12-09 RX ADMIN — Medication 112 MICROGRAM(S): at 05:51

## 2021-12-09 NOTE — PHYSICAL THERAPY INITIAL EVALUATION ADULT - PERTINENT HX OF CURRENT PROBLEM, REHAB EVAL
89 y.o female w/ hx of COPD on home O2 2L, HTN, HLD,  hypothyroidism, CKD, presented to the ED for evaluation for cough and SOB, admitted for COPD exacerbation 2/2 viral infx (flu).

## 2021-12-09 NOTE — PROGRESS NOTE ADULT - ASSESSMENT
89 y.o female w/ hx of COPD on home O2 2L, HTN, HLD, hypothyroidism, CKD, presented to the ED for evaluation for cough and SOB, admitted for COPD exacerbation 2/2 viral infx (flu).     #Acute hypoxic respiratory failure secondary to COPD exacerbation secondary to the flu A positive      -currently on 3L NC - taper as tolerated   -CXR with bibasilar opacities   -continue tamiflu and ceftriaxone  -continue solumedrol and nebs  -pulm following  -OOB to chair/PT eval (pt uses a rolator to get around)   -isolation precautions   -check echo     #MAURI on CKD III - likely prerenal  -Cr 1.7 on admission; (baseline 1.3)  -start IVF for 8 hours   -repeat BMP in am  -avoid nephrotoxics   -check renal sono if cr doesn't improve after IVF     #paroxsymal Atrial fibrillation  - rate control with metoprolol and multaq   - on last admission, eliquis was discontinued and pt started on aspirin after discussions with pt and her son      # Hypothyroidism  - continue levothyroxine     #HTN/DL  -stable on metoprol   -continue statin     DNR/DNI    Progress Note Handoff  Pending Consults: none  Pending Tests: echo, labs  Pending Results: echo, labs  Family Discussion: discussed echo, labs, medication, oxygen and overall plan of care with pt and medical staff - all questions answered.   Disposition: Home_____/SNF______/Other_____/Unknown at this time__x___    Please call me with any questions at extension [phone]

## 2021-12-09 NOTE — PROGRESS NOTE ADULT - SUBJECTIVE AND OBJECTIVE BOX
TANNA MCCRACKEN  89y  Female      Patient is a 89y old female who presents with a chief complaint of SOB (09 Dec 2021 09:14)      INTERVAL HPI/OVERNIGHT EVENTS:  Patient seen and examined earlier this morning  lying comfortably in bed  in nad  states she feels better than yesterday      REVIEW OF SYSTEMS:  CONSTITUTIONAL: No fever, weight loss, or fatigue  EYES: No eye pain, visual disturbances, or discharge  ENMT:  No difficulty hearing, tinnitus, vertigo; No sinus or throat pain  NECK: No pain or stiffness  RESPIRATORY: No cough, wheezing, chills or hemoptysis; No shortness of breath  CARDIOVASCULAR: No chest pain, palpitations, dizziness, or leg swelling  GASTROINTESTINAL: No abdominal or epigastric pain. No nausea, vomiting, or hematemesis; No diarrhea or constipation. No melena or hematochezia.  GENITOURINARY: No dysuria, frequency, hematuria, or incontinence  NEUROLOGICAL: No headaches, memory loss, loss of strength, numbness, or tremors  SKIN: No itching, burning, rashes, or lesions   LYMPH NODES: No enlarged glands  ENDOCRINE: No heat or cold intolerance; No hair loss  MUSCULOSKELETAL: No joint pain or swelling; No muscle, back, or extremity pain  PSYCHIATRIC: No depression, anxiety, mood swings, or difficulty sleeping  HEME/LYMPH: No easy bruising, or bleeding gums  ALLERY AND IMMUNOLOGIC: No hives or eczema    T(C): 35.9 (21 @ 08:22), Max: 36.8 (21 @ 17:45)  HR: 66 (21 @ 08:22) (66 - 70)  BP: 142/63 (21 @ 08:22) (115/57 - 142/63)  RR: 18 (21 @ 08:22) (18 - 18)  SpO2: 100% (21 @ 04:00) (92% - 100%)    PHYSICAL EXAM:  GENERAL: NAD, well-groomed, well-developed, elderly female   HEAD:  Atraumatic, Normocephalic  EYES: EOMI, PERRLA, conjunctiva and sclera clear  ENMT: No tonsillar erythema, exudates, or enlargement; Moist mucous membranes, Good dentition, No lesions  NECK: Supple, No JVD, Normal thyroid  NERVOUS SYSTEM:  Alert & Oriented X3, Good concentration; Moves all extremities   CHEST/LUNG: decreased breath sounds b/l, mild ronchi   HEART: Regular rate and rhythm; No murmurs, rubs, or gallops  ABDOMEN: Soft, Nontender, Nondistended; Bowel sounds present  EXTREMITIES:  2+ Peripheral Pulses, No clubbing, cyanosis, or edema  LYMPH: No lymphadenopathy noted  SKIN: No rashes or lesions    Consultant(s) Notes Reviewed:  [x ] YES  [ ] NO  Care Discussed with Consultants/Other Providers [ x] YES  [ ] NO    LAB:                        11.3   2.49  )-----------( 237      ( 09 Dec 2021 04:30 )             37.0         142  |  99  |  65<HH>  ----------------------------<  161<H>  4.4   |  27  |  1.7<H>    Ca    8.9      09 Dec 2021 04:30  Mg     1.8     12    TPro  6.4  /  Alb  3.6  /  TBili  0.2  /  DBili  x   /  AST  34  /  ALT  18  /  AlkPhos  89      LIVER FUNCTIONS - ( 09 Dec 2021 04:30 )  Alb: 3.6 g/dL / Pro: 6.4 g/dL / ALK PHOS: 89 U/L / ALT: 18 U/L / AST: 34 U/L / GGT: x           CARDIAC MARKERS ( 09 Dec 2021 04:30 )  x     / <0.01 ng/mL / x     / x     / x      CARDIAC MARKERS ( 08 Dec 2021 23:01 )  x     / <0.01 ng/mL / x     / x     / x      CARDIAC MARKERS ( 08 Dec 2021 08:44 )  x     / 0.03 ng/mL / x     / x     / x            Drug Dosing Weight  Height (cm): 144.8 (08 Dec 2021 08:21)  Weight (kg): 45.4 (08 Dec 2021 08:41)  BMI (kg/m2): 21.7 (08 Dec 2021 08:41)  BSA (m2): 1.34 (08 Dec 2021 08:41)      Urinalysis Basic - ( 08 Dec 2021 08:44 )    Color: Yellow / Appearance: Clear / S.017 / pH: x  Gluc: x / Ketone: Negative  / Bili: Negative / Urobili: <2 mg/dL   Blood: x / Protein: 30 mg/dL / Nitrite: Negative   Leuk Esterase: Negative / RBC: 2 /HPF / WBC 3 /HPF   Sq Epi: x / Non Sq Epi: 1 /HPF / Bacteria: Negative        RADIOLOGY & ADDITIONAL TESTS:  Imaging Personally Reviewed:  [x] YES  [ ] NO  < from: Xray Chest 1 View- PORTABLE-Urgent (21 @ 09:59) >  Impression:  Bibasilar interstitial opacities, unchanged.  Bones as above.  < end of copied text >        MEDS:  albuterol/ipratropium for Nebulization 3 milliLiter(s) Nebulizer every 6 hours  aspirin  chewable 81 milliGRAM(s) Oral daily  atorvastatin 40 milliGRAM(s) Oral at bedtime  cefTRIAXone   IVPB 1000 milliGRAM(s) IV Intermittent every 24 hours  chlorhexidine 4% Liquid 1 Application(s) Topical <User Schedule>  dronedarone 400 milliGRAM(s) Oral two times a day  heparin   Injectable 5000 Unit(s) SubCutaneous every 12 hours  levothyroxine 112 MICROGram(s) Oral daily  methylPREDNISolone sodium succinate Injectable 60 milliGRAM(s) IV Push every 12 hours  metoprolol succinate ER 25 milliGRAM(s) Oral daily  oseltamivir 75 milliGRAM(s) Oral two times a day  pantoprazole    Tablet 40 milliGRAM(s) Oral before breakfast  senna 2 Tablet(s) Oral at bedtime PRN

## 2021-12-09 NOTE — PROGRESS NOTE ADULT - SUBJECTIVE AND OBJECTIVE BOX
TANNA MCCRACKEN 89y Female  MRN#: [id number]     Hospital Day: 1d    Pt is currently admitted with the primary diagnosis of flu/copd    SUBJECTIVE  No acute events overnight, pt seen and examined this am, no complaints, feels breathing is better, on 3L NC                                          ----------------------------------------------------------  OBJECTIVE  PAST MEDICAL & SURGICAL HISTORY  COPD (chronic obstructive pulmonary disease)    Hypothyroid    HTN (hypertension)    High cholesterol    Colitis    CKD (chronic kidney disease)    No significant past surgical history                                              -----------------------------------------------------------  ALLERGIES:  No Known Allergies                                            ------------------------------------------------------------    HOME MEDICATIONS  Home Medications:  albuterol 2.5 mg/3 mL (0.083%) inhalation solution: 3 milliliter(s) inhaled every 6 hours, As Needed for wheezing, shortness of breath (31 Aug 2020 12:23)  aspirin 81 mg oral tablet, chewable: 1 tab(s) orally once a day (07 Oct 2020 11:31)  melatonin 5 mg oral tablet: 1 tab(s) orally once a day (at bedtime), As Needed, to help you sleep at night (31 Aug 2020 12:23)  polyethylene glycol 3350 oral powder for reconstitution: 17 gram(s) orally once a day, As Needed, after dinner (31 Aug 2020 12:23)  senna oral tablet: take 1 or 2 tab(s) orally once a day (at bedtime), if needed, for constipation (31 Aug 2020 12:23)                           MEDICATIONS:  STANDING MEDICATIONS  albuterol/ipratropium for Nebulization 3 milliLiter(s) Nebulizer every 6 hours  aspirin  chewable 81 milliGRAM(s) Oral daily  atorvastatin 40 milliGRAM(s) Oral at bedtime  cefTRIAXone   IVPB 1000 milliGRAM(s) IV Intermittent every 24 hours  chlorhexidine 4% Liquid 1 Application(s) Topical <User Schedule>  dronedarone 400 milliGRAM(s) Oral two times a day  heparin   Injectable 5000 Unit(s) SubCutaneous every 12 hours  levothyroxine 112 MICROGram(s) Oral daily  methylPREDNISolone sodium succinate Injectable 60 milliGRAM(s) IV Push every 12 hours  metoprolol succinate ER 25 milliGRAM(s) Oral daily  oseltamivir 75 milliGRAM(s) Oral two times a day  pantoprazole    Tablet 40 milliGRAM(s) Oral before breakfast    PRN MEDICATIONS  senna 2 Tablet(s) Oral at bedtime PRN                                            ------------------------------------------------------------  VITAL SIGNS: Last 24 Hours  T(C): 35.9 (09 Dec 2021 08:22), Max: 36.8 (08 Dec 2021 17:45)  T(F): 96.7 (09 Dec 2021 08:22), Max: 98.2 (08 Dec 2021 17:45)  HR: 66 (09 Dec 2021 08:22) (66 - 81)  BP: 142/63 (09 Dec 2021 08:22) (115/57 - 142/63)  BP(mean): 90 (09 Dec 2021 08:22) (77 - 102)  RR: 18 (09 Dec 2021 08:22) (16 - 18)  SpO2: 100% (09 Dec 2021 04:00) (92% - 100%)                                             --------------------------------------------------------------  LABS:                        11.9   7.32  )-----------( 254      ( 08 Dec 2021 08:44 )             40.0     12-08    144  |  97<L>  |  61<HH>  ----------------------------<  83  4.7   |  26  |  1.7<H>    Ca    9.6      08 Dec 2021 08:44  Mg     1.8     12-08    TPro  7.8  /  Alb  4.3  /  TBili  0.3  /  DBili  x   /  AST  50<H>  /  ALT  25  /  AlkPhos  112  12-08      Urinalysis Basic - ( 08 Dec 2021 08:44 )    Color: Yellow / Appearance: Clear / S.017 / pH: x  Gluc: x / Ketone: Negative  / Bili: Negative / Urobili: <2 mg/dL   Blood: x / Protein: 30 mg/dL / Nitrite: Negative   Leuk Esterase: Negative / RBC: 2 /HPF / WBC 3 /HPF   Sq Epi: x / Non Sq Epi: 1 /HPF / Bacteria: Negative      ABG - ( 08 Dec 2021 11:22 )  pH, Arterial: 7.35  pH, Blood: x     /  pCO2: 66    /  pO2: 72    / HCO3: 36    / Base Excess: 9.0   /  SaO2: 96.1              Troponin T, Serum: <0.01 ng/mL (21 @ 23:01)          CARDIAC MARKERS ( 08 Dec 2021 23:01 )  x     / <0.01 ng/mL / x     / x     / x      CARDIAC MARKERS ( 08 Dec 2021 08:44 )  x     / 0.03 ng/mL / x     / x     / x                                                  -------------------------------------------------------------  RADIOLOGY:  < from: Xray Chest 1 View- PORTABLE-Urgent (21 @ 09:59) >    Impression:    Bibasilar interstitial opacities, unchanged.    Bones as above.    < end of copied text >                                            --------------------------------------------------------------    PHYSICAL EXAM:  GENERAL: NAD, lying in bed comfortably  HEAD:  Atraumatic, Normocephalic  EYES:  conjunctiva and sclera clear  ENT: Moist mucous membranes  NECK: Supple, No JVD  CHEST/LUNG: coursse breath sounds, Unlabored respirations  HEART: Regular rate and rhythm; No murmurs, rubs, or gallops  ABDOMEN: Soft, nontender, nondistended  EXTREMITIES:  No clubbing, cyanosis, or edema  NERVOUS SYSTEM:  A&Ox2 to name and place                                                --------------------------------------------------------------

## 2021-12-09 NOTE — PROGRESS NOTE ADULT - ASSESSMENT
89 y.o female w/ hx of COPD on home O2 2L, HTN, HLD,  hypothyroidism, CKD, presented to the ED for evaluation for cough and SOB, admitted for COPD exacerbation 2/2 viral infx (flu).     #COPD exacerbation 2/2 flu     -flu A was positive on nasal swab  -currently on 3L NC   -CXR with bibasilar opacities   -c/w tamiflu   -On ceftriaxone as treating for copd and for ppx against superimposed bacterial infx  -continue inhalers and solumedrol 60 q12  -pulm following  -F/u cultures    -PT    #MAURI-1.7 Cr  -1/7 on admission, likely prerenal, Baseline creatinine ~1.3   -will f/u repeat and monitor for now     #paroxsymal Atrial fibrillation  - on last admission, eliquis was discontinued, ASA resumed after discussion with patient and son regarding risks and benefits  - Metoprolol ER 25 PO QD  - Multaq 400 BID    # Hypothyroidism  - Levothyroxine 112 mcg     DASH DIET  AIT  DVT PPX heparin sc  Droplet precautions for influenza  DNR/DNI

## 2021-12-10 LAB
ALBUMIN SERPL ELPH-MCNC: 3.8 G/DL — SIGNIFICANT CHANGE UP (ref 3.5–5.2)
ALP SERPL-CCNC: 81 U/L — SIGNIFICANT CHANGE UP (ref 30–115)
ALT FLD-CCNC: 18 U/L — SIGNIFICANT CHANGE UP (ref 0–41)
ANION GAP SERPL CALC-SCNC: 18 MMOL/L — HIGH (ref 7–14)
AST SERPL-CCNC: 34 U/L — SIGNIFICANT CHANGE UP (ref 0–41)
BILIRUB SERPL-MCNC: <0.2 MG/DL — SIGNIFICANT CHANGE UP (ref 0.2–1.2)
BUN SERPL-MCNC: 62 MG/DL — CRITICAL HIGH (ref 10–20)
CALCIUM SERPL-MCNC: 8.5 MG/DL — SIGNIFICANT CHANGE UP (ref 8.5–10.1)
CHLORIDE SERPL-SCNC: 103 MMOL/L — SIGNIFICANT CHANGE UP (ref 98–110)
CO2 SERPL-SCNC: 24 MMOL/L — SIGNIFICANT CHANGE UP (ref 17–32)
CREAT SERPL-MCNC: 1.6 MG/DL — HIGH (ref 0.7–1.5)
GLUCOSE SERPL-MCNC: 111 MG/DL — HIGH (ref 70–99)
HCT VFR BLD CALC: 36 % — LOW (ref 37–47)
HGB BLD-MCNC: 11.1 G/DL — LOW (ref 12–16)
MAGNESIUM SERPL-MCNC: 2.1 MG/DL — SIGNIFICANT CHANGE UP (ref 1.8–2.4)
MCHC RBC-ENTMCNC: 29.1 PG — SIGNIFICANT CHANGE UP (ref 27–31)
MCHC RBC-ENTMCNC: 30.8 G/DL — LOW (ref 32–37)
MCV RBC AUTO: 94.2 FL — SIGNIFICANT CHANGE UP (ref 81–99)
NRBC # BLD: 0 /100 WBCS — SIGNIFICANT CHANGE UP (ref 0–0)
PLATELET # BLD AUTO: 253 K/UL — SIGNIFICANT CHANGE UP (ref 130–400)
POTASSIUM SERPL-MCNC: 4.4 MMOL/L — SIGNIFICANT CHANGE UP (ref 3.5–5)
POTASSIUM SERPL-SCNC: 4.4 MMOL/L — SIGNIFICANT CHANGE UP (ref 3.5–5)
PROCALCITONIN SERPL-MCNC: 0.09 NG/ML — SIGNIFICANT CHANGE UP (ref 0.02–0.1)
PROT SERPL-MCNC: 6.6 G/DL — SIGNIFICANT CHANGE UP (ref 6–8)
RBC # BLD: 3.82 M/UL — LOW (ref 4.2–5.4)
RBC # FLD: 14.2 % — SIGNIFICANT CHANGE UP (ref 11.5–14.5)
SODIUM SERPL-SCNC: 145 MMOL/L — SIGNIFICANT CHANGE UP (ref 135–146)
WBC # BLD: 4.33 K/UL — LOW (ref 4.8–10.8)
WBC # FLD AUTO: 4.33 K/UL — LOW (ref 4.8–10.8)

## 2021-12-10 PROCEDURE — 99232 SBSQ HOSP IP/OBS MODERATE 35: CPT

## 2021-12-10 PROCEDURE — 99233 SBSQ HOSP IP/OBS HIGH 50: CPT

## 2021-12-10 RX ORDER — SODIUM CHLORIDE 9 MG/ML
1000 INJECTION, SOLUTION INTRAVENOUS
Refills: 0 | Status: DISCONTINUED | OUTPATIENT
Start: 2021-12-10 | End: 2021-12-11

## 2021-12-10 RX ADMIN — Medication 75 MILLIGRAM(S): at 17:27

## 2021-12-10 RX ADMIN — HEPARIN SODIUM 5000 UNIT(S): 5000 INJECTION INTRAVENOUS; SUBCUTANEOUS at 05:06

## 2021-12-10 RX ADMIN — Medication 112 MICROGRAM(S): at 05:05

## 2021-12-10 RX ADMIN — CEFTRIAXONE 100 MILLIGRAM(S): 500 INJECTION, POWDER, FOR SOLUTION INTRAMUSCULAR; INTRAVENOUS at 16:02

## 2021-12-10 RX ADMIN — Medication 60 MILLIGRAM(S): at 17:26

## 2021-12-10 RX ADMIN — DRONEDARONE 400 MILLIGRAM(S): 400 TABLET, FILM COATED ORAL at 05:05

## 2021-12-10 RX ADMIN — DRONEDARONE 400 MILLIGRAM(S): 400 TABLET, FILM COATED ORAL at 17:26

## 2021-12-10 RX ADMIN — Medication 25 MILLIGRAM(S): at 05:06

## 2021-12-10 RX ADMIN — HEPARIN SODIUM 5000 UNIT(S): 5000 INJECTION INTRAVENOUS; SUBCUTANEOUS at 17:27

## 2021-12-10 RX ADMIN — Medication 81 MILLIGRAM(S): at 11:20

## 2021-12-10 RX ADMIN — Medication 3 MILLILITER(S): at 14:19

## 2021-12-10 RX ADMIN — Medication 60 MILLIGRAM(S): at 05:06

## 2021-12-10 RX ADMIN — PANTOPRAZOLE SODIUM 40 MILLIGRAM(S): 20 TABLET, DELAYED RELEASE ORAL at 05:07

## 2021-12-10 RX ADMIN — Medication 75 MILLIGRAM(S): at 05:07

## 2021-12-10 NOTE — PROGRESS NOTE ADULT - SUBJECTIVE AND OBJECTIVE BOX
TANNA MCCRACKEN 89y Female  MRN#: [id number]     Hospital Day: 2d    Pt is currently admitted with the primary diagnosis of copd/flu    SUBJECTIVE  No acute events overnight, pt seen and examined this am, no complaints. on 5L NC                                          ----------------------------------------------------------  OBJECTIVE  PAST MEDICAL & SURGICAL HISTORY  COPD (chronic obstructive pulmonary disease)    Hypothyroid    HTN (hypertension)    High cholesterol    Colitis    CKD (chronic kidney disease)    No significant past surgical history                                              -----------------------------------------------------------  ALLERGIES:  No Known Allergies                                            ------------------------------------------------------------    HOME MEDICATIONS  Home Medications:  albuterol 2.5 mg/3 mL (0.083%) inhalation solution: 3 milliliter(s) inhaled every 6 hours, As Needed for wheezing, shortness of breath (31 Aug 2020 12:23)  aspirin 81 mg oral tablet, chewable: 1 tab(s) orally once a day (07 Oct 2020 11:31)  melatonin 5 mg oral tablet: 1 tab(s) orally once a day (at bedtime), As Needed, to help you sleep at night (31 Aug 2020 12:23)  polyethylene glycol 3350 oral powder for reconstitution: 17 gram(s) orally once a day, As Needed, after dinner (31 Aug 2020 12:23)  senna oral tablet: take 1 or 2 tab(s) orally once a day (at bedtime), if needed, for constipation (31 Aug 2020 12:23)                           MEDICATIONS:  STANDING MEDICATIONS  albuterol/ipratropium for Nebulization 3 milliLiter(s) Nebulizer every 6 hours  aspirin  chewable 81 milliGRAM(s) Oral daily  atorvastatin 40 milliGRAM(s) Oral at bedtime  cefTRIAXone   IVPB 1000 milliGRAM(s) IV Intermittent every 24 hours  chlorhexidine 4% Liquid 1 Application(s) Topical <User Schedule>  dronedarone 400 milliGRAM(s) Oral two times a day  heparin   Injectable 5000 Unit(s) SubCutaneous every 12 hours  levothyroxine 112 MICROGram(s) Oral daily  methylPREDNISolone sodium succinate Injectable 60 milliGRAM(s) IV Push every 12 hours  metoprolol succinate ER 25 milliGRAM(s) Oral daily  oseltamivir 75 milliGRAM(s) Oral two times a day  pantoprazole    Tablet 40 milliGRAM(s) Oral before breakfast    PRN MEDICATIONS  senna 2 Tablet(s) Oral at bedtime PRN                                            ------------------------------------------------------------  VITAL SIGNS: Last 24 Hours  T(C): 36.4 (10 Dec 2021 08:18), Max: 36.4 (10 Dec 2021 08:18)  T(F): 97.6 (10 Dec 2021 08:18), Max: 97.6 (10 Dec 2021 08:18)  HR: 69 (10 Dec 2021 08:18) (64 - 92)  BP: 148/67 (10 Dec 2021 08:18) (117/59 - 148/67)  BP(mean): 97 (10 Dec 2021 08:18) (97 - 97)  RR: 18 (10 Dec 2021 08:18) (18 - 18)  SpO2: 94% (10 Dec 2021 08:18) (93% - 99%)      21 @ 07:01  -  12-10-21 @ 07:00  --------------------------------------------------------  IN: 600 mL / OUT: 0 mL / NET: 600 mL                                             --------------------------------------------------------------  LABS:                        11.3   2.49  )-----------( 237      ( 09 Dec 2021 04:30 )             37.0     12    142  |  99  |  65<HH>  ----------------------------<  161<H>  4.4   |  27  |  1.7<H>    Ca    8.9      09 Dec 2021 04:30  Mg     1.8         TPro  6.4  /  Alb  3.6  /  TBili  0.2  /  DBili  x   /  AST  34  /  ALT  18  /  AlkPhos  89        Urinalysis Basic - ( 08 Dec 2021 08:44 )    Color: Yellow / Appearance: Clear / S.017 / pH: x  Gluc: x / Ketone: Negative  / Bili: Negative / Urobili: <2 mg/dL   Blood: x / Protein: 30 mg/dL / Nitrite: Negative   Leuk Esterase: Negative / RBC: 2 /HPF / WBC 3 /HPF   Sq Epi: x / Non Sq Epi: 1 /HPF / Bacteria: Negative      ABG - ( 08 Dec 2021 11:22 )  pH, Arterial: 7.35  pH, Blood: x     /  pCO2: 66    /  pO2: 72    / HCO3: 36    / Base Excess: 9.0   /  SaO2: 96.1                    Culture - Urine (collected 08 Dec 2021 08:45)  Source: Clean Catch Clean Catch (Midstream)  Final Report (09 Dec 2021 21:39):    No growth    Culture - Blood (collected 08 Dec 2021 08:44)  Source: .Blood Blood-Peripheral  Preliminary Report (09 Dec 2021 23:02):    No growth to date.    Culture - Blood (collected 08 Dec 2021 08:44)  Source: .Blood Blood-Peripheral  Preliminary Report (09 Dec 2021 23:02):    No growth to date.        CARDIAC MARKERS ( 09 Dec 2021 04:30 )  x     / <0.01 ng/mL / x     / x     / x      CARDIAC MARKERS ( 08 Dec 2021 23:01 )  x     / <0.01 ng/mL / x     / x     / x      CARDIAC MARKERS ( 08 Dec 2021 08:44 )  x     / 0.03 ng/mL / x     / x     / x                                                  -------------------------------------------------------------  RADIOLOGY:                                            --------------------------------------------------------------    PHYSICAL EXAM:  GENERAL: NAD, lying in bed comfortably  HEAD:  Atraumatic, Normocephalic  EYES:  conjunctiva and sclera clear  ENT: Moist mucous membranes  NECK: Supple, No JVD  CHEST/LUNG: wheezing b/l. Unlabored respirations  HEART: Regular rate and rhythm; No murmurs, rubs, or gallops  ABDOMEN: Soft, nontender, nondistended  EXTREMITIES:  No clubbing, cyanosis, or edema  NERVOUS SYSTEM:  A&Ox2-3                                               --------------------------------------------------------------

## 2021-12-10 NOTE — PROGRESS NOTE ADULT - ASSESSMENT
IMPRESSION:    Acute on chronic hypercapneic and hypoxemic respiratory failure on home O2  COPD exacerbation/ FLU A positive  MAURI on CKD    RECOMMEND:    NIV prn and at bedtime  Tamiflu  Droplet precautions  prednisone 40 for 5 days, 20 for 5 days  Nebs q4h and prn  Home inhalers  floor  OOB to chair

## 2021-12-10 NOTE — PROGRESS NOTE ADULT - SUBJECTIVE AND OBJECTIVE BOX
OVERNIGHT EVENTS: events noted, on NC, feels better    Vital Signs Last 24 Hrs  T(C): 35.1 (10 Dec 2021 05:17), Max: 36.1 (09 Dec 2021 20:15)  T(F): 95.2 (10 Dec 2021 05:17), Max: 96.9 (09 Dec 2021 20:15)  HR: 71 (10 Dec 2021 05:17) (64 - 92)  BP: 145/67 (10 Dec 2021 05:17) (117/59 - 145/67)  BP(mean): 97 (10 Dec 2021 05:17) (90 - 97)  RR: 18 (10 Dec 2021 05:00) (18 - 18)  SpO2: 98% (10 Dec 2021 05:17) (93% - 99%)    PHYSICAL EXAMINATION:    GENERAL: chronically ill looking    HEENT: Head is normocephalic and atraumatic. Extraocular muscles are intact. Mucous membranes are moist.    NECK: Supple.    LUNGS: dec bs both bases, bl rhonchi    HEART: Regular rate and rhythm without murmur.    ABDOMEN: Soft, nontender, and nondistended.      EXTREMITIES: Without any cyanosis, clubbing, rash, lesions or edema.    NEUROLOGIC: Grossly intact.    SKIN: No ulceration or induration present.      LABS:                        11.3   2.49  )-----------( 237      ( 09 Dec 2021 04:30 )             37.0         142  |  99  |  65<HH>  ----------------------------<  161<H>  4.4   |  27  |  1.7<H>    Ca    8.9      09 Dec 2021 04:30  Mg     1.8         TPro  6.4  /  Alb  3.6  /  TBili  0.2  /  DBili  x   /  AST  34  /  ALT  18  /  AlkPhos  89        Urinalysis Basic - ( 08 Dec 2021 08:44 )    Color: Yellow / Appearance: Clear / S.017 / pH: x  Gluc: x / Ketone: Negative  / Bili: Negative / Urobili: <2 mg/dL   Blood: x / Protein: 30 mg/dL / Nitrite: Negative   Leuk Esterase: Negative / RBC: 2 /HPF / WBC 3 /HPF   Sq Epi: x / Non Sq Epi: 1 /HPF / Bacteria: Negative      ABG - ( 08 Dec 2021 11:22 )  pH, Arterial: 7.35  pH, Blood: x     /  pCO2: 66    /  pO2: 72    / HCO3: 36    / Base Excess: 9.0   /  SaO2: 96.1              CARDIAC MARKERS ( 09 Dec 2021 04:30 )  x     / <0.01 ng/mL / x     / x     / x      CARDIAC MARKERS ( 08 Dec 2021 23:01 )  x     / <0.01 ng/mL / x     / x     / x      CARDIAC MARKERS ( 08 Dec 2021 08:44 )  x     / 0.03 ng/mL / x     / x     / x            Serum Pro-Brain Natriuretic Peptide: 1015 pg/mL (21 @ 08:44)      Procalcitonin, Serum: 0.09 ng/mL (21 @ 04:30)        21 @ 07:01  -  12-10-21 @ 06:26  --------------------------------------------------------  IN: 600 mL / OUT: 0 mL / NET: 600 mL        MICROBIOLOGY:  Culture Results:   No growth ( @ 08:45)  Culture Results:   No growth to date. ( @ 08:44)  Culture Results:   No growth to date. ( @ 08:44)      MEDICATIONS  (STANDING):  albuterol/ipratropium for Nebulization 3 milliLiter(s) Nebulizer every 6 hours  aspirin  chewable 81 milliGRAM(s) Oral daily  atorvastatin 40 milliGRAM(s) Oral at bedtime  cefTRIAXone   IVPB 1000 milliGRAM(s) IV Intermittent every 24 hours  chlorhexidine 4% Liquid 1 Application(s) Topical <User Schedule>  dextrose 5% + sodium chloride 0.45%. 1000 milliLiter(s) (75 mL/Hr) IV Continuous <Continuous>  dronedarone 400 milliGRAM(s) Oral two times a day  heparin   Injectable 5000 Unit(s) SubCutaneous every 12 hours  levothyroxine 112 MICROGram(s) Oral daily  methylPREDNISolone sodium succinate Injectable 60 milliGRAM(s) IV Push every 12 hours  metoprolol succinate ER 25 milliGRAM(s) Oral daily  oseltamivir 75 milliGRAM(s) Oral two times a day  pantoprazole    Tablet 40 milliGRAM(s) Oral before breakfast    MEDICATIONS  (PRN):  senna 2 Tablet(s) Oral at bedtime PRN Constipation      RADIOLOGY & ADDITIONAL STUDIES:

## 2021-12-10 NOTE — PROGRESS NOTE ADULT - SUBJECTIVE AND OBJECTIVE BOX
TANNA MCCRACKEN  89y  Female      Patient is a 89y old female who presents with a chief complaint of SOB (09 Dec 2021 09:14)      INTERVAL HPI/OVERNIGHT EVENTS:  Patient seen and examined earlier this morning  lying comfortably in bed  in nad  states she feels well this morning       REVIEW OF SYSTEMS:  CONSTITUTIONAL: No fever, weight loss, or fatigue  EYES: No eye pain, visual disturbances, or discharge  ENMT:  No difficulty hearing, tinnitus, vertigo; No sinus or throat pain  NECK: No pain or stiffness  RESPIRATORY: No cough, wheezing, chills or hemoptysis; No shortness of breath  CARDIOVASCULAR: No chest pain, palpitations, dizziness, or leg swelling  GASTROINTESTINAL: No abdominal or epigastric pain. No nausea, vomiting, or hematemesis; No diarrhea or constipation. No melena or hematochezia.  GENITOURINARY: No dysuria, frequency, hematuria, or incontinence  NEUROLOGICAL: No headaches, memory loss, loss of strength, numbness, or tremors  SKIN: No itching, burning, rashes, or lesions   LYMPH NODES: No enlarged glands  ENDOCRINE: No heat or cold intolerance; No hair loss  MUSCULOSKELETAL: No joint pain or swelling; No muscle, back, or extremity pain  PSYCHIATRIC: No depression, anxiety, mood swings, or difficulty sleeping  HEME/LYMPH: No easy bruising, or bleeding gums  ALLERY AND IMMUNOLOGIC: No hives or eczema    Vital Signs Last 24 Hrs  T(C): 36.1 (10 Dec 2021 11:52), Max: 36.4 (10 Dec 2021 08:18)  T(F): 96.9 (10 Dec 2021 11:52), Max: 97.6 (10 Dec 2021 08:18)  HR: 61 (10 Dec 2021 11:52) (61 - 92)  BP: 137/60 (10 Dec 2021 11:52) (117/59 - 148/67)  BP(mean): 97 (10 Dec 2021 08:18) (97 - 97)  RR: 18 (10 Dec 2021 11:52) (18 - 18)  SpO2: 95% (10 Dec 2021 11:52) (93% - 99%) on nc      PHYSICAL EXAM:  GENERAL: NAD, well-groomed, well-developed, elderly female   HEAD:  Atraumatic, Normocephalic  EYES: EOMI, PERRLA, conjunctiva and sclera clear  ENMT: No tonsillar erythema, exudates, or enlargement; Moist mucous membranes, Good dentition, No lesions  NECK: Supple, No JVD, Normal thyroid  NERVOUS SYSTEM:  Alert & Oriented X3, Good concentration; Moves all extremities   CHEST/LUNG: decreased breath sounds b/l, mild ronchi   HEART: Regular rate and rhythm; No murmurs, rubs, or gallops  ABDOMEN: Soft, Nontender, Nondistended; Bowel sounds present  EXTREMITIES:  2+ Peripheral Pulses, No clubbing, cyanosis, or edema  LYMPH: No lymphadenopathy noted  SKIN: No rashes or lesions    Consultant(s) Notes Reviewed:  [x ] YES  [ ] NO  Care Discussed with Consultants/Other Providers [ x] YES  [ ] NO    LAB:                        11.1   4.33  )-----------( 253      ( 10 Dec 2021 07:24 )             36.0       12-10    145  |  103  |  62<HH>  ----------------------------<  111<H>  4.4   |  24  |  1.6<H>    Ca    8.5      10 Dec 2021 07:24  Mg     2.1     12-10    TPro  6.6  /  Alb  3.8  /  TBili  <0.2  /  DBili  x   /  AST  34  /  ALT  18  /  AlkPhos  81  12-10      Drug Dosing Weight  Height (cm): 144.8 (08 Dec 2021 08:21)  Weight (kg): 45.4 (08 Dec 2021 08:41)  BMI (kg/m2): 21.7 (08 Dec 2021 08:41)  BSA (m2): 1.34 (08 Dec 2021 08:41)      Urinalysis Basic - ( 08 Dec 2021 08:44 )    Color: Yellow / Appearance: Clear / S.017 / pH: x  Gluc: x / Ketone: Negative  / Bili: Negative / Urobili: <2 mg/dL   Blood: x / Protein: 30 mg/dL / Nitrite: Negative   Leuk Esterase: Negative / RBC: 2 /HPF / WBC 3 /HPF   Sq Epi: x / Non Sq Epi: 1 /HPF / Bacteria: Negative        RADIOLOGY & ADDITIONAL TESTS:  Imaging Personally Reviewed:  [x] YES  [ ] NO  < from: Xray Chest 1 View- PORTABLE-Urgent (21 @ 09:59) >  Impression:  Bibasilar interstitial opacities, unchanged.  Bones as above.  < end of copied text >        MEDICATIONS  (STANDING):  albuterol/ipratropium for Nebulization 3 milliLiter(s) Nebulizer every 6 hours  aspirin  chewable 81 milliGRAM(s) Oral daily  atorvastatin 40 milliGRAM(s) Oral at bedtime  cefTRIAXone   IVPB 1000 milliGRAM(s) IV Intermittent every 24 hours  chlorhexidine 4% Liquid 1 Application(s) Topical <User Schedule>  dronedarone 400 milliGRAM(s) Oral two times a day  heparin   Injectable 5000 Unit(s) SubCutaneous every 12 hours  levothyroxine 112 MICROGram(s) Oral daily  methylPREDNISolone sodium succinate Injectable 60 milliGRAM(s) IV Push every 12 hours  metoprolol succinate ER 25 milliGRAM(s) Oral daily  oseltamivir 75 milliGRAM(s) Oral two times a day  pantoprazole    Tablet 40 milliGRAM(s) Oral before breakfast    MEDICATIONS  (PRN):  senna 2 Tablet(s) Oral at bedtime PRN Constipation

## 2021-12-10 NOTE — PROGRESS NOTE ADULT - ASSESSMENT
89 y.o female w/ hx of COPD on home O2 2L, HTN, HLD, hypothyroidism, CKD, presented to the ED for evaluation for cough and SOB, admitted for COPD exacerbation 2/2 viral infx (flu).     #Acute hypoxic respiratory failure secondary to COPD exacerbation secondary to the flu A positive      -currently on 3L NC - taper as tolerated   -CXR with bibasilar opacities   -continue tamiflu and ceftriaxone  -continue solumedrol and nebs  -pulm following  -OOB to chair/PT eval (pt uses a rolator to get around)   -isolation precautions   -echo ordered, not yet done    #MAURI on CKD III - likely prerenal  -Cr 1.7 on admission; (baseline 1.3)  -restart IVF for 8 hours   -repeat BMP in am  -avoid nephrotoxics   -check renal sono if cr doesn't improve after IVF     #paroxsymal Atrial fibrillation  - rate control with metoprolol and multaq   - on last admission, eliquis was discontinued and pt started on aspirin after discussions with pt and her son      # Hypothyroidism  - continue levothyroxine     #HTN/DL  -stable on metoprol   -continue statin     DNR/DNI    Progress Note Handoff  Pending Consults: none  Pending Tests: echo, labs  Pending Results: echo, labs  Family Discussion: discussed echo, labs, medication, oxygen and overall plan of care with pt and medical staff - all questions answered.   Disposition: Home_____/SNF______/Other_____/Unknown at this time__x___    Please call me with any questions at extension [phone]

## 2021-12-10 NOTE — PROGRESS NOTE ADULT - ASSESSMENT
89 y.o female w/ hx of COPD on home O2 2L, HTN, HLD,  hypothyroidism, CKD, presented to the ED for evaluation for cough and SOB, admitted for COPD exacerbation 2/2 viral infx (flu).     #COPD exacerbation 2/2 flu     -flu A was positive on nasal swab  -currently requiring 5L NC, on 2L at home, still with sig wheezing on exam    -CXR with bibasilar opacities   -c/w tamiflu   -On ceftriaxone as treating for copd and for ppx against superimposed bacterial infx  -continue inhalers and solumedrol 60 q12  -pulm following  -Bcx/Ucx NGTD  -PT    #MAURI  -Cr 1.7 on admission, likely prerenal, Baseline creatinine ~1.3, s/p fluid bolus   -will f/u repeat and monitor for now     #paroxsymal Atrial fibrillation  - on last admission, eliquis was discontinued, ASA resumed after discussion with patient and son regarding risks and benefits  - Metoprolol ER 25 PO QD  - Multaq 400 BID    # Hypothyroidism  - Levothyroxine 112 mcg     DASH DIET  AIT  DVT PPX heparin sc  Droplet precautions for influenza  DNR/DNI

## 2021-12-11 LAB
ALBUMIN SERPL ELPH-MCNC: 3.7 G/DL — SIGNIFICANT CHANGE UP (ref 3.5–5.2)
ALP SERPL-CCNC: 73 U/L — SIGNIFICANT CHANGE UP (ref 30–115)
ALT FLD-CCNC: 18 U/L — SIGNIFICANT CHANGE UP (ref 0–41)
ANION GAP SERPL CALC-SCNC: 18 MMOL/L — HIGH (ref 7–14)
AST SERPL-CCNC: 37 U/L — SIGNIFICANT CHANGE UP (ref 0–41)
BILIRUB SERPL-MCNC: <0.2 MG/DL — SIGNIFICANT CHANGE UP (ref 0.2–1.2)
BUN SERPL-MCNC: 67 MG/DL — CRITICAL HIGH (ref 10–20)
CALCIUM SERPL-MCNC: 8.5 MG/DL — SIGNIFICANT CHANGE UP (ref 8.5–10.1)
CHLORIDE SERPL-SCNC: 106 MMOL/L — SIGNIFICANT CHANGE UP (ref 98–110)
CO2 SERPL-SCNC: 25 MMOL/L — SIGNIFICANT CHANGE UP (ref 17–32)
CREAT SERPL-MCNC: 1.5 MG/DL — SIGNIFICANT CHANGE UP (ref 0.7–1.5)
GAS PNL BLDA: SIGNIFICANT CHANGE UP
GLUCOSE SERPL-MCNC: 94 MG/DL — SIGNIFICANT CHANGE UP (ref 70–99)
HCT VFR BLD CALC: 34.8 % — LOW (ref 37–47)
HGB BLD-MCNC: 10.6 G/DL — LOW (ref 12–16)
MAGNESIUM SERPL-MCNC: 2.2 MG/DL — SIGNIFICANT CHANGE UP (ref 1.8–2.4)
MCHC RBC-ENTMCNC: 29 PG — SIGNIFICANT CHANGE UP (ref 27–31)
MCHC RBC-ENTMCNC: 30.5 G/DL — LOW (ref 32–37)
MCV RBC AUTO: 95.3 FL — SIGNIFICANT CHANGE UP (ref 81–99)
NRBC # BLD: 0 /100 WBCS — SIGNIFICANT CHANGE UP (ref 0–0)
PLATELET # BLD AUTO: 217 K/UL — SIGNIFICANT CHANGE UP (ref 130–400)
POTASSIUM SERPL-MCNC: 4.3 MMOL/L — SIGNIFICANT CHANGE UP (ref 3.5–5)
POTASSIUM SERPL-SCNC: 4.3 MMOL/L — SIGNIFICANT CHANGE UP (ref 3.5–5)
PROT SERPL-MCNC: 6.5 G/DL — SIGNIFICANT CHANGE UP (ref 6–8)
RBC # BLD: 3.65 M/UL — LOW (ref 4.2–5.4)
RBC # FLD: 14.3 % — SIGNIFICANT CHANGE UP (ref 11.5–14.5)
SODIUM SERPL-SCNC: 149 MMOL/L — HIGH (ref 135–146)
WBC # BLD: 5.3 K/UL — SIGNIFICANT CHANGE UP (ref 4.8–10.8)
WBC # FLD AUTO: 5.3 K/UL — SIGNIFICANT CHANGE UP (ref 4.8–10.8)

## 2021-12-11 PROCEDURE — 71045 X-RAY EXAM CHEST 1 VIEW: CPT | Mod: 26

## 2021-12-11 PROCEDURE — 99232 SBSQ HOSP IP/OBS MODERATE 35: CPT

## 2021-12-11 PROCEDURE — 99233 SBSQ HOSP IP/OBS HIGH 50: CPT

## 2021-12-11 RX ORDER — SODIUM CHLORIDE 9 MG/ML
1000 INJECTION, SOLUTION INTRAVENOUS
Refills: 0 | Status: COMPLETED | OUTPATIENT
Start: 2021-12-11 | End: 2021-12-11

## 2021-12-11 RX ADMIN — DRONEDARONE 400 MILLIGRAM(S): 400 TABLET, FILM COATED ORAL at 17:11

## 2021-12-11 RX ADMIN — CHLORHEXIDINE GLUCONATE 1 APPLICATION(S): 213 SOLUTION TOPICAL at 06:02

## 2021-12-11 RX ADMIN — Medication 60 MILLIGRAM(S): at 05:59

## 2021-12-11 RX ADMIN — Medication 3 MILLILITER(S): at 19:37

## 2021-12-11 RX ADMIN — DRONEDARONE 400 MILLIGRAM(S): 400 TABLET, FILM COATED ORAL at 06:02

## 2021-12-11 RX ADMIN — Medication 3 MILLILITER(S): at 09:08

## 2021-12-11 RX ADMIN — PANTOPRAZOLE SODIUM 40 MILLIGRAM(S): 20 TABLET, DELAYED RELEASE ORAL at 06:22

## 2021-12-11 RX ADMIN — Medication 3 MILLILITER(S): at 14:29

## 2021-12-11 RX ADMIN — ATORVASTATIN CALCIUM 40 MILLIGRAM(S): 80 TABLET, FILM COATED ORAL at 21:02

## 2021-12-11 RX ADMIN — Medication 60 MILLIGRAM(S): at 17:11

## 2021-12-11 RX ADMIN — HEPARIN SODIUM 5000 UNIT(S): 5000 INJECTION INTRAVENOUS; SUBCUTANEOUS at 05:59

## 2021-12-11 RX ADMIN — Medication 25 MILLIGRAM(S): at 06:02

## 2021-12-11 RX ADMIN — Medication 112 MICROGRAM(S): at 06:01

## 2021-12-11 RX ADMIN — HEPARIN SODIUM 5000 UNIT(S): 5000 INJECTION INTRAVENOUS; SUBCUTANEOUS at 17:12

## 2021-12-11 RX ADMIN — CEFTRIAXONE 100 MILLIGRAM(S): 500 INJECTION, POWDER, FOR SOLUTION INTRAMUSCULAR; INTRAVENOUS at 15:30

## 2021-12-11 RX ADMIN — Medication 75 MILLIGRAM(S): at 06:02

## 2021-12-11 RX ADMIN — Medication 75 MILLIGRAM(S): at 17:12

## 2021-12-11 NOTE — PROGRESS NOTE ADULT - SUBJECTIVE AND OBJECTIVE BOX
TANNA MCCRACKEN  89y  Female      Patient is a 89y old female who presents with a chief complaint of SOB (09 Dec 2021 09:14)      INTERVAL HPI/OVERNIGHT EVENTS:  Patient seen and examined earlier this morning with her son and daughter bedside  lying comfortably in bed on bipap - pt was desaturating last night but now placed back on 5L nc and eating breakfast       REVIEW OF SYSTEMS:  CONSTITUTIONAL: No fever, weight loss, or fatigue  EYES: No eye pain, visual disturbances, or discharge  ENMT:  No difficulty hearing, tinnitus, vertigo; No sinus or throat pain  NECK: No pain or stiffness  RESPIRATORY: No cough, wheezing, chills or hemoptysis; No shortness of breath  CARDIOVASCULAR: No chest pain, palpitations, dizziness, or leg swelling  GASTROINTESTINAL: No abdominal or epigastric pain. No nausea, vomiting, or hematemesis; No diarrhea or constipation. No melena or hematochezia.  GENITOURINARY: No dysuria, frequency, hematuria, or incontinence  NEUROLOGICAL: No headaches, memory loss, loss of strength, numbness, or tremors  SKIN: No itching, burning, rashes, or lesions   LYMPH NODES: No enlarged glands  ENDOCRINE: No heat or cold intolerance; No hair loss  MUSCULOSKELETAL: No joint pain or swelling; No muscle, back, or extremity pain  PSYCHIATRIC: No depression, anxiety, mood swings, or difficulty sleeping  HEME/LYMPH: No easy bruising, or bleeding gums  ALLERY AND IMMUNOLOGIC: No hives or eczema      Vital Signs Last 24 Hrs  T(C): 36.2 (11 Dec 2021 08:00), Max: 36.3 (11 Dec 2021 04:00)  T(F): 97.1 (11 Dec 2021 08:00), Max: 97.4 (11 Dec 2021 04:00)  HR: 69 (11 Dec 2021 08:00) (61 - 84)  BP: 162/67 (11 Dec 2021 08:00) (134/61 - 162/67)  BP(mean): 96 (11 Dec 2021 08:00) (96 - 96)  RR: 18 (11 Dec 2021 08:40) (18 - 20)  SpO2: 98% (11 Dec 2021 08:40) (89% - 100%) on nc      PHYSICAL EXAM:  GENERAL: NAD, well-groomed, well-developed, elderly female   HEAD:  Atraumatic, Normocephalic  EYES: EOMI, PERRLA, conjunctiva and sclera clear  ENMT: No tonsillar erythema, exudates, or enlargement; Moist mucous membranes, Good dentition, No lesions  NECK: Supple, No JVD, Normal thyroid  NERVOUS SYSTEM:  Alert & Oriented X3, Good concentration; Moves all extremities   CHEST/LUNG: decreased breath sounds b/l, mild ronchi b/l  HEART: Regular rate and rhythm; No murmurs, rubs, or gallops  ABDOMEN: Soft, Nontender, Nondistended; Bowel sounds present  EXTREMITIES:  2+ Peripheral Pulses, No clubbing, cyanosis, or edema  LYMPH: No lymphadenopathy noted  SKIN: No rashes or lesions    Consultant(s) Notes Reviewed:  [x ] YES  [ ] NO  Care Discussed with Consultants/Other Providers [ x] YES  [ ] NO    LAB:                                   10.6   5.30  )-----------( 217      ( 11 Dec 2021 04:30 )             34.8     12-11    149<H>  |  106  |  67<HH>  ----------------------------<  94  4.3   |  25  |  1.5    Ca    8.5      11 Dec 2021 04:30  Mg     2.2     12-    TPro  6.5  /  Alb  3.7  /  TBili  <0.2  /  DBili  x   /  AST  37  /  ALT  18  /  AlkPhos  73  12-11        Drug Dosing Weight  Height (cm): 144.8 (08 Dec 2021 08:21)  Weight (kg): 45.4 (08 Dec 2021 08:41)  BMI (kg/m2): 21.7 (08 Dec 2021 08:41)  BSA (m2): 1.34 (08 Dec 2021 08:41)      Urinalysis Basic - ( 08 Dec 2021 08:44 )    Color: Yellow / Appearance: Clear / S.017 / pH: x  Gluc: x / Ketone: Negative  / Bili: Negative / Urobili: <2 mg/dL   Blood: x / Protein: 30 mg/dL / Nitrite: Negative   Leuk Esterase: Negative / RBC: 2 /HPF / WBC 3 /HPF   Sq Epi: x / Non Sq Epi: 1 /HPF / Bacteria: Negative        RADIOLOGY & ADDITIONAL TESTS:  Imaging Personally Reviewed:  [x] YES  [ ] NO  < from: Xray Chest 1 View- PORTABLE-Urgent (21 @ 09:59) >  Impression:  Bibasilar interstitial opacities, unchanged.  Bones as above.  < end of copied text >      MEDICATIONS  (STANDING):  albuterol/ipratropium for Nebulization 3 milliLiter(s) Nebulizer every 6 hours  aspirin  chewable 81 milliGRAM(s) Oral daily  atorvastatin 40 milliGRAM(s) Oral at bedtime  cefTRIAXone   IVPB 1000 milliGRAM(s) IV Intermittent every 24 hours  chlorhexidine 4% Liquid 1 Application(s) Topical <User Schedule>  dronedarone 400 milliGRAM(s) Oral two times a day  heparin   Injectable 5000 Unit(s) SubCutaneous every 12 hours  levothyroxine 112 MICROGram(s) Oral daily  methylPREDNISolone sodium succinate Injectable 60 milliGRAM(s) IV Push every 12 hours  metoprolol succinate ER 25 milliGRAM(s) Oral daily  oseltamivir 75 milliGRAM(s) Oral two times a day  pantoprazole    Tablet 40 milliGRAM(s) Oral before breakfast    MEDICATIONS  (PRN):  senna 2 Tablet(s) Oral at bedtime PRN Constipation

## 2021-12-11 NOTE — PROGRESS NOTE ADULT - ATTENDING COMMENTS
Attending Statement: I have personally performed a face to face diagnostic evaluation on this patient. The patient is suffering from:  Acute on chronic hypercapnic respiratory failure with hypoxemia  H/O Chronic hypoxemic respiratory failure on home O2  COPD exacerbation/ FLU A positive  I have reviewed the above note and agree with the history, exam and suggestions for care, except as I have noted in the text. I have amended the treatment plans as necessary.

## 2021-12-11 NOTE — PROGRESS NOTE ADULT - SUBJECTIVE AND OBJECTIVE BOX
Patient is a 89y old  Female who presents with a chief complaint of SOB (10 Dec 2021 16:03)        SUBJECTIVE: No acute events overnight       REVIEW OF SYSTEMS: ROS negative except as reported in HPI        PHYSICAL EXAM  Vital Signs Last 24 Hrs  T(C): 36.3 (11 Dec 2021 04:00), Max: 36.4 (10 Dec 2021 08:18)  T(F): 97.4 (11 Dec 2021 04:00), Max: 97.6 (10 Dec 2021 08:18)  HR: 79 (11 Dec 2021 04:00) (61 - 84)  BP: 144/69 (11 Dec 2021 04:00) (134/61 - 161/75)  BP(mean): 97 (10 Dec 2021 08:18) (97 - 97)  RR: 20 (11 Dec 2021 04:20) (18 - 20)  SpO2: 100% (11 Dec 2021 04:20) (89% - 100%)    CONSTITUTIONAL:   NAD    ENT:   Airway patent,   No thrush    CARDIAC:   Normal rate,   regular rhythm.    no edema      RESPIRATORY:   NO Wheezing  normal chest expansion  not tachypneic,  No use of accessory muscles    GASTROINTESTINAL:  Abdomen soft,   non-tender,   no guarding,   + BS    MUSCULOSKELETAL:   range of motion is not limited,  no clubbing, cyanosis    NEUROLOGICAL:   Non focal    SKIN:   Skin normal color for race,   warm, dry   No evidence of rash.      12-10-21 @ 07:01  -  12-11-21 @ 07:00  --------------------------------------------------------  IN:  Total IN: 0 mL    OUT:    Voided (mL): 600 mL  Total OUT: 600 mL    Total NET: -600 mL          LABS:                          10.6   5.30  )-----------( 217      ( 11 Dec 2021 04:30 )             34.8                                               12-11    149<H>  |  106  |  x   ----------------------------<  94  4.3   |  25  |  1.5    Ca    8.5      11 Dec 2021 04:30  Mg     2.2     12-11    TPro  6.5  /  Alb  3.7  /  TBili  <0.2  /  DBili  x   /  AST  37  /  ALT  18  /  AlkPhos  73  12-11                                                                                           LIVER FUNCTIONS - ( 11 Dec 2021 04:30 )  Alb: 3.7 g/dL / Pro: 6.5 g/dL / ALK PHOS: 73 U/L / ALT: 18 U/L / AST: 37 U/L / GGT: x                                                  Culture - Urine (collected 08 Dec 2021 08:45)  Source: Clean Catch Clean Catch (Midstream)  Final Report (09 Dec 2021 21:39):    No growth    Culture - Blood (collected 08 Dec 2021 08:44)  Source: .Blood Blood-Peripheral  Preliminary Report (09 Dec 2021 23:02):    No growth to date.    Culture - Blood (collected 08 Dec 2021 08:44)  Source: .Blood Blood-Peripheral  Preliminary Report (09 Dec 2021 23:02):    No growth to date.                                                    MEDICATIONS  (STANDING):  albuterol/ipratropium for Nebulization 3 milliLiter(s) Nebulizer every 6 hours  aspirin  chewable 81 milliGRAM(s) Oral daily  atorvastatin 40 milliGRAM(s) Oral at bedtime  cefTRIAXone   IVPB 1000 milliGRAM(s) IV Intermittent every 24 hours  chlorhexidine 4% Liquid 1 Application(s) Topical <User Schedule>  dronedarone 400 milliGRAM(s) Oral two times a day  heparin   Injectable 5000 Unit(s) SubCutaneous every 12 hours  levothyroxine 112 MICROGram(s) Oral daily  methylPREDNISolone sodium succinate Injectable 60 milliGRAM(s) IV Push every 12 hours  metoprolol succinate ER 25 milliGRAM(s) Oral daily  oseltamivir 75 milliGRAM(s) Oral two times a day  pantoprazole    Tablet 40 milliGRAM(s) Oral before breakfast  sodium chloride 0.45%. 1000 milliLiter(s) (75 mL/Hr) IV Continuous <Continuous>    MEDICATIONS  (PRN):  senna 2 Tablet(s) Oral at bedtime PRN Constipation     Patient is a 89y old  Female who presents with a chief complaint of SOB (10 Dec 2021 16:03)        SUBJECTIVE: No acute events overnight       REVIEW OF SYSTEMS: ROS negative except as reported in HPI        PHYSICAL EXAM  Vital Signs Last 24 Hrs  T(C): 36.3 (11 Dec 2021 04:00), Max: 36.4 (10 Dec 2021 08:18)  T(F): 97.4 (11 Dec 2021 04:00), Max: 97.6 (10 Dec 2021 08:18)  HR: 79 (11 Dec 2021 04:00) (61 - 84)  BP: 144/69 (11 Dec 2021 04:00) (134/61 - 161/75)  BP(mean): 97 (10 Dec 2021 08:18) (97 - 97)  RR: 20 (11 Dec 2021 04:20) (18 - 20)  SpO2: 100% (11 Dec 2021 04:20) (89% - 100%)    CONSTITUTIONAL:   NAD    ENT:   Airway patent,   No thrush    CARDIAC:   Normal rate,   regular rhythm.    no edema      RESPIRATORY:   NO Wheezing  normal chest expansion  not tachypneic,  No use of accessory muscles    GASTROINTESTINAL:  Abdomen soft,   non-tender,   no guarding,   + BS    MUSCULOSKELETAL:   range of motion is not limited,  no clubbing, cyanosis    NEUROLOGICAL:   Lethargic  Non focal    SKIN:   Skin normal color for race,   warm, dry   No evidence of rash.      12-10-21 @ 07:01  -  12-11-21 @ 07:00  --------------------------------------------------------  IN:  Total IN: 0 mL    OUT:    Voided (mL): 600 mL  Total OUT: 600 mL    Total NET: -600 mL          LABS:                          10.6   5.30  )-----------( 217      ( 11 Dec 2021 04:30 )             34.8                                               12-11    149<H>  |  106  |  x   ----------------------------<  94  4.3   |  25  |  1.5    Ca    8.5      11 Dec 2021 04:30  Mg     2.2     12-11    TPro  6.5  /  Alb  3.7  /  TBili  <0.2  /  DBili  x   /  AST  37  /  ALT  18  /  AlkPhos  73  12-11                                                                                           LIVER FUNCTIONS - ( 11 Dec 2021 04:30 )  Alb: 3.7 g/dL / Pro: 6.5 g/dL / ALK PHOS: 73 U/L / ALT: 18 U/L / AST: 37 U/L / GGT: x                                                  Culture - Urine (collected 08 Dec 2021 08:45)  Source: Clean Catch Clean Catch (Midstream)  Final Report (09 Dec 2021 21:39):    No growth    Culture - Blood (collected 08 Dec 2021 08:44)  Source: .Blood Blood-Peripheral  Preliminary Report (09 Dec 2021 23:02):    No growth to date.    Culture - Blood (collected 08 Dec 2021 08:44)  Source: .Blood Blood-Peripheral  Preliminary Report (09 Dec 2021 23:02):    No growth to date.                                                    MEDICATIONS  (STANDING):  albuterol/ipratropium for Nebulization 3 milliLiter(s) Nebulizer every 6 hours  aspirin  chewable 81 milliGRAM(s) Oral daily  atorvastatin 40 milliGRAM(s) Oral at bedtime  cefTRIAXone   IVPB 1000 milliGRAM(s) IV Intermittent every 24 hours  chlorhexidine 4% Liquid 1 Application(s) Topical <User Schedule>  dronedarone 400 milliGRAM(s) Oral two times a day  heparin   Injectable 5000 Unit(s) SubCutaneous every 12 hours  levothyroxine 112 MICROGram(s) Oral daily  methylPREDNISolone sodium succinate Injectable 60 milliGRAM(s) IV Push every 12 hours  metoprolol succinate ER 25 milliGRAM(s) Oral daily  oseltamivir 75 milliGRAM(s) Oral two times a day  pantoprazole    Tablet 40 milliGRAM(s) Oral before breakfast  sodium chloride 0.45%. 1000 milliLiter(s) (75 mL/Hr) IV Continuous <Continuous>    MEDICATIONS  (PRN):  senna 2 Tablet(s) Oral at bedtime PRN Constipation

## 2021-12-11 NOTE — PROGRESS NOTE ADULT - ASSESSMENT
89 y.o female w/ hx of COPD on home O2 2L, HTN, HLD, hypothyroidism, CKD, presented to the ED for evaluation for cough and SOB, admitted for COPD exacerbation 2/2 viral infx (flu).     #Acute hypoxic respiratory failure secondary to COPD exacerbation secondary to flu A     -currently on 5L NC - taper as tolerated   -CXR with bibasilar opacities - follow up repeat from this morning   -continue tamiflu and ceftriaxone  -continue solumedrol and nebs  -pulm following  -OOB to chair/PT eval (pt uses a rolator to get around) as tolerated   -isolation precautions   -echo ordered, not yet done    #Hypernatremia  #MAURI on CKD III - likely prerenal  -Cr 1.7 on admission; (baseline 1.3)  -restart IVF for 12 hours   -repeat BMP in am  -avoid nephrotoxics     #paroxsymal Atrial fibrillation  - rate control with metoprolol and multaq   - on last admission, eliquis was discontinued and pt started on aspirin after discussions with pt and her son      # Hypothyroidism  - continue levothyroxine     #HTN/DL  -stable on metoprol   -continue statin     DNR/DNI - MOLST in chart from previous admission     Progress Note Handoff  Pending Consults: none  Pending Tests: echo, labs  Pending Results: echo, labs  Family Discussion: discussed echo, labs, medication, oxygen and overall plan of care with pt, her son and daughter and medical staff - all questions answered.   Disposition: Home_____/SNF______/Other_____/Unknown at this time__x___    Please call me with any questions at extension [phone]  spent over 36 min on medical management

## 2021-12-11 NOTE — PROGRESS NOTE ADULT - ASSESSMENT
IMPRESSION:    Acute on chronic hypercapnic respiratory failure with hypoxemia  H/O Chronic hypoxemic respiratory failure on home O2  COPD exacerbation/ FLU A positive  Hypernatremia  MAURI on CKD    RECOMMEND:    NIV prn and at bedtime  Tamiflu  Droplet precautions  D/C Solumedrol and  initiate Prednisone 40 for 5 days followed by 20 mg for 5 days  D/C NS   Free water  Nebs q4h and prn  Home inhalers  DVT PPX  OOB to chair  DNR/DNI      Poor prognosis    Downgrade to med/surg   IMPRESSION:    Acute on chronic hypercapnic respiratory failure with hypoxemia  H/O Chronic hypoxemic respiratory failure on home O2  COPD exacerbation/ FLU A positive  Hypernatremia  MAURI on CKD  AMS likely 2/2 metabolic/toxic encephalopathy    RECOMMEND:    NIV prn and at bedtime  Tamiflu  Droplet precautions  D/C Solumedrol and  initiate Prednisone 40 for 5 days followed by 20 mg for 5 days  D/C NS   ABG  Consider CTH   Free water  Nebs q4h and prn  Home inhalers  DVT PPX  OOB to chair  DNR/DNI      Poor prognosis    SDU

## 2021-12-12 LAB
ALBUMIN SERPL ELPH-MCNC: 3.6 G/DL — SIGNIFICANT CHANGE UP (ref 3.5–5.2)
ALP SERPL-CCNC: 71 U/L — SIGNIFICANT CHANGE UP (ref 30–115)
ALT FLD-CCNC: 18 U/L — SIGNIFICANT CHANGE UP (ref 0–41)
ANION GAP SERPL CALC-SCNC: 16 MMOL/L — HIGH (ref 7–14)
AST SERPL-CCNC: 32 U/L — SIGNIFICANT CHANGE UP (ref 0–41)
BILIRUB SERPL-MCNC: 0.2 MG/DL — SIGNIFICANT CHANGE UP (ref 0.2–1.2)
BUN SERPL-MCNC: 52 MG/DL — HIGH (ref 10–20)
CALCIUM SERPL-MCNC: 8.9 MG/DL — SIGNIFICANT CHANGE UP (ref 8.5–10.1)
CHLORIDE SERPL-SCNC: 103 MMOL/L — SIGNIFICANT CHANGE UP (ref 98–110)
CO2 SERPL-SCNC: 25 MMOL/L — SIGNIFICANT CHANGE UP (ref 17–32)
CREAT SERPL-MCNC: 1.3 MG/DL — SIGNIFICANT CHANGE UP (ref 0.7–1.5)
GLUCOSE SERPL-MCNC: 103 MG/DL — HIGH (ref 70–99)
HCT VFR BLD CALC: 39.3 % — SIGNIFICANT CHANGE UP (ref 37–47)
HGB BLD-MCNC: 12.1 G/DL — SIGNIFICANT CHANGE UP (ref 12–16)
MAGNESIUM SERPL-MCNC: 2.3 MG/DL — SIGNIFICANT CHANGE UP (ref 1.8–2.4)
MCHC RBC-ENTMCNC: 29.6 PG — SIGNIFICANT CHANGE UP (ref 27–31)
MCHC RBC-ENTMCNC: 30.8 G/DL — LOW (ref 32–37)
MCV RBC AUTO: 96.1 FL — SIGNIFICANT CHANGE UP (ref 81–99)
NRBC # BLD: 0 /100 WBCS — SIGNIFICANT CHANGE UP (ref 0–0)
PLATELET # BLD AUTO: 167 K/UL — SIGNIFICANT CHANGE UP (ref 130–400)
POTASSIUM SERPL-MCNC: 5 MMOL/L — SIGNIFICANT CHANGE UP (ref 3.5–5)
POTASSIUM SERPL-SCNC: 5 MMOL/L — SIGNIFICANT CHANGE UP (ref 3.5–5)
PROT SERPL-MCNC: 6.3 G/DL — SIGNIFICANT CHANGE UP (ref 6–8)
RBC # BLD: 4.09 M/UL — LOW (ref 4.2–5.4)
RBC # FLD: 14.4 % — SIGNIFICANT CHANGE UP (ref 11.5–14.5)
SODIUM SERPL-SCNC: 144 MMOL/L — SIGNIFICANT CHANGE UP (ref 135–146)
WBC # BLD: 4.56 K/UL — LOW (ref 4.8–10.8)
WBC # FLD AUTO: 4.56 K/UL — LOW (ref 4.8–10.8)

## 2021-12-12 PROCEDURE — 99232 SBSQ HOSP IP/OBS MODERATE 35: CPT

## 2021-12-12 PROCEDURE — 99233 SBSQ HOSP IP/OBS HIGH 50: CPT

## 2021-12-12 PROCEDURE — 71045 X-RAY EXAM CHEST 1 VIEW: CPT | Mod: 26

## 2021-12-12 RX ADMIN — Medication 75 MILLIGRAM(S): at 17:37

## 2021-12-12 RX ADMIN — DRONEDARONE 400 MILLIGRAM(S): 400 TABLET, FILM COATED ORAL at 17:35

## 2021-12-12 RX ADMIN — Medication 40 MILLIGRAM(S): at 17:37

## 2021-12-12 RX ADMIN — Medication 3 MILLILITER(S): at 10:11

## 2021-12-12 RX ADMIN — Medication 25 MILLIGRAM(S): at 05:49

## 2021-12-12 RX ADMIN — Medication 75 MILLIGRAM(S): at 05:49

## 2021-12-12 RX ADMIN — Medication 3 MILLILITER(S): at 19:40

## 2021-12-12 RX ADMIN — CEFTRIAXONE 100 MILLIGRAM(S): 500 INJECTION, POWDER, FOR SOLUTION INTRAMUSCULAR; INTRAVENOUS at 15:00

## 2021-12-12 RX ADMIN — CHLORHEXIDINE GLUCONATE 1 APPLICATION(S): 213 SOLUTION TOPICAL at 05:42

## 2021-12-12 RX ADMIN — ATORVASTATIN CALCIUM 40 MILLIGRAM(S): 80 TABLET, FILM COATED ORAL at 21:01

## 2021-12-12 RX ADMIN — HEPARIN SODIUM 5000 UNIT(S): 5000 INJECTION INTRAVENOUS; SUBCUTANEOUS at 05:49

## 2021-12-12 RX ADMIN — Medication 112 MICROGRAM(S): at 05:49

## 2021-12-12 RX ADMIN — Medication 3 MILLILITER(S): at 13:44

## 2021-12-12 RX ADMIN — HEPARIN SODIUM 5000 UNIT(S): 5000 INJECTION INTRAVENOUS; SUBCUTANEOUS at 17:35

## 2021-12-12 RX ADMIN — SODIUM CHLORIDE 75 MILLILITER(S): 9 INJECTION, SOLUTION INTRAVENOUS at 15:29

## 2021-12-12 RX ADMIN — DRONEDARONE 400 MILLIGRAM(S): 400 TABLET, FILM COATED ORAL at 05:48

## 2021-12-12 RX ADMIN — Medication 81 MILLIGRAM(S): at 11:53

## 2021-12-12 RX ADMIN — Medication 60 MILLIGRAM(S): at 05:49

## 2021-12-12 NOTE — PROGRESS NOTE ADULT - ASSESSMENT
89 y.o female w/ hx of COPD on home O2 2L, HTN, HLD,  hypothyroidism, CKD, presented to the ED for evaluation for cough and SOB, admitted for COPD exacerbation 2/2 viral infx (flu).     #COPD exacerbation 2/2 flu     -flu A was positive on nasal swab  -currently on 4L NC, on 2-3L at home, still with wheezing on exam but improved   -CXR with bibasilar opacities   -c/w tamiflu for 5 days total (started 12/8)   -On ceftriaxone as treating for copd and for ppx against superimposed bacterial infx  -continue inhalers   -dec solumedrol 40 q12  -pulm following  -Bcx/Ucx NGTD  -PT    #MAURI-resolved  -Cr 1.7 on admission, likely prerenal, Baseline creatinine ~1.3, s/p fluid bolus   -cr now 1.3     #paroxsymal Atrial fibrillation  - on last admission, eliquis was discontinued, ASA resumed after discussion with patient and son regarding risks and benefits  - Metoprolol ER 25 PO QD  - Multaq 400 BID    # Hypothyroidism  - Levothyroxine 112 mcg     DASH DIET  AIT  DVT PPX heparin sc  Droplet precautions for influenza  DNR/DNI

## 2021-12-12 NOTE — PROGRESS NOTE ADULT - SUBJECTIVE AND OBJECTIVE BOX
TANNA MCCRACKEN  89y  Female      Patient is a 89y old female who presents with a chief complaint of SOB (09 Dec 2021 09:14)      INTERVAL HPI/OVERNIGHT EVENTS:  Patient seen and examined earlier this morning with her son bedside  denies any complaints  states she feels well and wants to sit in the chair       REVIEW OF SYSTEMS:  CONSTITUTIONAL: No fever, weight loss, or fatigue  EYES: No eye pain, visual disturbances, or discharge  ENMT:  No difficulty hearing, tinnitus, vertigo; No sinus or throat pain  NECK: No pain or stiffness  RESPIRATORY: No cough, wheezing, chills or hemoptysis; No shortness of breath  CARDIOVASCULAR: No chest pain, palpitations, dizziness, or leg swelling  GASTROINTESTINAL: No abdominal or epigastric pain. No nausea, vomiting, or hematemesis; No diarrhea or constipation. No melena or hematochezia.  GENITOURINARY: No dysuria, frequency, hematuria, or incontinence  NEUROLOGICAL: No headaches, memory loss, loss of strength, numbness, or tremors  SKIN: No itching, burning, rashes, or lesions   LYMPH NODES: No enlarged glands  ENDOCRINE: No heat or cold intolerance; No hair loss  MUSCULOSKELETAL: No joint pain or swelling; No muscle, back, or extremity pain  PSYCHIATRIC: No depression, anxiety, mood swings, or difficulty sleeping  HEME/LYMPH: No easy bruising, or bleeding gums  ALLERY AND IMMUNOLOGIC: No hives or eczema      Vital Signs Last 24 Hrs  T(C): 35.8 (12 Dec 2021 11:55), Max: 36.2 (12 Dec 2021 07:57)  T(F): 96.5 (12 Dec 2021 11:55), Max: 97.1 (12 Dec 2021 07:57)  HR: 66 (12 Dec 2021 11:55) (55 - 88)  BP: 140/69 (12 Dec 2021 11:55) (124/60 - 170/73)  BP(mean): 112 (12 Dec 2021 07:57) (74 - 112)  RR: 18 (12 Dec 2021 11:55) (18 - 95)  SpO2: 97% (12 Dec 2021 11:55) (81% - 97%) on nasal cannula       PHYSICAL EXAM:  GENERAL: NAD, well-groomed, well-developed, elderly female   HEAD:  Atraumatic, Normocephalic  EYES: EOMI, PERRLA, conjunctiva and sclera clear  ENMT: No tonsillar erythema, exudates, or enlargement; Moist mucous membranes, Good dentition, No lesions  NECK: Supple, No JVD, Normal thyroid  NERVOUS SYSTEM:  Alert & Oriented X3, Good concentration; Moves all extremities   CHEST/LUNG: decreased breath sounds b/l, mild ronchi b/l  HEART: Regular rate and rhythm; No murmurs, rubs, or gallops  ABDOMEN: Soft, Nontender, Nondistended; Bowel sounds present  EXTREMITIES:  2+ Peripheral Pulses, No clubbing, cyanosis, or edema  LYMPH: No lymphadenopathy noted  SKIN: No rashes or lesions    Consultant(s) Notes Reviewed:  [x ] YES  [ ] NO  Care Discussed with Consultants/Other Providers [ x] YES  [ ] NO    LAB:                                   12.1   4.56  )-----------( 167      ( 12 Dec 2021 04:30 )             39.3         144  |  103  |  52<H>  ----------------------------<  103<H>  5.0   |  25  |  1.3    Ca    8.9      12 Dec 2021 04:30  Mg     2.3         TPro  6.3  /  Alb  3.6  /  TBili  0.2  /  DBili  x   /  AST  32  /  ALT  18  /  AlkPhos  71  12      Drug Dosing Weight  Height (cm): 144.8 (08 Dec 2021 08:21)  Weight (kg): 45.4 (08 Dec 2021 08:41)  BMI (kg/m2): 21.7 (08 Dec 2021 08:41)  BSA (m2): 1.34 (08 Dec 2021 08:41)      Urinalysis Basic - ( 08 Dec 2021 08:44 )    Color: Yellow / Appearance: Clear / S.017 / pH: x  Gluc: x / Ketone: Negative  / Bili: Negative / Urobili: <2 mg/dL   Blood: x / Protein: 30 mg/dL / Nitrite: Negative   Leuk Esterase: Negative / RBC: 2 /HPF / WBC 3 /HPF   Sq Epi: x / Non Sq Epi: 1 /HPF / Bacteria: Negative        RADIOLOGY & ADDITIONAL TESTS:  Imaging Personally Reviewed:  [x] YES  [ ] NO  < from: Xray Chest 1 View- PORTABLE-Urgent (21 @ 09:59) >  Impression:  Bibasilar interstitial opacities, unchanged.  Bones as above.  < end of copied text >      MEDICATIONS  (STANDING):  albuterol/ipratropium for Nebulization 3 milliLiter(s) Nebulizer every 6 hours  aspirin  chewable 81 milliGRAM(s) Oral daily  atorvastatin 40 milliGRAM(s) Oral at bedtime  cefTRIAXone   IVPB 1000 milliGRAM(s) IV Intermittent every 24 hours  chlorhexidine 4% Liquid 1 Application(s) Topical <User Schedule>  dronedarone 400 milliGRAM(s) Oral two times a day  heparin   Injectable 5000 Unit(s) SubCutaneous every 12 hours  levothyroxine 112 MICROGram(s) Oral daily  methylPREDNISolone sodium succinate Injectable 40 milliGRAM(s) IV Push every 12 hours  metoprolol succinate ER 25 milliGRAM(s) Oral daily  oseltamivir 75 milliGRAM(s) Oral two times a day  pantoprazole    Tablet 40 milliGRAM(s) Oral before breakfast    MEDICATIONS  (PRN):  senna 2 Tablet(s) Oral at bedtime PRN Constipation

## 2021-12-12 NOTE — CHART NOTE - NSCHARTNOTEFT_GEN_A_CORE
Transfer Note    Transfer from: ceu   Transfer to: [facility] floor     HOSPITAL COURSE:  HPI:  89 y.o female w/ hx of COPD on home O2 2L, HTN, HLD,  hypothyroidism, CKD, presented to the ED for evaluation for cough x 2-3 weeks. The patient had seen Dr. Jose 11/30 and was started on doxycyline but over past week, patient experienced increased work of breathing, increasing home o2 to 3.5 L. Progressively, over past day she became more and more tachypneic and hypoxic prompting visit to the ED.The symptoms were associated with low grade fevers. Patient denied any chest pain, edema of lower extremities, calf pain, hemoptysis, abd pain.  Patient was received hypoxic in ER. Blood work showed MAURI and elevated troponin from baseline, flu A was positive on nasal swab. Patient is being admitted for COPD Exacerbation secondary to viral pneumonia with possible superimposed bacterial infection.     CEU:  In ceu pt respiratory status improved, currently on 4L NC (at home on 2-3.5L), on tamiflu, ceftriaxone, decreased steroids today to 40 q12. Pt's MAURI resolved with some fluids. Pt stable for downgrade to the floor.       PLAN:     #COPD exacerbation 2/2 flu     -flu A was positive on nasal swab  -currently on 4L NC, on 2-3.5L at home, still with wheezing on exam but improved   -CXR with bibasilar opacities   -c/w tamiflu for 5 days total (started 12/8)   -On ceftriaxone as treating for copd and for ppx against superimposed bacterial infx  -continue inhalers   -dec solumedrol 40 q12  -pulm following  -Bcx/Ucx NGTD  -PT    #MAURI-resolved  -Cr 1.7 on admission, likely prerenal, Baseline creatinine ~1.3, s/p fluid bolus   -cr now 1.3     #paroxsymal Atrial fibrillation  - on last admission, eliquis was discontinued, ASA resumed after discussion with patient and son regarding risks and benefits  - Metoprolol ER 25 PO QD  - Multaq 400 BID    # Hypothyroidism  - Levothyroxine 112 mcg     DASH DIET  AIT  DVT PPX heparin sc  Droplet precautions for influenza  DNR/DNI    For follow up:  -Taper steroids  -Monitor resp status           Vital Signs Last 24 Hrs  T(C): 35.8 (12 Dec 2021 11:55), Max: 36.2 (12 Dec 2021 07:57)  T(F): 96.5 (12 Dec 2021 11:55), Max: 97.1 (12 Dec 2021 07:57)  HR: 66 (12 Dec 2021 11:55) (55 - 88)  BP: 140/69 (12 Dec 2021 11:55) (124/60 - 170/73)  BP(mean): 112 (12 Dec 2021 07:57) (74 - 112)  RR: 18 (12 Dec 2021 11:55) (18 - 95)  SpO2: 97% (12 Dec 2021 11:55) (81% - 97%)  I&O's Summary    12 Dec 2021 07:01  -  12 Dec 2021 12:34  --------------------------------------------------------  IN: 0 mL / OUT: 1200 mL / NET: -1200 mL          MEDICATIONS  (STANDING):  albuterol/ipratropium for Nebulization 3 milliLiter(s) Nebulizer every 6 hours  aspirin  chewable 81 milliGRAM(s) Oral daily  atorvastatin 40 milliGRAM(s) Oral at bedtime  cefTRIAXone   IVPB 1000 milliGRAM(s) IV Intermittent every 24 hours  chlorhexidine 4% Liquid 1 Application(s) Topical <User Schedule>  dronedarone 400 milliGRAM(s) Oral two times a day  heparin   Injectable 5000 Unit(s) SubCutaneous every 12 hours  levothyroxine 112 MICROGram(s) Oral daily  methylPREDNISolone sodium succinate Injectable 40 milliGRAM(s) IV Push every 12 hours  metoprolol succinate ER 25 milliGRAM(s) Oral daily  oseltamivir 75 milliGRAM(s) Oral two times a day  pantoprazole    Tablet 40 milliGRAM(s) Oral before breakfast    MEDICATIONS  (PRN):  senna 2 Tablet(s) Oral at bedtime PRN Constipation        LABS                                            12.1                  Neurophils% (auto):   x      (12-12 @ 04:30):    4.56 )-----------(167          Lymphocytes% (auto):  x                                             39.3                   Eosinphils% (auto):   x        Manual%: Neutrophils x    ; Lymphocytes x    ; Eosinophils x    ; Bands%: x    ; Blasts x                                    144    |  103    |  52                  Calcium: 8.9   / iCa: x      (12-12 @ 04:30)    ----------------------------<  103       Magnesium: 2.3                              5.0     |  25     |  1.3              Phosphorous: x        TPro  6.3    /  Alb  3.6    /  TBili  0.2    /  DBili  x      /  AST  32     /  ALT  18     /  AlkPhos  71     12 Dec 2021 04:30

## 2021-12-12 NOTE — PROGRESS NOTE ADULT - ASSESSMENT
IMPRESSION:    Acute on chronic hypercapnic respiratory failure with hypoxemia; improved  H/O Chronic hypoxemic respiratory failure on home O2; improved  COPD exacerbation/ FLU A positive; improved  Hypernatremia; improved  MAURI on CKD; improved  AMS likely 2/2 metabolic/toxic encephalopathy; improved    RECOMMEND:    NIV prn and at bedtime  Tamiflu complete course  Continue Prednisone taper 40 for 5 days followed by 20 mg for 5 days  Nebs prn  Home inhalers  DVT PPX  OOB to chair  DNR/DNI  Downgrade to floor  DC planning  OOB to chair and PT rehab eval IMPRESSION:    Acute on chronic hypercapnic respiratory failure with hypoxemia; improved  H/O Chronic hypoxemic respiratory failure on home O2; improved  COPD exacerbation/ FLU A positive; improved  Hypernatremia; improved  MAURI on CKD; improved  AMS likely 2/2 metabolic/toxic encephalopathy; improved    RECOMMEND:    NIV prn and at bedtime  Tamiflu complete course  Continue Prednisone taper 40 for 5 days followed by 20 mg for 5 days  Nebs prn  Home inhalers  DVT PPX  OOB to chair  DNR/DNI  DC planning  OOB to chair and PT rehab eval

## 2021-12-12 NOTE — PROGRESS NOTE ADULT - SUBJECTIVE AND OBJECTIVE BOX
Patient is a 89y old  Female who presents with a chief complaint of SOB (11 Dec 2021 10:42)        SUBJECTIVE:    Feels better today. no events overnight    REVIEW OF SYSTEMS:  See HPI    PHYSICAL EXAM  Vital Signs Last 24 Hrs  T(C): 36.2 (12 Dec 2021 07:57), Max: 36.3 (11 Dec 2021 12:00)  T(F): 97.1 (12 Dec 2021 07:57), Max: 97.3 (11 Dec 2021 12:00)  HR: 88 (12 Dec 2021 07:57) (55 - 88)  BP: 162/78 (12 Dec 2021 07:57) (124/60 - 170/73)  BP(mean): 112 (12 Dec 2021 07:57) (74 - 112)  RR: 95 (12 Dec 2021 07:57) (18 - 95)  SpO2: 95% (12 Dec 2021 04:00) (81% - 97%)    General: In NAD  HEENT: ASH               Lymphatic system: No Cervical LN    Respiratory: Trell BS  Cardiovascular: Regular  Gastrointestinal: Soft. + BS  Musculoskeletal: No clubbing.  moves all extremities.  Full range of motion    Skin: Warm.  Intact  Neurological: No motor or sensory deficit        LABS:                          12.1   4.56  )-----------( 167      ( 12 Dec 2021 04:30 )             39.3                                               12-12    144  |  103  |  52<H>  ----------------------------<  103<H>  5.0   |  25  |  1.3    Ca    8.9      12 Dec 2021 04:30  Mg     2.3     12-12    TPro  6.3  /  Alb  3.6  /  TBili  0.2  /  DBili  x   /  AST  32  /  ALT  18  /  AlkPhos  71  12-12                                                                                           LIVER FUNCTIONS - ( 12 Dec 2021 04:30 )  Alb: 3.6 g/dL / Pro: 6.3 g/dL / ALK PHOS: 71 U/L / ALT: 18 U/L / AST: 32 U/L / GGT: x                                                                                            ABG - ( 11 Dec 2021 12:45 )  pH, Arterial: 7.42  pH, Blood: x     /  pCO2: 56    /  pO2: 57    / HCO3: 36    / Base Excess: 10.2  /  SaO2: 90.7                MEDICATIONS  (STANDING):  albuterol/ipratropium for Nebulization 3 milliLiter(s) Nebulizer every 6 hours  aspirin  chewable 81 milliGRAM(s) Oral daily  atorvastatin 40 milliGRAM(s) Oral at bedtime  cefTRIAXone   IVPB 1000 milliGRAM(s) IV Intermittent every 24 hours  chlorhexidine 4% Liquid 1 Application(s) Topical <User Schedule>  dronedarone 400 milliGRAM(s) Oral two times a day  heparin   Injectable 5000 Unit(s) SubCutaneous every 12 hours  levothyroxine 112 MICROGram(s) Oral daily  methylPREDNISolone sodium succinate Injectable 60 milliGRAM(s) IV Push every 12 hours  metoprolol succinate ER 25 milliGRAM(s) Oral daily  oseltamivir 75 milliGRAM(s) Oral two times a day  pantoprazole    Tablet 40 milliGRAM(s) Oral before breakfast    MEDICATIONS  (PRN):  senna 2 Tablet(s) Oral at bedtime PRN Constipation

## 2021-12-12 NOTE — PROGRESS NOTE ADULT - ASSESSMENT
89 y.o female w/ hx of COPD on home O2 2L, HTN, HLD, hypothyroidism, CKD, presented to the ED for evaluation for cough and SOB, admitted for COPD exacerbation 2/2 viral infx (flu).     #Acute hypoxic respiratory failure secondary to COPD exacerbation secondary to flu A     -currently on 5L NC - taper as tolerated   -CXR with bibasilar opacities - follow up repeat from this morning   -continue tamiflu and ceftriaxone  -continue solumedrol and nebs  -pulm following  -OOB to chair/PT eval (pt uses a rolator to get around) as tolerated   -isolation precautions   -follow up echo     #Hypernatremia - resolved  #MAURI on CKD III - likely prerenal - resolved   -Cr 1.7 on admission; (baseline 1.3)  -s/p IVF  -repeat BMP in am  -avoid nephrotoxics     #paroxsymal Atrial fibrillation  - rate control with metoprolol and multaq   - on last admission, eliquis was discontinued and pt started on aspirin after discussions with pt and her son      # Hypothyroidism  - continue levothyroxine     #HTN/DL  -stable on metoprol   -continue statin     DNR/DNI - MOLST in chart from previous admission     Progress Note Handoff  Pending Consults: none  Pending Tests: echo, labs  Pending Results: echo, labs  Family Discussion: discussed echo, labs, medication, oxygen and overall plan of care with pt, her son and medical staff - all questions answered.   Disposition: Home_____/SNF______/Other_____/Unknown at this time__x___    Please call me with any questions at extension [phone]  spent over 36 min on medical management

## 2021-12-12 NOTE — PROGRESS NOTE ADULT - ATTENDING COMMENTS
Attending Statement: I have personally performed a face to face diagnostic evaluation on this patient. The patient is suffering from:  Acute on chronic hypercapnic respiratory failure with hypoxemia; improved  H/O Chronic hypoxemic respiratory failure on home O2; improved  COPD exacerbation/ FLU A positive;  I have reviewed the above note and agree with the history, exam and suggestions for care, except as I have noted in the text. I have amended the treatment plans as necessary.

## 2021-12-12 NOTE — PROGRESS NOTE ADULT - SUBJECTIVE AND OBJECTIVE BOX
TANNA MCCRACKEN 89y Female  MRN#: [id number]     Hospital Day: 4d    Pt is currently admitted with the primary diagnosis of copd/flu     SUBJECTIVE  No acute events overnight, pt seen and examined this am, no complaints. satting well on4L NC                                          ----------------------------------------------------------  OBJECTIVE  PAST MEDICAL & SURGICAL HISTORY  COPD (chronic obstructive pulmonary disease)    Hypothyroid    HTN (hypertension)    High cholesterol    Colitis    CKD (chronic kidney disease)    No significant past surgical history                                              -----------------------------------------------------------  ALLERGIES:  No Known Allergies                                            ------------------------------------------------------------    HOME MEDICATIONS  Home Medications:  albuterol 2.5 mg/3 mL (0.083%) inhalation solution: 3 milliliter(s) inhaled every 6 hours, As Needed for wheezing, shortness of breath (31 Aug 2020 12:23)  aspirin 81 mg oral tablet, chewable: 1 tab(s) orally once a day (07 Oct 2020 11:31)  melatonin 5 mg oral tablet: 1 tab(s) orally once a day (at bedtime), As Needed, to help you sleep at night (31 Aug 2020 12:23)  polyethylene glycol 3350 oral powder for reconstitution: 17 gram(s) orally once a day, As Needed, after dinner (31 Aug 2020 12:23)  senna oral tablet: take 1 or 2 tab(s) orally once a day (at bedtime), if needed, for constipation (31 Aug 2020 12:23)                           MEDICATIONS:  STANDING MEDICATIONS  albuterol/ipratropium for Nebulization 3 milliLiter(s) Nebulizer every 6 hours  aspirin  chewable 81 milliGRAM(s) Oral daily  atorvastatin 40 milliGRAM(s) Oral at bedtime  cefTRIAXone   IVPB 1000 milliGRAM(s) IV Intermittent every 24 hours  chlorhexidine 4% Liquid 1 Application(s) Topical <User Schedule>  dronedarone 400 milliGRAM(s) Oral two times a day  heparin   Injectable 5000 Unit(s) SubCutaneous every 12 hours  levothyroxine 112 MICROGram(s) Oral daily  methylPREDNISolone sodium succinate Injectable 60 milliGRAM(s) IV Push every 12 hours  metoprolol succinate ER 25 milliGRAM(s) Oral daily  oseltamivir 75 milliGRAM(s) Oral two times a day  pantoprazole    Tablet 40 milliGRAM(s) Oral before breakfast    PRN MEDICATIONS  senna 2 Tablet(s) Oral at bedtime PRN                                            ------------------------------------------------------------  VITAL SIGNS: Last 24 Hours  T(C): 36.2 (12 Dec 2021 07:57), Max: 36.3 (11 Dec 2021 12:00)  T(F): 97.1 (12 Dec 2021 07:57), Max: 97.3 (11 Dec 2021 12:00)  HR: 88 (12 Dec 2021 07:57) (55 - 88)  BP: 162/78 (12 Dec 2021 07:57) (124/60 - 170/73)  BP(mean): 112 (12 Dec 2021 07:57) (74 - 112)  RR: 95 (12 Dec 2021 07:57) (18 - 95)  SpO2: 95% (12 Dec 2021 04:00) (81% - 97%)                                             --------------------------------------------------------------  LABS:                        12.1   4.56  )-----------( 167      ( 12 Dec 2021 04:30 )             39.3     12-12    144  |  103  |  52<H>  ----------------------------<  103<H>  5.0   |  25  |  1.3    Ca    8.9      12 Dec 2021 04:30  Mg     2.3     12-12    TPro  6.3  /  Alb  3.6  /  TBili  0.2  /  DBili  x   /  AST  32  /  ALT  18  /  AlkPhos  71  12-12        ABG - ( 11 Dec 2021 12:45 )  pH, Arterial: 7.42  pH, Blood: x     /  pCO2: 56    /  pO2: 57    / HCO3: 36    / Base Excess: 10.2  /  SaO2: 90.7                                                                  -------------------------------------------------------------  RADIOLOGY:                                            --------------------------------------------------------------    PHYSICAL EXAM:  GENERAL: NAD, lying in bed comfortably  HEAD:  Atraumatic, Normocephalic  EYES:  conjunctiva and sclera clear  ENT: Moist mucous membranes  NECK: Supple, No JVD  CHEST/LUNG: mild wheezing b/l Unlabored respirations  HEART: Regular rate and rhythm; No murmurs, rubs, or gallops  ABDOMEN: Soft, nontender, nondistended  EXTREMITIES:  No clubbing, cyanosis, or edema  NERVOUS SYSTEM:  A&Ox2                                               --------------------------------------------------------------

## 2021-12-13 LAB
ALBUMIN SERPL ELPH-MCNC: 3.5 G/DL — SIGNIFICANT CHANGE UP (ref 3.5–5.2)
ALP SERPL-CCNC: 71 U/L — SIGNIFICANT CHANGE UP (ref 30–115)
ALT FLD-CCNC: 19 U/L — SIGNIFICANT CHANGE UP (ref 0–41)
ANION GAP SERPL CALC-SCNC: 14 MMOL/L — SIGNIFICANT CHANGE UP (ref 7–14)
ANION GAP SERPL CALC-SCNC: 18 MMOL/L — HIGH (ref 7–14)
AST SERPL-CCNC: 31 U/L — SIGNIFICANT CHANGE UP (ref 0–41)
BILIRUB SERPL-MCNC: 0.2 MG/DL — SIGNIFICANT CHANGE UP (ref 0.2–1.2)
BUN SERPL-MCNC: 52 MG/DL — HIGH (ref 10–20)
BUN SERPL-MCNC: 55 MG/DL — HIGH (ref 10–20)
CALCIUM SERPL-MCNC: 9 MG/DL — SIGNIFICANT CHANGE UP (ref 8.5–10.1)
CALCIUM SERPL-MCNC: 9 MG/DL — SIGNIFICANT CHANGE UP (ref 8.5–10.1)
CHLORIDE SERPL-SCNC: 101 MMOL/L — SIGNIFICANT CHANGE UP (ref 98–110)
CHLORIDE SERPL-SCNC: 102 MMOL/L — SIGNIFICANT CHANGE UP (ref 98–110)
CO2 SERPL-SCNC: 21 MMOL/L — SIGNIFICANT CHANGE UP (ref 17–32)
CO2 SERPL-SCNC: 25 MMOL/L — SIGNIFICANT CHANGE UP (ref 17–32)
CREAT SERPL-MCNC: 1.3 MG/DL — SIGNIFICANT CHANGE UP (ref 0.7–1.5)
CREAT SERPL-MCNC: 1.3 MG/DL — SIGNIFICANT CHANGE UP (ref 0.7–1.5)
CULTURE RESULTS: SIGNIFICANT CHANGE UP
CULTURE RESULTS: SIGNIFICANT CHANGE UP
GLUCOSE SERPL-MCNC: 111 MG/DL — HIGH (ref 70–99)
GLUCOSE SERPL-MCNC: 76 MG/DL — SIGNIFICANT CHANGE UP (ref 70–99)
HCT VFR BLD CALC: 38.2 % — SIGNIFICANT CHANGE UP (ref 37–47)
HGB BLD-MCNC: 11.8 G/DL — LOW (ref 12–16)
MAGNESIUM SERPL-MCNC: 2.3 MG/DL — SIGNIFICANT CHANGE UP (ref 1.8–2.4)
MCHC RBC-ENTMCNC: 29.1 PG — SIGNIFICANT CHANGE UP (ref 27–31)
MCHC RBC-ENTMCNC: 30.9 G/DL — LOW (ref 32–37)
MCV RBC AUTO: 94.3 FL — SIGNIFICANT CHANGE UP (ref 81–99)
NRBC # BLD: 0 /100 WBCS — SIGNIFICANT CHANGE UP (ref 0–0)
PLATELET # BLD AUTO: 176 K/UL — SIGNIFICANT CHANGE UP (ref 130–400)
POTASSIUM SERPL-MCNC: 5.1 MMOL/L — HIGH (ref 3.5–5)
POTASSIUM SERPL-MCNC: 5.5 MMOL/L — HIGH (ref 3.5–5)
POTASSIUM SERPL-SCNC: 5.1 MMOL/L — HIGH (ref 3.5–5)
POTASSIUM SERPL-SCNC: 5.5 MMOL/L — HIGH (ref 3.5–5)
PROT SERPL-MCNC: 6.3 G/DL — SIGNIFICANT CHANGE UP (ref 6–8)
RBC # BLD: 4.05 M/UL — LOW (ref 4.2–5.4)
RBC # FLD: 14.4 % — SIGNIFICANT CHANGE UP (ref 11.5–14.5)
SODIUM SERPL-SCNC: 140 MMOL/L — SIGNIFICANT CHANGE UP (ref 135–146)
SODIUM SERPL-SCNC: 141 MMOL/L — SIGNIFICANT CHANGE UP (ref 135–146)
SPECIMEN SOURCE: SIGNIFICANT CHANGE UP
SPECIMEN SOURCE: SIGNIFICANT CHANGE UP
WBC # BLD: 5.7 K/UL — SIGNIFICANT CHANGE UP (ref 4.8–10.8)
WBC # FLD AUTO: 5.7 K/UL — SIGNIFICANT CHANGE UP (ref 4.8–10.8)

## 2021-12-13 PROCEDURE — 99233 SBSQ HOSP IP/OBS HIGH 50: CPT

## 2021-12-13 PROCEDURE — 93306 TTE W/DOPPLER COMPLETE: CPT | Mod: 26

## 2021-12-13 RX ADMIN — Medication 3 MILLILITER(S): at 15:23

## 2021-12-13 RX ADMIN — HEPARIN SODIUM 5000 UNIT(S): 5000 INJECTION INTRAVENOUS; SUBCUTANEOUS at 05:12

## 2021-12-13 RX ADMIN — Medication 75 MILLIGRAM(S): at 05:17

## 2021-12-13 RX ADMIN — CHLORHEXIDINE GLUCONATE 1 APPLICATION(S): 213 SOLUTION TOPICAL at 05:11

## 2021-12-13 RX ADMIN — PANTOPRAZOLE SODIUM 40 MILLIGRAM(S): 20 TABLET, DELAYED RELEASE ORAL at 07:22

## 2021-12-13 RX ADMIN — Medication 112 MICROGRAM(S): at 05:11

## 2021-12-13 RX ADMIN — Medication 40 MILLIGRAM(S): at 05:11

## 2021-12-13 RX ADMIN — DRONEDARONE 400 MILLIGRAM(S): 400 TABLET, FILM COATED ORAL at 17:55

## 2021-12-13 RX ADMIN — Medication 81 MILLIGRAM(S): at 11:34

## 2021-12-13 RX ADMIN — Medication 25 MILLIGRAM(S): at 05:11

## 2021-12-13 RX ADMIN — DRONEDARONE 400 MILLIGRAM(S): 400 TABLET, FILM COATED ORAL at 05:11

## 2021-12-13 RX ADMIN — Medication 3 MILLILITER(S): at 09:24

## 2021-12-13 RX ADMIN — HEPARIN SODIUM 5000 UNIT(S): 5000 INJECTION INTRAVENOUS; SUBCUTANEOUS at 17:55

## 2021-12-13 NOTE — PROGRESS NOTE ADULT - ASSESSMENT
IMPRESSION:    Acute on chronic hypercapnic respiratory failure with hypoxemia; improved  H/O Chronic hypoxemic respiratory failure on home O2  COPD exacerbation improved   FLU A positive  Hypernatremia resolved   MAURI on CKD; improving   AMS likely 2/2 metabolic/toxic encephalopathy; improved    RECOMMEND:    NIV during sleep   Finish Tamiflu  course  Prednisone 40 mg daily for 5 days.   Nebs prn  Home inhalers  DVT PPX  OOB to chair  DNR/DNI  DC planning  OOB to chair and PT rehab eval  Downgrade to med surg

## 2021-12-13 NOTE — PROGRESS NOTE ADULT - ASSESSMENT
89 y.o female w/ hx of COPD on home O2 2L, HTN, HLD, hypothyroidism, CKD, presented to the ED for evaluation for cough and SOB, admitted for COPD exacerbation 2/2 viral infx (flu).     #Acute hypoxic respiratory failure secondary to COPD exacerbation secondary to flu A     -currently on 5L NC - taper as tolerated   -CXR with bibasilar opacities   -continue tamiflu and ceftriaxone  -continue nebs- start oral steroids in am   -pulm following  -OOB to chair/PT eval (pt uses a rolator to get around) as tolerated   -isolation precautions   -follow up echo - done this morning     #Hypernatremia - resolved  #MAURI on CKD III - likely prerenal - resolved   #mild hyperkalemia  -Cr 1.7 on admission; (baseline 1.3)  -s/p IVF  -repeat BMP in am  -avoid nephrotoxics     #paroxsymal Atrial fibrillation  - rate control with metoprolol and multaq   - on last admission, eliquis was discontinued and pt started on aspirin after discussions with pt and her son      # Hypothyroidism  - continue levothyroxine     #HTN/DL  -stable on metoprol   -continue statin     DNR/DNI - MOLST in chart from previous admission     Progress Note Handoff  Pending Consults: none  Pending Tests:  labs  Pending Results: echo, labs  Family Discussion: discussed echo, labs, medication, oxygen and overall plan of care with pt and medical staff - all questions answered.  Start prednisone taper in am and anticipate d/c on Wednesday   Disposition: Home__x___/SNF______/Other_____/Unknown at this time__x___    Please call me with any questions at extension [phone]  spent over 36 min on medical management

## 2021-12-13 NOTE — PROGRESS NOTE ADULT - PROVIDER SPECIALTY LIST ADULT
Hospitalist
Internal Medicine
Hospitalist
Internal Medicine
Hospitalist
Internal Medicine
Pulmonology
Pulmonology
Internal Medicine
Pulmonology
Pulmonology

## 2021-12-13 NOTE — PROGRESS NOTE ADULT - SUBJECTIVE AND OBJECTIVE BOX
TANNA MCCRACKEN 89y Female  MRN#: [id number]       SUBJECTIVE  Patient is a 89y old Female who presents with a chief complaint of SOB (13 Dec 2021 06:55)  Currently admitted to medicine with the primary diagnosis of Acute respiratory failure with hypoxia and hypercarbia      Today is hospital day 5d    INTERVAL HISTORY/OVERNIGHT EVENTS:    No acute events overnight. Patient seen at bedside. Denies complaints on ROS.    OBJECTIVE  PAST MEDICAL & SURGICAL HISTORY  COPD (chronic obstructive pulmonary disease)    Hypothyroid    HTN (hypertension)    High cholesterol    Colitis    CKD (chronic kidney disease)    No significant past surgical history        ALLERGIES:  No Known Allergies      MEDICATIONS:  STANDING MEDICATIONS  albuterol/ipratropium for Nebulization 3 milliLiter(s) Nebulizer every 6 hours  aspirin  chewable 81 milliGRAM(s) Oral daily  atorvastatin 40 milliGRAM(s) Oral at bedtime  chlorhexidine 4% Liquid 1 Application(s) Topical <User Schedule>  dronedarone 400 milliGRAM(s) Oral two times a day  heparin   Injectable 5000 Unit(s) SubCutaneous every 12 hours  levothyroxine 112 MICROGram(s) Oral daily  metoprolol succinate ER 25 milliGRAM(s) Oral daily  pantoprazole    Tablet 40 milliGRAM(s) Oral before breakfast    PRN MEDICATIONS  senna 2 Tablet(s) Oral at bedtime PRN      VITAL SIGNS  T(C): 36.1 (13 Dec 2021 08:59), Max: 36.2 (12 Dec 2021 21:00)  T(F): 97 (13 Dec 2021 08:59), Max: 97.2 (12 Dec 2021 21:00)  HR: 87 (13 Dec 2021 08:59) (61 - 87)  BP: 160/71 (13 Dec 2021 08:59) (121/58 - 160/71)  BP(mean): 82 (12 Dec 2021 21:00) (82 - 93)  RR: 18 (13 Dec 2021 08:59) (18 - 18)  SpO2: 99% (13 Dec 2021 08:59) (96% - 99%)    LABS:                        11.8   5.70  )-----------( 176      ( 13 Dec 2021 07:30 )             38.2     12-13    141  |  102  |  52<H>  ----------------------------<  111<H>  5.1<H>   |  25  |  1.3    Ca    9.0      13 Dec 2021 12:03  Mg     2.3     12-13    TPro  6.3  /  Alb  3.5  /  TBili  0.2  /  DBili  x   /  AST  31  /  ALT  19  /  AlkPhos  71  12-13        PHYSICAL EXAM:  CONSTITUTIONAL: Well nourished.  NAD  ENT: Airway patent, No thrush  EYES: Clear bilaterally, pupils equal, round and reactive to light.  CARDIAC: Normal rate, regular rhythm. no edema  RESPIRATORY: Mild wheezing; Normal chest expansion. Not tachypneic, No use of accessory muscles  GASTROINTESTINAL: Abdomen soft, non-tender, No guarding, Positive BS  MUSCULOSKELETAL: Range of motion is not limited, No clubbing, cyanosis  NEUROLOGICAL: Alert and oriented, No motor deficits.  SKIN: Skin normal color for race, No evidence of rash    RADIOLOGY:  < from: Xray Chest 1 View- PORTABLE-Routine (Xray Chest 1 View- PORTABLE-Routine in AM.) (12.12.21 @ 06:02) >  Lung parenchyma/Pleura: There are stable bilateral pulmonary opacities.    No pleural effusion or pneumothorax is seen.    < end of copied text >

## 2021-12-13 NOTE — PROGRESS NOTE ADULT - SUBJECTIVE AND OBJECTIVE BOX
TANNA MCCRACKEN  89y  Female      Patient is a 89y old female who presents with a chief complaint of SOB (09 Dec 2021 09:14)      INTERVAL HPI/OVERNIGHT EVENTS:  Patient seen and examined earlier this morning   denies any complaints  states she feels well and wants to sit in the chair - ambulated with PT today   echo done - report pending       REVIEW OF SYSTEMS:  CONSTITUTIONAL: No fever, weight loss, or fatigue  EYES: No eye pain, visual disturbances, or discharge  ENMT:  No difficulty hearing, tinnitus, vertigo; No sinus or throat pain  NECK: No pain or stiffness  RESPIRATORY: No cough, wheezing, chills or hemoptysis; No shortness of breath  CARDIOVASCULAR: No chest pain, palpitations, dizziness, or leg swelling  GASTROINTESTINAL: No abdominal or epigastric pain. No nausea, vomiting, or hematemesis; No diarrhea or constipation. No melena or hematochezia.  GENITOURINARY: No dysuria, frequency, hematuria, or incontinence  NEUROLOGICAL: No headaches, memory loss, loss of strength, numbness, or tremors  SKIN: No itching, burning, rashes, or lesions   LYMPH NODES: No enlarged glands  ENDOCRINE: No heat or cold intolerance; No hair loss  MUSCULOSKELETAL: No joint pain or swelling; No muscle, back, or extremity pain  PSYCHIATRIC: No depression, anxiety, mood swings, or difficulty sleeping  HEME/LYMPH: No easy bruising, or bleeding gums  ALLERY AND IMMUNOLOGIC: No hives or eczema      Vital Signs Last 24 Hrs  T(C): 36.1 (13 Dec 2021 08:59), Max: 36.2 (12 Dec 2021 21:00)  T(F): 97 (13 Dec 2021 08:59), Max: 97.2 (12 Dec 2021 21:00)  HR: 87 (13 Dec 2021 08:59) (61 - 87)  BP: 160/71 (13 Dec 2021 08:59) (121/58 - 160/71)  BP(mean): 82 (12 Dec 2021 21:00) (82 - 82)  RR: 18 (13 Dec 2021 08:59) (18 - 18)  SpO2: 99% (13 Dec 2021 08:59) (97% - 99%) on nasal cannula       PHYSICAL EXAM:  GENERAL: NAD, well-groomed, well-developed, elderly female   HEAD:  Atraumatic, Normocephalic  EYES: EOMI, PERRLA, conjunctiva and sclera clear  ENMT: No tonsillar erythema, exudates, or enlargement; Moist mucous membranes, Good dentition, No lesions  NECK: Supple, No JVD, Normal thyroid  NERVOUS SYSTEM:  Alert & Oriented X3, Good concentration; Moves all extremities   CHEST/LUNG: decreased breath sounds b/l, mild ronchi b/l  HEART: Regular rate and rhythm; No murmurs, rubs, or gallops  ABDOMEN: Soft, Nontender, Nondistended; Bowel sounds present  EXTREMITIES:  2+ Peripheral Pulses, No clubbing, cyanosis, or edema  LYMPH: No lymphadenopathy noted  SKIN: No rashes or lesions    Consultant(s) Notes Reviewed:  [x ] YES  [ ] NO  Care Discussed with Consultants/Other Providers [ x] YES  [ ] NO    LAB:                                   11.8   5.70  )-----------( 176      ( 13 Dec 2021 07:30 )             38.2     12-13    141  |  102  |  52<H>  ----------------------------<  111<H>  5.1<H>   |  25  |  1.3    Ca    9.0      13 Dec 2021 12:03  Mg     2.3     12-13    TPro  6.3  /  Alb  3.5  /  TBili  0.2  /  DBili  x   /  AST  31  /  ALT  19  /  AlkPhos  71  12-13        Drug Dosing Weight  Height (cm): 144.8 (08 Dec 2021 08:21)  Weight (kg): 45.4 (08 Dec 2021 08:41)  BMI (kg/m2): 21.7 (08 Dec 2021 08:41)  BSA (m2): 1.34 (08 Dec 2021 08:41)      Urinalysis Basic - ( 08 Dec 2021 08:44 )    Color: Yellow / Appearance: Clear / S.017 / pH: x  Gluc: x / Ketone: Negative  / Bili: Negative / Urobili: <2 mg/dL   Blood: x / Protein: 30 mg/dL / Nitrite: Negative   Leuk Esterase: Negative / RBC: 2 /HPF / WBC 3 /HPF   Sq Epi: x / Non Sq Epi: 1 /HPF / Bacteria: Negative        RADIOLOGY & ADDITIONAL TESTS:  Imaging Personally Reviewed:  [x] YES  [ ] NO  < from: Xray Chest 1 View- PORTABLE-Urgent (21 @ 09:59) >  Impression:  Bibasilar interstitial opacities, unchanged.  Bones as above.  < end of copied text >      MEDICATIONS  (STANDING):  albuterol/ipratropium for Nebulization 3 milliLiter(s) Nebulizer every 6 hours  aspirin  chewable 81 milliGRAM(s) Oral daily  atorvastatin 40 milliGRAM(s) Oral at bedtime  chlorhexidine 4% Liquid 1 Application(s) Topical <User Schedule>  dronedarone 400 milliGRAM(s) Oral two times a day  heparin   Injectable 5000 Unit(s) SubCutaneous every 12 hours  levothyroxine 112 MICROGram(s) Oral daily  metoprolol succinate ER 25 milliGRAM(s) Oral daily  pantoprazole    Tablet 40 milliGRAM(s) Oral before breakfast    MEDICATIONS  (PRN):  senna 2 Tablet(s) Oral at bedtime PRN Constipation

## 2021-12-13 NOTE — PROGRESS NOTE ADULT - ASSESSMENT
89 y.o female w/ hx of COPD on home O2 2L, HTN, HLD,  hypothyroidism, CKD, presented to the ED for evaluation for cough and SOB, admitted for COPD exacerbation 2/2 viral infx (flu).     #COPD exacerbation 2/2 flu     * flu A nasal swab positive  * CXR with bibasilar opacities   * Bcx/Ucx NGTD  -currently on 4L NC, on 2-3L at home, still with wheezing on exam but improved   -c/w tamiflu for 5 days total (started 12/8)   -On ceftriaxone as treating for copd and for ppx against superimposed bacterial infx  -continue inhalers   -d/c solumedrol 40 q12; start pred 40 PO; taper  -pulm following  -PT    #MAURI - resolved  * Cr 1.7 on admission, likely prerenal, Baseline creatinine ~1.3, s/p fluid bolus   -cr now 1.3     #paroxsymal Atrial fibrillation  * on last admission, eliquis was discontinued, ASA resumed after discussion with patient and son regarding risks and benefits  - Metoprolol ER 25 PO QD  - Multaq 400 BID    # Hypothyroidism  - Levothyroxine 112 mcg     DASH DIET  AIT  DVT PPX heparin sc  Droplet precautions for influenza  DNR/DNI    # Progress Note Handoff  - Pending Consults: none  - Pending Tests: none  - Pending Results: none  - Family Discussion: discussed with patient who comprehends their medical care and agreeable for current admission plan of care   - PT recs: patient active and independent  - Dispo: home vs SNF

## 2021-12-13 NOTE — PROGRESS NOTE ADULT - SUBJECTIVE AND OBJECTIVE BOX
Patient is a 89y old  Female who presents with a chief complaint of SOB (12 Dec 2021 15:26)        Over Night Events:  Resting in Bed.  On NC.  Off pressors.          ROS:     All ROS are negative except HPI         PHYSICAL EXAM    ICU Vital Signs Last 24 Hrs  T(C): 36.2 (12 Dec 2021 21:00), Max: 36.2 (12 Dec 2021 07:57)  T(F): 97.2 (12 Dec 2021 21:00), Max: 97.2 (12 Dec 2021 21:00)  HR: 61 (12 Dec 2021 21:00) (61 - 88)  BP: 121/58 (12 Dec 2021 21:00) (121/58 - 162/78)  BP(mean): 82 (12 Dec 2021 21:00) (82 - 112)  ABP: --  ABP(mean): --  RR: 18 (12 Dec 2021 21:00) (18 - 95)  SpO2: 97% (12 Dec 2021 21:00) (96% - 97%)      CONSTITUTIONAL:  Well nourished.  NAD    ENT:   Airway patent,   Mouth with normal mucosa.   No thrush    EYES:   Pupils equal,   Round and reactive to light.    CARDIAC:   Normal rate,   Regular rhythm.    No edema      Vascular:  Normal systolic impulse  No Carotid bruits    RESPIRATORY:   No wheezing  Bilateral BS  Normal chest expansion  Not tachypneic,  No use of accessory muscles    GASTROINTESTINAL:  Abdomen soft,   Non-tender,   No guarding,   + BS    MUSCULOSKELETAL:   Range of motion is not limited,  No clubbing, cyanosis    NEUROLOGICAL:   Alert  No motor  deficits.    SKIN:   Skin normal color for race,   Warm and dry  No evidence of rash.    PSYCHIATRIC:   Normal mood and affect.   No apparent risk to self or others.    HEMATOLOGICAL:  No cervical  lymphadenopathy.  no inguinal lymphadenopathy      12-12-21 @ 07:01  -  12-13-21 @ 06:55  --------------------------------------------------------  IN:  Total IN: 0 mL    OUT:    Voided (mL): 1300 mL  Total OUT: 1300 mL    Total NET: -1300 mL          LABS:                            12.1   4.56  )-----------( 167      ( 12 Dec 2021 04:30 )             39.3                                               12-12    144  |  103  |  52<H>  ----------------------------<  103<H>  5.0   |  25  |  1.3    Creatinine Trend  BUN 52, Cr 1.3, (12-12-21 @ 04:30)  Creatinine Trend  BUN 67, Cr 1.5, (12-11-21 @ 04:30)  Creatinine Trend  BUN 62, Cr 1.6, (12-10-21 @ 07:24)  Creatinine Trend  BUN 65, Cr 1.7, (12-09-21 @ 04:30)  Creatinine Trend  BUN 61, Cr 1.7, (12-08-21 @ 08:44)      Ca    8.9      12 Dec 2021 04:30  Mg     2.3     12-12    TPro  6.3  /  Alb  3.6  /  TBili  0.2  /  DBili  x   /  AST  32  /  ALT  18  /  AlkPhos  71  12-12                                                                                           LIVER FUNCTIONS - ( 12 Dec 2021 04:30 )  Alb: 3.6 g/dL / Pro: 6.3 g/dL / ALK PHOS: 71 U/L / ALT: 18 U/L / AST: 32 U/L / GGT: x                                                                                                                                   ABG - ( 11 Dec 2021 12:45 )  pH, Arterial: 7.42  pH, Blood: x     /  pCO2: 56    /  pO2: 57    / HCO3: 36    / Base Excess: 10.2  /  SaO2: 90.7                MEDICATIONS  (STANDING):  albuterol/ipratropium for Nebulization 3 milliLiter(s) Nebulizer every 6 hours  aspirin  chewable 81 milliGRAM(s) Oral daily  atorvastatin 40 milliGRAM(s) Oral at bedtime  chlorhexidine 4% Liquid 1 Application(s) Topical <User Schedule>  dronedarone 400 milliGRAM(s) Oral two times a day  heparin   Injectable 5000 Unit(s) SubCutaneous every 12 hours  levothyroxine 112 MICROGram(s) Oral daily  methylPREDNISolone sodium succinate Injectable 40 milliGRAM(s) IV Push every 12 hours  metoprolol succinate ER 25 milliGRAM(s) Oral daily  pantoprazole    Tablet 40 milliGRAM(s) Oral before breakfast    MEDICATIONS  (PRN):  senna 2 Tablet(s) Oral at bedtime PRN Constipation      New X-rays reviewed:                                                                                  ECHO    CXR interpreted by me:  Bilateral opacities

## 2021-12-14 ENCOUNTER — TRANSCRIPTION ENCOUNTER (OUTPATIENT)
Age: 86
End: 2021-12-14

## 2021-12-14 VITALS
RESPIRATION RATE: 18 BRPM | TEMPERATURE: 97 F | HEART RATE: 91 BPM | DIASTOLIC BLOOD PRESSURE: 72 MMHG | SYSTOLIC BLOOD PRESSURE: 160 MMHG

## 2021-12-14 LAB
ALBUMIN SERPL ELPH-MCNC: 3.6 G/DL — SIGNIFICANT CHANGE UP (ref 3.5–5.2)
ALP SERPL-CCNC: 72 U/L — SIGNIFICANT CHANGE UP (ref 30–115)
ALT FLD-CCNC: 20 U/L — SIGNIFICANT CHANGE UP (ref 0–41)
ANION GAP SERPL CALC-SCNC: 15 MMOL/L — HIGH (ref 7–14)
AST SERPL-CCNC: 27 U/L — SIGNIFICANT CHANGE UP (ref 0–41)
BASOPHILS # BLD AUTO: 0.01 K/UL — SIGNIFICANT CHANGE UP (ref 0–0.2)
BASOPHILS NFR BLD AUTO: 0.1 % — SIGNIFICANT CHANGE UP (ref 0–1)
BILIRUB SERPL-MCNC: 0.3 MG/DL — SIGNIFICANT CHANGE UP (ref 0.2–1.2)
BUN SERPL-MCNC: 46 MG/DL — HIGH (ref 10–20)
CALCIUM SERPL-MCNC: 9.1 MG/DL — SIGNIFICANT CHANGE UP (ref 8.5–10.1)
CHLORIDE SERPL-SCNC: 103 MMOL/L — SIGNIFICANT CHANGE UP (ref 98–110)
CO2 SERPL-SCNC: 26 MMOL/L — SIGNIFICANT CHANGE UP (ref 17–32)
CREAT SERPL-MCNC: 1.2 MG/DL — SIGNIFICANT CHANGE UP (ref 0.7–1.5)
EOSINOPHIL # BLD AUTO: 0.07 K/UL — SIGNIFICANT CHANGE UP (ref 0–0.7)
EOSINOPHIL NFR BLD AUTO: 0.8 % — SIGNIFICANT CHANGE UP (ref 0–8)
GLUCOSE SERPL-MCNC: 66 MG/DL — LOW (ref 70–99)
HCT VFR BLD CALC: 39.3 % — SIGNIFICANT CHANGE UP (ref 37–47)
HGB BLD-MCNC: 11.8 G/DL — LOW (ref 12–16)
IMM GRANULOCYTES NFR BLD AUTO: 0.8 % — HIGH (ref 0.1–0.3)
LYMPHOCYTES # BLD AUTO: 1.42 K/UL — SIGNIFICANT CHANGE UP (ref 1.2–3.4)
LYMPHOCYTES # BLD AUTO: 17.2 % — LOW (ref 20.5–51.1)
MAGNESIUM SERPL-MCNC: 2.2 MG/DL — SIGNIFICANT CHANGE UP (ref 1.8–2.4)
MCHC RBC-ENTMCNC: 29.2 PG — SIGNIFICANT CHANGE UP (ref 27–31)
MCHC RBC-ENTMCNC: 30 G/DL — LOW (ref 32–37)
MCV RBC AUTO: 97.3 FL — SIGNIFICANT CHANGE UP (ref 81–99)
MONOCYTES # BLD AUTO: 0.62 K/UL — HIGH (ref 0.1–0.6)
MONOCYTES NFR BLD AUTO: 7.5 % — SIGNIFICANT CHANGE UP (ref 1.7–9.3)
NEUTROPHILS # BLD AUTO: 6.08 K/UL — SIGNIFICANT CHANGE UP (ref 1.4–6.5)
NEUTROPHILS NFR BLD AUTO: 73.6 % — SIGNIFICANT CHANGE UP (ref 42.2–75.2)
NRBC # BLD: 0 /100 WBCS — SIGNIFICANT CHANGE UP (ref 0–0)
PLATELET # BLD AUTO: 210 K/UL — SIGNIFICANT CHANGE UP (ref 130–400)
POTASSIUM SERPL-MCNC: 4.5 MMOL/L — SIGNIFICANT CHANGE UP (ref 3.5–5)
POTASSIUM SERPL-SCNC: 4.5 MMOL/L — SIGNIFICANT CHANGE UP (ref 3.5–5)
PROT SERPL-MCNC: 6.3 G/DL — SIGNIFICANT CHANGE UP (ref 6–8)
RBC # BLD: 4.04 M/UL — LOW (ref 4.2–5.4)
RBC # FLD: 14.5 % — SIGNIFICANT CHANGE UP (ref 11.5–14.5)
SODIUM SERPL-SCNC: 144 MMOL/L — SIGNIFICANT CHANGE UP (ref 135–146)
WBC # BLD: 8.27 K/UL — SIGNIFICANT CHANGE UP (ref 4.8–10.8)
WBC # FLD AUTO: 8.27 K/UL — SIGNIFICANT CHANGE UP (ref 4.8–10.8)

## 2021-12-14 PROCEDURE — 99239 HOSP IP/OBS DSCHRG MGMT >30: CPT

## 2021-12-14 RX ADMIN — Medication 40 MILLIGRAM(S): at 05:41

## 2021-12-14 RX ADMIN — Medication 3 MILLILITER(S): at 13:49

## 2021-12-14 RX ADMIN — Medication 112 MICROGRAM(S): at 05:41

## 2021-12-14 RX ADMIN — CHLORHEXIDINE GLUCONATE 1 APPLICATION(S): 213 SOLUTION TOPICAL at 05:42

## 2021-12-14 RX ADMIN — HEPARIN SODIUM 5000 UNIT(S): 5000 INJECTION INTRAVENOUS; SUBCUTANEOUS at 05:41

## 2021-12-14 RX ADMIN — DRONEDARONE 400 MILLIGRAM(S): 400 TABLET, FILM COATED ORAL at 05:40

## 2021-12-14 RX ADMIN — Medication 81 MILLIGRAM(S): at 11:12

## 2021-12-14 RX ADMIN — PANTOPRAZOLE SODIUM 40 MILLIGRAM(S): 20 TABLET, DELAYED RELEASE ORAL at 05:43

## 2021-12-14 RX ADMIN — Medication 25 MILLIGRAM(S): at 05:41

## 2021-12-14 NOTE — DISCHARGE NOTE PROVIDER - ATTENDING DISCHARGE PHYSICAL EXAMINATION:
T(C): 35.9 (12-14-21 @ 07:15), Max: 36.3 (12-13-21 @ 16:30)  HR: 91 (12-14-21 @ 07:15) (69 - 91)  BP: 160/72 (12-14-21 @ 07:15) (110/56 - 168/78)  RR: 18 (12-14-21 @ 07:15) (18 - 18)  SpO2: 94% (12-14-21 @ 06:01) (94% - 100%)    O/E: Awake, alert  HEENT: atraumatic, EOMI  Chest : no wheezes  CVS: sis2+, no murmur  P/S: soft , BS+  ECT: no edema feet  All systems reviewed

## 2021-12-14 NOTE — DISCHARGE NOTE NURSING/CASE MANAGEMENT/SOCIAL WORK - NSDCPEFALRISK_GEN_ALL_CORE
For information on Fall & Injury Prevention, visit: [email or web address] OR  [email or web address] OR  https://www.cdc.gov/steadi/patient.html

## 2021-12-14 NOTE — DISCHARGE NOTE PROVIDER - NSDCCPCAREPLAN_GEN_ALL_CORE_FT
PRINCIPAL DISCHARGE DIAGNOSIS  Diagnosis: Acute respiratory failure with hypoxia and hypercarbia  Assessment and Plan of Treatment: You were admitted for COPD exacerbation likely induced by influenza A infection. You were treated for influenza with Tamiflu for 5 days, and started on steroids and antibiotics for COPD exacerbation. You will be discharged home with steroids to complete a taper. Please follow up with your Pulmonologist.      SECONDARY DISCHARGE DIAGNOSES  Diagnosis: MAURI (acute kidney injury)  Assessment and Plan of Treatment:     Diagnosis: Influenza A  Assessment and Plan of Treatment:      PRINCIPAL DISCHARGE DIAGNOSIS  Diagnosis: Acute respiratory failure with hypoxia and hypercarbia  Assessment and Plan of Treatment: You were admitted for COPD exacerbation likely induced by influenza A infection. You were treated for influenza with Tamiflu for 5 days, and started on steroids and antibiotics for COPD exacerbation. You will be discharged home with steroids to complete a taper. Please follow up with your Pulmonologist.      SECONDARY DISCHARGE DIAGNOSES  Diagnosis: MAURI (acute kidney injury)  Assessment and Plan of Treatment: You had acute kidney injury on admission, with fluids, your kidney function improved.

## 2021-12-14 NOTE — DISCHARGE NOTE PROVIDER - CARE PROVIDER_API CALL
Chan, Yeoman K (MD)  Family Medicine  [address] [place] [address], [address]  [place] [address]  Phone: [phone]  Fax: [phone]  Follow Up Time: 2 weeks   Chan, Yeoman K (MD)  Family Medicine  [address] [place] [address], Floor [address]  [place] [address]  Phone: [phone]  Fax: [phone]  Follow Up Time: 2 weeks    Jose Ramon Jose)  Critical Care Medicine; Internal Medicine; Pulmonary Disease; Sleep Medicine  [address] [place] [address]  Phone: [phone]  Fax: [phone]  Follow Up Time: 1 week

## 2021-12-14 NOTE — DISCHARGE NOTE PROVIDER - NSDCMRMEDTOKEN_GEN_ALL_CORE_FT
albuterol 2.5 mg/3 mL (0.083%) inhalation solution: 3 milliliter(s) inhaled every 6 hours, As Needed for wheezing, shortness of breath  aspirin 81 mg oral tablet, chewable: 1 tab(s) orally once a day  atorvastatin 40 mg oral tablet: 1 tab(s) orally once a day (at bedtime)  dronedarone 400 mg oral tablet: 1 tab(s) orally 2 times a day  furosemide 40 mg oral tablet: 1 tab(s) orally once a day  levothyroxine 112 mcg (0.112 mg) oral tablet: 1 tab(s) orally once a day, take one hour before breakfast (on an empty stomach)  magnesium hydroxide/aluminum hydroxide/simethicone 200 mg-200 mg-20 mg/5 mL oral suspension: 5 milliliter(s) orally every 6 hours, As Needed -for diarrhea   melatonin 5 mg oral tablet: 1 tab(s) orally once a day (at bedtime), As Needed, to help you sleep at night  metoprolol succinate 25 mg oral tablet, extended release: 1 tab(s) orally once a day  pantoprazole 40 mg oral delayed release tablet: 1 tab(s) orally once a day (before a meal); take 30 minutes before breakfast  polyethylene glycol 3350 oral powder for reconstitution: 17 gram(s) orally once a day, As Needed, after dinner  predniSONE 20 mg oral tablet: 2 tab(s) orally once a day for 3 days then 1 tab orally for 3 days then stop   senna oral tablet: take 1 or 2 tab(s) orally once a day (at bedtime), if needed, for constipation  Trelegy Ellipta inhalation powder: 1 puff(s) inhaled once a day   albuterol 2.5 mg/3 mL (0.083%) inhalation solution: 3 milliliter(s) inhaled every 6 hours, As Needed for wheezing, shortness of breath  aspirin 81 mg oral tablet, chewable: 1 tab(s) orally once a day  atorvastatin 40 mg oral tablet: 1 tab(s) orally once a day (at bedtime)  dronedarone 400 mg oral tablet: 1 tab(s) orally 2 times a day  furosemide 40 mg oral tablet: 1 tab(s) orally once a day  levothyroxine 112 mcg (0.112 mg) oral tablet: 1 tab(s) orally once a day, take one hour before breakfast (on an empty stomach)  melatonin 5 mg oral tablet: 1 tab(s) orally once a day (at bedtime), As Needed, to help you sleep at night  metoprolol succinate 25 mg oral tablet, extended release: 1 tab(s) orally once a day  pantoprazole 40 mg oral delayed release tablet: 1 tab(s) orally once a day (before a meal); take 30 minutes before breakfast  predniSONE 10 mg oral tablet: 4 tab(s) orally once a day for 3 days   3 tabs orally once a day for 3 days  2 tabs orally once a day for 3 days   1 tab orally once a day for 3 days  then STOP  Trelegy Ellipta inhalation powder: 1 puff(s) inhaled once a day

## 2021-12-14 NOTE — DISCHARGE NOTE NURSING/CASE MANAGEMENT/SOCIAL WORK - PATIENT PORTAL LINK FT
You can access the FollowMyHealth Patient Portal offered by [facility] by registering at the following website: [email or web address]. By joining [facility]’s FollowMyHealth portal, you will also be able to view your health information using other applications (apps) compatible with our system.

## 2021-12-14 NOTE — DISCHARGE NOTE PROVIDER - HOSPITAL COURSE
89 y.o female w/ hx of COPD on home O2 2L, HTN, HLD,  hypothyroidism, CKD, presented to the ED for evaluation for cough x 2-3 weeks. The patient had seen Dr. Jose 11/30 and was started on doxycyline but over past week, patient experienced increased work of breathing, increasing home o2 to 3.5 L. Progressively, over past day she became more and more tachypneic and hypoxic prompting visit to the ED.The symptoms were associated with low grade fevers. Patient denied any chest pain, edema of lower extremities, calf pain, hemoptysis, abd pain.  Patient was received hypoxic in ER. Blood work showed MAURI and elevated troponin from baseline, flu A was positive on nasal swab. Patient is being admitted for COPD Exacerbation secondary to viral pneumonia with possible superimposed bacterial infection.   In ceu pt respiratory status improved, started on tamiflu, ceftriaxone, decreased steroids 40 q12. Pt's MAURI resolved with some fluids. Pt stable for downgrade to the floor.     Currently patient on 3-4L NC, no wheezing heard on exam. Completed tamiflu for 5 days, Rocephin for 5 days for COPD and PPX against superimposed bacterial infection. Currently on Prednisone 40mg daily, plans to taper at home.     Patient is medically stable and safe to be discharged home.

## 2021-12-15 ENCOUNTER — APPOINTMENT (OUTPATIENT)
Dept: CARE COORDINATION | Facility: HOME HEALTH | Age: 86
End: 2021-12-15
Payer: MEDICARE

## 2021-12-15 VITALS
SYSTOLIC BLOOD PRESSURE: 120 MMHG | HEART RATE: 80 BPM | RESPIRATION RATE: 16 BRPM | DIASTOLIC BLOOD PRESSURE: 80 MMHG | OXYGEN SATURATION: 93 %

## 2021-12-15 PROCEDURE — 99348 HOME/RES VST EST LOW MDM 30: CPT

## 2021-12-15 RX ORDER — METOPROLOL TARTRATE 1 MG/ML
INJECTION, SOLUTION INTRAVENOUS
Refills: 0 | Status: ACTIVE | COMMUNITY

## 2021-12-15 RX ORDER — PANTOPRAZOLE 40 MG/1
40 TABLET, DELAYED RELEASE ORAL
Qty: 30 | Refills: 0 | Status: ACTIVE | COMMUNITY
Start: 2020-08-19

## 2021-12-15 RX ORDER — LEVOTHYROXINE SODIUM 0.17 MG/1
TABLET ORAL
Refills: 0 | Status: ACTIVE | COMMUNITY

## 2021-12-15 RX ORDER — DOXYCYCLINE HYCLATE 100 MG/1
100 CAPSULE ORAL TWICE DAILY
Qty: 20 | Refills: 0 | Status: DISCONTINUED | COMMUNITY
Start: 2021-11-30 | End: 2021-12-15

## 2021-12-15 RX ORDER — ATORVASTATIN CALCIUM 80 MG/1
TABLET, FILM COATED ORAL
Refills: 0 | Status: ACTIVE | COMMUNITY

## 2021-12-15 RX ORDER — ASPIRIN 81 MG/1
81 TABLET ORAL DAILY
Refills: 0 | Status: ACTIVE | COMMUNITY
Start: 2020-08-19

## 2021-12-15 NOTE — PHYSICAL EXAM
[Normal Sclera/Conjunctiva] : normal sclera/conjunctiva [Normal] : normal sclera/conjunctiva, pupils are equal, round and reactive to light and extraocular movements are intact [No Respiratory Distress] : no respiratory distress  [No Accessory Muscle Use] : no accessory muscle use [Normal Rate] : normal rate  [Pedal Pulses Present] : the pedal pulses are present [No Edema] : there was no peripheral edema [Soft] : abdomen soft [Non Tender] : non-tender [Non-distended] : non-distended [No Joint Swelling] : no joint swelling [No Rash] : no rash [No Focal Deficits] : no focal deficits [Normal Affect] : the affect was normal [Alert and Oriented x3] : oriented to person, place, and time [Normal Insight/Judgement] : insight and judgment were intact [de-identified] : wheeze cleared with nebulizer

## 2021-12-15 NOTE — COUNSELING
[Fall prevention counseling provided] : Fall prevention counseling provided [de-identified] :  Advised patient to adhere to all medications including demonstrating proper use of inhalers and nebulizers. Encourage the use of proper breathing techniques such as pursed lip breathing or leaning forward during exhalation. Patient understands proper use of oxygen therapy. Increase activity as tolerated and maintain optimal activity levels. Continue coughing and deep breathing exercises including use of Incentive Spirometry. Receive routine pneumococcal and influenza vaccinations. Identify early signs and sx of disease and notify NP/MD. Maintain proper nutrition and hydration. Follow up with Pulmonologist within 7 days of discharge. Contact information given, patient advised to call with any questions/concerns. \par \par

## 2021-12-15 NOTE — HISTORY OF PRESENT ILLNESS
[FreeTextEntry1] : follow up post hospitalization [de-identified] : ospital Course: \par Discharge Date	14-Dec-2021 \par Admission Date	08-Dec-2021 12:47 \par Reason for Admission	SOB \par Hospital Course	 \par 89 y.o female w/ hx of COPD on home O2 2L, HTN, HLD,  hypothyroidism, CKD, \par presented to the ED for evaluation for cough x 2-3 weeks. The patient had seen \par Dr. Jose 11/30 and was started on doxycyline but over past week, patient \par experienced increased work of breathing, increasing home o2 to 3.5 L. \par Progressively, over past day she became more and more tachypneic and hypoxic \par prompting visit to the ED.The symptoms were associated with low grade fevers. \par Patient denied any chest pain, edema of lower extremities, calf pain, \par hemoptysis, abd pain. \par Patient was received hypoxic in ER. Blood work showed MAURI and elevated troponin \par from baseline, flu A was positive on nasal swab. Patient is being admitted for \par COPD Exacerbation secondary to viral pneumonia with possible superimposed \par bacterial infection. \par In ceu pt respiratory status improved, started on tamiflu, ceftriaxone, \par decreased steroids 40 q12. Pt's MAURI resolved with some fluids. Pt stable for \par downgrade to the floor. \par \par Currently patient on 3-4L NC, no wheezing heard on exam. Completed tamiflu for \par 5 days, Rocephin for 5 days for COPD and PPX against superimposed bacterial \par infection. Currently on Prednisone 40mg daily, plans to taper at home. \par \par Patient is medically stable and safe to be discharged home.

## 2021-12-15 NOTE — PLAN
[FreeTextEntry1] : COPD\par c/w with current medication\par c/w albuterol neb as needed\par follow up with MD

## 2021-12-15 NOTE — ASSESSMENT
[FreeTextEntry1] : Pt awake, alert and oriented x 3 at time of visit, daughter and son present.  pt using 3L supplemental oxygen and reports feeling much better than prior day. b/l wheezing remains, but is cleared significantly post nebulizer.  pt has f/u with pulmo on 12/21/21.

## 2021-12-20 DIAGNOSIS — J96.22 ACUTE AND CHRONIC RESPIRATORY FAILURE WITH HYPERCAPNIA: ICD-10-CM

## 2021-12-20 DIAGNOSIS — E78.5 HYPERLIPIDEMIA, UNSPECIFIED: ICD-10-CM

## 2021-12-20 DIAGNOSIS — Z99.81 DEPENDENCE ON SUPPLEMENTAL OXYGEN: ICD-10-CM

## 2021-12-20 DIAGNOSIS — N18.30 CHRONIC KIDNEY DISEASE, STAGE 3 UNSPECIFIED: ICD-10-CM

## 2021-12-20 DIAGNOSIS — Z66 DO NOT RESUSCITATE: ICD-10-CM

## 2021-12-20 DIAGNOSIS — I48.0 PAROXYSMAL ATRIAL FIBRILLATION: ICD-10-CM

## 2021-12-20 DIAGNOSIS — R77.8 OTHER SPECIFIED ABNORMALITIES OF PLASMA PROTEINS: ICD-10-CM

## 2021-12-20 DIAGNOSIS — J12.89 OTHER VIRAL PNEUMONIA: ICD-10-CM

## 2021-12-20 DIAGNOSIS — E03.9 HYPOTHYROIDISM, UNSPECIFIED: ICD-10-CM

## 2021-12-20 DIAGNOSIS — J96.21 ACUTE AND CHRONIC RESPIRATORY FAILURE WITH HYPOXIA: ICD-10-CM

## 2021-12-20 DIAGNOSIS — J44.0 CHRONIC OBSTRUCTIVE PULMONARY DISEASE WITH (ACUTE) LOWER RESPIRATORY INFECTION: ICD-10-CM

## 2021-12-20 DIAGNOSIS — I12.9 HYPERTENSIVE CHRONIC KIDNEY DISEASE WITH STAGE 1 THROUGH STAGE 4 CHRONIC KIDNEY DISEASE, OR UNSPECIFIED CHRONIC KIDNEY DISEASE: ICD-10-CM

## 2021-12-20 DIAGNOSIS — N17.9 ACUTE KIDNEY FAILURE, UNSPECIFIED: ICD-10-CM

## 2021-12-20 DIAGNOSIS — J10.1 INFLUENZA DUE TO OTHER IDENTIFIED INFLUENZA VIRUS WITH OTHER RESPIRATORY MANIFESTATIONS: ICD-10-CM

## 2021-12-20 DIAGNOSIS — J44.1 CHRONIC OBSTRUCTIVE PULMONARY DISEASE WITH (ACUTE) EXACERBATION: ICD-10-CM

## 2021-12-21 ENCOUNTER — APPOINTMENT (OUTPATIENT)
Age: 86
End: 2021-12-21
Payer: MEDICARE

## 2021-12-21 VITALS
DIASTOLIC BLOOD PRESSURE: 70 MMHG | HEIGHT: 59 IN | WEIGHT: 100 LBS | SYSTOLIC BLOOD PRESSURE: 116 MMHG | HEART RATE: 68 BPM | RESPIRATION RATE: 14 BRPM | BODY MASS INDEX: 20.16 KG/M2

## 2021-12-21 PROCEDURE — 99213 OFFICE O/P EST LOW 20 MIN: CPT | Mod: 25

## 2021-12-21 PROCEDURE — 71046 X-RAY EXAM CHEST 2 VIEWS: CPT

## 2021-12-21 NOTE — DISCUSSION/SUMMARY
[FreeTextEntry1] : SEVERE COPD SP RECENT EXACERBATION\par FLU\par SOB ON EXERTION\par HOSP RECORDS REVIEWED

## 2022-01-01 ENCOUNTER — APPOINTMENT (OUTPATIENT)
Age: 87
End: 2022-01-01

## 2022-01-01 ENCOUNTER — INPATIENT (INPATIENT)
Facility: HOSPITAL | Age: 87
LOS: 4 days | Discharge: HOME HEALTH | End: 2022-10-07
Attending: INTERNAL MEDICINE | Admitting: INTERNAL MEDICINE

## 2022-01-01 ENCOUNTER — EMERGENCY (EMERGENCY)
Facility: HOSPITAL | Age: 87
LOS: 0 days | Discharge: HOME | End: 2022-08-15
Admitting: SURGERY

## 2022-01-01 ENCOUNTER — INPATIENT (INPATIENT)
Facility: HOSPITAL | Age: 87
LOS: 0 days | Discharge: HOME HEALTH | End: 2022-08-16
Attending: SURGERY | Admitting: SURGERY

## 2022-01-01 ENCOUNTER — RX RENEWAL (OUTPATIENT)
Age: 87
End: 2022-01-01

## 2022-01-01 ENCOUNTER — TRANSCRIPTION ENCOUNTER (OUTPATIENT)
Age: 87
End: 2022-01-01

## 2022-01-01 ENCOUNTER — EMERGENCY (EMERGENCY)
Facility: HOSPITAL | Age: 87
LOS: 0 days | Discharge: HOME | End: 2022-09-25
Attending: STUDENT IN AN ORGANIZED HEALTH CARE EDUCATION/TRAINING PROGRAM | Admitting: STUDENT IN AN ORGANIZED HEALTH CARE EDUCATION/TRAINING PROGRAM

## 2022-01-01 VITALS
TEMPERATURE: 99 F | RESPIRATION RATE: 20 BRPM | OXYGEN SATURATION: 98 % | WEIGHT: 100.09 LBS | HEART RATE: 71 BPM | SYSTOLIC BLOOD PRESSURE: 102 MMHG | HEIGHT: 57 IN | DIASTOLIC BLOOD PRESSURE: 55 MMHG

## 2022-01-01 VITALS
HEART RATE: 65 BPM | SYSTOLIC BLOOD PRESSURE: 137 MMHG | OXYGEN SATURATION: 100 % | DIASTOLIC BLOOD PRESSURE: 68 MMHG | RESPIRATION RATE: 18 BRPM | TEMPERATURE: 97 F

## 2022-01-01 VITALS — TEMPERATURE: 101 F

## 2022-01-01 VITALS
OXYGEN SATURATION: 94 % | SYSTOLIC BLOOD PRESSURE: 130 MMHG | DIASTOLIC BLOOD PRESSURE: 61 MMHG | RESPIRATION RATE: 18 BRPM | HEART RATE: 79 BPM | WEIGHT: 100.09 LBS | HEIGHT: 57 IN | TEMPERATURE: 100 F

## 2022-01-01 VITALS
HEART RATE: 65 BPM | SYSTOLIC BLOOD PRESSURE: 102 MMHG | WEIGHT: 102 LBS | DIASTOLIC BLOOD PRESSURE: 50 MMHG | BODY MASS INDEX: 20.56 KG/M2 | HEIGHT: 59 IN | RESPIRATION RATE: 14 BRPM

## 2022-01-01 VITALS
WEIGHT: 103 LBS | BODY MASS INDEX: 20.76 KG/M2 | OXYGEN SATURATION: 82 % | DIASTOLIC BLOOD PRESSURE: 60 MMHG | HEART RATE: 53 BPM | HEIGHT: 59 IN | RESPIRATION RATE: 12 BRPM | SYSTOLIC BLOOD PRESSURE: 110 MMHG

## 2022-01-01 VITALS
HEART RATE: 65 BPM | SYSTOLIC BLOOD PRESSURE: 118 MMHG | WEIGHT: 104.06 LBS | RESPIRATION RATE: 18 BRPM | HEIGHT: 57 IN | DIASTOLIC BLOOD PRESSURE: 58 MMHG | TEMPERATURE: 98 F | OXYGEN SATURATION: 93 %

## 2022-01-01 VITALS
HEART RATE: 60 BPM | RESPIRATION RATE: 18 BRPM | DIASTOLIC BLOOD PRESSURE: 60 MMHG | SYSTOLIC BLOOD PRESSURE: 128 MMHG | OXYGEN SATURATION: 93 % | TEMPERATURE: 97 F

## 2022-01-01 DIAGNOSIS — Z79.82 LONG TERM (CURRENT) USE OF ASPIRIN: ICD-10-CM

## 2022-01-01 DIAGNOSIS — N17.9 ACUTE KIDNEY FAILURE, UNSPECIFIED: ICD-10-CM

## 2022-01-01 DIAGNOSIS — S32.028A OTHER FRACTURE OF SECOND LUMBAR VERTEBRA, INITIAL ENCOUNTER FOR CLOSED FRACTURE: ICD-10-CM

## 2022-01-01 DIAGNOSIS — J96.10 CHRONIC RESPIRATORY FAILURE, UNSPECIFIED WHETHER WITH HYPOXIA OR HYPERCAPNIA: ICD-10-CM

## 2022-01-01 DIAGNOSIS — N18.9 CHRONIC KIDNEY DISEASE, UNSPECIFIED: ICD-10-CM

## 2022-01-01 DIAGNOSIS — S32.038A OTHER FRACTURE OF THIRD LUMBAR VERTEBRA, INITIAL ENCOUNTER FOR CLOSED FRACTURE: ICD-10-CM

## 2022-01-01 DIAGNOSIS — S32.029A UNSPECIFIED FRACTURE OF SECOND LUMBAR VERTEBRA, INITIAL ENCOUNTER FOR CLOSED FRACTURE: ICD-10-CM
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
## 2022-01-01 DIAGNOSIS — Z87.891 PERSONAL HISTORY OF NICOTINE DEPENDENCE: ICD-10-CM

## 2022-01-01 DIAGNOSIS — S70.02XA CONTUSION OF LEFT HIP, INITIAL ENCOUNTER: ICD-10-CM

## 2022-01-01 DIAGNOSIS — Z79.890 HORMONE REPLACEMENT THERAPY: ICD-10-CM

## 2022-01-01 DIAGNOSIS — E78.5 HYPERLIPIDEMIA, UNSPECIFIED: ICD-10-CM

## 2022-01-01 DIAGNOSIS — S22.41XA MULTIPLE FRACTURES OF RIBS, RIGHT SIDE, INITIAL ENCOUNTER FOR CLOSED FRACTURE: ICD-10-CM

## 2022-01-01 DIAGNOSIS — I48.20 CHRONIC ATRIAL FIBRILLATION, UNSPECIFIED: ICD-10-CM

## 2022-01-01 DIAGNOSIS — I12.9 HYPERTENSIVE CHRONIC KIDNEY DISEASE WITH STAGE 1 THROUGH STAGE 4 CHRONIC KIDNEY DISEASE, OR UNSPECIFIED CHRONIC KIDNEY DISEASE: ICD-10-CM

## 2022-01-01 DIAGNOSIS — Z99.81 DEPENDENCE ON SUPPLEMENTAL OXYGEN: ICD-10-CM

## 2022-01-01 DIAGNOSIS — I50.32 CHRONIC DIASTOLIC (CONGESTIVE) HEART FAILURE: ICD-10-CM

## 2022-01-01 DIAGNOSIS — S32.018A OTHER FRACTURE OF FIRST LUMBAR VERTEBRA, INITIAL ENCOUNTER FOR CLOSED FRACTURE: ICD-10-CM

## 2022-01-01 DIAGNOSIS — Z20.822 CONTACT WITH AND (SUSPECTED) EXPOSURE TO COVID-19: ICD-10-CM
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
## 2022-01-01 DIAGNOSIS — E03.9 HYPOTHYROIDISM, UNSPECIFIED: ICD-10-CM

## 2022-01-01 DIAGNOSIS — Z79.2 LONG TERM (CURRENT) USE OF ANTIBIOTICS: ICD-10-CM

## 2022-01-01 DIAGNOSIS — S09.90XA UNSPECIFIED INJURY OF HEAD, INITIAL ENCOUNTER: ICD-10-CM

## 2022-01-01 DIAGNOSIS — M54.9 DORSALGIA, UNSPECIFIED: ICD-10-CM

## 2022-01-01 DIAGNOSIS — Z66 DO NOT RESUSCITATE: ICD-10-CM

## 2022-01-01 DIAGNOSIS — Z79.52 LONG TERM (CURRENT) USE OF SYSTEMIC STEROIDS: ICD-10-CM

## 2022-01-01 DIAGNOSIS — E87.5 HYPERKALEMIA: ICD-10-CM

## 2022-01-01 DIAGNOSIS — U07.1 COVID-19: ICD-10-CM

## 2022-01-01 DIAGNOSIS — S22.42XA MULTIPLE FRACTURES OF RIBS, LEFT SIDE, INITIAL ENCOUNTER FOR CLOSED FRACTURE: ICD-10-CM

## 2022-01-01 DIAGNOSIS — R07.81 PLEURODYNIA: ICD-10-CM
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
## 2022-01-01 DIAGNOSIS — Z87.19 PERSONAL HISTORY OF OTHER DISEASES OF THE DIGESTIVE SYSTEM: ICD-10-CM

## 2022-01-01 DIAGNOSIS — S50.311A ABRASION OF RIGHT ELBOW, INITIAL ENCOUNTER: ICD-10-CM

## 2022-01-01 DIAGNOSIS — J44.9 CHRONIC OBSTRUCTIVE PULMONARY DISEASE, UNSPECIFIED: ICD-10-CM

## 2022-01-01 DIAGNOSIS — I13.0 HYPERTENSIVE HEART AND CHRONIC KIDNEY DISEASE WITH HEART FAILURE AND STAGE 1 THROUGH STAGE 4 CHRONIC KIDNEY DISEASE, OR UNSPECIFIED CHRONIC KIDNEY DISEASE: ICD-10-CM
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
## 2022-01-01 DIAGNOSIS — J12.82 PNEUMONIA DUE TO CORONAVIRUS DISEASE 2019: ICD-10-CM

## 2022-01-01 DIAGNOSIS — S32.039A UNSPECIFIED FRACTURE OF THIRD LUMBAR VERTEBRA, INITIAL ENCOUNTER FOR CLOSED FRACTURE: ICD-10-CM

## 2022-01-01 DIAGNOSIS — D63.1 ANEMIA IN CHRONIC KIDNEY DISEASE: ICD-10-CM

## 2022-01-01 DIAGNOSIS — I10 ESSENTIAL (PRIMARY) HYPERTENSION: ICD-10-CM
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
## 2022-01-01 DIAGNOSIS — J44.0 CHRONIC OBSTRUCTIVE PULMONARY DISEASE WITH (ACUTE) LOWER RESPIRATORY INFECTION: ICD-10-CM

## 2022-01-01 DIAGNOSIS — H91.90 UNSPECIFIED HEARING LOSS, UNSPECIFIED EAR: ICD-10-CM

## 2022-01-01 DIAGNOSIS — J44.1 CHRONIC OBSTRUCTIVE PULMONARY DISEASE WITH (ACUTE) EXACERBATION: ICD-10-CM

## 2022-01-01 DIAGNOSIS — N18.30 CHRONIC KIDNEY DISEASE, STAGE 3 UNSPECIFIED: ICD-10-CM

## 2022-01-01 DIAGNOSIS — R79.89 OTHER SPECIFIED ABNORMAL FINDINGS OF BLOOD CHEMISTRY: ICD-10-CM

## 2022-01-01 DIAGNOSIS — E83.42 HYPOMAGNESEMIA: ICD-10-CM

## 2022-01-01 DIAGNOSIS — M84.68XA PATHOLOGICAL FRACTURE IN OTHER DISEASE, OTHER SITE, INITIAL ENCOUNTER FOR FRACTURE: ICD-10-CM

## 2022-01-01 DIAGNOSIS — S32.019A UNSPECIFIED FRACTURE OF FIRST LUMBAR VERTEBRA, INITIAL ENCOUNTER FOR CLOSED FRACTURE: ICD-10-CM
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
## 2022-01-01 DIAGNOSIS — E78.00 PURE HYPERCHOLESTEROLEMIA, UNSPECIFIED: ICD-10-CM

## 2022-01-01 DIAGNOSIS — S05.12XA CONTUSION OF EYEBALL AND ORBITAL TISSUES, LEFT EYE, INITIAL ENCOUNTER: ICD-10-CM

## 2022-01-01 DIAGNOSIS — R29.6 REPEATED FALLS: ICD-10-CM

## 2022-01-01 DIAGNOSIS — D72.829 ELEVATED WHITE BLOOD CELL COUNT, UNSPECIFIED: ICD-10-CM

## 2022-01-01 LAB
ALBUMIN SERPL ELPH-MCNC: 3.5 G/DL — SIGNIFICANT CHANGE UP (ref 3.5–5.2)
ALBUMIN SERPL ELPH-MCNC: 3.6 G/DL — SIGNIFICANT CHANGE UP (ref 3.5–5.2)
ALBUMIN SERPL ELPH-MCNC: 3.7 G/DL — SIGNIFICANT CHANGE UP (ref 3.5–5.2)
ALBUMIN SERPL ELPH-MCNC: 3.7 G/DL — SIGNIFICANT CHANGE UP (ref 3.5–5.2)
ALBUMIN SERPL ELPH-MCNC: 3.8 G/DL — SIGNIFICANT CHANGE UP (ref 3.5–5.2)
ALBUMIN SERPL ELPH-MCNC: 3.9 G/DL — SIGNIFICANT CHANGE UP (ref 3.5–5.2)
ALBUMIN SERPL ELPH-MCNC: 3.9 G/DL — SIGNIFICANT CHANGE UP (ref 3.5–5.2)
ALBUMIN SERPL ELPH-MCNC: 4.4 G/DL — SIGNIFICANT CHANGE UP (ref 3.5–5.2)
ALBUMIN SERPL ELPH-MCNC: 4.5 G/DL — SIGNIFICANT CHANGE UP (ref 3.5–5.2)
ALP SERPL-CCNC: 123 U/L — HIGH (ref 30–115)
ALP SERPL-CCNC: 132 U/L — HIGH (ref 30–115)
ALP SERPL-CCNC: 86 U/L — SIGNIFICANT CHANGE UP (ref 30–115)
ALP SERPL-CCNC: 87 U/L — SIGNIFICANT CHANGE UP (ref 30–115)
ALP SERPL-CCNC: 88 U/L — SIGNIFICANT CHANGE UP (ref 30–115)
ALP SERPL-CCNC: 88 U/L — SIGNIFICANT CHANGE UP (ref 30–115)
ALP SERPL-CCNC: 90 U/L — SIGNIFICANT CHANGE UP (ref 30–115)
ALP SERPL-CCNC: 91 U/L — SIGNIFICANT CHANGE UP (ref 30–115)
ALP SERPL-CCNC: 94 U/L — SIGNIFICANT CHANGE UP (ref 30–115)
ALT FLD-CCNC: 20 U/L — SIGNIFICANT CHANGE UP (ref 0–41)
ALT FLD-CCNC: 21 U/L — SIGNIFICANT CHANGE UP (ref 0–41)
ALT FLD-CCNC: 22 U/L — SIGNIFICANT CHANGE UP (ref 0–41)
ALT FLD-CCNC: 24 U/L — SIGNIFICANT CHANGE UP (ref 0–41)
ALT FLD-CCNC: 25 U/L — SIGNIFICANT CHANGE UP (ref 0–41)
ANION GAP SERPL CALC-SCNC: 10 MMOL/L — SIGNIFICANT CHANGE UP (ref 7–14)
ANION GAP SERPL CALC-SCNC: 10 MMOL/L — SIGNIFICANT CHANGE UP (ref 7–14)
ANION GAP SERPL CALC-SCNC: 11 MMOL/L — SIGNIFICANT CHANGE UP (ref 7–14)
ANION GAP SERPL CALC-SCNC: 11 MMOL/L — SIGNIFICANT CHANGE UP (ref 7–14)
ANION GAP SERPL CALC-SCNC: 12 MMOL/L — SIGNIFICANT CHANGE UP (ref 7–14)
ANION GAP SERPL CALC-SCNC: 12 MMOL/L — SIGNIFICANT CHANGE UP (ref 7–14)
ANION GAP SERPL CALC-SCNC: 15 MMOL/L — HIGH (ref 7–14)
ANION GAP SERPL CALC-SCNC: 16 MMOL/L — HIGH (ref 7–14)
ANION GAP SERPL CALC-SCNC: 17 MMOL/L — HIGH (ref 7–14)
APPEARANCE UR: CLEAR — SIGNIFICANT CHANGE UP
APTT BLD: 24.9 SEC — LOW (ref 27–39.2)
APTT BLD: 26.8 SEC — LOW (ref 27–39.2)
AST SERPL-CCNC: 29 U/L — SIGNIFICANT CHANGE UP (ref 0–41)
AST SERPL-CCNC: 29 U/L — SIGNIFICANT CHANGE UP (ref 0–41)
AST SERPL-CCNC: 30 U/L — SIGNIFICANT CHANGE UP (ref 0–41)
AST SERPL-CCNC: 30 U/L — SIGNIFICANT CHANGE UP (ref 0–41)
AST SERPL-CCNC: 33 U/L — SIGNIFICANT CHANGE UP (ref 0–41)
AST SERPL-CCNC: 33 U/L — SIGNIFICANT CHANGE UP (ref 0–41)
AST SERPL-CCNC: 34 U/L — SIGNIFICANT CHANGE UP (ref 0–41)
AST SERPL-CCNC: 35 U/L — SIGNIFICANT CHANGE UP (ref 0–41)
AST SERPL-CCNC: 36 U/L — SIGNIFICANT CHANGE UP (ref 0–41)
AST SERPL-CCNC: 41 U/L — SIGNIFICANT CHANGE UP (ref 0–41)
AST SERPL-CCNC: 46 U/L — HIGH (ref 0–41)
BACTERIA # UR AUTO: NEGATIVE — SIGNIFICANT CHANGE UP
BACTERIA # UR AUTO: NEGATIVE — SIGNIFICANT CHANGE UP
BASE EXCESS BLDV CALC-SCNC: 5.8 MMOL/L — HIGH (ref -2–3)
BASE EXCESS BLDV CALC-SCNC: 8.8 MMOL/L — HIGH (ref -2–3)
BASOPHILS # BLD AUTO: 0 K/UL — SIGNIFICANT CHANGE UP (ref 0–0.2)
BASOPHILS # BLD AUTO: 0 K/UL — SIGNIFICANT CHANGE UP (ref 0–0.2)
BASOPHILS # BLD AUTO: 0.01 K/UL — SIGNIFICANT CHANGE UP (ref 0–0.2)
BASOPHILS # BLD AUTO: 0.01 K/UL — SIGNIFICANT CHANGE UP (ref 0–0.2)
BASOPHILS # BLD AUTO: 0.03 K/UL — SIGNIFICANT CHANGE UP (ref 0–0.2)
BASOPHILS # BLD AUTO: 0.03 K/UL — SIGNIFICANT CHANGE UP (ref 0–0.2)
BASOPHILS # BLD AUTO: 0.04 K/UL — SIGNIFICANT CHANGE UP (ref 0–0.2)
BASOPHILS NFR BLD AUTO: 0 % — SIGNIFICANT CHANGE UP (ref 0–1)
BASOPHILS NFR BLD AUTO: 0 % — SIGNIFICANT CHANGE UP (ref 0–1)
BASOPHILS NFR BLD AUTO: 0.1 % — SIGNIFICANT CHANGE UP (ref 0–1)
BASOPHILS NFR BLD AUTO: 0.1 % — SIGNIFICANT CHANGE UP (ref 0–1)
BASOPHILS NFR BLD AUTO: 0.4 % — SIGNIFICANT CHANGE UP (ref 0–1)
BASOPHILS NFR BLD AUTO: 0.6 % — SIGNIFICANT CHANGE UP (ref 0–1)
BASOPHILS NFR BLD AUTO: 0.6 % — SIGNIFICANT CHANGE UP (ref 0–1)
BILIRUB DIRECT SERPL-MCNC: <0.2 MG/DL — SIGNIFICANT CHANGE UP (ref 0–0.3)
BILIRUB INDIRECT FLD-MCNC: >0.1 MG/DL — LOW (ref 0.2–1.2)
BILIRUB INDIRECT FLD-MCNC: >0.2 MG/DL — SIGNIFICANT CHANGE UP (ref 0.2–1.2)
BILIRUB SERPL-MCNC: 0.3 MG/DL — SIGNIFICANT CHANGE UP (ref 0.2–1.2)
BILIRUB SERPL-MCNC: 0.4 MG/DL — SIGNIFICANT CHANGE UP (ref 0.2–1.2)
BILIRUB SERPL-MCNC: 0.5 MG/DL — SIGNIFICANT CHANGE UP (ref 0.2–1.2)
BILIRUB UR-MCNC: NEGATIVE — SIGNIFICANT CHANGE UP
BUN SERPL-MCNC: 52 MG/DL — HIGH (ref 10–20)
BUN SERPL-MCNC: 54 MG/DL — HIGH (ref 10–20)
BUN SERPL-MCNC: 58 MG/DL — HIGH (ref 10–20)
BUN SERPL-MCNC: 58 MG/DL — HIGH (ref 10–20)
BUN SERPL-MCNC: 61 MG/DL — CRITICAL HIGH (ref 10–20)
BUN SERPL-MCNC: 65 MG/DL — CRITICAL HIGH (ref 10–20)
BUN SERPL-MCNC: 67 MG/DL — CRITICAL HIGH (ref 10–20)
BUN SERPL-MCNC: 74 MG/DL — CRITICAL HIGH (ref 10–20)
BUN SERPL-MCNC: 75 MG/DL — CRITICAL HIGH (ref 10–20)
CA-I SERPL-SCNC: 1.2 MMOL/L — SIGNIFICANT CHANGE UP (ref 1.15–1.33)
CALCIUM SERPL-MCNC: 8.4 MG/DL — SIGNIFICANT CHANGE UP (ref 8.4–10.5)
CALCIUM SERPL-MCNC: 8.4 MG/DL — SIGNIFICANT CHANGE UP (ref 8.4–10.5)
CALCIUM SERPL-MCNC: 8.7 MG/DL — SIGNIFICANT CHANGE UP (ref 8.4–10.5)
CALCIUM SERPL-MCNC: 8.7 MG/DL — SIGNIFICANT CHANGE UP (ref 8.5–10.1)
CALCIUM SERPL-MCNC: 8.9 MG/DL — SIGNIFICANT CHANGE UP (ref 8.4–10.5)
CALCIUM SERPL-MCNC: 9 MG/DL — SIGNIFICANT CHANGE UP (ref 8.4–10.5)
CALCIUM SERPL-MCNC: 9.1 MG/DL — SIGNIFICANT CHANGE UP (ref 8.4–10.5)
CALCIUM SERPL-MCNC: 9.3 MG/DL — SIGNIFICANT CHANGE UP (ref 8.4–10.5)
CALCIUM SERPL-MCNC: 9.3 MG/DL — SIGNIFICANT CHANGE UP (ref 8.5–10.1)
CHLORIDE SERPL-SCNC: 101 MMOL/L — SIGNIFICANT CHANGE UP (ref 98–110)
CHLORIDE SERPL-SCNC: 105 MMOL/L — SIGNIFICANT CHANGE UP (ref 98–110)
CHLORIDE SERPL-SCNC: 105 MMOL/L — SIGNIFICANT CHANGE UP (ref 98–110)
CHLORIDE SERPL-SCNC: 96 MMOL/L — LOW (ref 98–110)
CHLORIDE SERPL-SCNC: 96 MMOL/L — LOW (ref 98–110)
CHLORIDE SERPL-SCNC: 97 MMOL/L — LOW (ref 98–110)
CHLORIDE SERPL-SCNC: 97 MMOL/L — LOW (ref 98–110)
CHLORIDE SERPL-SCNC: 99 MMOL/L — SIGNIFICANT CHANGE UP (ref 98–110)
CHLORIDE SERPL-SCNC: 99 MMOL/L — SIGNIFICANT CHANGE UP (ref 98–110)
CO2 SERPL-SCNC: 25 MMOL/L — SIGNIFICANT CHANGE UP (ref 17–32)
CO2 SERPL-SCNC: 27 MMOL/L — SIGNIFICANT CHANGE UP (ref 17–32)
CO2 SERPL-SCNC: 28 MMOL/L — SIGNIFICANT CHANGE UP (ref 17–32)
CO2 SERPL-SCNC: 29 MMOL/L — SIGNIFICANT CHANGE UP (ref 17–32)
CO2 SERPL-SCNC: 29 MMOL/L — SIGNIFICANT CHANGE UP (ref 17–32)
CO2 SERPL-SCNC: 30 MMOL/L — SIGNIFICANT CHANGE UP (ref 17–32)
CO2 SERPL-SCNC: 31 MMOL/L — SIGNIFICANT CHANGE UP (ref 17–32)
COLOR SPEC: SIGNIFICANT CHANGE UP
CREAT SERPL-MCNC: 1.3 MG/DL — SIGNIFICANT CHANGE UP (ref 0.7–1.5)
CREAT SERPL-MCNC: 1.4 MG/DL — SIGNIFICANT CHANGE UP (ref 0.7–1.5)
CREAT SERPL-MCNC: 1.4 MG/DL — SIGNIFICANT CHANGE UP (ref 0.7–1.5)
CREAT SERPL-MCNC: 1.5 MG/DL — SIGNIFICANT CHANGE UP (ref 0.7–1.5)
CREAT SERPL-MCNC: 1.7 MG/DL — HIGH (ref 0.7–1.5)
CREAT SERPL-MCNC: 1.8 MG/DL — HIGH (ref 0.7–1.5)
CREAT SERPL-MCNC: 1.9 MG/DL — HIGH (ref 0.7–1.5)
CREAT SERPL-MCNC: 2.1 MG/DL — HIGH (ref 0.7–1.5)
CRP SERPL-MCNC: 104.9 MG/L — HIGH
CRP SERPL-MCNC: 96.3 MG/L — HIGH
CULTURE RESULTS: SIGNIFICANT CHANGE UP
D DIMER BLD IA.RAPID-MCNC: 823 NG/ML DDU — HIGH (ref 0–230)
DIFF PNL FLD: ABNORMAL
DIFF PNL FLD: NEGATIVE — SIGNIFICANT CHANGE UP
DIFF PNL FLD: NEGATIVE — SIGNIFICANT CHANGE UP
EGFR: 21 ML/MIN/1.73M2 — LOW
EGFR: 24 ML/MIN/1.73M2 — LOW
EGFR: 26 ML/MIN/1.73M2 — LOW
EGFR: 28 ML/MIN/1.73M2 — LOW
EGFR: 32 ML/MIN/1.73M2 — LOW
EGFR: 35 ML/MIN/1.73M2 — LOW
EGFR: 35 ML/MIN/1.73M2 — LOW
EGFR: 38 ML/MIN/1.73M2 — LOW
EOSINOPHIL # BLD AUTO: 0 K/UL — SIGNIFICANT CHANGE UP (ref 0–0.7)
EOSINOPHIL # BLD AUTO: 0.04 K/UL — SIGNIFICANT CHANGE UP (ref 0–0.7)
EOSINOPHIL # BLD AUTO: 0.16 K/UL — SIGNIFICANT CHANGE UP (ref 0–0.7)
EOSINOPHIL # BLD AUTO: 0.23 K/UL — SIGNIFICANT CHANGE UP (ref 0–0.7)
EOSINOPHIL NFR BLD AUTO: 0 % — SIGNIFICANT CHANGE UP (ref 0–8)
EOSINOPHIL NFR BLD AUTO: 0.6 % — SIGNIFICANT CHANGE UP (ref 0–8)
EOSINOPHIL NFR BLD AUTO: 3.2 % — SIGNIFICANT CHANGE UP (ref 0–8)
EOSINOPHIL NFR BLD AUTO: 3.4 % — SIGNIFICANT CHANGE UP (ref 0–8)
EPI CELLS # UR: 1 /HPF — SIGNIFICANT CHANGE UP (ref 0–5)
EPI CELLS # UR: 1 /HPF — SIGNIFICANT CHANGE UP (ref 0–5)
ETHANOL SERPL-MCNC: <10 MG/DL — SIGNIFICANT CHANGE UP
ETHANOL SERPL-MCNC: <10 MG/DL — SIGNIFICANT CHANGE UP
FERRITIN SERPL-MCNC: 254 NG/ML — HIGH (ref 15–150)
FLUAV AG NPH QL: SIGNIFICANT CHANGE UP
FLUBV AG NPH QL: SIGNIFICANT CHANGE UP
GAS PNL BLDV: 137 MMOL/L — SIGNIFICANT CHANGE UP (ref 136–145)
GAS PNL BLDV: SIGNIFICANT CHANGE UP
GLUCOSE SERPL-MCNC: 102 MG/DL — HIGH (ref 70–99)
GLUCOSE SERPL-MCNC: 103 MG/DL — HIGH (ref 70–99)
GLUCOSE SERPL-MCNC: 113 MG/DL — HIGH (ref 70–99)
GLUCOSE SERPL-MCNC: 118 MG/DL — HIGH (ref 70–99)
GLUCOSE SERPL-MCNC: 130 MG/DL — HIGH (ref 70–99)
GLUCOSE SERPL-MCNC: 243 MG/DL — HIGH (ref 70–99)
GLUCOSE SERPL-MCNC: 65 MG/DL — LOW (ref 70–99)
GLUCOSE SERPL-MCNC: 72 MG/DL — SIGNIFICANT CHANGE UP (ref 70–99)
GLUCOSE SERPL-MCNC: 92 MG/DL — SIGNIFICANT CHANGE UP (ref 70–99)
GLUCOSE UR QL: NEGATIVE — SIGNIFICANT CHANGE UP
GLUCOSE UR QL: NEGATIVE — SIGNIFICANT CHANGE UP
GLUCOSE UR QL: SIGNIFICANT CHANGE UP
HCO3 BLDV-SCNC: 34 MMOL/L — HIGH (ref 22–29)
HCO3 BLDV-SCNC: 35 MMOL/L — HIGH (ref 22–29)
HCT VFR BLD CALC: 29.6 % — LOW (ref 37–47)
HCT VFR BLD CALC: 32.4 % — LOW (ref 37–47)
HCT VFR BLD CALC: 32.6 % — LOW (ref 37–47)
HCT VFR BLD CALC: 34.4 % — LOW (ref 37–47)
HCT VFR BLD CALC: 36.3 % — LOW (ref 37–47)
HCT VFR BLD CALC: 36.3 % — LOW (ref 37–47)
HCT VFR BLD CALC: 36.5 % — LOW (ref 37–47)
HCT VFR BLD CALC: 36.9 % — LOW (ref 37–47)
HCT VFR BLDA CALC: 33 % — LOW (ref 39–51)
HGB BLD CALC-MCNC: 10.9 G/DL — LOW (ref 12.6–17.4)
HGB BLD-MCNC: 10.4 G/DL — LOW (ref 12–16)
HGB BLD-MCNC: 10.6 G/DL — LOW (ref 12–16)
HGB BLD-MCNC: 11.3 G/DL — LOW (ref 12–16)
HGB BLD-MCNC: 11.7 G/DL — LOW (ref 12–16)
HGB BLD-MCNC: 11.8 G/DL — LOW (ref 12–16)
HGB BLD-MCNC: 11.8 G/DL — LOW (ref 12–16)
HGB BLD-MCNC: 11.9 G/DL — LOW (ref 12–16)
HGB BLD-MCNC: 9.4 G/DL — LOW (ref 12–16)
HOROWITZ INDEX BLDV+IHG-RTO: 21 — SIGNIFICANT CHANGE UP
HYALINE CASTS # UR AUTO: 1 /LPF — SIGNIFICANT CHANGE UP (ref 0–7)
HYALINE CASTS # UR AUTO: 11 /LPF — HIGH (ref 0–7)
IMM GRANULOCYTES NFR BLD AUTO: 0.4 % — HIGH (ref 0.1–0.3)
IMM GRANULOCYTES NFR BLD AUTO: 0.4 % — HIGH (ref 0.1–0.3)
IMM GRANULOCYTES NFR BLD AUTO: 0.6 % — HIGH (ref 0.1–0.3)
INR BLD: 0.94 RATIO — SIGNIFICANT CHANGE UP (ref 0.65–1.3)
INR BLD: 0.96 RATIO — SIGNIFICANT CHANGE UP (ref 0.65–1.3)
INR BLD: 1.08 RATIO — SIGNIFICANT CHANGE UP (ref 0.65–1.3)
INR BLD: 1.1 RATIO — SIGNIFICANT CHANGE UP (ref 0.65–1.3)
INR BLD: 1.1 RATIO — SIGNIFICANT CHANGE UP (ref 0.65–1.3)
KETONES UR-MCNC: NEGATIVE — SIGNIFICANT CHANGE UP
LACTATE BLDV-MCNC: 0.6 MMOL/L — SIGNIFICANT CHANGE UP (ref 0.5–2)
LACTATE BLDV-MCNC: 1.3 MMOL/L — SIGNIFICANT CHANGE UP (ref 0.5–2)
LACTATE SERPL-SCNC: 1 MMOL/L — SIGNIFICANT CHANGE UP (ref 0.7–2)
LACTATE SERPL-SCNC: 2.6 MMOL/L — HIGH (ref 0.7–2)
LDH SERPL L TO P-CCNC: 282 — HIGH (ref 50–242)
LEUKOCYTE ESTERASE UR-ACNC: NEGATIVE — SIGNIFICANT CHANGE UP
LIDOCAIN IGE QN: 125 U/L — HIGH (ref 7–60)
LIDOCAIN IGE QN: 83 U/L — HIGH (ref 7–60)
LYMPHOCYTES # BLD AUTO: 0.39 K/UL — LOW (ref 1.2–3.4)
LYMPHOCYTES # BLD AUTO: 0.53 K/UL — LOW (ref 1.2–3.4)
LYMPHOCYTES # BLD AUTO: 0.62 K/UL — LOW (ref 1.2–3.4)
LYMPHOCYTES # BLD AUTO: 0.98 K/UL — LOW (ref 1.2–3.4)
LYMPHOCYTES # BLD AUTO: 1 K/UL — LOW (ref 1.2–3.4)
LYMPHOCYTES # BLD AUTO: 1.31 K/UL — SIGNIFICANT CHANGE UP (ref 1.2–3.4)
LYMPHOCYTES # BLD AUTO: 1.85 K/UL — SIGNIFICANT CHANGE UP (ref 1.2–3.4)
LYMPHOCYTES # BLD AUTO: 14.6 % — LOW (ref 20.5–51.1)
LYMPHOCYTES # BLD AUTO: 19.5 % — LOW (ref 20.5–51.1)
LYMPHOCYTES # BLD AUTO: 20.2 % — LOW (ref 20.5–51.1)
LYMPHOCYTES # BLD AUTO: 27.2 % — SIGNIFICANT CHANGE UP (ref 20.5–51.1)
LYMPHOCYTES # BLD AUTO: 4.5 % — LOW (ref 20.5–51.1)
LYMPHOCYTES # BLD AUTO: 8.2 % — LOW (ref 20.5–51.1)
LYMPHOCYTES # BLD AUTO: 9.8 % — LOW (ref 20.5–51.1)
MAGNESIUM SERPL-MCNC: 1.7 MG/DL — LOW (ref 1.8–2.4)
MAGNESIUM SERPL-MCNC: 1.9 MG/DL — SIGNIFICANT CHANGE UP (ref 1.8–2.4)
MAGNESIUM SERPL-MCNC: 2 MG/DL — SIGNIFICANT CHANGE UP (ref 1.8–2.4)
MAGNESIUM SERPL-MCNC: 2.1 MG/DL — SIGNIFICANT CHANGE UP (ref 1.8–2.4)
MAGNESIUM SERPL-MCNC: 2.3 MG/DL — SIGNIFICANT CHANGE UP (ref 1.8–2.4)
MCHC RBC-ENTMCNC: 30.3 PG — SIGNIFICANT CHANGE UP (ref 27–31)
MCHC RBC-ENTMCNC: 30.7 PG — SIGNIFICANT CHANGE UP (ref 27–31)
MCHC RBC-ENTMCNC: 31 PG — SIGNIFICANT CHANGE UP (ref 27–31)
MCHC RBC-ENTMCNC: 31 PG — SIGNIFICANT CHANGE UP (ref 27–31)
MCHC RBC-ENTMCNC: 31.1 PG — HIGH (ref 27–31)
MCHC RBC-ENTMCNC: 31.2 PG — HIGH (ref 27–31)
MCHC RBC-ENTMCNC: 31.5 PG — HIGH (ref 27–31)
MCHC RBC-ENTMCNC: 31.7 PG — HIGH (ref 27–31)
MCHC RBC-ENTMCNC: 31.8 G/DL — LOW (ref 32–37)
MCHC RBC-ENTMCNC: 31.9 G/DL — LOW (ref 32–37)
MCHC RBC-ENTMCNC: 32 G/DL — SIGNIFICANT CHANGE UP (ref 32–37)
MCHC RBC-ENTMCNC: 32.1 G/DL — SIGNIFICANT CHANGE UP (ref 32–37)
MCHC RBC-ENTMCNC: 32.5 G/DL — SIGNIFICANT CHANGE UP (ref 32–37)
MCHC RBC-ENTMCNC: 32.7 G/DL — SIGNIFICANT CHANGE UP (ref 32–37)
MCHC RBC-ENTMCNC: 32.8 G/DL — SIGNIFICANT CHANGE UP (ref 32–37)
MCHC RBC-ENTMCNC: 32.8 G/DL — SIGNIFICANT CHANGE UP (ref 32–37)
MCV RBC AUTO: 94.9 FL — SIGNIFICANT CHANGE UP (ref 81–99)
MCV RBC AUTO: 95.3 FL — SIGNIFICANT CHANGE UP (ref 81–99)
MCV RBC AUTO: 95.8 FL — SIGNIFICANT CHANGE UP (ref 81–99)
MCV RBC AUTO: 96.1 FL — SIGNIFICANT CHANGE UP (ref 81–99)
MCV RBC AUTO: 96.4 FL — SIGNIFICANT CHANGE UP (ref 81–99)
MCV RBC AUTO: 96.7 FL — SIGNIFICANT CHANGE UP (ref 81–99)
MCV RBC AUTO: 96.8 FL — SIGNIFICANT CHANGE UP (ref 81–99)
MCV RBC AUTO: 97 FL — SIGNIFICANT CHANGE UP (ref 81–99)
MONOCYTES # BLD AUTO: 0.14 K/UL — SIGNIFICANT CHANGE UP (ref 0.1–0.6)
MONOCYTES # BLD AUTO: 0.18 K/UL — SIGNIFICANT CHANGE UP (ref 0.1–0.6)
MONOCYTES # BLD AUTO: 0.28 K/UL — SIGNIFICANT CHANGE UP (ref 0.1–0.6)
MONOCYTES # BLD AUTO: 0.52 K/UL — SIGNIFICANT CHANGE UP (ref 0.1–0.6)
MONOCYTES # BLD AUTO: 0.61 K/UL — HIGH (ref 0.1–0.6)
MONOCYTES # BLD AUTO: 0.91 K/UL — HIGH (ref 0.1–0.6)
MONOCYTES # BLD AUTO: 0.95 K/UL — HIGH (ref 0.1–0.6)
MONOCYTES NFR BLD AUTO: 10.3 % — HIGH (ref 1.7–9.3)
MONOCYTES NFR BLD AUTO: 14.7 % — HIGH (ref 1.7–9.3)
MONOCYTES NFR BLD AUTO: 2.6 % — SIGNIFICANT CHANGE UP (ref 1.7–9.3)
MONOCYTES NFR BLD AUTO: 2.9 % — SIGNIFICANT CHANGE UP (ref 1.7–9.3)
MONOCYTES NFR BLD AUTO: 4.4 % — SIGNIFICANT CHANGE UP (ref 1.7–9.3)
MONOCYTES NFR BLD AUTO: 7.8 % — SIGNIFICANT CHANGE UP (ref 1.7–9.3)
MONOCYTES NFR BLD AUTO: 9 % — SIGNIFICANT CHANGE UP (ref 1.7–9.3)
NEUTROPHILS # BLD AUTO: 10.2 K/UL — HIGH (ref 1.4–6.5)
NEUTROPHILS # BLD AUTO: 3.31 K/UL — SIGNIFICANT CHANGE UP (ref 1.4–6.5)
NEUTROPHILS # BLD AUTO: 4.06 K/UL — SIGNIFICANT CHANGE UP (ref 1.4–6.5)
NEUTROPHILS # BLD AUTO: 4.1 K/UL — SIGNIFICANT CHANGE UP (ref 1.4–6.5)
NEUTROPHILS # BLD AUTO: 4.19 K/UL — SIGNIFICANT CHANGE UP (ref 1.4–6.5)
NEUTROPHILS # BLD AUTO: 5.39 K/UL — SIGNIFICANT CHANGE UP (ref 1.4–6.5)
NEUTROPHILS # BLD AUTO: 5.62 K/UL — SIGNIFICANT CHANGE UP (ref 1.4–6.5)
NEUTROPHILS NFR BLD AUTO: 59.6 % — SIGNIFICANT CHANGE UP (ref 42.2–75.2)
NEUTROPHILS NFR BLD AUTO: 63.3 % — SIGNIFICANT CHANGE UP (ref 42.2–75.2)
NEUTROPHILS NFR BLD AUTO: 65.8 % — SIGNIFICANT CHANGE UP (ref 42.2–75.2)
NEUTROPHILS NFR BLD AUTO: 82.3 % — HIGH (ref 42.2–75.2)
NEUTROPHILS NFR BLD AUTO: 85.2 % — HIGH (ref 42.2–75.2)
NEUTROPHILS NFR BLD AUTO: 87 % — HIGH (ref 42.2–75.2)
NEUTROPHILS NFR BLD AUTO: 88.3 % — HIGH (ref 42.2–75.2)
NITRITE UR-MCNC: NEGATIVE — SIGNIFICANT CHANGE UP
NRBC # BLD: 0 /100 WBCS — SIGNIFICANT CHANGE UP (ref 0–0)
NT-PROBNP SERPL-SCNC: 553 PG/ML — HIGH (ref 0–300)
PCO2 BLDV: 51 MMHG — HIGH (ref 39–42)
PCO2 BLDV: 65 MMHG — HIGH (ref 39–42)
PH BLDV: 7.32 — SIGNIFICANT CHANGE UP (ref 7.32–7.43)
PH BLDV: 7.44 — HIGH (ref 7.32–7.43)
PH UR: 6 — SIGNIFICANT CHANGE UP (ref 5–8)
PHOSPHATE SERPL-MCNC: 3 MG/DL — SIGNIFICANT CHANGE UP (ref 2.1–4.9)
PLATELET # BLD AUTO: 129 K/UL — LOW (ref 130–400)
PLATELET # BLD AUTO: 137 K/UL — SIGNIFICANT CHANGE UP (ref 130–400)
PLATELET # BLD AUTO: 149 K/UL — SIGNIFICANT CHANGE UP (ref 130–400)
PLATELET # BLD AUTO: 158 K/UL — SIGNIFICANT CHANGE UP (ref 130–400)
PLATELET # BLD AUTO: 167 K/UL — SIGNIFICANT CHANGE UP (ref 130–400)
PLATELET # BLD AUTO: 173 K/UL — SIGNIFICANT CHANGE UP (ref 130–400)
PLATELET # BLD AUTO: 175 K/UL — SIGNIFICANT CHANGE UP (ref 130–400)
PLATELET # BLD AUTO: 186 K/UL — SIGNIFICANT CHANGE UP (ref 130–400)
PO2 BLDV: 29 MMHG — SIGNIFICANT CHANGE UP
PO2 BLDV: 52 MMHG — SIGNIFICANT CHANGE UP
POTASSIUM BLDV-SCNC: 4.4 MMOL/L — SIGNIFICANT CHANGE UP (ref 3.5–5.1)
POTASSIUM SERPL-MCNC: 4.5 MMOL/L — SIGNIFICANT CHANGE UP (ref 3.5–5)
POTASSIUM SERPL-MCNC: 4.7 MMOL/L — SIGNIFICANT CHANGE UP (ref 3.5–5)
POTASSIUM SERPL-MCNC: 4.8 MMOL/L — SIGNIFICANT CHANGE UP (ref 3.5–5)
POTASSIUM SERPL-MCNC: 4.8 MMOL/L — SIGNIFICANT CHANGE UP (ref 3.5–5)
POTASSIUM SERPL-MCNC: 4.9 MMOL/L — SIGNIFICANT CHANGE UP (ref 3.5–5)
POTASSIUM SERPL-MCNC: 5 MMOL/L — SIGNIFICANT CHANGE UP (ref 3.5–5)
POTASSIUM SERPL-MCNC: 5.2 MMOL/L — HIGH (ref 3.5–5)
POTASSIUM SERPL-MCNC: 5.3 MMOL/L — HIGH (ref 3.5–5)
POTASSIUM SERPL-MCNC: 5.5 MMOL/L — HIGH (ref 3.5–5)
POTASSIUM SERPL-SCNC: 4.5 MMOL/L — SIGNIFICANT CHANGE UP (ref 3.5–5)
POTASSIUM SERPL-SCNC: 4.7 MMOL/L — SIGNIFICANT CHANGE UP (ref 3.5–5)
POTASSIUM SERPL-SCNC: 4.8 MMOL/L — SIGNIFICANT CHANGE UP (ref 3.5–5)
POTASSIUM SERPL-SCNC: 4.8 MMOL/L — SIGNIFICANT CHANGE UP (ref 3.5–5)
POTASSIUM SERPL-SCNC: 4.9 MMOL/L — SIGNIFICANT CHANGE UP (ref 3.5–5)
POTASSIUM SERPL-SCNC: 5 MMOL/L — SIGNIFICANT CHANGE UP (ref 3.5–5)
POTASSIUM SERPL-SCNC: 5.2 MMOL/L — HIGH (ref 3.5–5)
POTASSIUM SERPL-SCNC: 5.3 MMOL/L — HIGH (ref 3.5–5)
POTASSIUM SERPL-SCNC: 5.5 MMOL/L — HIGH (ref 3.5–5)
PROCALCITONIN SERPL-MCNC: 0.26 NG/ML — HIGH (ref 0.02–0.1)
PROCALCITONIN SERPL-MCNC: 0.3 NG/ML — HIGH (ref 0.02–0.1)
PROT SERPL-MCNC: 5.9 G/DL — LOW (ref 6–8)
PROT SERPL-MCNC: 5.9 G/DL — LOW (ref 6–8)
PROT SERPL-MCNC: 6.1 G/DL — SIGNIFICANT CHANGE UP (ref 6–8)
PROT SERPL-MCNC: 6.2 G/DL — SIGNIFICANT CHANGE UP (ref 6–8)
PROT SERPL-MCNC: 6.2 G/DL — SIGNIFICANT CHANGE UP (ref 6–8)
PROT SERPL-MCNC: 6.3 G/DL — SIGNIFICANT CHANGE UP (ref 6–8)
PROT SERPL-MCNC: 7.1 G/DL — SIGNIFICANT CHANGE UP (ref 6–8)
PROT SERPL-MCNC: 7.4 G/DL — SIGNIFICANT CHANGE UP (ref 6–8)
PROT UR-MCNC: ABNORMAL
PROT UR-MCNC: NEGATIVE — SIGNIFICANT CHANGE UP
PROT UR-MCNC: SIGNIFICANT CHANGE UP
PROTHROM AB SERPL-ACNC: 10.7 SEC — SIGNIFICANT CHANGE UP (ref 9.95–12.87)
PROTHROM AB SERPL-ACNC: 11.1 SEC — SIGNIFICANT CHANGE UP (ref 9.95–12.87)
PROTHROM AB SERPL-ACNC: 12.3 SEC — SIGNIFICANT CHANGE UP (ref 9.95–12.87)
PROTHROM AB SERPL-ACNC: 12.6 SEC — SIGNIFICANT CHANGE UP (ref 9.95–12.87)
PROTHROM AB SERPL-ACNC: 12.6 SEC — SIGNIFICANT CHANGE UP (ref 9.95–12.87)
RBC # BLD: 3.06 M/UL — LOW (ref 4.2–5.4)
RBC # BLD: 3.36 M/UL — LOW (ref 4.2–5.4)
RBC # BLD: 3.37 M/UL — LOW (ref 4.2–5.4)
RBC # BLD: 3.57 M/UL — LOW (ref 4.2–5.4)
RBC # BLD: 3.77 M/UL — LOW (ref 4.2–5.4)
RBC # BLD: 3.79 M/UL — LOW (ref 4.2–5.4)
RBC # BLD: 3.81 M/UL — LOW (ref 4.2–5.4)
RBC # BLD: 3.89 M/UL — LOW (ref 4.2–5.4)
RBC # FLD: 14.3 % — SIGNIFICANT CHANGE UP (ref 11.5–14.5)
RBC # FLD: 14.4 % — SIGNIFICANT CHANGE UP (ref 11.5–14.5)
RBC # FLD: 14.6 % — HIGH (ref 11.5–14.5)
RBC # FLD: 14.6 % — HIGH (ref 11.5–14.5)
RBC # FLD: 14.8 % — HIGH (ref 11.5–14.5)
RBC CASTS # UR COMP ASSIST: 2 /HPF — SIGNIFICANT CHANGE UP (ref 0–4)
RBC CASTS # UR COMP ASSIST: 7 /HPF — HIGH (ref 0–4)
RSV RNA NPH QL NAA+NON-PROBE: SIGNIFICANT CHANGE UP
SAO2 % BLDV: 45.2 % — SIGNIFICANT CHANGE UP
SAO2 % BLDV: 86.6 % — SIGNIFICANT CHANGE UP
SARS-COV-2 RNA SPEC QL NAA+PROBE: DETECTED
SARS-COV-2 RNA SPEC QL NAA+PROBE: SIGNIFICANT CHANGE UP
SARS-COV-2 RNA SPEC QL NAA+PROBE: SIGNIFICANT CHANGE UP
SODIUM SERPL-SCNC: 134 MMOL/L — LOW (ref 135–146)
SODIUM SERPL-SCNC: 135 MMOL/L — SIGNIFICANT CHANGE UP (ref 135–146)
SODIUM SERPL-SCNC: 138 MMOL/L — SIGNIFICANT CHANGE UP (ref 135–146)
SODIUM SERPL-SCNC: 138 MMOL/L — SIGNIFICANT CHANGE UP (ref 135–146)
SODIUM SERPL-SCNC: 140 MMOL/L — SIGNIFICANT CHANGE UP (ref 135–146)
SODIUM SERPL-SCNC: 141 MMOL/L — SIGNIFICANT CHANGE UP (ref 135–146)
SODIUM SERPL-SCNC: 142 MMOL/L — SIGNIFICANT CHANGE UP (ref 135–146)
SODIUM SERPL-SCNC: 147 MMOL/L — HIGH (ref 135–146)
SODIUM SERPL-SCNC: 147 MMOL/L — HIGH (ref 135–146)
SP GR SPEC: 1.02 — SIGNIFICANT CHANGE UP (ref 1.01–1.03)
SPECIMEN SOURCE: SIGNIFICANT CHANGE UP
TROPONIN T SERPL-MCNC: 0.01 NG/ML — SIGNIFICANT CHANGE UP
TROPONIN T SERPL-MCNC: 0.02 NG/ML — HIGH
UROBILINOGEN FLD QL: SIGNIFICANT CHANGE UP
WBC # BLD: 11.72 K/UL — HIGH (ref 4.8–10.8)
WBC # BLD: 13.21 K/UL — HIGH (ref 4.8–10.8)
WBC # BLD: 4.75 K/UL — LOW (ref 4.8–10.8)
WBC # BLD: 5.03 K/UL — SIGNIFICANT CHANGE UP (ref 4.8–10.8)
WBC # BLD: 6.33 K/UL — SIGNIFICANT CHANGE UP (ref 4.8–10.8)
WBC # BLD: 6.48 K/UL — SIGNIFICANT CHANGE UP (ref 4.8–10.8)
WBC # BLD: 6.81 K/UL — SIGNIFICANT CHANGE UP (ref 4.8–10.8)
WBC # BLD: 6.84 K/UL — SIGNIFICANT CHANGE UP (ref 4.8–10.8)
WBC # FLD AUTO: 11.72 K/UL — HIGH (ref 4.8–10.8)
WBC # FLD AUTO: 13.21 K/UL — HIGH (ref 4.8–10.8)
WBC # FLD AUTO: 4.75 K/UL — LOW (ref 4.8–10.8)
WBC # FLD AUTO: 5.03 K/UL — SIGNIFICANT CHANGE UP (ref 4.8–10.8)
WBC # FLD AUTO: 6.33 K/UL — SIGNIFICANT CHANGE UP (ref 4.8–10.8)
WBC # FLD AUTO: 6.48 K/UL — SIGNIFICANT CHANGE UP (ref 4.8–10.8)
WBC # FLD AUTO: 6.81 K/UL — SIGNIFICANT CHANGE UP (ref 4.8–10.8)
WBC # FLD AUTO: 6.84 K/UL — SIGNIFICANT CHANGE UP (ref 4.8–10.8)
WBC UR QL: 0 /HPF — SIGNIFICANT CHANGE UP (ref 0–5)
WBC UR QL: 1 /HPF — SIGNIFICANT CHANGE UP (ref 0–5)

## 2022-01-01 PROCEDURE — 99285 EMERGENCY DEPT VISIT HI MDM: CPT | Mod: GC

## 2022-01-01 PROCEDURE — 71045 X-RAY EXAM CHEST 1 VIEW: CPT | Mod: 26

## 2022-01-01 PROCEDURE — 72170 X-RAY EXAM OF PELVIS: CPT | Mod: 26

## 2022-01-01 PROCEDURE — 72125 CT NECK SPINE W/O DYE: CPT | Mod: 26,MA

## 2022-01-01 PROCEDURE — 93970 EXTREMITY STUDY: CPT | Mod: 26

## 2022-01-01 PROCEDURE — 99285 EMERGENCY DEPT VISIT HI MDM: CPT | Mod: FS

## 2022-01-01 PROCEDURE — 74177 CT ABD & PELVIS W/CONTRAST: CPT | Mod: 26,MA

## 2022-01-01 PROCEDURE — 93010 ELECTROCARDIOGRAM REPORT: CPT

## 2022-01-01 PROCEDURE — 99285 EMERGENCY DEPT VISIT HI MDM: CPT | Mod: FS,CS

## 2022-01-01 PROCEDURE — 71250 CT THORAX DX C-: CPT | Mod: 26,MA

## 2022-01-01 PROCEDURE — 99233 SBSQ HOSP IP/OBS HIGH 50: CPT

## 2022-01-01 PROCEDURE — 99223 1ST HOSP IP/OBS HIGH 75: CPT

## 2022-01-01 PROCEDURE — 99213 OFFICE O/P EST LOW 20 MIN: CPT

## 2022-01-01 PROCEDURE — 73060 X-RAY EXAM OF HUMERUS: CPT | Mod: 26,RT

## 2022-01-01 PROCEDURE — 71260 CT THORAX DX C+: CPT | Mod: 26,MA

## 2022-01-01 PROCEDURE — 70450 CT HEAD/BRAIN W/O DYE: CPT | Mod: 26,MA

## 2022-01-01 PROCEDURE — 99232 SBSQ HOSP IP/OBS MODERATE 35: CPT

## 2022-01-01 PROCEDURE — 73070 X-RAY EXAM OF ELBOW: CPT | Mod: 26,RT

## 2022-01-01 PROCEDURE — 99497 ADVNCD CARE PLAN 30 MIN: CPT | Mod: 25

## 2022-01-01 PROCEDURE — 99214 OFFICE O/P EST MOD 30 MIN: CPT | Mod: CS

## 2022-01-01 PROCEDURE — ZZZZZ: CPT

## 2022-01-01 PROCEDURE — 99223 1ST HOSP IP/OBS HIGH 75: CPT | Mod: 24,25

## 2022-01-01 PROCEDURE — 99239 HOSP IP/OBS DSCHRG MGMT >30: CPT

## 2022-01-01 PROCEDURE — 70486 CT MAXILLOFACIAL W/O DYE: CPT | Mod: 26,MA

## 2022-01-01 RX ORDER — FUROSEMIDE 40 MG
40 TABLET ORAL DAILY
Refills: 0 | Status: DISCONTINUED | OUTPATIENT
Start: 2022-01-01 | End: 2022-01-01

## 2022-01-01 RX ORDER — SODIUM CHLORIDE 9 MG/ML
1000 INJECTION, SOLUTION INTRAVENOUS ONCE
Refills: 0 | Status: COMPLETED | OUTPATIENT
Start: 2022-01-01 | End: 2022-01-01

## 2022-01-01 RX ORDER — ENOXAPARIN SODIUM 100 MG/ML
40 INJECTION SUBCUTANEOUS EVERY 24 HOURS
Refills: 0 | Status: DISCONTINUED | OUTPATIENT
Start: 2022-01-01 | End: 2022-01-01

## 2022-01-01 RX ORDER — METOPROLOL TARTRATE 50 MG
25 TABLET ORAL DAILY
Refills: 0 | Status: DISCONTINUED | OUTPATIENT
Start: 2022-01-01 | End: 2022-01-01

## 2022-01-01 RX ORDER — SODIUM CHLORIDE 9 MG/ML
1000 INJECTION INTRAMUSCULAR; INTRAVENOUS; SUBCUTANEOUS ONCE
Refills: 0 | Status: COMPLETED | OUTPATIENT
Start: 2022-01-01 | End: 2022-01-01

## 2022-01-01 RX ORDER — ATORVASTATIN CALCIUM 80 MG/1
20 TABLET, FILM COATED ORAL AT BEDTIME
Refills: 0 | Status: DISCONTINUED | OUTPATIENT
Start: 2022-01-01 | End: 2022-01-01

## 2022-01-01 RX ORDER — FLUTICASONE FUROATE, UMECLIDINIUM BROMIDE AND VILANTEROL TRIFENATATE 100; 62.5; 25 UG/1; UG/1; UG/1
100-62.5-25 POWDER RESPIRATORY (INHALATION) DAILY
Qty: 1 | Refills: 3 | Status: COMPLETED | COMMUNITY
Start: 2021-12-21 | End: 2022-01-01

## 2022-01-01 RX ORDER — FLUTICASONE FUROATE, UMECLIDINIUM BROMIDE AND VILANTEROL TRIFENATATE 100; 62.5; 25 UG/1; UG/1; UG/1
100-62.5-25 POWDER RESPIRATORY (INHALATION)
Refills: 0 | Status: COMPLETED | COMMUNITY
Start: 2020-08-19 | End: 2022-01-01

## 2022-01-01 RX ORDER — REMDESIVIR 5 MG/ML
100 INJECTION INTRAVENOUS EVERY 24 HOURS
Refills: 0 | Status: COMPLETED | OUTPATIENT
Start: 2022-01-01 | End: 2022-01-01

## 2022-01-01 RX ORDER — CEFEPIME 1 G/1
1000 INJECTION, POWDER, FOR SOLUTION INTRAMUSCULAR; INTRAVENOUS EVERY 12 HOURS
Refills: 0 | Status: DISCONTINUED | OUTPATIENT
Start: 2022-01-01 | End: 2022-01-01

## 2022-01-01 RX ORDER — ACETAMINOPHEN 500 MG
975 TABLET ORAL ONCE
Refills: 0 | Status: COMPLETED | OUTPATIENT
Start: 2022-01-01 | End: 2022-01-01

## 2022-01-01 RX ORDER — ASPIRIN/CALCIUM CARB/MAGNESIUM 324 MG
81 TABLET ORAL DAILY
Refills: 0 | Status: DISCONTINUED | OUTPATIENT
Start: 2022-01-01 | End: 2022-01-01

## 2022-01-01 RX ORDER — SODIUM CHLORIDE 9 MG/ML
500 INJECTION, SOLUTION INTRAVENOUS ONCE
Refills: 0 | Status: COMPLETED | OUTPATIENT
Start: 2022-01-01 | End: 2022-01-01

## 2022-01-01 RX ORDER — LANOLIN ALCOHOL/MO/W.PET/CERES
5 CREAM (GRAM) TOPICAL AT BEDTIME
Refills: 0 | Status: DISCONTINUED | OUTPATIENT
Start: 2022-01-01 | End: 2022-01-01

## 2022-01-01 RX ORDER — PANTOPRAZOLE SODIUM 20 MG/1
40 TABLET, DELAYED RELEASE ORAL
Refills: 0 | Status: DISCONTINUED | OUTPATIENT
Start: 2022-01-01 | End: 2022-01-01

## 2022-01-01 RX ORDER — LEVOTHYROXINE SODIUM 125 MCG
0 TABLET ORAL
Qty: 0 | Refills: 1 | DISCHARGE

## 2022-01-01 RX ORDER — DRONEDARONE 400 MG/1
400 TABLET, FILM COATED ORAL
Refills: 0 | Status: DISCONTINUED | OUTPATIENT
Start: 2022-01-01 | End: 2022-01-01

## 2022-01-01 RX ORDER — HEPARIN SODIUM 5000 [USP'U]/ML
5000 INJECTION INTRAVENOUS; SUBCUTANEOUS EVERY 12 HOURS
Refills: 0 | Status: DISCONTINUED | OUTPATIENT
Start: 2022-01-01 | End: 2022-01-01

## 2022-01-01 RX ORDER — DEXAMETHASONE 0.5 MG/5ML
6 ELIXIR ORAL DAILY
Refills: 0 | Status: DISCONTINUED | OUTPATIENT
Start: 2022-01-01 | End: 2022-01-01

## 2022-01-01 RX ORDER — ACETAMINOPHEN 500 MG
650 TABLET ORAL EVERY 6 HOURS
Refills: 0 | Status: DISCONTINUED | OUTPATIENT
Start: 2022-01-01 | End: 2022-01-01

## 2022-01-01 RX ORDER — METOPROLOL TARTRATE 50 MG
0 TABLET ORAL
Qty: 0 | Refills: 0 | DISCHARGE

## 2022-01-01 RX ORDER — AZTREONAM 2 G
1000 VIAL (EA) INJECTION EVERY 8 HOURS
Refills: 0 | Status: DISCONTINUED | OUTPATIENT
Start: 2022-01-01 | End: 2022-01-01

## 2022-01-01 RX ORDER — TETANUS TOXOID, REDUCED DIPHTHERIA TOXOID AND ACELLULAR PERTUSSIS VACCINE, ADSORBED 5; 2.5; 8; 8; 2.5 [IU]/.5ML; [IU]/.5ML; UG/.5ML; UG/.5ML; UG/.5ML
0.5 SUSPENSION INTRAMUSCULAR ONCE
Refills: 0 | Status: COMPLETED | OUTPATIENT
Start: 2022-01-01 | End: 2022-01-01

## 2022-01-01 RX ORDER — ATORVASTATIN CALCIUM 80 MG/1
0 TABLET, FILM COATED ORAL
Qty: 0 | Refills: 0 | DISCHARGE

## 2022-01-01 RX ORDER — SODIUM CHLORIDE 9 MG/ML
1400 INJECTION INTRAMUSCULAR; INTRAVENOUS; SUBCUTANEOUS ONCE
Refills: 0 | Status: COMPLETED | OUTPATIENT
Start: 2022-01-01 | End: 2022-01-01

## 2022-01-01 RX ORDER — FUROSEMIDE 40 MG
1 TABLET ORAL
Qty: 0 | Refills: 0 | DISCHARGE
Start: 2022-01-01

## 2022-01-01 RX ORDER — LEVOTHYROXINE SODIUM 125 MCG
112 TABLET ORAL DAILY
Refills: 0 | Status: DISCONTINUED | OUTPATIENT
Start: 2022-01-01 | End: 2022-01-01

## 2022-01-01 RX ORDER — MAGNESIUM SULFATE 500 MG/ML
2 VIAL (ML) INJECTION
Refills: 0 | Status: COMPLETED | OUTPATIENT
Start: 2022-01-01 | End: 2022-01-01

## 2022-01-01 RX ORDER — MAGNESIUM SULFATE 500 MG/ML
2 VIAL (ML) INJECTION ONCE
Refills: 0 | Status: COMPLETED | OUTPATIENT
Start: 2022-01-01 | End: 2022-01-01

## 2022-01-01 RX ORDER — CEFEPIME 1 G/1
2000 INJECTION, POWDER, FOR SOLUTION INTRAMUSCULAR; INTRAVENOUS EVERY 24 HOURS
Refills: 0 | Status: DISCONTINUED | OUTPATIENT
Start: 2022-01-01 | End: 2022-01-01

## 2022-01-01 RX ORDER — SODIUM CHLORIDE 9 MG/ML
1000 INJECTION, SOLUTION INTRAVENOUS
Refills: 0 | Status: DISCONTINUED | OUTPATIENT
Start: 2022-01-01 | End: 2022-01-01

## 2022-01-01 RX ORDER — ALBUTEROL 90 UG/1
2 AEROSOL, METERED ORAL ONCE
Refills: 0 | Status: DISCONTINUED | OUTPATIENT
Start: 2022-01-01 | End: 2022-01-01

## 2022-01-01 RX ORDER — FLUTICASONE FUROATE, UMECLIDINIUM BROMIDE AND VILANTEROL TRIFENATATE 100; 62.5; 25 UG/1; UG/1; UG/1
100-62.5-25 POWDER RESPIRATORY (INHALATION) DAILY
Qty: 1 | Refills: 3 | Status: ACTIVE | COMMUNITY
Start: 2022-01-01 | End: 1900-01-01

## 2022-01-01 RX ORDER — ALBUTEROL 90 UG/1
0 AEROSOL, METERED ORAL
Qty: 0 | Refills: 5 | DISCHARGE

## 2022-01-01 RX ORDER — CEFEPIME 1 G/1
2000 INJECTION, POWDER, FOR SOLUTION INTRAMUSCULAR; INTRAVENOUS ONCE
Refills: 0 | Status: COMPLETED | OUTPATIENT
Start: 2022-01-01 | End: 2022-01-01

## 2022-01-01 RX ORDER — HEPARIN SODIUM 5000 [USP'U]/ML
5000 INJECTION INTRAVENOUS; SUBCUTANEOUS EVERY 8 HOURS
Refills: 0 | Status: DISCONTINUED | OUTPATIENT
Start: 2022-01-01 | End: 2022-01-01

## 2022-01-01 RX ORDER — FLUTICASONE FUROATE, UMECLIDINIUM BROMIDE AND VILANTEROL TRIFENATATE 100; 62.5; 25 UG/1; UG/1; UG/1
100-62.5-25 POWDER RESPIRATORY (INHALATION) DAILY
Qty: 3 | Refills: 3 | Status: COMPLETED | COMMUNITY
Start: 2021-04-13 | End: 2022-01-01

## 2022-01-01 RX ORDER — REMDESIVIR 5 MG/ML
200 INJECTION INTRAVENOUS ONCE
Refills: 0 | Status: COMPLETED | OUTPATIENT
Start: 2022-01-01 | End: 2022-01-01

## 2022-01-01 RX ORDER — AZITHROMYCIN 500 MG/1
500 TABLET, FILM COATED ORAL EVERY 24 HOURS
Refills: 0 | Status: DISCONTINUED | OUTPATIENT
Start: 2022-01-01 | End: 2022-01-01

## 2022-01-01 RX ORDER — IPRATROPIUM/ALBUTEROL SULFATE 18-103MCG
3 AEROSOL WITH ADAPTER (GRAM) INHALATION EVERY 6 HOURS
Refills: 0 | Status: DISCONTINUED | OUTPATIENT
Start: 2022-01-01 | End: 2022-01-01

## 2022-01-01 RX ORDER — SODIUM ZIRCONIUM CYCLOSILICATE 10 G/10G
10 POWDER, FOR SUSPENSION ORAL ONCE
Refills: 0 | Status: COMPLETED | OUTPATIENT
Start: 2022-01-01 | End: 2022-01-01

## 2022-01-01 RX ORDER — SODIUM CHLORIDE 9 MG/ML
250 INJECTION INTRAMUSCULAR; INTRAVENOUS; SUBCUTANEOUS ONCE
Refills: 0 | Status: COMPLETED | OUTPATIENT
Start: 2022-01-01 | End: 2022-01-01

## 2022-01-01 RX ORDER — ALBUTEROL SULFATE 2.5 MG/3ML
(2.5 MG/3ML) SOLUTION RESPIRATORY (INHALATION) 4 TIMES DAILY
Qty: 1 | Refills: 3 | Status: COMPLETED | COMMUNITY
Start: 2021-07-06 | End: 2022-01-01

## 2022-01-01 RX ORDER — FUROSEMIDE 40 MG
0 TABLET ORAL
Qty: 0 | Refills: 0 | DISCHARGE

## 2022-01-01 RX ADMIN — PANTOPRAZOLE SODIUM 40 MILLIGRAM(S): 20 TABLET, DELAYED RELEASE ORAL at 05:42

## 2022-01-01 RX ADMIN — DRONEDARONE 400 MILLIGRAM(S): 400 TABLET, FILM COATED ORAL at 17:14

## 2022-01-01 RX ADMIN — SODIUM CHLORIDE 500 MILLILITER(S): 9 INJECTION, SOLUTION INTRAVENOUS at 04:38

## 2022-01-01 RX ADMIN — Medication 25 MILLIGRAM(S): at 06:33

## 2022-01-01 RX ADMIN — Medication 25 MILLIGRAM(S): at 06:04

## 2022-01-01 RX ADMIN — SODIUM CHLORIDE 50 MILLILITER(S): 9 INJECTION, SOLUTION INTRAVENOUS at 12:31

## 2022-01-01 RX ADMIN — Medication 112 MICROGRAM(S): at 05:59

## 2022-01-01 RX ADMIN — Medication 25 GRAM(S): at 13:01

## 2022-01-01 RX ADMIN — REMDESIVIR 500 MILLIGRAM(S): 5 INJECTION INTRAVENOUS at 12:38

## 2022-01-01 RX ADMIN — Medication 650 MILLIGRAM(S): at 06:04

## 2022-01-01 RX ADMIN — SODIUM CHLORIDE 75 MILLILITER(S): 9 INJECTION, SOLUTION INTRAVENOUS at 07:57

## 2022-01-01 RX ADMIN — DRONEDARONE 400 MILLIGRAM(S): 400 TABLET, FILM COATED ORAL at 05:43

## 2022-01-01 RX ADMIN — Medication 81 MILLIGRAM(S): at 12:38

## 2022-01-01 RX ADMIN — PANTOPRAZOLE SODIUM 40 MILLIGRAM(S): 20 TABLET, DELAYED RELEASE ORAL at 06:33

## 2022-01-01 RX ADMIN — PANTOPRAZOLE SODIUM 40 MILLIGRAM(S): 20 TABLET, DELAYED RELEASE ORAL at 05:15

## 2022-01-01 RX ADMIN — HEPARIN SODIUM 5000 UNIT(S): 5000 INJECTION INTRAVENOUS; SUBCUTANEOUS at 17:30

## 2022-01-01 RX ADMIN — DRONEDARONE 400 MILLIGRAM(S): 400 TABLET, FILM COATED ORAL at 05:59

## 2022-01-01 RX ADMIN — SODIUM CHLORIDE 50 MILLILITER(S): 9 INJECTION, SOLUTION INTRAVENOUS at 05:16

## 2022-01-01 RX ADMIN — HEPARIN SODIUM 5000 UNIT(S): 5000 INJECTION INTRAVENOUS; SUBCUTANEOUS at 05:14

## 2022-01-01 RX ADMIN — Medication 112 MICROGRAM(S): at 05:43

## 2022-01-01 RX ADMIN — Medication 6 MILLIGRAM(S): at 05:35

## 2022-01-01 RX ADMIN — Medication 81 MILLIGRAM(S): at 11:20

## 2022-01-01 RX ADMIN — Medication 40 MILLIGRAM(S): at 05:34

## 2022-01-01 RX ADMIN — Medication 112 MICROGRAM(S): at 06:32

## 2022-01-01 RX ADMIN — SODIUM CHLORIDE 75 MILLILITER(S): 9 INJECTION, SOLUTION INTRAVENOUS at 09:56

## 2022-01-01 RX ADMIN — HEPARIN SODIUM 5000 UNIT(S): 5000 INJECTION INTRAVENOUS; SUBCUTANEOUS at 05:58

## 2022-01-01 RX ADMIN — DRONEDARONE 400 MILLIGRAM(S): 400 TABLET, FILM COATED ORAL at 17:35

## 2022-01-01 RX ADMIN — HEPARIN SODIUM 5000 UNIT(S): 5000 INJECTION INTRAVENOUS; SUBCUTANEOUS at 17:35

## 2022-01-01 RX ADMIN — Medication 3 MILLILITER(S): at 06:23

## 2022-01-01 RX ADMIN — SODIUM CHLORIDE 1400 MILLILITER(S): 9 INJECTION INTRAMUSCULAR; INTRAVENOUS; SUBCUTANEOUS at 10:44

## 2022-01-01 RX ADMIN — Medication 81 MILLIGRAM(S): at 12:32

## 2022-01-01 RX ADMIN — Medication 6 MILLIGRAM(S): at 05:43

## 2022-01-01 RX ADMIN — Medication 50 MILLIGRAM(S): at 06:32

## 2022-01-01 RX ADMIN — SODIUM CHLORIDE 1000 MILLILITER(S): 9 INJECTION, SOLUTION INTRAVENOUS at 17:15

## 2022-01-01 RX ADMIN — Medication 25 MILLIGRAM(S): at 05:15

## 2022-01-01 RX ADMIN — Medication 25 MILLIGRAM(S): at 05:43

## 2022-01-01 RX ADMIN — Medication 650 MILLIGRAM(S): at 01:42

## 2022-01-01 RX ADMIN — PANTOPRAZOLE SODIUM 40 MILLIGRAM(S): 20 TABLET, DELAYED RELEASE ORAL at 05:35

## 2022-01-01 RX ADMIN — HEPARIN SODIUM 5000 UNIT(S): 5000 INJECTION INTRAVENOUS; SUBCUTANEOUS at 05:35

## 2022-01-01 RX ADMIN — TETANUS TOXOID, REDUCED DIPHTHERIA TOXOID AND ACELLULAR PERTUSSIS VACCINE, ADSORBED 0.5 MILLILITER(S): 5; 2.5; 8; 8; 2.5 SUSPENSION INTRAMUSCULAR at 16:59

## 2022-01-01 RX ADMIN — DRONEDARONE 400 MILLIGRAM(S): 400 TABLET, FILM COATED ORAL at 05:34

## 2022-01-01 RX ADMIN — PANTOPRAZOLE SODIUM 40 MILLIGRAM(S): 20 TABLET, DELAYED RELEASE ORAL at 05:59

## 2022-01-01 RX ADMIN — Medication 650 MILLIGRAM(S): at 00:41

## 2022-01-01 RX ADMIN — Medication 25 MILLIGRAM(S): at 05:35

## 2022-01-01 RX ADMIN — Medication 81 MILLIGRAM(S): at 11:37

## 2022-01-01 RX ADMIN — Medication 112 MICROGRAM(S): at 05:34

## 2022-01-01 RX ADMIN — ATORVASTATIN CALCIUM 20 MILLIGRAM(S): 80 TABLET, FILM COATED ORAL at 21:03

## 2022-01-01 RX ADMIN — ATORVASTATIN CALCIUM 20 MILLIGRAM(S): 80 TABLET, FILM COATED ORAL at 21:26

## 2022-01-01 RX ADMIN — HEPARIN SODIUM 5000 UNIT(S): 5000 INJECTION INTRAVENOUS; SUBCUTANEOUS at 18:02

## 2022-01-01 RX ADMIN — Medication 25 GRAM(S): at 20:10

## 2022-01-01 RX ADMIN — REMDESIVIR 500 MILLIGRAM(S): 5 INJECTION INTRAVENOUS at 17:34

## 2022-01-01 RX ADMIN — HEPARIN SODIUM 5000 UNIT(S): 5000 INJECTION INTRAVENOUS; SUBCUTANEOUS at 06:32

## 2022-01-01 RX ADMIN — Medication 112 MICROGRAM(S): at 05:15

## 2022-01-01 RX ADMIN — DRONEDARONE 400 MILLIGRAM(S): 400 TABLET, FILM COATED ORAL at 18:01

## 2022-01-01 RX ADMIN — SODIUM CHLORIDE 1000 MILLILITER(S): 9 INJECTION INTRAMUSCULAR; INTRAVENOUS; SUBCUTANEOUS at 21:52

## 2022-01-01 RX ADMIN — Medication 40 MILLIGRAM(S): at 05:43

## 2022-01-01 RX ADMIN — Medication 40 MILLIGRAM(S): at 13:03

## 2022-01-01 RX ADMIN — Medication 112 MICROGRAM(S): at 06:04

## 2022-01-01 RX ADMIN — SODIUM CHLORIDE 250 MILLILITER(S): 9 INJECTION INTRAMUSCULAR; INTRAVENOUS; SUBCUTANEOUS at 15:48

## 2022-01-01 RX ADMIN — REMDESIVIR 500 MILLIGRAM(S): 5 INJECTION INTRAVENOUS at 23:03

## 2022-01-01 RX ADMIN — Medication 6 MILLIGRAM(S): at 05:59

## 2022-01-01 RX ADMIN — Medication 3 MILLILITER(S): at 08:29

## 2022-01-01 RX ADMIN — CEFEPIME 100 MILLIGRAM(S): 1 INJECTION, POWDER, FOR SOLUTION INTRAMUSCULAR; INTRAVENOUS at 15:42

## 2022-01-01 RX ADMIN — Medication 975 MILLIGRAM(S): at 15:26

## 2022-01-01 RX ADMIN — Medication 6 MILLIGRAM(S): at 05:14

## 2022-01-01 RX ADMIN — Medication 6 MILLIGRAM(S): at 18:02

## 2022-01-01 RX ADMIN — Medication 40 MILLIGRAM(S): at 06:32

## 2022-01-01 RX ADMIN — REMDESIVIR 500 MILLIGRAM(S): 5 INJECTION INTRAVENOUS at 13:01

## 2022-01-01 RX ADMIN — Medication 40 MILLIGRAM(S): at 06:33

## 2022-01-01 RX ADMIN — DRONEDARONE 400 MILLIGRAM(S): 400 TABLET, FILM COATED ORAL at 06:32

## 2022-01-01 RX ADMIN — Medication 50 MILLIGRAM(S): at 22:04

## 2022-01-01 RX ADMIN — HEPARIN SODIUM 5000 UNIT(S): 5000 INJECTION INTRAVENOUS; SUBCUTANEOUS at 17:15

## 2022-01-01 RX ADMIN — ATORVASTATIN CALCIUM 20 MILLIGRAM(S): 80 TABLET, FILM COATED ORAL at 21:31

## 2022-01-01 RX ADMIN — Medication 50 MILLIGRAM(S): at 13:02

## 2022-01-01 RX ADMIN — HEPARIN SODIUM 5000 UNIT(S): 5000 INJECTION INTRAVENOUS; SUBCUTANEOUS at 05:43

## 2022-01-01 RX ADMIN — DRONEDARONE 400 MILLIGRAM(S): 400 TABLET, FILM COATED ORAL at 17:30

## 2022-01-01 RX ADMIN — DRONEDARONE 400 MILLIGRAM(S): 400 TABLET, FILM COATED ORAL at 05:14

## 2022-01-01 RX ADMIN — Medication 25 MILLIGRAM(S): at 05:59

## 2022-01-01 RX ADMIN — REMDESIVIR 500 MILLIGRAM(S): 5 INJECTION INTRAVENOUS at 12:32

## 2022-01-01 RX ADMIN — ATORVASTATIN CALCIUM 20 MILLIGRAM(S): 80 TABLET, FILM COATED ORAL at 21:37

## 2022-01-01 RX ADMIN — Medication 25 GRAM(S): at 16:54

## 2022-01-01 RX ADMIN — HEPARIN SODIUM 5000 UNIT(S): 5000 INJECTION INTRAVENOUS; SUBCUTANEOUS at 06:04

## 2022-01-01 RX ADMIN — Medication 125 MILLIGRAM(S): at 22:19

## 2022-01-01 RX ADMIN — Medication 3 MILLILITER(S): at 22:05

## 2022-01-01 NOTE — PATIENT PROFILE ADULT - NSPROSPIRITUALVALUESFT_GEN_A_NUR
Nausea and vomiting that does not stop/Inability to tolerate liquids or foods/Increased irritability or sluggishness [facility]

## 2022-03-22 ENCOUNTER — APPOINTMENT (OUTPATIENT)
Age: 87
End: 2022-03-22
Payer: MEDICARE

## 2022-03-22 VITALS
WEIGHT: 105 LBS | HEIGHT: 59 IN | DIASTOLIC BLOOD PRESSURE: 70 MMHG | SYSTOLIC BLOOD PRESSURE: 118 MMHG | RESPIRATION RATE: 14 BRPM | BODY MASS INDEX: 21.17 KG/M2 | HEART RATE: 76 BPM

## 2022-03-22 PROCEDURE — 99213 OFFICE O/P EST LOW 20 MIN: CPT

## 2022-03-22 RX ORDER — PREDNISONE 20 MG/1
20 TABLET ORAL
Qty: 15 | Refills: 0 | Status: ACTIVE | COMMUNITY
Start: 2022-03-22 | End: 1900-01-01

## 2022-06-06 NOTE — ED ADULT TRIAGE NOTE - MODE OF ARRIVAL
Walk in Private Auto Myalgia Monitoring: I explained this is common when taking isotretinoin. If this worsens they will contact us. Ipledge Number (Optional): [phone] Xerosis Normal Treatment: I recommended application of Cetaphil or CeraVe numerous times a day and before going to bed to all dry areas. Xerosis Aggressive Treatment: I recommended application of Cetaphil or CeraVe numerous times a day and before going to bed to all dry areas. I also prescribed a topical steroid for twice daily use. Calculate Months Of Therapy Based On Documented Dosages (Will Hide Months Of Therapy Question)?: No Myalgia Treatment: I explained this is common when taking isotretinoin. If this worsens they will contact us. They may try OTC ibuprofen. Is Cheilitis Present?: Yes - Normal Treatment Weight Units: pounds Counseling Text: I reviewed the side effect in detail. Patient should get monthly blood tests, not donate blood, not drive at night if vision affected, and not share medication. Hypercholesterolemia Monitoring: I explained this is common when taking isotretinoin. We will monitor closely. Female Pregnancy Counseling Text: Female patients should also be on two forms of birth control while taking this medication and for one month after their last dose. Patient Weight (Optional But Required For Cumulative Dose-Numbers And Decimals Only): 160 Use Therapeutic Ranged Or Therapeutic Target: please select Range or Target Months Of Therapy Completed: 3 Cheilitis Normal Treatment: I recommended application of Lanisoh, Vaseline or Aquaphor numerous times a day (as often as every hour) and before going to bed. Next Month's Dosage: Continue Current Dosage Headache Monitoring: I recommended monitoring the headaches for now. There is no evidence of increased intracranial pressure. They were instructed to call if the headaches are worsening. Cheilitis Aggressive Treatment: I recommended application of Vaseline or Aquaphor numerous times a day (as often as every hour) and before going to bed. I also prescribed a topical steroid for twice daily use. Lower Range (In Mg/Kg): 120 Pounds Preamble Statement (Weight Entered In Details Tab): Reported Weight in pounds:160 Dosing Month 1 (Required For Cumulative Dosing): 40mg BID Upper Range (In Mg/Kg): 150 Retinoid Dermatitis Normal Treatment: I recommended more frequent application of Cetaphil or CeraVe to the areas of dermatitis. Retinoid Dermatitis Aggressive Treatment: I recommended more frequent application of Cetaphil or CeraVe to the areas of dermatitis. I also prescribed a topical steroid for twice daily use until the dermatitis resolves. Kilograms Preamble Statement (Weight Entered In Details Tab): Reported Weight in kilograms: Female Completion Statement: After discussing her treatment course we decided to discontinue isotretinoin therapy at this time. I explained that she would need to continue her birth control methods for at least one month after the last dosage. She should also get a pregnancy test one month after the last dose. She shouldn't donate blood for one month after the last dose. She should call with any new symptoms of depression. Target Cumulative Dosage (In Mg/Kg): 135 Show Text Field For Brand Names Of Contraception?: Yes Male Completion Statement: After discussing his treatment course we decided to discontinue isotretinoin therapy at this time. He shouldn't donate blood for one month after the last dose. He should call with any new symptoms of depression. Detail Level: Zone Xerosis Normal Treatment: I recommended application of Cetaphil or CeraVe numerous times a day going to bed to all dry areas. Nosebleeds Normal Treatment: I explained this is common when taking isotretinoin. I recommended saline mist in each nostril multiple times a day. If this worsens they will contact us. What Is The Patient's Gender: Female Xerosis Aggressive Treatment: I recommended application of Cetaphil or CeraVe numerous times a day going to bed to all dry areas. I also prescribed a topical steroid for twice daily use. Nosebleeds Normal Treatment: I explained this is common when taking isotretinoin. I recommended saline mist in each nostril multiple times a day. If this worsens they will contact us. She may also use a humidifier in her bedroom at night. Cheilitis Normal Treatment: I recommended application of Vaseline or Aquaphor numerous times a day (as often as every hour) and before going to bed. Are Labs Available For Review?: No- Labs Deferred This Month

## 2022-08-10 NOTE — PATIENT PROFILE ADULT - TRANSPORTATION
[Follow-Up Visit] : a follow-up visit for [Blood Count Assessment] : blood count assessment [Myeloproliferative Disorder] : myeloproliferative disorder [Thrombocytosis] : thrombocytosis no

## 2022-08-15 NOTE — ED ADULT TRIAGE NOTE - HEIGHT IN INCHES
Hide Include Location In Plan Question?: No
Detail Level: Zone
Include Location In Plan?: Yes
Detail Level: Simple
9

## 2022-08-15 NOTE — CONSULT NOTE ADULT - ASSESSMENT
ASSESSMENT:  89y Female  w/ PMHx of *** seen as (Code Trauma / Trauma Alert / Trauma Consult) s/p ****** with complaint of *** , external signs of trauma include *** . Trauma assessment in ED: ABCs intact , GCS 15 , AAOx3,  BOSWELL.     Injuries identified:   -   -   -     PLAN:   - SICU consult for stepdown unit  - Neurosurgery recs appreciated: no intervention for L1-L3 right transverse process fx  - Incentive spirometry    Disposition pending results of above labs and imaging  Above plan discussed with Trauma attending,  ***  , patient, patient family, and ED team  --------------------------------------------------------------------------------------  08-15-22 @ 20:24    TRAUMA SENIOR SPECTRA: [id number]  TRAUMA TEAM SPECTRA: [id number] ASSESSMENT:  89yF w/ PMHx of HLD, HTN, COPD on 3L NC O2 24h, hypothyroidism seen as Trauma Consult s/p witnessed mechanical fall -HT -LOC -AC, with complaint of right lumbar pain, external signs of trauma include abrasion to the right forearm. Trauma assessment in ED: ABCs intact , GCS 15 , AAOx3,  BOSWELL.     Injuries identified:   -  Acute mildly displaced and nondisplaced right posterior 11-12th rib   fracture; no pneumothorax.  - Acute, nondisplaced L1-L3 right lumbar transverse process fractures.  - Stable nondisplaced chronic fracture in the right posterior arch of C1   extending to the right transverse foramina.  - Stable healed fracture in the right first rib.    PLAN:   - Admit to trauma surgery, Dr. Patel  - SICU consult for stepdown unit  - Neurosurgery recs appreciated: no intervention for L1-L3 right transverse process fx  - Incentive spirometry  - Ambulate as tolerated  - PT/Rehab/SW    Above plan discussed with Trauma attending, Dr. Patel, patient, patient family, and ED team  --------------------------------------------------------------------------------------  08-15-22 @ 20:24    TRAUMA SENIOR SPECTRA: [id number]  TRAUMA TEAM SPECTRA: [id number]

## 2022-08-15 NOTE — ED PROVIDER NOTE - PHYSICAL EXAMINATION
CONSTITUTIONAL: in no apparent distress.   HEAD: Normocephalic; atraumatic.   EYES: Pupils are round and reactive, extra-ocular muscles are intact. Eyelids are normal in appearance without swelling or lesions.   ENT: Hearing is intact with good acuity to spoken voice. Patient is speaking clearly, not muffled and airway is intact.   NECK: No midline tenderness  RESPIRATORY: No signs of respiratory distress. Lung sounds are clear in all lobes bilaterally without rales, rhonchi, or wheezes.  CARDIOVASCULAR: Regular rate and rhythm.   GI: Abdomen is soft, non-tender, and without distention. Bowel sounds are present and normoactive in all four quadrants. No masses are noted.   BACK: R back with no obvious deformity or discoloration; tender to palpation. No midline tenderness.   PELVIS: No evidence of trauma or deformity. No tenderness to palpation.  EXT: Normal appearance and ROM in all four extremities. No tenderness to palpation and distal pulses are normal. Sensation to the upper and lower extremities is normal bilaterally. ***Steady gait noted.  NEURO: A & O x 3. Normal speech.   PSYCHOLOGICAL: Appropriate mood and affect. Good judgement and insight.

## 2022-08-15 NOTE — CONSULT NOTE ADULT - SUBJECTIVE AND OBJECTIVE BOX
TANNA MCCRACKEN     89y Female    MRN-[id number]    Admit Date: 08-15-22  Hospital LOS: 8/15/22  ICU Admission Date:   OR #1-        ============================  HPI   HPI:    89F with PMHx of HTN, HLD, COPD on 3L O2 at home 24hr/day, and hypothyroidism presented s/p witnessed fall with -HT, -LOC, -AC. Patient was seen falling by son who accompanied her to hospital. Son states he's unsure what happened but she must have tripped and fallen but didn't hit her head. Patient unable to recall fall. Patient was able to stand up and ambulate after fall. Patient states she has pain in her right lumbar region of the spine. Patient is AxOx3 and denies any chest pain, nausea, dizziness, blurry vision, or unsteadiness. Patient is able to ambulate on own at baseline.     Patient has a trauma workup on arrival. Patient was found to have acute mildly displaced and nondisplaced right posterior 11-12th rib fractures, acute nondisplaced L1-L3 right lumbar transverse process fractures, stable nondisplaced chronic fracture in right posterior arch of C1 extending to right transverse foramina. Patient pulling less than 500 on IS.        24 Hour Events  -Admission under SICU service        [X] A ten-point review of systems was otherwise negative except as noted above.  [  ] Due to altered mental status/intubation, subjective information was not attained from the patient. History was obtained, to the extent possible, from review of the chart and collateral sources of information.    ==========================    PMH  PAST MEDICAL & SURGICAL HISTORY:  COPD (chronic obstructive pulmonary disease)      Hypothyroid      HTN (hypertension)      High cholesterol      Colitis      CKD (chronic kidney disease)      No significant past surgical history          Home Meds:  Home Medications:  albuterol 2.5 mg/3 mL (0.083%) inhalation solution: 3 milliliter(s) inhaled every 6 hours, As Needed for wheezing, shortness of breath (31 Aug 2020 12:23)  aspirin 81 mg oral tablet, chewable: 1 tab(s) orally once a day (07 Oct 2020 11:31)  melatonin 5 mg oral tablet: 1 tab(s) orally once a day (at bedtime), As Needed, to help you sleep at night (31 Aug 2020 12:23)       Allergies  Allergies    No Known Allergies    Intolerances                   Current Medications:      VITAL SIGNS, INS/OUTS (Last 24hours):    ICU Vital Signs Last 24 Hrs  T(C): 36.7 (15 Aug 2022 15:02), Max: 36.7 (15 Aug 2022 15:02)  T(F): 98 (15 Aug 2022 15:02), Max: 98 (15 Aug 2022 15:02)  HR: 65 (15 Aug 2022 15:02) (65 - 65)  BP: 118/58 (15 Aug 2022 15:02) (118/58 - 118/58)  BP(mean): --  ABP: --  ABP(mean): --  RR: 18 (15 Aug 2022 15:02) (18 - 18)  SpO2: 93% (15 Aug 2022 15:02) (93% - 93%)    O2 Parameters below as of 15 Aug 2022 15:02  Patient On (Oxygen Delivery Method): nasal cannula  O2 Flow (L/min): 3        I&O's Summary          Physical Exam:   ----------------------------------------------------------------------------------------------------------  GCS:      Exam: A&Ox3, no focal deficits    RESPIRATORY:  Normal expansion/effort. on NC     CARDIOVASCULAR:   S1/S2.  RRR  No peripheral edema    GASTROINTESTINAL:  Abdomen soft, non-tender, non-distended    MUSCULOSKELETAL:  Extremities warm, pink, well-perfused. Tenderness in lumbar spine     DERM:  No skin breakdown       Height (cm): 144.8 (08-15-22)  Weight (kg): 47.2 (08-15-22)  BMI (kg/m2): 22.5 (08-15-22)  BSA (m2): 1.36 (08-15-22)    Tubes/Lines/Drains   ----------------------------------------------------------------------------------------------------------  [x] Peripheral IV

## 2022-08-15 NOTE — ED PROVIDER NOTE - CARE PLAN
Principal Discharge DX:	Multiple fractures of ribs of right side  Secondary Diagnosis:	MAURI (acute kidney injury)  Secondary Diagnosis:	Closed fracture of lumbar vertebra   1

## 2022-08-15 NOTE — ED ADULT NURSE NOTE - OBJECTIVE STATEMENT
Pt c/o right lower back pain s/p fall at home. Pt states she tripped and fell in the kitchen, denies hitting her head, denies any LOC. Pt states she is on blood thinners.

## 2022-08-15 NOTE — ED PROVIDER NOTE - OBJECTIVE STATEMENT
89-year-old female with history of hyperlipidemia, hypertension, COPD on 3 L of oxygen constantly, and hypothyroidism who presents after fall.  Per son, patient was walking away from the refrigerator and lost balance and fell.  Denies LOC or anticoagulant use.  Right back pain after the fall.  Denies fever, shortness of breath, chest pain, nausea, vomiting, abdominal pain, urinary symptoms, and change in bowel movement.  Patient was able to ambulate after the fall.  Patient walks independently at her baseline.

## 2022-08-15 NOTE — H&P ADULT - ATTENDING COMMENTS
Trauma Attending H&P Attestation    Patient seen and evaluated with the trauma team in the trauma bay upon arrival. All pertinent labs and radiographic imaging reviewed, pending final reports. Outpatient medications reviewed, including the presence of anticoagulants, if applicable. I agree with the resident's note above, including the physical exam findings, assessment and plan as documented with the following adjustments.     Trauma Level: [ ] Code  [ ] Alert  [x ] Consult [ ] Transfer in  Activation by:  [x ] ED physician [ ] EMS  Intubated in Field? [ ] Yes [x ] No  Intubated in ED? [ ] Yes [x ] No  Intubated in Trauma [place]? [x ] Yes [ ] No    FLORESPAMELA TANNA Patient is a 89y old  Female who presents with a chief complaint of rib fractures due to fall      Patient presented with GCS [15 ]  upon arrival to the trauma bay.  Allergies  No Known Allergies  Intolerances    PAST MEDICAL & SURGICAL HISTORY:  COPD (chronic obstructive pulmonary disease)  Hypothyroid  HTN (hypertension)  High cholesterol  Colitis  CKD (chronic kidney disease)  No significant past surgical history    On AC/Antiplatelets [ ] Yes [x ] No              [ ] NOVACs, [ ] Coumadin, [ ] ASA, [ ] Antiplatelets     Vital Signs Last 24 Hrs  T(C): 36.5 (16 Aug 2022 00:15), Max: 36.7 (15 Aug 2022 15:02)  T(F): 97.7 (16 Aug 2022 00:15), Max: 98.1 (15 Aug 2022 20:15)  HR: 69 (16 Aug 2022 00:15) (65 - 71)  BP: 132/63 (16 Aug 2022 00:15) (118/58 - 132/63)  BP(mean): --  RR: 18 (16 Aug 2022 00:15) (18 - 18)  SpO2: 96% (16 Aug 2022 00:15) (93% - 96%)    Parameters below as of 16 Aug 2022 00:15    O2 Flow (L/min): 3    Assessment: fall with multiple ribs fractures    PLAN  - Admit to Trauma service/SDU  - supportive care  - GI/DVT prophylaxis  - pain management  - repeat studies as needed  - complete and follow up on trauma work up included but not limites to                          [x ] CXR [x ] PXR [x ] Extremities X-RAYs                          [x ] NCHCT [x ] C-Spine CT [x ] CT Chest [ x] CT Abdomen/Pelvis                          [x ] FAST [ ] Other                          [x ] Trauma Labs   [ ] Toxicology   - Follow up Consults  [ ] Neurosurgery [ ] Orthopaedics [ ] Plastics [ ] Fascial/OMFS [ ] Opthalmology [ ] Medicine [ ] Cardiology/EP [ ] Pediatric ICU                                        [x ] SICU/SDU [ ] Urology  - IV ABx give as indicated [ ] Yes [x ] No  - Tetanus given as indicated [ ] Yes [ x] No    Vanessa Patel MD, FACS  Trauma/ACS/Surgical Critical Care Attending

## 2022-08-15 NOTE — CONSULT NOTE ADULT - SUBJECTIVE AND OBJECTIVE BOX
89-year-old female with history of hyperlipidemia, hypertension, COPD on 3 L of oxygen constantly, and hypothyroidism who presents after fall.  Per son, patient was walking away from the refrigerator and lost balance and fell.  Denies LOC or anticoagulant use.  Right back pain after the fall.  Denies fever, shortness of breath, chest pain, nausea, vomiting, abdominal pain, urinary symptoms, and change in bowel movement.  Patient was able to ambulate after the fall.  Patient walks independently at her baseline.      Vital Signs Last 24 Hrs  T(C): 36.7 (15 Aug 2022 15:02), Max: 36.7 (15 Aug 2022 15:02)  T(F): 98 (15 Aug 2022 15:02), Max: 98 (15 Aug 2022 15:02)  HR: 65 (15 Aug 2022 15:02) (65 - 65)  BP: 118/58 (15 Aug 2022 15:02) (118/58 - 118/58)  BP(mean): --  RR: 18 (15 Aug 2022 15:02) (18 - 18)  SpO2: 93% (15 Aug 2022 15:02) (93% - 93%)    Parameters below as of 15 Aug 2022 15:02  Patient On (Oxygen Delivery Method): nasal cannula  O2 Flow (L/min): 3      Home Medications:  albuterol 2.5 mg/3 mL (0.083%) inhalation solution: 3 milliliter(s) inhaled every 6 hours, As Needed for wheezing, shortness of breath (31 Aug 2020 12:23)  aspirin 81 mg oral tablet, chewable: 1 tab(s) orally once a day (07 Oct 2020 11:31)  melatonin 5 mg oral tablet: 1 tab(s) orally once a day (at bedtime), As Needed, to help you sleep at night (31 Aug 2020 12:23)      Exam:   awake, alert and oriented x3, follows simple commands              PERRL,  EOMI              no tenderness on palpation to cervical , thoracic region              point tenderness on palpation lower back region              moves all 4s with 5/5 strength in all muscle groups              sensation intact and symmetrical                            10.4   6.81  )-----------( 167      ( 15 Aug 2022 15:39 )             32.6       08-15    138  |  97<L>  |  75<HH>  ----------------------------<  103<H>  5.3<H>   |  29  |  2.1<H>    Ca    9.3      15 Aug 2022 15:39    TPro  7.1  /  Alb  4.5  /  TBili  0.3  /  DBili  x   /  AST  41  /  ALT  24  /  AlkPhos  123<H>  08-15      CT Chest/abdomen:      1. Acute mildly displaced and nondisplaced right posterior 11-12th rib   fracture; no pneumothorax.    2. Acute, nondisplaced L1-L3 right lumbar transverse process fractures.    3. No evidence of solid abdominal organ injury.

## 2022-08-15 NOTE — ED ADULT TRIAGE NOTE - CHIEF COMPLAINT QUOTE
pt tripped and fell in the kitchen, denies hitting her head. pt on asa. no loc. c/o right lower back pain. ( hx of copd)

## 2022-08-15 NOTE — CONSULT NOTE ADULT - SUBJECTIVE AND OBJECTIVE BOX
TRAUMA ACTIVATION LEVEL: CONSULT  ACTIVATED BY: ED  INTUBATED: NO      MECHANISM OF INJURY:   [x] Fall	  GCS: 15 	E: 4	V: 5	M: 6    HPI:  89yF w/ PMHx of *** seen as Trauma Consult s/p ******.  Trauma assessment in ED: ABCs intact , GCS 15 , AAOx3.    PAST MEDICAL & SURGICAL HISTORY:  COPD (chronic obstructive pulmonary disease)  Hypothyroid  HTN (hypertension)  High cholesterol  Colitis  CKD (chronic kidney disease)  No significant past surgical history    Allergies  No Known Allergies  Intolerances      Home Medications:  albuterol 2.5 mg/3 mL (0.083%) inhalation solution: 3 milliliter(s) inhaled every 6 hours, As Needed for wheezing, shortness of breath (31 Aug 2020 12:23)  aspirin 81 mg oral tablet, chewable: 1 tab(s) orally once a day (07 Oct 2020 11:31)  melatonin 5 mg oral tablet: 1 tab(s) orally once a day (at bedtime), As Needed, to help you sleep at night (31 Aug 2020 12:23)    ROS: 10-system review is otherwise negative except HPI above.      Primary Survey:    A - airway intact  B - bilateral breath sounds and good chest rise  C - palpable pulses in all extremities  D - GCS 15 on arrival, BOSWELL  Exposure obtained    Vital Signs Last 24 Hrs  T(C): 36.7 (15 Aug 2022 15:02), Max: 36.7 (15 Aug 2022 15:02)  T(F): 98 (15 Aug 2022 15:02), Max: 98 (15 Aug 2022 15:02)  HR: 65 (15 Aug 2022 15:02) (65 - 65)  BP: 118/58 (15 Aug 2022 15:02) (118/58 - 118/58)  BP(mean): --  RR: 18 (15 Aug 2022 15:02) (18 - 18)  SpO2: 93% (15 Aug 2022 15:02) (93% - 93%)    Parameters below as of 15 Aug 2022 15:02  Patient On (Oxygen Delivery Method): nasal cannula  O2 Flow (L/min): 3      Secondary Survey:   General: NAD  HEENT: Normocephalic, atraumatic, EOMI, PEERLA. no scalp lacerations   Neck: Soft, midline trachea. no c-spine tenderness  Chest: No chest wall tenderness, no subcutaneous emphysema   Cardiac: S1, S2, RRR  Respiratory: Bilateral breath sounds, clear and equal bilaterally  Abdomen: Soft, non-distended, non-tender, no rebound, no guarding.  Groin: Normal appearing, pelvis stable   Ext:  Moving b/l upper and lower extremities. Palpable Radial b/l UE, b/l DP palpable in LE.   Back: No T/L/S spine tenderness, No palpable runoff/stepoff/deformity  Rectal:  good tone    ACCESS / DEVICES:  [x] Peripheral IV    Labs:  CAPILLARY BLOOD GLUCOSE                        10.4   6.81  )-----------( 167      ( 15 Aug 2022 15:39 )             32.6       Auto Neutrophil %: 59.6 % (08-15-22 @ 15:39)  Auto Immature Granulocyte %: 0.4 % (08-15-22 @ 15:39)    08-15    138  |  97<L>  |  75<HH>  ----------------------------<  103<H>  5.3<H>   |  29  |  2.1<H>      Calcium, Total Serum: 9.3 mg/dL (08-15-22 @ 15:39)    LFTs:             7.1  | 0.3  | 41       ------------------[123     ( 15 Aug 2022 15:39 )  4.5  | x    | 24          Lipase:83     Amylase:x         Lactate, Blood: 1.0 mmol/L (08-15-22 @ 15:39)    Coags:    CARDIAC MARKERS ( 15 Aug 2022 16:53 )  x     / 0.01 ng/mL / x     / x     / x        Alcohol, Blood: <10 mg/dL (08-15-22 @ 15:39)    Urinalysis Basic - ( 15 Aug 2022 19:35 )    Color: Light Yellow / Appearance: Clear / S.021 / pH: x  Gluc: x / Ketone: Negative  / Bili: Negative / Urobili: <2 mg/dL   Blood: x / Protein: Negative / Nitrite: Negative   Leuk Esterase: Negative / RBC: x / WBC x   Sq Epi: x / Non Sq Epi: x / Bacteria: x    Alcohol, Blood: <10 mg/dL (08-15-22 @ 15:39)      RADIOLOGY & ADDITIONAL STUDIES:  --------------------------------------------------------------------------------------- TRAUMA ACTIVATION LEVEL: CONSULT  ACTIVATED BY: ED  INTUBATED: NO    MECHANISM OF INJURY:   [x] Fall	  GCS: 15 	E: 4	V: 5	M: 6    HPI:  89yF w/ PMHx of HLD, HTN, COPD on 3L NC O2 24h, hypothyroidism seen as Trauma Consult s/p witnessed mechanical fall -HT -LOC -AC. Patient is accompanied by son who states they were in the kitchen and pt was walking away from the refrigerator when she lost balance and fell, pt did not hit head.  Patient endorses right lumbar pain after fall. Patient was able to stand up and ambulate after the fall. At baseline, pt walks independently w/o assistance devices at baseline. Denies fever, SOB, chest pain, nausea, vomiting, abdominal pain. Trauma assessment in ED: ABCs intact , GCS 15 , AAOx3.    PAST MEDICAL & SURGICAL HISTORY:  COPD (chronic obstructive pulmonary disease)  Hypothyroid  HTN (hypertension)  High cholesterol  Colitis  CKD (chronic kidney disease)  No significant past surgical history    Allergies  No Known Allergies  Intolerances    Home Medications:  albuterol 2.5 mg/3 mL (0.083%) inhalation solution: 3 milliliter(s) inhaled every 6 hours, As Needed for wheezing, shortness of breath (31 Aug 2020 12:23)  aspirin 81 mg oral tablet, chewable: 1 tab(s) orally once a day (07 Oct 2020 11:31)  melatonin 5 mg oral tablet: 1 tab(s) orally once a day (at bedtime), As Needed, to help you sleep at night (31 Aug 2020 12:23)    ROS: 10-system review is otherwise negative except HPI above.      Primary Survey:    A - airway intact  B - bilateral breath sounds and good chest rise  C - palpable pulses in all extremities  D - GCS 15 on arrival, BOSWELL  Exposure obtained    Vital Signs Last 24 Hrs  T(C): 36.7 (15 Aug 2022 15:02), Max: 36.7 (15 Aug 2022 15:02)  T(F): 98 (15 Aug 2022 15:02), Max: 98 (15 Aug 2022 15:02)  HR: 65 (15 Aug 2022 15:02) (65 - 65)  BP: 118/58 (15 Aug 2022 15:02) (118/58 - 118/58)  BP(mean): --  RR: 18 (15 Aug 2022 15:02) (18 - 18)  SpO2: 93% (15 Aug 2022 15:02) (93% - 93%)    Parameters below as of 15 Aug 2022 15:  Patient On (Oxygen Delivery Method): nasal cannula  O2 Flow (L/min): 3      Secondary Survey:   General: NAD  HEENT: Normocephalic, atraumatic, EOMI, PEERLA. no scalp lacerations   Neck: Soft, midline trachea. no c-spine tenderness  Chest: No chest wall tenderness, no subcutaneous emphysema   Cardiac: S1, S2, RRR  Respiratory: Bilateral breath sounds, clear and equal bilaterally  Abdomen: Soft, non-distended, non-tender, no rebound, no guarding.  Groin: Normal appearing, pelvis stable   Ext:  Moving b/l upper and lower extremities. Palpable Radial b/l UE, b/l DP palpable in LE. +abrasion to right forearm  Back: No T/L/S spine tenderness, No palpable runoff/stepoff/deformity  Rectal:  good tone    ACCESS / DEVICES:  [x] Peripheral IV    Labs:  CAPILLARY BLOOD GLUCOSE                        10.4   6.81  )-----------( 167      ( 15 Aug 2022 15:39 )             32.6       Auto Neutrophil %: 59.6 % (08-15-22 @ 15:39)  Auto Immature Granulocyte %: 0.4 % (08-15-22 @ 15:39)    08-15    138  |  97<L>  |  75<HH>  ----------------------------<  103<H>  5.3<H>   |  29  |  2.1<H>      Calcium, Total Serum: 9.3 mg/dL (08-15-22 @ 15:39)    LFTs:             7.1  | 0.3  | 41       ------------------[123     ( 15 Aug 2022 15:39 )  4.5  | x    | 24          Lipase:83     Amylase:x         Lactate, Blood: 1.0 mmol/L (08-15-22 @ 15:39)    Coags:    CARDIAC MARKERS ( 15 Aug 2022 16:53 )  x     / 0.01 ng/mL / x     / x     / x        Alcohol, Blood: <10 mg/dL (08-15-22 @ 15:39)    Urinalysis Basic - ( 15 Aug 2022 19:35 )    Color: Light Yellow / Appearance: Clear / S.021 / pH: x  Gluc: x / Ketone: Negative  / Bili: Negative / Urobili: <2 mg/dL   Blood: x / Protein: Negative / Nitrite: Negative   Leuk Esterase: Negative / RBC: x / WBC x   Sq Epi: x / Non Sq Epi: x / Bacteria: x    Alcohol, Blood: <10 mg/dL (08-15-22 @ 15:39)      RADIOLOGY & ADDITIONAL STUDIES:  < from: Xray Chest 1 View AP/PA (08.15.22 @ 16:05) >  Impression:  No radiographic evidence of acute cardiopulmonary disease.  A CT scan of the chest was performed the same day, please see that report   for further information.    < from: Xray Pelvis AP only (08.15.22 @ 16:05) >  IMPRESSION:  No acute displaced fracture demonstrated.  A CT scan of the abdomen and pelvis was performed the same day, please   see that report for further examination.    < from: CT Head No Cont (08.15.22 @ 16:23) >  IMPRESSION:  CT HEAD:  No acute intracranial findings.  Severe chronic microvascular ischemic changes, slightly progressed since   2017.    CT CERVICAL SPINE:  No acute cervical fracture or dislocation.  Stable nondisplaced chronic fracture in the right posterior arch of C1   extending to the right transverse foramina.  Stable healed fracture in the right first rib.    < from: CT Chest w/ IV Cont (08.15.22 @ 16:39) >  IMPRESSION:  1. Acute mildly displaced and nondisplaced right posterior 11-12th rib   fracture; no pneumothorax.  2. Acute, nondisplaced L1-L3 right lumbar transverse process fractures.  3. No evidence of solid abdominal organ injury.  ---------------------------------------------------------------------------------------

## 2022-08-15 NOTE — ED ADULT NURSE NOTE - HIV OFFER
Pt. Aware of UC results and is willing to take the [address]. Please send to pharmacy. Previously Declined (within the last year)

## 2022-08-15 NOTE — ED PROVIDER NOTE - NS ED ROS FT
Constitutional: Negative for fever, chills, and fatigue.  HENT: Negative for headache,   Eyes: Negative for eye pain, and vision change.  Cardiovascular: Negative for chest pain, and palpitation.  Respiratory: Negative for SOB, wheezing, cough and sputum production.  Gastrointestinal: Negative for nausea, vomiting, abdominal pain  Genitourinary: Negative for flank pain, dysuria, frequency, and hematuria.  Neurological: Negative for dizziness, syncope, and loss of consciousness.  Musculoskeletal: + R back pain. Negative for neck pain, and calf cramps.  Hematological: Does not bruise/bleed easily.

## 2022-08-15 NOTE — ED PROVIDER NOTE - CADM POA PRESS ULCER
I was present for and supervised this entire procedure. I agree with the above documentation.    Ramona Barragan MD   No

## 2022-08-15 NOTE — H&P ADULT - NSHPLABSRESULTS_GEN_ALL_CORE
Labs:  CAPILLARY BLOOD GLUCOSE                        10.4   6.81  )-----------( 167      ( 15 Aug 2022 15:39 )             32.6       Auto Neutrophil %: 59.6 % (08-15-22 @ 15:39)  Auto Immature Granulocyte %: 0.4 % (08-15-22 @ 15:39)    08-15    138  |  97<L>  |  75<HH>  ----------------------------<  103<H>  5.3<H>   |  29  |  2.1<H>      Calcium, Total Serum: 9.3 mg/dL (08-15-22 @ 15:39)    LFTs:             7.1  | 0.3  | 41       ------------------[123     ( 15 Aug 2022 15:39 )  4.5  | x    | 24          Lipase:83     Amylase:x         Lactate, Blood: 1.0 mmol/L (08-15-22 @ 15:39)    Coags:    CARDIAC MARKERS ( 15 Aug 2022 16:53 )  x     / 0.01 ng/mL / x     / x     / x        Alcohol, Blood: <10 mg/dL (08-15-22 @ 15:39)    Urinalysis Basic - ( 15 Aug 2022 19:35 )    Color: Light Yellow / Appearance: Clear / S.021 / pH: x  Gluc: x / Ketone: Negative  / Bili: Negative / Urobili: <2 mg/dL   Blood: x / Protein: Negative / Nitrite: Negative   Leuk Esterase: Negative / RBC: x / WBC x   Sq Epi: x / Non Sq Epi: x / Bacteria: x    Alcohol, Blood: <10 mg/dL (08-15-22 @ 15:39)    RADIOLOGY & ADDITIONAL STUDIES:  < from: Xray Chest 1 View AP/PA (08.15.22 @ 16:05) >  Impression:  No radiographic evidence of acute cardiopulmonary disease.  A CT scan of the chest was performed the same day, please see that report   for further information.    < from: Xray Pelvis AP only (08.15.22 @ 16:05) >  IMPRESSION:  No acute displaced fracture demonstrated.  A CT scan of the abdomen and pelvis was performed the same day, please   see that report for further examination.    < from: CT Head No Cont (08.15.22 @ 16:23) >  IMPRESSION:  CT HEAD:  No acute intracranial findings.  Severe chronic microvascular ischemic changes, slightly progressed since   2017.    CT CERVICAL SPINE:  No acute cervical fracture or dislocation.  Stable nondisplaced chronic fracture in the right posterior arch of C1   extending to the right transverse foramina.  Stable healed fracture in the right first rib.    < from: CT Chest w/ IV Cont (08.15.22 @ 16:39) >  IMPRESSION:  1. Acute mildly displaced and nondisplaced right posterior 11-12th rib   fracture; no pneumothorax.  2. Acute, nondisplaced L1-L3 right lumbar transverse process fractures.  3. No evidence of solid abdominal organ injury.

## 2022-08-15 NOTE — H&P ADULT - NSHPPHYSICALEXAM_GEN_ALL_CORE
Primary Survey:    A - airway intact  B - bilateral breath sounds and good chest rise  C - palpable pulses in all extremities  D - GCS 15 on arrival, BOSWELL  Exposure obtained    Vital Signs Last 24 Hrs  T(C): 36.7 (15 Aug 2022 15:02), Max: 36.7 (15 Aug 2022 15:02)  T(F): 98 (15 Aug 2022 15:02), Max: 98 (15 Aug 2022 15:02)  HR: 65 (15 Aug 2022 15:02) (65 - 65)  BP: 118/58 (15 Aug 2022 15:02) (118/58 - 118/58)  BP(mean): --  RR: 18 (15 Aug 2022 15:02) (18 - 18)  SpO2: 93% (15 Aug 2022 15:02) (93% - 93%)    Parameters below as of 15 Aug 2022 15:02  Patient On (Oxygen Delivery Method): nasal cannula  O2 Flow (L/min): 3      Secondary Survey:   General: NAD  HEENT: Normocephalic, atraumatic, EOMI, PEERLA. no scalp lacerations   Neck: Soft, midline trachea. no c-spine tenderness  Chest: No chest wall tenderness, no subcutaneous emphysema   Cardiac: S1, S2, RRR  Respiratory: Bilateral breath sounds, clear and equal bilaterally  Abdomen: Soft, non-distended, non-tender, no rebound, no guarding.  Groin: Normal appearing, pelvis stable   Ext:  Moving b/l upper and lower extremities. Palpable Radial b/l UE, b/l DP palpable in LE. +abrasion to right forearm  Back: No T/L/S spine tenderness, No palpable runoff/stepoff/deformity  Rectal:  good tone    ACCESS / DEVICES:  [x] Peripheral IV

## 2022-08-15 NOTE — ED PROVIDER NOTE - CLINICAL SUMMARY MEDICAL DECISION MAKING FREE TEXT BOX
89 year old female with a pmh of COPD on 3L NC, HTN HLD hypothyroid presents here s/p fall. PAtient states she was walking and suddenly found herself on the floor. Unknown loc not on a/c. Denies any fever chills cough cp sob n/v abdominal pain. Currently c/o right lower back pain.  VS reviewed labs imaging ekg obtained and reviewed ct demonstrated +rib fractures & lumbar fx, trauma consult placed, neurosx consult placed, spoke with trauma patient admitted to SDU under trauma.

## 2022-08-15 NOTE — H&P ADULT - HISTORY OF PRESENT ILLNESS
89yF w/ PMHx of HLD, HTN, COPD on 3L NC O2 24h, hypothyroidism seen as Trauma Consult s/p witnessed mechanical fall -HT -LOC -AC. Patient is accompanied by son who states they were in the kitchen and pt was walking away from the refrigerator when she lost balance and fell, pt did not hit head.  Patient endorses right lumbar pain after fall. Patient was able to stand up and ambulate after the fall. At baseline, pt walks independently w/o assistance devices at baseline. Denies fever, SOB, chest pain, nausea, vomiting, abdominal pain. Trauma assessment in ED: ABCs intact , GCS 15 , AAOx3.

## 2022-08-15 NOTE — ED PROVIDER NOTE - ATTENDING APP SHARED VISIT CONTRIBUTION OF CARE
I personally evaluated the patient. I reviewed the Resident’s or Physician Assistant’s note (as assigned above), and agree with the findings and plan except as documented in my note.  89 year old female with a pmh of COPD on 3L NC, HTN HLD hypothyroid presents here s/p fall. PAtient states she was walking and suddenly found herself on the floor. Unknown loc not on a/c. Denies any fever chills cough cp sob n/v abdominal pain. Currently c/o right lower back pain.   on exam  CONSTITUTIONAL: WA / WN / NAD  HEAD: NCAT  EYES: PERRL; EOMI;   ENT: Normal pharynx; mucous membranes pink/moist, no erythema.  NECK: Supple;   CARD: RRR; nl S1/S2; no M/R/G.   RESP: Respiratory rate and effort are normal; breath sounds clear and equal bilaterally.  ABD: Soft, NT ND   MSK/EXT: No gross deformities; full range of motion. no midline CTLS + right flank ttp + right elbow abrasion  SKIN: Warm and dry;   NEURO: AAOx3, Motor 5/5 x 4 extremities  PSYCH: Memory Intact, Normal Affect

## 2022-08-15 NOTE — CONSULT NOTE ADULT - ASSESSMENT
Assessment & Plan    89y Female  s/p s/p witnessed fall with -HT, -LOC, -AC. Patient was found to have acute mildly displaced and nondisplaced right posterior 11-12th rib fractures, acute nondisplaced L1-L3 right lumbar transverse process fractures, stable nondisplaced chronic fracture in right posterior arch of C1 extending to right transverse foramina. Patient pulling less than 500 on IS.       NEURO:  #Acute pain    - Tylenol, Toradol Gabapentin, lidocaine patch     # Acute nondisplaced L1-L3 right lumbar transverse process fractures  - No intervention. Abd binder for comfort     # Stable nondisplaced chronic fracture in right posterior arch of C1 extending to right transverse foramina.   - no NSGY intervention     RESP:   #COPD    -NC @ 3L     - on Home o2 24 hrs/day @ 3L.     - Albuterol and Ellipta at home.     #Activity    -increase as tolerated    CARDS:   # HTN   - Metoprolol restarted     #HLD   - Atorvastatin restarted     GI/NUTR:   #GI Prophylaxis    - Home Protonix restarted       /RENAL:     Monitor UO    Labs:          BUN/Cr- 75/2.1  -->          Electrolytes-Na 138 // K 5.3 // Mg -- //  Phos -- (08-15 @ 15:39)  - Furosemide 40 daily at home.          HEME/ONC:   #  DVT prophylaxis-, SCDs    Labs: Hb/Hct:  10.4/32.6  -->                      Plts:  167  -->                 PTT/INR:        ID:  #leukocytosis   WBC- 6.81  --->>  Temp trend- 24hrs T(F): 98 (08-15 @ 15:02), Max: 98 (08-15 @ 15:02)         ENDO:   #Hypothyroidism  - Levothyroxine restarted      Glucose, Serum: 103 (08-15 @ 15:39)      MSK:  # Acute mildly displaced and nondisplaced right posteriro 11-12th rib fractures  - IS, lidocaine patch         LINES/DRAINS:  PIV    ADVANCED DIRECTIVES:  Full Code      INDICATION FOR SICU/SDU: Multiple rib fractures.       DISPO:   SDU  Case discussed with attending Dr. Patel     Signed out:   Date:   Time:  Assessment & Plan    89y Female  s/p s/p witnessed fall with -HT, -LOC, -AC. Patient was found to have acute mildly displaced and nondisplaced right posterior 11-12th rib fractures, acute nondisplaced L1-L3 right lumbar transverse process fractures, stable nondisplaced chronic fracture in right posterior arch of C1 extending to right transverse foramina. Patient pulling less than 500 on IS.       NEURO:  #Acute pain    - Tylenol, Toradol Gabapentin, lidocaine patch     # Acute nondisplaced L1-L3 right lumbar transverse process fractures  - No intervention. Abd binder for comfort     # Stable nondisplaced chronic fracture in right posterior arch of C1 extending to right transverse foramina.   - no NSGY intervention     RESP:   #COPD    -NC @ 3L     - on Home o2 24 hrs/day @ 3L.     - Albuterol and Ellipta at home.     #Activity    -increase as tolerated    CARDS:   # HTN   - Metoprolol restarted     #HLD   - Atorvastatin restarted     GI/NUTR:   #GI Prophylaxis    - Home Protonix restarted       /RENAL:     Monitor UO    Labs:          BUN/Cr- 75/2.1  -->          Electrolytes-Na 138 // K 5.3 // Mg -- //  Phos -- (08-15 @ 15:39)  - Furosemide 40 daily at home.          HEME/ONC:   #  DVT prophylaxis-, SCDs    Labs: Hb/Hct:  10.4/32.6  -->                      Plts:  167  -->                 PTT/INR:        ID:  #leukocytosis   WBC- 6.81  --->>  Temp trend- 24hrs T(F): 98 (08-15 @ 15:02), Max: 98 (08-15 @ 15:02)         ENDO:   #Hypothyroidism  - Levothyroxine restarted      Glucose, Serum: 103 (08-15 @ 15:39)      MSK:  # Acute mildly displaced and nondisplaced right posteriro 11-12th rib fractures  - IS, lidocaine patch         LINES/DRAINS:  PIV    ADVANCED DIRECTIVES:  Full Code      INDICATION FOR SICU/SDU: Multiple rib fractures.       DISPO:   SDU  Case discussed with attending Dr. Patel     Signed out: Dr. Christianson   Date: 8/16/22  Time: 00:09

## 2022-08-15 NOTE — H&P ADULT - ASSESSMENT
ASSESSMENT:  89yF w/ PMHx of HLD, HTN, COPD on 3L NC O2 24h, hypothyroidism seen as Trauma Consult s/p witnessed mechanical fall -HT -LOC -AC, with complaint of right lumbar pain, external signs of trauma include abrasion to the right forearm. Trauma assessment in ED: ABCs intact , GCS 15 , AAOx3,  BOSWELL.     Injuries identified:   -  Acute mildly displaced and nondisplaced right posterior 11-12th rib   fracture; no pneumothorax.  - Acute, nondisplaced L1-L3 right lumbar transverse process fractures.  - Stable nondisplaced chronic fracture in the right posterior arch of C1   extending to the right transverse foramina.  - Stable healed fracture in the right first rib.    PLAN:   - Admit to trauma surgery, Dr. Patel  - SICU consult for stepdown unit  - Neurosurgery recs appreciated: no intervention for L1-L3 right transverse process fx  - Incentive spirometry  - Ambulate as tolerated  - PT/Rehab/SW    Above plan discussed with Trauma attending, Dr. Patel, patient, patient family, and ED team  --------------------------------------------------------------------------------------  08-15-22 @ 20:24    TRAUMA SENIOR SPECTRA: [id number]  TRAUMA TEAM SPECTRA: [id number]

## 2022-08-15 NOTE — CONSULT NOTE ADULT - ASSESSMENT
89 yo female s/p fall with acute nondisplaced L1-L3 right lumbar transverse process fractures  no neurosurgical intervention indicated  abdominal binder for comfort

## 2022-08-16 NOTE — ED ADULT NURSE REASSESSMENT NOTE - NS ED NURSE REASSESS COMMENT FT1
Patient eloped with son, MD White x [phone] made aware, at bedside with patient. Patient ambulated self with a steady gait. No signs of distress noted. Patient had home O2 at bedside upon departure. IV removed, no redness or swelling noted.

## 2022-08-16 NOTE — CONSULT NOTE ADULT - CONSULT REASON
Multiple rib fractures
trauma consult
acute nondisplaced L1-L3 right transverse process fracture
rib fx

## 2022-08-16 NOTE — CONSULT NOTE ADULT - SUBJECTIVE AND OBJECTIVE BOX
HPI:  89yF w/ PMHx of HLD, HTN, COPD on 3L NC O2 24h, hypothyroidism seen as Trauma Consult s/p witnessed mechanical fall -HT -LOC -AC. Patient is accompanied by son who states they were in the kitchen and pt was walking away from the refrigerator when she lost balance and fell, pt did not hit head.  Patient endorses right lumbar pain after fall. Patient was able to stand up and ambulate after the fall. At baseline, pt walks independently w/o assistance devices at baseline. Denies fever, SOB, chest pain, nausea, vomiting, abdominal pain. Trauma assessment in ED: ABCs intact , GCS 15 , AAOx3.   Patient was found to have acute mildly displaced and nondisplaced right posterior 11-12th rib fractures and  acute nondisplaced L1-L3 right lumbar transverse process fractures.    PAST MEDICAL & SURGICAL HISTORY:  COPD (chronic obstructive pulmonary disease)      Hypothyroid      HTN (hypertension)      High cholesterol      Colitis      CKD (chronic kidney disease)      No significant past surgical history          Hospital Course:    TODAY'S SUBJECTIVE & REVIEW OF SYMPTOMS:     Constitutional WNL   Cardio WNL   Resp WNL   GI WNL  Heme WNL  Endo WNL  Skin WNL  MSK pain  Neuro WNL  Cognitive WNL  Psych WNL      MEDICATIONS  (STANDING):  acetaminophen     Tablet .. 650 milliGRAM(s) Oral every 6 hours  atorvastatin 20 milliGRAM(s) Oral at bedtime  heparin   Injectable 5000 Unit(s) SubCutaneous every 8 hours  lactated ringers. 1000 milliLiter(s) (75 mL/Hr) IV Continuous <Continuous>  levothyroxine 112 MICROGram(s) Oral daily  melatonin 5 milliGRAM(s) Oral at bedtime  metoprolol succinate ER 25 milliGRAM(s) Oral daily  pantoprazole    Tablet 40 milliGRAM(s) Oral before breakfast    MEDICATIONS  (PRN):      FAMILY HISTORY:      Allergies    No Known Allergies    Intolerances        SOCIAL HISTORY:    [  ] Etoh  [  ] Smoking  [  ] Substance abuse     Home Environment:  [   ] Home Alone  [ x  ] Lives with Family  [   ] Home Health Aid    Dwelling:  [   ] Apartment  [  x ] Private House  [   ] Adult Home  [   ] Skilled Nursing Facility      [   ] Short Term  [   ] Long Term  [ x  ] Stairs       Elevator [   ]    FUNCTIONAL STATUS PTA: (Check all that apply)  Ambulation: [ x   ]Independent    [   ] Dependent     [   ] Non-Ambulatory  Assistive Device: [   ] SA Cane  [   ]  Q Cane  [   ] Walker  [   ]  Wheelchair  ADL : [  x ] Independent  [    ]  Dependent       Vital Signs Last 24 Hrs  T(C): 36.1 (16 Aug 2022 07:36), Max: 36.9 (16 Aug 2022 04:15)  T(F): 96.9 (16 Aug 2022 07:36), Max: 98.4 (16 Aug 2022 04:15)  HR: 62 (16 Aug 2022 07:36) (62 - 71)  BP: 148/80 (16 Aug 2022 07:36) (118/58 - 148/80)  BP(mean): --  RR: 18 (16 Aug 2022 07:36) (18 - 18)  SpO2: 100% (16 Aug 2022 07:36) (93% - 100%)    Parameters below as of 16 Aug 2022 07:36  Patient On (Oxygen Delivery Method): nasal cannula  O2 Flow (L/min): 3        PHYSICAL EXAM: Awake & Alert  GENERAL: NAD  HEAD:  Normocephalic  CHEST/LUNG: Clear   HEART: S1S2+  ABDOMEN: Soft, Nontender  EXTREMITIES:  no calf tenderness    NERVOUS SYSTEM:  Cranial Nerves 2-12 intact [   ] Abnormal  [   ]  ROM: WFL all extremities [ x  ]  Abnormal [   ]  Motor Strength: WFL all extremities  [ x  ]  Abnormal [   ]  Sensation: intact to light touch [  x ] Abnormal [   ]    FUNCTIONAL STATUS:  Bed Mobility: Independent [   ]  Supervision [   ]  Needs Assistance [  x ]  N/A [   ]  Transfers: Independent [   ]  Supervision [   ]  Needs Assistance [   ]  N/A [   ]   Ambulation: Independent [   ]  Supervision [   ]  Needs Assistance [   ]  N/A [   ]  ADL: Independent [   ] Requires Assistance [   ] N/A [   ]      LABS:                        9.4    5.03  )-----------( 129      ( 16 Aug 2022 06:59 )             29.6     08-16    138  |  101  |  54<H>  ----------------------------<  72  4.8   |  27  |  1.4    Ca    8.7      16 Aug 2022 06:59  Phos  3.0     08-16  Mg     2.0     08-16    TPro  7.1  /  Alb  4.5  /  TBili  0.3  /  DBili  x   /  AST  41  /  ALT  24  /  AlkPhos  123<H>  08-15      Urinalysis Basic - ( 15 Aug 2022 19:35 )    Color: Light Yellow / Appearance: Clear / S.021 / pH: x  Gluc: x / Ketone: Negative  / Bili: Negative / Urobili: <2 mg/dL   Blood: x / Protein: Negative / Nitrite: Negative   Leuk Esterase: Negative / RBC: x / WBC x   Sq Epi: x / Non Sq Epi: x / Bacteria: x        RADIOLOGY & ADDITIONAL STUDIES:

## 2022-08-16 NOTE — CONSULT NOTE ADULT - ASSESSMENT
IMPRESSION: Rehab of multiple rib fx / L1-3 transverse process fx    PRECAUTIONS: [   ] Cardiac  [   ] Respiratory  [   ] Seizures [   ] Contact Isolation  [   ] Droplet Isolation  [   ] Other    Weight Bearing Status:     RECOMMENDATION:    Out of Bed to Chair     DVT/Decubiti Prophylaxis    REHAB PLAN:     [  x  ] Bedside P/T 3-5 times a week   [    ]   Bedside O/T  2-3 times a week             [    ] Speech Therapy               [    ]  No Rehab Therapy Indicated   Conditioning/ROM                                    ADL  Bed Mobility                                               Conditioning/ROM  Transfers                                                     Bed Mobility  Sitting /Standing Balance                         Transfers                                        Gait Training                                               Sitting/Standing Balance  Stair Training [   ]Applicable                    Home equipment Eval                                                                        Splinting  [   ] Only      GOALS:   ADL   [    ]   Independent                    Transfers  [   x ] Independent                          Ambulation  [  x  ] Independent     [  x   ] With device                            [    ]  CG                                                         [    ]  CG                                                                  [    ] CG                            [    ] Min A                                                   [    ] Min A                                                              [    ] Min  A          DISCHARGE PLAN:   [    ]  Good candidate for Intensive Rehabilitation/Hospital based                                             Will tolerate 3hrs Intensive Rehab Daily                                       [   x  ]  Short Term Rehab in Skilled Nursing Facility                          vs           [  x   ]  Home with Outpatient or VN services                                         [     ]  Possible Candidate for Intensive Hospital based Rehab

## 2022-08-16 NOTE — PHYSICAL THERAPY INITIAL EVALUATION ADULT - PERTINENT HX OF CURRENT PROBLEM, REHAB EVAL
89y Female  s/p s/p witnessed fall with -HT, -LOC, -AC. Patient was found to have acute mildly displaced and nondisplaced right posterior 11-12th rib fractures, acute nondisplaced L1-L3 right lumbar transverse process fractures, stable nondisplaced chronic fracture in right posterior arch of C1 extending to right transverse foramina.

## 2022-09-01 NOTE — OCCUPATIONAL THERAPY INITIAL EVALUATION ADULT - TRANSFER SAFETY CONCERNS NOTED: SIT/STAND, REHAB EVAL
[facility]  Metabolic Weight Loss Program        Patient's Name: Fransisca Menchaca  : 1955    This patient is enrolled in [address] [place] [address] Weight Loss Program and attended the required weekly virtual nutrition class hosted via [employer].       Chavez Atkins, MS, RD, LDN decreased step length/decreased weight-shifting ability

## 2022-09-25 NOTE — ED PROVIDER NOTE - CARE PLAN
Principal Discharge DX:	Multiple fractures of ribs of left side  Secondary Diagnosis:	MAURI (acute kidney injury)   1

## 2022-09-25 NOTE — ED PROVIDER NOTE - PHYSICAL EXAMINATION
CONSTITUTIONAL: WDWN. NAD. Speaking in full sentences, moving all extremities.  SKIN: Large skin tear to LUE  HEAD: Normocephalic, ecchymosis under L eye  EYES: PERRLA, EOMI  NECK: No posterior midline cervical tenderness  CARD: +S1, S2 no murmurs, gallops, or rubs. Regular rate and rhythm. Radial, DP, PT 2+/4 bilaterally  RESP: Bilateral basilar crackles. No wheezes or rhonchi.  ABD: Abdomen soft, nontender, nondistended.  NEURO: Alert, orientedx3, grossly unremarkable. Strength 5/5 in bilateral UE and LEs. CN 2-12 grossly intact. Follows commands.  MSK: L lateral rib TTP. No TTP in UE and LEs.   BACK: No posterior midline tenderness  PSYCH: Cooperative, appropriate.

## 2022-09-25 NOTE — ED PROVIDER NOTE - PATIENT PORTAL LINK FT
You can access the FollowMyHealth Patient Portal offered by [facility] by registering at the following website: [email or web address]. By joining [employer]’s FollowMyHealth portal, you will also be able to view your health information using other applications (apps) compatible with our system.

## 2022-09-25 NOTE — ED PROVIDER NOTE - NS ED ROS FT
Constitutional: No fatigue, confusion, or amnesia.  Head: No headache  ENT: No visual changes.  Cardiac:  No chest pain or SOB.  Respiratory:  No respiratory distress or hemoptysis.  GI:  No nausea, vomiting, or abdominal pain.  :  No incontinence.  MS: +L rib pain. No joint pain or back pain.  Neuro:  No dizziness, LOC, paralysis, or N/T.  Skin:  No lacerations or abrasions.

## 2022-09-25 NOTE — ED PROVIDER NOTE - CARE PROVIDER_API CALL
Chan, Yeoman K (MD)  Family Medicine  [address] [place] [address], [address]  [place] [address]  Phone: [phone]  Fax: [phone]  Established Patient  Follow Up Time: 1-3 Days

## 2022-09-25 NOTE — ED PROVIDER NOTE - CLINICAL SUMMARY MEDICAL DECISION MAKING FREE TEXT BOX
89F with PMH HTN, HLD, COPD on 3.5L NC who presents to [facility] with family for being more confused than usual. Of note, she sustained an unwitnessed fall last Thursday while using the bathroom. EKG, labs and imaging personally reviewed.  Patient sustained fracture of ribs 6 through 7, nondisplaced on the left.  Patient demonstrated good inspiratory effort with I-S.  MAURI with mild hyperkalemia noted, improved on redraw of labs.  Discussed results with family, offered admission however since patient is well-appearing and ambulatory with a steady gait preference for discharge with close follow-up, low threshold for return to the ER established. I have fully discussed the medical management and delivery of care with the patient. I have discussed any available labs, imaging and treatment options with the patient. All Questions answered at the bedside and printed copies of all results provided and recommended to review with PCP. Patient confirms understanding and has been given detailed return precautions. Patient instructed to return to the ED should symptoms persist or worsen. Patient has demonstrated capacity and has verbalized understanding. Patient is well appearing upon discharge, ambulatory with a steady gait.

## 2022-09-25 NOTE — ED ADULT NURSE NOTE - OBJECTIVE STATEMENT
as per son she fell in the bathroom 3 days ago, and cut her L eye. no anticoag no loc, has been acting "weird" ever since, pt normally aox4 and has been confused, today pt aox4.

## 2022-09-25 NOTE — ED PROVIDER NOTE - OBJECTIVE STATEMENT
89 y/o female, with PMHx of COPD, HTN, HLD, and hypothyroidism, presents to the ED for fall 3 days ago. +HT, -LOC, -AC. Family states she was using the restroom when they heard her fall, found laying down possibly on her L side. Today they noticed the patient was "slow" and "acting stupid", however he says she seems at her baseline now. Patient complains of L rib pain. ROS otherwise negative.

## 2022-10-02 NOTE — H&P ADULT - NSHPSOCIALHISTORY_GEN_ALL_CORE
Non-smoker  non-alcoholic  lives with the son who takes care of her  AAO x 3 at baseline , walks with the help of a walker

## 2022-10-02 NOTE — ED PROVIDER NOTE - NS ED ROS FT
CONST: (+) fever. No chills or bodyaches  EYES: No pain, redness, drainage or visual changes.  ENT: No ear pain or discharge, nasal discharge or congestion. No sore throat  CARD: No chest pain, palpitations  RESP: (+) SOB, cough. No hemoptysis. (+) hx of COPD  GI: No abdominal pain, N/V/D  : No urinary symptoms  MS: No joint pain, back pain or extremity pain/injury  SKIN: No rashes  NEURO: No headache, dizziness, paresthesias or LOC

## 2022-10-02 NOTE — H&P ADULT - HISTORY OF PRESENT ILLNESS
89 y/o female with female with a PMH of COPD on 3 1/2-4L followed by pulmonolgist Dr. Jose, HTN, HLD, hypothyroidism, and CKD presents to the ED for evaluation of fever, and cough x 5 days. as per pt son, pt fell and broke her ribs 09/25. pt has been using her incentive spirometer at home. pt son reports pt has been taking doxycycline and prednisone that she had at home that was prescribed by Dr. Jose for these situations. pt son reports he noticed pt was sob and her symptoms not improving. pt denies chest pain, abdominal pain, n/v/d/c, urinary symptoms, leg pain, leg swelling, recent travel, recent surgeries, hx of cancer, or use of hormones 89 y/o female with female with a PMH of Atrial fibrillation not on AC due to recurrent falls, Hfpef, HTN, HLD, hypothyroidism, and CKD, COPD, on 3 1/2-4L followed by pulmonolgist Dr. Jose,  presents to the ED for evaluation of fever, and cough x 5 days that was unresponsive to PO prednisone and doxycycline. The patient had increased dyspnea, sputum production for the past 5 days. Important to note patient fell and broke her ribs 09/25 as per patient she did not loose consciousness and it was a purely mechanical fall. Patient has been using her incentive spirometer at home. Patient's son reports that her symptoms were not improving. pt denies chest pain, abdominal pain, n/v/d/c, urinary symptoms, leg pain, leg swelling, recent travel, recent surgeries, hx of cancer.    IN the ED patient was noted to have a new pulmonary opacity, covid PCR +ve, so she was treated for a pneumonia with Levofloxacin and cefepime

## 2022-10-02 NOTE — ED PROVIDER NOTE - PHYSICAL EXAMINATION
Physical Exam    Vital Signs: I have reviewed the initial vital signs.  Constitutional: well-nourished, appears stated age, no acute distress  Eyes: Conjunctiva pink, Sclera clear  Cardiovascular: S1 and S2, regular rate, regular rhythm, well-perfused extremities, radial pulses equal and 2+ b/l.   Respiratory: unlabored respiratory effort, (+) diffuse decrease breath sounds, poor air flow throughout pt is speaking full sentences. no accessory muscle use.   Gastrointestinal: soft, non-tender, nondistended abdomen, no pulsatile mass, normal bowl sounds, no rebound, no guarding  Musculoskeletal: supple neck, no lower extremity edema, no calf tenderness  Integumentary: warm, dry, no rash. (+) scab lateral to the left eye from previous fall.   Neurologic: awake, alert, cranial nerves II-XII grossly intact, extremities’ motor and sensory functions grossly intact.   Psychiatric: appropriate mood, appropriate affect

## 2022-10-02 NOTE — PATIENT PROFILE ADULT - FALL HARM RISK - HARM RISK INTERVENTIONS

## 2022-10-02 NOTE — H&P ADULT - NSHPPHYSICALEXAM_GEN_ALL_CORE
PHYSICAL EXAM:  GENERAL: NAD, lying in bed comfortably  HEAD:  Atraumatic, Normocephalic  EYES: EOMI, PERRLA, conjunctiva and sclera clear  ENT: Moist mucous membranes  NECK: Supple, No JVD  CHEST/LUNG: Clear to auscultation bilaterally; No rales, rhonchi, wheezing, or rubs. Unlabored respirations  HEART: Regular rate and rhythm; No murmurs, rubs, or gallops  ABDOMEN: Bowel sounds present; Soft, Nontender, Nondistended. No hepatomegally  EXTREMITIES:  2+ Peripheral Pulses, brisk capillary refill. No clubbing, cyanosis, or edema  NERVOUS SYSTEM:  Alert & Oriented X3, speech clear. No deficits   MSK: FROM all 4 extremities, full and equal strength  SKIN: No rashes or lesions PHYSICAL EXAM:  GENERAL: NAD, lying in bed comfortably  HEAD: Laceration above the left eyebrow  EYES: EOMI, PERRLA, conjunctiva and sclera clear  ENT: Moist mucous membranes  NECK: Supple, No JVD  CHEST/LUNG: crackles at the base with intermittent wheezing  HEART:  No murmurs, rubs, or gallops  ABDOMEN: Bowel sounds present; Soft, Nontender, Nondistended. No hepatomegally  EXTREMITIES:  No clubbing, cyanosis, or edema  NERVOUS SYSTEM:  Alert & Oriented X3, speech clear. No deficits , hard of hearing   MSK: FROM all 4 extremities, full and equal strength  SKIN: Multiple bruises on extremities

## 2022-10-02 NOTE — ED PROVIDER NOTE - OBJECTIVE STATEMENT
89 y/o female with female with a PMH of COPD on 3 1/2-4L followed by pulmonolgist Dr. Jose, HTN, HLD, hypothyroidism, and CKD presents to the ED for evaluation of fever, and cough x 5 days. as per pt son, pt fell and broke her ribs 09/25. pt has been using her incentive spirometer at home. pt son reports pt has been taking doxycycline and prednisone that she had at home that was prescribed by Dr. Jose for these situations. pt son reports he noticed pt was sob and her symptoms not improving. pt denies chest pain, abdominal pain, n/v/d/c, urinary symptoms, leg pain, leg swelling, recent travel, recent surgeries, hx of cancer, or use of hormones.

## 2022-10-02 NOTE — H&P ADULT - ATTENDING COMMENTS
89 YO F with a PMH of chronic Afib (not on AC, fall risk), HFpEF, HTN, HLD, hypothyroidism, CKD3, COPD (on 3.5-4L of home O2), and recent mechanical fall w/ rib fractures who presents to the hospital w/ a c/o fevers/chills for the past x 5 days. Associated w/ productive cough and worsening dyspnea. Associated with fevers and non-productive cough. Denies any runny nose, sore throat, rashes, palpitations, LE swelling, N/V/D, ABD pain, and dysuria. + COVID Vaccine x 2, no booster    In the ED, Chest X-ray with unchanged bibasilar opacities. CT-chest w/o contrast showed right basilar patchy GGOs, no rib fractures identified. Isolated and COVID19 swab was positive. Pt started on IV ABXs (Cefe/Vanc/Levoflox) and Remdesivir in the ED.     Physical exam shows pt in NAD. VSS, afebrile, not hypoxic on 4L NC. A&Ox3. Non-focal neuro exam. Muscle strength/sensation intact. CTA B/L with no W/C/R. RRR, no M/G/R. ABD is soft and non-tender, normoactive BSs. LEs without swelling. No rashes. Labs and radiology as above.     SOB + Fevers + Cough due to COVID19 pneumonia, cover for bacterial pneumonia; no sepsis present on admission. Admit to COVID19 isolation unit. Send CBC/diff, CMP, procal/CRP, LDH, CPK, Ferritin, Ferritin, Coags, and baseline A1c (for pts who may receive steroids). IV ABXs (Cefep/Aztreonam). IV Steroids. Remdesivir. APAP PRN. Anti-tussives PRN. Supplemental O2 PRN. Prone pt as tolerated. AC as per [facility] COVID protocol. ID Consult.     Normocytic anemia, at baseline. Pt denies bleeding symptoms. Send anemia work-up. Replace as necessary.     Troponin elevation, suspected cause is CKD3. Doubt ACS. No CP or EKG changes. Serial cardiac enzymes and EKGs.     HX of chronic Afib (not on AC, fall risk), HFpEF, HTN, HLD, hypothyroidism, CKD3, COPD (on 3.5-4L of home O2), and recent mechanical fall w/ rib fractures. Restart home meds, except as stated above. DVT PPX. Inform PCP of pt's admission to hospital. My note supersedes the residents note.     Spoke with family: Andrzej Cameron (Son; HCP) over the phone    Date seen by Attending: 10/2/22

## 2022-10-02 NOTE — H&P ADULT - ASSESSMENT
89 y/o female with female with a PMH of Atrial fibrillation not on AC due to recurrent falls, Hfpef, HTN, HLD, hypothyroidism, and CKD, COPD, on 3 1/2-4L followed by pulmonolgist Dr. Jose,  presents to the ED for evaluation of fever, and cough x 5 days that was unresponsive to PO prednisone and doxycycline. The patient had increased dyspnea, sputum production for the past 5 days. Important to note patient fell and broke her ribs 09/25 as per patient she did not loose consciousness and it was a purely mechanical fall. Patient has been using her incentive spirometer at home. Patient's son reports that her symptoms were not improving. pt denies chest pain, abdominal pain, n/v/d/c, urinary symptoms, leg pain, leg swelling, recent travel, recent surgeries, hx of cancer.      #Pneumonia  - HCAP  - CXR reviewed   - Legionella/strep urine antigen   - Nebs prn sob/wheezing  - Incentive spirometry  - Sputum culture  - Blood cultures  - Procal  - IV antibiotics Cefepime and azithromycin  - Chest CT as noted above   - EKG NSR    #COPD  - CXR/CT chest: as above   - Pulse ox q8 hrs  - Nebs q 6 hrs PRN  - Solumedrol 125mg IV then 40 q 6 with plan for taper  - Supplemental Oxygen PRN, titrate PRN to wean patient to baseline O2 status. COPD patient will keep SPO2 goal 88-92%   - On home oxygen 3-4 L    #Covid-19  - early viral replicative phase based on hx  - Inflammatory markers  - dexamethasone 6 mg IV QD  - ID eval for RDV and other therapeutics if indicated   -  mg IV X1 for now continue based off ID eval     #A-fib  - Currently in NSR  - ON metoprolol and Multaq>>continue   - off AC due to recurrent falls    #HFpef  - Not in exacerbation  - C/W lasix 40 mg PO QD    #DLD  #Hypothyroidism  - C/W levothyroxine and atorvastatin     #reccurent falls   - Mechanical   - get orthostatics  - PT eval   - Fall precautions     #Troponemia   - No active CP  - Likely type 2   - trend trops  - ekg without ischemic changes    DVT PPX: Heparin   Diet: DASH  Dispo: acute  Code status: DNR/DNI as per son Andrzej          89 y/o female with female with a PMH of Atrial fibrillation not on AC due to recurrent falls, Hfpef, HTN, HLD, hypothyroidism, and CKD, COPD, on 3 1/2-4L followed by pulmonolgist Dr. Jose,  presents to the ED for evaluation of fever, and cough x 5 days that was unresponsive to PO prednisone and doxycycline. The patient had increased dyspnea, sputum production for the past 5 days. Important to note patient fell and broke her ribs 09/25 as per patient she did not loose consciousness and it was a purely mechanical fall. Patient has been using her incentive spirometer at home. Patient's son reports that her symptoms were not improving. pt denies chest pain, abdominal pain, n/v/d/c, urinary symptoms, leg pain, leg swelling, recent travel, recent surgeries, hx of cancer.      #Pneumonia  - HCAP  - CXR reviewed   - Legionella/strep urine antigen   - Nebs prn sob/wheezing  - Incentive spirometry  - Sputum culture  - Blood cultures  - Procal  - IV antibiotics Cefepime and azithromycin  - Chest CT as noted above   - EKG NSR    #COPD  - CXR/CT chest: as above   - Pulse ox q8 hrs  - Nebs q 6 hrs PRN  - Solumedrol 125mg IV then 40 q 6 with plan for taper  - Supplemental Oxygen PRN, titrate PRN to wean patient to baseline O2 status. COPD patient will keep SPO2 goal 88-92%   - On home oxygen 3-4 L    #Covid-19  - early viral replicative phase based on hx  - Inflammatory markers  - C/W Solu-medrol   - ID eval for RDV and other therapeutics if indicated   -  mg IV X1 for now continue based off ID eval     #A-fib  - Currently in NSR  - ON metoprolol and Multaq>>continue   - off AC due to recurrent falls    #HFpef  - Not in exacerbation  - C/W lasix 40 mg PO QD    #DLD  #Hypothyroidism  - C/W levothyroxine and atorvastatin     #reccurent falls   - Mechanical   - get orthostatics  - PT eval   - Fall precautions     #Troponemia   - No active CP  - Likely type 2   - trend trops  - ekg without ischemic changes    DVT PPX: Heparin   Diet: DASH  Dispo: acute  Code status: DNR/DNI as per son Andrzej          89 y/o female with female with a PMH of Atrial fibrillation not on AC due to recurrent falls, Hfpef, HTN, HLD, hypothyroidism, and CKD, COPD, on 3 1/2-4L followed by pulmonolgist Dr. Jose,  presents to the ED for evaluation of fever, and cough x 5 days that was unresponsive to PO prednisone and doxycycline. The patient had increased dyspnea, sputum production for the past 5 days. Important to note patient fell and broke her ribs 09/25 as per patient she did not loose consciousness and it was a purely mechanical fall. Patient has been using her incentive spirometer at home. Patient's son reports that her symptoms were not improving. pt denies chest pain, abdominal pain, n/v/d/c, urinary symptoms, leg pain, leg swelling, recent travel, recent surgeries, hx of cancer.      #Pneumonia  - HCAP  - CXR reviewed   - Legionella/strep urine antigen   - Nebs prn sob/wheezing  - Incentive spirometry  - Sputum culture  - Blood cultures  - Procal  - IV antibiotics Cefepime and azithromycin  - Chest CT as noted above   - EKG NSR  - ABG in am    #COPD  - CXR/CT chest: as above   - Pulse ox q8 hrs  - Nebs q 6 hrs PRN  - Solumedrol 125mg IV then 40 q 6 with plan for taper  - Supplemental Oxygen PRN, titrate PRN to wean patient to baseline O2 status. COPD patient will keep SPO2 goal 88-92%   - On home oxygen 3-4 L    #Covid-19  - early viral replicative phase based on hx  - Inflammatory markers  - C/W Solu-medrol   - ID eval for RDV and other therapeutics if indicated   -  mg IV X1 for now continue based off ID eval     #A-fib  - Currently in NSR  - ON metoprolol and Multaq>>continue   - off AC due to recurrent falls    #HFpef  - Not in exacerbation  - C/W lasix 40 mg PO QD    #DLD  #Hypothyroidism  - C/W levothyroxine and atorvastatin     #reccurent falls   - Mechanical   - get orthostatics  - PT eval   - Fall precautions     #Troponemia   - No active CP  - Likely type 2   - trend trops  - ekg without ischemic changes    DVT PPX: Heparin   Diet: DASH  Dispo: acute  Code status: DNR/DNI as per son Andrzej

## 2022-10-02 NOTE — ED PROVIDER NOTE - CLINICAL SUMMARY MEDICAL DECISION MAKING FREE TEXT BOX
Patient here with shortness of breath fever.  Here patient found to have new right basilar opacities on CT imaging.  Patient admitted for pneumonia, healthcare acquired pneumonia.  Antibiotics given patient stable for medical floor admission.

## 2022-10-02 NOTE — ED ADULT NURSE NOTE - OBJECTIVE STATEMENT
Patient reported fever x1 day and SOB.  Patient's son at bedside is primary historian, reported patient fell forward and sustained rib injury x10 days ago at home. Stable VS noted; pt on NC @4L/min.

## 2022-10-02 NOTE — H&P ADULT - NSHPREVIEWOFSYSTEMS_GEN_ALL_CORE
REVIEW OF SYSTEMS:    CONSTITUTIONAL: No weakness, fevers or chills  EYES/ENT: No visual changes;  No vertigo or throat pain   NECK: No pain or stiffness  RESPIRATORY: No cough, wheezing, hemoptysis; No shortness of breath  CARDIOVASCULAR: No chest pain or palpitations  GASTROINTESTINAL: No abdominal or epigastric pain. No nausea, vomiting, or hematemesis; No diarrhea or constipation. No melena or hematochezia.  GENITOURINARY: No dysuria, frequency or hematuria  NEUROLOGICAL: No numbness or weakness  SKIN: No itching, rashes REVIEW OF SYSTEMS:    CONSTITUTIONAL: Fevers, chills as noted in HPI  EYES/ENT: No visual changes;  No vertigo or throat pain   NECK: No pain or stiffness  RESPIRATORY: Cough wheezing, SOB, increased sputum production  CARDIOVASCULAR: No chest pain or palpitations  GASTROINTESTINAL: No abdominal or epigastric pain. No nausea, vomiting, or hematemesis; No diarrhea or constipation. No melena or hematochezia.  GENITOURINARY: No dysuria, frequency or hematuria  NEUROLOGICAL: No numbness or weakness

## 2022-10-02 NOTE — ED PROVIDER NOTE - ATTENDING APP SHARED VISIT CONTRIBUTION OF CARE
89-year-old female with history of COPD on 3 to 4 L, hypertension, dyslipidemia presents evaluation of fever cough for the past 5 days son reports the patient's been taking doxycycline prednisone but noticed the patient was short of breath and appears to be weak.  Patient no chest pain leg swelling.  Agree with above exam  Impression  Patient here with shortness of breath fever.  Here patient found to have new right basilar opacities on CT imaging.  Patient admitted for pneumonia, healthcare acquired pneumonia.  Antibiotics given patient stable for medical floor admission.

## 2022-10-03 NOTE — PHYSICAL THERAPY INITIAL EVALUATION ADULT - ADDITIONAL COMMENTS
Patient lives with daughter and son. has 1 steps to enter home. As per patient she was assisted by daughter in ADL's and ambulates with a RW in home.

## 2022-10-03 NOTE — PROGRESS NOTE ADULT - ASSESSMENT
89 y/o female with female with a PMH of Atrial fibrillation not on AC due to recurrent falls, Hfpef, HTN, HLD, hypothyroidism, and CKD, COPD, on 3 1/2-4L followed by pulmonolgist Dr. Jose,  presents to the ED for evaluation of fever, and cough x 5 days that was unresponsive to PO prednisone and doxycycline. The patient had increased dyspnea, sputum production for the past 5 days. Important to note patient fell and broke her ribs 09/25 as per patient she did not loose consciousness and it was a purely mechanical fall. Patient has been using her incentive spirometer at home. Patient's son reports that her symptoms were not improving. pt denies chest pain, abdominal pain, n/v/d/c, urinary symptoms, leg pain, leg swelling, recent travel, recent surgeries, hx of cancer.      #Pneumonia  - HCAP  - Legionella/strep urine antigen   - Nebs prn sob/wheezing  - Incentive spirometry  - Sputum culture  - Blood cultures  - Procal  -  aztreonam   - Chest CT as noted above   - EKG NSR    #COPD  - CXR/CT chest: as above   - Pulse ox q8 hrs  - Nebs q 6 hrs PRN  - Solumedrol 125mg IV then 40 q 6 with plan for taper  - Supplemental Oxygen PRN, titrate PRN to wean patient to baseline O2 status. COPD patient will keep SPO2 goal 88-92%   - On home oxygen 3-4 L    #Covid-19  -vaccinated with one booster   - s/p Day 1 : Single  mg IV loading dose  -Day 2-5 : single  mg IV once daily maintenance dose  -Dexamethasone 6 mg iv q24h for 10 days.    #A-fib  - Currently in NSR  - ON metoprolol and Multaq>>continue   - off AC due to recurrent falls    #HFpef  - Not in exacerbation  - C/W lasix 40 mg PO QD    #DLD  #Hypothyroidism  - C/W levothyroxine and atorvastatin     #reccurent falls   - Mechanical   - PT eval   - Fall precautions     #Troponemia  - Likely due to ckd3   - trend trops  - ekg without ischemic changes    DVT PPX: Heparin   Diet: DASH  Dispo: acute  Code status: DNR/DNI as per son Andrzej

## 2022-10-03 NOTE — CONSULT NOTE ADULT - ATTENDING COMMENTS
Events noted, COPD Exacerbation/ covid+, chest ct reviewed, steroids, infl markers, OP inhalers, LE doppler, poor prognosis

## 2022-10-03 NOTE — CONSULT NOTE ADULT - ASSESSMENT
Impression:  COVID 19 PNA  COPD on 3-4L NC at home   Chronic afib not on AC  Hx of HfpEF    Plan:  -currently at baseline 3L NC  -Remdesivir as per ID  -Decadron as per ID, d/c solumedrol   -monitor off abx  -DVT ppx  -Trend inflammatory markers  -duonebs   -INCOMPLETE note      Impression:  COVID 19 PNA  COPD on 3-4L NC at home   Chronic afib not on AC due to recurrent falls  Hx of HfpEF    Plan:  -currently at baseline 3L NC  -Remdesivir as per ID  -Decadron as per ID, d/c solumedrol   -monitor off abx  -DVT ppx  -Trend inflammatory markers  -duonebs      Impression:    COPD exacerbation  COVID 19  Severe COPD on 3-4L NC at home   Chronic afib not on AC due to recurrent falls  Hx of HfpEF    Plan:    -currently at baseline 3L NC  -Remdesivir as per ID  -solumedrol 40 q 12  -procal  -DVT ppx  -Trend inflammatory markers  -michaelle   [facility]

## 2022-10-03 NOTE — PHYSICAL THERAPY INITIAL EVALUATION ADULT - PERTINENT HX OF CURRENT PROBLEM, REHAB EVAL
89 y/o female with female with a PMH of Atrial fibrillation not on AC due to recurrent falls, Hfpef, HTN, HLD, hypothyroidism, and CKD, COPD, on 3 1/2-4L followed by pulmonolgist Dr. Jose,  presents to the ED for evaluation of fever, and cough x 5 days that was unresponsive to PO prednisone and doxycycline. The patient had increased dyspnea, sputum production for the past 5 days. Important to note patient fell and broke her ribs 09/25 as per patient she did not loose consciousness and it was a purely mechanical fall. Patient has been using her incentive spirometer at home. Patient's son reports that her symptoms were not improving. pt denies chest pain, abdominal pain, n/v/d/c, urinary symptoms, leg pain, leg swelling, recent travel, recent surgeries, hx of cancer.

## 2022-10-03 NOTE — PROGRESS NOTE ADULT - ASSESSMENT
89 YO F with a PMH of chronic Afib (not on AC, fall risk), HFpEF, HTN, HLD, hypothyroidism, CKD3, COPD (on 3.5-4L of home O2), and recent mechanical fall w/ rib fractures who presents to the hospital w/ a c/o fevers/chills for the past x 5 days. Associated w/ productive cough and worsening dyspnea. Associated with fevers and non-productive cough. Denies any runny nose, sore throat, rashes, palpitations, LE swelling, N/V/D, ABD pain, and dysuria. + COVID Vaccine x 2, no booster    # Covid PNA-- seen by ID--started on RDV and dexa on oxygen 3L--on home oxygen. Patient seen by ID and PULM  CRP 96.3,   vaccinated x2 not boosted  exposed to her daughter who was sick for 2 weeks    # A fib-- on dronaderone and metoprolol-- not on AC    # CKD--on lasix at home and here.  # fall s/p fracture of ribs-- lt lateral 6-7th ribs-- on 9/25-- patient had incentive spirometer and  has crackles on that side  # hypomagnesemia-- on IV mag    plan discussed with son at bedside-- will complete RDV here.

## 2022-10-03 NOTE — CONSULT NOTE ADULT - SUBJECTIVE AND OBJECTIVE BOX
Patient is a 89y old  Female who presents with a chief complaint of Pneumonia, COPD exacerbation (03 Oct 2022 11:24)      HPI:  89 y/o female with female with a PMH of Atrial fibrillation not on AC due to recurrent falls, Hfpef, HTN, HLD, hypothyroidism, and CKD, COPD, on 3 1/2-4L followed by pulmonolgist Dr. Jose,  presents to the ED for evaluation of fever, and cough x 5 days that was unresponsive to PO prednisone and doxycycline. The patient had increased dyspnea, sputum production for the past 5 days. Important to note patient fell and broke her ribs  as per patient she did not loose consciousness and it was a purely mechanical fall. Patient has been using her incentive spirometer at home. Patient's son reports that her symptoms were not improving. pt denies chest pain, abdominal pain, n/v/d/c, urinary symptoms, leg pain, leg swelling, recent travel, recent surgeries, hx of cancer.    IN the ED patient was noted to have a new pulmonary opacity, covid PCR +ve, so she was treated for a pneumonia with Levofloxacin and cefepime (02 Oct 2022 18:33)        PAST MEDICAL & SURGICAL HISTORY:  COPD (chronic obstructive pulmonary disease)      Hypothyroid      HTN (hypertension)      High cholesterol      Colitis      CKD (chronic kidney disease)      No significant past surgical history      REVIEW OF SYSTEMS:    All ROS are negative except per HPI     Allergies    No Known Allergies    Intolerances      PHYSICAL EXAM:  GENERAL: NAD, lying in bed comfortably  HEAD:  Atraumatic, Normocephalic  EYES: conjunctiva and sclera clear  ENT: Moist mucous membranes  CHEST/LUNG: Course breath sounds, mild wheezing, on 3L NC   HEART: Regular rate and rhythm; No murmurs, rubs, or gallops  ABDOMEN: Soft, nontender, nondistended  EXTREMITIES:  No clubbing, cyanosis, or edema  NERVOUS SYSTEM:  A&Ox3    Vital Signs Last 24 Hrs  T(C): 36.1 (03 Oct 2022 05:00), Max: 36.6 (02 Oct 2022 16:15)  T(F): 97 (03 Oct 2022 05:00), Max: 97.9 (02 Oct 2022 16:15)  HR: 74 (03 Oct 2022 05:00) (63 - 74)  BP: 165/71 (03 Oct 2022 05:00) (126/99 - 165/71)  BP(mean): --  RR: 18 (03 Oct 2022 05:00) (18 - 18)  SpO2: 97% (03 Oct 2022 05:00) (97% - 100%)    Parameters below as of 03 Oct 2022 05:00  Patient On (Oxygen Delivery Method): nasal cannula              LABS:                          11.3   13.21 )-----------( 158      ( 03 Oct 2022 09:13 )             34.4                                               10-03    147<H>  |  105  |  52<H>  ----------------------------<  65<L>  5.2<H>   |  25  |  1.3    Ca    9.3      03 Oct 2022 09:13  Mg     1.7     10-03    TPro  6.3  /  Alb  3.9  /  TBili  0.5  /  DBili  x   /  AST  36  /  ALT  22  /  AlkPhos  94  10-03      PT/INR - ( 02 Oct 2022 10:56 )   PT: 10.70 sec;   INR: 0.94 ratio         PTT - ( 02 Oct 2022 10:56 )  PTT:26.8 sec                                       Urinalysis Basic - ( 02 Oct 2022 13:50 )    Color: Light Yellow / Appearance: Clear / S.017 / pH: x  Gluc: x / Ketone: Negative  / Bili: Negative / Urobili: <2 mg/dL   Blood: x / Protein: 30 mg/dL / Nitrite: Negative   Leuk Esterase: Negative / RBC: 7 /HPF / WBC 1 /HPF   Sq Epi: x / Non Sq Epi: 1 /HPF / Bacteria: Negative        CARDIAC MARKERS ( 03 Oct 2022 09:13 )  x     / 0.02 ng/mL / x     / x     / x      CARDIAC MARKERS ( 03 Oct 2022 01:41 )  x     / 0.01 ng/mL / x     / x     / x      CARDIAC MARKERS ( 02 Oct 2022 10:56 )  x     / 0.02 ng/mL / x     / x     / x                                                LIVER FUNCTIONS - ( 03 Oct 2022 09:13 )  Alb: 3.9 g/dL / Pro: 6.3 g/dL / ALK PHOS: 94 U/L / ALT: 22 U/L / AST: 36 U/L / GGT: x                                                                                                MEDICATIONS  (STANDING):  albuterol/ipratropium for Nebulization 3 milliLiter(s) Nebulizer every 6 hours  aspirin  chewable 81 milliGRAM(s) Oral daily  atorvastatin 20 milliGRAM(s) Oral at bedtime  aztreonam  IVPB 1000 milliGRAM(s) IV Intermittent every 8 hours  dexAMETHasone  Injectable 6 milliGRAM(s) IV Push daily  dronedarone 400 milliGRAM(s) Oral two times a day  furosemide    Tablet 40 milliGRAM(s) Oral daily  heparin   Injectable 5000 Unit(s) SubCutaneous every 12 hours  levothyroxine 112 MICROGram(s) Oral daily  magnesium sulfate  IVPB 2 Gram(s) IV Intermittent every 2 hours  methylPREDNISolone sodium succinate Injectable 40 milliGRAM(s) IV Push every 8 hours  metoprolol succinate ER 25 milliGRAM(s) Oral daily  pantoprazole    Tablet 40 milliGRAM(s) Oral before breakfast  remdesivir  IVPB 100 milliGRAM(s) IV Intermittent every 24 hours    MEDICATIONS  (PRN):  ALBUTerol    90 MICROgram(s) HFA Inhaler 2 Puff(s) Inhalation once PRN Shortness of Breath and/or Wheezing      Radiology:   < from: Xray Chest 1 View- PORTABLE-Urgent (10.02.22 @ 11:28) >  Impression:    1. Unchanged small bibasilar opacities, right greater than left.    < end of copied text >  < from: CT Chest No Cont (10.02.22 @ 13:22) >  IMPRESSION:    New predominantly right basilar patchy groundglass and consolidative   opacities which may represent pneumonia in the correct clinical setting.    Additional chronic findings as above.    < end of copied text >   Patient is a 89y old  Female who presents with a chief complaint of SOB (03 Oct 2022 11:24)      HPI:  89 y/o female with female with a PMH of Atrial fibrillation not on AC due to recurrent falls, Hfpef, HTN, HLD, hypothyroidism, and CKD, COPD, on 3 1/2-4L f  presents to the ED for evaluation of fever, and cough x 5 days that was unresponsive to PO prednisone and doxycycline. The patient had increased dyspnea, sputum production for the past 5 days. Important to note patient fell and broke her ribs  as per patient she did not loose consciousness and it was a purely mechanical fall. Patient has been using her incentive spirometer at home. Patient's son reports that her symptoms were not improving. pt denies chest pain, abdominal pain, n/v/d/c, urinary symptoms, leg pain, leg swelling, recent travel, recent surgeries, hx of cancer.    IN the ED patient was noted to have a new pulmonary opacity, covid PCR +ve, so she was treated for a pneumonia with Levofloxacin and cefepime admitted to floor on NC, weak, cough        PAST MEDICAL & SURGICAL HISTORY:  COPD (chronic obstructive pulmonary disease)      Hypothyroid      HTN (hypertension)      High cholesterol      Colitis      CKD (chronic kidney disease)      No significant past surgical history      REVIEW OF SYSTEMS:    All ROS are negative except per HPI     Allergies    No Known Allergies    Intolerances      PHYSICAL EXAM:  GENERAL: NAD, lying in bed comfortably  HEAD:  Atraumatic, Normocephalic  EYES: conjunctiva and sclera clear  ENT: Moist mucous membranes  CHEST/LUNG: Course breath sounds, mild wheezing, on 3L NC   HEART: Regular rate and rhythm; No murmurs, rubs, or gallops  ABDOMEN: Soft, nontender, nondistended  EXTREMITIES:  No clubbing, cyanosis, or edema  NERVOUS SYSTEM:  A&Ox3    Vital Signs Last 24 Hrs  T(C): 36.1 (03 Oct 2022 05:00), Max: 36.6 (02 Oct 2022 16:15)  T(F): 97 (03 Oct 2022 05:00), Max: 97.9 (02 Oct 2022 16:15)  HR: 74 (03 Oct 2022 05:00) (63 - 74)  BP: 165/71 (03 Oct 2022 05:00) (126/99 - 165/71)  BP(mean): --  RR: 18 (03 Oct 2022 05:00) (18 - 18)  SpO2: 97% (03 Oct 2022 05:00) (97% - 100%)    Parameters below as of 03 Oct 2022 05:00  Patient On (Oxygen Delivery Method): nasal cannula              LABS:                          11.3   13.21 )-----------( 158      ( 03 Oct 2022 09:13 )             34.4                                               10-03    147<H>  |  105  |  52<H>  ----------------------------<  65<L>  5.2<H>   |  25  |  1.3    Ca    9.3      03 Oct 2022 09:13  Mg     1.7     10-03    TPro  6.3  /  Alb  3.9  /  TBili  0.5  /  DBili  x   /  AST  36  /  ALT  22  /  AlkPhos  94  10-03      PT/INR - ( 02 Oct 2022 10:56 )   PT: 10.70 sec;   INR: 0.94 ratio         PTT - ( 02 Oct 2022 10:56 )  PTT:26.8 sec                                       Urinalysis Basic - ( 02 Oct 2022 13:50 )    Color: Light Yellow / Appearance: Clear / S.017 / pH: x  Gluc: x / Ketone: Negative  / Bili: Negative / Urobili: <2 mg/dL   Blood: x / Protein: 30 mg/dL / Nitrite: Negative   Leuk Esterase: Negative / RBC: 7 /HPF / WBC 1 /HPF   Sq Epi: x / Non Sq Epi: 1 /HPF / Bacteria: Negative        CARDIAC MARKERS ( 03 Oct 2022 09:13 )  x     / 0.02 ng/mL / x     / x     / x      CARDIAC MARKERS ( 03 Oct 2022 01:41 )  x     / 0.01 ng/mL / x     / x     / x      CARDIAC MARKERS ( 02 Oct 2022 10:56 )  x     / 0.02 ng/mL / x     / x     / x                                                LIVER FUNCTIONS - ( 03 Oct 2022 09:13 )  Alb: 3.9 g/dL / Pro: 6.3 g/dL / ALK PHOS: 94 U/L / ALT: 22 U/L / AST: 36 U/L / GGT: x                                                                                                MEDICATIONS  (STANDING):  albuterol/ipratropium for Nebulization 3 milliLiter(s) Nebulizer every 6 hours  aspirin  chewable 81 milliGRAM(s) Oral daily  atorvastatin 20 milliGRAM(s) Oral at bedtime  aztreonam  IVPB 1000 milliGRAM(s) IV Intermittent every 8 hours  dexAMETHasone  Injectable 6 milliGRAM(s) IV Push daily  dronedarone 400 milliGRAM(s) Oral two times a day  furosemide    Tablet 40 milliGRAM(s) Oral daily  heparin   Injectable 5000 Unit(s) SubCutaneous every 12 hours  levothyroxine 112 MICROGram(s) Oral daily  magnesium sulfate  IVPB 2 Gram(s) IV Intermittent every 2 hours  methylPREDNISolone sodium succinate Injectable 40 milliGRAM(s) IV Push every 8 hours  metoprolol succinate ER 25 milliGRAM(s) Oral daily  pantoprazole    Tablet 40 milliGRAM(s) Oral before breakfast  remdesivir  IVPB 100 milliGRAM(s) IV Intermittent every 24 hours    MEDICATIONS  (PRN):  ALBUTerol    90 MICROgram(s) HFA Inhaler 2 Puff(s) Inhalation once PRN Shortness of Breath and/or Wheezing      Radiology:   < from: Xray Chest 1 View- PORTABLE-Urgent (10.02.22 @ 11:28) >  Impression:    1. Unchanged small bibasilar opacities, right greater than left.    < end of copied text >  < from: CT Chest No Cont (10.02.22 @ 13:22) >  IMPRESSION:    New predominantly right basilar patchy groundglass and consolidative   opacities which may represent pneumonia in the correct clinical setting.    Additional chronic findings as above.    < end of copied text >

## 2022-10-03 NOTE — PHYSICAL THERAPY INITIAL EVALUATION ADULT - IMPAIRMENTS CONTRIBUTING IMPAIRED BED MOBILITY, REHAB EVAL
decreased endurance/impaired balance/decreased flexibility/impaired motor control/decreased strength

## 2022-10-03 NOTE — CONSULT NOTE ADULT - ASSESSMENT
89 y/o female with female with a PMH of Atrial fibrillation not on AC due to recurrent falls, Hfpef, HTN, HLD, hypothyroidism, and CKD, COPD, on 3 1/2-4L followed by pulmonolgist Dr. Jose,  presents to the ED for evaluation of fever, and cough x 5 days that was unresponsive to PO prednisone and doxycycline.     IMPRESSION;  COVID 19 with severe illness. SpO2 < 94% on RA and need for supplemental O2.   Pt is on 4 lit at home  CT with minimal RLL opacity  Pt is in the early viral replicative phase based on the timeline/onset of symptoms.  No bacterial PNA  Vaccination status unclear  COVID-19 NG 9/25  COVID-19 positive 10/2    RECOMMENDATIONS;  Target SpO2 92 % to 96 %  f/u ferritin, CRP, procalcitonin  Day 1 : Single  mg IV loading dose  Day 2-5 : single  mg IV once daily maintenance dose  Daily CBC,CMP.  Dexamethasone 6 mg iv q24h for 10 days.  Monitor for side effects: hyperglycemia, neurological ( agitation/confusion), adrenal suppression, bacterial and fungal infections  D/c cefepime  Off loading to prevent pressure sores and preventive measures to avoid aspiration If any questions , please call [phone] or send a message on [employer] teams  Please update ID in real time with any pertinent new laboratory /microbiological/radiographically findings or a change in the clinical characteristics

## 2022-10-03 NOTE — PHYSICAL THERAPY INITIAL EVALUATION ADULT - GENERAL OBSERVATIONS, REHAB EVAL
Patient encountered lying in bed On O2 via Nc 3LPM, O2 sat 93% at rest. Agreed to participate in therapy.

## 2022-10-03 NOTE — CHART NOTE - NSCHARTNOTEFT_GEN_A_CORE
Spoke to son Andrzej patient is vaccinated against covid-19 with 1 booster
Azithromycin is contraindicated with multaq. I spoke with senior and started pt on 1g aztreonam q8.

## 2022-10-03 NOTE — CONSULT NOTE ADULT - SUBJECTIVE AND OBJECTIVE BOX
TANNA MCCRACKEN  89y, Female  Allergy: No Known Allergies      All historical available data reviewed.    HPI:  89 y/o female with female with a PMH of Atrial fibrillation not on AC due to recurrent falls, Hfpef, HTN, HLD, hypothyroidism, and CKD, COPD, on 3 -4L followed by pulmonolgist Dr. Jose,  presents to the ED for evaluation of fever, and cough x 5 days that was unresponsive to PO prednisone and doxycycline. The patient had increased dyspnea, sputum production for the past 5 days. Important to note patient fell and broke her ribs  as per patient she did not loose consciousness and it was a purely mechanical fall. Patient has been using her incentive spirometer at home. Patient's son reports that her symptoms were not improving. pt denies chest pain, abdominal pain, n/v/d/c, urinary symptoms, leg pain, leg swelling, recent travel, recent surgeries, hx of cancer.    IN the ED patient was noted to have a new pulmonary opacity, covid PCR +ve, so she was treated for a pneumonia with Levofloxacin and cefepime (02 Oct 2022 18:33)    FAMILY HISTORY:    PAST MEDICAL & SURGICAL HISTORY:  COPD (chronic obstructive pulmonary disease)      Hypothyroid      HTN (hypertension)      High cholesterol      Colitis      CKD (chronic kidney disease)      No significant past surgical history            VITALS:  T(F): 97, Max: 100.2 (10-02-22 @ 10:38)  HR: 74  BP: 165/71  RR: 18Vital Signs Last 24 Hrs  T(C): 36.1 (03 Oct 2022 05:00), Max: 37.9 (02 Oct 2022 10:38)  T(F): 97 (03 Oct 2022 05:00), Max: 100.2 (02 Oct 2022 10:38)  HR: 74 (03 Oct 2022 05:00) (63 - 74)  BP: 165/71 (03 Oct 2022 05:00) (102/55 - 165/71)  BP(mean): --  RR: 18 (03 Oct 2022 05:00) (18 - 20)  SpO2: 97% (03 Oct 2022 05:00) (97% - 100%)    Parameters below as of 03 Oct 2022 05:00  Patient On (Oxygen Delivery Method): nasal cannula        TESTS & MEASUREMENTS:                        10.6   11.72 )-----------( 149      ( 02 Oct 2022 10:56 )             32.4     10-02    134<L>  |  96<L>  |  65<HH>  ----------------------------<  92  4.9   |  27  |  1.5    Ca    9.1      02 Oct 2022 10:56    TPro  6.2  /  Alb  3.9  /  TBili  0.4  /  DBili  x   /  AST  34  /  ALT  22  /  AlkPhos  91  10-02    LIVER FUNCTIONS - ( 02 Oct 2022 10:56 )  Alb: 3.9 g/dL / Pro: 6.2 g/dL / ALK PHOS: 91 U/L / ALT: 22 U/L / AST: 34 U/L / GGT: x             Urinalysis Basic - ( 02 Oct 2022 13:50 )    Color: Light Yellow / Appearance: Clear / S.017 / pH: x  Gluc: x / Ketone: Negative  / Bili: Negative / Urobili: <2 mg/dL   Blood: x / Protein: 30 mg/dL / Nitrite: Negative   Leuk Esterase: Negative / RBC: 7 /HPF / WBC 1 /HPF   Sq Epi: x / Non Sq Epi: 1 /HPF / Bacteria: Negative          RADIOLOGY & ADDITIONAL TESTS:  Personal review of radiological diagnostics performed  Echo and EKG results noted when applicable.     MEDICATIONS:  ALBUTerol    90 MICROgram(s) HFA Inhaler 2 Puff(s) Inhalation once PRN  albuterol/ipratropium for Nebulization 3 milliLiter(s) Nebulizer every 6 hours  aspirin  chewable 81 milliGRAM(s) Oral daily  atorvastatin 20 milliGRAM(s) Oral at bedtime  aztreonam  IVPB 1000 milliGRAM(s) IV Intermittent every 8 hours  cefepime   IVPB 2000 milliGRAM(s) IV Intermittent every 24 hours  dronedarone 400 milliGRAM(s) Oral two times a day  furosemide    Tablet 40 milliGRAM(s) Oral daily  heparin   Injectable 5000 Unit(s) SubCutaneous every 12 hours  levothyroxine 112 MICROGram(s) Oral daily  methylPREDNISolone sodium succinate Injectable 40 milliGRAM(s) IV Push every 8 hours  metoprolol succinate ER 25 milliGRAM(s) Oral daily  pantoprazole    Tablet 40 milliGRAM(s) Oral before breakfast      ANTIBIOTICS:  aztreonam  IVPB 1000 milliGRAM(s) IV Intermittent every 8 hours  cefepime   IVPB 2000 milliGRAM(s) IV Intermittent every 24 hours

## 2022-10-04 NOTE — PROGRESS NOTE ADULT - ASSESSMENT
89 YO F with a PMH of chronic Afib (not on AC, fall risk), HFpEF, HTN, HLD, hypothyroidism, CKD3, COPD (on 3.5-4L of home O2), and recent mechanical fall w/ rib fractures who presents to the hospital w/ a c/o fevers/chills for the past x 5 days. Associated w/ productive cough and worsening dyspnea. Associated with fevers and non-productive cough. Denies any runny nose, sore throat, rashes, palpitations, LE swelling, N/V/D, ABD pain, and dysuria. + COVID Vaccine x 2, no booster    # Covid PNA-- seen by ID--started on RDV and dexa on oxygen 3L--on home oxygen. Patient seen by ID and PULM  CRP 96.3,   vaccinated x2 not boosted  exposed to her daughter who was sick for 2 weeks  CRP, ferritin and D-dimer are elevated-- check AM  patient has remained on baseline    # A fib-- on dronaderone and metoprolol-- not on AC    # CKD--on lasix at home and here.  # fall s/p fracture of ribs-- lt lateral 6-7th ribs-- on 9/25-- patient had incentive spirometer and  has crackles on that side  # hypomagnesemia-- on IV mag    plan discussed with son at bedside-- will complete RDV here. He wants to take her home after completion of RDV

## 2022-10-04 NOTE — PROGRESS NOTE ADULT - ASSESSMENT
· Assessment	  89 y/o female with female with a PMH of Atrial fibrillation not on AC due to recurrent falls, Hfpef, HTN, HLD, hypothyroidism, and CKD, COPD, on 3 1/2-4L followed by pulmonolgist Dr. Jose,  presents to the ED for evaluation of fever, and cough x 5 days that was unresponsive to PO prednisone and doxycycline.     IMPRESSION;  COVID 19 with severe illness. SpO2 < 94% on RA and need for supplemental O2.   Clinically stable  Increased WBC secondary to steroids  Pt is on 4 lit at home  CT with minimal RLL opacity  Pt is in the early viral replicative phase based on the timeline/onset of symptoms.  No bacterial PNA  Vaccination status unclear  COVID-19 NG 9/25  COVID-19 positive 10/2    RECOMMENDATIONS;  Target SpO2 92 % to 96 %  f/u ferritin, CRP, procalcitonin  Day 1 : Single  mg IV loading dose  Day 2-5 : single  mg IV once daily maintenance dose  Daily CBC,CMP.  Dexamethasone 6 mg iv q24h for 10 days.  Monitor for side effects: hyperglycemia, neurological ( agitation/confusion), adrenal suppression, bacterial and fungal infections  Could finish course of steroids with po prednisone 40 mg q24 for a total of 10 days   Off loading to prevent pressure sores and preventive measures to avoid aspiration.  Please do not hesitate to recall ID if any questions arise either through [employer] or through [employer] teams

## 2022-10-04 NOTE — PROGRESS NOTE ADULT - ASSESSMENT
89 y/o female with female with a PMH of Atrial fibrillation not on AC due to recurrent falls, Hfpef, HTN, HLD, hypothyroidism, and CKD, COPD, on 3 1/2-4L followed by pulmonolgist Dr. Jose,  presents to the ED for evaluation of fever, and cough x 5 days that was unresponsive to PO prednisone and doxycycline. The patient had increased dyspnea, sputum production for the past 5 days. Important to note patient fell and broke her ribs 09/25 as per patient she did not loose consciousness and it was a purely mechanical fall. Patient has been using her incentive spirometer at home. Patient's son reports that her symptoms were not improving. pt denies chest pain, abdominal pain, n/v/d/c, urinary symptoms, leg pain, leg swelling, recent travel, recent surgeries, hx of cancer.      #Pneumonia  - HCAP  - Legionella/strep urine antigen   - Nebs prn sob/wheezing  - Incentive spirometry  - Sputum culture  - Blood cultures  - Procal  -  aztreonam   - Chest CT as noted above   - EKG NSR    #COPD  - CXR/CT chest: as above   - Pulse ox q8 hrs  - Nebs q 6 hrs PRN  - Solumedrol 125mg IV then 40 q 6 with plan for taper  - Supplemental Oxygen PRN, titrate PRN to wean patient to baseline O2 status. COPD patient will keep SPO2 goal 88-92%   - On home oxygen 3-4 L    #Covid-19  -vaccinated with one booster   - s/p Day 1 : Single  mg IV loading dose  -Day 2-5 : single  mg IV once daily maintenance dose  -Dexamethasone 6 mg iv q24h for 10 days.    #A-fib  - Currently in NSR  - ON metoprolol and Multaq>>continue   - off AC due to recurrent falls    #HFpef  - Not in exacerbation  - C/W lasix 40 mg PO QD    #DLD  #Hypothyroidism  - C/W levothyroxine and atorvastatin     #reccurent falls   - Mechanical   - PT eval   - Fall precautions     #Troponemia  - Likely due to ckd3   - trend trops  - ekg without ischemic changes    DVT PPX: Heparin   Diet: DASH  Dispo: acute  Code status: DNR/DNI as per son Andrzej   89 y/o female with female with a PMH of Atrial fibrillation not on AC due to recurrent falls, Hfpef, HTN, HLD, hypothyroidism, and CKD, COPD, on 3 1/2-4L followed by pulmonolgist Dr. Jose,  presents to the ED for evaluation of fever, and cough x 5 days that was unresponsive to PO prednisone and doxycycline. The patient had increased dyspnea, sputum production for the past 5 days. Important to note patient fell and broke her ribs 09/25 as per patient she did not loose consciousness and it was a purely mechanical fall. Patient has been using her incentive spirometer at home. Patient's son reports that her symptoms were not improving. pt denies chest pain, abdominal pain, n/v/d/c, urinary symptoms, leg pain, leg swelling, recent travel, recent surgeries, hx of cancer.      #Pneumonia  #Covid-19  -vaccinated with one booster   - s/p Day 1 : Single  mg IV loading dose  -Day 2-5 (today day 3) : single  mg IV once daily maintenance dose  -Dexamethasone 6 mg iv q24h for 10 days.  - Legionella/strep urine antigen   - Nebs prn sob/wheezing  - Incentive spirometry  - Sputum culture  - Blood cultures  - Chest CT as noted above   - EKG NSR  -crp 104.9  -procal 0.09  -f/u ferritin    -f/u duplex    #COPD  - CXR/CT chest: as above   - Pulse ox q8 hrs  - Nebs q 6 hrs PRN  - Solumedrol 125mg IV then 40 q 6 with plan for taper  - Supplemental Oxygen PRN, titrate PRN to wean patient to baseline O2 status. COPD patient will keep SPO2 goal 88-92%   - On home oxygen 3-4 L    #A-fib  - Currently in NSR  - ON metoprolol and Multaq>>continue   - off AC due to recurrent falls    #HFpef  - Not in exacerbation  - C/W lasix 40 mg PO QD    #DLD  #Hypothyroidism  - C/W levothyroxine and atorvastatin     #reccurent falls   - Mechanical   - PT eval   - Fall precautions     #Troponemia  - Likely due to ckd3   - ekg without ischemic changes    DVT PPX: Heparin   Diet: DASH  Dispo: acute  Code status: DNR/DNI as per son Andrzej   89 y/o female with female with a PMH of Atrial fibrillation not on AC due to recurrent falls, Hfpef, HTN, HLD, hypothyroidism, and CKD, COPD, on 3 1/2-4L followed by pulmonolgist Dr. Jose,  presents to the ED for evaluation of fever, and cough x 5 days that was unresponsive to PO prednisone and doxycycline. The patient had increased dyspnea, sputum production for the past 5 days. Important to note patient fell and broke her ribs 09/25 as per patient she did not loose consciousness and it was a purely mechanical fall. Patient has been using her incentive spirometer at home. Patient's son reports that her symptoms were not improving. pt denies chest pain, abdominal pain, n/v/d/c, urinary symptoms, leg pain, leg swelling, recent travel, recent surgeries, hx of cancer.      #Pneumonia  #Covid-19  -vaccinated with one booster   - s/p Day 1 : Single  mg IV loading dose  -Day 2-5 (today day 3) : single  mg IV once daily maintenance dose  -Dexamethasone 6 mg iv q24h for 10 days.  -consider tapering steroids brendan if stable   - Legionella/strep urine antigen   - Nebs prn sob/wheezing  - Incentive spirometry  - Sputum culture  - Blood cultures  - Chest CT as noted above   - EKG NSR  -crp 104.9  -procal 0.09  -f/u ferritin    -f/u duplex    #COPD  - CXR/CT chest: as above   - Pulse ox q8 hrs  - Nebs q 6 hrs PRN  - Solumedrol 125mg IV then 40 q 6 with plan for taper  - Supplemental Oxygen PRN, titrate PRN to wean patient to baseline O2 status. COPD patient will keep SPO2 goal 88-92%   - On home oxygen 3-4 L    #A-fib  - Currently in NSR  - ON metoprolol and Multaq>>continue   - off AC due to recurrent falls    #HFpef  - Not in exacerbation  - C/W lasix 40 mg PO QD    #DLD  #Hypothyroidism  - C/W levothyroxine and atorvastatin     #reccurent falls   - Mechanical   - PT eval   - Fall precautions     #Troponemia  - Likely due to ckd3   - ekg without ischemic changes    DVT PPX: Heparin   Diet: DASH  Dispo: acute  Code status: DNR/DNI as per son Andrzej

## 2022-10-05 NOTE — PROGRESS NOTE ADULT - SUBJECTIVE AND OBJECTIVE BOX
Location: [address] ([id number])  Patient Name: TANNA MCCRACKEN  Age: 89y  Gender: Female    Past Medical and Surgical History:  COPD (chronic obstructive pulmonary disease)    Hypothyroid    HTN (hypertension)    High cholesterol    Colitis    CKD (chronic kidney disease)    No significant past surgical history        Social History:  Social History:  Non-smoker  non-alcoholic  lives with the son who takes care of her  AAO x 3 at baseline , walks with the help of a walker (02 Oct 2022 18:33)      Allergies:  No Known Allergies      Patient is a 89y old Female who presents with a chief complaint of Pneumonia, COPD exacerbation (05 Oct 2022 09:12)    Primary diagnosis of : HCAP (HEALTHCARE-ASSOCIATED PNEUMONIA)        Progress Note  This morning patient was seen and examined at bedside.        Hospital Course      Overnight events      Vital Signs in the last 24 hours   Vitals Summary T(C): 36.1 (10-05-22 @ 05:04), Max: 36.6 (10-04-22 @ 21:19)  HR: 64 (10-05-22 @ 05:04) (60 - 64)  BP: 155/67 (10-05-22 @ 05:04) (123/59 - 155/67)  RR: 18 (10-05-22 @ 05:04) (18 - 18)  SpO2: 97% (10-05-22 @ 05:04) (91% - 97%)  Vent Data   Intake/ Output   10-04-22 @ 07:01  -  10-05-22 @ 07:00  --------------------------------------------------------  IN: 800 mL / OUT: 0 mL / NET: 800 mL          Physical Exam  GEN: No acute distress  LUNGS: Clear to auscultation bilaterally   HEART: S1/S2 present. RRR.   ABD/ GI: Soft, non-tender, non-distended. Bowel sounds present  EXT: NC/NC/NE/2+PP/BOSWELL  NEURO: AAOX3        Investigations   Laboratory Workup  - CBC:                        11.8   6.33  )-----------( 173      ( 05 Oct 2022 07:56 )             36.3     - Chemistry:  10-05    141  |  99  |  74<HH>  ----------------------------<  102<H>  4.5   |  31  |  1.7<H>    Ca    8.7      05 Oct 2022 07:56  Mg     2.1     10-05    TPro  6.1  /  Alb  3.6  /  TBili  0.4  /  DBili  <0.2  /  AST  30  /  ALT  20  /  AlkPhos  88  10-05    - Coagulation Studies:  PT/INR - ( 05 Oct 2022 07:56 )   PT: 12.60 sec;   INR: 1.10 ratio           - ABG:    - Cardiac Markers:  CARDIAC MARKERS ( 03 Oct 2022 19:08 )  x     / 0.01 ng/mL / x     / x     / x            Microbiological Workup        Radiological Workup  *      Current Medications  Standing Medications  albuterol/ipratropium for Nebulization 3 milliLiter(s) Nebulizer every 6 hours  aspirin  chewable 81 milliGRAM(s) Oral daily  atorvastatin 20 milliGRAM(s) Oral at bedtime  dexAMETHasone  Injectable 6 milliGRAM(s) IV Push daily  dronedarone 400 milliGRAM(s) Oral two times a day  furosemide    Tablet 40 milliGRAM(s) Oral daily  heparin   Injectable 5000 Unit(s) SubCutaneous every 12 hours  lactated ringers. 1000 milliLiter(s) (50 mL/Hr) IV Continuous <Continuous>  levothyroxine 112 MICROGram(s) Oral daily  metoprolol succinate ER 25 milliGRAM(s) Oral daily  pantoprazole    Tablet 40 milliGRAM(s) Oral before breakfast  remdesivir  IVPB 100 milliGRAM(s) IV Intermittent every 24 hours    PRN Medications  ALBUTerol    90 MICROgram(s) HFA Inhaler 2 Puff(s) Inhalation once PRN Shortness of Breath and/or Wheezing    Singles Doses Administered  (ADM OVERRIDE) 3 each &lt;see task&gt; GiveOnce  acetaminophen     Tablet .. 975 milliGRAM(s) Oral once  cefepime   IVPB 2000 milliGRAM(s) IV Intermittent once  levoFLOXacin IVPB 750 milliGRAM(s) IV Intermittent once  magnesium sulfate  IVPB 2 Gram(s) IV Intermittent every 2 hours  magnesium sulfate  IVPB 2 Gram(s) IV Intermittent once  methylPREDNISolone sodium succinate Injectable 125 milliGRAM(s) IV Push once  remdesivir  IVPB 200 milliGRAM(s) IV Intermittent once  sodium chloride 0.9% Bolus 1400 milliLiter(s) IV Bolus once        
Over Night Events: events noted, on NC, cough, afebrile, weak    PHYSICAL EXAM    ICU Vital Signs Last 24 Hrs  T(C): 36.1 (05 Oct 2022 05:04), Max: 36.6 (04 Oct 2022 21:19)  T(F): 97 (05 Oct 2022 05:04), Max: 97.9 (04 Oct 2022 21:19)  HR: 64 (05 Oct 2022 05:04) (60 - 64)  BP: 155/67 (05 Oct 2022 05:04) (123/59 - 155/67)  RR: 18 (05 Oct 2022 05:04) (18 - 18)  SpO2: 97% (05 Oct 2022 05:04) (91% - 97%)    O2 Parameters below as of 05 Oct 2022 05:04  Patient On (Oxygen Delivery Method): nasal cannula  O2 Flow (L/min): 3          General: ill looking  Lungs: Bilateral crackles  Cardiovascular: MANUEL 2/6  Abdomen: Soft, Positive BS  Extremities: No clubbing   Neurological: Non focal       10-04-22 @ 07:01  -  10-05-22 @ 07:00  --------------------------------------------------------  IN:    Oral Fluid: 800 mL  Total IN: 800 mL    OUT:  Total OUT: 0 mL    Total NET: 800 mL          LABS:                          11.8   4.75  )-----------( 186      ( 04 Oct 2022 09:37 )             36.9                                               10-04    142  |  99  |  61<HH>  ----------------------------<  243<H>  4.8   |  27  |  1.4    Ca    8.9      04 Oct 2022 09:37  Mg     2.3     10-04    TPro  6.1  /  Alb  3.7  /  TBili  0.4  /  DBili  <0.2  /  AST  33  /  ALT  21  /  AlkPhos  88  10-04      PT/INR - ( 04 Oct 2022 09:37 )   PT: 12.60 sec;   INR: 1.10 ratio                                                    CARDIAC MARKERS ( 03 Oct 2022 19:08 )  x     / 0.01 ng/mL / x     / x     / x                                                LIVER FUNCTIONS - ( 04 Oct 2022 09:37 )  Alb: 3.7 g/dL / Pro: 6.1 g/dL / ALK PHOS: 88 U/L / ALT: 21 U/L / AST: 33 U/L / GGT: x                                                  Culture - Urine (collected 02 Oct 2022 13:50)  Source: Clean Catch Clean Catch (Midstream)  Final Report (03 Oct 2022 21:51):    <10,000 CFU/mL Normal Urogenital Consuelo    Culture - Blood (collected 02 Oct 2022 10:56)  Source: .Blood Blood-Peripheral  Preliminary Report (03 Oct 2022 17:02):    No growth to date.    Culture - Blood (collected 02 Oct 2022 10:56)  Source: .Blood Blood-Peripheral  Preliminary Report (03 Oct 2022 17:02):    No growth to date.                                                                                           MEDICATIONS  (STANDING):  albuterol/ipratropium for Nebulization 3 milliLiter(s) Nebulizer every 6 hours  aspirin  chewable 81 milliGRAM(s) Oral daily  atorvastatin 20 milliGRAM(s) Oral at bedtime  dexAMETHasone  Injectable 6 milliGRAM(s) IV Push daily  dronedarone 400 milliGRAM(s) Oral two times a day  furosemide    Tablet 40 milliGRAM(s) Oral daily  heparin   Injectable 5000 Unit(s) SubCutaneous every 12 hours  levothyroxine 112 MICROGram(s) Oral daily  metoprolol succinate ER 25 milliGRAM(s) Oral daily  pantoprazole    Tablet 40 milliGRAM(s) Oral before breakfast  remdesivir  IVPB 100 milliGRAM(s) IV Intermittent every 24 hours    MEDICATIONS  (PRN):  ALBUTerol    90 MICROgram(s) HFA Inhaler 2 Puff(s) Inhalation once PRN Shortness of Breath and/or Wheezing      CXR REVIEWED    
SUBJECTIVE:    Patient is a 89y old Female who presents with a chief complaint of Pneumonia, COPD exacerbation (03 Oct 2022 08:56)    Currently admitted to medicine with the primary diagnosis of:    Today is hospital day 1d.     Overnight Events:     no overnight events     PAST MEDICAL & SURGICAL HISTORY  COPD (chronic obstructive pulmonary disease)    Hypothyroid    HTN (hypertension)    High cholesterol    Colitis    CKD (chronic kidney disease)    No significant past surgical history      ALLERGIES:  No Known Allergies    MEDICATIONS:  STANDING MEDICATIONS  albuterol/ipratropium for Nebulization 3 milliLiter(s) Nebulizer every 6 hours  aspirin  chewable 81 milliGRAM(s) Oral daily  atorvastatin 20 milliGRAM(s) Oral at bedtime  aztreonam  IVPB 1000 milliGRAM(s) IV Intermittent every 8 hours  dexAMETHasone  Injectable 6 milliGRAM(s) IV Push daily  dronedarone 400 milliGRAM(s) Oral two times a day  furosemide    Tablet 40 milliGRAM(s) Oral daily  heparin   Injectable 5000 Unit(s) SubCutaneous every 12 hours  levothyroxine 112 MICROGram(s) Oral daily  magnesium sulfate  IVPB 2 Gram(s) IV Intermittent every 2 hours  methylPREDNISolone sodium succinate Injectable 40 milliGRAM(s) IV Push every 8 hours  metoprolol succinate ER 25 milliGRAM(s) Oral daily  pantoprazole    Tablet 40 milliGRAM(s) Oral before breakfast  remdesivir  IVPB 100 milliGRAM(s) IV Intermittent every 24 hours    PRN MEDICATIONS  ALBUTerol    90 MICROgram(s) HFA Inhaler 2 Puff(s) Inhalation once PRN    VITALS:   ICU Vital Signs Last 24 Hrs  T(C): 36.1 (03 Oct 2022 05:00), Max: 36.6 (02 Oct 2022 16:15)  T(F): 97 (03 Oct 2022 05:00), Max: 97.9 (02 Oct 2022 16:15)  HR: 74 (03 Oct 2022 05:00) (63 - 74)  BP: 165/71 (03 Oct 2022 05:00) (126/99 - 165/71)  BP(mean): --  ABP: --  ABP(mean): --  RR: 18 (03 Oct 2022 05:00) (18 - 18)  SpO2: 97% (03 Oct 2022 05:00) (97% - 100%)    O2 Parameters below as of 03 Oct 2022 05:00  Patient On (Oxygen Delivery Method): nasal cannula            LABS:                        11.3   13.21 )-----------( 158      ( 03 Oct 2022 09:13 )             34.4     10    147<H>  |  105  |  52<H>  ----------------------------<  65<L>  5.2<H>   |  25  |  1.3    Ca    9.3      03 Oct 2022 09:13  Mg     1.7     10-03    TPro  6.3  /  Alb  3.9  /  TBili  0.5  /  DBili  x   /  AST  36  /  ALT  22  /  AlkPhos  94  10    PT/INR - ( 02 Oct 2022 10:56 )   PT: 10.70 sec;   INR: 0.94 ratio         PTT - ( 02 Oct 2022 10:56 )  PTT:26.8 sec  Urinalysis Basic - ( 02 Oct 2022 13:50 )    Color: Light Yellow / Appearance: Clear / S.017 / pH: x  Gluc: x / Ketone: Negative  / Bili: Negative / Urobili: <2 mg/dL   Blood: x / Protein: 30 mg/dL / Nitrite: Negative   Leuk Esterase: Negative / RBC: 7 /HPF / WBC 1 /HPF   Sq Epi: x / Non Sq Epi: 1 /HPF / Bacteria: Negative        Troponin T, Serum: 0.02 ng/mL *H* (10-03-22 @ 09:13)  Troponin T, Serum: 0.01 ng/mL (10-03-22 @ 01:41)      CARDIAC MARKERS ( 03 Oct 2022 09:13 )  x     / 0.02 ng/mL / x     / x     / x      CARDIAC MARKERS ( 03 Oct 2022 01:41 )  x     / 0.01 ng/mL / x     / x     / x      CARDIAC MARKERS ( 02 Oct 2022 10:56 )  x     / 0.02 ng/mL / x     / x     / x            RADIOLOGY:  < from: CT Chest No Cont (10.02.22 @ 13:22) >  New predominantly right basilar patchy groundglass and consolidative   opacities which may represent pneumonia in the correct clinical setting.    Additional chronic findings as above.    < end of copied text >    PHYSICAL EXAM:  GEN: laying in bed  LUNGS: clear on 4L NC  HEART: s1 s2  ABD: nontender nondistended  EXT: no lower extremity edema   NEURO: alert responsive
SUBJECTIVE:    Patient is a 89y old Female who presents with a chief complaint of Pneumonia, COPD exacerbation (03 Oct 2022 13:33)    Currently admitted to medicine with the primary diagnosis of HCAP (healthcare-associated pneumonia)       Today is hospital day 1d.     PAST MEDICAL & SURGICAL HISTORY  COPD (chronic obstructive pulmonary disease)    Hypothyroid    HTN (hypertension)    High cholesterol    Colitis    CKD (chronic kidney disease)    No significant past surgical history      ALLERGIES:  No Known Allergies    MEDICATIONS:  STANDING MEDICATIONS  albuterol/ipratropium for Nebulization 3 milliLiter(s) Nebulizer every 6 hours  aspirin  chewable 81 milliGRAM(s) Oral daily  atorvastatin 20 milliGRAM(s) Oral at bedtime  dexAMETHasone  Injectable 6 milliGRAM(s) IV Push daily  dronedarone 400 milliGRAM(s) Oral two times a day  furosemide    Tablet 40 milliGRAM(s) Oral daily  heparin   Injectable 5000 Unit(s) SubCutaneous every 12 hours  levothyroxine 112 MICROGram(s) Oral daily  magnesium sulfate  IVPB 2 Gram(s) IV Intermittent every 2 hours  metoprolol succinate ER 25 milliGRAM(s) Oral daily  pantoprazole    Tablet 40 milliGRAM(s) Oral before breakfast  remdesivir  IVPB 100 milliGRAM(s) IV Intermittent every 24 hours    PRN MEDICATIONS  ALBUTerol    90 MICROgram(s) HFA Inhaler 2 Puff(s) Inhalation once PRN    VITALS:   T(F): 97.2  HR: 67  BP: 147/73  RR: 18  SpO2: 97%    LABS:                        11.3   13.21 )-----------( 158      ( 03 Oct 2022 09:13 )             34.4     10-03    147<H>  |  105  |  52<H>  ----------------------------<  65<L>  5.2<H>   |  25  |  1.3    Ca    9.3      03 Oct 2022 09:13  Mg     1.7     10-03    TPro  6.3  /  Alb  3.9  /  TBili  0.5  /  DBili  x   /  AST  36  /  ALT  22  /  AlkPhos  94  10-03    PT/INR - ( 02 Oct 2022 10:56 )   PT: 10.70 sec;   INR: 0.94 ratio         PTT - ( 02 Oct 2022 10:56 )  PTT:26.8 sec  Urinalysis Basic - ( 02 Oct 2022 13:50 )    Color: Light Yellow / Appearance: Clear / S.017 / pH: x  Gluc: x / Ketone: Negative  / Bili: Negative / Urobili: <2 mg/dL   Blood: x / Protein: 30 mg/dL / Nitrite: Negative   Leuk Esterase: Negative / RBC: 7 /HPF / WBC 1 /HPF   Sq Epi: x / Non Sq Epi: 1 /HPF / Bacteria: Negative        Troponin T, Serum: 0.02 ng/mL *H* (10-03-22 @ 09:13)  Troponin T, Serum: 0.01 ng/mL (10-03-22 @ 01:41)      CARDIAC MARKERS ( 03 Oct 2022 09:13 )  x     / 0.02 ng/mL / x     / x     / x      CARDIAC MARKERS ( 03 Oct 2022 01:41 )  x     / 0.01 ng/mL / x     / x     / x      CARDIAC MARKERS ( 02 Oct 2022 10:56 )  x     / 0.02 ng/mL / x     / x     / x          RADIOLOGY:    PHYSICAL EXAM:  GEN: No acute distress  LUNGS: lt side crackles   HEART: S1/S2 present. RRR.   ABD/ GI: Soft, non-tender, non-distended. Bowel sounds present  EXT: NC/NC/NE/2+PP/BOSWELL  NEURO: AAOX3    
  TANNA MCCRACKEN  89y, Female    All available historical data reviewed    OVERNIGHT EVENTS:  94%NC 2 LIT  feels well and has no new complaints  No fevers     ROS:  General: Denies rigors, nightsweats  HEENT: Denies headache, rhinorrhea, sore throat, eye pain  CV: Denies CP, palpitations  PULM: Denies wheezing, hemoptysis  GI: Denies hematemesis, hematochezia, melena  : Denies discharge, hematuria  MSK: Denies arthralgias, myalgias  SKIN: Denies rash, lesions  NEURO: Denies paresthesias, weakness  PSYCH: Denies depression, anxiety    VITALS:  T(F): 96.6, Max: 97.9 (10-04-22 @ 02:00)  HR: 63  BP: 153/70  RR: 18Vital Signs Last 24 Hrs  T(C): 35.9 (04 Oct 2022 05:00), Max: 36.6 (03 Oct 2022 21:45)  T(F): 96.6 (04 Oct 2022 05:00), Max: 97.9 (04 Oct 2022 02:00)  HR: 63 (04 Oct 2022 05:00) (63 - 73)  BP: 153/70 (04 Oct 2022 05:00) (129/58 - 153/70)  BP(mean): --  RR: 18 (04 Oct 2022 05:00) (18 - 18)  SpO2: 94% (04 Oct 2022 05:00) (94% - 99%)    Parameters below as of 04 Oct 2022 05:00  Patient On (Oxygen Delivery Method): nasal cannula        TESTS & MEASUREMENTS:                        11.3   13.21 )-----------( 158      ( 03 Oct 2022 09:13 )             34.4     10-03    x   |  x   |  x   ----------------------------<  x   x    |  x   |  1.3    Ca    9.3      03 Oct 2022 09:13  Mg     1.7     10-03    TPro  6.1  /  Alb  3.6  /  TBili  0.3  /  DBili  <0.2  /  AST  35  /  ALT  21  /  AlkPhos  90  10-03    LIVER FUNCTIONS - ( 03 Oct 2022 19:08 )  Alb: 3.6 g/dL / Pro: 6.1 g/dL / ALK PHOS: 90 U/L / ALT: 21 U/L / AST: 35 U/L / GGT: x             Culture - Urine (collected 10-02-22 @ 13:50)  Source: Clean Catch Clean Catch (Midstream)  Final Report (10-03-22 @ 21:51):    <10,000 CFU/mL Normal Urogenital Consuelo    Culture - Blood (collected 10-02-22 @ 10:56)  Source: .Blood Blood-Peripheral  Preliminary Report (10-03-22 @ 17:02):    No growth to date.    Culture - Blood (collected 10-02-22 @ 10:56)  Source: .Blood Blood-Peripheral  Preliminary Report (10-03-22 @ 17:02):    No growth to date.      Urinalysis Basic - ( 02 Oct 2022 13:50 )    Color: Light Yellow / Appearance: Clear / S.017 / pH: x  Gluc: x / Ketone: Negative  / Bili: Negative / Urobili: <2 mg/dL   Blood: x / Protein: 30 mg/dL / Nitrite: Negative   Leuk Esterase: Negative / RBC: 7 /HPF / WBC 1 /HPF   Sq Epi: x / Non Sq Epi: 1 /HPF / Bacteria: Negative          RADIOLOGY & ADDITIONAL TESTS:  Personal review of radiological diagnostics performed  Echo and EKG results noted when applicable.     MEDICATIONS:  ALBUTerol    90 MICROgram(s) HFA Inhaler 2 Puff(s) Inhalation once PRN  albuterol/ipratropium for Nebulization 3 milliLiter(s) Nebulizer every 6 hours  aspirin  chewable 81 milliGRAM(s) Oral daily  atorvastatin 20 milliGRAM(s) Oral at bedtime  dexAMETHasone  Injectable 6 milliGRAM(s) IV Push daily  dronedarone 400 milliGRAM(s) Oral two times a day  furosemide    Tablet 40 milliGRAM(s) Oral daily  heparin   Injectable 5000 Unit(s) SubCutaneous every 12 hours  levothyroxine 112 MICROGram(s) Oral daily  metoprolol succinate ER 25 milliGRAM(s) Oral daily  pantoprazole    Tablet 40 milliGRAM(s) Oral before breakfast  remdesivir  IVPB 100 milliGRAM(s) IV Intermittent every 24 hours      ANTIBIOTICS:  remdesivir  IVPB 100 milliGRAM(s) IV Intermittent every 24 hours    
Progress Note:  Provider Speciality                            Hospitalist      TANNA MCCRACKEN [id number] 89y Female     CHIEF PRESENTING COMPLAINT:  Patient is a 89y old  Female who presents with a chief complaint of Pneumonia, COPD exacerbation (05 Oct 2022 14:04)        SUBJECTIVE:  Patient was seen and examined at bedside. Reports improvement in  presenting complaint.   No significant overnight events reported.     HISTORY OF PRESENTING ILLNESS:  HPI:  89 y/o female with female with a PMH of Atrial fibrillation not on AC due to recurrent falls, Hfpef, HTN, HLD, hypothyroidism, and CKD, COPD, on 3 1/2-4L followed by pulmonolgist Dr. Jose,  presents to the ED for evaluation of fever, and cough x 5 days that was unresponsive to PO prednisone and doxycycline. The patient had increased dyspnea, sputum production for the past 5 days. Important to note patient fell and broke her ribs 09/25 as per patient she did not loose consciousness and it was a purely mechanical fall. Patient has been using her incentive spirometer at home. Patient's son reports that her symptoms were not improving. pt denies chest pain, abdominal pain, n/v/d/c, urinary symptoms, leg pain, leg swelling, recent travel, recent surgeries, hx of cancer.    IN the ED patient was noted to have a new pulmonary opacity, covid PCR +ve, so she was treated for a pneumonia with Levofloxacin and cefepime (02 Oct 2022 18:33)        REVIEW OF SYSTEMS:  Patient denies any headache, any vision complaints, runny nose, fever, chills. Denies chest pain, shortness of breath, palpitation. Denies nausea, vomiting, abdominal pain or diarrhoea, Denies dysuria. Denies  localized weakness in any part of the body or numbness.   At least 10 systems were reviewed in ROS. All systems reviewed  are within normal limits except for the complaints as described in Subjective.    PAST MEDICAL & SURGICAL HISTORY:  PAST MEDICAL & SURGICAL HISTORY:  COPD (chronic obstructive pulmonary disease)      Hypothyroid      HTN (hypertension)      High cholesterol      Colitis      CKD (chronic kidney disease)      No significant past surgical history              VITAL SIGNS:  Vital Signs Last 24 Hrs  T(C): 36.8 (05 Oct 2022 13:00), Max: 36.8 (05 Oct 2022 13:00)  T(F): 98.3 (05 Oct 2022 13:00), Max: 98.3 (05 Oct 2022 13:00)  HR: 65 (05 Oct 2022 13:00) (60 - 65)  BP: 138/63 (05 Oct 2022 13:00) (123/59 - 155/67)  BP(mean): --  RR: 18 (05 Oct 2022 13:00) (18 - 18)  SpO2: 95% (05 Oct 2022 13:00) (91% - 97%)    Parameters below as of 05 Oct 2022 13:00  Patient On (Oxygen Delivery Method): nasal cannula              PHYSICAL EXAMINATION:  Not in acute distress  General: No icterus  HEENT:   no JVD.  Heart: S1+S2 audible  Lungs: bilateral  moderate air entry, no wheezing, no crepitations.  Abdomen: Soft, non-tender, non-distended , no  rigidity or guarding.  CNS: Awake alert, CN  grossly intact.  Extremities:  No edema            CONSULTS:  Consultant(s) Notes Reviewed by me.   Care Discussed with Consultants/Other Providers where required.        MEDICATIONS:  MEDICATIONS  (STANDING):  albuterol/ipratropium for Nebulization 3 milliLiter(s) Nebulizer every 6 hours  aspirin  chewable 81 milliGRAM(s) Oral daily  atorvastatin 20 milliGRAM(s) Oral at bedtime  dexAMETHasone  Injectable 6 milliGRAM(s) IV Push daily  dronedarone 400 milliGRAM(s) Oral two times a day  heparin   Injectable 5000 Unit(s) SubCutaneous every 12 hours  lactated ringers. 1000 milliLiter(s) (50 mL/Hr) IV Continuous <Continuous>  levothyroxine 112 MICROGram(s) Oral daily  metoprolol succinate ER 25 milliGRAM(s) Oral daily  pantoprazole    Tablet 40 milliGRAM(s) Oral before breakfast  remdesivir  IVPB 100 milliGRAM(s) IV Intermittent every 24 hours    MEDICATIONS  (PRN):  ALBUTerol    90 MICROgram(s) HFA Inhaler 2 Puff(s) Inhalation once PRN Shortness of Breath and/or Wheezing            ASSESSMENT:    89 y/o female with female with a PMH of Atrial fibrillation not on AC due to recurrent falls, Hfpef, HTN, HLD, hypothyroidism, and CKD, COPD, on 3 1/2-4L followed by pulmonolgist Dr. Jose,  presents to the ED for evaluation of fever, and cough x 5 days      COPD exacerbation   COVID 19 PNA  Mild MAURI  Chronic respiratory failure due to COPD, on home oxygen  Chronic afib not on AC due to recurrent falls  Chronic HfpEF  Hypothyroidism    COPD exacerbation improving  currently at baseline 3L NC  Remdesivir X 3 days  Off abx--procal 0.09  On Dexa 6 mg x 10 days  MAURI- hold laisx. gentle IVF for 10 hrs  C/W levothyroxine and atorvastatin       Handoff: Anticipated discharge for tomorrow  Total time spent to complete patient's bedside assessment, physical examination, review medical chart including labs & imaging, discuss medical plan of care with housestaff was more than 35 minutes        
SUBJECTIVE:    Patient is a 89y old Female who presents with a chief complaint of Pneumonia, COPD exacerbation (04 Oct 2022 08:42)    Currently admitted to medicine with the primary diagnosis of HCAP (healthcare-associated pneumonia)       Today is hospital day 2d.     PAST MEDICAL & SURGICAL HISTORY  COPD (chronic obstructive pulmonary disease)    Hypothyroid    HTN (hypertension)    High cholesterol    Colitis    CKD (chronic kidney disease)    No significant past surgical history      ALLERGIES:  No Known Allergies    MEDICATIONS:  STANDING MEDICATIONS  albuterol/ipratropium for Nebulization 3 milliLiter(s) Nebulizer every 6 hours  aspirin  chewable 81 milliGRAM(s) Oral daily  atorvastatin 20 milliGRAM(s) Oral at bedtime  dexAMETHasone  Injectable 6 milliGRAM(s) IV Push daily  dronedarone 400 milliGRAM(s) Oral two times a day  furosemide    Tablet 40 milliGRAM(s) Oral daily  heparin   Injectable 5000 Unit(s) SubCutaneous every 12 hours  levothyroxine 112 MICROGram(s) Oral daily  metoprolol succinate ER 25 milliGRAM(s) Oral daily  pantoprazole    Tablet 40 milliGRAM(s) Oral before breakfast  remdesivir  IVPB 100 milliGRAM(s) IV Intermittent every 24 hours    PRN MEDICATIONS  ALBUTerol    90 MICROgram(s) HFA Inhaler 2 Puff(s) Inhalation once PRN    VITALS:   T(F): 96.6  HR: 63  BP: 153/70  RR: 18  SpO2: 94%    LABS:                        11.8   4.75  )-----------( 186      ( 04 Oct 2022 09:37 )             36.9     10-04    142  |  99  |  61<HH>  ----------------------------<  243<H>  4.8   |  27  |  1.4    Ca    8.9      04 Oct 2022 09:37  Mg     2.3     10-04    TPro  6.1  /  Alb  3.7  /  TBili  0.4  /  DBili  <0.2  /  AST  33  /  ALT  21  /  AlkPhos  88  10-04    PT/INR - ( 04 Oct 2022 09:37 )   PT: 12.60 sec;   INR: 1.10 ratio           Urinalysis Basic - ( 02 Oct 2022 13:50 )    Color: Light Yellow / Appearance: Clear / S.017 / pH: x  Gluc: x / Ketone: Negative  / Bili: Negative / Urobili: <2 mg/dL   Blood: x / Protein: 30 mg/dL / Nitrite: Negative   Leuk Esterase: Negative / RBC: 7 /HPF / WBC 1 /HPF   Sq Epi: x / Non Sq Epi: 1 /HPF / Bacteria: Negative        Troponin T, Serum: 0.01 ng/mL (10-03-22 @ 19:08)      Culture - Urine (collected 02 Oct 2022 13:50)  Source: Clean Catch Clean Catch (Midstream)  Final Report (03 Oct 2022 21:51):    <10,000 CFU/mL Normal Urogenital Consuelo    Culture - Blood (collected 02 Oct 2022 10:56)  Source: .Blood Blood-Peripheral  Preliminary Report (03 Oct 2022 17:02):    No growth to date.    Culture - Blood (collected 02 Oct 2022 10:56)  Source: .Blood Blood-Peripheral  Preliminary Report (03 Oct 2022 17:02):    No growth to date.      CARDIAC MARKERS ( 03 Oct 2022 19:08 )  x     / 0.01 ng/mL / x     / x     / x      CARDIAC MARKERS ( 03 Oct 2022 09:13 )  x     / 0.02 ng/mL / x     / x     / x      CARDIAC MARKERS ( 03 Oct 2022 01:41 )  x     / 0.01 ng/mL / x     / x     / x          RADIOLOGY:    PHYSICAL EXAM:  GEN: No acute distress  LUNGS: Clear to auscultation bilaterally   HEART: S1/S2 present. RRR.   ABD/ GI: Soft, non-tender, non-distended. Bowel sounds present  EXT: NC/NC/NE/2+PP/BOSWELL  NEURO: AAOX3    
SUBJECTIVE:    Patient is a 89y old Female who presents with a chief complaint of Pneumonia, COPD exacerbation (04 Oct 2022 08:42)    Currently admitted to medicine with the primary diagnosis of:    Today is hospital day 2d.     Overnight Events:     no overnight events    PAST MEDICAL & SURGICAL HISTORY  COPD (chronic obstructive pulmonary disease)    Hypothyroid    HTN (hypertension)    High cholesterol    Colitis    CKD (chronic kidney disease)    No significant past surgical history      ALLERGIES:  No Known Allergies    MEDICATIONS:  STANDING MEDICATIONS  albuterol/ipratropium for Nebulization 3 milliLiter(s) Nebulizer every 6 hours  aspirin  chewable 81 milliGRAM(s) Oral daily  atorvastatin 20 milliGRAM(s) Oral at bedtime  dexAMETHasone  Injectable 6 milliGRAM(s) IV Push daily  dronedarone 400 milliGRAM(s) Oral two times a day  furosemide    Tablet 40 milliGRAM(s) Oral daily  heparin   Injectable 5000 Unit(s) SubCutaneous every 12 hours  levothyroxine 112 MICROGram(s) Oral daily  metoprolol succinate ER 25 milliGRAM(s) Oral daily  pantoprazole    Tablet 40 milliGRAM(s) Oral before breakfast  remdesivir  IVPB 100 milliGRAM(s) IV Intermittent every 24 hours    PRN MEDICATIONS  ALBUTerol    90 MICROgram(s) HFA Inhaler 2 Puff(s) Inhalation once PRN    VITALS:   ICU Vital Signs Last 24 Hrs  T(C): 35.9 (04 Oct 2022 05:00), Max: 36.6 (03 Oct 2022 21:45)  T(F): 96.6 (04 Oct 2022 05:00), Max: 97.9 (04 Oct 2022 02:00)  HR: 63 (04 Oct 2022 05:00) (63 - 73)  BP: 153/70 (04 Oct 2022 05:00) (129/58 - 153/70)  BP(mean): --  ABP: --  ABP(mean): --  RR: 18 (04 Oct 2022 05:00) (18 - 18)  SpO2: 94% (04 Oct 2022 05:00) (94% - 99%)    O2 Parameters below as of 04 Oct 2022 05:00  Patient On (Oxygen Delivery Method): nasal cannula            LABS:                        11.3   13.21 )-----------( 158      ( 03 Oct 2022 09:13 )             34.4     10-03    x   |  x   |  x   ----------------------------<  x   x    |  x   |  1.3    Ca    9.3      03 Oct 2022 09:13  Mg     1.7     10-03    TPro  6.1  /  Alb  3.6  /  TBili  0.3  /  DBili  <0.2  /  AST  35  /  ALT  21  /  AlkPhos  90  10-03    PT/INR - ( 02 Oct 2022 10:56 )   PT: 10.70 sec;   INR: 0.94 ratio         PTT - ( 02 Oct 2022 10:56 )  PTT:26.8 sec  Urinalysis Basic - ( 02 Oct 2022 13:50 )    Color: Light Yellow / Appearance: Clear / S.017 / pH: x  Gluc: x / Ketone: Negative  / Bili: Negative / Urobili: <2 mg/dL   Blood: x / Protein: 30 mg/dL / Nitrite: Negative   Leuk Esterase: Negative / RBC: 7 /HPF / WBC 1 /HPF   Sq Epi: x / Non Sq Epi: 1 /HPF / Bacteria: Negative        Troponin T, Serum: 0.01 ng/mL (10-03-22 @ 19:08)      Culture - Urine (collected 02 Oct 2022 13:50)  Source: Clean Catch Clean Catch (Midstream)  Final Report (03 Oct 2022 21:51):    <10,000 CFU/mL Normal Urogenital Consuelo    Culture - Blood (collected 02 Oct 2022 10:56)  Source: .Blood Blood-Peripheral  Preliminary Report (03 Oct 2022 17:02):    No growth to date.    Culture - Blood (collected 02 Oct 2022 10:56)  Source: .Blood Blood-Peripheral  Preliminary Report (03 Oct 2022 17:02):    No growth to date.      CARDIAC MARKERS ( 03 Oct 2022 19:08 )  x     / 0.01 ng/mL / x     / x     / x      CARDIAC MARKERS ( 03 Oct 2022 09:13 )  x     / 0.02 ng/mL / x     / x     / x      CARDIAC MARKERS ( 03 Oct 2022 01:41 )  x     / 0.01 ng/mL / x     / x     / x      CARDIAC MARKERS ( 02 Oct 2022 10:56 )  x     / 0.02 ng/mL / x     / x     / x            RADIOLOGY:  < from: CT Chest No Cont (10.02.22 @ 13:22) >    New predominantly right basilar patchy groundglass and consolidative   opacities which may represent pneumonia in the correct clinical setting.    Additional chronic findings as above.    < end of copied text >    PHYSICAL EXAM:  GEN: laying in bed  LUNGS: nonlabored breathing   HEART: s1 s2  ABD: nontender nondistended   EXT: no lower extremity edema  NEURO: lethargic

## 2022-10-05 NOTE — PROGRESS NOTE ADULT - REASON FOR ADMISSION
Pneumonia, COPD exacerbation

## 2022-10-05 NOTE — DISCHARGE NOTE NURSING/CASE MANAGEMENT/SOCIAL WORK - NSDCPEFALRISK_GEN_ALL_CORE
For information on Fall & Injury Prevention, visit: [email or web address] OR  [email or web address] OR  https://www.cdc.gov/steadi/patient.html

## 2022-10-05 NOTE — PROGRESS NOTE ADULT - ASSESSMENT
Impression:    COPD exacerbation improving  COVID 19  Severe COPD on 3-4L NC at home   Chronic afib not on AC due to recurrent falls  Hx of HfpEF    Plan:    -currently at baseline 3L NC  -Remdesivir as per ID  -solumedrol 40 q 12  -procal  -DVT ppx  -Trend inflammatory markers  -michaelle   [facility]  dc planning

## 2022-10-05 NOTE — PROGRESS NOTE ADULT - PROVIDER SPECIALTY LIST ADULT
Hospitalist
Internal Medicine
Internal Medicine
Pulmonology
Internal Medicine
Infectious Disease

## 2022-10-05 NOTE — DISCHARGE NOTE NURSING/CASE MANAGEMENT/SOCIAL WORK - PATIENT PORTAL LINK FT
You can access the FollowMyHealth Patient Portal offered by [facility] by registering at the following website: [email or web address]. By joining [employer]’s FollowMyHealth portal, you will also be able to view your health information using other applications (apps) compatible with our system.

## 2022-10-05 NOTE — PROGRESS NOTE ADULT - ASSESSMENT
89 y/o female with female with a PMH of Atrial fibrillation not on AC due to recurrent falls, Hfpef, HTN, HLD, hypothyroidism, and CKD, COPD, on 3 1/2-4L followed by pulmonolgist Dr. Jose,  presents to the ED for evaluation of fever, and cough x 5 days that was unresponsive to PO prednisone and doxycycline. The patient had increased dyspnea, sputum production for the past 5 days. Important to note patient fell and broke her ribs 09/25 as per patient she did not loose consciousness and it was a purely mechanical fall. Patient has been using her incentive spirometer at home. Patient's son reports that her symptoms were not improving. pt denies chest pain, abdominal pain, n/v/d/c, urinary symptoms, leg pain, leg swelling, recent travel, recent surgeries, hx of cancer.      #Pneumonia  #Covid-19  -vaccinated with one booster   - s/p Day 1 : Single  mg IV loading dose  -Day 2-5 (today day 3) : single  mg IV once daily maintenance dose  -Dexamethasone 6 mg iv q24h for 10 days.  -consider tapering steroids brendan if stable   - Legionella/strep urine antigen   - Nebs prn sob/wheezing  - Incentive spirometry  - Sputum culture  - Blood cultures  - Chest CT as noted above   - EKG NSR  -crp 104.9  -procal 0.09  -f/u ferritin    -f/u duplex    #COPD  - CXR/CT chest: as above   - Pulse ox q8 hrs  - Nebs q 6 hrs PRN  - Solumedrol 125mg IV then 40 q 6 with plan for taper  - Supplemental Oxygen PRN, titrate PRN to wean patient to baseline O2 status. COPD patient will keep SPO2 goal 88-92%   - On home oxygen 3-4 L    #A-fib  - Currently in NSR  - ON metoprolol and Multaq>>continue   - off AC due to recurrent falls    #HFpef  - Not in exacerbation  - C/W lasix 40 mg PO QD    #DLD  #Hypothyroidism  - C/W levothyroxine and atorvastatin     #reccurent falls   - Mechanical   - PT eval   - Fall precautions     #Troponemia  - Likely due to ckd3   - ekg without ischemic changes    DVT PPX: Heparin   Diet: DASH  Dispo: acute  Code status: DNR/DNI as per son Andrzej   89 y/o female with female with a PMH of Atrial fibrillation not on AC due to recurrent falls, Hfpef, HTN, HLD, hypothyroidism, and CKD, COPD, on 3 1/2-4L followed by pulmonolgist Dr. Jose,  presents to the ED for evaluation of fever, and cough x 5 days that was unresponsive to PO prednisone and doxycycline. The patient had increased dyspnea, sputum production for the past 5 days. Important to note patient fell and broke her ribs 09/25 as per patient she did not loose consciousness and it was a purely mechanical fall. Patient has been using her incentive spirometer at home. Patient's son reports that her symptoms were not improving. pt denies chest pain, abdominal pain, n/v/d/c, urinary symptoms, leg pain, leg swelling, recent travel, recent surgeries, hx of cancer.      #Pneumonia  #Covid-19  -vaccinated with one booster   - s/p Day 1 : Single  mg IV loading dose  -Day 2-5 (today day 4) : single  mg IV once daily maintenance dose  -Dexamethasone 6 mg iv q24h for 10 days.   - Legionella/strep urine antigen   - Nebs prn sob/wheezing  - Incentive spirometry  - Sputum culture  - Blood cultures  - Chest CT as noted above   - EKG NSR  -crp 104.9  -procal 0.09  -ferritin 254    -duplex neg    #COPD  - CXR/CT chest: as above   - Pulse ox q8 hrs  - Nebs q 6 hrs PRN  - Solumedrol 125mg IV then 40 q 6 with plan for taper  - Supplemental Oxygen PRN, titrate PRN to wean patient to baseline O2 status. COPD patient will keep SPO2 goal 88-92%   - On home oxygen 3-4 L    #A-fib  - Currently in NSR  - ON metoprolol and Multaq>>continue   - off AC due to recurrent falls    #HFpef  - Not in exacerbation  - C/W lasix 40 mg PO QD    #DLD  #Hypothyroidism  - C/W levothyroxine and atorvastatin     #reccurent falls   - Mechanical   - PT eval   - Fall precautions     #Troponemia  - Likely due to ckd3   - ekg without ischemic changes    DVT PPX: Heparin   Diet: DASH  Dispo: Dc tomorrow s/p final RDV dose  Code status: DNR/DNI as per son Andrzej

## 2022-10-05 NOTE — DISCHARGE NOTE NURSING/CASE MANAGEMENT/SOCIAL WORK - NSDCVIVACCINE_GEN_ALL_CORE_FT
Tdap; 15-Aug-2022 16:59; Sayra Irving (RN); [employer]; [id number] (Exp. Date: 22-Sep-2024); IntraMuscular; Deltoid Left.; 0.5 milliLiter(s); VIS (VIS Published: 09-May-2013, VIS Presented: 15-Aug-2022);

## 2022-10-06 NOTE — DISCHARGE NOTE PROVIDER - NSDCMRMEDTOKEN_GEN_ALL_CORE_FT
albuterol 2.5 mg/3 mL (0.083%) inhalation solution: 3 milliliter(s) inhaled every 6 hours, As Needed for wheezing, shortness of breath  aspirin 81 mg oral tablet: 1 tab(s) orally once a day  ATORVASTATIN 20MG TABLETS: TAKE 1 TABLET BY MOUTH EVERY DAY  furosemide 40 mg oral tablet: 1 tab(s) orally once a day  levothyroxine 112 mcg (0.112 mg) oral tablet: 1 tab(s) orally once a day, take one hour before breakfast (on an empty stomach)  melatonin 5 mg oral tablet: 1 tab(s) orally once a day (at bedtime), As Needed, to help you sleep at night  metoprolol succinate 25 mg oral tablet, extended release: 1 tab(s) orally once a day  Multaq 400 mg oral tablet: 1 tab(s) orally 2 times a day  pantoprazole 40 mg oral delayed release tablet: 1 tab(s) orally once a day (before a meal); take 30 minutes before breakfast  TRELEGY ELLIPTA 100-62.5MCG INH 30P: INHALE 1 PUFF BY MOUTH DAILY   albuterol 2.5 mg/3 mL (0.083%) inhalation solution: 3 milliliter(s) inhaled every 6 hours, As Needed for wheezing, shortness of breath  aspirin 81 mg oral tablet: 1 tab(s) orally once a day  ATORVASTATIN 20MG TABLETS: each orally once a day  furosemide 40 mg oral tablet: 1 tab(s) orally once a day  levothyroxine 112 mcg (0.112 mg) oral tablet: 1 tab(s) orally once a day, take one hour before breakfast (on an empty stomach)  melatonin 5 mg oral tablet: 1 tab(s) orally once a day (at bedtime), As Needed, to help you sleep at night  metoprolol succinate 25 mg oral tablet, extended release: 1 tab(s) orally once a day  Multaq 400 mg oral tablet: 1 tab(s) orally 2 times a day  pantoprazole 40 mg oral delayed release tablet: 1 tab(s) orally once a day (before a meal); take 30 minutes before breakfast  predniSONE 10 mg oral tablet: 3 tab(s) orally once a day   TRELEGY ELLIPTA 100-62.5MCG INH 30P: INHALE 1 PUFF BY MOUTH DAILY   albuterol 2.5 mg/3 mL (0.083%) inhalation solution: 3 milliliter(s) inhaled every 6 hours, As Needed for wheezing, shortness of breath  aspirin 81 mg oral tablet: 1 tab(s) orally once a day  ATORVASTATIN 20MG TABLETS: each orally once a day  Lasix 20 mg oral tablet: 1 tab(s) orally once a day  levothyroxine 112 mcg (0.112 mg) oral tablet: 1 tab(s) orally once a day, take one hour before breakfast (on an empty stomach)  melatonin 5 mg oral tablet: 1 tab(s) orally once a day (at bedtime), As Needed, to help you sleep at night  metoprolol succinate 25 mg oral tablet, extended release: 1 tab(s) orally once a day  Multaq 400 mg oral tablet: 1 tab(s) orally 2 times a day  pantoprazole 40 mg oral delayed release tablet: 1 tab(s) orally once a day (before a meal); take 30 minutes before breakfast  predniSONE 10 mg oral tablet: 3 tab(s) orally once a day   TRELEGY ELLIPTA 100-62.5MCG INH 30P: INHALE 1 PUFF BY MOUTH DAILY

## 2022-10-06 NOTE — DISCHARGE NOTE PROVIDER - NSDCFUSCHEDAPPT_GEN_ALL_CORE_FT
Jose Ramon Jose  [facility]  PULMMED [address] [place] [address] Vincent  Scheduled Appointment: 11/28/2022

## 2022-10-06 NOTE — DISCHARGE NOTE PROVIDER - PROVIDER TOKENS
PROVIDER:[TOKEN:[[id number]],FOLLOWUP:[1 week]],PROVIDER:[TOKEN:[[id number]],FOLLOWUP:[1 week]]

## 2022-10-06 NOTE — DISCHARGE NOTE PROVIDER - HOSPITAL COURSE
89 y/o female with female with a PMH of Atrial fibrillation not on AC due to recurrent falls, Hfpef, HTN, HLD, hypothyroidism, and CKD, COPD, on 3 1/2-4L followed by pulmonolgist Dr. Jose,  presents to the ED for evaluation of fever, and cough x 5 days that was unresponsive to PO prednisone and doxycycline. The patient had increased dyspnea, sputum production for 5 days prior to admission. Important to note patient fell and broke her ribs 09/25 as per patient she did not loose consciousness and it was a purely mechanical fall. Patient has been using her incentive spirometer at home. Patient's son reports that her symptoms were not improving. pt denies chest pain, abdominal pain, n/v/d/c, urinary symptoms, leg pain, leg swelling, recent travel, recent surgeries, hx of cancer.    Patient found to be covid positive, patient is vaccinated and received one booster and copd exacerbation. CT correlating with pna. Patient completed course of RDV. Day 1 : Single  mg IV loading dose. Day 2-5: single  mg IV once daily maintenance dose. Patient started a 10 day course of dexamethasone 6 mg iv daily, on discharge will complete remaining days with prednisone. Nebs given prn sob/wheezing.      labs showed Troponemia, Likely due to ckd3, ekg without ischemic changes. 89 y/o female with female with a PMH of Atrial fibrillation not on AC due to recurrent falls, Hfpef, HTN, HLD, hypothyroidism, and CKD, COPD, on 3 1/2-4L followed by pulmonolgist Dr. Jose,  presents to the ED for evaluation of fever, and cough x 5 days that was unresponsive to PO prednisone and doxycycline. The patient had increased dyspnea, sputum production for 5 days prior to admission. Important to note patient fell and broke her ribs 09/25 as per patient she did not loose consciousness and it was a purely mechanical fall. Patient has been using her incentive spirometer at home. Patient's son reports that her symptoms were not improving. pt denies chest pain, abdominal pain, n/v/d/c, urinary symptoms, leg pain, leg swelling, recent travel, recent surgeries, hx of cancer.    Patient found to be covid positive, patient is vaccinated and received one booster and copd exacerbation. CT correlating with pna.   Labs showed Troponemia, Likely due to ckd3, ekg without ischemic changes. Patient completed course of RDV. Day 1 : Single  mg IV loading dose. Day 2-5: single  mg IV once daily maintenance dose. Patient started a 10 day course of dexamethasone 6 mg iv daily, on discharge will complete remaining days with prednisone. Nebs given prn sob/wheezing.  Attending Attestation:  Patient was seen & examined independently. At least 10 systems were reviewed in ROS. All systems reviewed  are within normal limits. Latest vital signs and labs were reviewed today. Case was discussed with house staff in morning rounds for assessment and plan.  Patient is medically stable for discharge . About 36 mins spent on discharge disposition.      89 y/o female with female with a PMH of Atrial fibrillation not on AC due to recurrent falls, Hfpef, HTN, HLD, hypothyroidism, and CKD, COPD, on 3 1/2-4L followed by pulmonolgist Dr. Jose,  presents to the ED for evaluation of fever, and cough x 5 days that was unresponsive to PO prednisone and doxycycline. The patient had increased dyspnea, sputum production for 5 days prior to admission. Important to note patient fell and broke her ribs 09/25 as per patient she did not loose consciousness and it was a purely mechanical fall. Patient has been using her incentive spirometer at home. Patient's son reports that her symptoms were not improving. pt denies chest pain, abdominal pain, n/v/d/c, urinary symptoms, leg pain, leg swelling, recent travel, recent surgeries, hx of cancer.    Patient found to be covid positive, patient is vaccinated and received one booster and copd exacerbation. CT correlating with pna.   Labs showed Troponemia, Likely due to ckd3, ekg without ischemic changes. Patient completed course of RDV. Day 1 : Single  mg IV loading dose. Day 2-5: single  mg IV once daily maintenance dose. Patient started a 10 day course of dexamethasone 6 mg iv daily, on discharge will complete remaining days with prednisone. Nebs given prn sob/wheezing.    Patient had MAURI which resolved, please f/u with pcp for repeat cmp. lasix was lowered to 20 on dc due to mauri.     Attending Attestation:  Patient was seen & examined independently. At least 10 systems were reviewed in ROS. All systems reviewed  are within normal limits. Latest vital signs and labs were reviewed today. Case was discussed with house staff in morning rounds for assessment and plan.  Patient is medically stable for discharge . About 36 mins spent on discharge disposition.

## 2022-10-06 NOTE — DISCHARGE NOTE PROVIDER - NSDCCPCAREPLAN_GEN_ALL_CORE_FT
PRINCIPAL DISCHARGE DIAGNOSIS  Diagnosis: HCAP (healthcare-associated pneumonia)  Assessment and Plan of Treatment: you where treated for covid. you where diven rdv and steroids. you completed your course of rdv in hospital. please continue to completer your steroid course upon discharge and follow up with pulmanry and your primary MD as an outpatinet.

## 2022-10-06 NOTE — DISCHARGE NOTE PROVIDER - CARE PROVIDER_API CALL
Jose Ramon Jose)  Critical Care Medicine; Internal Medicine; Pulmonary Disease; Sleep Medicine  [address] [place] [address]  Phone: [phone]  Fax: [phone]  Follow Up Time: 1 week    Chan, Yeoman K (MD)  Family Medicine  [address] [place] [address], [address]  [place] [address]  Phone: [phone]  Fax: [phone]  Follow Up Time: 1 week

## 2023-01-01 ENCOUNTER — APPOINTMENT (OUTPATIENT)
Age: 88
End: 2023-01-01
Payer: MEDICARE

## 2023-01-01 ENCOUNTER — APPOINTMENT (OUTPATIENT)
Dept: CARE COORDINATION | Facility: HOME HEALTH | Age: 88
End: 2023-01-01
Payer: MEDICARE

## 2023-01-01 ENCOUNTER — TRANSCRIPTION ENCOUNTER (OUTPATIENT)
Age: 88
End: 2023-01-01

## 2023-01-01 ENCOUNTER — APPOINTMENT (OUTPATIENT)
Dept: PULMONOLOGY | Facility: CLINIC | Age: 88
End: 2023-01-01

## 2023-01-01 ENCOUNTER — EMERGENCY (EMERGENCY)
Facility: HOSPITAL | Age: 88
LOS: 0 days | Discharge: HOME | End: 2023-04-14
Attending: EMERGENCY MEDICINE
Payer: MEDICARE

## 2023-01-01 ENCOUNTER — INPATIENT (INPATIENT)
Facility: HOSPITAL | Age: 88
LOS: 16 days | DRG: 189 | End: 2023-05-13
Attending: INTERNAL MEDICINE | Admitting: INTERNAL MEDICINE
Payer: MEDICARE

## 2023-01-01 ENCOUNTER — INPATIENT (INPATIENT)
Facility: HOSPITAL | Age: 88
LOS: 11 days | Discharge: HOME | DRG: 189 | End: 2023-03-15
Attending: HOSPITALIST | Admitting: HOSPITALIST
Payer: MEDICARE

## 2023-01-01 ENCOUNTER — APPOINTMENT (OUTPATIENT)
Dept: PULMONOLOGY | Facility: CLINIC | Age: 88
End: 2023-01-01
Payer: MEDICARE

## 2023-01-01 VITALS
SYSTOLIC BLOOD PRESSURE: 124 MMHG | HEART RATE: 96 BPM | RESPIRATION RATE: 15 BRPM | WEIGHT: 109 LBS | HEIGHT: 59 IN | BODY MASS INDEX: 21.97 KG/M2 | DIASTOLIC BLOOD PRESSURE: 82 MMHG

## 2023-01-01 VITALS
RESPIRATION RATE: 20 BRPM | OXYGEN SATURATION: 95 % | HEIGHT: 57 IN | SYSTOLIC BLOOD PRESSURE: 135 MMHG | TEMPERATURE: 98 F | HEART RATE: 79 BPM | DIASTOLIC BLOOD PRESSURE: 60 MMHG

## 2023-01-01 VITALS
HEIGHT: 59 IN | DIASTOLIC BLOOD PRESSURE: 50 MMHG | WEIGHT: 103 LBS | RESPIRATION RATE: 14 BRPM | SYSTOLIC BLOOD PRESSURE: 106 MMHG | BODY MASS INDEX: 20.76 KG/M2

## 2023-01-01 VITALS
WEIGHT: 160.06 LBS | HEART RATE: 67 BPM | TEMPERATURE: 98 F | DIASTOLIC BLOOD PRESSURE: 53 MMHG | HEIGHT: 57 IN | SYSTOLIC BLOOD PRESSURE: 118 MMHG | OXYGEN SATURATION: 97 % | RESPIRATION RATE: 22 BRPM

## 2023-01-01 VITALS
TEMPERATURE: 98 F | RESPIRATION RATE: 20 BRPM | SYSTOLIC BLOOD PRESSURE: 174 MMHG | HEART RATE: 79 BPM | WEIGHT: 100.09 LBS | DIASTOLIC BLOOD PRESSURE: 78 MMHG | OXYGEN SATURATION: 94 %

## 2023-01-01 VITALS
HEART RATE: 72 BPM | SYSTOLIC BLOOD PRESSURE: 100 MMHG | RESPIRATION RATE: 15 BRPM | OXYGEN SATURATION: 96 % | DIASTOLIC BLOOD PRESSURE: 56 MMHG

## 2023-01-01 VITALS
TEMPERATURE: 96 F | SYSTOLIC BLOOD PRESSURE: 96 MMHG | HEART RATE: 94 BPM | RESPIRATION RATE: 18 BRPM | DIASTOLIC BLOOD PRESSURE: 50 MMHG

## 2023-01-01 VITALS
OXYGEN SATURATION: 96 % | HEART RATE: 83 BPM | DIASTOLIC BLOOD PRESSURE: 54 MMHG | RESPIRATION RATE: 18 BRPM | TEMPERATURE: 96 F | SYSTOLIC BLOOD PRESSURE: 100 MMHG

## 2023-01-01 VITALS
HEART RATE: 78 BPM | OXYGEN SATURATION: 95 % | DIASTOLIC BLOOD PRESSURE: 56 MMHG | SYSTOLIC BLOOD PRESSURE: 118 MMHG | RESPIRATION RATE: 17 BRPM

## 2023-01-01 VITALS
RESPIRATION RATE: 15 BRPM | SYSTOLIC BLOOD PRESSURE: 88 MMHG | HEART RATE: 72 BPM | OXYGEN SATURATION: 94 % | DIASTOLIC BLOOD PRESSURE: 42 MMHG

## 2023-01-01 VITALS
BODY MASS INDEX: 21.57 KG/M2 | DIASTOLIC BLOOD PRESSURE: 54 MMHG | HEIGHT: 59 IN | SYSTOLIC BLOOD PRESSURE: 112 MMHG | RESPIRATION RATE: 12 BRPM | WEIGHT: 107 LBS

## 2023-01-01 DIAGNOSIS — I50.9 HEART FAILURE, UNSPECIFIED: ICD-10-CM

## 2023-01-01 DIAGNOSIS — Z99.81 DEPENDENCE ON SUPPLEMENTAL OXYGEN: ICD-10-CM

## 2023-01-01 DIAGNOSIS — U07.1 COVID-19: ICD-10-CM

## 2023-01-01 DIAGNOSIS — I48.0 PAROXYSMAL ATRIAL FIBRILLATION: ICD-10-CM

## 2023-01-01 DIAGNOSIS — S41.111A LACERATION WITHOUT FOREIGN BODY OF RIGHT UPPER ARM, INITIAL ENCOUNTER: ICD-10-CM

## 2023-01-01 DIAGNOSIS — Z86.79 PERSONAL HISTORY OF OTHER DISEASES OF THE CIRCULATORY SYSTEM: ICD-10-CM

## 2023-01-01 DIAGNOSIS — J96.22 ACUTE AND CHRONIC RESPIRATORY FAILURE WITH HYPERCAPNIA: ICD-10-CM

## 2023-01-01 DIAGNOSIS — N17.9 ACUTE KIDNEY FAILURE, UNSPECIFIED: ICD-10-CM

## 2023-01-01 DIAGNOSIS — N18.9 CHRONIC KIDNEY DISEASE, UNSPECIFIED: ICD-10-CM

## 2023-01-01 DIAGNOSIS — I13.0 HYPERTENSIVE HEART AND CHRONIC KIDNEY DISEASE WITH HEART FAILURE AND STAGE 1 THROUGH STAGE 4 CHRONIC KIDNEY DISEASE, OR UNSPECIFIED CHRONIC KIDNEY DISEASE: ICD-10-CM

## 2023-01-01 DIAGNOSIS — Z86.39 PERSONAL HISTORY OF OTHER ENDOCRINE, NUTRITIONAL AND METABOLIC DISEASE: ICD-10-CM

## 2023-01-01 DIAGNOSIS — R41.82 ALTERED MENTAL STATUS, UNSPECIFIED: ICD-10-CM

## 2023-01-01 DIAGNOSIS — R06.02 SHORTNESS OF BREATH: ICD-10-CM

## 2023-01-01 DIAGNOSIS — D64.9 ANEMIA, UNSPECIFIED: ICD-10-CM

## 2023-01-01 DIAGNOSIS — I50.32 CHRONIC DIASTOLIC (CONGESTIVE) HEART FAILURE: ICD-10-CM

## 2023-01-01 DIAGNOSIS — Z79.82 LONG TERM (CURRENT) USE OF ASPIRIN: ICD-10-CM

## 2023-01-01 DIAGNOSIS — J69.0 PNEUMONITIS DUE TO INHALATION OF FOOD AND VOMIT: ICD-10-CM

## 2023-01-01 DIAGNOSIS — Z51.5 ENCOUNTER FOR PALLIATIVE CARE: ICD-10-CM
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
## 2023-01-01 DIAGNOSIS — I48.20 CHRONIC ATRIAL FIBRILLATION, UNSPECIFIED: ICD-10-CM

## 2023-01-01 DIAGNOSIS — Z86.16 PERSONAL HISTORY OF COVID-19: ICD-10-CM

## 2023-01-01 DIAGNOSIS — J44.9 CHRONIC OBSTRUCTIVE PULMONARY DISEASE, UNSPECIFIED: ICD-10-CM

## 2023-01-01 DIAGNOSIS — J44.1 CHRONIC OBSTRUCTIVE PULMONARY DISEASE WITH (ACUTE) EXACERBATION: ICD-10-CM

## 2023-01-01 DIAGNOSIS — I24.8 OTHER FORMS OF ACUTE ISCHEMIC HEART DISEASE: ICD-10-CM

## 2023-01-01 DIAGNOSIS — I95.9 HYPOTENSION, UNSPECIFIED: ICD-10-CM

## 2023-01-01 DIAGNOSIS — E03.9 HYPOTHYROIDISM, UNSPECIFIED: ICD-10-CM

## 2023-01-01 DIAGNOSIS — R33.9 RETENTION OF URINE, UNSPECIFIED: ICD-10-CM

## 2023-01-01 DIAGNOSIS — J12.82 COVID-19: ICD-10-CM

## 2023-01-01 DIAGNOSIS — E87.5 HYPERKALEMIA: ICD-10-CM

## 2023-01-01 DIAGNOSIS — Z87.891 PERSONAL HISTORY OF NICOTINE DEPENDENCE: ICD-10-CM

## 2023-01-01 DIAGNOSIS — E83.42 HYPOMAGNESEMIA: ICD-10-CM

## 2023-01-01 DIAGNOSIS — J96.21 ACUTE AND CHRONIC RESPIRATORY FAILURE WITH HYPOXIA: ICD-10-CM

## 2023-01-01 DIAGNOSIS — T50.8X5A ADVERSE EFFECT OF DIAGNOSTIC AGENTS, INITIAL ENCOUNTER: ICD-10-CM

## 2023-01-01 DIAGNOSIS — N14.11 CONTRAST-INDUCED NEPHROPATHY: ICD-10-CM

## 2023-01-01 DIAGNOSIS — F05 DELIRIUM DUE TO KNOWN PHYSIOLOGICAL CONDITION: ICD-10-CM

## 2023-01-01 DIAGNOSIS — N18.32 CHRONIC KIDNEY DISEASE, STAGE 3B: ICD-10-CM

## 2023-01-01 DIAGNOSIS — E78.5 HYPERLIPIDEMIA, UNSPECIFIED: ICD-10-CM
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
## 2023-01-01 DIAGNOSIS — Z79.52 LONG TERM (CURRENT) USE OF SYSTEMIC STEROIDS: ICD-10-CM

## 2023-01-01 DIAGNOSIS — J90 PLEURAL EFFUSION, NOT ELSEWHERE CLASSIFIED: ICD-10-CM

## 2023-01-01 DIAGNOSIS — R29.6 REPEATED FALLS: ICD-10-CM

## 2023-01-01 DIAGNOSIS — Z71.89 OTHER SPECIFIED COUNSELING: ICD-10-CM

## 2023-01-01 DIAGNOSIS — I48.91 UNSPECIFIED ATRIAL FIBRILLATION: ICD-10-CM

## 2023-01-01 LAB
-  COAGULASE NEGATIVE STAPHYLOCOCCUS: SIGNIFICANT CHANGE UP
ALBUMIN SERPL ELPH-MCNC: 3.1 G/DL — LOW (ref 3.5–5.2)
ALBUMIN SERPL ELPH-MCNC: 3.2 G/DL — LOW (ref 3.5–5.2)
ALBUMIN SERPL ELPH-MCNC: 3.2 G/DL — LOW (ref 3.5–5.2)
ALBUMIN SERPL ELPH-MCNC: 3.3 G/DL — LOW (ref 3.5–5.2)
ALBUMIN SERPL ELPH-MCNC: 3.4 G/DL — LOW (ref 3.5–5.2)
ALBUMIN SERPL ELPH-MCNC: 3.5 G/DL — SIGNIFICANT CHANGE UP (ref 3.5–5.2)
ALBUMIN SERPL ELPH-MCNC: 3.5 G/DL — SIGNIFICANT CHANGE UP (ref 3.5–5.2)
ALBUMIN SERPL ELPH-MCNC: 3.6 G/DL — SIGNIFICANT CHANGE UP (ref 3.5–5.2)
ALBUMIN SERPL ELPH-MCNC: 3.7 G/DL — SIGNIFICANT CHANGE UP (ref 3.5–5.2)
ALBUMIN SERPL ELPH-MCNC: 3.8 G/DL — SIGNIFICANT CHANGE UP (ref 3.5–5.2)
ALBUMIN SERPL ELPH-MCNC: 3.9 G/DL — SIGNIFICANT CHANGE UP (ref 3.5–5.2)
ALBUMIN SERPL ELPH-MCNC: 3.9 G/DL — SIGNIFICANT CHANGE UP (ref 3.5–5.2)
ALBUMIN SERPL ELPH-MCNC: 4 G/DL — SIGNIFICANT CHANGE UP (ref 3.5–5.2)
ALBUMIN SERPL ELPH-MCNC: 4 G/DL — SIGNIFICANT CHANGE UP (ref 3.5–5.2)
ALP SERPL-CCNC: 102 U/L — SIGNIFICANT CHANGE UP (ref 30–115)
ALP SERPL-CCNC: 103 U/L — SIGNIFICANT CHANGE UP (ref 30–115)
ALP SERPL-CCNC: 117 U/L — HIGH (ref 30–115)
ALP SERPL-CCNC: 123 U/L — HIGH (ref 30–115)
ALP SERPL-CCNC: 133 U/L — HIGH (ref 30–115)
ALP SERPL-CCNC: 139 U/L — HIGH (ref 30–115)
ALP SERPL-CCNC: 65 U/L — SIGNIFICANT CHANGE UP (ref 30–115)
ALP SERPL-CCNC: 66 U/L — SIGNIFICANT CHANGE UP (ref 30–115)
ALP SERPL-CCNC: 71 U/L — SIGNIFICANT CHANGE UP (ref 30–115)
ALP SERPL-CCNC: 78 U/L — SIGNIFICANT CHANGE UP (ref 30–115)
ALP SERPL-CCNC: 79 U/L — SIGNIFICANT CHANGE UP (ref 30–115)
ALP SERPL-CCNC: 80 U/L — SIGNIFICANT CHANGE UP (ref 30–115)
ALP SERPL-CCNC: 80 U/L — SIGNIFICANT CHANGE UP (ref 30–115)
ALP SERPL-CCNC: 82 U/L — SIGNIFICANT CHANGE UP (ref 30–115)
ALP SERPL-CCNC: 83 U/L — SIGNIFICANT CHANGE UP (ref 30–115)
ALP SERPL-CCNC: 84 U/L — SIGNIFICANT CHANGE UP (ref 30–115)
ALP SERPL-CCNC: 86 U/L — SIGNIFICANT CHANGE UP (ref 30–115)
ALP SERPL-CCNC: 89 U/L — SIGNIFICANT CHANGE UP (ref 30–115)
ALP SERPL-CCNC: 89 U/L — SIGNIFICANT CHANGE UP (ref 30–115)
ALP SERPL-CCNC: 92 U/L — SIGNIFICANT CHANGE UP (ref 30–115)
ALT FLD-CCNC: 19 U/L — SIGNIFICANT CHANGE UP (ref 0–41)
ALT FLD-CCNC: 20 U/L — SIGNIFICANT CHANGE UP (ref 0–41)
ALT FLD-CCNC: 21 U/L — SIGNIFICANT CHANGE UP (ref 0–41)
ALT FLD-CCNC: 21 U/L — SIGNIFICANT CHANGE UP (ref 0–41)
ALT FLD-CCNC: 22 U/L — SIGNIFICANT CHANGE UP (ref 0–41)
ALT FLD-CCNC: 22 U/L — SIGNIFICANT CHANGE UP (ref 0–41)
ALT FLD-CCNC: 23 U/L — SIGNIFICANT CHANGE UP (ref 0–41)
ALT FLD-CCNC: 24 U/L — SIGNIFICANT CHANGE UP (ref 0–41)
ALT FLD-CCNC: 24 U/L — SIGNIFICANT CHANGE UP (ref 0–41)
ALT FLD-CCNC: 26 U/L — SIGNIFICANT CHANGE UP (ref 0–41)
ALT FLD-CCNC: 27 U/L — SIGNIFICANT CHANGE UP (ref 0–41)
ALT FLD-CCNC: 29 U/L — SIGNIFICANT CHANGE UP (ref 0–41)
ALT FLD-CCNC: 31 U/L — SIGNIFICANT CHANGE UP (ref 0–41)
ALT FLD-CCNC: 33 U/L — SIGNIFICANT CHANGE UP (ref 0–41)
ALT FLD-CCNC: 35 U/L — SIGNIFICANT CHANGE UP (ref 0–41)
ALT FLD-CCNC: 36 U/L — SIGNIFICANT CHANGE UP (ref 0–41)
ALT FLD-CCNC: 37 U/L — SIGNIFICANT CHANGE UP (ref 0–41)
ALT FLD-CCNC: 45 U/L — HIGH (ref 0–41)
ANION GAP SERPL CALC-SCNC: 10 MMOL/L — SIGNIFICANT CHANGE UP (ref 7–14)
ANION GAP SERPL CALC-SCNC: 11 MMOL/L — SIGNIFICANT CHANGE UP (ref 7–14)
ANION GAP SERPL CALC-SCNC: 12 MMOL/L — SIGNIFICANT CHANGE UP (ref 7–14)
ANION GAP SERPL CALC-SCNC: 13 MMOL/L — SIGNIFICANT CHANGE UP (ref 7–14)
ANION GAP SERPL CALC-SCNC: 13 MMOL/L — SIGNIFICANT CHANGE UP (ref 7–14)
ANION GAP SERPL CALC-SCNC: 14 MMOL/L — SIGNIFICANT CHANGE UP (ref 7–14)
ANION GAP SERPL CALC-SCNC: 15 MMOL/L — HIGH (ref 7–14)
ANION GAP SERPL CALC-SCNC: 16 MMOL/L — HIGH (ref 7–14)
ANION GAP SERPL CALC-SCNC: 19 MMOL/L — HIGH (ref 7–14)
ANION GAP SERPL CALC-SCNC: 7 MMOL/L — SIGNIFICANT CHANGE UP (ref 7–14)
ANION GAP SERPL CALC-SCNC: 8 MMOL/L — SIGNIFICANT CHANGE UP (ref 7–14)
ANION GAP SERPL CALC-SCNC: 8 MMOL/L — SIGNIFICANT CHANGE UP (ref 7–14)
ANION GAP SERPL CALC-SCNC: 9 MMOL/L — SIGNIFICANT CHANGE UP (ref 7–14)
ANION GAP SERPL CALC-SCNC: 9 MMOL/L — SIGNIFICANT CHANGE UP (ref 7–14)
APPEARANCE UR: ABNORMAL
APPEARANCE UR: CLEAR — SIGNIFICANT CHANGE UP
AST SERPL-CCNC: 20 U/L — SIGNIFICANT CHANGE UP (ref 0–41)
AST SERPL-CCNC: 24 U/L — SIGNIFICANT CHANGE UP (ref 0–41)
AST SERPL-CCNC: 27 U/L — SIGNIFICANT CHANGE UP (ref 0–41)
AST SERPL-CCNC: 27 U/L — SIGNIFICANT CHANGE UP (ref 0–41)
AST SERPL-CCNC: 28 U/L — SIGNIFICANT CHANGE UP (ref 0–41)
AST SERPL-CCNC: 29 U/L — SIGNIFICANT CHANGE UP (ref 0–41)
AST SERPL-CCNC: 31 U/L — SIGNIFICANT CHANGE UP (ref 0–41)
AST SERPL-CCNC: 34 U/L — SIGNIFICANT CHANGE UP (ref 0–41)
AST SERPL-CCNC: 35 U/L — SIGNIFICANT CHANGE UP (ref 0–41)
AST SERPL-CCNC: 35 U/L — SIGNIFICANT CHANGE UP (ref 0–41)
AST SERPL-CCNC: 36 U/L — SIGNIFICANT CHANGE UP (ref 0–41)
AST SERPL-CCNC: 37 U/L — SIGNIFICANT CHANGE UP (ref 0–41)
AST SERPL-CCNC: 41 U/L — SIGNIFICANT CHANGE UP (ref 0–41)
AST SERPL-CCNC: 42 U/L — HIGH (ref 0–41)
AST SERPL-CCNC: 44 U/L — HIGH (ref 0–41)
AST SERPL-CCNC: 45 U/L — HIGH (ref 0–41)
AST SERPL-CCNC: 47 U/L — HIGH (ref 0–41)
AST SERPL-CCNC: 48 U/L — HIGH (ref 0–41)
AST SERPL-CCNC: 60 U/L — HIGH (ref 0–41)
AST SERPL-CCNC: 61 U/L — HIGH (ref 0–41)
BACTERIA # UR AUTO: NEGATIVE — SIGNIFICANT CHANGE UP
BACTERIA # UR AUTO: NEGATIVE — SIGNIFICANT CHANGE UP
BASE EXCESS BLDA CALC-SCNC: 5.8 MMOL/L — HIGH (ref -2–3)
BASE EXCESS BLDA CALC-SCNC: 6 MMOL/L — HIGH (ref -2–3)
BASE EXCESS BLDA CALC-SCNC: 9.1 MMOL/L — HIGH (ref -2–3)
BASE EXCESS BLDV CALC-SCNC: 10.1 MMOL/L — HIGH (ref -2–3)
BASE EXCESS BLDV CALC-SCNC: 10.5 MMOL/L — HIGH (ref -2–3)
BASE EXCESS BLDV CALC-SCNC: 10.7 MMOL/L — HIGH (ref -2–3)
BASE EXCESS BLDV CALC-SCNC: 4.5 MMOL/L — HIGH (ref -2–3)
BASOPHILS # BLD AUTO: 0 K/UL — SIGNIFICANT CHANGE UP (ref 0–0.2)
BASOPHILS # BLD AUTO: 0.01 K/UL — SIGNIFICANT CHANGE UP (ref 0–0.2)
BASOPHILS # BLD AUTO: 0.02 K/UL — SIGNIFICANT CHANGE UP (ref 0–0.2)
BASOPHILS # BLD AUTO: 0.02 K/UL — SIGNIFICANT CHANGE UP (ref 0–0.2)
BASOPHILS # BLD AUTO: 0.03 K/UL — SIGNIFICANT CHANGE UP (ref 0–0.2)
BASOPHILS NFR BLD AUTO: 0 % — SIGNIFICANT CHANGE UP (ref 0–1)
BASOPHILS NFR BLD AUTO: 0.1 % — SIGNIFICANT CHANGE UP (ref 0–1)
BASOPHILS NFR BLD AUTO: 0.2 % — SIGNIFICANT CHANGE UP (ref 0–1)
BASOPHILS NFR BLD AUTO: 0.3 % — SIGNIFICANT CHANGE UP (ref 0–1)
BILIRUB SERPL-MCNC: 0.2 MG/DL — SIGNIFICANT CHANGE UP (ref 0.2–1.2)
BILIRUB SERPL-MCNC: 0.3 MG/DL — SIGNIFICANT CHANGE UP (ref 0.2–1.2)
BILIRUB SERPL-MCNC: 0.4 MG/DL — SIGNIFICANT CHANGE UP (ref 0.2–1.2)
BILIRUB SERPL-MCNC: 0.5 MG/DL — SIGNIFICANT CHANGE UP (ref 0.2–1.2)
BILIRUB SERPL-MCNC: 0.6 MG/DL — SIGNIFICANT CHANGE UP (ref 0.2–1.2)
BILIRUB SERPL-MCNC: 1 MG/DL — SIGNIFICANT CHANGE UP (ref 0.2–1.2)
BILIRUB UR-MCNC: NEGATIVE — SIGNIFICANT CHANGE UP
BILIRUB UR-MCNC: NEGATIVE — SIGNIFICANT CHANGE UP
BLD GP AB SCN SERPL QL: SIGNIFICANT CHANGE UP
BUN SERPL-MCNC: 37 MG/DL — HIGH (ref 10–20)
BUN SERPL-MCNC: 43 MG/DL — HIGH (ref 10–20)
BUN SERPL-MCNC: 50 MG/DL — HIGH (ref 10–20)
BUN SERPL-MCNC: 51 MG/DL — HIGH (ref 10–20)
BUN SERPL-MCNC: 51 MG/DL — HIGH (ref 10–20)
BUN SERPL-MCNC: 52 MG/DL — HIGH (ref 10–20)
BUN SERPL-MCNC: 55 MG/DL — HIGH (ref 10–20)
BUN SERPL-MCNC: 56 MG/DL — HIGH (ref 10–20)
BUN SERPL-MCNC: 57 MG/DL — HIGH (ref 10–20)
BUN SERPL-MCNC: 58 MG/DL — HIGH (ref 10–20)
BUN SERPL-MCNC: 59 MG/DL — HIGH (ref 10–20)
BUN SERPL-MCNC: 59 MG/DL — HIGH (ref 10–20)
BUN SERPL-MCNC: 60 MG/DL — HIGH (ref 10–20)
BUN SERPL-MCNC: 60 MG/DL — HIGH (ref 10–20)
BUN SERPL-MCNC: 61 MG/DL — CRITICAL HIGH (ref 10–20)
BUN SERPL-MCNC: 62 MG/DL — CRITICAL HIGH (ref 10–20)
BUN SERPL-MCNC: 63 MG/DL — CRITICAL HIGH (ref 10–20)
BUN SERPL-MCNC: 63 MG/DL — CRITICAL HIGH (ref 10–20)
BUN SERPL-MCNC: 64 MG/DL — CRITICAL HIGH (ref 10–20)
BUN SERPL-MCNC: 72 MG/DL — CRITICAL HIGH (ref 10–20)
BUN SERPL-MCNC: 77 MG/DL — CRITICAL HIGH (ref 10–20)
BUN SERPL-MCNC: 77 MG/DL — CRITICAL HIGH (ref 10–20)
BUN SERPL-MCNC: 80 MG/DL — CRITICAL HIGH (ref 10–20)
BUN SERPL-MCNC: 81 MG/DL — CRITICAL HIGH (ref 10–20)
BUN SERPL-MCNC: 82 MG/DL — CRITICAL HIGH (ref 10–20)
BUN SERPL-MCNC: 82 MG/DL — CRITICAL HIGH (ref 10–20)
BUN SERPL-MCNC: 83 MG/DL — CRITICAL HIGH (ref 10–20)
BUN SERPL-MCNC: 90 MG/DL — CRITICAL HIGH (ref 10–20)
BUN SERPL-MCNC: 93 MG/DL — CRITICAL HIGH (ref 10–20)
CA-I SERPL-SCNC: 1.17 MMOL/L — SIGNIFICANT CHANGE UP (ref 1.15–1.33)
CA-I SERPL-SCNC: 1.17 MMOL/L — SIGNIFICANT CHANGE UP (ref 1.15–1.33)
CA-I SERPL-SCNC: 1.19 MMOL/L — SIGNIFICANT CHANGE UP (ref 1.15–1.33)
CA-I SERPL-SCNC: 1.2 MMOL/L — SIGNIFICANT CHANGE UP (ref 1.15–1.33)
CALCIUM SERPL-MCNC: 7.7 MG/DL — LOW (ref 8.4–10.5)
CALCIUM SERPL-MCNC: 7.9 MG/DL — LOW (ref 8.4–10.4)
CALCIUM SERPL-MCNC: 8 MG/DL — LOW (ref 8.4–10.5)
CALCIUM SERPL-MCNC: 8.1 MG/DL — LOW (ref 8.4–10.5)
CALCIUM SERPL-MCNC: 8.1 MG/DL — LOW (ref 8.4–10.5)
CALCIUM SERPL-MCNC: 8.2 MG/DL — LOW (ref 8.4–10.4)
CALCIUM SERPL-MCNC: 8.2 MG/DL — LOW (ref 8.4–10.5)
CALCIUM SERPL-MCNC: 8.3 MG/DL — LOW (ref 8.4–10.5)
CALCIUM SERPL-MCNC: 8.3 MG/DL — LOW (ref 8.4–10.5)
CALCIUM SERPL-MCNC: 8.4 MG/DL — SIGNIFICANT CHANGE UP (ref 8.4–10.5)
CALCIUM SERPL-MCNC: 8.4 MG/DL — SIGNIFICANT CHANGE UP (ref 8.4–10.5)
CALCIUM SERPL-MCNC: 8.5 MG/DL — SIGNIFICANT CHANGE UP (ref 8.4–10.5)
CALCIUM SERPL-MCNC: 8.6 MG/DL — SIGNIFICANT CHANGE UP (ref 8.4–10.5)
CALCIUM SERPL-MCNC: 8.7 MG/DL — SIGNIFICANT CHANGE UP (ref 8.4–10.4)
CALCIUM SERPL-MCNC: 8.7 MG/DL — SIGNIFICANT CHANGE UP (ref 8.4–10.5)
CALCIUM SERPL-MCNC: 8.7 MG/DL — SIGNIFICANT CHANGE UP (ref 8.4–10.5)
CALCIUM SERPL-MCNC: 8.8 MG/DL — SIGNIFICANT CHANGE UP (ref 8.4–10.4)
CALCIUM SERPL-MCNC: 8.8 MG/DL — SIGNIFICANT CHANGE UP (ref 8.4–10.5)
CALCIUM SERPL-MCNC: 8.9 MG/DL — SIGNIFICANT CHANGE UP (ref 8.4–10.4)
CALCIUM SERPL-MCNC: 8.9 MG/DL — SIGNIFICANT CHANGE UP (ref 8.4–10.5)
CALCIUM SERPL-MCNC: 8.9 MG/DL — SIGNIFICANT CHANGE UP (ref 8.4–10.5)
CALCIUM SERPL-MCNC: 9 MG/DL — SIGNIFICANT CHANGE UP (ref 8.4–10.4)
CALCIUM SERPL-MCNC: 9 MG/DL — SIGNIFICANT CHANGE UP (ref 8.4–10.5)
CALCIUM SERPL-MCNC: 9.1 MG/DL — SIGNIFICANT CHANGE UP (ref 8.4–10.5)
CALCIUM SERPL-MCNC: 9.2 MG/DL — SIGNIFICANT CHANGE UP (ref 8.4–10.5)
CHLORIDE SERPL-SCNC: 100 MMOL/L — SIGNIFICANT CHANGE UP (ref 98–110)
CHLORIDE SERPL-SCNC: 101 MMOL/L — SIGNIFICANT CHANGE UP (ref 98–110)
CHLORIDE SERPL-SCNC: 102 MMOL/L — SIGNIFICANT CHANGE UP (ref 98–110)
CHLORIDE SERPL-SCNC: 102 MMOL/L — SIGNIFICANT CHANGE UP (ref 98–110)
CHLORIDE SERPL-SCNC: 103 MMOL/L — SIGNIFICANT CHANGE UP (ref 98–110)
CHLORIDE SERPL-SCNC: 104 MMOL/L — SIGNIFICANT CHANGE UP (ref 98–110)
CHLORIDE SERPL-SCNC: 104 MMOL/L — SIGNIFICANT CHANGE UP (ref 98–110)
CHLORIDE SERPL-SCNC: 105 MMOL/L — SIGNIFICANT CHANGE UP (ref 98–110)
CHLORIDE SERPL-SCNC: 106 MMOL/L — SIGNIFICANT CHANGE UP (ref 98–110)
CHLORIDE SERPL-SCNC: 107 MMOL/L — SIGNIFICANT CHANGE UP (ref 98–110)
CHLORIDE SERPL-SCNC: 92 MMOL/L — LOW (ref 98–110)
CHLORIDE SERPL-SCNC: 94 MMOL/L — LOW (ref 98–110)
CHLORIDE SERPL-SCNC: 95 MMOL/L — LOW (ref 98–110)
CHLORIDE SERPL-SCNC: 96 MMOL/L — LOW (ref 98–110)
CHLORIDE SERPL-SCNC: 97 MMOL/L — LOW (ref 98–110)
CHLORIDE SERPL-SCNC: 97 MMOL/L — LOW (ref 98–110)
CHLORIDE SERPL-SCNC: 98 MMOL/L — SIGNIFICANT CHANGE UP (ref 98–110)
CHLORIDE SERPL-SCNC: 98 MMOL/L — SIGNIFICANT CHANGE UP (ref 98–110)
CHLORIDE SERPL-SCNC: 99 MMOL/L — SIGNIFICANT CHANGE UP (ref 98–110)
CK MB CFR SERPL CALC: 6.3 NG/ML — SIGNIFICANT CHANGE UP (ref 0.6–6.3)
CK SERPL-CCNC: 188 U/L — SIGNIFICANT CHANGE UP (ref 0–225)
CK SERPL-CCNC: 213 U/L — SIGNIFICANT CHANGE UP (ref 0–225)
CO2 SERPL-SCNC: 23 MMOL/L — SIGNIFICANT CHANGE UP (ref 17–32)
CO2 SERPL-SCNC: 23 MMOL/L — SIGNIFICANT CHANGE UP (ref 17–32)
CO2 SERPL-SCNC: 24 MMOL/L — SIGNIFICANT CHANGE UP (ref 17–32)
CO2 SERPL-SCNC: 24 MMOL/L — SIGNIFICANT CHANGE UP (ref 17–32)
CO2 SERPL-SCNC: 25 MMOL/L — SIGNIFICANT CHANGE UP (ref 17–32)
CO2 SERPL-SCNC: 25 MMOL/L — SIGNIFICANT CHANGE UP (ref 17–32)
CO2 SERPL-SCNC: 26 MMOL/L — SIGNIFICANT CHANGE UP (ref 17–32)
CO2 SERPL-SCNC: 26 MMOL/L — SIGNIFICANT CHANGE UP (ref 17–32)
CO2 SERPL-SCNC: 27 MMOL/L — SIGNIFICANT CHANGE UP (ref 17–32)
CO2 SERPL-SCNC: 28 MMOL/L — SIGNIFICANT CHANGE UP (ref 17–32)
CO2 SERPL-SCNC: 29 MMOL/L — SIGNIFICANT CHANGE UP (ref 17–32)
CO2 SERPL-SCNC: 30 MMOL/L — SIGNIFICANT CHANGE UP (ref 17–32)
CO2 SERPL-SCNC: 31 MMOL/L — SIGNIFICANT CHANGE UP (ref 17–32)
CO2 SERPL-SCNC: 31 MMOL/L — SIGNIFICANT CHANGE UP (ref 17–32)
CO2 SERPL-SCNC: 32 MMOL/L — SIGNIFICANT CHANGE UP (ref 17–32)
CO2 SERPL-SCNC: 33 MMOL/L — HIGH (ref 17–32)
CO2 SERPL-SCNC: 33 MMOL/L — HIGH (ref 17–32)
CO2 SERPL-SCNC: 34 MMOL/L — HIGH (ref 17–32)
CO2 SERPL-SCNC: 35 MMOL/L — HIGH (ref 17–32)
COD CRY URNS QL: ABNORMAL
COLOR SPEC: SIGNIFICANT CHANGE UP
COLOR SPEC: YELLOW — SIGNIFICANT CHANGE UP
CREAT ?TM UR-MCNC: 121 MG/DL — SIGNIFICANT CHANGE UP
CREAT SERPL-MCNC: 1.2 MG/DL — SIGNIFICANT CHANGE UP (ref 0.7–1.5)
CREAT SERPL-MCNC: 1.4 MG/DL — SIGNIFICANT CHANGE UP (ref 0.7–1.5)
CREAT SERPL-MCNC: 1.5 MG/DL — SIGNIFICANT CHANGE UP (ref 0.7–1.5)
CREAT SERPL-MCNC: 1.5 MG/DL — SIGNIFICANT CHANGE UP (ref 0.7–1.5)
CREAT SERPL-MCNC: 1.6 MG/DL — HIGH (ref 0.7–1.5)
CREAT SERPL-MCNC: 1.7 MG/DL — HIGH (ref 0.7–1.5)
CREAT SERPL-MCNC: 1.8 MG/DL — HIGH (ref 0.7–1.5)
CREAT SERPL-MCNC: 1.9 MG/DL — HIGH (ref 0.7–1.5)
CREAT SERPL-MCNC: 2 MG/DL — HIGH (ref 0.7–1.5)
CREAT SERPL-MCNC: 2.2 MG/DL — HIGH (ref 0.7–1.5)
CREAT SERPL-MCNC: 2.3 MG/DL — HIGH (ref 0.7–1.5)
CREAT SERPL-MCNC: 2.3 MG/DL — HIGH (ref 0.7–1.5)
CREAT SERPL-MCNC: 2.5 MG/DL — HIGH (ref 0.7–1.5)
CREAT SERPL-MCNC: 2.7 MG/DL — HIGH (ref 0.7–1.5)
CREAT SERPL-MCNC: 3.1 MG/DL — HIGH (ref 0.7–1.5)
CREAT SERPL-MCNC: 3.2 MG/DL — HIGH (ref 0.7–1.5)
CREAT SERPL-MCNC: 3.3 MG/DL — HIGH (ref 0.7–1.5)
CULTURE RESULTS: SIGNIFICANT CHANGE UP
DIFF PNL FLD: NEGATIVE — SIGNIFICANT CHANGE UP
DIFF PNL FLD: NEGATIVE — SIGNIFICANT CHANGE UP
EGFR: 12 ML/MIN/1.73M2 — LOW
EGFR: 13 ML/MIN/1.73M2 — LOW
EGFR: 13 ML/MIN/1.73M2 — LOW
EGFR: 16 ML/MIN/1.73M2 — LOW
EGFR: 17 ML/MIN/1.73M2 — LOW
EGFR: 19 ML/MIN/1.73M2 — LOW
EGFR: 19 ML/MIN/1.73M2 — LOW
EGFR: 20 ML/MIN/1.73M2 — LOW
EGFR: 23 ML/MIN/1.73M2 — LOW
EGFR: 24 ML/MIN/1.73M2 — LOW
EGFR: 26 ML/MIN/1.73M2 — LOW
EGFR: 28 ML/MIN/1.73M2 — LOW
EGFR: 30 ML/MIN/1.73M2 — LOW
EGFR: 32 ML/MIN/1.73M2 — LOW
EGFR: 32 ML/MIN/1.73M2 — LOW
EGFR: 35 ML/MIN/1.73M2 — LOW
EGFR: 42 ML/MIN/1.73M2 — LOW
EOSINOPHIL # BLD AUTO: 0 K/UL — SIGNIFICANT CHANGE UP (ref 0–0.7)
EOSINOPHIL # BLD AUTO: 0.02 K/UL — SIGNIFICANT CHANGE UP (ref 0–0.7)
EOSINOPHIL # BLD AUTO: 0.03 K/UL — SIGNIFICANT CHANGE UP (ref 0–0.7)
EOSINOPHIL # BLD AUTO: 0.03 K/UL — SIGNIFICANT CHANGE UP (ref 0–0.7)
EOSINOPHIL # BLD AUTO: 0.05 K/UL — SIGNIFICANT CHANGE UP (ref 0–0.7)
EOSINOPHIL # BLD AUTO: 0.08 K/UL — SIGNIFICANT CHANGE UP (ref 0–0.7)
EOSINOPHIL # BLD AUTO: 0.1 K/UL — SIGNIFICANT CHANGE UP (ref 0–0.7)
EOSINOPHIL NFR BLD AUTO: 0 % — SIGNIFICANT CHANGE UP (ref 0–8)
EOSINOPHIL NFR BLD AUTO: 0.2 % — SIGNIFICANT CHANGE UP (ref 0–8)
EOSINOPHIL NFR BLD AUTO: 0.2 % — SIGNIFICANT CHANGE UP (ref 0–8)
EOSINOPHIL NFR BLD AUTO: 0.5 % — SIGNIFICANT CHANGE UP (ref 0–8)
EOSINOPHIL NFR BLD AUTO: 0.6 % — SIGNIFICANT CHANGE UP (ref 0–8)
EOSINOPHIL NFR BLD AUTO: 0.8 % — SIGNIFICANT CHANGE UP (ref 0–8)
EOSINOPHIL NFR BLD AUTO: 1.2 % — SIGNIFICANT CHANGE UP (ref 0–8)
EPI CELLS # UR: 2 /HPF — SIGNIFICANT CHANGE UP (ref 0–5)
EPI CELLS # UR: 3 /HPF — SIGNIFICANT CHANGE UP (ref 0–5)
FERRITIN SERPL-MCNC: 71 NG/ML — SIGNIFICANT CHANGE UP (ref 15–150)
FLUAV AG NPH QL: SIGNIFICANT CHANGE UP
FLUBV AG NPH QL: SIGNIFICANT CHANGE UP
GAS PNL BLDA: SIGNIFICANT CHANGE UP
GAS PNL BLDV: 131 MMOL/L — LOW (ref 136–145)
GAS PNL BLDV: 138 MMOL/L — SIGNIFICANT CHANGE UP (ref 136–145)
GAS PNL BLDV: 139 MMOL/L — SIGNIFICANT CHANGE UP (ref 136–145)
GAS PNL BLDV: 139 MMOL/L — SIGNIFICANT CHANGE UP (ref 136–145)
GAS PNL BLDV: SIGNIFICANT CHANGE UP
GLUCOSE BLDC GLUCOMTR-MCNC: 124 MG/DL — HIGH (ref 70–99)
GLUCOSE BLDC GLUCOMTR-MCNC: 161 MG/DL — HIGH (ref 70–99)
GLUCOSE BLDC GLUCOMTR-MCNC: 213 MG/DL — HIGH (ref 70–99)
GLUCOSE SERPL-MCNC: 100 MG/DL — HIGH (ref 70–99)
GLUCOSE SERPL-MCNC: 103 MG/DL — HIGH (ref 70–99)
GLUCOSE SERPL-MCNC: 107 MG/DL — HIGH (ref 70–99)
GLUCOSE SERPL-MCNC: 118 MG/DL — HIGH (ref 70–99)
GLUCOSE SERPL-MCNC: 120 MG/DL — HIGH (ref 70–99)
GLUCOSE SERPL-MCNC: 122 MG/DL — HIGH (ref 70–99)
GLUCOSE SERPL-MCNC: 124 MG/DL — HIGH (ref 70–99)
GLUCOSE SERPL-MCNC: 124 MG/DL — HIGH (ref 70–99)
GLUCOSE SERPL-MCNC: 132 MG/DL — HIGH (ref 70–99)
GLUCOSE SERPL-MCNC: 140 MG/DL — HIGH (ref 70–99)
GLUCOSE SERPL-MCNC: 148 MG/DL — HIGH (ref 70–99)
GLUCOSE SERPL-MCNC: 151 MG/DL — HIGH (ref 70–99)
GLUCOSE SERPL-MCNC: 164 MG/DL — HIGH (ref 70–99)
GLUCOSE SERPL-MCNC: 166 MG/DL — HIGH (ref 70–99)
GLUCOSE SERPL-MCNC: 172 MG/DL — HIGH (ref 70–99)
GLUCOSE SERPL-MCNC: 224 MG/DL — HIGH (ref 70–99)
GLUCOSE SERPL-MCNC: 45 MG/DL — CRITICAL LOW (ref 70–99)
GLUCOSE SERPL-MCNC: 73 MG/DL — SIGNIFICANT CHANGE UP (ref 70–99)
GLUCOSE SERPL-MCNC: 74 MG/DL — SIGNIFICANT CHANGE UP (ref 70–99)
GLUCOSE SERPL-MCNC: 78 MG/DL — SIGNIFICANT CHANGE UP (ref 70–99)
GLUCOSE SERPL-MCNC: 79 MG/DL — SIGNIFICANT CHANGE UP (ref 70–99)
GLUCOSE SERPL-MCNC: 82 MG/DL — SIGNIFICANT CHANGE UP (ref 70–99)
GLUCOSE SERPL-MCNC: 83 MG/DL — SIGNIFICANT CHANGE UP (ref 70–99)
GLUCOSE SERPL-MCNC: 83 MG/DL — SIGNIFICANT CHANGE UP (ref 70–99)
GLUCOSE SERPL-MCNC: 87 MG/DL — SIGNIFICANT CHANGE UP (ref 70–99)
GLUCOSE SERPL-MCNC: 87 MG/DL — SIGNIFICANT CHANGE UP (ref 70–99)
GLUCOSE SERPL-MCNC: 89 MG/DL — SIGNIFICANT CHANGE UP (ref 70–99)
GLUCOSE SERPL-MCNC: 89 MG/DL — SIGNIFICANT CHANGE UP (ref 70–99)
GLUCOSE SERPL-MCNC: 90 MG/DL — SIGNIFICANT CHANGE UP (ref 70–99)
GLUCOSE SERPL-MCNC: 91 MG/DL — SIGNIFICANT CHANGE UP (ref 70–99)
GLUCOSE SERPL-MCNC: 93 MG/DL — SIGNIFICANT CHANGE UP (ref 70–99)
GLUCOSE SERPL-MCNC: 93 MG/DL — SIGNIFICANT CHANGE UP (ref 70–99)
GLUCOSE SERPL-MCNC: 95 MG/DL — SIGNIFICANT CHANGE UP (ref 70–99)
GLUCOSE SERPL-MCNC: 98 MG/DL — SIGNIFICANT CHANGE UP (ref 70–99)
GLUCOSE SERPL-MCNC: 99 MG/DL — SIGNIFICANT CHANGE UP (ref 70–99)
GLUCOSE UR QL: NEGATIVE — SIGNIFICANT CHANGE UP
GLUCOSE UR QL: NEGATIVE — SIGNIFICANT CHANGE UP
GRAM STN FLD: SIGNIFICANT CHANGE UP
HCO3 BLDA-SCNC: 33 MMOL/L — HIGH (ref 21–28)
HCO3 BLDA-SCNC: 33 MMOL/L — HIGH (ref 21–28)
HCO3 BLDA-SCNC: 38 MMOL/L — HIGH (ref 21–28)
HCO3 BLDV-SCNC: 33 MMOL/L — HIGH (ref 22–29)
HCO3 BLDV-SCNC: 40 MMOL/L — HIGH (ref 22–29)
HCT VFR BLD CALC: 18.7 % — LOW (ref 37–47)
HCT VFR BLD CALC: 23.4 % — LOW (ref 37–47)
HCT VFR BLD CALC: 24.8 % — LOW (ref 37–47)
HCT VFR BLD CALC: 25.4 % — LOW (ref 37–47)
HCT VFR BLD CALC: 25.4 % — LOW (ref 37–47)
HCT VFR BLD CALC: 27.2 % — LOW (ref 37–47)
HCT VFR BLD CALC: 27.5 % — LOW (ref 37–47)
HCT VFR BLD CALC: 28.5 % — LOW (ref 37–47)
HCT VFR BLD CALC: 28.8 % — LOW (ref 37–47)
HCT VFR BLD CALC: 29.2 % — LOW (ref 37–47)
HCT VFR BLD CALC: 29.2 % — LOW (ref 37–47)
HCT VFR BLD CALC: 29.7 % — LOW (ref 37–47)
HCT VFR BLD CALC: 29.8 % — LOW (ref 37–47)
HCT VFR BLD CALC: 30.3 % — LOW (ref 37–47)
HCT VFR BLD CALC: 30.3 % — LOW (ref 37–47)
HCT VFR BLD CALC: 30.4 % — LOW (ref 37–47)
HCT VFR BLD CALC: 31 % — LOW (ref 37–47)
HCT VFR BLD CALC: 31 % — LOW (ref 37–47)
HCT VFR BLD CALC: 31.7 % — LOW (ref 37–47)
HCT VFR BLD CALC: 31.9 % — LOW (ref 37–47)
HCT VFR BLD CALC: 32 % — LOW (ref 37–47)
HCT VFR BLD CALC: 32.1 % — LOW (ref 37–47)
HCT VFR BLD CALC: 32.3 % — LOW (ref 37–47)
HCT VFR BLD CALC: 33.4 % — LOW (ref 37–47)
HCT VFR BLD CALC: 33.4 % — LOW (ref 37–47)
HCT VFR BLD CALC: 33.5 % — LOW (ref 37–47)
HCT VFR BLD CALC: 34.1 % — LOW (ref 37–47)
HCT VFR BLD CALC: 35.5 % — LOW (ref 37–47)
HCT VFR BLDA CALC: 27 % — LOW (ref 39–51)
HCT VFR BLDA CALC: 28 % — LOW (ref 39–51)
HCT VFR BLDA CALC: 29 % — LOW (ref 39–51)
HCT VFR BLDA CALC: 34 % — LOW (ref 39–51)
HEPARIN-PF4 AB RESULT: <0.6 U/ML — SIGNIFICANT CHANGE UP (ref 0–0.9)
HGB BLD CALC-MCNC: 11.4 G/DL — LOW (ref 12.6–17.4)
HGB BLD CALC-MCNC: 8.9 G/DL — LOW (ref 12.6–17.4)
HGB BLD CALC-MCNC: 9.3 G/DL — LOW (ref 12.6–17.4)
HGB BLD CALC-MCNC: 9.5 G/DL — LOW (ref 12.6–17.4)
HGB BLD-MCNC: 10.1 G/DL — LOW (ref 12–16)
HGB BLD-MCNC: 10.1 G/DL — LOW (ref 12–16)
HGB BLD-MCNC: 10.9 G/DL — LOW (ref 12–16)
HGB BLD-MCNC: 5.7 G/DL — CRITICAL LOW (ref 12–16)
HGB BLD-MCNC: 7.2 G/DL — LOW (ref 12–16)
HGB BLD-MCNC: 7.4 G/DL — LOW (ref 12–16)
HGB BLD-MCNC: 7.7 G/DL — LOW (ref 12–16)
HGB BLD-MCNC: 7.8 G/DL — LOW (ref 12–16)
HGB BLD-MCNC: 8.3 G/DL — LOW (ref 12–16)
HGB BLD-MCNC: 8.7 G/DL — LOW (ref 12–16)
HGB BLD-MCNC: 8.9 G/DL — LOW (ref 12–16)
HGB BLD-MCNC: 9 G/DL — LOW (ref 12–16)
HGB BLD-MCNC: 9.1 G/DL — LOW (ref 12–16)
HGB BLD-MCNC: 9.2 G/DL — LOW (ref 12–16)
HGB BLD-MCNC: 9.4 G/DL — LOW (ref 12–16)
HGB BLD-MCNC: 9.5 G/DL — LOW (ref 12–16)
HGB BLD-MCNC: 9.7 G/DL — LOW (ref 12–16)
HGB BLD-MCNC: 9.7 G/DL — LOW (ref 12–16)
HGB BLD-MCNC: 9.8 G/DL — LOW (ref 12–16)
HGB BLD-MCNC: 9.8 G/DL — LOW (ref 12–16)
HGB BLD-MCNC: 9.9 G/DL — LOW (ref 12–16)
HGB BLD-MCNC: 9.9 G/DL — LOW (ref 12–16)
HOROWITZ INDEX BLDA+IHG-RTO: 28 — SIGNIFICANT CHANGE UP
HOROWITZ INDEX BLDA+IHG-RTO: 40 — SIGNIFICANT CHANGE UP
HYALINE CASTS # UR AUTO: 0 /LPF — SIGNIFICANT CHANGE UP (ref 0–7)
HYALINE CASTS # UR AUTO: 3 /LPF — SIGNIFICANT CHANGE UP (ref 0–7)
IMM GRANULOCYTES NFR BLD AUTO: 0.3 % — SIGNIFICANT CHANGE UP (ref 0.1–0.3)
IMM GRANULOCYTES NFR BLD AUTO: 0.3 % — SIGNIFICANT CHANGE UP (ref 0.1–0.3)
IMM GRANULOCYTES NFR BLD AUTO: 0.4 % — HIGH (ref 0.1–0.3)
IMM GRANULOCYTES NFR BLD AUTO: 0.5 % — HIGH (ref 0.1–0.3)
IMM GRANULOCYTES NFR BLD AUTO: 0.6 % — HIGH (ref 0.1–0.3)
IMM GRANULOCYTES NFR BLD AUTO: 0.7 % — HIGH (ref 0.1–0.3)
IMM GRANULOCYTES NFR BLD AUTO: 0.7 % — HIGH (ref 0.1–0.3)
IMM GRANULOCYTES NFR BLD AUTO: 0.8 % — HIGH (ref 0.1–0.3)
IMM GRANULOCYTES NFR BLD AUTO: 0.9 % — HIGH (ref 0.1–0.3)
IMM GRANULOCYTES NFR BLD AUTO: 1 % — HIGH (ref 0.1–0.3)
IMM GRANULOCYTES NFR BLD AUTO: 1.1 % — HIGH (ref 0.1–0.3)
IMM GRANULOCYTES NFR BLD AUTO: 1.2 % — HIGH (ref 0.1–0.3)
IRON SATN MFR SERPL: 12 % — LOW (ref 15–50)
IRON SATN MFR SERPL: 36 UG/DL — SIGNIFICANT CHANGE UP (ref 35–150)
KETONES UR-MCNC: NEGATIVE — SIGNIFICANT CHANGE UP
KETONES UR-MCNC: NEGATIVE — SIGNIFICANT CHANGE UP
LACTATE BLDV-MCNC: 0.6 MMOL/L — SIGNIFICANT CHANGE UP (ref 0.5–2)
LACTATE BLDV-MCNC: 0.6 MMOL/L — SIGNIFICANT CHANGE UP (ref 0.5–2)
LACTATE BLDV-MCNC: 0.7 MMOL/L — SIGNIFICANT CHANGE UP (ref 0.5–2)
LACTATE BLDV-MCNC: 0.8 MMOL/L — SIGNIFICANT CHANGE UP (ref 0.5–2)
LEUKOCYTE ESTERASE UR-ACNC: NEGATIVE — SIGNIFICANT CHANGE UP
LEUKOCYTE ESTERASE UR-ACNC: NEGATIVE — SIGNIFICANT CHANGE UP
LIDOCAIN IGE QN: 83 U/L — HIGH (ref 7–60)
LYMPHOCYTES # BLD AUTO: 0.25 K/UL — LOW (ref 1.2–3.4)
LYMPHOCYTES # BLD AUTO: 0.34 K/UL — LOW (ref 1.2–3.4)
LYMPHOCYTES # BLD AUTO: 0.34 K/UL — LOW (ref 1.2–3.4)
LYMPHOCYTES # BLD AUTO: 0.36 K/UL — LOW (ref 1.2–3.4)
LYMPHOCYTES # BLD AUTO: 0.39 K/UL — LOW (ref 1.2–3.4)
LYMPHOCYTES # BLD AUTO: 0.43 K/UL — LOW (ref 1.2–3.4)
LYMPHOCYTES # BLD AUTO: 0.44 K/UL — LOW (ref 1.2–3.4)
LYMPHOCYTES # BLD AUTO: 0.47 K/UL — LOW (ref 1.2–3.4)
LYMPHOCYTES # BLD AUTO: 0.51 K/UL — LOW (ref 1.2–3.4)
LYMPHOCYTES # BLD AUTO: 0.56 K/UL — LOW (ref 1.2–3.4)
LYMPHOCYTES # BLD AUTO: 0.68 K/UL — LOW (ref 1.2–3.4)
LYMPHOCYTES # BLD AUTO: 1.02 K/UL — LOW (ref 1.2–3.4)
LYMPHOCYTES # BLD AUTO: 1.03 K/UL — LOW (ref 1.2–3.4)
LYMPHOCYTES # BLD AUTO: 1.06 K/UL — LOW (ref 1.2–3.4)
LYMPHOCYTES # BLD AUTO: 1.11 K/UL — LOW (ref 1.2–3.4)
LYMPHOCYTES # BLD AUTO: 1.19 K/UL — LOW (ref 1.2–3.4)
LYMPHOCYTES # BLD AUTO: 1.32 K/UL — SIGNIFICANT CHANGE UP (ref 1.2–3.4)
LYMPHOCYTES # BLD AUTO: 1.66 K/UL — SIGNIFICANT CHANGE UP (ref 1.2–3.4)
LYMPHOCYTES # BLD AUTO: 10.2 % — LOW (ref 20.5–51.1)
LYMPHOCYTES # BLD AUTO: 13.1 % — LOW (ref 20.5–51.1)
LYMPHOCYTES # BLD AUTO: 15.8 % — LOW (ref 20.5–51.1)
LYMPHOCYTES # BLD AUTO: 17.5 % — LOW (ref 20.5–51.1)
LYMPHOCYTES # BLD AUTO: 17.9 % — LOW (ref 20.5–51.1)
LYMPHOCYTES # BLD AUTO: 19.3 % — LOW (ref 20.5–51.1)
LYMPHOCYTES # BLD AUTO: 2.5 % — LOW (ref 20.5–51.1)
LYMPHOCYTES # BLD AUTO: 3.5 % — LOW (ref 20.5–51.1)
LYMPHOCYTES # BLD AUTO: 4.2 % — LOW (ref 20.5–51.1)
LYMPHOCYTES # BLD AUTO: 5.3 % — LOW (ref 20.5–51.1)
LYMPHOCYTES # BLD AUTO: 5.4 % — LOW (ref 20.5–51.1)
LYMPHOCYTES # BLD AUTO: 6 % — LOW (ref 20.5–51.1)
LYMPHOCYTES # BLD AUTO: 6.9 % — LOW (ref 20.5–51.1)
LYMPHOCYTES # BLD AUTO: 7.1 % — LOW (ref 20.5–51.1)
LYMPHOCYTES # BLD AUTO: 7.3 % — LOW (ref 20.5–51.1)
LYMPHOCYTES # BLD AUTO: 8 % — LOW (ref 20.5–51.1)
LYMPHOCYTES # BLD AUTO: 8.5 % — LOW (ref 20.5–51.1)
LYMPHOCYTES # BLD AUTO: 8.9 % — LOW (ref 20.5–51.1)
MAGNESIUM SERPL-MCNC: 1.9 MG/DL — SIGNIFICANT CHANGE UP (ref 1.8–2.4)
MAGNESIUM SERPL-MCNC: 2 MG/DL — SIGNIFICANT CHANGE UP (ref 1.8–2.4)
MAGNESIUM SERPL-MCNC: 2.1 MG/DL — SIGNIFICANT CHANGE UP (ref 1.8–2.4)
MAGNESIUM SERPL-MCNC: 2.2 MG/DL — SIGNIFICANT CHANGE UP (ref 1.8–2.4)
MAGNESIUM SERPL-MCNC: 2.3 MG/DL — SIGNIFICANT CHANGE UP (ref 1.8–2.4)
MAGNESIUM SERPL-MCNC: 2.4 MG/DL — SIGNIFICANT CHANGE UP (ref 1.8–2.4)
MAGNESIUM SERPL-MCNC: 2.5 MG/DL — HIGH (ref 1.8–2.4)
MAGNESIUM SERPL-MCNC: 2.5 MG/DL — HIGH (ref 1.8–2.4)
MAGNESIUM SERPL-MCNC: 2.7 MG/DL — HIGH (ref 1.8–2.4)
MAGNESIUM SERPL-MCNC: 2.7 MG/DL — HIGH (ref 1.8–2.4)
MAGNESIUM SERPL-MCNC: 2.8 MG/DL — HIGH (ref 1.8–2.4)
MAGNESIUM SERPL-MCNC: 3.1 MG/DL — CRITICAL HIGH (ref 1.8–2.4)
MCHC RBC-ENTMCNC: 29.3 PG — SIGNIFICANT CHANGE UP (ref 27–31)
MCHC RBC-ENTMCNC: 29.3 PG — SIGNIFICANT CHANGE UP (ref 27–31)
MCHC RBC-ENTMCNC: 29.4 PG — SIGNIFICANT CHANGE UP (ref 27–31)
MCHC RBC-ENTMCNC: 29.5 PG — SIGNIFICANT CHANGE UP (ref 27–31)
MCHC RBC-ENTMCNC: 29.6 G/DL — LOW (ref 32–37)
MCHC RBC-ENTMCNC: 29.6 PG — SIGNIFICANT CHANGE UP (ref 27–31)
MCHC RBC-ENTMCNC: 29.6 PG — SIGNIFICANT CHANGE UP (ref 27–31)
MCHC RBC-ENTMCNC: 29.7 PG — SIGNIFICANT CHANGE UP (ref 27–31)
MCHC RBC-ENTMCNC: 29.8 G/DL — LOW (ref 32–37)
MCHC RBC-ENTMCNC: 29.8 G/DL — LOW (ref 32–37)
MCHC RBC-ENTMCNC: 29.8 PG — SIGNIFICANT CHANGE UP (ref 27–31)
MCHC RBC-ENTMCNC: 29.9 G/DL — LOW (ref 32–37)
MCHC RBC-ENTMCNC: 29.9 PG — SIGNIFICANT CHANGE UP (ref 27–31)
MCHC RBC-ENTMCNC: 29.9 PG — SIGNIFICANT CHANGE UP (ref 27–31)
MCHC RBC-ENTMCNC: 30 G/DL — LOW (ref 32–37)
MCHC RBC-ENTMCNC: 30 PG — SIGNIFICANT CHANGE UP (ref 27–31)
MCHC RBC-ENTMCNC: 30 PG — SIGNIFICANT CHANGE UP (ref 27–31)
MCHC RBC-ENTMCNC: 30.1 G/DL — LOW (ref 32–37)
MCHC RBC-ENTMCNC: 30.1 PG — SIGNIFICANT CHANGE UP (ref 27–31)
MCHC RBC-ENTMCNC: 30.2 G/DL — LOW (ref 32–37)
MCHC RBC-ENTMCNC: 30.2 G/DL — LOW (ref 32–37)
MCHC RBC-ENTMCNC: 30.2 PG — SIGNIFICANT CHANGE UP (ref 27–31)
MCHC RBC-ENTMCNC: 30.2 PG — SIGNIFICANT CHANGE UP (ref 27–31)
MCHC RBC-ENTMCNC: 30.3 G/DL — LOW (ref 32–37)
MCHC RBC-ENTMCNC: 30.3 G/DL — LOW (ref 32–37)
MCHC RBC-ENTMCNC: 30.3 PG — SIGNIFICANT CHANGE UP (ref 27–31)
MCHC RBC-ENTMCNC: 30.4 PG — SIGNIFICANT CHANGE UP (ref 27–31)
MCHC RBC-ENTMCNC: 30.4 PG — SIGNIFICANT CHANGE UP (ref 27–31)
MCHC RBC-ENTMCNC: 30.5 G/DL — LOW (ref 32–37)
MCHC RBC-ENTMCNC: 30.5 G/DL — LOW (ref 32–37)
MCHC RBC-ENTMCNC: 30.5 PG — SIGNIFICANT CHANGE UP (ref 27–31)
MCHC RBC-ENTMCNC: 30.6 G/DL — LOW (ref 32–37)
MCHC RBC-ENTMCNC: 30.6 PG — SIGNIFICANT CHANGE UP (ref 27–31)
MCHC RBC-ENTMCNC: 30.7 G/DL — LOW (ref 32–37)
MCHC RBC-ENTMCNC: 30.7 G/DL — LOW (ref 32–37)
MCHC RBC-ENTMCNC: 30.8 G/DL — LOW (ref 32–37)
MCHC RBC-ENTMCNC: 30.8 G/DL — LOW (ref 32–37)
MCHC RBC-ENTMCNC: 30.9 G/DL — LOW (ref 32–37)
MCHC RBC-ENTMCNC: 31.4 G/DL — LOW (ref 32–37)
MCHC RBC-ENTMCNC: 32.2 G/DL — SIGNIFICANT CHANGE UP (ref 32–37)
MCHC RBC-ENTMCNC: 33 G/DL — SIGNIFICANT CHANGE UP (ref 32–37)
MCHC RBC-ENTMCNC: 33.5 G/DL — SIGNIFICANT CHANGE UP (ref 32–37)
MCV RBC AUTO: 100 FL — HIGH (ref 81–99)
MCV RBC AUTO: 100 FL — HIGH (ref 81–99)
MCV RBC AUTO: 101.4 FL — HIGH (ref 81–99)
MCV RBC AUTO: 90.4 FL — SIGNIFICANT CHANGE UP (ref 81–99)
MCV RBC AUTO: 91.3 FL — SIGNIFICANT CHANGE UP (ref 81–99)
MCV RBC AUTO: 91.3 FL — SIGNIFICANT CHANGE UP (ref 81–99)
MCV RBC AUTO: 95.9 FL — SIGNIFICANT CHANGE UP (ref 81–99)
MCV RBC AUTO: 96.4 FL — SIGNIFICANT CHANGE UP (ref 81–99)
MCV RBC AUTO: 96.7 FL — SIGNIFICANT CHANGE UP (ref 81–99)
MCV RBC AUTO: 97.2 FL — SIGNIFICANT CHANGE UP (ref 81–99)
MCV RBC AUTO: 97.3 FL — SIGNIFICANT CHANGE UP (ref 81–99)
MCV RBC AUTO: 97.6 FL — SIGNIFICANT CHANGE UP (ref 81–99)
MCV RBC AUTO: 97.8 FL — SIGNIFICANT CHANGE UP (ref 81–99)
MCV RBC AUTO: 97.8 FL — SIGNIFICANT CHANGE UP (ref 81–99)
MCV RBC AUTO: 97.9 FL — SIGNIFICANT CHANGE UP (ref 81–99)
MCV RBC AUTO: 98.3 FL — SIGNIFICANT CHANGE UP (ref 81–99)
MCV RBC AUTO: 98.3 FL — SIGNIFICANT CHANGE UP (ref 81–99)
MCV RBC AUTO: 98.4 FL — SIGNIFICANT CHANGE UP (ref 81–99)
MCV RBC AUTO: 98.7 FL — SIGNIFICANT CHANGE UP (ref 81–99)
MCV RBC AUTO: 98.8 FL — SIGNIFICANT CHANGE UP (ref 81–99)
MCV RBC AUTO: 99 FL — SIGNIFICANT CHANGE UP (ref 81–99)
MCV RBC AUTO: 99.1 FL — HIGH (ref 81–99)
MCV RBC AUTO: 99.3 FL — HIGH (ref 81–99)
MCV RBC AUTO: 99.7 FL — HIGH (ref 81–99)
METHOD TYPE: SIGNIFICANT CHANGE UP
MONOCYTES # BLD AUTO: 0.05 K/UL — LOW (ref 0.1–0.6)
MONOCYTES # BLD AUTO: 0.14 K/UL — SIGNIFICANT CHANGE UP (ref 0.1–0.6)
MONOCYTES # BLD AUTO: 0.21 K/UL — SIGNIFICANT CHANGE UP (ref 0.1–0.6)
MONOCYTES # BLD AUTO: 0.37 K/UL — SIGNIFICANT CHANGE UP (ref 0.1–0.6)
MONOCYTES # BLD AUTO: 0.47 K/UL — SIGNIFICANT CHANGE UP (ref 0.1–0.6)
MONOCYTES # BLD AUTO: 0.48 K/UL — SIGNIFICANT CHANGE UP (ref 0.1–0.6)
MONOCYTES # BLD AUTO: 0.5 K/UL — SIGNIFICANT CHANGE UP (ref 0.1–0.6)
MONOCYTES # BLD AUTO: 0.5 K/UL — SIGNIFICANT CHANGE UP (ref 0.1–0.6)
MONOCYTES # BLD AUTO: 0.53 K/UL — SIGNIFICANT CHANGE UP (ref 0.1–0.6)
MONOCYTES # BLD AUTO: 0.58 K/UL — SIGNIFICANT CHANGE UP (ref 0.1–0.6)
MONOCYTES # BLD AUTO: 0.59 K/UL — SIGNIFICANT CHANGE UP (ref 0.1–0.6)
MONOCYTES # BLD AUTO: 0.72 K/UL — HIGH (ref 0.1–0.6)
MONOCYTES # BLD AUTO: 0.72 K/UL — HIGH (ref 0.1–0.6)
MONOCYTES # BLD AUTO: 0.74 K/UL — HIGH (ref 0.1–0.6)
MONOCYTES # BLD AUTO: 0.79 K/UL — HIGH (ref 0.1–0.6)
MONOCYTES # BLD AUTO: 0.94 K/UL — HIGH (ref 0.1–0.6)
MONOCYTES NFR BLD AUTO: 1 % — LOW (ref 1.7–9.3)
MONOCYTES NFR BLD AUTO: 2.4 % — SIGNIFICANT CHANGE UP (ref 1.7–9.3)
MONOCYTES NFR BLD AUTO: 2.5 % — SIGNIFICANT CHANGE UP (ref 1.7–9.3)
MONOCYTES NFR BLD AUTO: 4.2 % — SIGNIFICANT CHANGE UP (ref 1.7–9.3)
MONOCYTES NFR BLD AUTO: 5.2 % — SIGNIFICANT CHANGE UP (ref 1.7–9.3)
MONOCYTES NFR BLD AUTO: 5.9 % — SIGNIFICANT CHANGE UP (ref 1.7–9.3)
MONOCYTES NFR BLD AUTO: 6 % — SIGNIFICANT CHANGE UP (ref 1.7–9.3)
MONOCYTES NFR BLD AUTO: 6.6 % — SIGNIFICANT CHANGE UP (ref 1.7–9.3)
MONOCYTES NFR BLD AUTO: 7.9 % — SIGNIFICANT CHANGE UP (ref 1.7–9.3)
MONOCYTES NFR BLD AUTO: 8.4 % — SIGNIFICANT CHANGE UP (ref 1.7–9.3)
MONOCYTES NFR BLD AUTO: 8.4 % — SIGNIFICANT CHANGE UP (ref 1.7–9.3)
MONOCYTES NFR BLD AUTO: 8.5 % — SIGNIFICANT CHANGE UP (ref 1.7–9.3)
MONOCYTES NFR BLD AUTO: 8.6 % — SIGNIFICANT CHANGE UP (ref 1.7–9.3)
MONOCYTES NFR BLD AUTO: 8.7 % — SIGNIFICANT CHANGE UP (ref 1.7–9.3)
MONOCYTES NFR BLD AUTO: 9 % — SIGNIFICANT CHANGE UP (ref 1.7–9.3)
MONOCYTES NFR BLD AUTO: 9.2 % — SIGNIFICANT CHANGE UP (ref 1.7–9.3)
MRSA PCR RESULT.: NEGATIVE — SIGNIFICANT CHANGE UP
NEUTROPHILS # BLD AUTO: 10.03 K/UL — HIGH (ref 1.4–6.5)
NEUTROPHILS # BLD AUTO: 10.08 K/UL — HIGH (ref 1.4–6.5)
NEUTROPHILS # BLD AUTO: 12.62 K/UL — HIGH (ref 1.4–6.5)
NEUTROPHILS # BLD AUTO: 4.28 K/UL — SIGNIFICANT CHANGE UP (ref 1.4–6.5)
NEUTROPHILS # BLD AUTO: 4.48 K/UL — SIGNIFICANT CHANGE UP (ref 1.4–6.5)
NEUTROPHILS # BLD AUTO: 4.7 K/UL — SIGNIFICANT CHANGE UP (ref 1.4–6.5)
NEUTROPHILS # BLD AUTO: 4.87 K/UL — SIGNIFICANT CHANGE UP (ref 1.4–6.5)
NEUTROPHILS # BLD AUTO: 5.1 K/UL — SIGNIFICANT CHANGE UP (ref 1.4–6.5)
NEUTROPHILS # BLD AUTO: 5.14 K/UL — SIGNIFICANT CHANGE UP (ref 1.4–6.5)
NEUTROPHILS # BLD AUTO: 5.3 K/UL — SIGNIFICANT CHANGE UP (ref 1.4–6.5)
NEUTROPHILS # BLD AUTO: 5.53 K/UL — SIGNIFICANT CHANGE UP (ref 1.4–6.5)
NEUTROPHILS # BLD AUTO: 6.06 K/UL — SIGNIFICANT CHANGE UP (ref 1.4–6.5)
NEUTROPHILS # BLD AUTO: 6.2 K/UL — SIGNIFICANT CHANGE UP (ref 1.4–6.5)
NEUTROPHILS # BLD AUTO: 7.29 K/UL — HIGH (ref 1.4–6.5)
NEUTROPHILS # BLD AUTO: 7.61 K/UL — HIGH (ref 1.4–6.5)
NEUTROPHILS # BLD AUTO: 7.62 K/UL — HIGH (ref 1.4–6.5)
NEUTROPHILS # BLD AUTO: 8.35 K/UL — HIGH (ref 1.4–6.5)
NEUTROPHILS # BLD AUTO: 8.49 K/UL — HIGH (ref 1.4–6.5)
NEUTROPHILS NFR BLD AUTO: 70.4 % — SIGNIFICANT CHANGE UP (ref 42.2–75.2)
NEUTROPHILS NFR BLD AUTO: 73 % — SIGNIFICANT CHANGE UP (ref 42.2–75.2)
NEUTROPHILS NFR BLD AUTO: 74.2 % — SIGNIFICANT CHANGE UP (ref 42.2–75.2)
NEUTROPHILS NFR BLD AUTO: 75.6 % — HIGH (ref 42.2–75.2)
NEUTROPHILS NFR BLD AUTO: 80.2 % — HIGH (ref 42.2–75.2)
NEUTROPHILS NFR BLD AUTO: 80.6 % — HIGH (ref 42.2–75.2)
NEUTROPHILS NFR BLD AUTO: 81.6 % — HIGH (ref 42.2–75.2)
NEUTROPHILS NFR BLD AUTO: 83.5 % — HIGH (ref 42.2–75.2)
NEUTROPHILS NFR BLD AUTO: 83.7 % — HIGH (ref 42.2–75.2)
NEUTROPHILS NFR BLD AUTO: 83.9 % — HIGH (ref 42.2–75.2)
NEUTROPHILS NFR BLD AUTO: 85.2 % — HIGH (ref 42.2–75.2)
NEUTROPHILS NFR BLD AUTO: 85.6 % — HIGH (ref 42.2–75.2)
NEUTROPHILS NFR BLD AUTO: 87.2 % — HIGH (ref 42.2–75.2)
NEUTROPHILS NFR BLD AUTO: 88.7 % — HIGH (ref 42.2–75.2)
NEUTROPHILS NFR BLD AUTO: 90.7 % — HIGH (ref 42.2–75.2)
NEUTROPHILS NFR BLD AUTO: 91.6 % — HIGH (ref 42.2–75.2)
NEUTROPHILS NFR BLD AUTO: 92 % — HIGH (ref 42.2–75.2)
NEUTROPHILS NFR BLD AUTO: 92.9 % — HIGH (ref 42.2–75.2)
NITRITE UR-MCNC: NEGATIVE — SIGNIFICANT CHANGE UP
NITRITE UR-MCNC: NEGATIVE — SIGNIFICANT CHANGE UP
NRBC # BLD: 0 /100 WBCS — SIGNIFICANT CHANGE UP (ref 0–0)
NT-PROBNP SERPL-SCNC: 642 PG/ML — HIGH (ref 0–300)
NT-PROBNP SERPL-SCNC: 836 PG/ML — HIGH (ref 0–300)
NT-PROBNP SERPL-SCNC: 897 PG/ML — HIGH (ref 0–300)
ORGANISM # SPEC MICROSCOPIC CNT: SIGNIFICANT CHANGE UP
ORGANISM # SPEC MICROSCOPIC CNT: SIGNIFICANT CHANGE UP
PCO2 BLDA: 59 MMHG — HIGH (ref 25–48)
PCO2 BLDA: 61 MMHG — HIGH (ref 25–48)
PCO2 BLDA: 72 MMHG — CRITICAL HIGH (ref 25–48)
PCO2 BLDV: 71 MMHG — HIGH (ref 39–42)
PCO2 BLDV: 88 MMHG — HIGH (ref 39–42)
PCO2 BLDV: 92 MMHG — HIGH (ref 39–42)
PCO2 BLDV: 93 MMHG — HIGH (ref 39–42)
PF4 HEPARIN CMPLX AB SER-ACNC: NEGATIVE — SIGNIFICANT CHANGE UP
PH BLDA: 7.33 — LOW (ref 7.35–7.45)
PH BLDA: 7.34 — LOW (ref 7.35–7.45)
PH BLDA: 7.36 — SIGNIFICANT CHANGE UP (ref 7.35–7.45)
PH BLDV: 7.24 — LOW (ref 7.32–7.43)
PH BLDV: 7.25 — LOW (ref 7.32–7.43)
PH BLDV: 7.27 — LOW (ref 7.32–7.43)
PH BLDV: 7.27 — LOW (ref 7.32–7.43)
PH UR: 5.5 — SIGNIFICANT CHANGE UP (ref 5–8)
PH UR: 6 — SIGNIFICANT CHANGE UP (ref 5–8)
PHOSPHATE SERPL-MCNC: 4.8 MG/DL — SIGNIFICANT CHANGE UP (ref 2.1–4.9)
PHOSPHATE SERPL-MCNC: 5.3 MG/DL — HIGH (ref 2.1–4.9)
PHOSPHATE SERPL-MCNC: 5.5 MG/DL — HIGH (ref 2.1–4.9)
PHOSPHATE SERPL-MCNC: 5.6 MG/DL — HIGH (ref 2.1–4.9)
PHOSPHATE SERPL-MCNC: 5.9 MG/DL — HIGH (ref 2.1–4.9)
PLATELET # BLD AUTO: 133 K/UL — SIGNIFICANT CHANGE UP (ref 130–400)
PLATELET # BLD AUTO: 135 K/UL — SIGNIFICANT CHANGE UP (ref 130–400)
PLATELET # BLD AUTO: 139 K/UL — SIGNIFICANT CHANGE UP (ref 130–400)
PLATELET # BLD AUTO: 140 K/UL — SIGNIFICANT CHANGE UP (ref 130–400)
PLATELET # BLD AUTO: 144 K/UL — SIGNIFICANT CHANGE UP (ref 130–400)
PLATELET # BLD AUTO: 150 K/UL — SIGNIFICANT CHANGE UP (ref 130–400)
PLATELET # BLD AUTO: 154 K/UL — SIGNIFICANT CHANGE UP (ref 130–400)
PLATELET # BLD AUTO: 158 K/UL — SIGNIFICANT CHANGE UP (ref 130–400)
PLATELET # BLD AUTO: 158 K/UL — SIGNIFICANT CHANGE UP (ref 130–400)
PLATELET # BLD AUTO: 159 K/UL — SIGNIFICANT CHANGE UP (ref 130–400)
PLATELET # BLD AUTO: 161 K/UL — SIGNIFICANT CHANGE UP (ref 130–400)
PLATELET # BLD AUTO: 166 K/UL — SIGNIFICANT CHANGE UP (ref 130–400)
PLATELET # BLD AUTO: 171 K/UL — SIGNIFICANT CHANGE UP (ref 130–400)
PLATELET # BLD AUTO: 173 K/UL — SIGNIFICANT CHANGE UP (ref 130–400)
PLATELET # BLD AUTO: 178 K/UL — SIGNIFICANT CHANGE UP (ref 130–400)
PLATELET # BLD AUTO: 178 K/UL — SIGNIFICANT CHANGE UP (ref 130–400)
PLATELET # BLD AUTO: 181 K/UL — SIGNIFICANT CHANGE UP (ref 130–400)
PLATELET # BLD AUTO: 183 K/UL — SIGNIFICANT CHANGE UP (ref 130–400)
PLATELET # BLD AUTO: 195 K/UL — SIGNIFICANT CHANGE UP (ref 130–400)
PLATELET # BLD AUTO: 198 K/UL — SIGNIFICANT CHANGE UP (ref 130–400)
PLATELET # BLD AUTO: 200 K/UL — SIGNIFICANT CHANGE UP (ref 130–400)
PLATELET # BLD AUTO: 201 K/UL — SIGNIFICANT CHANGE UP (ref 130–400)
PLATELET # BLD AUTO: 202 K/UL — SIGNIFICANT CHANGE UP (ref 130–400)
PLATELET # BLD AUTO: 208 K/UL — SIGNIFICANT CHANGE UP (ref 130–400)
PLATELET # BLD AUTO: 215 K/UL — SIGNIFICANT CHANGE UP (ref 130–400)
PLATELET # BLD AUTO: 218 K/UL — SIGNIFICANT CHANGE UP (ref 130–400)
PLATELET # BLD AUTO: 220 K/UL — SIGNIFICANT CHANGE UP (ref 130–400)
PLATELET # BLD AUTO: 235 K/UL — SIGNIFICANT CHANGE UP (ref 130–400)
PMV BLD: 10 FL — SIGNIFICANT CHANGE UP (ref 7.4–10.4)
PMV BLD: 10.1 FL — SIGNIFICANT CHANGE UP (ref 7.4–10.4)
PMV BLD: 10.2 FL — SIGNIFICANT CHANGE UP (ref 7.4–10.4)
PMV BLD: 10.3 FL — SIGNIFICANT CHANGE UP (ref 7.4–10.4)
PMV BLD: 10.4 FL — SIGNIFICANT CHANGE UP (ref 7.4–10.4)
PMV BLD: 10.5 FL — HIGH (ref 7.4–10.4)
PMV BLD: 10.6 FL — HIGH (ref 7.4–10.4)
PMV BLD: 10.8 FL — HIGH (ref 7.4–10.4)
PMV BLD: 10.8 FL — HIGH (ref 7.4–10.4)
PMV BLD: 11.1 FL — HIGH (ref 7.4–10.4)
PMV BLD: 11.5 FL — HIGH (ref 7.4–10.4)
PMV BLD: 11.7 FL — HIGH (ref 7.4–10.4)
PMV BLD: 11.7 FL — HIGH (ref 7.4–10.4)
PMV BLD: 12 FL — HIGH (ref 7.4–10.4)
PMV BLD: 12 FL — HIGH (ref 7.4–10.4)
PMV BLD: 12.2 FL — HIGH (ref 7.4–10.4)
PO2 BLDA: 65 MMHG — LOW (ref 83–108)
PO2 BLDA: 73 MMHG — LOW (ref 83–108)
PO2 BLDA: 77 MMHG — LOW (ref 83–108)
PO2 BLDV: 24 MMHG — SIGNIFICANT CHANGE UP
PO2 BLDV: 25 MMHG — SIGNIFICANT CHANGE UP
PO2 BLDV: 30 MMHG — SIGNIFICANT CHANGE UP
PO2 BLDV: 78 MMHG — SIGNIFICANT CHANGE UP
POTASSIUM BLDV-SCNC: 4.5 MMOL/L — SIGNIFICANT CHANGE UP (ref 3.5–5.1)
POTASSIUM BLDV-SCNC: 5.2 MMOL/L — HIGH (ref 3.5–5.1)
POTASSIUM BLDV-SCNC: 5.2 MMOL/L — HIGH (ref 3.5–5.1)
POTASSIUM BLDV-SCNC: 5.4 MMOL/L — HIGH (ref 3.5–5.1)
POTASSIUM SERPL-MCNC: 4.2 MMOL/L — SIGNIFICANT CHANGE UP (ref 3.5–5)
POTASSIUM SERPL-MCNC: 4.4 MMOL/L — SIGNIFICANT CHANGE UP (ref 3.5–5)
POTASSIUM SERPL-MCNC: 4.5 MMOL/L — SIGNIFICANT CHANGE UP (ref 3.5–5)
POTASSIUM SERPL-MCNC: 4.5 MMOL/L — SIGNIFICANT CHANGE UP (ref 3.5–5)
POTASSIUM SERPL-MCNC: 4.6 MMOL/L — SIGNIFICANT CHANGE UP (ref 3.5–5)
POTASSIUM SERPL-MCNC: 4.7 MMOL/L — SIGNIFICANT CHANGE UP (ref 3.5–5)
POTASSIUM SERPL-MCNC: 4.8 MMOL/L — SIGNIFICANT CHANGE UP (ref 3.5–5)
POTASSIUM SERPL-MCNC: 4.8 MMOL/L — SIGNIFICANT CHANGE UP (ref 3.5–5)
POTASSIUM SERPL-MCNC: 4.9 MMOL/L — SIGNIFICANT CHANGE UP (ref 3.5–5)
POTASSIUM SERPL-MCNC: 5 MMOL/L — SIGNIFICANT CHANGE UP (ref 3.5–5)
POTASSIUM SERPL-MCNC: 5 MMOL/L — SIGNIFICANT CHANGE UP (ref 3.5–5)
POTASSIUM SERPL-MCNC: 5.1 MMOL/L — HIGH (ref 3.5–5)
POTASSIUM SERPL-MCNC: 5.2 MMOL/L — HIGH (ref 3.5–5)
POTASSIUM SERPL-MCNC: 5.3 MMOL/L — HIGH (ref 3.5–5)
POTASSIUM SERPL-MCNC: 5.3 MMOL/L — HIGH (ref 3.5–5)
POTASSIUM SERPL-MCNC: 5.4 MMOL/L — HIGH (ref 3.5–5)
POTASSIUM SERPL-MCNC: 5.4 MMOL/L — HIGH (ref 3.5–5)
POTASSIUM SERPL-MCNC: 5.5 MMOL/L — HIGH (ref 3.5–5)
POTASSIUM SERPL-MCNC: 5.7 MMOL/L — HIGH (ref 3.5–5)
POTASSIUM SERPL-MCNC: 5.8 MMOL/L — HIGH (ref 3.5–5)
POTASSIUM SERPL-MCNC: 5.9 MMOL/L — HIGH (ref 3.5–5)
POTASSIUM SERPL-MCNC: 6 MMOL/L — CRITICAL HIGH (ref 3.5–5)
POTASSIUM SERPL-MCNC: 6 MMOL/L — CRITICAL HIGH (ref 3.5–5)
POTASSIUM SERPL-SCNC: 4.2 MMOL/L — SIGNIFICANT CHANGE UP (ref 3.5–5)
POTASSIUM SERPL-SCNC: 4.4 MMOL/L — SIGNIFICANT CHANGE UP (ref 3.5–5)
POTASSIUM SERPL-SCNC: 4.5 MMOL/L — SIGNIFICANT CHANGE UP (ref 3.5–5)
POTASSIUM SERPL-SCNC: 4.5 MMOL/L — SIGNIFICANT CHANGE UP (ref 3.5–5)
POTASSIUM SERPL-SCNC: 4.6 MMOL/L — SIGNIFICANT CHANGE UP (ref 3.5–5)
POTASSIUM SERPL-SCNC: 4.7 MMOL/L — SIGNIFICANT CHANGE UP (ref 3.5–5)
POTASSIUM SERPL-SCNC: 4.8 MMOL/L — SIGNIFICANT CHANGE UP (ref 3.5–5)
POTASSIUM SERPL-SCNC: 4.8 MMOL/L — SIGNIFICANT CHANGE UP (ref 3.5–5)
POTASSIUM SERPL-SCNC: 4.9 MMOL/L — SIGNIFICANT CHANGE UP (ref 3.5–5)
POTASSIUM SERPL-SCNC: 5 MMOL/L — SIGNIFICANT CHANGE UP (ref 3.5–5)
POTASSIUM SERPL-SCNC: 5 MMOL/L — SIGNIFICANT CHANGE UP (ref 3.5–5)
POTASSIUM SERPL-SCNC: 5.1 MMOL/L — HIGH (ref 3.5–5)
POTASSIUM SERPL-SCNC: 5.2 MMOL/L — HIGH (ref 3.5–5)
POTASSIUM SERPL-SCNC: 5.3 MMOL/L — HIGH (ref 3.5–5)
POTASSIUM SERPL-SCNC: 5.3 MMOL/L — HIGH (ref 3.5–5)
POTASSIUM SERPL-SCNC: 5.4 MMOL/L — HIGH (ref 3.5–5)
POTASSIUM SERPL-SCNC: 5.4 MMOL/L — HIGH (ref 3.5–5)
POTASSIUM SERPL-SCNC: 5.5 MMOL/L — HIGH (ref 3.5–5)
POTASSIUM SERPL-SCNC: 5.7 MMOL/L — HIGH (ref 3.5–5)
POTASSIUM SERPL-SCNC: 5.8 MMOL/L — HIGH (ref 3.5–5)
POTASSIUM SERPL-SCNC: 5.9 MMOL/L — HIGH (ref 3.5–5)
POTASSIUM SERPL-SCNC: 6 MMOL/L — CRITICAL HIGH (ref 3.5–5)
POTASSIUM SERPL-SCNC: 6 MMOL/L — CRITICAL HIGH (ref 3.5–5)
PROCALCITONIN SERPL-MCNC: 0.07 NG/ML — SIGNIFICANT CHANGE UP (ref 0.02–0.1)
PROCALCITONIN SERPL-MCNC: 0.12 NG/ML — HIGH (ref 0.02–0.1)
PROCALCITONIN SERPL-MCNC: 0.39 NG/ML — HIGH (ref 0.02–0.1)
PROCALCITONIN SERPL-MCNC: 0.4 NG/ML — HIGH (ref 0.02–0.1)
PROT SERPL-MCNC: 4.8 G/DL — LOW (ref 6–8)
PROT SERPL-MCNC: 5 G/DL — LOW (ref 6–8)
PROT SERPL-MCNC: 5.2 G/DL — LOW (ref 6–8)
PROT SERPL-MCNC: 5.3 G/DL — LOW (ref 6–8)
PROT SERPL-MCNC: 5.5 G/DL — LOW (ref 6–8)
PROT SERPL-MCNC: 5.6 G/DL — LOW (ref 6–8)
PROT SERPL-MCNC: 5.7 G/DL — LOW (ref 6–8)
PROT SERPL-MCNC: 5.8 G/DL — LOW (ref 6–8)
PROT SERPL-MCNC: 5.8 G/DL — LOW (ref 6–8)
PROT SERPL-MCNC: 5.9 G/DL — LOW (ref 6–8)
PROT SERPL-MCNC: 6 G/DL — SIGNIFICANT CHANGE UP (ref 6–8)
PROT SERPL-MCNC: 6.1 G/DL — SIGNIFICANT CHANGE UP (ref 6–8)
PROT SERPL-MCNC: 6.3 G/DL — SIGNIFICANT CHANGE UP (ref 6–8)
PROT SERPL-MCNC: 6.5 G/DL — SIGNIFICANT CHANGE UP (ref 6–8)
PROT SERPL-MCNC: 6.5 G/DL — SIGNIFICANT CHANGE UP (ref 6–8)
PROT SERPL-MCNC: 6.6 G/DL — SIGNIFICANT CHANGE UP (ref 6–8)
PROT UR-MCNC: ABNORMAL
PROT UR-MCNC: SIGNIFICANT CHANGE UP
RAPID RVP RESULT: SIGNIFICANT CHANGE UP
RAPID RVP RESULT: SIGNIFICANT CHANGE UP
RBC # BLD: 1.94 M/UL — LOW (ref 4.2–5.4)
RBC # BLD: 2.38 M/UL — LOW (ref 4.2–5.4)
RBC # BLD: 2.51 M/UL — LOW (ref 4.2–5.4)
RBC # BLD: 2.58 M/UL — LOW (ref 4.2–5.4)
RBC # BLD: 2.61 M/UL — LOW (ref 4.2–5.4)
RBC # BLD: 2.77 M/UL — LOW (ref 4.2–5.4)
RBC # BLD: 2.89 M/UL — LOW (ref 4.2–5.4)
RBC # BLD: 2.93 M/UL — LOW (ref 4.2–5.4)
RBC # BLD: 2.95 M/UL — LOW (ref 4.2–5.4)
RBC # BLD: 3.01 M/UL — LOW (ref 4.2–5.4)
RBC # BLD: 3.03 M/UL — LOW (ref 4.2–5.4)
RBC # BLD: 3.05 M/UL — LOW (ref 4.2–5.4)
RBC # BLD: 3.07 M/UL — LOW (ref 4.2–5.4)
RBC # BLD: 3.12 M/UL — LOW (ref 4.2–5.4)
RBC # BLD: 3.16 M/UL — LOW (ref 4.2–5.4)
RBC # BLD: 3.17 M/UL — LOW (ref 4.2–5.4)
RBC # BLD: 3.19 M/UL — LOW (ref 4.2–5.4)
RBC # BLD: 3.2 M/UL — LOW (ref 4.2–5.4)
RBC # BLD: 3.2 M/UL — LOW (ref 4.2–5.4)
RBC # BLD: 3.23 M/UL — LOW (ref 4.2–5.4)
RBC # BLD: 3.24 M/UL — LOW (ref 4.2–5.4)
RBC # BLD: 3.28 M/UL — LOW (ref 4.2–5.4)
RBC # BLD: 3.31 M/UL — LOW (ref 4.2–5.4)
RBC # BLD: 3.35 M/UL — LOW (ref 4.2–5.4)
RBC # BLD: 3.35 M/UL — LOW (ref 4.2–5.4)
RBC # BLD: 3.37 M/UL — LOW (ref 4.2–5.4)
RBC # BLD: 3.45 M/UL — LOW (ref 4.2–5.4)
RBC # BLD: 3.67 M/UL — LOW (ref 4.2–5.4)
RBC # FLD: 16 % — HIGH (ref 11.5–14.5)
RBC # FLD: 16.2 % — HIGH (ref 11.5–14.5)
RBC # FLD: 16.3 % — HIGH (ref 11.5–14.5)
RBC # FLD: 16.3 % — HIGH (ref 11.5–14.5)
RBC # FLD: 16.4 % — HIGH (ref 11.5–14.5)
RBC # FLD: 16.4 % — HIGH (ref 11.5–14.5)
RBC # FLD: 16.5 % — HIGH (ref 11.5–14.5)
RBC # FLD: 16.5 % — HIGH (ref 11.5–14.5)
RBC # FLD: 16.6 % — HIGH (ref 11.5–14.5)
RBC # FLD: 16.7 % — HIGH (ref 11.5–14.5)
RBC # FLD: 16.8 % — HIGH (ref 11.5–14.5)
RBC # FLD: 16.9 % — HIGH (ref 11.5–14.5)
RBC # FLD: 17.1 % — HIGH (ref 11.5–14.5)
RBC # FLD: 17.2 % — HIGH (ref 11.5–14.5)
RBC # FLD: 17.2 % — HIGH (ref 11.5–14.5)
RBC CASTS # UR COMP ASSIST: 0 /HPF — SIGNIFICANT CHANGE UP (ref 0–4)
RBC CASTS # UR COMP ASSIST: 5 /HPF — HIGH (ref 0–4)
RSV RNA NPH QL NAA+NON-PROBE: SIGNIFICANT CHANGE UP
SAO2 % BLDA: 94.2 % — SIGNIFICANT CHANGE UP (ref 94–98)
SAO2 % BLDA: 96 % — SIGNIFICANT CHANGE UP (ref 94–98)
SAO2 % BLDA: 97 % — SIGNIFICANT CHANGE UP (ref 94–98)
SAO2 % BLDV: 33.1 % — SIGNIFICANT CHANGE UP
SAO2 % BLDV: 34.2 % — SIGNIFICANT CHANGE UP
SAO2 % BLDV: 43.6 % — SIGNIFICANT CHANGE UP
SAO2 % BLDV: 96 % — SIGNIFICANT CHANGE UP
SARS-COV-2 RNA SPEC QL NAA+PROBE: SIGNIFICANT CHANGE UP
SODIUM SERPL-SCNC: 131 MMOL/L — LOW (ref 135–146)
SODIUM SERPL-SCNC: 131 MMOL/L — LOW (ref 135–146)
SODIUM SERPL-SCNC: 134 MMOL/L — LOW (ref 135–146)
SODIUM SERPL-SCNC: 135 MMOL/L — SIGNIFICANT CHANGE UP (ref 135–146)
SODIUM SERPL-SCNC: 136 MMOL/L — SIGNIFICANT CHANGE UP (ref 135–146)
SODIUM SERPL-SCNC: 136 MMOL/L — SIGNIFICANT CHANGE UP (ref 135–146)
SODIUM SERPL-SCNC: 137 MMOL/L — SIGNIFICANT CHANGE UP (ref 135–146)
SODIUM SERPL-SCNC: 137 MMOL/L — SIGNIFICANT CHANGE UP (ref 135–146)
SODIUM SERPL-SCNC: 138 MMOL/L — SIGNIFICANT CHANGE UP (ref 135–146)
SODIUM SERPL-SCNC: 138 MMOL/L — SIGNIFICANT CHANGE UP (ref 135–146)
SODIUM SERPL-SCNC: 139 MMOL/L — SIGNIFICANT CHANGE UP (ref 135–146)
SODIUM SERPL-SCNC: 139 MMOL/L — SIGNIFICANT CHANGE UP (ref 135–146)
SODIUM SERPL-SCNC: 140 MMOL/L — SIGNIFICANT CHANGE UP (ref 135–146)
SODIUM SERPL-SCNC: 141 MMOL/L — SIGNIFICANT CHANGE UP (ref 135–146)
SODIUM SERPL-SCNC: 142 MMOL/L — SIGNIFICANT CHANGE UP (ref 135–146)
SODIUM SERPL-SCNC: 143 MMOL/L — SIGNIFICANT CHANGE UP (ref 135–146)
SODIUM SERPL-SCNC: 144 MMOL/L — SIGNIFICANT CHANGE UP (ref 135–146)
SODIUM SERPL-SCNC: 145 MMOL/L — SIGNIFICANT CHANGE UP (ref 135–146)
SODIUM SERPL-SCNC: 146 MMOL/L — SIGNIFICANT CHANGE UP (ref 135–146)
SODIUM UR-SCNC: 24 MMOL/L — SIGNIFICANT CHANGE UP
SP GR SPEC: 1.02 — SIGNIFICANT CHANGE UP (ref 1.01–1.03)
SP GR SPEC: 1.02 — SIGNIFICANT CHANGE UP (ref 1.01–1.03)
SPECIMEN SOURCE: SIGNIFICANT CHANGE UP
TIBC SERPL-MCNC: 297 UG/DL — SIGNIFICANT CHANGE UP (ref 220–430)
TROPONIN T SERPL-MCNC: 0.01 NG/ML — SIGNIFICANT CHANGE UP
TROPONIN T SERPL-MCNC: 0.02 NG/ML — HIGH
TROPONIN T SERPL-MCNC: 0.03 NG/ML — CRITICAL HIGH
TROPONIN T SERPL-MCNC: 0.03 NG/ML — CRITICAL HIGH
TROPONIN T SERPL-MCNC: 0.04 NG/ML — CRITICAL HIGH
TROPONIN T SERPL-MCNC: <0.01 NG/ML — SIGNIFICANT CHANGE UP
UIBC SERPL-MCNC: 261 UG/DL — SIGNIFICANT CHANGE UP (ref 110–370)
UNFRACTIONATED HEPARIN INTERPRETATION: SIGNIFICANT CHANGE UP
UNFRACTIONATED HEPARIN RESULT: NEGATIVE — SIGNIFICANT CHANGE UP
UNFRACTIONATED HEPARIN-HIGH DOSE: 1 % — SIGNIFICANT CHANGE UP
UNFRACTIONATED HEPARIN-LOW DOSE: 1 % — SIGNIFICANT CHANGE UP
URATE CRY FLD QL MICRO: ABNORMAL
UROBILINOGEN FLD QL: SIGNIFICANT CHANGE UP
UROBILINOGEN FLD QL: SIGNIFICANT CHANGE UP
UUN UR-MCNC: 1114 MG/DL — SIGNIFICANT CHANGE UP
WBC # BLD: 10.16 K/UL — SIGNIFICANT CHANGE UP (ref 4.8–10.8)
WBC # BLD: 10.41 K/UL — SIGNIFICANT CHANGE UP (ref 4.8–10.8)
WBC # BLD: 11.29 K/UL — HIGH (ref 4.8–10.8)
WBC # BLD: 12.04 K/UL — HIGH (ref 4.8–10.8)
WBC # BLD: 12.21 K/UL — HIGH (ref 4.8–10.8)
WBC # BLD: 13.72 K/UL — HIGH (ref 4.8–10.8)
WBC # BLD: 3 K/UL — LOW (ref 4.8–10.8)
WBC # BLD: 3.63 K/UL — LOW (ref 4.8–10.8)
WBC # BLD: 4.94 K/UL — SIGNIFICANT CHANGE UP (ref 4.8–10.8)
WBC # BLD: 5.23 K/UL — SIGNIFICANT CHANGE UP (ref 4.8–10.8)
WBC # BLD: 5.71 K/UL — SIGNIFICANT CHANGE UP (ref 4.8–10.8)
WBC # BLD: 5.87 K/UL — SIGNIFICANT CHANGE UP (ref 4.8–10.8)
WBC # BLD: 5.95 K/UL — SIGNIFICANT CHANGE UP (ref 4.8–10.8)
WBC # BLD: 6.01 K/UL — SIGNIFICANT CHANGE UP (ref 4.8–10.8)
WBC # BLD: 6.08 K/UL — SIGNIFICANT CHANGE UP (ref 4.8–10.8)
WBC # BLD: 6.19 K/UL — SIGNIFICANT CHANGE UP (ref 4.8–10.8)
WBC # BLD: 6.21 K/UL — SIGNIFICANT CHANGE UP (ref 4.8–10.8)
WBC # BLD: 6.3 K/UL — SIGNIFICANT CHANGE UP (ref 4.8–10.8)
WBC # BLD: 6.35 K/UL — SIGNIFICANT CHANGE UP (ref 4.8–10.8)
WBC # BLD: 6.71 K/UL — SIGNIFICANT CHANGE UP (ref 4.8–10.8)
WBC # BLD: 8.31 K/UL — SIGNIFICANT CHANGE UP (ref 4.8–10.8)
WBC # BLD: 8.36 K/UL — SIGNIFICANT CHANGE UP (ref 4.8–10.8)
WBC # BLD: 8.38 K/UL — SIGNIFICANT CHANGE UP (ref 4.8–10.8)
WBC # BLD: 8.6 K/UL — SIGNIFICANT CHANGE UP (ref 4.8–10.8)
WBC # BLD: 8.74 K/UL — SIGNIFICANT CHANGE UP (ref 4.8–10.8)
WBC # BLD: 8.96 K/UL — SIGNIFICANT CHANGE UP (ref 4.8–10.8)
WBC # BLD: 9.05 K/UL — SIGNIFICANT CHANGE UP (ref 4.8–10.8)
WBC # BLD: 9.92 K/UL — SIGNIFICANT CHANGE UP (ref 4.8–10.8)
WBC # FLD AUTO: 10.16 K/UL — SIGNIFICANT CHANGE UP (ref 4.8–10.8)
WBC # FLD AUTO: 10.41 K/UL — SIGNIFICANT CHANGE UP (ref 4.8–10.8)
WBC # FLD AUTO: 11.29 K/UL — HIGH (ref 4.8–10.8)
WBC # FLD AUTO: 12.04 K/UL — HIGH (ref 4.8–10.8)
WBC # FLD AUTO: 12.21 K/UL — HIGH (ref 4.8–10.8)
WBC # FLD AUTO: 13.72 K/UL — HIGH (ref 4.8–10.8)
WBC # FLD AUTO: 3 K/UL — LOW (ref 4.8–10.8)
WBC # FLD AUTO: 3.63 K/UL — LOW (ref 4.8–10.8)
WBC # FLD AUTO: 4.94 K/UL — SIGNIFICANT CHANGE UP (ref 4.8–10.8)
WBC # FLD AUTO: 5.23 K/UL — SIGNIFICANT CHANGE UP (ref 4.8–10.8)
WBC # FLD AUTO: 5.71 K/UL — SIGNIFICANT CHANGE UP (ref 4.8–10.8)
WBC # FLD AUTO: 5.87 K/UL — SIGNIFICANT CHANGE UP (ref 4.8–10.8)
WBC # FLD AUTO: 5.95 K/UL — SIGNIFICANT CHANGE UP (ref 4.8–10.8)
WBC # FLD AUTO: 6.01 K/UL — SIGNIFICANT CHANGE UP (ref 4.8–10.8)
WBC # FLD AUTO: 6.08 K/UL — SIGNIFICANT CHANGE UP (ref 4.8–10.8)
WBC # FLD AUTO: 6.19 K/UL — SIGNIFICANT CHANGE UP (ref 4.8–10.8)
WBC # FLD AUTO: 6.21 K/UL — SIGNIFICANT CHANGE UP (ref 4.8–10.8)
WBC # FLD AUTO: 6.3 K/UL — SIGNIFICANT CHANGE UP (ref 4.8–10.8)
WBC # FLD AUTO: 6.35 K/UL — SIGNIFICANT CHANGE UP (ref 4.8–10.8)
WBC # FLD AUTO: 6.71 K/UL — SIGNIFICANT CHANGE UP (ref 4.8–10.8)
WBC # FLD AUTO: 8.31 K/UL — SIGNIFICANT CHANGE UP (ref 4.8–10.8)
WBC # FLD AUTO: 8.36 K/UL — SIGNIFICANT CHANGE UP (ref 4.8–10.8)
WBC # FLD AUTO: 8.38 K/UL — SIGNIFICANT CHANGE UP (ref 4.8–10.8)
WBC # FLD AUTO: 8.6 K/UL — SIGNIFICANT CHANGE UP (ref 4.8–10.8)
WBC # FLD AUTO: 8.74 K/UL — SIGNIFICANT CHANGE UP (ref 4.8–10.8)
WBC # FLD AUTO: 8.96 K/UL — SIGNIFICANT CHANGE UP (ref 4.8–10.8)
WBC # FLD AUTO: 9.05 K/UL — SIGNIFICANT CHANGE UP (ref 4.8–10.8)
WBC # FLD AUTO: 9.92 K/UL — SIGNIFICANT CHANGE UP (ref 4.8–10.8)
WBC UR QL: 1 /HPF — SIGNIFICANT CHANGE UP (ref 0–5)
WBC UR QL: 4 /HPF — SIGNIFICANT CHANGE UP (ref 0–5)

## 2023-01-01 PROCEDURE — 93010 ELECTROCARDIOGRAM REPORT: CPT

## 2023-01-01 PROCEDURE — 87086 URINE CULTURE/COLONY COUNT: CPT

## 2023-01-01 PROCEDURE — 85014 HEMATOCRIT: CPT

## 2023-01-01 PROCEDURE — 83880 ASSAY OF NATRIURETIC PEPTIDE: CPT

## 2023-01-01 PROCEDURE — 71045 X-RAY EXAM CHEST 1 VIEW: CPT | Mod: 26

## 2023-01-01 PROCEDURE — 80053 COMPREHEN METABOLIC PANEL: CPT

## 2023-01-01 PROCEDURE — 36415 COLL VENOUS BLD VENIPUNCTURE: CPT

## 2023-01-01 PROCEDURE — 92610 EVALUATE SWALLOWING FUNCTION: CPT | Mod: GN

## 2023-01-01 PROCEDURE — 99233 SBSQ HOSP IP/OBS HIGH 50: CPT

## 2023-01-01 PROCEDURE — 94640 AIRWAY INHALATION TREATMENT: CPT

## 2023-01-01 PROCEDURE — 84145 PROCALCITONIN (PCT): CPT

## 2023-01-01 PROCEDURE — 99285 EMERGENCY DEPT VISIT HI MDM: CPT | Mod: GC

## 2023-01-01 PROCEDURE — 71045 X-RAY EXAM CHEST 1 VIEW: CPT

## 2023-01-01 PROCEDURE — 85025 COMPLETE CBC W/AUTO DIFF WBC: CPT

## 2023-01-01 PROCEDURE — 83735 ASSAY OF MAGNESIUM: CPT

## 2023-01-01 PROCEDURE — 93971 EXTREMITY STUDY: CPT | Mod: LT

## 2023-01-01 PROCEDURE — 36569 INSJ PICC 5 YR+ W/O IMAGING: CPT

## 2023-01-01 PROCEDURE — 99291 CRITICAL CARE FIRST HOUR: CPT

## 2023-01-01 PROCEDURE — 87040 BLOOD CULTURE FOR BACTERIA: CPT

## 2023-01-01 PROCEDURE — 82330 ASSAY OF CALCIUM: CPT

## 2023-01-01 PROCEDURE — 99239 HOSP IP/OBS DSCHRG MGMT >30: CPT

## 2023-01-01 PROCEDURE — 99232 SBSQ HOSP IP/OBS MODERATE 35: CPT

## 2023-01-01 PROCEDURE — 99497 ADVNCD CARE PLAN 30 MIN: CPT

## 2023-01-01 PROCEDURE — 82550 ASSAY OF CK (CPK): CPT

## 2023-01-01 PROCEDURE — 99348 HOME/RES VST EST LOW MDM 30: CPT

## 2023-01-01 PROCEDURE — 97116 GAIT TRAINING THERAPY: CPT | Mod: GP

## 2023-01-01 PROCEDURE — 84484 ASSAY OF TROPONIN QUANT: CPT

## 2023-01-01 PROCEDURE — 82570 ASSAY OF URINE CREATININE: CPT

## 2023-01-01 PROCEDURE — 70450 CT HEAD/BRAIN W/O DYE: CPT | Mod: MA

## 2023-01-01 PROCEDURE — 74177 CT ABD & PELVIS W/CONTRAST: CPT | Mod: 26,MA

## 2023-01-01 PROCEDURE — 99221 1ST HOSP IP/OBS SF/LOW 40: CPT | Mod: FS

## 2023-01-01 PROCEDURE — 71046 X-RAY EXAM CHEST 2 VIEWS: CPT

## 2023-01-01 PROCEDURE — 73070 X-RAY EXAM OF ELBOW: CPT | Mod: 26,RT

## 2023-01-01 PROCEDURE — 84132 ASSAY OF SERUM POTASSIUM: CPT

## 2023-01-01 PROCEDURE — 99221 1ST HOSP IP/OBS SF/LOW 40: CPT

## 2023-01-01 PROCEDURE — 83605 ASSAY OF LACTIC ACID: CPT

## 2023-01-01 PROCEDURE — 0225U NFCT DS DNA&RNA 21 SARSCOV2: CPT

## 2023-01-01 PROCEDURE — 86022 PLATELET ANTIBODIES: CPT

## 2023-01-01 PROCEDURE — 82803 BLOOD GASES ANY COMBINATION: CPT

## 2023-01-01 PROCEDURE — 85027 COMPLETE CBC AUTOMATED: CPT

## 2023-01-01 PROCEDURE — 86923 COMPATIBILITY TEST ELECTRIC: CPT

## 2023-01-01 PROCEDURE — 81001 URINALYSIS AUTO W/SCOPE: CPT

## 2023-01-01 PROCEDURE — 76770 US EXAM ABDO BACK WALL COMP: CPT | Mod: 26

## 2023-01-01 PROCEDURE — 72125 CT NECK SPINE W/O DYE: CPT | Mod: 26,MA

## 2023-01-01 PROCEDURE — 83550 IRON BINDING TEST: CPT

## 2023-01-01 PROCEDURE — 87641 MR-STAPH DNA AMP PROBE: CPT

## 2023-01-01 PROCEDURE — 84300 ASSAY OF URINE SODIUM: CPT

## 2023-01-01 PROCEDURE — 84100 ASSAY OF PHOSPHORUS: CPT

## 2023-01-01 PROCEDURE — 93306 TTE W/DOPPLER COMPLETE: CPT | Mod: 26

## 2023-01-01 PROCEDURE — 99223 1ST HOSP IP/OBS HIGH 75: CPT

## 2023-01-01 PROCEDURE — 72170 X-RAY EXAM OF PELVIS: CPT | Mod: 26

## 2023-01-01 PROCEDURE — 73090 X-RAY EXAM OF FOREARM: CPT | Mod: RT

## 2023-01-01 PROCEDURE — 99214 OFFICE O/P EST MOD 30 MIN: CPT | Mod: 25

## 2023-01-01 PROCEDURE — 99223 1ST HOSP IP/OBS HIGH 75: CPT | Mod: AI

## 2023-01-01 PROCEDURE — 97167 OT EVAL HIGH COMPLEX 60 MIN: CPT | Mod: GO

## 2023-01-01 PROCEDURE — 73070 X-RAY EXAM OF ELBOW: CPT | Mod: RT

## 2023-01-01 PROCEDURE — 80048 BASIC METABOLIC PNL TOTAL CA: CPT

## 2023-01-01 PROCEDURE — 93005 ELECTROCARDIOGRAM TRACING: CPT

## 2023-01-01 PROCEDURE — 97162 PT EVAL MOD COMPLEX 30 MIN: CPT | Mod: GP

## 2023-01-01 PROCEDURE — 84295 ASSAY OF SERUM SODIUM: CPT

## 2023-01-01 PROCEDURE — 99284 EMERGENCY DEPT VISIT MOD MDM: CPT | Mod: 25

## 2023-01-01 PROCEDURE — 72170 X-RAY EXAM OF PELVIS: CPT

## 2023-01-01 PROCEDURE — 86901 BLOOD TYPING SEROLOGIC RH(D): CPT

## 2023-01-01 PROCEDURE — 93306 TTE W/DOPPLER COMPLETE: CPT

## 2023-01-01 PROCEDURE — 99497 ADVNCD CARE PLAN 30 MIN: CPT | Mod: 25

## 2023-01-01 PROCEDURE — 97110 THERAPEUTIC EXERCISES: CPT | Mod: GP

## 2023-01-01 PROCEDURE — 93970 EXTREMITY STUDY: CPT | Mod: 26

## 2023-01-01 PROCEDURE — 76770 US EXAM ABDO BACK WALL COMP: CPT

## 2023-01-01 PROCEDURE — P9016: CPT

## 2023-01-01 PROCEDURE — 83540 ASSAY OF IRON: CPT

## 2023-01-01 PROCEDURE — 86850 RBC ANTIBODY SCREEN: CPT

## 2023-01-01 PROCEDURE — 99213 OFFICE O/P EST LOW 20 MIN: CPT | Mod: CS

## 2023-01-01 PROCEDURE — 99231 SBSQ HOSP IP/OBS SF/LOW 25: CPT

## 2023-01-01 PROCEDURE — 94660 CPAP INITIATION&MGMT: CPT

## 2023-01-01 PROCEDURE — 73090 X-RAY EXAM OF FOREARM: CPT | Mod: 26,RT

## 2023-01-01 PROCEDURE — 86900 BLOOD TYPING SEROLOGIC ABO: CPT

## 2023-01-01 PROCEDURE — 87640 STAPH A DNA AMP PROBE: CPT

## 2023-01-01 PROCEDURE — 71045 X-RAY EXAM CHEST 1 VIEW: CPT | Mod: 26,77

## 2023-01-01 PROCEDURE — 72125 CT NECK SPINE W/O DYE: CPT | Mod: MA

## 2023-01-01 PROCEDURE — 92526 ORAL FUNCTION THERAPY: CPT | Mod: GN

## 2023-01-01 PROCEDURE — 70450 CT HEAD/BRAIN W/O DYE: CPT | Mod: 26,MA

## 2023-01-01 PROCEDURE — 97530 THERAPEUTIC ACTIVITIES: CPT | Mod: GP

## 2023-01-01 PROCEDURE — 99349 HOME/RES VST EST MOD MDM 40: CPT

## 2023-01-01 PROCEDURE — 93971 EXTREMITY STUDY: CPT | Mod: 26,LT

## 2023-01-01 PROCEDURE — 99213 OFFICE O/P EST LOW 20 MIN: CPT | Mod: 25

## 2023-01-01 PROCEDURE — 82728 ASSAY OF FERRITIN: CPT

## 2023-01-01 PROCEDURE — 82962 GLUCOSE BLOOD TEST: CPT

## 2023-01-01 PROCEDURE — 99495 TRANSJ CARE MGMT MOD F2F 14D: CPT

## 2023-01-01 PROCEDURE — 36430 TRANSFUSION BLD/BLD COMPNT: CPT

## 2023-01-01 PROCEDURE — 85018 HEMOGLOBIN: CPT

## 2023-01-01 PROCEDURE — 93970 EXTREMITY STUDY: CPT

## 2023-01-01 PROCEDURE — 36000 PLACE NEEDLE IN VEIN: CPT | Mod: 59

## 2023-01-01 PROCEDURE — 82553 CREATINE MB FRACTION: CPT

## 2023-01-01 PROCEDURE — 84540 ASSAY OF URINE/UREA-N: CPT

## 2023-01-01 RX ORDER — FUROSEMIDE 40 MG
20 TABLET ORAL DAILY
Refills: 0 | Status: DISCONTINUED | OUTPATIENT
Start: 2023-01-01 | End: 2023-01-01

## 2023-01-01 RX ORDER — AZITHROMYCIN 500 MG/1
500 TABLET, FILM COATED ORAL EVERY 24 HOURS
Refills: 0 | Status: DISCONTINUED | OUTPATIENT
Start: 2023-01-01 | End: 2023-01-01

## 2023-01-01 RX ORDER — PREDNISONE 20 MG/1
20 TABLET ORAL
Qty: 15 | Refills: 0 | Status: DISCONTINUED | COMMUNITY
Start: 2022-01-01 | End: 2023-01-01

## 2023-01-01 RX ORDER — HYDROMORPHONE HYDROCHLORIDE 2 MG/ML
0.25 INJECTION INTRAMUSCULAR; INTRAVENOUS; SUBCUTANEOUS
Refills: 0 | Status: DISCONTINUED | OUTPATIENT
Start: 2023-01-01 | End: 2023-01-01

## 2023-01-01 RX ORDER — FLUTICASONE FUROATE, UMECLIDINIUM BROMIDE AND VILANTEROL TRIFENATATE 100; 62.5; 25 UG/1; UG/1; UG/1
100-62.5-25 POWDER RESPIRATORY (INHALATION) DAILY
Qty: 1 | Refills: 3 | Status: COMPLETED | COMMUNITY
Start: 2022-03-22 | End: 2023-01-01

## 2023-01-01 RX ORDER — TAMSULOSIN HYDROCHLORIDE 0.4 MG/1
0.4 CAPSULE ORAL AT BEDTIME
Refills: 0 | Status: DISCONTINUED | OUTPATIENT
Start: 2023-01-01 | End: 2023-01-01

## 2023-01-01 RX ORDER — HALOPERIDOL DECANOATE 100 MG/ML
2 INJECTION INTRAMUSCULAR ONCE
Refills: 0 | Status: COMPLETED | OUTPATIENT
Start: 2023-01-01 | End: 2023-01-01

## 2023-01-01 RX ORDER — FUROSEMIDE 40 MG
40 TABLET ORAL EVERY 12 HOURS
Refills: 0 | Status: DISCONTINUED | OUTPATIENT
Start: 2023-01-01 | End: 2023-01-01

## 2023-01-01 RX ORDER — DEXTROSE 50 % IN WATER 50 %
50 SYRINGE (ML) INTRAVENOUS ONCE
Refills: 0 | Status: COMPLETED | OUTPATIENT
Start: 2023-01-01 | End: 2023-01-01

## 2023-01-01 RX ORDER — HYDROMORPHONE HYDROCHLORIDE 2 MG/ML
0.5 INJECTION INTRAMUSCULAR; INTRAVENOUS; SUBCUTANEOUS ONCE
Refills: 0 | Status: DISCONTINUED | OUTPATIENT
Start: 2023-01-01 | End: 2023-01-01

## 2023-01-01 RX ORDER — IPRATROPIUM/ALBUTEROL SULFATE 18-103MCG
3 AEROSOL WITH ADAPTER (GRAM) INHALATION ONCE
Refills: 0 | Status: DISCONTINUED | OUTPATIENT
Start: 2023-01-01 | End: 2023-01-01

## 2023-01-01 RX ORDER — METOPROLOL TARTRATE 50 MG
25 TABLET ORAL DAILY
Refills: 0 | Status: DISCONTINUED | OUTPATIENT
Start: 2023-01-01 | End: 2023-01-01

## 2023-01-01 RX ORDER — ALBUTEROL SULFATE 2.5 MG/3ML
(2.5 MG/3ML) SOLUTION RESPIRATORY (INHALATION) 4 TIMES DAILY
Qty: 2 | Refills: 5 | Status: COMPLETED | COMMUNITY
Start: 2022-03-22 | End: 2023-01-01

## 2023-01-01 RX ORDER — LEVOTHYROXINE SODIUM 125 MCG
112 TABLET ORAL DAILY
Refills: 0 | Status: DISCONTINUED | OUTPATIENT
Start: 2023-01-01 | End: 2023-01-01

## 2023-01-01 RX ORDER — OLANZAPINE 15 MG/1
5 TABLET, FILM COATED ORAL ONCE
Refills: 0 | Status: COMPLETED | OUTPATIENT
Start: 2023-01-01 | End: 2023-01-01

## 2023-01-01 RX ORDER — INSULIN HUMAN 100 [IU]/ML
10 INJECTION, SOLUTION SUBCUTANEOUS ONCE
Refills: 0 | Status: COMPLETED | OUTPATIENT
Start: 2023-01-01 | End: 2023-01-01

## 2023-01-01 RX ORDER — FLUTICASONE FUROATE, UMECLIDINIUM BROMIDE AND VILANTEROL TRIFENATATE 100; 62.5; 25 UG/1; UG/1; UG/1
100-62.5-25 POWDER RESPIRATORY (INHALATION)
Qty: 60 | Refills: 5 | Status: COMPLETED | COMMUNITY
Start: 2023-01-01 | End: 2023-01-01

## 2023-01-01 RX ORDER — ALBUTEROL SULFATE 2.5 MG/3ML
(2.5 MG/3ML) SOLUTION RESPIRATORY (INHALATION) 4 TIMES DAILY
Qty: 1 | Refills: 3 | Status: COMPLETED | COMMUNITY
Start: 2022-01-01 | End: 2023-01-01

## 2023-01-01 RX ORDER — PREDNISONE 20 MG/1
20 TABLET ORAL
Qty: 15 | Refills: 0 | Status: ACTIVE | COMMUNITY
Start: 2023-01-01 | End: 1900-01-01

## 2023-01-01 RX ORDER — AZITHROMYCIN 500 MG/1
500 TABLET, FILM COATED ORAL ONCE
Refills: 0 | Status: DISCONTINUED | OUTPATIENT
Start: 2023-01-01 | End: 2023-01-01

## 2023-01-01 RX ORDER — SODIUM ZIRCONIUM CYCLOSILICATE 10 G/10G
5 POWDER, FOR SUSPENSION ORAL ONCE
Refills: 0 | Status: COMPLETED | OUTPATIENT
Start: 2023-01-01 | End: 2023-01-01

## 2023-01-01 RX ORDER — ATORVASTATIN CALCIUM 80 MG/1
0 TABLET, FILM COATED ORAL
Qty: 0 | Refills: 0 | DISCHARGE

## 2023-01-01 RX ORDER — FUROSEMIDE 20 MG/1
20 TABLET ORAL
Refills: 0 | Status: ACTIVE | COMMUNITY
Start: 2023-01-01

## 2023-01-01 RX ORDER — SODIUM CHLORIDE 9 MG/ML
1000 INJECTION INTRAMUSCULAR; INTRAVENOUS; SUBCUTANEOUS
Refills: 0 | Status: DISCONTINUED | OUTPATIENT
Start: 2023-01-01 | End: 2023-01-01

## 2023-01-01 RX ORDER — PANTOPRAZOLE SODIUM 20 MG/1
40 TABLET, DELAYED RELEASE ORAL ONCE
Refills: 0 | Status: COMPLETED | OUTPATIENT
Start: 2023-01-01 | End: 2023-01-01

## 2023-01-01 RX ORDER — HYDROMORPHONE HYDROCHLORIDE 2 MG/ML
0.25 INJECTION INTRAMUSCULAR; INTRAVENOUS; SUBCUTANEOUS ONCE
Refills: 0 | Status: DISCONTINUED | OUTPATIENT
Start: 2023-01-01 | End: 2023-01-01

## 2023-01-01 RX ORDER — CEFTRIAXONE 500 MG/1
1000 INJECTION, POWDER, FOR SOLUTION INTRAMUSCULAR; INTRAVENOUS ONCE
Refills: 0 | Status: COMPLETED | OUTPATIENT
Start: 2023-01-01 | End: 2023-01-01

## 2023-01-01 RX ORDER — PANTOPRAZOLE SODIUM 20 MG/1
40 TABLET, DELAYED RELEASE ORAL
Refills: 0 | Status: ACTIVE | OUTPATIENT
Start: 2023-01-01 | End: 2024-03-29

## 2023-01-01 RX ORDER — IPRATROPIUM/ALBUTEROL SULFATE 18-103MCG
3 AEROSOL WITH ADAPTER (GRAM) INHALATION EVERY 6 HOURS
Refills: 0 | Status: DISCONTINUED | OUTPATIENT
Start: 2023-01-01 | End: 2023-01-01

## 2023-01-01 RX ORDER — QUETIAPINE FUMARATE 200 MG/1
25 TABLET, FILM COATED ORAL ONCE
Refills: 0 | Status: DISCONTINUED | OUTPATIENT
Start: 2023-01-01 | End: 2023-01-01

## 2023-01-01 RX ORDER — PREDNISONE 20 MG/1
20 TABLET ORAL
Qty: 30 | Refills: 1 | Status: ACTIVE | COMMUNITY
Start: 1900-01-01 | End: 1900-01-01

## 2023-01-01 RX ORDER — DOXYCYCLINE HYCLATE 100 MG/1
100 CAPSULE ORAL TWICE DAILY
Qty: 20 | Refills: 0 | Status: COMPLETED | COMMUNITY
Start: 2022-03-22 | End: 2023-01-01

## 2023-01-01 RX ORDER — OXYCODONE HYDROCHLORIDE 5 MG/1
5 CAPSULE ORAL
Refills: 0 | Status: DISCONTINUED | COMMUNITY
Start: 2020-08-19 | End: 2023-01-01

## 2023-01-01 RX ORDER — FUROSEMIDE 80 MG/1
TABLET ORAL
Refills: 0 | Status: DISCONTINUED | COMMUNITY
End: 2023-01-01

## 2023-01-01 RX ORDER — SODIUM ZIRCONIUM CYCLOSILICATE 10 G/10G
10 POWDER, FOR SUSPENSION ORAL EVERY 12 HOURS
Refills: 0 | Status: DISCONTINUED | OUTPATIENT
Start: 2023-01-01 | End: 2023-01-01

## 2023-01-01 RX ORDER — PREDNISONE 20 MG/1
20 TABLET ORAL
Refills: 0 | Status: DISCONTINUED | COMMUNITY
End: 2023-01-01

## 2023-01-01 RX ORDER — FLUTICASONE FUROATE, UMECLIDINIUM BROMIDE AND VILANTEROL TRIFENATATE 100; 62.5; 25 UG/1; UG/1; UG/1
100-62.5-25 POWDER RESPIRATORY (INHALATION)
Qty: 60 | Refills: 0 | Status: COMPLETED | COMMUNITY
Start: 2022-01-01 | End: 2023-01-01

## 2023-01-01 RX ORDER — ASPIRIN/CALCIUM CARB/MAGNESIUM 324 MG
81 TABLET ORAL DAILY
Refills: 0 | Status: DISCONTINUED | OUTPATIENT
Start: 2023-01-01 | End: 2023-01-01

## 2023-01-01 RX ORDER — MAGNESIUM SULFATE 500 MG/ML
2 VIAL (ML) INJECTION ONCE
Refills: 0 | Status: COMPLETED | OUTPATIENT
Start: 2023-01-01 | End: 2023-01-01

## 2023-01-01 RX ORDER — TAMSULOSIN HYDROCHLORIDE 0.4 MG/1
1 CAPSULE ORAL
Qty: 30 | Refills: 0
Start: 2023-01-01 | End: 2023-01-01

## 2023-01-01 RX ORDER — APIXABAN 5 MG/1
TABLET, FILM COATED ORAL
Refills: 0 | Status: DISCONTINUED | COMMUNITY
End: 2023-01-01

## 2023-01-01 RX ORDER — SODIUM ZIRCONIUM CYCLOSILICATE 10 G/10G
10 POWDER, FOR SUSPENSION ORAL ONCE
Refills: 0 | Status: COMPLETED | OUTPATIENT
Start: 2023-01-01 | End: 2023-01-01

## 2023-01-01 RX ORDER — LANOLIN ALCOHOL/MO/W.PET/CERES
5 CREAM (GRAM) TOPICAL AT BEDTIME
Refills: 0 | Status: DISCONTINUED | OUTPATIENT
Start: 2023-01-01 | End: 2023-01-01

## 2023-01-01 RX ORDER — METOPROLOL TARTRATE 50 MG
25 TABLET ORAL EVERY 12 HOURS
Refills: 0 | Status: DISCONTINUED | OUTPATIENT
Start: 2023-01-01 | End: 2023-01-01

## 2023-01-01 RX ORDER — SODIUM CHLORIDE 9 MG/ML
500 INJECTION, SOLUTION INTRAVENOUS ONCE
Refills: 0 | Status: COMPLETED | OUTPATIENT
Start: 2023-01-01 | End: 2023-01-01

## 2023-01-01 RX ORDER — HEPARIN SODIUM 5000 [USP'U]/ML
5000 INJECTION INTRAVENOUS; SUBCUTANEOUS EVERY 8 HOURS
Refills: 0 | Status: DISCONTINUED | OUTPATIENT
Start: 2023-01-01 | End: 2023-01-01

## 2023-01-01 RX ORDER — CEFEPIME 1 G/1
2000 INJECTION, POWDER, FOR SOLUTION INTRAMUSCULAR; INTRAVENOUS ONCE
Refills: 0 | Status: COMPLETED | OUTPATIENT
Start: 2023-01-01 | End: 2023-01-01

## 2023-01-01 RX ORDER — METOPROLOL TARTRATE 50 MG
5 TABLET ORAL ONCE
Refills: 0 | Status: COMPLETED | OUTPATIENT
Start: 2023-01-01 | End: 2023-01-01

## 2023-01-01 RX ORDER — HALOPERIDOL DECANOATE 100 MG/ML
0.5 INJECTION INTRAMUSCULAR EVERY 6 HOURS
Refills: 0 | Status: DISCONTINUED | OUTPATIENT
Start: 2023-01-01 | End: 2023-01-01

## 2023-01-01 RX ORDER — SODIUM CHLORIDE 9 MG/ML
1000 INJECTION, SOLUTION INTRAVENOUS
Refills: 0 | Status: DISCONTINUED | OUTPATIENT
Start: 2023-01-01 | End: 2023-01-01

## 2023-01-01 RX ORDER — ATORVASTATIN CALCIUM 80 MG/1
20 TABLET, FILM COATED ORAL AT BEDTIME
Refills: 0 | Status: DISCONTINUED | OUTPATIENT
Start: 2023-01-01 | End: 2023-01-01

## 2023-01-01 RX ORDER — SODIUM CHLORIDE 9 MG/ML
1000 INJECTION, SOLUTION INTRAVENOUS
Refills: 0 | Status: COMPLETED | OUTPATIENT
Start: 2023-01-01 | End: 2023-01-01

## 2023-01-01 RX ORDER — SODIUM CHLORIDE 9 MG/ML
500 INJECTION, SOLUTION INTRAVENOUS
Refills: 0 | Status: DISCONTINUED | OUTPATIENT
Start: 2023-01-01 | End: 2023-01-01

## 2023-01-01 RX ORDER — DILTIAZEM HCL 120 MG
30 CAPSULE, EXT RELEASE 24 HR ORAL EVERY 6 HOURS
Refills: 0 | Status: DISCONTINUED | OUTPATIENT
Start: 2023-01-01 | End: 2023-01-01

## 2023-01-01 RX ORDER — LANOLIN ALCOHOL/MO/W.PET/CERES
5 CREAM (GRAM) TOPICAL ONCE
Refills: 0 | Status: COMPLETED | OUTPATIENT
Start: 2023-01-01 | End: 2023-01-01

## 2023-01-01 RX ORDER — IPRATROPIUM/ALBUTEROL SULFATE 18-103MCG
3 AEROSOL WITH ADAPTER (GRAM) INHALATION ONCE
Refills: 0 | Status: COMPLETED | OUTPATIENT
Start: 2023-01-01 | End: 2023-01-01

## 2023-01-01 RX ORDER — IPRATROPIUM/ALBUTEROL SULFATE 18-103MCG
3 AEROSOL WITH ADAPTER (GRAM) INHALATION EVERY 4 HOURS
Refills: 0 | Status: DISCONTINUED | OUTPATIENT
Start: 2023-01-01 | End: 2023-01-01

## 2023-01-01 RX ORDER — FLUTICASONE FUROATE, UMECLIDINIUM BROMIDE AND VILANTEROL TRIFENATATE 200; 62.5; 25 UG/1; UG/1; UG/1
0 POWDER RESPIRATORY (INHALATION)
Qty: 0 | Refills: 3 | DISCHARGE

## 2023-01-01 RX ORDER — TETANUS TOXOID, REDUCED DIPHTHERIA TOXOID AND ACELLULAR PERTUSSIS VACCINE, ADSORBED 5; 2.5; 8; 8; 2.5 [IU]/.5ML; [IU]/.5ML; UG/.5ML; UG/.5ML; UG/.5ML
0.5 SUSPENSION INTRAMUSCULAR ONCE
Refills: 0 | Status: DISCONTINUED | OUTPATIENT
Start: 2023-01-01 | End: 2023-01-01

## 2023-01-01 RX ORDER — BACITRACIN ZINC 500 UNIT/G
1 OINTMENT IN PACKET (EA) TOPICAL DAILY
Refills: 0 | Status: DISCONTINUED | OUTPATIENT
Start: 2023-01-01 | End: 2023-01-01

## 2023-01-01 RX ORDER — ASPIRIN/CALCIUM CARB/MAGNESIUM 324 MG
1 TABLET ORAL
Qty: 0 | Refills: 0 | DISCHARGE

## 2023-01-01 RX ORDER — CHLORHEXIDINE GLUCONATE 213 G/1000ML
1 SOLUTION TOPICAL DAILY
Refills: 0 | Status: DISCONTINUED | OUTPATIENT
Start: 2023-01-01 | End: 2023-01-01

## 2023-01-01 RX ORDER — ROBINUL 0.2 MG/ML
0.2 INJECTION INTRAMUSCULAR; INTRAVENOUS EVERY 6 HOURS
Refills: 0 | Status: DISCONTINUED | OUTPATIENT
Start: 2023-01-01 | End: 2023-01-01

## 2023-01-01 RX ORDER — DRONEDARONE 400 MG/1
400 TABLET, FILM COATED ORAL
Refills: 0 | Status: DISCONTINUED | OUTPATIENT
Start: 2023-01-01 | End: 2023-01-01

## 2023-01-01 RX ORDER — MORPHINE SULFATE 50 MG/1
2 CAPSULE, EXTENDED RELEASE ORAL ONCE
Refills: 0 | Status: DISCONTINUED | OUTPATIENT
Start: 2023-01-01 | End: 2023-01-01

## 2023-01-01 RX ORDER — CHLORHEXIDINE GLUCONATE 213 G/1000ML
1 SOLUTION TOPICAL
Refills: 0 | Status: DISCONTINUED | OUTPATIENT
Start: 2023-01-01 | End: 2023-01-01

## 2023-01-01 RX ORDER — DRONEDARONE 400 MG/1
400 TABLET, FILM COATED ORAL
Refills: 0 | Status: ACTIVE | COMMUNITY
Start: 2023-01-01

## 2023-01-01 RX ORDER — METOPROLOL TARTRATE 50 MG
25 TABLET ORAL ONCE
Refills: 0 | Status: COMPLETED | OUTPATIENT
Start: 2023-01-01 | End: 2023-01-01

## 2023-01-01 RX ORDER — BUDESONIDE AND FORMOTEROL FUMARATE DIHYDRATE 160; 4.5 UG/1; UG/1
2 AEROSOL RESPIRATORY (INHALATION)
Refills: 0 | Status: DISCONTINUED | OUTPATIENT
Start: 2023-01-01 | End: 2023-01-01

## 2023-01-01 RX ORDER — TAMSULOSIN HYDROCHLORIDE 0.4 MG/1
0.4 CAPSULE ORAL
Refills: 0 | Status: ACTIVE | COMMUNITY
Start: 2023-01-01

## 2023-01-01 RX ORDER — PREDNISONE 5 MG/1
5 TABLET ORAL
Qty: 30 | Refills: 1 | Status: ACTIVE | COMMUNITY
Start: 2023-01-01 | End: 1900-01-01

## 2023-01-01 RX ORDER — DRONEDARONE 400 MG/1
1 TABLET, FILM COATED ORAL
Qty: 0 | Refills: 0 | DISCHARGE

## 2023-01-01 RX ORDER — ALBUTEROL 90 UG/1
2 AEROSOL, METERED ORAL EVERY 6 HOURS
Refills: 0 | Status: DISCONTINUED | OUTPATIENT
Start: 2023-01-01 | End: 2023-01-01

## 2023-01-01 RX ORDER — PREDNISONE 20 MG/1
20 TABLET ORAL
Qty: 15 | Refills: 0 | Status: DISCONTINUED | COMMUNITY
Start: 2021-11-30 | End: 2023-01-01

## 2023-01-01 RX ORDER — ALBUTEROL SULFATE 2.5 MG/3ML
(2.5 MG/3ML) SOLUTION RESPIRATORY (INHALATION)
Qty: 6 | Refills: 3 | Status: COMPLETED | COMMUNITY
End: 2023-01-01

## 2023-01-01 RX ORDER — QUETIAPINE FUMARATE 200 MG/1
25 TABLET, FILM COATED ORAL ONCE
Refills: 0 | Status: COMPLETED | OUTPATIENT
Start: 2023-01-01 | End: 2023-01-01

## 2023-01-01 RX ORDER — PANTOPRAZOLE SODIUM 20 MG/1
40 TABLET, DELAYED RELEASE ORAL
Refills: 0 | Status: DISCONTINUED | OUTPATIENT
Start: 2023-01-01 | End: 2023-01-01

## 2023-01-01 RX ORDER — ACETAMINOPHEN 500 MG
650 TABLET ORAL EVERY 6 HOURS
Refills: 0 | Status: DISCONTINUED | OUTPATIENT
Start: 2023-01-01 | End: 2023-01-01

## 2023-01-01 RX ORDER — GUAIFENESIN/DEXTROMETHORPHAN 600MG-30MG
10 TABLET, EXTENDED RELEASE 12 HR ORAL EVERY 4 HOURS
Refills: 0 | Status: DISCONTINUED | OUTPATIENT
Start: 2023-01-01 | End: 2023-01-01

## 2023-01-01 RX ORDER — PREDNISONE 20 MG/1
20 TABLET ORAL
Qty: 30 | Refills: 0 | Status: DISCONTINUED | COMMUNITY
Start: 2023-01-01 | End: 2023-01-01

## 2023-01-01 RX ORDER — ALBUTEROL SULFATE 2.5 MG/3ML
(2.5 MG/3ML) SOLUTION RESPIRATORY (INHALATION) 4 TIMES DAILY
Qty: 1 | Refills: 3 | Status: COMPLETED | COMMUNITY
Start: 2023-01-01 | End: 2023-01-01

## 2023-01-01 RX ORDER — ALBUTEROL 90 UG/1
3 AEROSOL, METERED ORAL
Qty: 0 | Refills: 0 | DISCHARGE

## 2023-01-01 RX ORDER — FLUTICASONE PROPIONATE 50 UG/1
50 SPRAY, METERED NASAL TWICE DAILY
Refills: 0 | Status: DISCONTINUED | COMMUNITY
End: 2023-01-01

## 2023-01-01 RX ORDER — PREDNISONE 10 MG/1
10 TABLET ORAL DAILY
Qty: 40 | Refills: 0 | Status: DISCONTINUED | COMMUNITY
Start: 2023-01-01 | End: 2023-01-01

## 2023-01-01 RX ORDER — SODIUM ZIRCONIUM CYCLOSILICATE 10 G/10G
10 POWDER, FOR SUSPENSION ORAL ONCE
Refills: 0 | Status: DISCONTINUED | OUTPATIENT
Start: 2023-01-01 | End: 2023-01-01

## 2023-01-01 RX ORDER — HEPARIN SODIUM 5000 [USP'U]/ML
5000 INJECTION INTRAVENOUS; SUBCUTANEOUS EVERY 12 HOURS
Refills: 0 | Status: DISCONTINUED | OUTPATIENT
Start: 2023-01-01 | End: 2023-01-01

## 2023-01-01 RX ORDER — DOXYCYCLINE HYCLATE 100 MG/1
100 CAPSULE ORAL TWICE DAILY
Qty: 20 | Refills: 0 | Status: DISCONTINUED | COMMUNITY
Start: 2023-01-01 | End: 2023-01-01

## 2023-01-01 RX ORDER — ONDANSETRON 8 MG/1
4 TABLET, FILM COATED ORAL ONCE
Refills: 0 | Status: COMPLETED | OUTPATIENT
Start: 2023-01-01 | End: 2023-01-01

## 2023-01-01 RX ORDER — ALBUTEROL SULFATE 2.5 MG/3ML
(2.5 MG/3ML) SOLUTION RESPIRATORY (INHALATION)
Qty: 6 | Refills: 3 | Status: ACTIVE | COMMUNITY
Start: 2023-01-01 | End: 1900-01-01

## 2023-01-01 RX ORDER — IPRATROPIUM/ALBUTEROL SULFATE 18-103MCG
3 AEROSOL WITH ADAPTER (GRAM) INHALATION
Refills: 0 | Status: COMPLETED | OUTPATIENT
Start: 2023-01-01 | End: 2023-01-01

## 2023-01-01 RX ADMIN — DRONEDARONE 400 MILLIGRAM(S): 400 TABLET, FILM COATED ORAL at 17:31

## 2023-01-01 RX ADMIN — Medication 0.5 MILLIGRAM(S): at 13:38

## 2023-01-01 RX ADMIN — CHLORHEXIDINE GLUCONATE 1 APPLICATION(S): 213 SOLUTION TOPICAL at 12:25

## 2023-01-01 RX ADMIN — DRONEDARONE 400 MILLIGRAM(S): 400 TABLET, FILM COATED ORAL at 16:57

## 2023-01-01 RX ADMIN — CHLORHEXIDINE GLUCONATE 1 APPLICATION(S): 213 SOLUTION TOPICAL at 13:00

## 2023-01-01 RX ADMIN — HEPARIN SODIUM 5000 UNIT(S): 5000 INJECTION INTRAVENOUS; SUBCUTANEOUS at 13:36

## 2023-01-01 RX ADMIN — ATORVASTATIN CALCIUM 20 MILLIGRAM(S): 80 TABLET, FILM COATED ORAL at 22:20

## 2023-01-01 RX ADMIN — BUDESONIDE AND FORMOTEROL FUMARATE DIHYDRATE 2 PUFF(S): 160; 4.5 AEROSOL RESPIRATORY (INHALATION) at 21:46

## 2023-01-01 RX ADMIN — Medication 40 MILLIGRAM(S): at 05:33

## 2023-01-01 RX ADMIN — DRONEDARONE 400 MILLIGRAM(S): 400 TABLET, FILM COATED ORAL at 05:14

## 2023-01-01 RX ADMIN — Medication 3 MILLILITER(S): at 15:30

## 2023-01-01 RX ADMIN — ROBINUL 0.2 MILLIGRAM(S): 0.2 INJECTION INTRAMUSCULAR; INTRAVENOUS at 16:11

## 2023-01-01 RX ADMIN — CHLORHEXIDINE GLUCONATE 1 APPLICATION(S): 213 SOLUTION TOPICAL at 05:11

## 2023-01-01 RX ADMIN — PANTOPRAZOLE SODIUM 40 MILLIGRAM(S): 20 TABLET, DELAYED RELEASE ORAL at 06:01

## 2023-01-01 RX ADMIN — Medication 25 MILLIGRAM(S): at 05:24

## 2023-01-01 RX ADMIN — SODIUM ZIRCONIUM CYCLOSILICATE 10 GRAM(S): 10 POWDER, FOR SUSPENSION ORAL at 12:07

## 2023-01-01 RX ADMIN — Medication 3 MILLILITER(S): at 20:05

## 2023-01-01 RX ADMIN — HEPARIN SODIUM 5000 UNIT(S): 5000 INJECTION INTRAVENOUS; SUBCUTANEOUS at 06:23

## 2023-01-01 RX ADMIN — ATORVASTATIN CALCIUM 20 MILLIGRAM(S): 80 TABLET, FILM COATED ORAL at 21:54

## 2023-01-01 RX ADMIN — Medication 60 MILLIGRAM(S): at 21:14

## 2023-01-01 RX ADMIN — Medication 3 MILLILITER(S): at 17:13

## 2023-01-01 RX ADMIN — DRONEDARONE 400 MILLIGRAM(S): 400 TABLET, FILM COATED ORAL at 05:21

## 2023-01-01 RX ADMIN — SODIUM ZIRCONIUM CYCLOSILICATE 10 GRAM(S): 10 POWDER, FOR SUSPENSION ORAL at 23:29

## 2023-01-01 RX ADMIN — SODIUM ZIRCONIUM CYCLOSILICATE 5 GRAM(S): 10 POWDER, FOR SUSPENSION ORAL at 23:01

## 2023-01-01 RX ADMIN — Medication 40 MILLIGRAM(S): at 05:12

## 2023-01-01 RX ADMIN — HEPARIN SODIUM 5000 UNIT(S): 5000 INJECTION INTRAVENOUS; SUBCUTANEOUS at 15:28

## 2023-01-01 RX ADMIN — Medication 3 MILLILITER(S): at 13:21

## 2023-01-01 RX ADMIN — Medication 30 MILLIGRAM(S): at 17:28

## 2023-01-01 RX ADMIN — ATORVASTATIN CALCIUM 20 MILLIGRAM(S): 80 TABLET, FILM COATED ORAL at 21:43

## 2023-01-01 RX ADMIN — HEPARIN SODIUM 5000 UNIT(S): 5000 INJECTION INTRAVENOUS; SUBCUTANEOUS at 21:54

## 2023-01-01 RX ADMIN — ATORVASTATIN CALCIUM 20 MILLIGRAM(S): 80 TABLET, FILM COATED ORAL at 21:42

## 2023-01-01 RX ADMIN — Medication 40 MILLIGRAM(S): at 05:29

## 2023-01-01 RX ADMIN — Medication 100 MILLIGRAM(S): at 05:07

## 2023-01-01 RX ADMIN — CHLORHEXIDINE GLUCONATE 1 APPLICATION(S): 213 SOLUTION TOPICAL at 13:06

## 2023-01-01 RX ADMIN — Medication 3 MILLILITER(S): at 07:32

## 2023-01-01 RX ADMIN — Medication 3 MILLILITER(S): at 09:20

## 2023-01-01 RX ADMIN — Medication 112 MICROGRAM(S): at 05:22

## 2023-01-01 RX ADMIN — Medication 30 MILLIGRAM(S): at 11:31

## 2023-01-01 RX ADMIN — Medication 112 MICROGRAM(S): at 05:56

## 2023-01-01 RX ADMIN — DRONEDARONE 400 MILLIGRAM(S): 400 TABLET, FILM COATED ORAL at 18:04

## 2023-01-01 RX ADMIN — CHLORHEXIDINE GLUCONATE 1 APPLICATION(S): 213 SOLUTION TOPICAL at 06:01

## 2023-01-01 RX ADMIN — Medication 100 MILLIGRAM(S): at 06:22

## 2023-01-01 RX ADMIN — DRONEDARONE 400 MILLIGRAM(S): 400 TABLET, FILM COATED ORAL at 05:30

## 2023-01-01 RX ADMIN — Medication 112 MICROGRAM(S): at 06:14

## 2023-01-01 RX ADMIN — Medication 112 MICROGRAM(S): at 05:51

## 2023-01-01 RX ADMIN — Medication 112 MICROGRAM(S): at 05:31

## 2023-01-01 RX ADMIN — Medication 30 MILLIGRAM(S): at 05:41

## 2023-01-01 RX ADMIN — HEPARIN SODIUM 5000 UNIT(S): 5000 INJECTION INTRAVENOUS; SUBCUTANEOUS at 17:11

## 2023-01-01 RX ADMIN — ATORVASTATIN CALCIUM 20 MILLIGRAM(S): 80 TABLET, FILM COATED ORAL at 21:04

## 2023-01-01 RX ADMIN — TAMSULOSIN HYDROCHLORIDE 0.4 MILLIGRAM(S): 0.4 CAPSULE ORAL at 21:42

## 2023-01-01 RX ADMIN — SODIUM ZIRCONIUM CYCLOSILICATE 10 GRAM(S): 10 POWDER, FOR SUSPENSION ORAL at 05:43

## 2023-01-01 RX ADMIN — SODIUM CHLORIDE 75 MILLILITER(S): 9 INJECTION, SOLUTION INTRAVENOUS at 18:01

## 2023-01-01 RX ADMIN — DRONEDARONE 400 MILLIGRAM(S): 400 TABLET, FILM COATED ORAL at 06:14

## 2023-01-01 RX ADMIN — Medication 5 MILLIGRAM(S): at 11:22

## 2023-01-01 RX ADMIN — Medication 3 MILLILITER(S): at 08:16

## 2023-01-01 RX ADMIN — Medication 3 MILLILITER(S): at 09:00

## 2023-01-01 RX ADMIN — Medication 25 MILLIGRAM(S): at 05:20

## 2023-01-01 RX ADMIN — Medication 3 MILLILITER(S): at 08:56

## 2023-01-01 RX ADMIN — Medication 81 MILLIGRAM(S): at 12:07

## 2023-01-01 RX ADMIN — Medication 10 MILLILITER(S): at 06:02

## 2023-01-01 RX ADMIN — CHLORHEXIDINE GLUCONATE 1 APPLICATION(S): 213 SOLUTION TOPICAL at 11:50

## 2023-01-01 RX ADMIN — Medication 25 MILLIGRAM(S): at 06:03

## 2023-01-01 RX ADMIN — Medication 30 MILLIGRAM(S): at 18:03

## 2023-01-01 RX ADMIN — Medication 81 MILLIGRAM(S): at 12:42

## 2023-01-01 RX ADMIN — Medication 112 MICROGRAM(S): at 05:21

## 2023-01-01 RX ADMIN — PANTOPRAZOLE SODIUM 40 MILLIGRAM(S): 20 TABLET, DELAYED RELEASE ORAL at 06:00

## 2023-01-01 RX ADMIN — Medication 3 MILLILITER(S): at 18:23

## 2023-01-01 RX ADMIN — Medication 30 MILLIGRAM(S): at 23:17

## 2023-01-01 RX ADMIN — HEPARIN SODIUM 5000 UNIT(S): 5000 INJECTION INTRAVENOUS; SUBCUTANEOUS at 22:52

## 2023-01-01 RX ADMIN — Medication 81 MILLIGRAM(S): at 11:26

## 2023-01-01 RX ADMIN — HEPARIN SODIUM 5000 UNIT(S): 5000 INJECTION INTRAVENOUS; SUBCUTANEOUS at 14:08

## 2023-01-01 RX ADMIN — Medication 3 MILLILITER(S): at 09:14

## 2023-01-01 RX ADMIN — Medication 3 MILLILITER(S): at 16:15

## 2023-01-01 RX ADMIN — Medication 3 MILLILITER(S): at 20:26

## 2023-01-01 RX ADMIN — SODIUM CHLORIDE 75 MILLILITER(S): 9 INJECTION, SOLUTION INTRAVENOUS at 13:05

## 2023-01-01 RX ADMIN — Medication 112 MICROGRAM(S): at 05:30

## 2023-01-01 RX ADMIN — HEPARIN SODIUM 5000 UNIT(S): 5000 INJECTION INTRAVENOUS; SUBCUTANEOUS at 05:51

## 2023-01-01 RX ADMIN — Medication 81 MILLIGRAM(S): at 12:03

## 2023-01-01 RX ADMIN — Medication 3 MILLILITER(S): at 17:53

## 2023-01-01 RX ADMIN — Medication 1 APPLICATION(S): at 13:06

## 2023-01-01 RX ADMIN — DRONEDARONE 400 MILLIGRAM(S): 400 TABLET, FILM COATED ORAL at 06:44

## 2023-01-01 RX ADMIN — Medication 25 MILLIGRAM(S): at 05:51

## 2023-01-01 RX ADMIN — Medication 30 MILLIGRAM(S): at 23:18

## 2023-01-01 RX ADMIN — DRONEDARONE 400 MILLIGRAM(S): 400 TABLET, FILM COATED ORAL at 05:04

## 2023-01-01 RX ADMIN — Medication 25 MILLIGRAM(S): at 21:51

## 2023-01-01 RX ADMIN — Medication 40 MILLIGRAM(S): at 05:55

## 2023-01-01 RX ADMIN — Medication 25 MILLIGRAM(S): at 11:22

## 2023-01-01 RX ADMIN — Medication 5 MILLIGRAM(S): at 22:26

## 2023-01-01 RX ADMIN — DRONEDARONE 400 MILLIGRAM(S): 400 TABLET, FILM COATED ORAL at 18:29

## 2023-01-01 RX ADMIN — PANTOPRAZOLE SODIUM 40 MILLIGRAM(S): 20 TABLET, DELAYED RELEASE ORAL at 05:46

## 2023-01-01 RX ADMIN — Medication 3 MILLILITER(S): at 01:59

## 2023-01-01 RX ADMIN — Medication 40 MILLIGRAM(S): at 05:44

## 2023-01-01 RX ADMIN — ONDANSETRON 4 MILLIGRAM(S): 8 TABLET, FILM COATED ORAL at 01:38

## 2023-01-01 RX ADMIN — Medication 3 MILLILITER(S): at 16:18

## 2023-01-01 RX ADMIN — Medication 30 MILLIGRAM(S): at 17:12

## 2023-01-01 RX ADMIN — Medication 1 APPLICATION(S): at 12:17

## 2023-01-01 RX ADMIN — Medication 3 MILLILITER(S): at 16:02

## 2023-01-01 RX ADMIN — Medication 3 MILLILITER(S): at 13:25

## 2023-01-01 RX ADMIN — HEPARIN SODIUM 5000 UNIT(S): 5000 INJECTION INTRAVENOUS; SUBCUTANEOUS at 21:14

## 2023-01-01 RX ADMIN — Medication 40 MILLIGRAM(S): at 05:42

## 2023-01-01 RX ADMIN — DRONEDARONE 400 MILLIGRAM(S): 400 TABLET, FILM COATED ORAL at 05:56

## 2023-01-01 RX ADMIN — Medication 100 MILLIGRAM(S): at 16:56

## 2023-01-01 RX ADMIN — PANTOPRAZOLE SODIUM 40 MILLIGRAM(S): 20 TABLET, DELAYED RELEASE ORAL at 05:10

## 2023-01-01 RX ADMIN — PANTOPRAZOLE SODIUM 40 MILLIGRAM(S): 20 TABLET, DELAYED RELEASE ORAL at 07:56

## 2023-01-01 RX ADMIN — DRONEDARONE 400 MILLIGRAM(S): 400 TABLET, FILM COATED ORAL at 06:17

## 2023-01-01 RX ADMIN — Medication 81 MILLIGRAM(S): at 13:14

## 2023-01-01 RX ADMIN — Medication 3 MILLILITER(S): at 11:56

## 2023-01-01 RX ADMIN — HEPARIN SODIUM 5000 UNIT(S): 5000 INJECTION INTRAVENOUS; SUBCUTANEOUS at 17:20

## 2023-01-01 RX ADMIN — DRONEDARONE 400 MILLIGRAM(S): 400 TABLET, FILM COATED ORAL at 17:27

## 2023-01-01 RX ADMIN — DRONEDARONE 400 MILLIGRAM(S): 400 TABLET, FILM COATED ORAL at 06:01

## 2023-01-01 RX ADMIN — SODIUM ZIRCONIUM CYCLOSILICATE 10 GRAM(S): 10 POWDER, FOR SUSPENSION ORAL at 18:16

## 2023-01-01 RX ADMIN — Medication 30 MILLIGRAM(S): at 17:21

## 2023-01-01 RX ADMIN — CHLORHEXIDINE GLUCONATE 1 APPLICATION(S): 213 SOLUTION TOPICAL at 14:00

## 2023-01-01 RX ADMIN — SODIUM CHLORIDE 60 MILLILITER(S): 9 INJECTION, SOLUTION INTRAVENOUS at 10:19

## 2023-01-01 RX ADMIN — Medication 3 MILLILITER(S): at 07:47

## 2023-01-01 RX ADMIN — CHLORHEXIDINE GLUCONATE 1 APPLICATION(S): 213 SOLUTION TOPICAL at 05:14

## 2023-01-01 RX ADMIN — SODIUM CHLORIDE 75 MILLILITER(S): 9 INJECTION, SOLUTION INTRAVENOUS at 06:25

## 2023-01-01 RX ADMIN — HEPARIN SODIUM 5000 UNIT(S): 5000 INJECTION INTRAVENOUS; SUBCUTANEOUS at 05:32

## 2023-01-01 RX ADMIN — SODIUM ZIRCONIUM CYCLOSILICATE 10 GRAM(S): 10 POWDER, FOR SUSPENSION ORAL at 05:32

## 2023-01-01 RX ADMIN — Medication 60 MILLIGRAM(S): at 00:00

## 2023-01-01 RX ADMIN — HYDROMORPHONE HYDROCHLORIDE 0.25 MILLIGRAM(S): 2 INJECTION INTRAMUSCULAR; INTRAVENOUS; SUBCUTANEOUS at 15:11

## 2023-01-01 RX ADMIN — Medication 3 MILLILITER(S): at 01:52

## 2023-01-01 RX ADMIN — Medication 5 MILLIGRAM(S): at 22:59

## 2023-01-01 RX ADMIN — Medication 3 MILLILITER(S): at 12:50

## 2023-01-01 RX ADMIN — Medication 60 MILLIGRAM(S): at 17:27

## 2023-01-01 RX ADMIN — Medication 3 MILLILITER(S): at 20:18

## 2023-01-01 RX ADMIN — HEPARIN SODIUM 5000 UNIT(S): 5000 INJECTION INTRAVENOUS; SUBCUTANEOUS at 13:42

## 2023-01-01 RX ADMIN — Medication 81 MILLIGRAM(S): at 11:41

## 2023-01-01 RX ADMIN — Medication 3 MILLILITER(S): at 10:05

## 2023-01-01 RX ADMIN — Medication 5 MILLIGRAM(S): at 21:13

## 2023-01-01 RX ADMIN — Medication 1 APPLICATION(S): at 11:30

## 2023-01-01 RX ADMIN — HEPARIN SODIUM 5000 UNIT(S): 5000 INJECTION INTRAVENOUS; SUBCUTANEOUS at 22:01

## 2023-01-01 RX ADMIN — HYDROMORPHONE HYDROCHLORIDE 0.25 MILLIGRAM(S): 2 INJECTION INTRAMUSCULAR; INTRAVENOUS; SUBCUTANEOUS at 15:41

## 2023-01-01 RX ADMIN — Medication 112 MICROGRAM(S): at 06:29

## 2023-01-01 RX ADMIN — DRONEDARONE 400 MILLIGRAM(S): 400 TABLET, FILM COATED ORAL at 21:51

## 2023-01-01 RX ADMIN — CEFEPIME 2000 MILLIGRAM(S): 1 INJECTION, POWDER, FOR SOLUTION INTRAMUSCULAR; INTRAVENOUS at 14:37

## 2023-01-01 RX ADMIN — Medication 60 MILLIGRAM(S): at 05:24

## 2023-01-01 RX ADMIN — DRONEDARONE 400 MILLIGRAM(S): 400 TABLET, FILM COATED ORAL at 17:15

## 2023-01-01 RX ADMIN — HEPARIN SODIUM 5000 UNIT(S): 5000 INJECTION INTRAVENOUS; SUBCUTANEOUS at 05:47

## 2023-01-01 RX ADMIN — Medication 30 MILLIGRAM(S): at 06:14

## 2023-01-01 RX ADMIN — Medication 60 MILLIGRAM(S): at 16:57

## 2023-01-01 RX ADMIN — ATORVASTATIN CALCIUM 20 MILLIGRAM(S): 80 TABLET, FILM COATED ORAL at 21:51

## 2023-01-01 RX ADMIN — HEPARIN SODIUM 5000 UNIT(S): 5000 INJECTION INTRAVENOUS; SUBCUTANEOUS at 05:11

## 2023-01-01 RX ADMIN — Medication 81 MILLIGRAM(S): at 11:24

## 2023-01-01 RX ADMIN — CHLORHEXIDINE GLUCONATE 1 APPLICATION(S): 213 SOLUTION TOPICAL at 05:07

## 2023-01-01 RX ADMIN — ATORVASTATIN CALCIUM 20 MILLIGRAM(S): 80 TABLET, FILM COATED ORAL at 21:47

## 2023-01-01 RX ADMIN — PANTOPRAZOLE SODIUM 40 MILLIGRAM(S): 20 TABLET, DELAYED RELEASE ORAL at 06:13

## 2023-01-01 RX ADMIN — Medication 25 MILLIGRAM(S): at 09:00

## 2023-01-01 RX ADMIN — BUDESONIDE AND FORMOTEROL FUMARATE DIHYDRATE 2 PUFF(S): 160; 4.5 AEROSOL RESPIRATORY (INHALATION) at 10:10

## 2023-01-01 RX ADMIN — Medication 40 MILLIGRAM(S): at 05:21

## 2023-01-01 RX ADMIN — CHLORHEXIDINE GLUCONATE 1 APPLICATION(S): 213 SOLUTION TOPICAL at 11:37

## 2023-01-01 RX ADMIN — Medication 112 MICROGRAM(S): at 05:05

## 2023-01-01 RX ADMIN — Medication 1 APPLICATION(S): at 11:51

## 2023-01-01 RX ADMIN — HEPARIN SODIUM 5000 UNIT(S): 5000 INJECTION INTRAVENOUS; SUBCUTANEOUS at 05:06

## 2023-01-01 RX ADMIN — Medication 3 MILLILITER(S): at 08:12

## 2023-01-01 RX ADMIN — TAMSULOSIN HYDROCHLORIDE 0.4 MILLIGRAM(S): 0.4 CAPSULE ORAL at 22:01

## 2023-01-01 RX ADMIN — HEPARIN SODIUM 5000 UNIT(S): 5000 INJECTION INTRAVENOUS; SUBCUTANEOUS at 22:59

## 2023-01-01 RX ADMIN — CHLORHEXIDINE GLUCONATE 1 APPLICATION(S): 213 SOLUTION TOPICAL at 07:00

## 2023-01-01 RX ADMIN — Medication 40 MILLIGRAM(S): at 17:42

## 2023-01-01 RX ADMIN — Medication 3 MILLILITER(S): at 00:05

## 2023-01-01 RX ADMIN — Medication 25 MILLIGRAM(S): at 17:32

## 2023-01-01 RX ADMIN — HEPARIN SODIUM 5000 UNIT(S): 5000 INJECTION INTRAVENOUS; SUBCUTANEOUS at 22:05

## 2023-01-01 RX ADMIN — Medication 25 MILLIGRAM(S): at 05:56

## 2023-01-01 RX ADMIN — DRONEDARONE 400 MILLIGRAM(S): 400 TABLET, FILM COATED ORAL at 05:31

## 2023-01-01 RX ADMIN — Medication 81 MILLIGRAM(S): at 12:01

## 2023-01-01 RX ADMIN — Medication 3 MILLILITER(S): at 20:42

## 2023-01-01 RX ADMIN — ATORVASTATIN CALCIUM 20 MILLIGRAM(S): 80 TABLET, FILM COATED ORAL at 21:13

## 2023-01-01 RX ADMIN — BUDESONIDE AND FORMOTEROL FUMARATE DIHYDRATE 2 PUFF(S): 160; 4.5 AEROSOL RESPIRATORY (INHALATION) at 20:58

## 2023-01-01 RX ADMIN — Medication 3 MILLILITER(S): at 19:42

## 2023-01-01 RX ADMIN — Medication 3 MILLILITER(S): at 21:48

## 2023-01-01 RX ADMIN — Medication 100 MILLIGRAM(S): at 17:05

## 2023-01-01 RX ADMIN — SODIUM CHLORIDE 250 MILLILITER(S): 9 INJECTION, SOLUTION INTRAVENOUS at 14:25

## 2023-01-01 RX ADMIN — Medication 3 MILLILITER(S): at 14:51

## 2023-01-01 RX ADMIN — Medication 60 MILLIGRAM(S): at 11:11

## 2023-01-01 RX ADMIN — Medication 112 MICROGRAM(S): at 05:24

## 2023-01-01 RX ADMIN — Medication 25 MILLIGRAM(S): at 05:31

## 2023-01-01 RX ADMIN — Medication 30 MILLIGRAM(S): at 05:21

## 2023-01-01 RX ADMIN — BUDESONIDE AND FORMOTEROL FUMARATE DIHYDRATE 2 PUFF(S): 160; 4.5 AEROSOL RESPIRATORY (INHALATION) at 20:50

## 2023-01-01 RX ADMIN — Medication 3 MILLILITER(S): at 00:38

## 2023-01-01 RX ADMIN — ATORVASTATIN CALCIUM 20 MILLIGRAM(S): 80 TABLET, FILM COATED ORAL at 22:01

## 2023-01-01 RX ADMIN — HEPARIN SODIUM 5000 UNIT(S): 5000 INJECTION INTRAVENOUS; SUBCUTANEOUS at 05:10

## 2023-01-01 RX ADMIN — SODIUM CHLORIDE 75 MILLILITER(S): 9 INJECTION, SOLUTION INTRAVENOUS at 17:15

## 2023-01-01 RX ADMIN — Medication 25 MILLIGRAM(S): at 05:14

## 2023-01-01 RX ADMIN — HEPARIN SODIUM 5000 UNIT(S): 5000 INJECTION INTRAVENOUS; SUBCUTANEOUS at 15:40

## 2023-01-01 RX ADMIN — HEPARIN SODIUM 5000 UNIT(S): 5000 INJECTION INTRAVENOUS; SUBCUTANEOUS at 21:44

## 2023-01-01 RX ADMIN — TAMSULOSIN HYDROCHLORIDE 0.4 MILLIGRAM(S): 0.4 CAPSULE ORAL at 22:41

## 2023-01-01 RX ADMIN — SODIUM CHLORIDE 50 MILLILITER(S): 9 INJECTION INTRAMUSCULAR; INTRAVENOUS; SUBCUTANEOUS at 15:20

## 2023-01-01 RX ADMIN — Medication 3 MILLILITER(S): at 13:02

## 2023-01-01 RX ADMIN — HEPARIN SODIUM 5000 UNIT(S): 5000 INJECTION INTRAVENOUS; SUBCUTANEOUS at 05:13

## 2023-01-01 RX ADMIN — Medication 25 MILLIGRAM(S): at 05:46

## 2023-01-01 RX ADMIN — DRONEDARONE 400 MILLIGRAM(S): 400 TABLET, FILM COATED ORAL at 17:39

## 2023-01-01 RX ADMIN — TAMSULOSIN HYDROCHLORIDE 0.4 MILLIGRAM(S): 0.4 CAPSULE ORAL at 21:51

## 2023-01-01 RX ADMIN — Medication 112 MICROGRAM(S): at 06:02

## 2023-01-01 RX ADMIN — Medication 3 MILLILITER(S): at 20:37

## 2023-01-01 RX ADMIN — Medication 25 MILLIGRAM(S): at 05:30

## 2023-01-01 RX ADMIN — Medication 30 MILLIGRAM(S): at 23:02

## 2023-01-01 RX ADMIN — INSULIN HUMAN 10 UNIT(S): 100 INJECTION, SOLUTION SUBCUTANEOUS at 20:22

## 2023-01-01 RX ADMIN — Medication 112 MICROGRAM(S): at 06:03

## 2023-01-01 RX ADMIN — ATORVASTATIN CALCIUM 20 MILLIGRAM(S): 80 TABLET, FILM COATED ORAL at 21:12

## 2023-01-01 RX ADMIN — Medication 25 MILLIGRAM(S): at 05:22

## 2023-01-01 RX ADMIN — Medication 1 MILLIGRAM(S): at 02:09

## 2023-01-01 RX ADMIN — TAMSULOSIN HYDROCHLORIDE 0.4 MILLIGRAM(S): 0.4 CAPSULE ORAL at 22:26

## 2023-01-01 RX ADMIN — DRONEDARONE 400 MILLIGRAM(S): 400 TABLET, FILM COATED ORAL at 05:51

## 2023-01-01 RX ADMIN — Medication 3 MILLILITER(S): at 07:59

## 2023-01-01 RX ADMIN — Medication 3 MILLILITER(S): at 19:56

## 2023-01-01 RX ADMIN — ATORVASTATIN CALCIUM 20 MILLIGRAM(S): 80 TABLET, FILM COATED ORAL at 22:22

## 2023-01-01 RX ADMIN — HEPARIN SODIUM 5000 UNIT(S): 5000 INJECTION INTRAVENOUS; SUBCUTANEOUS at 13:02

## 2023-01-01 RX ADMIN — SODIUM ZIRCONIUM CYCLOSILICATE 10 GRAM(S): 10 POWDER, FOR SUSPENSION ORAL at 06:29

## 2023-01-01 RX ADMIN — Medication 3 MILLILITER(S): at 05:46

## 2023-01-01 RX ADMIN — DRONEDARONE 400 MILLIGRAM(S): 400 TABLET, FILM COATED ORAL at 19:44

## 2023-01-01 RX ADMIN — Medication 3 MILLILITER(S): at 15:29

## 2023-01-01 RX ADMIN — TAMSULOSIN HYDROCHLORIDE 0.4 MILLIGRAM(S): 0.4 CAPSULE ORAL at 22:22

## 2023-01-01 RX ADMIN — Medication 3 MILLILITER(S): at 14:43

## 2023-01-01 RX ADMIN — Medication 60 MILLIGRAM(S): at 11:07

## 2023-01-01 RX ADMIN — ATORVASTATIN CALCIUM 20 MILLIGRAM(S): 80 TABLET, FILM COATED ORAL at 21:31

## 2023-01-01 RX ADMIN — SODIUM ZIRCONIUM CYCLOSILICATE 5 GRAM(S): 10 POWDER, FOR SUSPENSION ORAL at 19:00

## 2023-01-01 RX ADMIN — Medication 81 MILLIGRAM(S): at 12:17

## 2023-01-01 RX ADMIN — CHLORHEXIDINE GLUCONATE 1 APPLICATION(S): 213 SOLUTION TOPICAL at 05:20

## 2023-01-01 RX ADMIN — Medication 40 MILLIGRAM(S): at 17:38

## 2023-01-01 RX ADMIN — Medication 112 MICROGRAM(S): at 06:44

## 2023-01-01 RX ADMIN — Medication 40 MILLIGRAM(S): at 06:03

## 2023-01-01 RX ADMIN — Medication 60 MILLIGRAM(S): at 17:37

## 2023-01-01 RX ADMIN — Medication 3 MILLILITER(S): at 01:38

## 2023-01-01 RX ADMIN — Medication 0.5 MILLIGRAM(S): at 13:06

## 2023-01-01 RX ADMIN — CHLORHEXIDINE GLUCONATE 1 APPLICATION(S): 213 SOLUTION TOPICAL at 11:49

## 2023-01-01 RX ADMIN — Medication 60 MILLIGRAM(S): at 12:24

## 2023-01-01 RX ADMIN — Medication 100 MILLIGRAM(S): at 10:40

## 2023-01-01 RX ADMIN — BUDESONIDE AND FORMOTEROL FUMARATE DIHYDRATE 2 PUFF(S): 160; 4.5 AEROSOL RESPIRATORY (INHALATION) at 08:47

## 2023-01-01 RX ADMIN — TAMSULOSIN HYDROCHLORIDE 0.4 MILLIGRAM(S): 0.4 CAPSULE ORAL at 21:13

## 2023-01-01 RX ADMIN — Medication 3 MILLILITER(S): at 20:25

## 2023-01-01 RX ADMIN — Medication 3 MILLILITER(S): at 13:51

## 2023-01-01 RX ADMIN — Medication 3 MILLILITER(S): at 15:49

## 2023-01-01 RX ADMIN — Medication 30 MILLIGRAM(S): at 12:22

## 2023-01-01 RX ADMIN — Medication 81 MILLIGRAM(S): at 11:45

## 2023-01-01 RX ADMIN — Medication 100 MILLIGRAM(S): at 23:00

## 2023-01-01 RX ADMIN — Medication 30 MILLIGRAM(S): at 12:30

## 2023-01-01 RX ADMIN — ATORVASTATIN CALCIUM 20 MILLIGRAM(S): 80 TABLET, FILM COATED ORAL at 22:05

## 2023-01-01 RX ADMIN — HEPARIN SODIUM 5000 UNIT(S): 5000 INJECTION INTRAVENOUS; SUBCUTANEOUS at 06:44

## 2023-01-01 RX ADMIN — Medication 3 MILLILITER(S): at 08:24

## 2023-01-01 RX ADMIN — Medication 3 MILLILITER(S): at 08:26

## 2023-01-01 RX ADMIN — Medication 1 APPLICATION(S): at 11:24

## 2023-01-01 RX ADMIN — Medication 100 MILLIGRAM(S): at 17:37

## 2023-01-01 RX ADMIN — ATORVASTATIN CALCIUM 20 MILLIGRAM(S): 80 TABLET, FILM COATED ORAL at 22:59

## 2023-01-01 RX ADMIN — Medication 112 MICROGRAM(S): at 05:47

## 2023-01-01 RX ADMIN — Medication 1 APPLICATION(S): at 12:43

## 2023-01-01 RX ADMIN — HEPARIN SODIUM 5000 UNIT(S): 5000 INJECTION INTRAVENOUS; SUBCUTANEOUS at 21:51

## 2023-01-01 RX ADMIN — TAMSULOSIN HYDROCHLORIDE 0.4 MILLIGRAM(S): 0.4 CAPSULE ORAL at 21:32

## 2023-01-01 RX ADMIN — DRONEDARONE 400 MILLIGRAM(S): 400 TABLET, FILM COATED ORAL at 05:11

## 2023-01-01 RX ADMIN — PANTOPRAZOLE SODIUM 40 MILLIGRAM(S): 20 TABLET, DELAYED RELEASE ORAL at 06:44

## 2023-01-01 RX ADMIN — Medication 40 MILLIGRAM(S): at 17:27

## 2023-01-01 RX ADMIN — CHLORHEXIDINE GLUCONATE 1 APPLICATION(S): 213 SOLUTION TOPICAL at 12:43

## 2023-01-01 RX ADMIN — Medication 100 MILLIGRAM(S): at 05:09

## 2023-01-01 RX ADMIN — Medication 81 MILLIGRAM(S): at 17:42

## 2023-01-01 RX ADMIN — CHLORHEXIDINE GLUCONATE 1 APPLICATION(S): 213 SOLUTION TOPICAL at 05:22

## 2023-01-01 RX ADMIN — Medication 40 MILLIGRAM(S): at 05:10

## 2023-01-01 RX ADMIN — DRONEDARONE 400 MILLIGRAM(S): 400 TABLET, FILM COATED ORAL at 05:12

## 2023-01-01 RX ADMIN — Medication 3 MILLILITER(S): at 19:38

## 2023-01-01 RX ADMIN — DRONEDARONE 400 MILLIGRAM(S): 400 TABLET, FILM COATED ORAL at 23:17

## 2023-01-01 RX ADMIN — Medication 5 MILLIGRAM(S): at 22:01

## 2023-01-01 RX ADMIN — PANTOPRAZOLE SODIUM 40 MILLIGRAM(S): 20 TABLET, DELAYED RELEASE ORAL at 06:03

## 2023-01-01 RX ADMIN — Medication 5 MILLIGRAM(S): at 22:54

## 2023-01-01 RX ADMIN — Medication 112 MICROGRAM(S): at 05:10

## 2023-01-01 RX ADMIN — Medication 112 MICROGRAM(S): at 05:12

## 2023-01-01 RX ADMIN — BUDESONIDE AND FORMOTEROL FUMARATE DIHYDRATE 2 PUFF(S): 160; 4.5 AEROSOL RESPIRATORY (INHALATION) at 11:30

## 2023-01-01 RX ADMIN — Medication 30 MILLIGRAM(S): at 23:23

## 2023-01-01 RX ADMIN — HEPARIN SODIUM 5000 UNIT(S): 5000 INJECTION INTRAVENOUS; SUBCUTANEOUS at 21:42

## 2023-01-01 RX ADMIN — Medication 30 MILLIGRAM(S): at 05:44

## 2023-01-01 RX ADMIN — Medication 60 MILLIGRAM(S): at 22:52

## 2023-01-01 RX ADMIN — HEPARIN SODIUM 5000 UNIT(S): 5000 INJECTION INTRAVENOUS; SUBCUTANEOUS at 13:27

## 2023-01-01 RX ADMIN — Medication 3 MILLILITER(S): at 16:34

## 2023-01-01 RX ADMIN — Medication 112 MICROGRAM(S): at 05:06

## 2023-01-01 RX ADMIN — Medication 30 MILLIGRAM(S): at 05:11

## 2023-01-01 RX ADMIN — DRONEDARONE 400 MILLIGRAM(S): 400 TABLET, FILM COATED ORAL at 17:10

## 2023-01-01 RX ADMIN — SODIUM ZIRCONIUM CYCLOSILICATE 10 GRAM(S): 10 POWDER, FOR SUSPENSION ORAL at 10:42

## 2023-01-01 RX ADMIN — Medication 25 MILLIGRAM(S): at 05:06

## 2023-01-01 RX ADMIN — HEPARIN SODIUM 5000 UNIT(S): 5000 INJECTION INTRAVENOUS; SUBCUTANEOUS at 14:01

## 2023-01-01 RX ADMIN — Medication 40 MILLIGRAM(S): at 06:16

## 2023-01-01 RX ADMIN — CHLORHEXIDINE GLUCONATE 1 APPLICATION(S): 213 SOLUTION TOPICAL at 05:56

## 2023-01-01 RX ADMIN — Medication 3 MILLILITER(S): at 03:50

## 2023-01-01 RX ADMIN — CEFTRIAXONE 100 MILLIGRAM(S): 500 INJECTION, POWDER, FOR SOLUTION INTRAMUSCULAR; INTRAVENOUS at 01:59

## 2023-01-01 RX ADMIN — Medication 30 MILLIGRAM(S): at 18:17

## 2023-01-01 RX ADMIN — Medication 81 MILLIGRAM(S): at 11:36

## 2023-01-01 RX ADMIN — Medication 5 MILLIGRAM(S): at 21:12

## 2023-01-01 RX ADMIN — CEFEPIME 100 MILLIGRAM(S): 1 INJECTION, POWDER, FOR SOLUTION INTRAMUSCULAR; INTRAVENOUS at 14:32

## 2023-01-01 RX ADMIN — Medication 81 MILLIGRAM(S): at 12:09

## 2023-01-01 RX ADMIN — DRONEDARONE 400 MILLIGRAM(S): 400 TABLET, FILM COATED ORAL at 17:06

## 2023-01-01 RX ADMIN — HALOPERIDOL DECANOATE 2 MILLIGRAM(S): 100 INJECTION INTRAMUSCULAR at 23:44

## 2023-01-01 RX ADMIN — TAMSULOSIN HYDROCHLORIDE 0.4 MILLIGRAM(S): 0.4 CAPSULE ORAL at 22:53

## 2023-01-01 RX ADMIN — PANTOPRAZOLE SODIUM 40 MILLIGRAM(S): 20 TABLET, DELAYED RELEASE ORAL at 06:17

## 2023-01-01 RX ADMIN — SODIUM CHLORIDE 75 MILLILITER(S): 9 INJECTION, SOLUTION INTRAVENOUS at 17:01

## 2023-01-01 RX ADMIN — DRONEDARONE 400 MILLIGRAM(S): 400 TABLET, FILM COATED ORAL at 06:29

## 2023-01-01 RX ADMIN — CHLORHEXIDINE GLUCONATE 1 APPLICATION(S): 213 SOLUTION TOPICAL at 11:25

## 2023-01-01 RX ADMIN — Medication 3 MILLILITER(S): at 09:39

## 2023-01-01 RX ADMIN — Medication 100 MILLIGRAM(S): at 17:27

## 2023-01-01 RX ADMIN — Medication 81 MILLIGRAM(S): at 11:43

## 2023-01-01 RX ADMIN — ATORVASTATIN CALCIUM 20 MILLIGRAM(S): 80 TABLET, FILM COATED ORAL at 22:26

## 2023-01-01 RX ADMIN — TAMSULOSIN HYDROCHLORIDE 0.4 MILLIGRAM(S): 0.4 CAPSULE ORAL at 21:39

## 2023-01-01 RX ADMIN — HEPARIN SODIUM 5000 UNIT(S): 5000 INJECTION INTRAVENOUS; SUBCUTANEOUS at 21:15

## 2023-01-01 RX ADMIN — HEPARIN SODIUM 5000 UNIT(S): 5000 INJECTION INTRAVENOUS; SUBCUTANEOUS at 22:16

## 2023-01-01 RX ADMIN — Medication 30 MILLIGRAM(S): at 17:11

## 2023-01-01 RX ADMIN — HEPARIN SODIUM 5000 UNIT(S): 5000 INJECTION INTRAVENOUS; SUBCUTANEOUS at 13:09

## 2023-01-01 RX ADMIN — Medication 100 MILLIGRAM(S): at 19:30

## 2023-01-01 RX ADMIN — Medication 60 MILLIGRAM(S): at 11:25

## 2023-01-01 RX ADMIN — HYDROMORPHONE HYDROCHLORIDE 0.5 MILLIGRAM(S): 2 INJECTION INTRAMUSCULAR; INTRAVENOUS; SUBCUTANEOUS at 10:45

## 2023-01-01 RX ADMIN — Medication 50 MILLILITER(S): at 09:16

## 2023-01-01 RX ADMIN — DRONEDARONE 400 MILLIGRAM(S): 400 TABLET, FILM COATED ORAL at 06:03

## 2023-01-01 RX ADMIN — DRONEDARONE 400 MILLIGRAM(S): 400 TABLET, FILM COATED ORAL at 17:37

## 2023-01-01 RX ADMIN — ATORVASTATIN CALCIUM 20 MILLIGRAM(S): 80 TABLET, FILM COATED ORAL at 21:40

## 2023-01-01 RX ADMIN — SODIUM ZIRCONIUM CYCLOSILICATE 10 GRAM(S): 10 POWDER, FOR SUSPENSION ORAL at 17:39

## 2023-01-01 RX ADMIN — PANTOPRAZOLE SODIUM 40 MILLIGRAM(S): 20 TABLET, DELAYED RELEASE ORAL at 05:42

## 2023-01-01 RX ADMIN — INSULIN HUMAN 10 UNIT(S): 100 INJECTION, SOLUTION SUBCUTANEOUS at 09:16

## 2023-01-01 RX ADMIN — Medication 40 MILLIGRAM(S): at 18:03

## 2023-01-01 RX ADMIN — PANTOPRAZOLE SODIUM 40 MILLIGRAM(S): 20 TABLET, DELAYED RELEASE ORAL at 05:21

## 2023-01-01 RX ADMIN — ATORVASTATIN CALCIUM 20 MILLIGRAM(S): 80 TABLET, FILM COATED ORAL at 22:53

## 2023-01-01 RX ADMIN — Medication 40 MILLIGRAM(S): at 05:45

## 2023-01-01 RX ADMIN — PANTOPRAZOLE SODIUM 40 MILLIGRAM(S): 20 TABLET, DELAYED RELEASE ORAL at 08:32

## 2023-01-01 RX ADMIN — CHLORHEXIDINE GLUCONATE 1 APPLICATION(S): 213 SOLUTION TOPICAL at 06:04

## 2023-01-01 RX ADMIN — Medication 3 MILLILITER(S): at 20:22

## 2023-01-01 RX ADMIN — Medication 3 MILLILITER(S): at 08:53

## 2023-01-01 RX ADMIN — SODIUM ZIRCONIUM CYCLOSILICATE 10 GRAM(S): 10 POWDER, FOR SUSPENSION ORAL at 21:04

## 2023-01-01 RX ADMIN — Medication 5 MILLIGRAM(S): at 03:47

## 2023-01-01 RX ADMIN — TAMSULOSIN HYDROCHLORIDE 0.4 MILLIGRAM(S): 0.4 CAPSULE ORAL at 21:04

## 2023-01-01 RX ADMIN — DRONEDARONE 400 MILLIGRAM(S): 400 TABLET, FILM COATED ORAL at 05:22

## 2023-01-01 RX ADMIN — Medication 3 MILLILITER(S): at 20:30

## 2023-01-01 RX ADMIN — MORPHINE SULFATE 2 MILLIGRAM(S): 50 CAPSULE, EXTENDED RELEASE ORAL at 13:14

## 2023-01-01 RX ADMIN — Medication 100 MILLIGRAM(S): at 17:26

## 2023-01-01 RX ADMIN — Medication 3 MILLILITER(S): at 13:03

## 2023-01-01 RX ADMIN — Medication 30 MILLIGRAM(S): at 05:12

## 2023-01-01 RX ADMIN — Medication 112 MICROGRAM(S): at 05:44

## 2023-01-01 RX ADMIN — Medication 30 MILLIGRAM(S): at 00:08

## 2023-01-01 RX ADMIN — BUDESONIDE AND FORMOTEROL FUMARATE DIHYDRATE 2 PUFF(S): 160; 4.5 AEROSOL RESPIRATORY (INHALATION) at 08:32

## 2023-01-01 RX ADMIN — Medication 30 MILLIGRAM(S): at 17:41

## 2023-01-01 RX ADMIN — SODIUM CHLORIDE 60 MILLILITER(S): 9 INJECTION, SOLUTION INTRAVENOUS at 11:44

## 2023-01-01 RX ADMIN — DRONEDARONE 400 MILLIGRAM(S): 400 TABLET, FILM COATED ORAL at 19:00

## 2023-01-01 RX ADMIN — CHLORHEXIDINE GLUCONATE 1 APPLICATION(S): 213 SOLUTION TOPICAL at 13:27

## 2023-01-01 RX ADMIN — SODIUM CHLORIDE 75 MILLILITER(S): 9 INJECTION, SOLUTION INTRAVENOUS at 14:06

## 2023-01-01 RX ADMIN — Medication 3 MILLILITER(S): at 01:44

## 2023-01-01 RX ADMIN — DRONEDARONE 400 MILLIGRAM(S): 400 TABLET, FILM COATED ORAL at 05:06

## 2023-01-01 RX ADMIN — Medication 3 MILLILITER(S): at 08:08

## 2023-01-01 RX ADMIN — Medication 0.5 MILLIGRAM(S): at 23:56

## 2023-01-01 RX ADMIN — Medication 5 MILLIGRAM(S): at 21:32

## 2023-01-01 RX ADMIN — Medication 3 MILLILITER(S): at 07:43

## 2023-01-01 RX ADMIN — HEPARIN SODIUM 5000 UNIT(S): 5000 INJECTION INTRAVENOUS; SUBCUTANEOUS at 06:14

## 2023-01-01 RX ADMIN — Medication 3 MILLILITER(S): at 15:16

## 2023-01-01 RX ADMIN — Medication 40 MILLIGRAM(S): at 17:16

## 2023-01-01 RX ADMIN — Medication 30 MILLIGRAM(S): at 13:14

## 2023-01-01 RX ADMIN — SODIUM CHLORIDE 75 MILLILITER(S): 9 INJECTION, SOLUTION INTRAVENOUS at 05:32

## 2023-01-01 RX ADMIN — Medication 100 MILLIGRAM(S): at 08:00

## 2023-01-01 RX ADMIN — SODIUM CHLORIDE 50 MILLILITER(S): 9 INJECTION, SOLUTION INTRAVENOUS at 04:19

## 2023-01-01 RX ADMIN — DRONEDARONE 400 MILLIGRAM(S): 400 TABLET, FILM COATED ORAL at 05:46

## 2023-01-01 RX ADMIN — PANTOPRAZOLE SODIUM 40 MILLIGRAM(S): 20 TABLET, DELAYED RELEASE ORAL at 03:33

## 2023-01-01 RX ADMIN — DRONEDARONE 400 MILLIGRAM(S): 400 TABLET, FILM COATED ORAL at 17:53

## 2023-01-01 RX ADMIN — Medication 3 MILLILITER(S): at 13:05

## 2023-01-01 RX ADMIN — DRONEDARONE 400 MILLIGRAM(S): 400 TABLET, FILM COATED ORAL at 17:12

## 2023-01-01 RX ADMIN — Medication 125 MILLIGRAM(S): at 01:38

## 2023-01-01 RX ADMIN — Medication 3 MILLILITER(S): at 20:36

## 2023-01-01 RX ADMIN — Medication 3 MILLILITER(S): at 10:26

## 2023-01-01 RX ADMIN — Medication 60 MILLIGRAM(S): at 06:04

## 2023-01-01 RX ADMIN — HEPARIN SODIUM 5000 UNIT(S): 5000 INJECTION INTRAVENOUS; SUBCUTANEOUS at 13:35

## 2023-01-01 RX ADMIN — Medication 30 MILLIGRAM(S): at 11:44

## 2023-01-01 RX ADMIN — Medication 100 MILLIGRAM(S): at 20:19

## 2023-01-01 RX ADMIN — HEPARIN SODIUM 5000 UNIT(S): 5000 INJECTION INTRAVENOUS; SUBCUTANEOUS at 21:43

## 2023-01-01 RX ADMIN — TAMSULOSIN HYDROCHLORIDE 0.4 MILLIGRAM(S): 0.4 CAPSULE ORAL at 21:54

## 2023-01-01 RX ADMIN — DRONEDARONE 400 MILLIGRAM(S): 400 TABLET, FILM COATED ORAL at 05:24

## 2023-01-01 RX ADMIN — Medication 81 MILLIGRAM(S): at 12:39

## 2023-01-01 RX ADMIN — Medication 3 MILLILITER(S): at 14:15

## 2023-01-01 RX ADMIN — Medication 40 MILLIGRAM(S): at 06:44

## 2023-01-01 RX ADMIN — Medication 40 MILLIGRAM(S): at 05:20

## 2023-01-01 RX ADMIN — Medication 25 MILLIGRAM(S): at 17:39

## 2023-01-01 RX ADMIN — BUDESONIDE AND FORMOTEROL FUMARATE DIHYDRATE 2 PUFF(S): 160; 4.5 AEROSOL RESPIRATORY (INHALATION) at 08:34

## 2023-01-01 RX ADMIN — Medication 25 GRAM(S): at 03:33

## 2023-01-01 RX ADMIN — Medication 3 MILLILITER(S): at 12:55

## 2023-01-01 RX ADMIN — Medication 3 MILLILITER(S): at 03:58

## 2023-01-01 RX ADMIN — Medication 30 MILLIGRAM(S): at 17:15

## 2023-01-01 RX ADMIN — DRONEDARONE 400 MILLIGRAM(S): 400 TABLET, FILM COATED ORAL at 17:57

## 2023-01-01 RX ADMIN — SODIUM CHLORIDE 75 MILLILITER(S): 9 INJECTION, SOLUTION INTRAVENOUS at 00:30

## 2023-01-01 RX ADMIN — Medication 3 MILLILITER(S): at 07:54

## 2023-01-01 RX ADMIN — PANTOPRAZOLE SODIUM 40 MILLIGRAM(S): 20 TABLET, DELAYED RELEASE ORAL at 06:53

## 2023-01-01 RX ADMIN — Medication 50 MILLILITER(S): at 23:29

## 2023-01-01 RX ADMIN — HEPARIN SODIUM 5000 UNIT(S): 5000 INJECTION INTRAVENOUS; SUBCUTANEOUS at 14:59

## 2023-01-01 RX ADMIN — HEPARIN SODIUM 5000 UNIT(S): 5000 INJECTION INTRAVENOUS; SUBCUTANEOUS at 06:52

## 2023-01-01 RX ADMIN — CHLORHEXIDINE GLUCONATE 1 APPLICATION(S): 213 SOLUTION TOPICAL at 06:18

## 2023-01-01 RX ADMIN — Medication 65 MILLIGRAM(S): at 12:47

## 2023-01-01 RX ADMIN — Medication 60 MILLIGRAM(S): at 10:41

## 2023-01-01 RX ADMIN — HEPARIN SODIUM 5000 UNIT(S): 5000 INJECTION INTRAVENOUS; SUBCUTANEOUS at 06:02

## 2023-01-01 RX ADMIN — Medication 60 MILLIGRAM(S): at 07:04

## 2023-01-01 RX ADMIN — Medication 60 MILLIGRAM(S): at 23:00

## 2023-01-01 RX ADMIN — CHLORHEXIDINE GLUCONATE 1 APPLICATION(S): 213 SOLUTION TOPICAL at 12:30

## 2023-01-01 RX ADMIN — Medication 81 MILLIGRAM(S): at 14:01

## 2023-01-01 RX ADMIN — Medication 40 MILLIGRAM(S): at 17:31

## 2023-01-01 RX ADMIN — CHLORHEXIDINE GLUCONATE 1 APPLICATION(S): 213 SOLUTION TOPICAL at 05:47

## 2023-01-01 RX ADMIN — Medication 3 MILLILITER(S): at 09:04

## 2023-01-01 RX ADMIN — Medication 81 MILLIGRAM(S): at 11:07

## 2023-01-01 RX ADMIN — SODIUM ZIRCONIUM CYCLOSILICATE 10 GRAM(S): 10 POWDER, FOR SUSPENSION ORAL at 17:06

## 2023-01-01 RX ADMIN — Medication 3 MILLILITER(S): at 23:22

## 2023-01-01 RX ADMIN — DRONEDARONE 400 MILLIGRAM(S): 400 TABLET, FILM COATED ORAL at 05:42

## 2023-01-01 RX ADMIN — PANTOPRAZOLE SODIUM 40 MILLIGRAM(S): 20 TABLET, DELAYED RELEASE ORAL at 06:24

## 2023-01-01 RX ADMIN — ATORVASTATIN CALCIUM 20 MILLIGRAM(S): 80 TABLET, FILM COATED ORAL at 22:28

## 2023-01-01 RX ADMIN — ATORVASTATIN CALCIUM 20 MILLIGRAM(S): 80 TABLET, FILM COATED ORAL at 22:41

## 2023-01-01 RX ADMIN — TAMSULOSIN HYDROCHLORIDE 0.4 MILLIGRAM(S): 0.4 CAPSULE ORAL at 22:16

## 2023-01-01 RX ADMIN — SODIUM CHLORIDE 500 MILLILITER(S): 9 INJECTION, SOLUTION INTRAVENOUS at 02:36

## 2023-01-01 RX ADMIN — Medication 112 MICROGRAM(S): at 05:41

## 2023-01-01 RX ADMIN — Medication 112 MICROGRAM(S): at 05:14

## 2023-01-01 RX ADMIN — Medication 3 MILLILITER(S): at 14:59

## 2023-01-01 RX ADMIN — Medication 3 MILLILITER(S): at 12:47

## 2023-01-01 RX ADMIN — Medication 100 MILLIGRAM(S): at 06:58

## 2023-01-01 RX ADMIN — Medication 40 MILLIGRAM(S): at 17:12

## 2023-01-01 RX ADMIN — Medication 81 MILLIGRAM(S): at 11:44

## 2023-01-01 RX ADMIN — CHLORHEXIDINE GLUCONATE 1 APPLICATION(S): 213 SOLUTION TOPICAL at 11:34

## 2023-01-01 RX ADMIN — HEPARIN SODIUM 5000 UNIT(S): 5000 INJECTION INTRAVENOUS; SUBCUTANEOUS at 21:04

## 2023-01-01 RX ADMIN — Medication 112 MICROGRAM(S): at 06:16

## 2023-01-01 RX ADMIN — Medication 3 MILLILITER(S): at 14:47

## 2023-01-01 RX ADMIN — CHLORHEXIDINE GLUCONATE 1 APPLICATION(S): 213 SOLUTION TOPICAL at 12:18

## 2023-01-01 RX ADMIN — Medication 1 APPLICATION(S): at 21:54

## 2023-01-01 RX ADMIN — Medication 3 MILLILITER(S): at 07:29

## 2023-01-01 RX ADMIN — SODIUM CHLORIDE 75 MILLILITER(S): 9 INJECTION, SOLUTION INTRAVENOUS at 22:24

## 2023-01-01 RX ADMIN — Medication 3 MILLILITER(S): at 16:59

## 2023-01-01 RX ADMIN — HEPARIN SODIUM 5000 UNIT(S): 5000 INJECTION INTRAVENOUS; SUBCUTANEOUS at 22:23

## 2023-01-01 RX ADMIN — SODIUM ZIRCONIUM CYCLOSILICATE 10 GRAM(S): 10 POWDER, FOR SUSPENSION ORAL at 05:31

## 2023-01-01 RX ADMIN — DRONEDARONE 400 MILLIGRAM(S): 400 TABLET, FILM COATED ORAL at 17:40

## 2023-01-01 RX ADMIN — Medication 40 MILLIGRAM(S): at 06:13

## 2023-01-01 RX ADMIN — Medication 60 MILLIGRAM(S): at 05:11

## 2023-01-01 RX ADMIN — Medication 3 MILLILITER(S): at 21:10

## 2023-01-01 RX ADMIN — Medication 81 MILLIGRAM(S): at 12:30

## 2023-01-01 RX ADMIN — TAMSULOSIN HYDROCHLORIDE 0.4 MILLIGRAM(S): 0.4 CAPSULE ORAL at 21:43

## 2023-01-01 RX ADMIN — Medication 3 MILLILITER(S): at 08:05

## 2023-01-01 RX ADMIN — PANTOPRAZOLE SODIUM 40 MILLIGRAM(S): 20 TABLET, DELAYED RELEASE ORAL at 22:23

## 2023-01-01 RX ADMIN — SODIUM CHLORIDE 60 MILLILITER(S): 9 INJECTION, SOLUTION INTRAVENOUS at 17:19

## 2023-01-01 RX ADMIN — HEPARIN SODIUM 5000 UNIT(S): 5000 INJECTION INTRAVENOUS; SUBCUTANEOUS at 13:17

## 2023-01-01 RX ADMIN — ATORVASTATIN CALCIUM 20 MILLIGRAM(S): 80 TABLET, FILM COATED ORAL at 21:44

## 2023-01-01 RX ADMIN — DRONEDARONE 400 MILLIGRAM(S): 400 TABLET, FILM COATED ORAL at 18:03

## 2023-01-01 RX ADMIN — Medication 3 MILLILITER(S): at 13:12

## 2023-01-01 RX ADMIN — Medication 30 MILLIGRAM(S): at 23:44

## 2023-01-01 RX ADMIN — Medication 81 MILLIGRAM(S): at 11:53

## 2023-01-01 RX ADMIN — Medication 3 MILLILITER(S): at 12:59

## 2023-01-01 RX ADMIN — Medication 3 MILLILITER(S): at 08:01

## 2023-01-01 RX ADMIN — DRONEDARONE 400 MILLIGRAM(S): 400 TABLET, FILM COATED ORAL at 17:41

## 2023-01-01 RX ADMIN — SODIUM CHLORIDE 40 MILLILITER(S): 9 INJECTION, SOLUTION INTRAVENOUS at 18:30

## 2023-01-01 RX ADMIN — Medication 1 APPLICATION(S): at 12:25

## 2023-01-01 RX ADMIN — BUDESONIDE AND FORMOTEROL FUMARATE DIHYDRATE 2 PUFF(S): 160; 4.5 AEROSOL RESPIRATORY (INHALATION) at 11:35

## 2023-01-01 RX ADMIN — HEPARIN SODIUM 5000 UNIT(S): 5000 INJECTION INTRAVENOUS; SUBCUTANEOUS at 14:10

## 2023-01-01 RX ADMIN — HEPARIN SODIUM 5000 UNIT(S): 5000 INJECTION INTRAVENOUS; SUBCUTANEOUS at 16:03

## 2023-01-01 RX ADMIN — Medication 50 MILLILITER(S): at 20:22

## 2023-01-01 RX ADMIN — Medication 3 MILLILITER(S): at 21:00

## 2023-01-01 RX ADMIN — Medication 5 MILLIGRAM(S): at 21:40

## 2023-01-01 RX ADMIN — Medication 100 MILLIGRAM(S): at 05:53

## 2023-01-01 RX ADMIN — Medication 3 MILLILITER(S): at 12:25

## 2023-01-01 RX ADMIN — Medication 3 MILLILITER(S): at 14:18

## 2023-01-01 RX ADMIN — DRONEDARONE 400 MILLIGRAM(S): 400 TABLET, FILM COATED ORAL at 17:42

## 2023-01-01 RX ADMIN — Medication 40 MILLIGRAM(S): at 17:20

## 2023-01-01 RX ADMIN — Medication 25 MILLIGRAM(S): at 06:19

## 2023-01-01 RX ADMIN — Medication 30 MILLIGRAM(S): at 12:25

## 2023-01-01 RX ADMIN — SODIUM ZIRCONIUM CYCLOSILICATE 10 GRAM(S): 10 POWDER, FOR SUSPENSION ORAL at 17:31

## 2023-01-01 RX ADMIN — QUETIAPINE FUMARATE 25 MILLIGRAM(S): 200 TABLET, FILM COATED ORAL at 11:25

## 2023-01-01 RX ADMIN — Medication 100 MILLIGRAM(S): at 18:48

## 2023-01-01 RX ADMIN — Medication 100 MILLIGRAM(S): at 07:04

## 2023-01-01 RX ADMIN — Medication 20 MILLIGRAM(S): at 05:46

## 2023-01-01 RX ADMIN — Medication 3 MILLILITER(S): at 20:04

## 2023-01-01 RX ADMIN — HYDROMORPHONE HYDROCHLORIDE 0.5 MILLIGRAM(S): 2 INJECTION INTRAMUSCULAR; INTRAVENOUS; SUBCUTANEOUS at 10:13

## 2023-01-01 RX ADMIN — Medication 3 MILLILITER(S): at 03:59

## 2023-01-01 RX ADMIN — Medication 3 MILLILITER(S): at 07:57

## 2023-01-01 RX ADMIN — Medication 100 MILLIGRAM(S): at 06:01

## 2023-01-01 RX ADMIN — Medication 5 MILLIGRAM(S): at 22:05

## 2023-01-01 RX ADMIN — Medication 60 MILLIGRAM(S): at 05:05

## 2023-01-01 RX ADMIN — DRONEDARONE 400 MILLIGRAM(S): 400 TABLET, FILM COATED ORAL at 05:44

## 2023-01-01 RX ADMIN — SODIUM ZIRCONIUM CYCLOSILICATE 10 GRAM(S): 10 POWDER, FOR SUSPENSION ORAL at 17:12

## 2023-01-01 RX ADMIN — PANTOPRAZOLE SODIUM 40 MILLIGRAM(S): 20 TABLET, DELAYED RELEASE ORAL at 07:34

## 2023-01-01 RX ADMIN — HEPARIN SODIUM 5000 UNIT(S): 5000 INJECTION INTRAVENOUS; SUBCUTANEOUS at 05:41

## 2023-01-01 RX ADMIN — HEPARIN SODIUM 5000 UNIT(S): 5000 INJECTION INTRAVENOUS; SUBCUTANEOUS at 06:24

## 2023-01-01 RX ADMIN — Medication 3 MILLILITER(S): at 19:51

## 2023-01-01 RX ADMIN — Medication 30 MILLIGRAM(S): at 00:31

## 2023-01-01 RX ADMIN — INSULIN HUMAN 10 UNIT(S): 100 INJECTION, SOLUTION SUBCUTANEOUS at 23:29

## 2023-01-01 RX ADMIN — Medication 100 MILLIGRAM(S): at 05:24

## 2023-01-10 PROBLEM — J44.1 COPD EXACERBATION: Status: ACTIVE | Noted: 2023-01-01

## 2023-01-10 PROBLEM — R06.02 EXERTIONAL SHORTNESS OF BREATH: Status: ACTIVE | Noted: 2022-01-01

## 2023-01-10 NOTE — DISCUSSION/SUMMARY
[FreeTextEntry1] : SEVEER COPD NOW EXACERBATION\par DO NOT WANT TO GO TO HOSP\par SON PRESENT\par STEROIDS/ ABX/ NEB\par POOR PROGNOSIS

## 2023-01-10 NOTE — PROCEDURE
[FreeTextEntry1] : CXR PA/ LAR COUGH\par \par HYPEERINFLATED/ SMALL BL EFFUSION\par \par IMPRESSION\par \par UNCHANGED COMPARED TO PRIOR

## 2023-01-10 NOTE — PHYSICAL EXAM
[No Acute Distress] : no acute distress [Normal Oropharynx] : normal oropharynx [Normal Appearance] : normal appearance [No Neck Mass] : no neck mass [Normal Rate/Rhythm] : normal rate/rhythm [Normal S1, S2] : normal s1, s2 [No Murmurs] : no murmurs [Benign] : benign [Normal Gait] : normal gait [No Clubbing] : no clubbing [No Cyanosis] : no cyanosis [No Edema] : no edema [FROM] : FROM [Normal Color/ Pigmentation] : normal color/ pigmentation [No Focal Deficits] : no focal deficits [Oriented x3] : oriented x3 [Normal Affect] : normal affect [TextBox_68] : DIFFUSE WHEEZING

## 2023-02-01 PROBLEM — U07.1 PNEUMONIA DUE TO COVID-19 VIRUS: Status: ACTIVE | Noted: 2022-01-01

## 2023-03-03 NOTE — ED ADULT TRIAGE NOTE - CHIEF COMPLAINT QUOTE
pt BIBA for nausea and NBNB vomitting starting one hour PTA. pt denies abdominal pain. states she began vomitting after eating chinese food.

## 2023-03-03 NOTE — ED ADULT TRIAGE NOTE - GLASGOW COMA SCALE: EYE OPENING, MLM
For information on Fall & Injury Prevention, visit: [email or web address] OR  [email or web address] OR  https://www.cdc.gov/steadi/patient.html
(E4) spontaneous

## 2023-03-04 NOTE — H&P ADULT - CONVERSATION DETAILS
GOC discussed with patient's son. Pt has previous wishes and MOLST for DNR/DNI and son would like to continue those wishes. Previous MOLST form was brought by son and placed in chart. DNR/DNI reinstated

## 2023-03-04 NOTE — CONSULT NOTE ADULT - SUBJECTIVE AND OBJECTIVE BOX
Patient is a 89y old  Female who presents with a chief complaint of SOB    89 y/o female with PMH of HTN, HLD, CHF, a fib not on AC, hyopthyroidism, CKD (baseline Cr 1.7) and COPD on 4L NC presenting for SOB. As per daughter, pt was wheezing at home, looked SOB and looked like she was holding her breath, became lethargic so they called EMS. Vomited once with EMS. No fever, cough, congestion or abdominal pain, In ED placed in BIPAP, sp CT AP, head, called to evaluate.  well known to me with very severe COPD, oxygen dependant, need assistance with all ADL      PAST MEDICAL & SURGICAL HISTORY:  COPD (chronic obstructive pulmonary disease)      Hypothyroid      HTN (hypertension)      High cholesterol      Colitis      CKD (chronic kidney disease)      No significant past surgical history          SOCIAL HX:   Smoking      -    FAMILY HISTORY:      REVIEW OF SYSTEMS See hpi      Allergies    No Known Allergies    Intolerances        : Home Meds:      PHYSICAL EXAM    ICU Vital Signs Last 24 Hrs  T(C): 35.6 (04 Mar 2023 08:43), Max: 36.6 (03 Mar 2023 22:36)  T(F): 96 (04 Mar 2023 08:43), Max: 97.8 (03 Mar 2023 22:36)  HR: 71 (04 Mar 2023 08:43) (69 - 79)  BP: 150/63 (04 Mar 2023 08:43) (97/45 - 174/78)  BP(mean): --  ABP: --  ABP(mean): --  RR: 19 (04 Mar 2023 08:43) (19 - 20)  SpO2: 96% (04 Mar 2023 08:43) (94% - 98%)    O2 Parameters below as of 04 Mar 2023 03:26  Patient On (Oxygen Delivery Method): BiPAP/CPAP            General: ill looking, cushinoid apearance  Lungs: dec bs both bases  Cardiovascular: Regular  Abdomen: Soft, Positive BS  Extremities: No clubbing  Neurological: obtunded        LABS:                          9.1    9.92  )-----------( 178      ( 04 Mar 2023 00:44 )             30.3                                               03-04    143  |  98  |  61<HH>  ----------------------------<  140<H>  4.7   |  35<H>  |  2.3<H>    Ca    8.5      04 Mar 2023 00:44    TPro  6.1  /  Alb  3.7  /  TBili  0.3  /  DBili  x   /  AST  60<H>  /  ALT  45<H>  /  AlkPhos  139<H>  03-04                                                 CARDIAC MARKERS ( 04 Mar 2023 00:44 )  x     / 0.03 ng/mL / x     / x     / x                                                LIVER FUNCTIONS - ( 04 Mar 2023 00:44 )  Alb: 3.7 g/dL / Pro: 6.1 g/dL / ALK PHOS: 139 U/L / ALT: 45 U/L / AST: 60 U/L / GGT: x                                                                                                                                   ABG - ( 04 Mar 2023 07:41 )  pH, Arterial: 7.33  pH, Blood: x     /  pCO2: 72    /  pO2: 73    / HCO3: 38    / Base Excess: 9.1   /  SaO2: 97.0                CXR reviewed

## 2023-03-04 NOTE — ED PROVIDER NOTE - ATTENDING CONTRIBUTION TO CARE
89-year-old female with past medical history of hypertension hyperlipidemia CKD hypothyroid COPD on 4 L at home brought in by her daughter because they noticed for a brief moment she had an episode where she was puffing her cheeks and then subsequently had an episode of vomiting no fevers no shortness of breath chest pain no abdominal pain   CONSTITUTIONAL: WA / WN / NAD +dark emesis   HEAD: NCAT  EYES: PERRL; EOMI; anicteric.  ENT: Normal pharynx; mucous membranes pink/moist, no erythema.  NECK: Supple; no meningeal signs  CARD: RRR; nl S1/S2; no M/R/G.  RESP: Respiratory rate and effort are normal; mild wheeze at bases on 4l nc  ABD: Soft, NT ND   MSK/EXT: No gross deformities; full range of motion.  SKIN: Warm and dry;   NEURO: AAOx3  PSYCH: Memory Intact, Normal Affect

## 2023-03-04 NOTE — H&P ADULT - ATTENDING COMMENTS
89 y/o female PMH of HTN, HLD, CHF, a fib not on AC, hyopthyroidism, CKD (baseline Cr 1.7) and COPD on 4L NC admitted for acute hypercapnic respiratory failure due to COPD Exacerbation.    #Acute Hypercapnic respiratory Failure  #COPD Exacerbation  Pulmonary team on board. ABG improved today morning. Pt is a chronic retainer  - Wean off AVAPs as tolerated  - Cont duonebs q4h ATC if off AVAPs  - Cont IV solumedrol 60 TID  - SDU monitoring  - Cont IV doxycycline 100 BID  - f/u RVP and procalcitonin  - LE Duplex 89 y/o female PMH of HTN, HLD, CHF, a fib not on AC, hypothyroidism, CKD (baseline Cr 1.5) and COPD on 4L NC admitted for acute hypercapnic respiratory failure due to COPD Exacerbation.    #Acute on Chronic Hypoxic/Hypercapnic respiratory Failure (On Home O2 4L)  #COPD Exacerbation  Pulmonary team on board. ABG improved today morning. Pt is a chronic CO2 retainer  CXR showing no acute cardiopulmonary disease  - Wean off AVAPs as tolerated  - Cont duonebs q4h ATC if off AVAPs  - Cont IV solumedrol 60 TID  - SDU monitoring  - Cont IV doxycycline 100 BID  - f/u RVP and procalcitonin  - LE Duplex  - f/u BCx    #MAURI on CKD Stage 3  Likely pre-renal  - IVF  - Monitor renal funciton  - KBUS  - Hold lasix for now    #Chronic HFpEF  #HTN/HLD  #Elevated troponins, likely demand ischemia  - Cont metoprolol ER 25mg qD  - Cont lipitor 20mg qHs  - Holding lasix for now  - Cont ASA 81mg qD  - Trend troponins until stable    #Chronic Afib  Not on AC due to recurrentl falls  - BB as above  - Cont multaq 400 BID    #Hypothyroidism  - Cont synthroid 112mcg qD    DVT PPX, heparin 89 y/o female PMH of HTN, HLD, CHF, a fib not on AC, hypothyroidism, CKD (baseline Cr 1.5) and COPD on 4L NC admitted for acute hypercapnic respiratory failure due to COPD Exacerbation.    #Acute on Chronic Hypoxic/Hypercapnic respiratory Failure (On Home O2 4L)  #COPD Exacerbation  Pulmonary team on board. ABG improved today morning. Pt is a chronic CO2 retainer  CXR showing no acute cardiopulmonary disease\  On AVAPs, saturating 100% today morning  - Wean off AVAPs as tolerated  - Cont duonebs q4h ATC if off AVAPs  - Cont IV solumedrol 60 BID  - SDU monitoring  - Cont IV doxycycline 100 BID  - f/u RVP and procalcitonin  - LE Duplex  - f/u BCx    #MAURI on CKD Stage 3  Likely pre-renal  - IVF  - Monitor renal funciton  - KBUS  - Hold lasix for now    #Chronic HFpEF  #HTN/HLD  #Elevated troponins, likely demand ischemia  - Cont metoprolol ER 25mg qD  - Cont lipitor 20mg qHs  - Holding lasix for now  - Cont ASA 81mg qD  - Trend troponins until stable    #Chronic Afib  Not on AC due to recurrentl falls  - BB as above  - Cont multaq 400 BID    #Hypothyroidism  - Cont synthroid 112mcg qD    DVT PPX, heparin    Pt seen 03/04    #Progress Note Handoff  Pending (specify): Wean off AVAPs, cont IV steroids, Duonebs  Family discussion: d/w family by bedside regarding tx for COPD exacerbation  Disposition: Home

## 2023-03-04 NOTE — H&P ADULT - NSHPPHYSICALEXAM_GEN_ALL_CORE
GENERAL: NAD, lying in bed comfortably  HEAD:  Atraumatic, Normocephalic  EYES: EOMI, PERRLA, conjunctiva and sclera clear  ENT: Moist mucous membranes  NECK: Supple, No JVD  CHEST/LUNG: Clear to auscultation bilaterally; No rales, rhonchi, wheezing, or rubs. Unlabored respirations  HEART: Regular rate and rhythm; No murmurs, rubs, or gallops  ABDOMEN: Bowel sounds present; Soft, Nontender, Nondistended. No hepatomegalia  EXTREMITIES:  2+ Peripheral Pulses, brisk capillary refill. No clubbing, cyanosis, or edema  NERVOUS SYSTEM:  Alert & Oriented X3, speech clear. No deficits   MSK: FROM all 4 extremities, full and equal strength  SKIN: No rashes or lesions GENERAL: NAD, lying in bed comfortably  HEAD:  Atraumatic, Normocephalic  EYES: EOMI, conjunctiva and sclera clear  ENT: Dry mucous membranes  NECK: Supple,   CHEST/LUNG: BL wheezing,   HEART: Regular rate and rhythm;  ABDOMEN: Soft, Nontender, Nondistended.   EXTREMITIES:  2+ Peripheral Pulses, No edema   NERVOUS SYSTEM:  Alert & Oriented X3, speech clear.    SKIN: No rashes or lesions

## 2023-03-04 NOTE — H&P ADULT - HISTORY OF PRESENT ILLNESS
Pt is a 89 y/o female     PMH of HTN, HLD, CHF, a fib not on AC, hyopthyroidism, CKD (baseline Cr 1.7) and COPD on 4L NC    Presenting for SOB.     As per daughter, pt was wheezing at home, looked SOB and looked like she was holding her breath, became lethargic so they called EMS. Vomited once with EMS. No fever, cough, congestion or abdominal pain.    In the ED :  BP: 174/78  HR : 79  RR : 20  T : 97  O2: 94% on 6L NC     Hb 9.1 (baseline 11)  Cr 2.3 (baseline 1.3)  New transaminitis : AST/ALT /Alk phos : 60/45/139  Trop :0.03  BNP:  830 (baseline 500)  VBG: pH 7.24 , CO2: 88 , HCO3 : 30 -> placed on AVAPS -> pH 7.33 , CO2: 72 , HCO3 : 38    CT abdomen/pelvis : for vomiting and abdominal pain : No evidence of acute abdominal or pelvic pathology.  CT Head : No evidence of intracranial hemorrhage, territorial infarct, or mass effect.  ECG : Normal sinus rhythm     Pt is a 89 y/o female     PMH of HTN, HLD, CHF, a fib not on AC, hyopthyroidism, CKD (baseline Cr 1.7) and COPD on 4L NC    Presenting for SOB.     As per daughter, pt was wheezing at home in the evening, looked SOB and looked like she was holding her breath, became lethargic so they called EMS. Daughter later noticed she was off of her oxygen for some unknown amount of time. Patient also vomited once with EMS. No fever, cough, congestion or abdominal pain.    In the ED :  BP: 174/78  HR : 79  RR : 20  T : 97  O2: 94% on 6L NC -> later on AVAPS    Hb 9.1 (baseline 11)  Cr 2.3 (baseline 1.3)  New transaminitis : AST/ALT /Alk phos : 60/45/139  Trop :0.03  BNP:  830 (baseline 500)  VBG: pH 7.24 , CO2: 88 , HCO3 : 30 -> placed on AVAPS -> pH 7.33 , CO2: 72 , HCO3 : 38    CT abdomen/pelvis : for vomiting : No evidence of acute abdominal or pelvic pathology.  CT Head : No evidence of intracranial hemorrhage, territorial infarct, or mass effect.  ECG : Normal sinus rhythm

## 2023-03-04 NOTE — ED PROVIDER NOTE - CLINICAL SUMMARY MEDICAL DECISION MAKING FREE TEXT BOX
89-year-old female with past medical history of hypertension hyperlipidemia CKD hypothyroid COPD on 4 L at home brought in by her daughter because they noticed for a brief moment she had an episode where she was puffing her cheeks and then subsequently had an episode of vomiting no fevers no shortness of breath chest pain no abdominal pain  vs reviewed, labs imaging ekg obtained and reviewed, found to be hypercapenic antibiotics given, nebs given for copd and wheezing  bipap settings adjusted rpt vbg sent. icu consult placed accepted to SDU

## 2023-03-04 NOTE — ED PROVIDER NOTE - CONSIDERATION OF ADMISSION OBSERVATION
patient requiring admission for hypercapnia  bipap dependence. Consideration of Admission/Observation

## 2023-03-04 NOTE — ED PROVIDER NOTE - DIFFERENTIAL DIAGNOSIS
Differential Diagnosis vomiting r/o abdominal pathologies vs acs will do labs ekg   episodes of breath holding will check labs and cxr

## 2023-03-04 NOTE — ED PROVIDER NOTE - CARE PLAN
1 Principal Discharge DX:	COPD exacerbation  Secondary Diagnosis:	Acute kidney injury superimposed on CKD  Secondary Diagnosis:	Elevated troponin  Secondary Diagnosis:	Hypercapnia   Principal Discharge DX:	COPD exacerbation  Secondary Diagnosis:	Acute kidney injury superimposed on CKD  Secondary Diagnosis:	Elevated troponin  Secondary Diagnosis:	Hypercapnia  Secondary Diagnosis:	Prolonged QT interval

## 2023-03-04 NOTE — ED PROVIDER NOTE - PHYSICAL EXAMINATION
CONST: well appearing for age  HEAD:  normocephalic, atraumatic  EYES:  conjunctivae without injection, drainage or discharge  ENMT:  nasal mucosa moist; mouth moist without ulcerations or lesions; throat moist without erythema, exudate, ulcerations or lesions  NECK:  supple  CARDIAC:  regular rate and rhythm, normal S1 and S2, no murmurs, rubs or gallops  RESP:  respiratory rate and effort appear normal for age; mild expiratory wheeze bilaterally  ABDOMEN:  soft, nontender, nondistended  MUSCULOSKELETAL/NEURO: awake and alert, normal movement, normal tone  SKIN:  normal skin color for age and race, well-perfused; warm and dry

## 2023-03-04 NOTE — H&P ADULT - NSHPLABSRESULTS_GEN_ALL_CORE
(03-04 @ 00:44)                      9.1  9.92 )-----------( 178                 30.3    Neutrophils = 8.49 (85.6%)  Lymphocytes = 0.68 (6.9%)  Eosinophils = 0.08 (0.8%)  Basophils = 0.03 (0.3%)  Monocytes = 0.59 (5.9%)  Bands = --%    03-04    143  |  98  |  61<HH>  ----------------------------<  140<H>  4.7   |  35<H>  |  2.3<H>    Ca    8.5      04 Mar 2023 00:44    TPro  6.1  /  Alb  3.7  /  TBili  0.3  /  DBili  x   /  AST  60<H>  /  ALT  45<H>  /  AlkPhos  139<H>  03-04      CARDIAC MARKERS ( 04 Mar 2023 00:44 )  Trop 0.03 ng/mL<HH> / CK x     / CKMB x           RVP:    Venous Blood Gas:  03-04 @ 04:35  7.24/93/25/40/33.1  VBG Lactate: 0.80  Venous Blood Gas:  03-04 @ 02:35  7.25/92/24/40/34.2  VBG Lactate: 0.60  Venous Blood Gas:  03-04 @ 00:53  7.27/88/30/40/43.6  VBG Lactate: 0.70    Arterial Blood Gas:  03-04 @ 07:41  7.33/72/73/38/97.0/9.1  ABG lactate: --

## 2023-03-04 NOTE — ED PROVIDER NOTE - OBJECTIVE STATEMENT
Pt is a 89 y/o female with PMH of HTN, HLD, CHF, a fib not on AC, hyopthyroidism, CKD (baseline Cr 1.7) and COPD on 4L NC presenting for SOB. As per daughter, pt was wheezing at home, looked SOB and looked like she was holding her breath, became lethargic so they called EMS. Vomited once with EMS. No fever, cough, congestion or abdominal pain.

## 2023-03-04 NOTE — H&P ADULT - ASSESSMENT
Pt is a 89 y/o female     PMH of HTN, HLD, CHF, a fib not on AC, hyopthyroidism, CKD (baseline Cr 1.7) and COPD on 4L NC  Presenting for SOB.   As per daughter, pt was wheezing at home, looked SOB and looked like she was holding her breath, became lethargic so they called EMS. Vomited once with EMS. No fever, cough, congestion..    # Acute Resp failure : COPD exacerbation   # Hx COPD on home oxygen 3-4 L  # Hx Covid-19 in Oct 2022 s/p dexamethasone and remdesivir  (vaccinated with one booster )  - VBG: pH 7.24 , CO2: 88 , HCO3 : 30 -> placed on AVAPS -> pH 7.33 , CO2: 72 , HCO3 : 38  - HOB @ 45 degrees, aspiration precaution  - AVAPS, repeat ABG, keep Sao2 88 to 92%  - solumedrol 60 q 12  - Duoneb q6  - f/u AM CXR  - f/u LE duplex    # Acute on chronic renal failure  - gentle hydration : LR at 75/h  - f/u Urine: Osm, Urea, Cr , Na  - f/u UA  - f/u renal US     # Normocytic anemia   - Hb 9.1 (baseline 11)  - No obvious bleeding  - f/u Iron profile, ferritin, b12 , folate     # Transaminitis :   - AST/ALT /Alk phos : 60/45/139  - Trend LE     #Hx A-fib off AC due to recurrent falls  - Currently in NSR  - c/w home metoprolol and Multaq    # HFPeF  - c/w lasix 40 mg PO QD    # DLD  # Hypothyroidism  - C/W levothyroxine and atorvastatin     # Recurrent falls - Mechanical   - PT eval / Fall precautions     #Troponemia  - Trop :0.03 in the setting of CKD  - BNP:  830 (baseline 500)  - ECG : Normal sinus rhythm no signs of new ischemia       DVT PPX: Heparin sq prof dose  Diet: DASH  GI PPX : protonix   Dispo: From home - admitted to SDU  Code status: DNR/DNI  Pt is a 89 y/o female     PMH of HTN, HLD, CHF, a fib not on AC, hyopthyroidism, CKD (baseline Cr 1.7) and COPD on 4L NC  Presenting for SOB.   As per daughter, pt was wheezing at home, looked SOB and looked like she was holding her breath, became lethargic so they called EMS. Vomited once with EMS. No fever, cough, congestion..    # Acute Resp failure : COPD exacerbation   # Hx COPD on home oxygen 3-4 L  # Hx Covid-19 in Oct 2022 s/p dexamethasone and remdesivir  (vaccinated with one booster )  - VBG: pH 7.24 , CO2: 88 , HCO3 : 30 -> placed on AVAPS -> pH 7.33 , CO2: 72 , HCO3 : 38  - HOB @ 45 degrees, aspiration precaution  - AVAPS, repeat ABG, keep Sao2 88 to 92%  - solumedrol 60 q 12  - Duoneb q6  - f/u ABG   - f/u AM CXR  - f/u LE duplex  - f/u RVP, Procal     # Acute on chronic renal failure  - gentle hydration : LR at 75/h + Holding lasix (dry on exam)  - f/u Urine: Osm, Urea, Cr , Na  - f/u UA  - f/u renal US     # Normocytic anemia   - Hb 9.1 (baseline 11)  - No obvious bleeding  - f/u Iron profile, ferritin, b12 , folate     # Transaminitis :   - AST/ALT /Alk phos : 60/45/139  - Trend LE     #Hx A-fib off AC due to recurrent falls  - Currently in NSR  - c/w home metoprolol and Multaq    # HFPeF  - holding lasix 40 mg PO QD - for MAURI  - dry on exam     # DLD  # Hypothyroidism  - C/W levothyroxine and atorvastatin     # Recurrent falls - Mechanical   - PT eval / Fall precautions     #Troponemia  - Trop :0.03 in the setting of CKD - trend   - BNP:  830 (baseline 500)  - ECG : Normal sinus rhythm no signs of new ischemia       DVT PPX: Heparin sq prof dose  Diet: DASH  GI PPX : protonix   Dispo: From home - admitted to SDU  Code status: DNR/DNI

## 2023-03-04 NOTE — ED ADULT NURSE REASSESSMENT NOTE - NS ED NURSE REASSESS COMMENT FT1
Patient assessed. No signs of pain or  discomfort present. Family at bedside requesting to speak to MD and MD Juan David Aguilar at bedside to speak with family regarding plan of care. As per MD, bipap continued at this time. No signs of distress noted.

## 2023-03-05 NOTE — PROGRESS NOTE ADULT - SUBJECTIVE AND OBJECTIVE BOX
Patient is a 89y old  Female who presents with a chief complaint of Resp Failure (04 Mar 2023 11:52)    Over Night Events:  No overnight events.  On 4L NC.  Off pressors.    ROS:   All ROS are negative except HPI     PHYSICAL EXAM  ICU Vital Signs Last 24 Hrs  T(C): 36.3 (05 Mar 2023 07:10), Max: 36.3 (04 Mar 2023 15:56)  T(F): 97.3 (05 Mar 2023 07:10), Max: 97.4 (05 Mar 2023 05:00)  HR: 92 (05 Mar 2023 07:10) (70 - 92)  BP: 120/57 (05 Mar 2023 07:10) (113/62 - 159/65)  BP(mean): 82 (05 Mar 2023 07:10) (82 - 94)  RR: 18 (05 Mar 2023 07:10) (18 - 20)  SpO2: 93% (05 Mar 2023 07:10) (93% - 100%)    O2 Parameters below as of 05 Mar 2023 07:10  Patient On (Oxygen Delivery Method): nasal cannula  O2 Flow (L/min): 4    General: ill looking, cushinoid appearance  Lungs: dec bs both bases  Cardiovascular: Regular  Abdomen: Soft, Positive BS  Extremities: No clubbing  Neurological: obtunded      03-04-23 @ 07:01  -  03-05-23 @ 07:00  --------------------------------------------------------  IN:    IV PiggyBack: 100 mL  Total IN: 100 mL    OUT:    Voided (mL): 200 mL  Total OUT: 200 mL    Total NET: -100 mL      LABS:             9.9    5.23  )-----------( 183      ( 05 Mar 2023 06:30 )             33.4     142  |  99  |  51<H>  ----------------------------<  124<H>  5.5<H>   |  34<H>  |  1.7<H>    Creatinine Trend  BUN 61, Cr 2.3, (03-04-23 @ 00:44)    Ca    8.7      04 Mar 2023 18:17    TPro  6.1  /  Alb  3.7  /  TBili  0.3  /  DBili  x   /  AST  60<H>  /  ALT  45<H>  /  AlkPhos  139<H>  03-04    CARDIAC MARKERS ( 05 Mar 2023 06:30 )  x     / 0.01 ng/mL / x     / x     / x      CARDIAC MARKERS ( 04 Mar 2023 18:17 )  x     / <0.01 ng/mL / x     / x     / x      CARDIAC MARKERS ( 04 Mar 2023 00:44 )  x     / 0.03 ng/mL / x     / x     / x        LIVER FUNCTIONS - ( 04 Mar 2023 00:44 )  Alb: 3.7 g/dL / Pro: 6.1 g/dL / ALK PHOS: 139 U/L / ALT: 45 U/L / AST: 60 U/L / GGT: x                                              Culture - Blood (collected 04 Mar 2023 02:02)  Source: .Blood Blood  Preliminary Report (05 Mar 2023 09:01):    No growth to date.    Culture - Blood (collected 04 Mar 2023 02:02)  Source: .Blood Blood  Preliminary Report (05 Mar 2023 09:01):    No growth to date.    ABG - ( 04 Mar 2023 07:41 )  pH, Arterial: 7.33  pH, Blood: x     /  pCO2: 72    /  pO2: 73    / HCO3: 38    / Base Excess: 9.1   /  SaO2: 97.0      MEDICATIONS  (STANDING):  albuterol/ipratropium for Nebulization 3 milliLiter(s) Nebulizer every 4 hours  aspirin  chewable 81 milliGRAM(s) Oral daily  atorvastatin 20 milliGRAM(s) Oral at bedtime  chlorhexidine 2% Cloths 1 Application(s) Topical <User Schedule>  doxycycline IVPB      doxycycline IVPB 100 milliGRAM(s) IV Intermittent every 12 hours  dronedarone 400 milliGRAM(s) Oral two times a day  heparin   Injectable 5000 Unit(s) SubCutaneous every 8 hours  levothyroxine 112 MICROGram(s) Oral daily  melatonin 5 milliGRAM(s) Oral at bedtime  methylPREDNISolone sodium succinate Injectable 60 milliGRAM(s) IV Push every 12 hours  metoprolol succinate ER 25 milliGRAM(s) Oral daily  pantoprazole    Tablet 40 milliGRAM(s) Oral before breakfast    MEDICATIONS  (PRN):  acetaminophen     Tablet .. 650 milliGRAM(s) Oral every 6 hours PRN Temp greater or equal to 38C (100.4F), Mild Pain (1 - 3)  aluminum hydroxide/magnesium hydroxide/simethicone Suspension 30 milliLiter(s) Oral every 4 hours PRN Dyspepsia      New X-rays reviewed:                                                                                  ECHO   Patient is a 89y old  Female who presents with a chief complaint of Resp Failure (04 Mar 2023 11:52)    Over Night Events:  No overnight events.  On 4L NC.  Off pressors. better    PHYSICAL EXAM  ICU Vital Signs Last 24 Hrs  T(C): 36.3 (05 Mar 2023 07:10), Max: 36.3 (04 Mar 2023 15:56)  T(F): 97.3 (05 Mar 2023 07:10), Max: 97.4 (05 Mar 2023 05:00)  HR: 92 (05 Mar 2023 07:10) (70 - 92)  BP: 120/57 (05 Mar 2023 07:10) (113/62 - 159/65)  BP(mean): 82 (05 Mar 2023 07:10) (82 - 94)  RR: 18 (05 Mar 2023 07:10) (18 - 20)  SpO2: 93% (05 Mar 2023 07:10) (93% - 100%)    O2 Parameters below as of 05 Mar 2023 07:10  Patient On (Oxygen Delivery Method): nasal cannula  O2 Flow (L/min): 4    General: ill looking, cushinoid appearance  Lungs: dec bs both bases  Cardiovascular: Regular  Abdomen: Soft, Positive BS  Extremities: No clubbing  Neurological: obtunded      03-04-23 @ 07:01  -  03-05-23 @ 07:00  --------------------------------------------------------  IN:    IV PiggyBack: 100 mL  Total IN: 100 mL    OUT:    Voided (mL): 200 mL  Total OUT: 200 mL    Total NET: -100 mL      LABS:             9.9    5.23  )-----------( 183      ( 05 Mar 2023 06:30 )             33.4     142  |  99  |  51<H>  ----------------------------<  124<H>  5.5<H>   |  34<H>  |  1.7<H>    Creatinine Trend  BUN 61, Cr 2.3, (03-04-23 @ 00:44)    Ca    8.7      04 Mar 2023 18:17    TPro  6.1  /  Alb  3.7  /  TBili  0.3  /  DBili  x   /  AST  60<H>  /  ALT  45<H>  /  AlkPhos  139<H>  03-04    CARDIAC MARKERS ( 05 Mar 2023 06:30 )  x     / 0.01 ng/mL / x     / x     / x      CARDIAC MARKERS ( 04 Mar 2023 18:17 )  x     / <0.01 ng/mL / x     / x     / x      CARDIAC MARKERS ( 04 Mar 2023 00:44 )  x     / 0.03 ng/mL / x     / x     / x        LIVER FUNCTIONS - ( 04 Mar 2023 00:44 )  Alb: 3.7 g/dL / Pro: 6.1 g/dL / ALK PHOS: 139 U/L / ALT: 45 U/L / AST: 60 U/L / GGT: x                                              Culture - Blood (collected 04 Mar 2023 02:02)  Source: .Blood Blood  Preliminary Report (05 Mar 2023 09:01):    No growth to date.    Culture - Blood (collected 04 Mar 2023 02:02)  Source: .Blood Blood  Preliminary Report (05 Mar 2023 09:01):    No growth to date.    ABG - ( 04 Mar 2023 07:41 )  pH, Arterial: 7.33  pH, Blood: x     /  pCO2: 72    /  pO2: 73    / HCO3: 38    / Base Excess: 9.1   /  SaO2: 97.0      MEDICATIONS  (STANDING):  albuterol/ipratropium for Nebulization 3 milliLiter(s) Nebulizer every 4 hours  aspirin  chewable 81 milliGRAM(s) Oral daily  atorvastatin 20 milliGRAM(s) Oral at bedtime  chlorhexidine 2% Cloths 1 Application(s) Topical <User Schedule>  doxycycline IVPB      doxycycline IVPB 100 milliGRAM(s) IV Intermittent every 12 hours  dronedarone 400 milliGRAM(s) Oral two times a day  heparin   Injectable 5000 Unit(s) SubCutaneous every 8 hours  levothyroxine 112 MICROGram(s) Oral daily  melatonin 5 milliGRAM(s) Oral at bedtime  methylPREDNISolone sodium succinate Injectable 60 milliGRAM(s) IV Push every 12 hours  metoprolol succinate ER 25 milliGRAM(s) Oral daily  pantoprazole    Tablet 40 milliGRAM(s) Oral before breakfast    MEDICATIONS  (PRN):  acetaminophen     Tablet .. 650 milliGRAM(s) Oral every 6 hours PRN Temp greater or equal to 38C (100.4F), Mild Pain (1 - 3)  aluminum hydroxide/magnesium hydroxide/simethicone Suspension 30 milliLiter(s) Oral every 4 hours PRN Dyspepsia      CXR reviewed

## 2023-03-05 NOTE — CHART NOTE - NSCHARTNOTEFT_GEN_A_CORE
CEU Transfer Note    Transfer from: CEU  Transfer to:  (  ) Medicine    (  ) Telemetry    (  ) RCU    (  ) Palliative    (  ) Stroke Unit    (  ) _______________  Accepting physican:      CEU COURSE:  Pt is a 89 y/o female PMH of HTN, HLD, CHF, a fib not on AC, hyopthyroidism, CKD (baseline Cr 1.7) and COPD on 4L NC presenting for SOB.     As per daughter, pt was wheezing at home in the evening, looked SOB and looked like she was holding her breath, became lethargic so they called EMS. Daughter later noticed she was off of her oxygen for some unknown amount of time. Patient also vomited once with EMS. No fever, cough, congestion or abdominal pain.    In the ED :  BP: 174/78  HR : 79  RR : 20  T : 97  O2: 94% on 6L NC -> later on AVAPS    Hb 9.1 (baseline 11)  Cr 2.3 (baseline 1.3)  New transaminitis : AST/ALT /Alk phos : 60/45/139  Trop :0.03  BNP:  830 (baseline 500)  VBG: pH 7.24 , CO2: 88 , HCO3 : 30 -> placed on AVAPS -> pH 7.33 , CO2: 72 , HCO3 : 38    CT abdomen/pelvis : for vomiting : No evidence of acute abdominal or pelvic pathology.  CT Head : No evidence of intracranial hemorrhage, territorial infarct, or mass effect.  ECG : Normal sinus rhythm    In CEU patient was weaned off AVAPS now saturating >90% on nasal cannula. Patient tolerating diet, no overnight events, safe for DGTF     ASSESSMENT & PLAN:     89 y/o female PMH of HTN, HLD, CHF, a fib not on AC, hypothyroidism, CKD (baseline Cr 1.5) and COPD on 4L NC admitted for acute hypercapnic respiratory failure due to COPD Exacerbation.    #Acute on Chronic Hypoxic/Hypercapnic respiratory Failure (On Home O2 4L)  #COPD Exacerbation  Pulmonary team on board. ABG improved today morning. Pt is a chronic CO2 retainer  CXR showing no acute cardiopulmonary disease  - Wean off AVAPs as tolerated  - Cont duonebs q4h ATC if off AVAPs  - Cont IV solumedrol 60 BID  - Cont IV doxycycline 100 BID  - f/u RVP and procalcitonin  - LE Duplex  - f/u BCx    #MAURI on CKD Stage 3  Likely pre-renal  - IVF  - Monitor renal funciton  - KBUS  - Hold lasix for now    #Chronic HFpEF  #HTN/HLD  #Elevated troponins, likely demand ischemia  - Cont metoprolol ER 25mg qD  - Cont lipitor 20mg qHs  - Holding lasix for now  - Cont ASA 81mg qD  - Trend troponins until stable    #Chronic Afib  Not on AC due to recurrentl falls  - BB as above  - Cont multaq 400 BID    #Hypothyroidism  - Cont synthroid 112mcg qD    DVT PPX, heparin        General: No acute distress; Pallor (-), Icterus (-), Cyanosis (-), Clubbing (-)  HEENT: Normocephalic, atraumatic, PERRLA, EOMI  PULM: Bilaterally equal and clear breath sounds, wheeze (-), rubs (-), crackles (-)  CVS: Normal S1 and S2, murmurs (-), rubs (-), gallops (-)   GI: Soft, nondistended, nontender, BS +  MSK: Edema (-), no muscle, bone or joint tenderness noted  SKIN: Warm and well perfused, no rashes noted  NEURO:  Alert and Oriented x 3; No gross focal neurological deficit noted      Vital Signs Last 24 Hrs  T(C): 36.3 (05 Mar 2023 07:10), Max: 36.3 (04 Mar 2023 15:56)  T(F): 97.3 (05 Mar 2023 07:10), Max: 97.4 (05 Mar 2023 05:00)  HR: 92 (05 Mar 2023 07:10) (70 - 92)  BP: 120/57 (05 Mar 2023 07:10) (113/62 - 159/65)  BP(mean): 82 (05 Mar 2023 07:10) (82 - 94)  RR: 18 (05 Mar 2023 07:10) (18 - 18)  SpO2: 93% (05 Mar 2023 07:10) (93% - 100%)    Parameters below as of 05 Mar 2023 07:10  Patient On (Oxygen Delivery Method): nasal cannula  O2 Flow (L/min): 4      I&O's Summary    04 Mar 2023 07:01  -  05 Mar 2023 07:00  --------------------------------------------------------  IN: 100 mL / OUT: 200 mL / NET: -100 mL          MEDICATIONS  (STANDING):  albuterol/ipratropium for Nebulization 3 milliLiter(s) Nebulizer every 4 hours  aspirin  chewable 81 milliGRAM(s) Oral daily  atorvastatin 20 milliGRAM(s) Oral at bedtime  chlorhexidine 2% Cloths 1 Application(s) Topical <User Schedule>  doxycycline IVPB      doxycycline IVPB 100 milliGRAM(s) IV Intermittent every 12 hours  dronedarone 400 milliGRAM(s) Oral two times a day  heparin   Injectable 5000 Unit(s) SubCutaneous every 8 hours  levothyroxine 112 MICROGram(s) Oral daily  melatonin 5 milliGRAM(s) Oral at bedtime  methylPREDNISolone sodium succinate Injectable 60 milliGRAM(s) IV Push every 12 hours  metoprolol succinate ER 25 milliGRAM(s) Oral daily  pantoprazole    Tablet 40 milliGRAM(s) Oral before breakfast    MEDICATIONS  (PRN):  acetaminophen     Tablet .. 650 milliGRAM(s) Oral every 6 hours PRN Temp greater or equal to 38C (100.4F), Mild Pain (1 - 3)  aluminum hydroxide/magnesium hydroxide/simethicone Suspension 30 milliLiter(s) Oral every 4 hours PRN Dyspepsia        LABS                                            9.9                   Neurophils% (auto):   x      (03-05 @ 06:30):    5.23 )-----------(183          Lymphocytes% (auto):  x                                             33.4                   Eosinphils% (auto):   x        Manual%: Neutrophils x    ; Lymphocytes x    ; Eosinophils x    ; Bands%: x    ; Blasts x                                    142    |  98     |  55                  Calcium: 9.2   / iCa: x      (03-05 @ 06:30)    ----------------------------<  124       Magnesium: 3.1                              5.2     |  29     |  1.7              Phosphorous: x        TPro  6.6    /  Alb  4.0    /  TBili  0.2    /  DBili  x      /  AST  41     /  ALT  37     /  AlkPhos  133    05 Mar 2023 06:30

## 2023-03-05 NOTE — PROGRESS NOTE ADULT - ASSESSMENT
Impression:    Acute on chronic hypercapnic resp failure on AVAPS, resolved  COPD exacerbation, improved  MAURI on CKD, improving  Hyperkalemia  Severe COPD & Chronic Hypoxemic Respiratory Failure on 3-4L NC at home   Chronic afib not on AC due to recurrent falls  HfpEF      PLAN:    CNS: Avoid CNS depressant    HEENT:  Oral care    PULMONARY:  HOB @ 45 degrees, aspiration precautions.  Solumedrol 60mg BID.  c/w home inhalers.  Duoneb q4h & q6h PRN.  NIV HS.  Supplemental O2 as tolerated.  Target POx 88 - 92%.    CARDIOVASCULAR:  Avoid volume overload.    GI: GI prophylaxis.  Feeding as tolerated.  Bowel regimen if needed.    RENAL:  FU lytes.  Correct as necessary.  Lokelma.  Repeat lytes.  Low K diet.    INFECTIOUS DISEASE:  Doxycycline 5 days.  Procalcitonin.  FU cultures.    HEMATOLOGICAL:  DVT prophylaxis. FU LE doppler    ENDOCRINE:  Follow up FS.  Insulin protocol if needed.    Palliative care eval  Very poor overall prognosis  DGTF Impression:    Acute on chronic hypercapnic resp failure improved  COPD exacerbation, improved  MAURI on CKD, improving  Hyperkalemia  Severe COPD & Chronic Hypoxemic Respiratory Failure on 3-4L NC at home   Chronic afib not on AC due to recurrent falls  HfpEF      PLAN:    CNS: Avoid CNS depressant    HEENT:  Oral care    PULMONARY:  HOB @ 45 degrees, aspiration precautions.  Solumedrol 60mg BID.  c/w home inhalers.  Duoneb q4h & q6h PRN.  NIV HS.  Supplemental O2 as tolerated.  Target POx 88 - 92%.    CARDIOVASCULAR:  Avoid volume overload.    GI: GI prophylaxis.  Feeding as tolerated.  Bowel regimen if needed.    RENAL:  FU lytes.  Correct as necessary.  Lokelma.  Repeat lytes.  Low K diet.    INFECTIOUS DISEASE:  Doxycycline 5 days.  Procalcitonin.  FU cultures.    HEMATOLOGICAL:  DVT prophylaxis. FU LE doppler    ENDOCRINE:  Follow up FS.  Insulin protocol if needed.    Palliative care eval  Very poor overall prognosis  DGTF

## 2023-03-05 NOTE — PROGRESS NOTE ADULT - SUBJECTIVE AND OBJECTIVE BOX
T H I S   I S    N O  T   A    F I N A L I Z E D   N O T BRYNN APRKERETTE  89y, Female  Allergy: No Known Allergies    Hospital Day: 1d    Patient seen and examined earlier today.     PMH/PSH:  PAST MEDICAL & SURGICAL HISTORY:  COPD (chronic obstructive pulmonary disease)      Hypothyroid      HTN (hypertension)      High cholesterol      Colitis      CKD (chronic kidney disease)      No significant past surgical history          LAST 24-Hr EVENTS:    VITALS:  T(F): 97.3 (03-05-23 @ 07:10), Max: 97.4 (03-05-23 @ 05:00)  HR: 92 (03-05-23 @ 07:10)  BP: 120/57 (03-05-23 @ 07:10) (113/62 - 159/65)  RR: 18 (03-05-23 @ 07:10)  SpO2: 93% (03-05-23 @ 07:10)          TESTS & MEASUREMENTS:  Weight/BMI  45.4 (03-03-23 @ 22:36)    03-04-23 @ 07:01  -  03-05-23 @ 07:00  --------------------------------------------------------  IN: 100 mL / OUT: 200 mL / NET: -100 mL                            9.9    5.23  )-----------( 183      ( 05 Mar 2023 06:30 )             33.4         03-05    142  |  98  |  55<H>  ----------------------------<  124<H>  5.2<H>   |  29  |  1.7<H>    Ca    9.2      05 Mar 2023 06:30  Mg     3.1     03-05    TPro  6.6  /  Alb  4.0  /  TBili  0.2  /  DBili  x   /  AST  41  /  ALT  37  /  AlkPhos  133<H>  03-05    LIVER FUNCTIONS - ( 05 Mar 2023 06:30 )  Alb: 4.0 g/dL / Pro: 6.6 g/dL / ALK PHOS: 133 U/L / ALT: 37 U/L / AST: 41 U/L / GGT: x           CARDIAC MARKERS ( 05 Mar 2023 06:30 )  x     / 0.01 ng/mL / x     / x     / x      CARDIAC MARKERS ( 04 Mar 2023 18:17 )  x     / <0.01 ng/mL / x     / x     / x      CARDIAC MARKERS ( 04 Mar 2023 00:44 )  x     / 0.03 ng/mL / x     / x     / x            Culture - Blood (collected 03-04-23 @ 02:02)  Source: .Blood Blood  Preliminary Report (03-05-23 @ 09:01):    No growth to date.    Culture - Blood (collected 03-04-23 @ 02:02)  Source: .Blood Blood  Preliminary Report (03-05-23 @ 09:01):    No growth to date.              Serum Pro-Brain Natriuretic Peptide: 836 pg/mL (03-04-23 @ 00:44)                  RADIOLOGY, ECG, & ADDITIONAL TESTS:  12 Lead ECG:   Ventricular Rate 69 BPM    Atrial Rate 69 BPM    P-R Interval 176 ms    QRS Duration 82 ms    Q-T Interval 474 ms    QTC Calculation(Bazett) 507 ms    P Axis 67 degrees    R Axis 98 degrees    T Axis 69 degrees    Diagnosis Line Normal sinus rhythm  Rightward axis  Prolonged QT  Abnormal ECG    Confirmed by Gaurang Valenzuela ([id number]) on 3/4/2023 8:24:54 AM (03-04-23 @ 02:12)    CT Head No Cont:   ACC: [id number] EXAM:  CT BRAIN   ORDERED BY: CHUN CRUZ     PROCEDURE DATE:  03/04/2023          INTERPRETATION:  Clinical History/Reason For Exam: Lethargy.    Technique: Multiple contiguous axial CT images were obtained from the   base of the skull to the vertex without administration of intravenous   contrast. Axial, coronal and sagittal images were reviewed.    Comparison: 9/25/2022    Findings:    The ventricles, basal cisterns and sulcal pattern are prominent and   compatible with parenchymal volume loss commonly seen in patients of this   age    The gray-white matter differentiation is preserved.    Scattered areas of hypoattenuation within the periventricular white   matter, nonspecific but likely representing moderate to severe   microvascular ischemic changes.    There is no acute mass effect, midline shift or intracranial hemorrhage.    The imaged paranasal sinuses and bilateral mastoid complexes are   unremarkable.    No evidence of a depressed skull fracture.    Beam hardening artifact is noted overlying the brain stem and posterior   fossa which is inherent to CT in this location.      Impression:      No evidence of intracranial hemorrhage, territorial infarct, or mass   effect.    --- End of Report ---            RASHIDA ISRAEL MD; Attending Radiologist  This document has been electronically signed. Mar  4 2023  1:26AM (03-04-23 @ 01:09)    RECENT DIAGNOSTIC ORDERS:  VA Duplex Lower Ext Vein Scan, Bilat: Urgent   Indication: r/o dvt  Transport: Stretcher-Crib (03-05-23 @ 09:17)  Comprehensive Metabolic Panel: 11:00 (03-05-23 @ 09:16)  Procalcitonin, Serum: AM Sched. Collection: 05-Mar-2023 04:30 (03-04-23 @ 13:11)  Diet, DASH/TLC:   Sodium & Cholesterol Restricted (03-04-23 @ 13:11)  Respiratory Viral Panel with COVID-19 by ERIKA: Routine (03-04-23 @ 13:11)  VA Duplex Lower Ext Vein Scan, Bilat: 12:51  Exam Completed (03-04-23 @ 13:08)  Blood Gas Arterial - Lytes,iCa,Lact: Routine (03-04-23 @ 12:52)  Blood Gas Profile - Arterial: AM Sched. Collection:05-Mar-2023 04:30 (03-04-23 @ 12:52)  Xray Chest 1 View- PORTABLE-Routine: AM   Indication: copd exacer  Transport: Portable  Exam Completed (03-04-23 @ 12:52)  Urea Nitrogen,  Random Urine: Routine (03-04-23 @ 12:49)  Creatinine, Random Urine: Routine (03-04-23 @ 12:49)  Sodium, Random Urine: Routine (03-04-23 @ 12:49)  Osmolality, Random Urine: Routine (03-04-23 @ 12:49)  Urinalysis: Routine (03-04-23 @ 12:49)  Blood Gas Profile - Arterial: Routine (03-04-23 @ 12:29)      MEDICATIONS:  MEDICATIONS  (STANDING):  albuterol/ipratropium for Nebulization 3 milliLiter(s) Nebulizer every 4 hours  aspirin  chewable 81 milliGRAM(s) Oral daily  atorvastatin 20 milliGRAM(s) Oral at bedtime  chlorhexidine 2% Cloths 1 Application(s) Topical <User Schedule>  doxycycline IVPB      doxycycline IVPB 100 milliGRAM(s) IV Intermittent every 12 hours  dronedarone 400 milliGRAM(s) Oral two times a day  heparin   Injectable 5000 Unit(s) SubCutaneous every 8 hours  levothyroxine 112 MICROGram(s) Oral daily  melatonin 5 milliGRAM(s) Oral at bedtime  methylPREDNISolone sodium succinate Injectable 60 milliGRAM(s) IV Push every 12 hours  metoprolol succinate ER 25 milliGRAM(s) Oral daily  pantoprazole    Tablet 40 milliGRAM(s) Oral before breakfast  sodium zirconium cyclosilicate 10 Gram(s) Oral once    MEDICATIONS  (PRN):  acetaminophen     Tablet .. 650 milliGRAM(s) Oral every 6 hours PRN Temp greater or equal to 38C (100.4F), Mild Pain (1 - 3)  aluminum hydroxide/magnesium hydroxide/simethicone Suspension 30 milliLiter(s) Oral every 4 hours PRN Dyspepsia      HOME MEDICATIONS:  albuterol 2.5 mg/3 mL (0.083%) inhalation solution (08-15)  aspirin 81 mg oral tablet (10-02)  ATORVASTATIN 20MG TABLETS (10-06)  Lasix 20 mg oral tablet (10-06)  levothyroxine 112 mcg (0.112 mg) oral tablet (08-15)  melatonin 5 mg oral tablet (08-15)  metoprolol succinate 25 mg oral tablet, extended release (08-15)  Multaq 400 mg oral tablet (10-02)  pantoprazole 40 mg oral delayed release tablet (08-15)  predniSONE 10 mg oral tablet (10-06)  TRELEGY ELLIPTA 100-62.5MCG INH 30P (08-15)      PHYSICAL EXAM:  GENERAL:   CHEST/LUNG:   HEART:   ABDOMEN:   EXTREMITIES:               TANNA MCCRACKEN  89y, Female  Allergy: No Known Allergies    Hospital Day: 1d    Patient seen and examined earlier today.  she stated that she feels fine, her son at bedside     PMH/PSH:  PAST MEDICAL & SURGICAL HISTORY:  COPD (chronic obstructive pulmonary disease)      Hypothyroid      HTN (hypertension)      High cholesterol      Colitis      CKD (chronic kidney disease)      No significant past surgical history          LAST 24-Hr EVENTS:    VITALS:  T(F): 97.3 (03-05-23 @ 07:10), Max: 97.4 (03-05-23 @ 05:00)  HR: 92 (03-05-23 @ 07:10)  BP: 120/57 (03-05-23 @ 07:10) (113/62 - 159/65)  RR: 18 (03-05-23 @ 07:10)  SpO2: 93% (03-05-23 @ 07:10)          TESTS & MEASUREMENTS:  Weight/BMI  45.4 (03-03-23 @ 22:36)    03-04-23 @ 07:01  -  03-05-23 @ 07:00  --------------------------------------------------------  IN: 100 mL / OUT: 200 mL / NET: -100 mL                            9.9    5.23  )-----------( 183      ( 05 Mar 2023 06:30 )             33.4         03-05    142  |  98  |  55<H>  ----------------------------<  124<H>  5.2<H>   |  29  |  1.7<H>    Ca    9.2      05 Mar 2023 06:30  Mg     3.1     03-05    TPro  6.6  /  Alb  4.0  /  TBili  0.2  /  DBili  x   /  AST  41  /  ALT  37  /  AlkPhos  133<H>  03-05    LIVER FUNCTIONS - ( 05 Mar 2023 06:30 )  Alb: 4.0 g/dL / Pro: 6.6 g/dL / ALK PHOS: 133 U/L / ALT: 37 U/L / AST: 41 U/L / GGT: x           CARDIAC MARKERS ( 05 Mar 2023 06:30 )  x     / 0.01 ng/mL / x     / x     / x      CARDIAC MARKERS ( 04 Mar 2023 18:17 )  x     / <0.01 ng/mL / x     / x     / x      CARDIAC MARKERS ( 04 Mar 2023 00:44 )  x     / 0.03 ng/mL / x     / x     / x            Culture - Blood (collected 03-04-23 @ 02:02)  Source: .Blood Blood  Preliminary Report (03-05-23 @ 09:01):    No growth to date.    Culture - Blood (collected 03-04-23 @ 02:02)  Source: .Blood Blood  Preliminary Report (03-05-23 @ 09:01):    No growth to date.              Serum Pro-Brain Natriuretic Peptide: 836 pg/mL (03-04-23 @ 00:44)                  RADIOLOGY, ECG, & ADDITIONAL TESTS:  12 Lead ECG:   Ventricular Rate 69 BPM    Atrial Rate 69 BPM    P-R Interval 176 ms    QRS Duration 82 ms    Q-T Interval 474 ms    QTC Calculation(Bazett) 507 ms    P Axis 67 degrees    R Axis 98 degrees    T Axis 69 degrees    Diagnosis Line Normal sinus rhythm  Rightward axis  Prolonged QT  Abnormal ECG    Confirmed by Gaurang Valenzuela ([id number]) on 3/4/2023 8:24:54 AM (03-04-23 @ 02:12)    CT Head No Cont:   ACC: [id number] EXAM:  CT BRAIN   ORDERED BY: CHUN MACIAS DATE:  03/04/2023          INTERPRETATION:  Clinical History/Reason For Exam: Lethargy.    Technique: Multiple contiguous axial CT images were obtained from the   base of the skull to the vertex without administration of intravenous   contrast. Axial, coronal and sagittal images were reviewed.    Comparison: 9/25/2022    Findings:    The ventricles, basal cisterns and sulcal pattern are prominent and   compatible with parenchymal volume loss commonly seen in patients of this   age    The gray-white matter differentiation is preserved.    Scattered areas of hypoattenuation within the periventricular white   matter, nonspecific but likely representing moderate to severe   microvascular ischemic changes.    There is no acute mass effect, midline shift or intracranial hemorrhage.    The imaged paranasal sinuses and bilateral mastoid complexes are   unremarkable.    No evidence of a depressed skull fracture.    Beam hardening artifact is noted overlying the brain stem and posterior   fossa which is inherent to CT in this location.      Impression:      No evidence of intracranial hemorrhage, territorial infarct, or mass   effect.    --- End of Report ---            RASHIDA ISRAEL MD; Attending Radiologist  This document has been electronically signed. Mar  4 2023  1:26AM (03-04-23 @ 01:09)    RECENT DIAGNOSTIC ORDERS:  VA Duplex Lower Ext Vein Scan, Bilat: Urgent   Indication: r/o dvt  Transport: Stretcher-Crib (03-05-23 @ 09:17)  Comprehensive Metabolic Panel: 11:00 (03-05-23 @ 09:16)  Procalcitonin, Serum: AM Sched. Collection: 05-Mar-2023 04:30 (03-04-23 @ 13:11)  Diet, DASH/TLC:   Sodium & Cholesterol Restricted (03-04-23 @ 13:11)  Respiratory Viral Panel with COVID-19 by ERIKA: Routine (03-04-23 @ 13:11)  VA Duplex Lower Ext Vein Scan, Bilat: 12:51  Exam Completed (03-04-23 @ 13:08)  Blood Gas Arterial - Lytes,iCa,Lact: Routine (03-04-23 @ 12:52)  Blood Gas Profile - Arterial: AM Sched. Collection:05-Mar-2023 04:30 (03-04-23 @ 12:52)  Xray Chest 1 View- PORTABLE-Routine: AM   Indication: copd exacer  Transport: Portable  Exam Completed (03-04-23 @ 12:52)  Urea Nitrogen,  Random Urine: Routine (03-04-23 @ 12:49)  Creatinine, Random Urine: Routine (03-04-23 @ 12:49)  Sodium, Random Urine: Routine (03-04-23 @ 12:49)  Osmolality, Random Urine: Routine (03-04-23 @ 12:49)  Urinalysis: Routine (03-04-23 @ 12:49)  Blood Gas Profile - Arterial: Routine (03-04-23 @ 12:29)      MEDICATIONS:  MEDICATIONS  (STANDING):  albuterol/ipratropium for Nebulization 3 milliLiter(s) Nebulizer every 4 hours  aspirin  chewable 81 milliGRAM(s) Oral daily  atorvastatin 20 milliGRAM(s) Oral at bedtime  chlorhexidine 2% Cloths 1 Application(s) Topical <User Schedule>  doxycycline IVPB      doxycycline IVPB 100 milliGRAM(s) IV Intermittent every 12 hours  dronedarone 400 milliGRAM(s) Oral two times a day  heparin   Injectable 5000 Unit(s) SubCutaneous every 8 hours  levothyroxine 112 MICROGram(s) Oral daily  melatonin 5 milliGRAM(s) Oral at bedtime  methylPREDNISolone sodium succinate Injectable 60 milliGRAM(s) IV Push every 12 hours  metoprolol succinate ER 25 milliGRAM(s) Oral daily  pantoprazole    Tablet 40 milliGRAM(s) Oral before breakfast  sodium zirconium cyclosilicate 10 Gram(s) Oral once    MEDICATIONS  (PRN):  acetaminophen     Tablet .. 650 milliGRAM(s) Oral every 6 hours PRN Temp greater or equal to 38C (100.4F), Mild Pain (1 - 3)  aluminum hydroxide/magnesium hydroxide/simethicone Suspension 30 milliLiter(s) Oral every 4 hours PRN Dyspepsia      HOME MEDICATIONS:  albuterol 2.5 mg/3 mL (0.083%) inhalation solution (08-15)  aspirin 81 mg oral tablet (10-02)  ATORVASTATIN 20MG TABLETS (10-06)  Lasix 20 mg oral tablet (10-06)  levothyroxine 112 mcg (0.112 mg) oral tablet (08-15)  melatonin 5 mg oral tablet (08-15)  metoprolol succinate 25 mg oral tablet, extended release (08-15)  Multaq 400 mg oral tablet (10-02)  pantoprazole 40 mg oral delayed release tablet (08-15)  predniSONE 10 mg oral tablet (10-06)  TRELEGY ELLIPTA 100-62.5MCG INH 30P (08-15)      PHYSICAL EXAM:  GENERAL: awake, alert, [place], NAD , on NC , frail   CHEST/LUNG:  Decrease breath sound b/l   HEART:  regular rhythm   ABDOMEN: soft, non tender   EXTREMITIES:   no edema

## 2023-03-05 NOTE — PROGRESS NOTE ADULT - ASSESSMENT
89 y/o female PMH of HTN, HLD, CHF, a fib not on AC, hypothyroidism, CKD (baseline Cr 1.5) and COPD on 4L NC came for sob and lethargy and vomited once in the EMS ,  admitted for acute hypercapnic respiratory failure due to COPD Exacerbation.    #Acute on Chronic Hypoxic/Hypercapnic respiratory Failure (On Home O2 4L)  #COPD Exacerbation  Pulmonary team on board. ABG improved today morning. Pt is a chronic CO2 retainer  CXR showing no acute cardiopulmonary disease   NIV at night / today she is on NC 4 L    Supplemental O2 as tolerated.  Target POx 88 - 92%  - Cont duonebs q4h ATC if off AVAPs  - Cont IV solumedrol 60 BID  - Cont IV doxycycline 100 BID X 5DAYS   - f/u RVP   procalcitonin 0.40  - LE Duplex no DVT   - f/u BCx     #MAURI on CKD Stage 3  Hyperkalaima   Likely pre-renal Pt also received contrast   - Hold IVF and continue holding lasix   Creatinine Trend: 1.8<--, 1.7<--, 1.7<--, 2.3<--  nephro consult if no improvment   - Monitor renal funciton  - renal sono and CT abd no hydro     #Chronic HFpEF  #HTN/HLD  #Elevated troponins, likely demand ischemia  - Cont metoprolol ER 25mg qD  - Cont lipitor 20mg qHs  - Holding lasix for now  - Cont ASA 81mg qD  - Trend troponin until stable    #Chronic Afib  Not on AC due to recurrent falls  - BB as above  - on dronedarone - 400 milliGRAM(s) Oral two times a day   c/w  metoprolol succinate   repeat EKG for prolonged QTc if prolonging hold dronedarone     #Hypothyroidism  - Cont synthroid 112mcg qD    DVT PPX, heparin    pending: clinical improvement  / monitor Qtc /     consider consult palliative Monday

## 2023-03-05 NOTE — PATIENT PROFILE ADULT - FALL HARM RISK - HARM RISK INTERVENTIONS
Assistance with ambulation/Assistance OOB with selected safe patient handling equipment/Communicate Risk of Fall with Harm to all staff/Discuss with provider need for PT consult/Monitor gait and stability/Reinforce activity limits and safety measures with patient and family/Tailored Fall Risk Interventions/Visual Cue: Yellow wristband and red socks/Bed in lowest position, wheels locked, appropriate side rails in place/Call bell, personal items and telephone in reach/Instruct patient to call for assistance before getting out of bed or chair/Non-slip footwear when patient is out of bed/[place] to call system/Physically safe environment - no spills, clutter or unnecessary equipment/Purposeful Proactive Rounding/Room/bathroom lighting operational, light cord in reach

## 2023-03-06 NOTE — PROGRESS NOTE ADULT - ASSESSMENT
89 y/o female PMH of HTN, HLD, CHF, a fib not on AC, hypothyroidism, CKD (baseline Cr 1.5) and COPD on 4L NC came for sob and lethargy and vomited once in the EMS ,  admitted for acute hypercapnic respiratory failure due to COPD Exacerbation.    #Acute on Chronic Hypoxic/Hypercapnic respiratory Failure (On Home O2 4L)  #COPD Exacerbation  Pulmonary team on board. ABG improved today morning. Pt is a chronic CO2 retainer  CXR showing no acute cardiopulmonary disease   NIV at night / today she is on NC 4 L    Supplemental O2 as tolerated.  Target POx 88 - 92%  - Cont duonebs q4h ATC if off AVAPs  - Cont IV solumedrol 60 BID  - Cont IV doxycycline 100 BID X 5DAYS   - f/u RVP   procalcitonin 0.40  - LE Duplex no DVT   - f/u BCx     #MAURI on CKD Stage 3  Hyperkalaima   Likely pre-renal Pt also received contrast   - Hold IVF and continue holding lasix   Creatinine Trend: 2.2<--, 1.8<--, 1.7<--, 1.7<--, 2.3<--  nephro consult appreciated    prerenal vs [place]   keep I =O   follow UOP and BMP   check IP   no hydro on CT   no need for RRT  gentle hydration given for few hours    - Monitor renal funciton  - renal sono and CT abd no hydro   but sono showed  Urinary bladder volume 676 cc  today bladder scan 50 cc   bladder scan q 8 hours        #Chronic HFpEF  #HTN/HLD  #Elevated troponin likely demand ischemia  - Cont metoprolol ER 25mg qD  - Cont lipitor 20mg qHs  - Holding lasix for now  - Cont ASA 81mg qD  - Trend troponin until stable    #Chronic Afib  Not on AC due to recurrent falls  - BB as above  - on dronedarone - 400 milliGRAM(s) Oral two times a day   c/w  metoprolol succinate   repeat EKG with improving  QTc     #Hypothyroidism  - Cont synthroid 112mcg qD    DVT PPX, heparin    pending: clinical improvement  / monitor Qtc /     Discussed with the patient son   PT/OT requested

## 2023-03-06 NOTE — PROGRESS NOTE ADULT - ASSESSMENT
89 y/o female PMH of HTN, HLD, CHF, a fib not on AC, hypothyroidism, CKD (baseline Cr 1.5) and COPD on 4L NC admitted for acute hypercapnic respiratory failure due to COPD Exacerbation.    #Acute on Chronic Hypoxic/Hypercapnic respiratory Failure (On Home O2 4L)  #COPD Exacerbation  - Pulmonary team on board. ABG improved. Pt is a chronic CO2 retainer  - CXR showing no acute cardiopulmonary disease  - On 3L NC saturating 90s  - Cont duonebs q4h ATC if off AVAPs  - Cont IV solumedrol 60 BID  - Cont IV doxycycline 100 BID day 3/5   - procalcitonin 0.40  - RVP negative  - LE Duplex: No evidence of deep venous thrombosis in either lower extremity  - BCx: NGTD     #MAURI on CKD Stage 3  - Likely pre-renal  - Creatine 2.2 <--1.8<--, 1.7<--, 1.7<--, 2.3  - KBUS: Limited portable exam. No definite hydronephrosis similar to CT findings of the same day. Bilateral renal cysts. Patient unable to void therefore postvoid residual not obtained.  - Hold lasix for now  - CT abd/pelv: No evidence of acute abdominal or pelvic pathology.    #Chronic HFpEF  #HTN/HLD  #Elevated troponins, likely demand ischemia  - Cont metoprolol ER 25mg qD  - Cont lipitor 20mg qHs  - Holding lasix for now  - Cont ASA 81mg qD  - Troponin now stable 0.01     #Chronic Afib  - Not on AC due to recurrent falls  - BB as above  - Cont multaq 400 BID    #Hypothyroidism  - Cont synthroid 112mcg qD    #DVT PPX: heparin  #DIET: DASH   #GI prophylaxis: pantoprazole

## 2023-03-06 NOTE — PHYSICAL THERAPY INITIAL EVALUATION ADULT - GENERAL OBSERVATIONS, REHAB EVAL
[id number] pm. 89 y/o F received in bed left in b/s chair, nad, + 3L O2, tele. son present. pt ambulated 10 ft with RW, min A. vitals: in bed 113/57 75 96% on 3L O2, sitting 134/71 75 94% on 3L O2.

## 2023-03-06 NOTE — PROGRESS NOTE ADULT - ASSESSMENT
Impression:    Acute on chronic hypercapnic resp failure improved refusing NIV   COPD exacerbation, improved  MAURI on CKD, improving  Hyperkalemia  Severe COPD & Chronic Hypoxemic Respiratory Failure on 3-4L NC at home   Chronic afib not on AC due to recurrent falls  HfpEF      PLAN:    CNS: Avoid CNS depressant    HEENT:  Oral care    PULMONARY:  HOB @ 45 degrees, aspiration precautions.  Solumedrol 60mg BID.  c/w home inhalers.  Duoneb q4h and PRN.  Encourage NIV use.  Supplemental O2 as tolerated.  Target POx 88 - 92%.    CARDIOVASCULAR:  Avoid volume overload.    GI: GI prophylaxis.  Feeding as tolerated.  Bowel regimen if needed.    RENAL:  FU lytes.  Correct as necessary.  Lokelma.  Repeat lytes.  Low K diet.  Bladder US     INFECTIOUS DISEASE:  Doxycycline 5 days.  Procalcitonin noted.  FU cultures.    HEMATOLOGICAL:  DVT prophylaxis. FU LE doppler    ENDOCRINE:  Follow up FS.  Insulin protocol if needed.    Palliative care eval    Very poor overall prognosis    DGTF

## 2023-03-06 NOTE — PROGRESS NOTE ADULT - SUBJECTIVE AND OBJECTIVE BOX
TANNA MCCRACKEN 89y Female  MRN#: [id number]   Hospital Day: 2d    HPI:  Pt is a 89 y/o female     PMH of HTN, HLD, CHF, a fib not on AC, hyopthyroidism, CKD (baseline Cr 1.7) and COPD on 4L NC    Presenting for SOB.     As per daughter, pt was wheezing at home in the evening, looked SOB and looked like she was holding her breath, became lethargic so they called EMS. Daughter later noticed she was off of her oxygen for some unknown amount of time. Patient also vomited once with EMS. No fever, cough, congestion or abdominal pain.    In the ED :  BP: 174/78  HR : 79  RR : 20  T : 97  O2: 94% on 6L NC -> later on AVAPS    Hb 9.1 (baseline 11)  Cr 2.3 (baseline 1.3)  New transaminitis : AST/ALT /Alk phos : 60/45/139  Trop :0.03  BNP:  830 (baseline 500)  VBG: pH 7.24 , CO2: 88 , HCO3 : 30 -> placed on AVAPS -> pH 7.33 , CO2: 72 , HCO3 : 38    CT abdomen/pelvis : for vomiting : No evidence of acute abdominal or pelvic pathology.  CT Head : No evidence of intracranial hemorrhage, territorial infarct, or mass effect.  ECG : Normal sinus rhythm     (04 Mar 2023 11:52)      SUBJECTIVE  Patient is a 89y old Female who presents with a chief complaint of Resp Failure (06 Mar 2023 14:02)  Currently admitted to medicine with the primary diagnosis of COPD exacerbation      INTERVAL HPI AND OVERNIGHT EVENTS:  Patient was examined and seen at bedside. This morning she is resting comfortably in bed and reports no issues or overnight events.    VITAL SIGNS: Last 24 Hours  T(C): 36.3 (06 Mar 2023 11:41), Max: 36.6 (06 Mar 2023 00:00)  T(F): 97.4 (06 Mar 2023 11:41), Max: 97.9 (06 Mar 2023 04:00)  HR: 71 (06 Mar 2023 11:41) (71 - 96)  BP: 113/57 (06 Mar 2023 11:41) (110/59 - 148/68)  BP(mean): 82 (06 Mar 2023 11:41) (82 - 98)  RR: 20 (06 Mar 2023 11:41) (18 - 20)  SpO2: 91% (06 Mar 2023 11:41) (91% - 98%)    LABS:                        9.8    6.01  )-----------( 218      ( 06 Mar 2023 12:22 )             32.1     03-06    138  |  96<L>  |  x   ----------------------------<  164<H>  4.9   |  27  |  2.2<H>    Ca    8.5      06 Mar 2023 12:22  Mg     3.1     03-05    TPro  6.5  /  Alb  4.0  /  TBili  0.2  /  DBili  x   /  AST  45<H>  /  ALT  35  /  AlkPhos  117<H>  03-06    Culture - Blood (collected 04 Mar 2023 02:02)  Source: .Blood Blood  Preliminary Report (05 Mar 2023 09:01):    No growth to date.    Culture - Blood (collected 04 Mar 2023 02:02)  Source: .Blood Blood  Preliminary Report (05 Mar 2023 09:01):    No growth to date.    CARDIAC MARKERS ( 05 Mar 2023 06:30 )  x     / 0.01 ng/mL / x     / x     / x      CARDIAC MARKERS ( 04 Mar 2023 18:17 )  x     / <0.01 ng/mL / x     / x     / x        PHYSICAL EXAM:  General: No acute distress; Pallor (-), Icterus (-), Cyanosis (-), Clubbing (-)  HEENT: Normocephalic, atraumatic, PERRLA, EOMI  PULM: Bilaterally equal and clear breath sounds, wheeze (-), rubs (-), crackles (-)  CVS: Normal S1 and S2, murmurs (-), rubs (-), gallops (-)   GI: Soft, nondistended, nontender, BS +  MSK: Edema (-), no muscle, bone or joint tenderness noted  SKIN: Warm and well perfused, no rashes noted  NEURO:  Alert and Oriented x 3; No gross focal neurological deficit noted

## 2023-03-06 NOTE — PROGRESS NOTE ADULT - SUBJECTIVE AND OBJECTIVE BOX
T H I S   I S    N O  T   A    F I N A L I Z E D   N O T BRYNN PARKERETTE  89y, Female  Allergy: No Known Allergies    Hospital Day: 2d    Patient seen and examined earlier today.     PMH/PSH:  PAST MEDICAL & SURGICAL HISTORY:  COPD (chronic obstructive pulmonary disease)      Hypothyroid      HTN (hypertension)      High cholesterol      Colitis      CKD (chronic kidney disease)      No significant past surgical history          LAST 24-Hr EVENTS:    VITALS:  T(F): 97.4 (03-06-23 @ 11:41), Max: 97.9 (03-06-23 @ 04:00)  HR: 71 (03-06-23 @ 11:41)  BP: 113/57 (03-06-23 @ 11:41) (110/59 - 148/68)  RR: 20 (03-06-23 @ 11:41)  SpO2: 91% (03-06-23 @ 11:41)          TESTS & MEASUREMENTS:  Weight/BMI  45.4 (03-03-23 @ 22:36)    03-04-23 @ 07:01  -  03-05-23 @ 07:00  --------------------------------------------------------  IN: 100 mL / OUT: 200 mL / NET: -100 mL    03-05-23 @ 07:01  -  03-06-23 @ 07:00  --------------------------------------------------------  IN: 0 mL / OUT: 250 mL / NET: -250 mL                            9.8    6.01  )-----------( 218      ( 06 Mar 2023 12:22 )             32.1         03-05    143  |  100  |  61<HH>  ----------------------------<  151<H>  5.3<H>   |  31  |  1.8<H>    Ca    9.0      05 Mar 2023 12:04  Mg     3.1     03-05    TPro  6.5  /  Alb  3.9  /  TBili  0.3  /  DBili  x   /  AST  42<H>  /  ALT  36  /  AlkPhos  123<H>  03-05    LIVER FUNCTIONS - ( 05 Mar 2023 12:04 )  Alb: 3.9 g/dL / Pro: 6.5 g/dL / ALK PHOS: 123 U/L / ALT: 36 U/L / AST: 42 U/L / GGT: x           CARDIAC MARKERS ( 05 Mar 2023 06:30 )  x     / 0.01 ng/mL / x     / x     / x      CARDIAC MARKERS ( 04 Mar 2023 18:17 )  x     / <0.01 ng/mL / x     / x     / x            Culture - Blood (collected 03-04-23 @ 02:02)  Source: .Blood Blood  Preliminary Report (03-05-23 @ 09:01):    No growth to date.    Culture - Blood (collected 03-04-23 @ 02:02)  Source: .Blood Blood  Preliminary Report (03-05-23 @ 09:01):    No growth to date.        Procalcitonin, Serum: 0.40 ng/mL (03-04-23 @ 18:17)        Serum Pro-Brain Natriuretic Peptide: 836 pg/mL (03-04-23 @ 00:44)                  RADIOLOGY, ECG, & ADDITIONAL TESTS:  12 Lead ECG:   Ventricular Rate 69 BPM    Atrial Rate 69 BPM    P-R Interval 176 ms    QRS Duration 82 ms    Q-T Interval 474 ms    QTC Calculation(Bazett) 507 ms    P Axis 67 degrees    R Axis 98 degrees    T Axis 69 degrees    Diagnosis Line Normal sinus rhythm  Rightward axis  Prolonged QT  Abnormal ECG    Confirmed by Gaurang Valenzuela ([id number]) on 3/4/2023 8:24:54 AM (03-04-23 @ 02:12)    CT Head No Cont:   ACC: [id number] EXAM:  CT BRAIN   ORDERED BY: HCUN CRUZ     PROCEDURE DATE:  03/04/2023          INTERPRETATION:  Clinical History/Reason For Exam: Lethargy.    Technique: Multiple contiguous axial CT images were obtained from the   base of the skull to the vertex without administration of intravenous   contrast. Axial, coronal and sagittal images were reviewed.    Comparison: 9/25/2022    Findings:    The ventricles, basal cisterns and sulcal pattern are prominent and   compatible with parenchymal volume loss commonly seen in patients of this   age    The gray-white matter differentiation is preserved.    Scattered areas of hypoattenuation within the periventricular white   matter, nonspecific but likely representing moderate to severe   microvascular ischemic changes.    There is no acute mass effect, midline shift or intracranial hemorrhage.    The imaged paranasal sinuses and bilateral mastoid complexes are   unremarkable.    No evidence of a depressed skull fracture.    Beam hardening artifact is noted overlying the brain stem and posterior   fossa which is inherent to CT in this location.      Impression:      No evidence of intracranial hemorrhage, territorial infarct, or mass   effect.    --- End of Report ---            RASHIDA ISRAEL MD; Attending Radiologist  This document has been electronically signed. Mar  4 2023  1:26AM (03-04-23 @ 01:09)    RECENT DIAGNOSTIC ORDERS:  12 Lead ECG (03-06-23 @ 11:50)  12 Lead ECG (03-06-23 @ 11:49)  Comprehensive Metabolic Panel: 11:00 (03-06-23 @ 10:18)  Creatine Kinase, Serum: 11:00 (03-06-23 @ 08:51)  12 Lead ECG (03-05-23 @ 18:16)  Diet, DASH/TLC:   Sodium & Cholesterol Restricted  No Concentrated Potassium (03-05-23 @ 18:11)      MEDICATIONS:  MEDICATIONS  (STANDING):  albuterol/ipratropium for Nebulization 3 milliLiter(s) Nebulizer every 4 hours  aspirin  chewable 81 milliGRAM(s) Oral daily  atorvastatin 20 milliGRAM(s) Oral at bedtime  chlorhexidine 2% Cloths 1 Application(s) Topical <User Schedule>  doxycycline IVPB      doxycycline IVPB 100 milliGRAM(s) IV Intermittent every 12 hours  dronedarone 400 milliGRAM(s) Oral two times a day  heparin   Injectable 5000 Unit(s) SubCutaneous every 8 hours  levothyroxine 112 MICROGram(s) Oral daily  melatonin 5 milliGRAM(s) Oral at bedtime  methylPREDNISolone sodium succinate Injectable 60 milliGRAM(s) IV Push every 12 hours  metoprolol succinate ER 25 milliGRAM(s) Oral daily  pantoprazole    Tablet 40 milliGRAM(s) Oral before breakfast    MEDICATIONS  (PRN):  acetaminophen     Tablet .. 650 milliGRAM(s) Oral every 6 hours PRN Temp greater or equal to 38C (100.4F), Mild Pain (1 - 3)  aluminum hydroxide/magnesium hydroxide/simethicone Suspension 30 milliLiter(s) Oral every 4 hours PRN Dyspepsia      HOME MEDICATIONS:  albuterol 2.5 mg/3 mL (0.083%) inhalation solution (08-15)  aspirin 81 mg oral tablet (10-02)  ATORVASTATIN 20MG TABLETS (10-06)  Lasix 20 mg oral tablet (10-06)  levothyroxine 112 mcg (0.112 mg) oral tablet (08-15)  melatonin 5 mg oral tablet (08-15)  metoprolol succinate 25 mg oral tablet, extended release (08-15)  Multaq 400 mg oral tablet (10-02)  pantoprazole 40 mg oral delayed release tablet (08-15)  predniSONE 10 mg oral tablet (10-06)  TRELEGY ELLIPTA 100-62.5MCG INH 30P (08-15)      PHYSICAL EXAM:  GENERAL:   CHEST/LUNG:   HEART:   ABDOMEN:   EXTREMITIES:               NINAJACKIEPAMELABRYNNTANNA  89y, Female  Allergy: No Known Allergies    Hospital Day: 2d    Patient seen and examined earlier today. she stated that she feels fine , her son at bedside ( he was agitated with loud voice ) I explained to him the clinical picture     PMH/PSH:  PAST MEDICAL & SURGICAL HISTORY:  COPD (chronic obstructive pulmonary disease)      Hypothyroid      HTN (hypertension)      High cholesterol      Colitis      CKD (chronic kidney disease)      No significant past surgical history          LAST 24-Hr EVENTS:    VITALS:  T(F): 97.4 (03-06-23 @ 11:41), Max: 97.9 (03-06-23 @ 04:00)  HR: 71 (03-06-23 @ 11:41)  BP: 113/57 (03-06-23 @ 11:41) (110/59 - 148/68)  RR: 20 (03-06-23 @ 11:41)  SpO2: 91% (03-06-23 @ 11:41)          TESTS & MEASUREMENTS:  Weight/BMI  45.4 (03-03-23 @ 22:36)    03-04-23 @ 07:01  -  03-05-23 @ 07:00  --------------------------------------------------------  IN: 100 mL / OUT: 200 mL / NET: -100 mL    03-05-23 @ 07:01  -  03-06-23 @ 07:00  --------------------------------------------------------  IN: 0 mL / OUT: 250 mL / NET: -250 mL                            9.8    6.01  )-----------( 218      ( 06 Mar 2023 12:22 )             32.1         03-05    143  |  100  |  61<HH>  ----------------------------<  151<H>  5.3<H>   |  31  |  1.8<H>    Ca    9.0      05 Mar 2023 12:04  Mg     3.1     03-05    TPro  6.5  /  Alb  3.9  /  TBili  0.3  /  DBili  x   /  AST  42<H>  /  ALT  36  /  AlkPhos  123<H>  03-05    LIVER FUNCTIONS - ( 05 Mar 2023 12:04 )  Alb: 3.9 g/dL / Pro: 6.5 g/dL / ALK PHOS: 123 U/L / ALT: 36 U/L / AST: 42 U/L / GGT: x           CARDIAC MARKERS ( 05 Mar 2023 06:30 )  x     / 0.01 ng/mL / x     / x     / x      CARDIAC MARKERS ( 04 Mar 2023 18:17 )  x     / <0.01 ng/mL / x     / x     / x            Culture - Blood (collected 03-04-23 @ 02:02)  Source: .Blood Blood  Preliminary Report (03-05-23 @ 09:01):    No growth to date.    Culture - Blood (collected 03-04-23 @ 02:02)  Source: .Blood Blood  Preliminary Report (03-05-23 @ 09:01):    No growth to date.        Procalcitonin, Serum: 0.40 ng/mL (03-04-23 @ 18:17)        Serum Pro-Brain Natriuretic Peptide: 836 pg/mL (03-04-23 @ 00:44)                  RADIOLOGY, ECG, & ADDITIONAL TESTS:  12 Lead ECG:   Ventricular Rate 69 BPM    Atrial Rate 69 BPM    P-R Interval 176 ms    QRS Duration 82 ms    Q-T Interval 474 ms    QTC Calculation(Bazett) 507 ms    P Axis 67 degrees    R Axis 98 degrees    T Axis 69 degrees    Diagnosis Line Normal sinus rhythm  Rightward axis  Prolonged QT  Abnormal ECG    Confirmed by Gaurang Valenzuela ([id number]) on 3/4/2023 8:24:54 AM (03-04-23 @ 02:12)    CT Head No Cont:   ACC: [id number] EXAM:  CT BRAIN   ORDERED BY: CHUN CRUZ     PROCEDURE DATE:  03/04/2023          INTERPRETATION:  Clinical History/Reason For Exam: Lethargy.    Technique: Multiple contiguous axial CT images were obtained from the   base of the skull to the vertex without administration of intravenous   contrast. Axial, coronal and sagittal images were reviewed.    Comparison: 9/25/2022    Findings:    The ventricles, basal cisterns and sulcal pattern are prominent and   compatible with parenchymal volume loss commonly seen in patients of this   age    The gray-white matter differentiation is preserved.    Scattered areas of hypoattenuation within the periventricular white   matter, nonspecific but likely representing moderate to severe   microvascular ischemic changes.    There is no acute mass effect, midline shift or intracranial hemorrhage.    The imaged paranasal sinuses and bilateral mastoid complexes are   unremarkable.    No evidence of a depressed skull fracture.    Beam hardening artifact is noted overlying the brain stem and posterior   fossa which is inherent to CT in this location.      Impression:      No evidence of intracranial hemorrhage, territorial infarct, or mass   effect.    --- End of Report ---            RASHIDA ISRAEL MD; Attending Radiologist  This document has been electronically signed. Mar  4 2023  1:26AM (03-04-23 @ 01:09)    RECENT DIAGNOSTIC ORDERS:  12 Lead ECG (03-06-23 @ 11:50)  12 Lead ECG (03-06-23 @ 11:49)  Comprehensive Metabolic Panel: 11:00 (03-06-23 @ 10:18)  Creatine Kinase, Serum: 11:00 (03-06-23 @ 08:51)  12 Lead ECG (03-05-23 @ 18:16)  Diet, DASH/TLC:   Sodium & Cholesterol Restricted  No Concentrated Potassium (03-05-23 @ 18:11)      MEDICATIONS:  MEDICATIONS  (STANDING):  albuterol/ipratropium for Nebulization 3 milliLiter(s) Nebulizer every 4 hours  aspirin  chewable 81 milliGRAM(s) Oral daily  atorvastatin 20 milliGRAM(s) Oral at bedtime  chlorhexidine 2% Cloths 1 Application(s) Topical <User Schedule>  doxycycline IVPB      doxycycline IVPB 100 milliGRAM(s) IV Intermittent every 12 hours  dronedarone 400 milliGRAM(s) Oral two times a day  heparin   Injectable 5000 Unit(s) SubCutaneous every 8 hours  levothyroxine 112 MICROGram(s) Oral daily  melatonin 5 milliGRAM(s) Oral at bedtime  methylPREDNISolone sodium succinate Injectable 60 milliGRAM(s) IV Push every 12 hours  metoprolol succinate ER 25 milliGRAM(s) Oral daily  pantoprazole    Tablet 40 milliGRAM(s) Oral before breakfast    MEDICATIONS  (PRN):  acetaminophen     Tablet .. 650 milliGRAM(s) Oral every 6 hours PRN Temp greater or equal to 38C (100.4F), Mild Pain (1 - 3)  aluminum hydroxide/magnesium hydroxide/simethicone Suspension 30 milliLiter(s) Oral every 4 hours PRN Dyspepsia      HOME MEDICATIONS:  albuterol 2.5 mg/3 mL (0.083%) inhalation solution (08-15)  aspirin 81 mg oral tablet (10-02)  ATORVASTATIN 20MG TABLETS (10-06)  Lasix 20 mg oral tablet (10-06)  levothyroxine 112 mcg (0.112 mg) oral tablet (08-15)  melatonin 5 mg oral tablet (08-15)  metoprolol succinate 25 mg oral tablet, extended release (08-15)  Multaq 400 mg oral tablet (10-02)  pantoprazole 40 mg oral delayed release tablet (08-15)  predniSONE 10 mg oral tablet (10-06)  TRELEGY ELLIPTA 100-62.5MCG INH 30P (08-15)      PHYSICAL EXAM:  GENERAL: awake, alert, [place], NAD , on NC , frail   CHEST/LUNG:  Decrease breath sound b/l   HEART:  regular rhythm   ABDOMEN: soft, non tender   EXTREMITIES:   no edema

## 2023-03-06 NOTE — CONSULT NOTE ADULT - SUBJECTIVE AND OBJECTIVE BOX
NEPHROLOGY CONSULTATION NOTE    89 y/o female PMH of HTN, HLD, CHF, a fib not on AC, hypothyroidism, CKD (baseline Cr 1.5) and COPD on 4L NC admitted for acute hypercapnic respiratory failure due to COPD Exacerbation. Hospital stay complicated by MAURI on CKD 3b.  Patient seen at bedside, SOB improving.  VS within acceptable range, on 3 L NC.    PAST MEDICAL & SURGICAL HISTORY:  COPD (chronic obstructive pulmonary disease)      Hypothyroid      HTN (hypertension)      High cholesterol      Colitis      CKD (chronic kidney disease)      No significant past surgical history        Allergies:  No Known Allergies    Home Medications Reviewed  Hospital Medications:   MEDICATIONS  (STANDING):  albuterol/ipratropium for Nebulization 3 milliLiter(s) Nebulizer every 4 hours  aspirin  chewable 81 milliGRAM(s) Oral daily  atorvastatin 20 milliGRAM(s) Oral at bedtime  chlorhexidine 2% Cloths 1 Application(s) Topical <User Schedule>  doxycycline IVPB      doxycycline IVPB 100 milliGRAM(s) IV Intermittent every 12 hours  dronedarone 400 milliGRAM(s) Oral two times a day  heparin   Injectable 5000 Unit(s) SubCutaneous every 8 hours  levothyroxine 112 MICROGram(s) Oral daily  melatonin 5 milliGRAM(s) Oral at bedtime  methylPREDNISolone sodium succinate Injectable 60 milliGRAM(s) IV Push every 12 hours  metoprolol succinate ER 25 milliGRAM(s) Oral daily  pantoprazole    Tablet 40 milliGRAM(s) Oral before breakfast  sodium chloride 0.9%. 1000 milliLiter(s) (50 mL/Hr) IV Continuous <Continuous>    SOCIAL HISTORY:  Denies ETOH,Smoking,   FAMILY HISTORY:    Yes      REVIEW OF SYSTEMS:  CONSTITUTIONAL: No weakness, fevers or chills  EYES/ENT: No visual changes;  No vertigo or throat pain   NECK: No pain or stiffness  RESPIRATORY: No cough, wheezing, hemoptysis; shortness of breath  CARDIOVASCULAR: No chest pain or palpitations.  GASTROINTESTINAL: No abdominal or epigastric pain. No nausea, vomiting, or hematemesis; No diarrhea or constipation. No melena or hematochezia.  GENITOURINARY: No dysuria, frequency, foamy urine, urinary urgency, incontinence or hematuria  NEUROLOGICAL: No numbness or weakness  SKIN: No itching, burning, rashes, or lesions   VASCULAR: No bilateral lower extremity edema.   All other review of systems is negative unless indicated above.    VITALS:  Vital Signs Last 24 Hrs  T(C): 36.3 (06 Mar 2023 11:41), Max: 36.6 (06 Mar 2023 00:00)  T(F): 97.4 (06 Mar 2023 11:41), Max: 97.9 (06 Mar 2023 04:00)  HR: 71 (06 Mar 2023 11:41) (71 - 96)  BP: 113/57 (06 Mar 2023 11:41) (110/59 - 148/68)  BP(mean): 82 (06 Mar 2023 11:41) (82 - 98)  RR: 20 (06 Mar 2023 11:41) (18 - 20)  SpO2: 91% (06 Mar 2023 11:41) (91% - 98%)    Parameters below as of 06 Mar 2023 11:41  Patient On (Oxygen Delivery Method): nasal cannula        03-05 @ 07:01  -  03-06 @ 07:00  --------------------------------------------------------  IN: 0 mL / OUT: 250 mL / NET: -250 mL        PHYSICAL EXAM:  Constitutional: NAD  HEENT: anicteric sclera, oropharynx clear, MMM  Neck: No JVD  Respiratory: CTAB, b/l wheezing  Cardiovascular: S1, S2, RRR  Gastrointestinal: BS+, soft, NT/ND  Extremities: No cyanosis or clubbing. No peripheral edema  Neurological: A/O x 3, no focal deficits  Psychiatric: Normal mood, normal affect  : No CVA tenderness. No santizo.   Skin: No rashes    LABS:  03-06    138  |  96<L>  |  77<HH>  ----------------------------<  164<H>  4.9   |  27  |  2.2<H>    Ca    8.5      06 Mar 2023 12:22  Mg     3.1     03-05    TPro  6.5  /  Alb  4.0  /  TBili  0.2  /  DBili      /  AST  45<H>  /  ALT  35  /  AlkPhos  117<H>  03-06    Creatinine Trend: 2.2 <--, 1.8 <--, 1.7 <--, 1.7 <--, 2.3 <--                        9.8    6.01  )-----------( 218      ( 06 Mar 2023 12:22 )             32.1                                NEPHROLOGY CONSULTATION NOTE    89 y/o female PMH of HTN, HLD, CHF, a fib not on AC, hypothyroidism, CKD (baseline Cr 1.5) and COPD on 4L NC admitted for acute hypercapnic respiratory failure due to COPD Exacerbation. Hospital stay complicated by MAURI on CKD 3b.  Patient seen at bedside, SOB improving.  VS within acceptable range, on 3 L NC.    PAST MEDICAL & SURGICAL HISTORY:  COPD (chronic obstructive pulmonary disease)  Hypothyroid  HTN (hypertension)  High cholesterol  Colitis  CKD (chronic kidney disease)      No significant past surgical history        Allergies:  No Known Allergies    Home Medications Reviewed  Hospital Medications:   MEDICATIONS  (STANDING):  albuterol/ipratropium for Nebulization 3 milliLiter(s) Nebulizer every 4 hours  aspirin  chewable 81 milliGRAM(s) Oral daily  atorvastatin 20 milliGRAM(s) Oral at bedtime  chlorhexidine 2% Cloths 1 Application(s) Topical <User Schedule>  doxycycline IVPB 100 milliGRAM(s) IV Intermittent every 12 hours  dronedarone 400 milliGRAM(s) Oral two times a day  heparin   Injectable 5000 Unit(s) SubCutaneous every 8 hours  levothyroxine 112 MICROGram(s) Oral daily  melatonin 5 milliGRAM(s) Oral at bedtime  methylPREDNISolone sodium succinate Injectable 60 milliGRAM(s) IV Push every 12 hours  metoprolol succinate ER 25 milliGRAM(s) Oral daily  pantoprazole    Tablet 40 milliGRAM(s) Oral before breakfast  sodium chloride 0.9%. 1000 milliLiter(s) (50 mL/Hr) IV Continuous <Continuous>    SOCIAL HISTORY:  Denies ETOH,Smoking,   FAMILY HISTORY:    Yes      REVIEW OF SYSTEMS:  CONSTITUTIONAL: No weakness, fevers or chills  EYES/ENT: No visual changes;  No vertigo or throat pain   NECK: No pain or stiffness  RESPIRATORY: No cough, wheezing, hemoptysis; shortness of breath  CARDIOVASCULAR: No chest pain or palpitations.  GASTROINTESTINAL: No abdominal or epigastric pain. No nausea, vomiting, or hematemesis; No diarrhea or constipation. No melena or hematochezia.  GENITOURINARY: No dysuria, frequency, foamy urine, urinary urgency, incontinence or hematuria  NEUROLOGICAL: No numbness or weakness  SKIN: No itching, burning, rashes, or lesions   VASCULAR: No bilateral lower extremity edema.   All other review of systems is negative unless indicated above.    VITALS:  Vital Signs Last 24 Hrs  T(C): 36.3 (06 Mar 2023 11:41), Max: 36.6 (06 Mar 2023 00:00)  T(F): 97.4 (06 Mar 2023 11:41), Max: 97.9 (06 Mar 2023 04:00)  HR: 71 (06 Mar 2023 11:41) (71 - 96)  BP: 113/57 (06 Mar 2023 11:41) (110/59 - 148/68)  BP(mean): 82 (06 Mar 2023 11:41) (82 - 98)  RR: 20 (06 Mar 2023 11:41) (18 - 20)  SpO2: 91% (06 Mar 2023 11:41) (91% - 98%)    Parameters below as of 06 Mar 2023 11:41  Patient On (Oxygen Delivery Method): nasal cannula        03-05 @ 07:01  -  03-06 @ 07:00  --------------------------------------------------------  IN: 0 mL / OUT: 250 mL / NET: -250 mL        PHYSICAL EXAM:  Constitutional: NAD  Neck: No JVD  Respiratory: CTAB, b/l wheezing  Cardiovascular: S1, S2, RRR  Gastrointestinal: BS+, soft, NT/ND  Extremities: No cyanosis or clubbing. No peripheral edema  Neurological: A/O x 3, no focal deficits  Psychiatric: Normal mood, normal affect  : No CVA tenderness. No santizo.   Skin: No rashes    LABS:    03-06    138  |  96<L>  |  77<HH>  ----------------------------<  164<H>  4.9   |  27  |  2.2<H>    Ca    8.5      06 Mar 2023 12:22  Mg     3.1     03-05    TPro  6.5  /  Alb  4.0  /  TBili  0.2  /  DBili      /  AST  45<H>  /  ALT  35  /  AlkPhos  117<H>  03-06    Creatinine Trend: 2.2 <--, 1.8 <--, 1.7 <--, 1.7 <--, 2.3 <--                        9.8    6.01  )-----------( 218      ( 06 Mar 2023 12:22 )             32.1

## 2023-03-06 NOTE — PROGRESS NOTE ADULT - SUBJECTIVE AND OBJECTIVE BOX
Patient is a 89y old  Female who presents with a chief complaint of Resp Failure (05 Mar 2023 10:47)        Over Night Events:  on 3 liters O2.  off pressors.          ROS:     All ROS are negative except HPI         PHYSICAL EXAM    ICU Vital Signs Last 24 Hrs  T(C): 36.2 (06 Mar 2023 08:42), Max: 36.6 (06 Mar 2023 00:00)  T(F): 97.1 (06 Mar 2023 08:42), Max: 97.9 (06 Mar 2023 04:00)  HR: 89 (06 Mar 2023 08:42) (82 - 96)  BP: 125/73 (06 Mar 2023 08:42) (110/59 - 148/68)  BP(mean): 97 (06 Mar 2023 08:42) (83 - 98)  ABP: --  ABP(mean): --  RR: 20 (06 Mar 2023 08:42) (18 - 20)  SpO2: 97% (06 Mar 2023 08:42) (91% - 98%)    O2 Parameters below as of 06 Mar 2023 08:42  Patient On (Oxygen Delivery Method): nasal cannula            CONSTITUTIONAL:  In  NAD    ENT:   Airway patent,   Mouth with normal mucosa.       EYES:   Pupils equal,   Round and reactive to light.    CARDIAC:   Normal rate,   Regular rhythm.        RESPIRATORY:   Exp wheezing  Bilateral BS  Normal chest expansion  Not tachypneic,  No use of accessory muscles    GASTROINTESTINAL:  Abdomen soft,   Non-tender,   No guarding,   + BS    MUSCULOSKELETAL:   Range of motion is not limited,  No clubbing, cyanosis    NEUROLOGICAL:   Alert   Follows commands     SKIN:   Skin normal color for race,   No evidence of rash.    PSYCHIATRIC:   No apparent risk to self or others.        03-05-23 @ 07:01  -  03-06-23 @ 07:00  --------------------------------------------------------  IN:  Total IN: 0 mL    OUT:    Voided (mL): 250 mL  Total OUT: 250 mL    Total NET: -250 mL          LABS:                            9.9    5.23  )-----------( 183      ( 05 Mar 2023 06:30 )             33.4                                               03-05    143  |  100  |  61<HH>  ----------------------------<  151<H>  5.3<H>   |  31  |  1.8<H>      Ca    9.0      05 Mar 2023 12:04  Mg     3.1     03-05    TPro  6.5  /  Alb  3.9  /  TBili  0.3  /  DBili  x   /  AST  42<H>  /  ALT  36  /  AlkPhos  123<H>  03-05                                                 CARDIAC MARKERS ( 05 Mar 2023 06:30 )  x     / 0.01 ng/mL / x     / x     / x      CARDIAC MARKERS ( 04 Mar 2023 18:17 )  x     / <0.01 ng/mL / x     / x     / x                                                LIVER FUNCTIONS - ( 05 Mar 2023 12:04 )  Alb: 3.9 g/dL / Pro: 6.5 g/dL / ALK PHOS: 123 U/L / ALT: 36 U/L / AST: 42 U/L / GGT: x                                                  Culture - Blood (collected 04 Mar 2023 02:02)  Source: .Blood Blood  Preliminary Report (05 Mar 2023 09:01):    No growth to date.    Culture - Blood (collected 04 Mar 2023 02:02)  Source: .Blood Blood  Preliminary Report (05 Mar 2023 09:01):    No growth to date.                                                                                           MEDICATIONS  (STANDING):  albuterol/ipratropium for Nebulization 3 milliLiter(s) Nebulizer every 4 hours  aspirin  chewable 81 milliGRAM(s) Oral daily  atorvastatin 20 milliGRAM(s) Oral at bedtime  chlorhexidine 2% Cloths 1 Application(s) Topical <User Schedule>  doxycycline IVPB      doxycycline IVPB 100 milliGRAM(s) IV Intermittent every 12 hours  dronedarone 400 milliGRAM(s) Oral two times a day  heparin   Injectable 5000 Unit(s) SubCutaneous every 8 hours  levothyroxine 112 MICROGram(s) Oral daily  melatonin 5 milliGRAM(s) Oral at bedtime  methylPREDNISolone sodium succinate Injectable 60 milliGRAM(s) IV Push every 12 hours  metoprolol succinate ER 25 milliGRAM(s) Oral daily  pantoprazole    Tablet 40 milliGRAM(s) Oral before breakfast    MEDICATIONS  (PRN):  acetaminophen     Tablet .. 650 milliGRAM(s) Oral every 6 hours PRN Temp greater or equal to 38C (100.4F), Mild Pain (1 - 3)  aluminum hydroxide/magnesium hydroxide/simethicone Suspension 30 milliLiter(s) Oral every 4 hours PRN Dyspepsia      New X-rays reviewed:                                                                                  ECHO

## 2023-03-06 NOTE — CONSULT NOTE ADULT - ATTENDING COMMENTS
89 y/o female PMH of HTN, HLD, CHF, a fib not on AC, hypothyroidism, CKD (baseline Cr 1.5) and COPD on 4L NC admitted for acute hypercapnic respiratory failure due to COPD Exacerbation. Hospital stay complicated by MAURI on CKD 3b.    # MAURI on CKD 3b prerenal vs BARRINGTON   keep I =O   follow UOP and BMP   check IP   no hydro on CT   no need for RRT    will follow

## 2023-03-07 NOTE — PROGRESS NOTE ADULT - ASSESSMENT
Impression:    Acute on chronic hypercapnic resp failure improved refusing NIV   COPD exacerbation, improved  MAURI on CKD, worsening  Hyperkalemia-resolved  Severe COPD & Chronic Hypoxemic Respiratory Failure on 3-4L NC at home   Chronic afib not on AC due to recurrent falls  HfpEF      PLAN:    CNS: avoid sedatives, seroquel 25mg now, seoquel 50mg qhs. frequent reorientation    HEENT:  Oral care    PULMONARY:  HOB @ 45 degrees, aspiration precautions.  Solumedrol 60mg QD.  c/w home inhalers.  Duoneb q4h and PRN.  Encourage NIV use.  Supplemental O2 as tolerated.  Target POx 88 - 92%.    CARDIOVASCULAR:  Avoid volume overload. monitor Qtc    GI: GI prophylaxis.  Feeding as tolerated.  Bowel regimen if needed.    RENAL:  FU lytes.  Correct as necessary.  Repeat lytes.  Low K diet.  Bladder US     INFECTIOUS DISEASE:  Doxycycline 5 days.   FU cultures.    HEMATOLOGICAL:  DVT prophylaxis. LE duplex negative    ENDOCRINE:  Follow up FS.  Insulin protocol if needed.    Palliative care eval    Very poor overall prognosis    SDU

## 2023-03-07 NOTE — PROGRESS NOTE ADULT - SUBJECTIVE AND OBJECTIVE BOX
Patient is a 89y old  Female who presents with a chief complaint of Resp Failure (06 Mar 2023 15:07)        Over Night Events:  confused overnight  now with mittens, aggitated      ROS:     All ROS are negative except HPI         PHYSICAL EXAM    ICU Vital Signs Last 24 Hrs  T(C): 36.4 (07 Mar 2023 07:35), Max: 36.4 (07 Mar 2023 07:35)  T(F): 97.6 (07 Mar 2023 07:35), Max: 97.6 (07 Mar 2023 07:35)  HR: 94 (07 Mar 2023 07:35) (71 - 98)  BP: 170/79 (07 Mar 2023 07:35) (113/57 - 170/79)  BP(mean): 113 (07 Mar 2023 07:35) (82 - 113)  ABP: --  ABP(mean): --  RR: 20 (07 Mar 2023 07:35) (20 - 20)  SpO2: 94% (07 Mar 2023 07:35) (91% - 96%)    O2 Parameters below as of 07 Mar 2023 07:35  Patient On (Oxygen Delivery Method): nasal cannula            CONSTITUTIONAL:  In NAD    ENT:   Airway patent,   Mouth with normal mucosa.   No thrush  on NC      EYES:   Pupils equal,   Round and reactive to light.    CARDIAC:   tachycardic,   Regular rhythm.    No edema    RESPIRATORY:   No wheezing  Bilateral BS  Normal chest expansion  Not tachypneic,  No use of accessory muscles    GASTROINTESTINAL:  Abdomen soft,   Non-tender,   No guarding,     MUSCULOSKELETAL:   Range of motion is not limited,  No clubbing, cyanosis    NEUROLOGICAL:   alert awake    SKIN:   Skin normal color for race,   Warm and dry and intact.   No evidence of rash.    PSYCHIATRIC:   aggitated    03-06-23 @ 07:01  -  03-07-23 @ 07:00  --------------------------------------------------------  IN:  Total IN: 0 mL    OUT:    Voided (mL): 800 mL  Total OUT: 800 mL    Total NET: -800 mL          LABS:                            9.8    6.01  )-----------( 218      ( 06 Mar 2023 12:22 )             32.1                                               03-06    138  |  96<L>  |  77<HH>  ----------------------------<  164<H>  4.9   |  27  |  2.2<H>    Ca    8.5      06 Mar 2023 12:22    TPro  6.5  /  Alb  4.0  /  TBili  0.2  /  DBili  x   /  AST  45<H>  /  ALT  35  /  AlkPhos  117<H>  03-06                                                 CARDIAC MARKERS ( 06 Mar 2023 12:22 )  x     / x     / 213 U/L / x     / x                                                LIVER FUNCTIONS - ( 06 Mar 2023 12:22 )  Alb: 4.0 g/dL / Pro: 6.5 g/dL / ALK PHOS: 117 U/L / ALT: 35 U/L / AST: 45 U/L / GGT: x                                                                                                                                       MEDICATIONS  (STANDING):  albuterol/ipratropium for Nebulization 3 milliLiter(s) Nebulizer every 4 hours  aspirin  chewable 81 milliGRAM(s) Oral daily  atorvastatin 20 milliGRAM(s) Oral at bedtime  chlorhexidine 2% Cloths 1 Application(s) Topical <User Schedule>  doxycycline IVPB      doxycycline IVPB 100 milliGRAM(s) IV Intermittent every 12 hours  dronedarone 400 milliGRAM(s) Oral two times a day  heparin   Injectable 5000 Unit(s) SubCutaneous every 8 hours  levothyroxine 112 MICROGram(s) Oral daily  melatonin 5 milliGRAM(s) Oral at bedtime  methylPREDNISolone sodium succinate Injectable 60 milliGRAM(s) IV Push every 12 hours  metoprolol succinate ER 25 milliGRAM(s) Oral daily  metoprolol succinate ER 25 milliGRAM(s) Oral once  pantoprazole    Tablet 40 milliGRAM(s) Oral before breakfast    MEDICATIONS  (PRN):  acetaminophen     Tablet .. 650 milliGRAM(s) Oral every 6 hours PRN Temp greater or equal to 38C (100.4F), Mild Pain (1 - 3)  aluminum hydroxide/magnesium hydroxide/simethicone Suspension 30 milliLiter(s) Oral every 4 hours PRN Dyspepsia  guaifenesin/dextromethorphan Oral Liquid 10 milliLiter(s) Oral every 4 hours PRN Cough      New X-rays reviewed:                                                                                  ECHO    CXR interpreted by me:       Patient is a 89y old  Female who presents with a chief complaint of Resp Failure (06 Mar 2023 15:07)        Over Night Events:  confused overnight  now with mittens, aggitated      ROS:     All ROS are negative except HPI         PHYSICAL EXAM    ICU Vital Signs Last 24 Hrs  T(C): 36.4 (07 Mar 2023 07:35), Max: 36.4 (07 Mar 2023 07:35)  T(F): 97.6 (07 Mar 2023 07:35), Max: 97.6 (07 Mar 2023 07:35)  HR: 94 (07 Mar 2023 07:35) (71 - 98)  BP: 170/79 (07 Mar 2023 07:35) (113/57 - 170/79)  BP(mean): 113 (07 Mar 2023 07:35) (82 - 113)  ABP: --  ABP(mean): --  RR: 20 (07 Mar 2023 07:35) (20 - 20)  SpO2: 94% (07 Mar 2023 07:35) (91% - 96%)    O2 Parameters below as of 07 Mar 2023 07:35  Patient On (Oxygen Delivery Method): nasal cannula            CONSTITUTIONAL:  In NAD    ENT:   Airway patent,   Mouth with normal mucosa.   No thrush  on NC      EYES:   Pupils equal,   Round and reactive to light.    CARDIAC:   tachycardic,   Regular rhythm.    No edema    RESPIRATORY:   No wheezing  Bilateral BS  Normal chest expansion  Not tachypneic,  No use of accessory muscles    GASTROINTESTINAL:  Abdomen soft,   Non-tender,   No guarding,     MUSCULOSKELETAL:   Range of motion is not limited,  No clubbing, cyanosis    NEUROLOGICAL:   alert awake  Confused     SKIN:   Skin normal color for race,   Warm and dry and intact.   No evidence of rash.    PSYCHIATRIC:   aggitated    03-06-23 @ 07:01  -  03-07-23 @ 07:00  --------------------------------------------------------  IN:  Total IN: 0 mL    OUT:    Voided (mL): 800 mL  Total OUT: 800 mL    Total NET: -800 mL          LABS:                            9.8    6.01  )-----------( 218      ( 06 Mar 2023 12:22 )             32.1                                               03-06    138  |  96<L>  |  77<HH>  ----------------------------<  164<H>  4.9   |  27  |  2.2<H>    Ca    8.5      06 Mar 2023 12:22    TPro  6.5  /  Alb  4.0  /  TBili  0.2  /  DBili  x   /  AST  45<H>  /  ALT  35  /  AlkPhos  117<H>  03-06                                                 CARDIAC MARKERS ( 06 Mar 2023 12:22 )  x     / x     / 213 U/L / x     / x                                                LIVER FUNCTIONS - ( 06 Mar 2023 12:22 )  Alb: 4.0 g/dL / Pro: 6.5 g/dL / ALK PHOS: 117 U/L / ALT: 35 U/L / AST: 45 U/L / GGT: x                                                                                                                                       MEDICATIONS  (STANDING):  albuterol/ipratropium for Nebulization 3 milliLiter(s) Nebulizer every 4 hours  aspirin  chewable 81 milliGRAM(s) Oral daily  atorvastatin 20 milliGRAM(s) Oral at bedtime  chlorhexidine 2% Cloths 1 Application(s) Topical <User Schedule>  doxycycline IVPB      doxycycline IVPB 100 milliGRAM(s) IV Intermittent every 12 hours  dronedarone 400 milliGRAM(s) Oral two times a day  heparin   Injectable 5000 Unit(s) SubCutaneous every 8 hours  levothyroxine 112 MICROGram(s) Oral daily  melatonin 5 milliGRAM(s) Oral at bedtime  methylPREDNISolone sodium succinate Injectable 60 milliGRAM(s) IV Push every 12 hours  metoprolol succinate ER 25 milliGRAM(s) Oral daily  metoprolol succinate ER 25 milliGRAM(s) Oral once  pantoprazole    Tablet 40 milliGRAM(s) Oral before breakfast    MEDICATIONS  (PRN):  acetaminophen     Tablet .. 650 milliGRAM(s) Oral every 6 hours PRN Temp greater or equal to 38C (100.4F), Mild Pain (1 - 3)  aluminum hydroxide/magnesium hydroxide/simethicone Suspension 30 milliLiter(s) Oral every 4 hours PRN Dyspepsia  guaifenesin/dextromethorphan Oral Liquid 10 milliLiter(s) Oral every 4 hours PRN Cough      New X-rays reviewed:                                                                                  ECHO    CXR interpreted by me:

## 2023-03-07 NOTE — PROGRESS NOTE ADULT - ASSESSMENT
89 y/o female PMH of HTN, HLD, CHF, a fib not on AC, hypothyroidism, CKD (baseline Cr 1.5) and COPD on 4L NC admitted for acute hypercapnic respiratory failure due to COPD Exacerbation. Hospital stay complicated by MAURI on CKD 3b.    Assessment and plan  MAURI on CKD 3b/ COPD exacerbation/ HF/ Afib  MAURI likely BARRINGTON  creatinine improved post admission then worsened 48 hrs post contrast study  no hydro on CT or US   UA no hematuria check  sport urine prto/cr  No need for IVF if patient tolerating po diet  strict i/os  adjust medications to eGFR  avoid nephrotoxic drugs  will follow       .  will follow .

## 2023-03-07 NOTE — PROGRESS NOTE ADULT - ASSESSMENT
89 y/o female PMH of HTN, HLD, CHF, a fib not on AC, hypothyroidism, CKD (baseline Cr 1.5) and COPD on 4L NC admitted for acute hypercapnic respiratory failure due to COPD Exacerbation.    #Acute on Chronic Hypoxic/Hypercapnic respiratory Failure (On Home O2 4L)  #COPD Exacerbation  - Pulmonary team on board. ABG improved. Pt is a chronic CO2 retainer  - CXR showing no acute cardiopulmonary disease  - On 3L NC saturating 90s  - Cont duonebs q4h ATC if off AVAPs  - Cont IV solumedrol 60 BID  - Cont IV doxycycline 100 BID day 3/5   - procalcitonin 0.40  - RVP negative  - LE Duplex: No evidence of deep venous thrombosis in either lower extremity  - BCx: NGTD     #MAURI on CKD Stage 3  - Likely pre-renal  - Creatine 2.2 <--1.8<--, 1.7<--, 1.7<--, 2.3  - KBUS: Limited portable exam. No definite hydronephrosis similar to CT findings of the same day. Bilateral renal cysts. Patient unable to void therefore postvoid residual not obtained.  - Hold lasix for now  - CT abd/pelv: No evidence of acute abdominal or pelvic pathology.    #Chronic HFpEF  #HTN/HLD  #Elevated troponins, likely demand ischemia  - Cont metoprolol ER 25mg qD  - Cont lipitor 20mg qHs  - Holding lasix for now  - Cont ASA 81mg qD  - Troponin now stable 0.01     #Chronic Afib  - Not on AC due to recurrent falls  - BB as above  - Cont multaq 400 BID    #Hypothyroidism  - Cont synthroid 112mcg qD    #DVT PPX: heparin  #DIET: DASH   #GI prophylaxis: pantoprazole    89 y/o female PMH of HTN, HLD, CHF, a fib not on AC, hypothyroidism, CKD (baseline Cr 1.5) and COPD on 4L NC admitted for acute hypercapnic respiratory failure due to COPD Exacerbation.    #Agitation  - Became agitated last night  - Seroquel 25 mg once  - Monitor QTc, was prolonged on several EKG, most recent < 500mg     #Acute on Chronic Hypoxic/Hypercapnic respiratory Failure (On Home O2 4L)  #COPD Exacerbation  - Pulmonary team on board. Pt is a chronic CO2 retainer  - CXR showing no acute cardiopulmonary disease  - On 3L NC saturating 90s  - Cont duonebs q4h ATC if off AVAPs  - Cont IV solumedrol 60 BID  - Cont IV doxycycline 100 BID day 3/5   - procalcitonin 0.39  - RVP negative  - LE Duplex: No evidence of deep venous thrombosis in either lower extremity  - BCx: NGTD     #MAURI on CKD Stage 3  - Likely pre-renal  - Creatine 2.3 <--2.2 <--1.8<--, 1.7<--, 1.7<--, 2.3  - KBUS: Limited portable exam. No definite hydronephrosis similar to CT findings of the same day. Bilateral renal cysts. Patient unable to void therefore postvoid residual not obtained.  - Hold lasix for now  - CT abd/pelv: No evidence of acute abdominal or pelvic pathology.  - Was retaining 600 ml on Bladder scan today     #Chronic HFpEF  #HTN/HLD  #Elevated troponins, likely demand ischemia  - Cont metoprolol ER 25mg qD  - Cont lipitor 20mg qHs  - Holding lasix for now  - Cont ASA 81mg qD  - Troponin now stable 0.01     #Chronic Afib  - Not on AC due to recurrent falls  - BB as above  - Cont multaq 400 BID    #Hypothyroidism  - Cont synthroid 112mcg qD    #DVT PPX: heparin  #DIET: DASH   #GI prophylaxis: pantoprazole    89 y/o female PMH of HTN, HLD, CHF, a fib not on AC, hypothyroidism, CKD (baseline Cr 1.5) and COPD on 4L NC admitted for acute hypercapnic respiratory failure due to COPD Exacerbation.    #Agitation  - Became agitated last night  - Seroquel 25 mg once  - Monitor QTc, was prolonged on several EKG, most recent < 500mg     #Acute on Chronic Hypoxic/Hypercapnic respiratory Failure (On Home O2 4L)  #COPD Exacerbation  - Pulmonary team on board. Pt is a chronic CO2 retainer  - CXR showing no acute cardiopulmonary disease  - On 3L NC saturating 90s  - Cont duonebs q4h ATC if off AVAPs  - Cont IV solumedrol 60 BID  - Cont IV doxycycline 100 BID day 3/5   - procalcitonin 0.39  - RVP negative  - LE Duplex: No evidence of deep venous thrombosis in either lower extremity  - BCx: NGTD     #MAURI on CKD Stage 3  - Likely pre-renal  - Creatine 2.3 <--2.2 <--1.8<--, 1.7<--, 1.7<--, 2.3  - KBUS: Limited portable exam. No definite hydronephrosis similar to CT findings of the same day. Bilateral renal cysts. Patient unable to void therefore postvoid residual not obtained.  - Hold lasix for now  - CT abd/pelv: No evidence of acute abdominal or pelvic pathology.  - Was retaining 600 ml on Bladder scan today   - As per nephro keep monitoring off fluids, its normal for B[place] to worsenfor the first 5-7 days before improvement    #Chronic HFpEF  #HTN/HLD  #Elevated troponins, likely demand ischemia  - Cont metoprolol ER 25mg qD  - Cont lipitor 20mg qHs  - Holding lasix for now  - Cont ASA 81mg qD  - Troponin now stable 0.01     #Chronic Afib  - Not on AC due to recurrent falls  - BB as above  - Cont multaq 400 BID    #Hypothyroidism  - Cont synthroid 112mcg qD    #DVT PPX: heparin  #DIET: DASH   #GI prophylaxis: pantoprazole    89 y/o female PMH of HTN, HLD, CHF, a fib not on AC, hypothyroidism, CKD (baseline Cr 1.5) and COPD on 4L NC admitted for acute hypercapnic respiratory failure due to COPD Exacerbation.    #Agitation  - Became agitated last night  - Seroquel 25 mg once  - Monitor QTc, was prolonged on several EKG, most recent < 500mg     #Acute on Chronic Hypoxic/Hypercapnic respiratory Failure (On Home O2 4L)  #COPD Exacerbation  - Work of Breathing worse today, increased secretion, placed on BiPAP  - Pulmonary team on board. Pt is a chronic CO2 retainer  - CXR showing no acute cardiopulmonary disease  - On 3L NC saturating 90s  - Cont duonebs q4h ATC if off AVAPs  - Cont IV solumedrol 60 BID  - Cont IV doxycycline 100 BID day 3/5   - procalcitonin 0.39  - RVP negative  - LE Duplex: No evidence of deep venous thrombosis in either lower extremity  - BCx: NGTD     #MAURI on CKD Stage 3  - Likely pre-renal  - Creatine 2.3 <--2.2 <--1.8<--, 1.7<--, 1.7<--, 2.3  - KBUS: Limited portable exam. No definite hydronephrosis similar to CT findings of the same day. Bilateral renal cysts. Patient unable to void therefore postvoid residual not obtained.  - Hold lasix for now  - CT abd/pelv: No evidence of acute abdominal or pelvic pathology.  - Was retaining 600 ml on Bladder scan today   - As per nephro keep monitoring off fluids, its normal for [place] to worsenfor the first 5-7 days before improvement    #Chronic HFpEF  #HTN/HLD  #Elevated troponins, likely demand ischemia  - Cont metoprolol ER 25mg qD  - Cont lipitor 20mg qHs  - Holding lasix for now  - Cont ASA 81mg qD  - Troponin now stable 0.01     #Chronic Afib  - Not on AC due to recurrent falls  - BB as above  - Cont multaq 400 BID    #Hypothyroidism  - Cont synthroid 112mcg qD    #DVT PPX: heparin  #DIET: DASH   #GI prophylaxis: pantoprazole

## 2023-03-07 NOTE — PROGRESS NOTE ADULT - SUBJECTIVE AND OBJECTIVE BOX
Nephrology progress note    Patient was seen and examined, events over the last 24 h noted .    Allergies:  No Known Allergies    Hospital Medications:   MEDICATIONS  (STANDING):  albuterol/ipratropium for Nebulization 3 milliLiter(s) Nebulizer every 4 hours  aspirin  chewable 81 milliGRAM(s) Oral daily  atorvastatin 20 milliGRAM(s) Oral at bedtime  chlorhexidine 2% Cloths 1 Application(s) Topical <User Schedule>  doxycycline IVPB      doxycycline IVPB 100 milliGRAM(s) IV Intermittent every 12 hours  dronedarone 400 milliGRAM(s) Oral two times a day  heparin   Injectable 5000 Unit(s) SubCutaneous every 8 hours  lactated ringers. 1000 milliLiter(s) (40 mL/Hr) IV Continuous <Continuous>  levothyroxine 112 MICROGram(s) Oral daily  melatonin 5 milliGRAM(s) Oral at bedtime  methylPREDNISolone sodium succinate Injectable 60 milliGRAM(s) IV Push every 12 hours  metoprolol succinate ER 25 milliGRAM(s) Oral daily  pantoprazole    Tablet 40 milliGRAM(s) Oral before breakfast        VITALS:  T(F): 97.3 (23 @ 16:22), Max: 97.6 (23 @ 07:35)  HR: 97 (23 @ 16:22)  BP: 139/58 (23 @ 16:22)  RR: 22 (23 @ 16:22)  SpO2: 95% (23 @ 16:22)  Wt(kg): --    :  -   07:00  --------------------------------------------------------  IN: 0 mL / OUT: 250 mL / NET: -250 mL     07:01  -   @ 07:00  --------------------------------------------------------  IN: 0 mL / OUT: 800 mL / NET: -800 mL    03-07 @ 07:01  -   @ 22:07  --------------------------------------------------------  IN: 40 mL / OUT: 1200 mL / NET: -1160 mL      Height (cm): 144.8 ( @ 00:13)    PHYSICAL EXAM:  Constitutional: NAD  HEENT: anicteric sclera, oropharynx clear, MMM  Neck: No JVD  Respiratory: CTAB, no wheezes, rales or rhonchi  Cardiovascular: S1, S2, RRR  Gastrointestinal: BS+, soft, NT/ND  Extremities: No cyanosis or clubbing. No peripheral edema  :  No santizo.   Skin: No rashes    LABS:      141  |  100  |  83<HH>  ----------------------------<  79  4.2   |  29  |  2.3<H>    Ca    8.4      07 Mar 2023 09:32  Mg     2.7         TPro  6.3  /  Alb  3.9  /  TBili  0.3  /  DBili      /  AST  44<H>  /  ALT  33  /  AlkPhos  102                            9.4    10.41 )-----------( 202      ( 07 Mar 2023 09:32 )             31.0       Urine Studies:  Urinalysis Basic - ( 07 Mar 2023 12:10 )    Color: Light Yellow / Appearance: Clear / S.017 / pH:   Gluc:  / Ketone: Negative  / Bili: Negative / Urobili: <2 mg/dL   Blood:  / Protein: 30 mg/dL / Nitrite: Negative   Leuk Esterase: Negative / RBC: 5 /HPF / WBC 4 /HPF   Sq Epi:  / Non Sq Epi: 3 /HPF / Bacteria: Negative        RADIOLOGY & ADDITIONAL STUDIES:

## 2023-03-07 NOTE — PROGRESS NOTE ADULT - SUBJECTIVE AND OBJECTIVE BOX
TANNA MCCRACKEN 89y Female  MRN#: [id number]   Hospital Day: 3d    HPI:  Pt is a 89 y/o female     PMH of HTN, HLD, CHF, a fib not on AC, hyopthyroidism, CKD (baseline Cr 1.7) and COPD on 4L NC    Presenting for SOB.     As per daughter, pt was wheezing at home in the evening, looked SOB and looked like she was holding her breath, became lethargic so they called EMS. Daughter later noticed she was off of her oxygen for some unknown amount of time. Patient also vomited once with EMS. No fever, cough, congestion or abdominal pain.    In the ED :  BP: 174/78  HR : 79  RR : 20  T : 97  O2: 94% on 6L NC -> later on AVAPS    Hb 9.1 (baseline 11)  Cr 2.3 (baseline 1.3)  New transaminitis : AST/ALT /Alk phos : 60/45/139  Trop :0.03  BNP:  830 (baseline 500)  VBG: pH 7.24 , CO2: 88 , HCO3 : 30 -> placed on AVAPS -> pH 7.33 , CO2: 72 , HCO3 : 38    CT abdomen/pelvis : for vomiting : No evidence of acute abdominal or pelvic pathology.  CT Head : No evidence of intracranial hemorrhage, territorial infarct, or mass effect.  ECG : Normal sinus rhythm     (04 Mar 2023 11:52)      SUBJECTIVE  Patient is a 89y old Female who presents with a chief complaint of Resp Failure (07 Mar 2023 08:50)  Currently admitted to medicine with the primary diagnosis of COPD exacerbation      INTERVAL HPI AND OVERNIGHT EVENTS:  Patient was examined and seen at bedside. This morning she is resting comfortably in bed and reports no issues or overnight events.      VITAL SIGNS: Last 24 Hours  T(C): 36.4 (07 Mar 2023 07:35), Max: 36.4 (07 Mar 2023 07:35)  T(F): 97.6 (07 Mar 2023 07:35), Max: 97.6 (07 Mar 2023 07:35)  HR: 94 (07 Mar 2023 07:35) (72 - 98)  BP: 170/79 (07 Mar 2023 07:35) (149/67 - 170/79)  BP(mean): 113 (07 Mar 2023 07:35) (113 - 113)  RR: 22 (07 Mar 2023 09:18) (20 - 22)  SpO2: 94% (07 Mar 2023 09:18) (92% - 96%)    LABS:                        9.4    10.41 )-----------( 202      ( 07 Mar 2023 09:32 )             31.0     03-07    141  |  100  |  83<HH>  ----------------------------<  79  4.2   |  29  |  2.3<H>    Ca    8.4      07 Mar 2023 09:32  Mg     2.7     03-07    TPro  6.3  /  Alb  3.9  /  TBili  0.3  /  DBili  x   /  AST  44<H>  /  ALT  33  /  AlkPhos  102  03-07    ABG - ( 07 Mar 2023 09:18 )  pH, Arterial: 7.49  pH, Blood: x     /  pCO2: 42    /  pO2: 56    / HCO3: 32    / Base Excess: 7.9   /  SaO2: 92.8    Creatine Kinase, Serum: 213 U/L (03-06-23 @ 12:22)    CARDIAC MARKERS ( 06 Mar 2023 12:22 )  x     / x     / 213 U/L / x     / x        PHYSICAL EXAM:  General: No acute distress; Pallor (-), Icterus (-), Cyanosis (-), Clubbing (-)  HEENT: Normocephalic, atraumatic, PERRLA, EOMI  PULM: Bilaterally equal and clear breath sounds, wheeze (-), rubs (-), crackles (-)  CVS: Normal S1 and S2, murmurs (-), rubs (-), gallops (-)   GI: Soft, nondistended, nontender, BS +  MSK: Edema (-), no muscle, bone or joint tenderness noted  SKIN: Warm and well perfused, no rashes noted  NEURO:  Alert and Oriented x 3; No gross focal neurological deficit noted     TANNA MCCRACKEN 89y Female  MRN#: [id number]   Hospital Day: 3d    HPI:  Pt is a 89 y/o female     PMH of HTN, HLD, CHF, a fib not on AC, hyopthyroidism, CKD (baseline Cr 1.7) and COPD on 4L NC    Presenting for SOB.     As per daughter, pt was wheezing at home in the evening, looked SOB and looked like she was holding her breath, became lethargic so they called EMS. Daughter later noticed she was off of her oxygen for some unknown amount of time. Patient also vomited once with EMS. No fever, cough, congestion or abdominal pain.    In the ED :  BP: 174/78  HR : 79  RR : 20  T : 97  O2: 94% on 6L NC -> later on AVAPS    Hb 9.1 (baseline 11)  Cr 2.3 (baseline 1.3)  New transaminitis : AST/ALT /Alk phos : 60/45/139  Trop :0.03  BNP:  830 (baseline 500)  VBG: pH 7.24 , CO2: 88 , HCO3 : 30 -> placed on AVAPS -> pH 7.33 , CO2: 72 , HCO3 : 38    CT abdomen/pelvis : for vomiting : No evidence of acute abdominal or pelvic pathology.  CT Head : No evidence of intracranial hemorrhage, territorial infarct, or mass effect.  ECG : Normal sinus rhythm     (04 Mar 2023 11:52)      SUBJECTIVE  Patient is a 89y old Female who presents with a chief complaint of Resp Failure (07 Mar 2023 08:50)  Currently admitted to medicine with the primary diagnosis of COPD exacerbation      INTERVAL HPI AND OVERNIGHT EVENTS:  Patient was examined and seen at bedside. This morning she is resting comfortably in bed and reports no issues or overnight events.      VITAL SIGNS: Last 24 Hours  T(C): 36.4 (07 Mar 2023 07:35), Max: 36.4 (07 Mar 2023 07:35)  T(F): 97.6 (07 Mar 2023 07:35), Max: 97.6 (07 Mar 2023 07:35)  HR: 94 (07 Mar 2023 07:35) (72 - 98)  BP: 170/79 (07 Mar 2023 07:35) (149/67 - 170/79)  BP(mean): 113 (07 Mar 2023 07:35) (113 - 113)  RR: 22 (07 Mar 2023 09:18) (20 - 22)  SpO2: 94% (07 Mar 2023 09:18) (92% - 96%)    LABS:                        9.4    10.41 )-----------( 202      ( 07 Mar 2023 09:32 )             31.0     03-07    141  |  100  |  83<HH>  ----------------------------<  79  4.2   |  29  |  2.3<H>    Ca    8.4      07 Mar 2023 09:32  Mg     2.7     03-07    TPro  6.3  /  Alb  3.9  /  TBili  0.3  /  DBili  x   /  AST  44<H>  /  ALT  33  /  AlkPhos  102  03-07    ABG - ( 07 Mar 2023 09:18 )  pH, Arterial: 7.49  pH, Blood: x     /  pCO2: 42    /  pO2: 56    / HCO3: 32    / Base Excess: 7.9   /  SaO2: 92.8    Creatine Kinase, Serum: 213 U/L (03-06-23 @ 12:22)    CARDIAC MARKERS ( 06 Mar 2023 12:22 )  x     / x     / 213 U/L / x     / x        PHYSICAL EXAM:  General: No acute distress; Pallor (-), Icterus (-), Cyanosis (-), Clubbing (-)  HEENT: Normocephalic, atraumatic, PERRLA, EOMI  PULM: Bilaterally equal and clear breath sounds, wheeze (-), rubs (-), crackles (-)  CVS: Normal S1 and S2, murmurs (-), rubs (-), gallops (-)   GI: Soft, nondistended, nontender, BS +  MSK: Edema (-), no muscle, bone or joint tenderness noted  SKIN: Warm and well perfused, no rashes noted  NEURO:  Alert and Oriented x 3; No gross focal neurological deficit noted

## 2023-03-08 NOTE — PROGRESS NOTE ADULT - SUBJECTIVE AND OBJECTIVE BOX
Patient is a 89y old  Female who presents with a chief complaint of Resp Failure (07 Mar 2023 11:42)        SUBJECTIVE:  SP Tobar  More awake and alert      REVIEW OF SYSTEMS:    All Pertinent ROS are negative except per HPI       PHYSICAL EXAM  Vital Signs Last 24 Hrs  T(C): 36.1 (08 Mar 2023 07:18), Max: 36.3 (07 Mar 2023 16:22)  T(F): 97 (08 Mar 2023 07:18), Max: 97.4 (07 Mar 2023 20:00)  HR: 86 (08 Mar 2023 07:18) (70 - 98)  BP: 152/72 (08 Mar 2023 07:18) (103/58 - 155/67)  BP(mean): 103 (08 Mar 2023 07:18) (72 - 103)  RR: 18 (08 Mar 2023 07:18) (18 - 22)  SpO2: 100% (08 Mar 2023 07:18) (90% - 100%)    Parameters below as of 08 Mar 2023 07:18  Patient On (Oxygen Delivery Method): mask, nonrebreather        CONSTITUTIONAL:  chronically ill appearing   In NAD    ENT:   Airway patent,   No thrush  on 3 liters NC    CARDIAC:   tachy,   regular rhythm.    no edema      RESPIRATORY:   NO Wheezing  Normal chest expansion  Not tachypneic,  No use of accessory muscles    GASTROINTESTINAL:  Abdomen soft,   non-tender,   no guarding,     MUSCULOSKELETAL:   range of motion is not limited,  no clubbing, cyanosis    NEUROLOGICAL:   Alert awake   no motor or deficits.    SKIN:   Skin normal color for race,   warm, dry   No evidence of rash.      23 @ 07:01  -  23 @ 07:00  --------------------------------------------------------  IN:    Lactated Ringers: 360 mL  Total IN: 360 mL    OUT:    Indwelling Catheter - Urethral (mL): 1500 mL    Intermittent Catheterization - Urethral (mL): 500 mL  Total OUT: 2000 mL    Total NET: -1640 mL          LABS:                          9.7    3.63  )-----------( 135      ( 08 Mar 2023 02:01 )             32.3                                               0308    146  |  105  |  80<HH>  ----------------------------<  93  5.1<H>   |  31  |  1.8<H>    Ca    8.7      08 Mar 2023 02:01  Phos  5.3     03-08  Mg     2.8     03-08    TPro  5.7<L>  /  Alb  3.6  /  TBili  0.3  /  DBili  x   /  AST  37  /  ALT  29  /  AlkPhos  92  03-08                                             Urinalysis Basic - ( 07 Mar 2023 12:10 )    Color: Light Yellow / Appearance: Clear / S.017 / pH: x  Gluc: x / Ketone: Negative  / Bili: Negative / Urobili: <2 mg/dL   Blood: x / Protein: 30 mg/dL / Nitrite: Negative   Leuk Esterase: Negative / RBC: 5 /HPF / WBC 4 /HPF   Sq Epi: x / Non Sq Epi: 3 /HPF / Bacteria: Negative        CARDIAC MARKERS ( 06 Mar 2023 12:22 )  x     / x     / 213 U/L / x     / x                                                LIVER FUNCTIONS - ( 08 Mar 2023 02:01 )  Alb: 3.6 g/dL / Pro: 5.7 g/dL / ALK PHOS: 92 U/L / ALT: 29 U/L / AST: 37 U/L / GGT: x                                                                                            ABG - ( 07 Mar 2023 13:57 )  pH, Arterial: 7.34  pH, Blood: x     /  pCO2: 61    /  pO2: 77    / HCO3: 33    / Base Excess: 5.8   /  SaO2: 96.0                MEDICATIONS  (STANDING):  albuterol/ipratropium for Nebulization 3 milliLiter(s) Nebulizer every 4 hours  aspirin  chewable 81 milliGRAM(s) Oral daily  atorvastatin 20 milliGRAM(s) Oral at bedtime  chlorhexidine 2% Cloths 1 Application(s) Topical <User Schedule>  doxycycline IVPB      doxycycline IVPB 100 milliGRAM(s) IV Intermittent every 12 hours  dronedarone 400 milliGRAM(s) Oral two times a day  heparin   Injectable 5000 Unit(s) SubCutaneous every 8 hours  lactated ringers. 1000 milliLiter(s) (40 mL/Hr) IV Continuous <Continuous>  levothyroxine 112 MICROGram(s) Oral daily  melatonin 5 milliGRAM(s) Oral at bedtime  methylPREDNISolone sodium succinate Injectable 60 milliGRAM(s) IV Push every 12 hours  metoprolol succinate ER 25 milliGRAM(s) Oral daily  pantoprazole    Tablet 40 milliGRAM(s) Oral before breakfast    MEDICATIONS  (PRN):  acetaminophen     Tablet .. 650 milliGRAM(s) Oral every 6 hours PRN Temp greater or equal to 38C (100.4F), Mild Pain (1 - 3)  aluminum hydroxide/magnesium hydroxide/simethicone Suspension 30 milliLiter(s) Oral every 4 hours PRN Dyspepsia  guaifenesin/dextromethorphan Oral Liquid 10 milliLiter(s) Oral every 4 hours PRN Cough      X-Rays reviewed    CXR interpreted by me: Patient is a 89y old  Female who presents with a chief complaint of Resp Failure (07 Mar 2023 11:42)        SUBJECTIVE:  SP Tobar  More awake and alert  Refused NIV overnight    REVIEW OF SYSTEMS:    All Pertinent ROS are negative except per HPI       PHYSICAL EXAM  Vital Signs Last 24 Hrs  T(C): 36.1 (08 Mar 2023 07:18), Max: 36.3 (07 Mar 2023 16:22)  T(F): 97 (08 Mar 2023 07:18), Max: 97.4 (07 Mar 2023 20:00)  HR: 86 (08 Mar 2023 07:18) (70 - 98)  BP: 152/72 (08 Mar 2023 07:18) (103/58 - 155/67)  BP(mean): 103 (08 Mar 2023 07:18) (72 - 103)  RR: 18 (08 Mar 2023 07:18) (18 - 22)  SpO2: 100% (08 Mar 2023 07:18) (90% - 100%)    Parameters below as of 08 Mar 2023 07:18  Patient On (Oxygen Delivery Method): mask, nonrebreather        CONSTITUTIONAL:  chronically ill appearing   In NAD    ENT:   Airway patent,   No thrush  on 3 liters NC    CARDIAC:   tachy,   regular rhythm.    no edema      RESPIRATORY:   NO Wheezing  Normal chest expansion  Not tachypneic,  No use of accessory muscles    GASTROINTESTINAL:  Abdomen soft,   non-tender,   no guarding,     MUSCULOSKELETAL:   range of motion is not limited,  no clubbing, cyanosis    NEUROLOGICAL:   Alert awake   no motor or deficits.    SKIN:   Skin normal color for race,   warm, dry   No evidence of rash.      23 @ 07:01  -  23 @ 07:00  --------------------------------------------------------  IN:    Lactated Ringers: 360 mL  Total IN: 360 mL    OUT:    Indwelling Catheter - Urethral (mL): 1500 mL    Intermittent Catheterization - Urethral (mL): 500 mL  Total OUT: 2000 mL    Total NET: -1640 mL          LABS:                          9.7    3.63  )-----------( 135      ( 08 Mar 2023 02:01 )             32.3                                               03-08    146  |  105  |  80<HH>  ----------------------------<  93  5.1<H>   |  31  |  1.8<H>    Ca    8.7      08 Mar 2023 02:01  Phos  5.3     03-08  Mg     2.8     03-08    TPro  5.7<L>  /  Alb  3.6  /  TBili  0.3  /  DBili  x   /  AST  37  /  ALT  29  /  AlkPhos  92  03-08                                             Urinalysis Basic - ( 07 Mar 2023 12:10 )    Color: Light Yellow / Appearance: Clear / S.017 / pH: x  Gluc: x / Ketone: Negative  / Bili: Negative / Urobili: <2 mg/dL   Blood: x / Protein: 30 mg/dL / Nitrite: Negative   Leuk Esterase: Negative / RBC: 5 /HPF / WBC 4 /HPF   Sq Epi: x / Non Sq Epi: 3 /HPF / Bacteria: Negative        CARDIAC MARKERS ( 06 Mar 2023 12:22 )  x     / x     / 213 U/L / x     / x                                                LIVER FUNCTIONS - ( 08 Mar 2023 02:01 )  Alb: 3.6 g/dL / Pro: 5.7 g/dL / ALK PHOS: 92 U/L / ALT: 29 U/L / AST: 37 U/L / GGT: x                                                                                            ABG - ( 07 Mar 2023 13:57 )  pH, Arterial: 7.34  pH, Blood: x     /  pCO2: 61    /  pO2: 77    / HCO3: 33    / Base Excess: 5.8   /  SaO2: 96.0                MEDICATIONS  (STANDING):  albuterol/ipratropium for Nebulization 3 milliLiter(s) Nebulizer every 4 hours  aspirin  chewable 81 milliGRAM(s) Oral daily  atorvastatin 20 milliGRAM(s) Oral at bedtime  chlorhexidine 2% Cloths 1 Application(s) Topical <User Schedule>  doxycycline IVPB      doxycycline IVPB 100 milliGRAM(s) IV Intermittent every 12 hours  dronedarone 400 milliGRAM(s) Oral two times a day  heparin   Injectable 5000 Unit(s) SubCutaneous every 8 hours  lactated ringers. 1000 milliLiter(s) (40 mL/Hr) IV Continuous <Continuous>  levothyroxine 112 MICROGram(s) Oral daily  melatonin 5 milliGRAM(s) Oral at bedtime  methylPREDNISolone sodium succinate Injectable 60 milliGRAM(s) IV Push every 12 hours  metoprolol succinate ER 25 milliGRAM(s) Oral daily  pantoprazole    Tablet 40 milliGRAM(s) Oral before breakfast    MEDICATIONS  (PRN):  acetaminophen     Tablet .. 650 milliGRAM(s) Oral every 6 hours PRN Temp greater or equal to 38C (100.4F), Mild Pain (1 - 3)  aluminum hydroxide/magnesium hydroxide/simethicone Suspension 30 milliLiter(s) Oral every 4 hours PRN Dyspepsia  guaifenesin/dextromethorphan Oral Liquid 10 milliLiter(s) Oral every 4 hours PRN Cough      X-Rays reviewed    CXR interpreted by me: Patient is a 89y old  Female who presents with a chief complaint of Resp Failure (07 Mar 2023 11:42)        SUBJECTIVE:  SP Tobar  More awake and alert  Refused NIV overnight    REVIEW OF SYSTEMS:    All Pertinent ROS are negative except per HPI       PHYSICAL EXAM  Vital Signs Last 24 Hrs  T(C): 36.1 (08 Mar 2023 07:18), Max: 36.3 (07 Mar 2023 16:22)  T(F): 97 (08 Mar 2023 07:18), Max: 97.4 (07 Mar 2023 20:00)  HR: 86 (08 Mar 2023 07:18) (70 - 98)  BP: 152/72 (08 Mar 2023 07:18) (103/58 - 155/67)  BP(mean): 103 (08 Mar 2023 07:18) (72 - 103)  RR: 18 (08 Mar 2023 07:18) (18 - 22)  SpO2: 100% (08 Mar 2023 07:18) (90% - 100%)    Parameters below as of 08 Mar 2023 07:18  Patient On (Oxygen Delivery Method): mask, nonrebreather        CONSTITUTIONAL:  chronically ill appearing   In NAD    ENT:   Airway patent,   No thrush  on 3 liters NC    CARDIAC:   tachy,   regular rhythm.    no edema      RESPIRATORY:   + Wheezing  Normal chest expansion  Not tachypneic,  No use of accessory muscles    GASTROINTESTINAL:  Abdomen soft,   non-tender,   no guarding,     MUSCULOSKELETAL:   range of motion is not limited,  no clubbing, cyanosis    NEUROLOGICAL:   Alert awake   no motor or deficits.    SKIN:   Skin normal color for race,   warm, dry   No evidence of rash.      23 @ 07:01  -  23 @ 07:00  --------------------------------------------------------  IN:    Lactated Ringers: 360 mL  Total IN: 360 mL    OUT:    Indwelling Catheter - Urethral (mL): 1500 mL    Intermittent Catheterization - Urethral (mL): 500 mL  Total OUT: 2000 mL    Total NET: -1640 mL          LABS:                          9.7    3.63  )-----------( 135      ( 08 Mar 2023 02:01 )             32.3                                               03-08    146  |  105  |  80<HH>  ----------------------------<  93  5.1<H>   |  31  |  1.8<H>    Ca    8.7      08 Mar 2023 02:01  Phos  5.3     03-08  Mg     2.8     03-08    TPro  5.7<L>  /  Alb  3.6  /  TBili  0.3  /  DBili  x   /  AST  37  /  ALT  29  /  AlkPhos  92  03-08                                             Urinalysis Basic - ( 07 Mar 2023 12:10 )    Color: Light Yellow / Appearance: Clear / S.017 / pH: x  Gluc: x / Ketone: Negative  / Bili: Negative / Urobili: <2 mg/dL   Blood: x / Protein: 30 mg/dL / Nitrite: Negative   Leuk Esterase: Negative / RBC: 5 /HPF / WBC 4 /HPF   Sq Epi: x / Non Sq Epi: 3 /HPF / Bacteria: Negative        CARDIAC MARKERS ( 06 Mar 2023 12:22 )  x     / x     / 213 U/L / x     / x                                                LIVER FUNCTIONS - ( 08 Mar 2023 02:01 )  Alb: 3.6 g/dL / Pro: 5.7 g/dL / ALK PHOS: 92 U/L / ALT: 29 U/L / AST: 37 U/L / GGT: x                                                                                            ABG - ( 07 Mar 2023 13:57 )  pH, Arterial: 7.34  pH, Blood: x     /  pCO2: 61    /  pO2: 77    / HCO3: 33    / Base Excess: 5.8   /  SaO2: 96.0                MEDICATIONS  (STANDING):  albuterol/ipratropium for Nebulization 3 milliLiter(s) Nebulizer every 4 hours  aspirin  chewable 81 milliGRAM(s) Oral daily  atorvastatin 20 milliGRAM(s) Oral at bedtime  chlorhexidine 2% Cloths 1 Application(s) Topical <User Schedule>  doxycycline IVPB      doxycycline IVPB 100 milliGRAM(s) IV Intermittent every 12 hours  dronedarone 400 milliGRAM(s) Oral two times a day  heparin   Injectable 5000 Unit(s) SubCutaneous every 8 hours  lactated ringers. 1000 milliLiter(s) (40 mL/Hr) IV Continuous <Continuous>  levothyroxine 112 MICROGram(s) Oral daily  melatonin 5 milliGRAM(s) Oral at bedtime  methylPREDNISolone sodium succinate Injectable 60 milliGRAM(s) IV Push every 12 hours  metoprolol succinate ER 25 milliGRAM(s) Oral daily  pantoprazole    Tablet 40 milliGRAM(s) Oral before breakfast    MEDICATIONS  (PRN):  acetaminophen     Tablet .. 650 milliGRAM(s) Oral every 6 hours PRN Temp greater or equal to 38C (100.4F), Mild Pain (1 - 3)  aluminum hydroxide/magnesium hydroxide/simethicone Suspension 30 milliLiter(s) Oral every 4 hours PRN Dyspepsia  guaifenesin/dextromethorphan Oral Liquid 10 milliLiter(s) Oral every 4 hours PRN Cough      X-Rays reviewed    CXR interpreted by me: Patient is a 89y old  Female who presents with a chief complaint of Resp Failure (07 Mar 2023 11:42)        SUBJECTIVE:  SP Tobar  More awake and alert  Refused NIV overnight    REVIEW OF SYSTEMS:    All Pertinent ROS are negative except per HPI       PHYSICAL EXAM  Vital Signs Last 24 Hrs  T(C): 36.1 (08 Mar 2023 07:18), Max: 36.3 (07 Mar 2023 16:22)  T(F): 97 (08 Mar 2023 07:18), Max: 97.4 (07 Mar 2023 20:00)  HR: 86 (08 Mar 2023 07:18) (70 - 98)  BP: 152/72 (08 Mar 2023 07:18) (103/58 - 155/67)  BP(mean): 103 (08 Mar 2023 07:18) (72 - 103)  RR: 18 (08 Mar 2023 07:18) (18 - 22)  SpO2: 100% (08 Mar 2023 07:18) (90% - 100%)    Parameters below as of 08 Mar 2023 07:18  Patient On (Oxygen Delivery Method): mask, nonrebreather        CONSTITUTIONAL:  chronically ill appearing   In NAD    ENT:   Airway patent,   No thrush  on 3 liters NC    CARDIAC:   tachy,   regular rhythm.    no edema      RESPIRATORY:   + Wheezing  Normal chest expansion  Not tachypneic,  No use of accessory muscles    GASTROINTESTINAL:  Abdomen soft,   non-tender,   no guarding,     MUSCULOSKELETAL:   range of motion is not limited,  no clubbing, cyanosis    NEUROLOGICAL:   Alert awake   no motor or deficits.    SKIN:   Skin normal color for race,   warm, dry   No evidence of rash.      23 @ 07:01  -  23 @ 07:00  --------------------------------------------------------  IN:    Lactated Ringers: 360 mL  Total IN: 360 mL    OUT:    Indwelling Catheter - Urethral (mL): 1500 mL    Intermittent Catheterization - Urethral (mL): 500 mL  Total OUT: 2000 mL    Total NET: -1640 mL          LABS:                          9.7    3.63  )-----------( 135      ( 08 Mar 2023 02:01 )             32.3                                               03-08             9.7    3.63  )-----------( 135      (  @ 02:01 )             32.3                9.4    10.41 )-----------( 202      (  @ 09:32 )             31.0                9.8    6.01  )-----------( 218      (  @ 12:22 )             32.1           146  |  105  |  80<HH>  ----------------------------<  93  5.1<H>   |  31  |  1.8<H>    Ca    8.7      08 Mar 2023 02:01  Phos  5.3     03-08  Mg     2.8     -08    TPro  5.7<L>  /  Alb  3.6  /  TBili  0.3  /  DBili  x   /  AST  37  /  ALT  29  /  AlkPhos  92  03-08                                             Urinalysis Basic - ( 07 Mar 2023 12:10 )    Color: Light Yellow / Appearance: Clear / S.017 / pH: x  Gluc: x / Ketone: Negative  / Bili: Negative / Urobili: <2 mg/dL   Blood: x / Protein: 30 mg/dL / Nitrite: Negative   Leuk Esterase: Negative / RBC: 5 /HPF / WBC 4 /HPF   Sq Epi: x / Non Sq Epi: 3 /HPF / Bacteria: Negative        CARDIAC MARKERS ( 06 Mar 2023 12:22 )  x     / x     / 213 U/L / x     / x                                                LIVER FUNCTIONS - ( 08 Mar 2023 02:01 )  Alb: 3.6 g/dL / Pro: 5.7 g/dL / ALK PHOS: 92 U/L / ALT: 29 U/L / AST: 37 U/L / GGT: x                                                                                            ABG - ( 07 Mar 2023 13:57 )  pH, Arterial: 7.34  pH, Blood: x     /  pCO2: 61    /  pO2: 77    / HCO3: 33    / Base Excess: 5.8   /  SaO2: 96.0                MEDICATIONS  (STANDING):  albuterol/ipratropium for Nebulization 3 milliLiter(s) Nebulizer every 4 hours  aspirin  chewable 81 milliGRAM(s) Oral daily  atorvastatin 20 milliGRAM(s) Oral at bedtime  chlorhexidine 2% Cloths 1 Application(s) Topical <User Schedule>  doxycycline IVPB      doxycycline IVPB 100 milliGRAM(s) IV Intermittent every 12 hours  dronedarone 400 milliGRAM(s) Oral two times a day  heparin   Injectable 5000 Unit(s) SubCutaneous every 8 hours  lactated ringers. 1000 milliLiter(s) (40 mL/Hr) IV Continuous <Continuous>  levothyroxine 112 MICROGram(s) Oral daily  melatonin 5 milliGRAM(s) Oral at bedtime  methylPREDNISolone sodium succinate Injectable 60 milliGRAM(s) IV Push every 12 hours  metoprolol succinate ER 25 milliGRAM(s) Oral daily  pantoprazole    Tablet 40 milliGRAM(s) Oral before breakfast    MEDICATIONS  (PRN):  acetaminophen     Tablet .. 650 milliGRAM(s) Oral every 6 hours PRN Temp greater or equal to 38C (100.4F), Mild Pain (1 - 3)  aluminum hydroxide/magnesium hydroxide/simethicone Suspension 30 milliLiter(s) Oral every 4 hours PRN Dyspepsia  guaifenesin/dextromethorphan Oral Liquid 10 milliLiter(s) Oral every 4 hours PRN Cough      X-Rays reviewed    CXR interpreted by me:

## 2023-03-08 NOTE — PROGRESS NOTE ADULT - ASSESSMENT
89 y/o female PMH of HTN, HLD, CHF, a fib not on AC, hypothyroidism, CKD (baseline Cr 1.5) and COPD on 4L NC came for sob and lethargy and vomited once in the EMS ,  admitted for acute hypercapnic respiratory failure due to COPD Exacerbation.    Acute on Chronic Hypoxic/Hypercapnic respiratory Failure (On Home O2 4L)  COPD Exacerbation  clinically improving, tolerating 4L NC, but refused KIMANI at night  NIV 4 on and 4 off today, c/w SOlumedrol  last dose of Doxycycline today  LE duplex negative for DVT  family hopeful to take patient home eventually, will need BIPAP  PT f/u    MAURI on CKD Stage 3 - improved, prerenal v BARRINGTON  Hyperkalemia  Urinary retention  holding lasix and IVF  renal function improved  encourage PO hydration  s/p renal eval    Chronic HFpEF  HTN/HLD  Elevated troponin likely demand ischemia  - Cont metoprolol ER 25mg daily   - Cont lipitor 20mg qHs  - Holding lasix for now  - Cont ASA 81mg qD    Chronic Afib  Not on AC due to recurrent falls  - on dronedarone and metoprolol   repeat EKG with improving  QTc     Hypothyroidism - Cont synthroid 112mcg qD, check TSH     DNR/DNI     Pending: taper steroids, improvement in respiratory status, all discussed with patient's family at bedside, all questions answered

## 2023-03-08 NOTE — PROGRESS NOTE ADULT - ASSESSMENT
Impression:    Acute on chronic hypercapnic resp failure improved refusing NIV   COPD exacerbation, improved  MAURI on CKD, worsening  Hyperkalemia-resolved  Severe COPD & Chronic Hypoxemic Respiratory Failure on 3-4L NC at home   Chronic afib not on AC due to recurrent falls  HfpEF      PLAN:    CNS: avoid sedatives, seoquel 50mg qhs. frequent reorientation    HEENT:  Oral care    PULMONARY:  HOB @ 45 degrees, aspiration precautions.  Solumedrol 60mg QD.  c/w home inhalers.  Duoneb q4h and PRN.  Encourage NIV use.  Supplemental O2 as tolerated.  Target POx 88 - 92%. NIV 4 hours on 4 hours off    CARDIOVASCULAR:  Avoid volume overload. monitor Qtc    GI: GI prophylaxis.  Feeding when off NIV.  Bowel regimen if needed.    RENAL:  FU lytes.  Correct as necessary.  Repeat lytes.  Low K diet.     INFECTIOUS DISEASE:  Doxycycline 5 days.   FU cultures.    HEMATOLOGICAL:  DVT prophylaxis. LE duplex negative    ENDOCRINE:  Follow up FS.  Insulin protocol if needed.    Palliative care eval    Very poor overall prognosis    Downgrade to Floor Impression:    Acute on chronic hypercapnic resp failure improved refusing NIV   COPD exacerbation, improved  MAURI on CKD, worsening  Hyperkalemia-resolved  Severe COPD & Chronic Hypoxemic Respiratory Failure on 3-4L NC at home   Chronic afib not on AC due to recurrent falls  HfpEF      PLAN:    CNS: avoid sedatives. Frequent reorientation    HEENT:  Oral care    PULMONARY:  HOB @ 45 degrees, aspiration precautions.  Solumedrol 60mg BID.  c/w home inhalers.  Duoneb q4h and PRN.  Encourage NIV use.  Supplemental O2 as tolerated.  Target POx 88 - 92%. NIV 4 hours on 4 hours off    CARDIOVASCULAR:  Avoid volume overload. monitor Qtc    GI: GI prophylaxis.  Feeding when off NIV.  Bowel regimen if needed.    RENAL:  FU lytes.  Correct as necessary.  Repeat lytes.  Low K diet.     INFECTIOUS DISEASE:  Doxycycline 5 days.   FU cultures.    HEMATOLOGICAL:  DVT prophylaxis. LE duplex negative    ENDOCRINE:  Follow up FS.  Insulin protocol if needed.    Palliative care eval    Very poor overall prognosis    Downgrade to Floor   Impression:    Acute on chronic hypercapnic resp failure improved refusing NIV   COPD exacerbation, improved  MAURI on CKD, worsening  Hyperkalemia-resolved  Severe COPD & Chronic Hypoxemic Respiratory Failure on 3-4L NC at home   Chronic afib not on AC due to recurrent falls  HfpEF  Thrombocytopenia       PLAN:    CNS: avoid sedatives. Frequent reorientation    HEENT:  Oral care    PULMONARY:  HOB @ 45 degrees, aspiration precautions.  Solumedrol 60mg BID.  c/w home inhalers.  Duoneb q4h and PRN.  Encourage NIV use.  Supplemental O2 as tolerated.  Target POx 88 - 92%.     CARDIOVASCULAR:  Avoid volume overload. monitor Qtc    GI: GI prophylaxis.  Feeding when off NIV.  Bowel regimen if needed.    RENAL:  FU lytes.  Correct as necessary.  Repeat lytes.  Low K diet.     INFECTIOUS DISEASE:  Doxycycline 5 days.   FU cultures.    HEMATOLOGICAL:  DVT prophylaxis. LE duplex negative.  Hold Heparin.  HIT and DIC panel     ENDOCRINE:  Follow up FS.  Insulin protocol if needed.    Palliative care eval    Very poor overall prognosis    Downgrade to Floor

## 2023-03-08 NOTE — PROGRESS NOTE ADULT - SUBJECTIVE AND OBJECTIVE BOX
FLORESPAMELA TANNA  89y Female    CHIEF COMPLAINT:    Patient is a 89y old  Female who presents with a chief complaint of Resp Failure (08 Mar 2023 13:44)    INTERVAL HPI/OVERNIGHT EVENTS:    Patient seen and examined. No acute events overnight. Mental status improving today per family     ROS: All other systems are negative.    Vital Signs:    T(F): 97.3 (23 @ 11:18), Max: 97.4 (23 @ 20:00)  HR: 88 (23 @ 13:00) (70 - 97)  BP: 133/67 (23 @ 11:18) (103/58 - 152/72)  RR: 26 (23 @ 13:00) (18 - 26)  SpO2: 96% (23 @ 13:00) (93% - 100%)    07 Mar 2023 07:01  -  08 Mar 2023 07:00  --------------------------------------------------------  IN: 360 mL / OUT: 2000 mL / NET: -1640 mL    Daily Weight in k.3 (08 Mar 2023 00:00)    POCT Blood Glucose.: 124 mg/dL (07 Mar 2023 16:51)    PHYSICAL EXAM:    GENERAL:  NAD, chronically ill appearing  SKIN: No rashes or lesions  HEENT: Atraumatic. Normocephalic.    NECK: Supple, No JVD.    PULMONARY: decreased breath sounds B/L. No wheezing   CVS: Normal S1, S2. Rate and Rhythm are regular.   ABDOMEN/GI: Soft, Nontender, Nondistended   MSK:  No clubbing or cyanosis   NEUROLOGIC: Moves all extremities   PSYCH: Awake, follows simple commands    Consultant(s) Notes Reviewed:  [x ] YES  [ ] NO  Care Discussed with Consultants/Other Providers [ x] YES  [ ] NO    LABS:                        9.7    3.63  )-----------( 135      ( 08 Mar 2023 02:01 )             32.3     146  |  105  |  80<HH>  ----------------------------<  93  5.1<H>   |  31  |  1.8<H>    Ca    8.7      08 Mar 2023 02:01  Phos  5.3     03-08  Mg     2.8     -08    TPro  5.7<L>  /  Alb  3.6  /  TBili  0.3  /  DBili  x   /  AST  37  /  ALT  29  /  AlkPhos  92  03-08    Serum Pro-Brain Natriuretic Peptide: 836 pg/mL (23 @ 00:44)    Trop --, CKMB --, , 23 @ 12:22    RADIOLOGY & ADDITIONAL TESTS:  Imaging or report Personally Reviewed:  [x] YES  [ ] NO  EKG reviewed: [x] YES  [ ] NO    Medications:  Standing  albuterol/ipratropium for Nebulization 3 milliLiter(s) Nebulizer every 4 hours  aspirin  chewable 81 milliGRAM(s) Oral daily  atorvastatin 20 milliGRAM(s) Oral at bedtime  chlorhexidine 2% Cloths 1 Application(s) Topical <User Schedule>  doxycycline IVPB      doxycycline IVPB 100 milliGRAM(s) IV Intermittent every 12 hours  dronedarone 400 milliGRAM(s) Oral two times a day  heparin   Injectable 5000 Unit(s) SubCutaneous every 8 hours  lactated ringers. 1000 milliLiter(s) IV Continuous <Continuous>  levothyroxine 112 MICROGram(s) Oral daily  melatonin 5 milliGRAM(s) Oral at bedtime  methylPREDNISolone sodium succinate Injectable 60 milliGRAM(s) IV Push every 12 hours  metoprolol succinate ER 25 milliGRAM(s) Oral daily  pantoprazole    Tablet 40 milliGRAM(s) Oral before breakfast    PRN Meds  acetaminophen     Tablet .. 650 milliGRAM(s) Oral every 6 hours PRN  aluminum hydroxide/magnesium hydroxide/simethicone Suspension 30 milliLiter(s) Oral every 4 hours PRN  guaifenesin/dextromethorphan Oral Liquid 10 milliLiter(s) Oral every 4 hours PRN

## 2023-03-08 NOTE — PROGRESS NOTE ADULT - ASSESSMENT
89 y/o female PMH of HTN, HLD, CHF, a fib not on AC, hypothyroidism, CKD (baseline Cr 1.5) and COPD on 4L NC admitted for acute hypercapnic respiratory failure due to COPD Exacerbation. Hospital stay complicated by MAURI on CKD 3b.    Assessment and plan  MAURI on CKD 3b/ COPD exacerbation/ HF/ Afib  MAURI likely BARRINGTON  creatinine improved post admission then worsened 48 hrs post contrast study  creatinine trending down now  no hydro on CT or US   UA no hematuria   No need for IVF if patient tolerating po diet  strict i/os  adjust medications to eGFR  avoid nephrotoxic drugs  will sign off/ recall prn

## 2023-03-08 NOTE — PROGRESS NOTE ADULT - SUBJECTIVE AND OBJECTIVE BOX
SUBJECTIVE:    Patient is a 89y old Female who presents with a chief complaint of Resp Failure (08 Mar 2023 09:14)    Overnight Events: Patient is stable, no acute events overnight.  VS within acceptable range, on oxygen.  Good UO.    PAST MEDICAL & SURGICAL HISTORY  COPD (chronic obstructive pulmonary disease)    Hypothyroid    HTN (hypertension)    High cholesterol    Colitis    CKD (chronic kidney disease)    No significant past surgical history      SOCIAL HISTORY:  Negative for smoking/alcohol/drug use.     ALLERGIES:  No Known Allergies    MEDICATIONS:  STANDING MEDICATIONS  albuterol/ipratropium for Nebulization 3 milliLiter(s) Nebulizer every 4 hours  aspirin  chewable 81 milliGRAM(s) Oral daily  atorvastatin 20 milliGRAM(s) Oral at bedtime  chlorhexidine 2% Cloths 1 Application(s) Topical <User Schedule>  doxycycline IVPB      doxycycline IVPB 100 milliGRAM(s) IV Intermittent every 12 hours  dronedarone 400 milliGRAM(s) Oral two times a day  heparin   Injectable 5000 Unit(s) SubCutaneous every 8 hours  lactated ringers. 1000 milliLiter(s) IV Continuous <Continuous>  levothyroxine 112 MICROGram(s) Oral daily  melatonin 5 milliGRAM(s) Oral at bedtime  methylPREDNISolone sodium succinate Injectable 60 milliGRAM(s) IV Push every 12 hours  metoprolol succinate ER 25 milliGRAM(s) Oral daily  pantoprazole    Tablet 40 milliGRAM(s) Oral before breakfast    PRN MEDICATIONS  acetaminophen     Tablet .. 650 milliGRAM(s) Oral every 6 hours PRN  aluminum hydroxide/magnesium hydroxide/simethicone Suspension 30 milliLiter(s) Oral every 4 hours PRN  guaifenesin/dextromethorphan Oral Liquid 10 milliLiter(s) Oral every 4 hours PRN    VITALS:   T(F): 97, Max: 97.4 (23 @ 20:00)  HR: 86 (70 - 97)  BP: 152/72 (103/58 - 152/72)  RR: 18 (18 - 22)  SpO2: 93% (90% - 100%)    LABS:                        9.7    3.63  )-----------( 135      ( 08 Mar 2023 02:01 )             32.3     03-08    146  |  105  |  80<HH>  ----------------------------<  93  5.1<H>   |  31  |  1.8<H>    Ca    8.7      08 Mar 2023 02:01  Phos  5.3     03-08  Mg     2.8     03-08    TPro  5.7<L>  /  Alb  3.6  /  TBili  0.3  /  DBili  x   /  AST  37  /  ALT  29  /  AlkPhos  92  03-08      Urinalysis Basic - ( 07 Mar 2023 12:10 )    Color: Light Yellow / Appearance: Clear / S.017 / pH: x  Gluc: x / Ketone: Negative  / Bili: Negative / Urobili: <2 mg/dL   Blood: x / Protein: 30 mg/dL / Nitrite: Negative   Leuk Esterase: Negative / RBC: 5 /HPF / WBC 4 /HPF   Sq Epi: x / Non Sq Epi: 3 /HPF / Bacteria: Negative      ABG - ( 07 Mar 2023 13:57 )  pH, Arterial: 7.34  pH, Blood: x     /  pCO2: 61    /  pO2: 77    / HCO3: 33    / Base Excess: 5.8   /  SaO2: 96.0                  CARDIAC MARKERS ( 06 Mar 2023 12:22 )  x     / x     / 213 U/L / x     / x              23 @ 07:01  -  23 @ 07:00  --------------------------------------------------------  IN: 360 mL / OUT: 2000 mL / NET: -1640 mL      PHYSICAL EXAM:  GEN: NAD, comfortable  LUNGS: CTAB, no w/r/r  HEART: RRR, s1 and s2 appreciated, no m/r/g  ABD: soft, NT/ND, +BS  EXT: no edema, PP b/l  NEURO: AAOX3 SUBJECTIVE:    Patient is a 89y old Female who presents with a chief complaint of Resp Failure (08 Mar 2023 09:14)    Overnight Events: Patient is stable, no acute events overnight.  VS within acceptable range, on oxygen.  Good UO.    PAST MEDICAL & SURGICAL HISTORY    COPD (chronic obstructive pulmonary disease)  Hypothyroid  HTN (hypertension)  High cholesterol  Colitis  CKD (chronic kidney disease)  No significant past surgical history      SOCIAL HISTORY:  Negative for smoking/alcohol/drug use.     ALLERGIES:  No Known Allergies    MEDICATIONS:  STANDING MEDICATIONS  albuterol/ipratropium for Nebulization 3 milliLiter(s) Nebulizer every 4 hours  aspirin  chewable 81 milliGRAM(s) Oral daily  atorvastatin 20 milliGRAM(s) Oral at bedtime  doxycycline IVPB 100 milliGRAM(s) IV Intermittent every 12 hours  dronedarone 400 milliGRAM(s) Oral two times a day  heparin   Injectable 5000 Unit(s) SubCutaneous every 8 hours  lactated ringers. 1000 milliLiter(s) IV Continuous <Continuous>  levothyroxine 112 MICROGram(s) Oral daily  melatonin 5 milliGRAM(s) Oral at bedtime  methylPREDNISolone sodium succinate Injectable 60 milliGRAM(s) IV Push every 12 hours  metoprolol succinate ER 25 milliGRAM(s) Oral daily  pantoprazole    Tablet 40 milliGRAM(s) Oral before breakfast    PRN MEDICATIONS  acetaminophen     Tablet .. 650 milliGRAM(s) Oral every 6 hours PRN  aluminum hydroxide/magnesium hydroxide/simethicone Suspension 30 milliLiter(s) Oral every 4 hours PRN  guaifenesin/dextromethorphan Oral Liquid 10 milliLiter(s) Oral every 4 hours PRN    VITALS:   T(F): 97, Max: 97.4 (23 @ 20:00)  HR: 86 (70 - 97)  BP: 152/72 (103/58 - 152/72)  RR: 18 (18 - 22)  SpO2: 93% (90% - 100%)    LABS:                        9.7    3.63  )-----------( 135      ( 08 Mar 2023 02:01 )             32.3     03-08    146  |  105  |  80<HH>  ----------------------------<  93  5.1<H>   |  31  |  1.8<H>    Ca    8.7      08 Mar 2023 02:01  Phos  5.3     03-08  Mg     2.8     03-08    TPro  5.7<L>  /  Alb  3.6  /  TBili  0.3  /  DBili  x   /  AST  37  /  ALT  29  /  AlkPhos  92  03-08      Urinalysis Basic - ( 07 Mar 2023 12:10 )    Color: Light Yellow / Appearance: Clear / S.017 / pH: x  Gluc: x / Ketone: Negative  / Bili: Negative / Urobili: <2 mg/dL   Blood: x / Protein: 30 mg/dL / Nitrite: Negative   Leuk Esterase: Negative / RBC: 5 /HPF / WBC 4 /HPF   Sq Epi: x / Non Sq Epi: 3 /HPF / Bacteria: Negative      ABG - ( 07 Mar 2023 13:57 )  pH, Arterial: 7.34  pH, Blood: x     /  pCO2: 61    /  pO2: 77    / HCO3: 33    / Base Excess: 5.8   /  SaO2: 96.0                  CARDIAC MARKERS ( 06 Mar 2023 12:22 )  x     / x     / 213 U/L / x     / x              23 @ 07:01  -  -- @ 07:00  --------------------------------------------------------  IN: 360 mL / OUT: 2000 mL / NET: -1640 mL      PHYSICAL EXAM:  GEN: NAD, comfortable  LUNGS: CTAB, no w/r/r  HEART: RRR, s1 and s2 appreciated, no m/r/g  ABD: soft, NT/ND, +BS  EXT: no edema, PP b/l  NEURO: AAOX3

## 2023-03-08 NOTE — PROGRESS NOTE ADULT - ASSESSMENT
89 y/o female PMH of HTN, HLD, CHF, a fib not on AC, hypothyroidism, CKD (baseline Cr 1.5) and COPD on 4L NC admitted for acute hypercapnic respiratory failure due to COPD Exacerbation.    #Agitation  - Became agitated last night  - Seroquel 25 mg once  - Monitor QTc, was prolonged on several EKG, most recent < 500mg     #Acute on Chronic Hypoxic/Hypercapnic respiratory Failure (On Home O2 4L)  #COPD Exacerbation  - Work of Breathing improved today, placed on BiPAP 4 hours on 4 hours off   - Pulmonary team on board. Pt is a chronic CO2 retainer  - CXR showing no acute cardiopulmonary disease  - On 3L NC saturating 90s  - Cont duonebs q4h ATC if off AVAPs  - Cont IV solumedrol 60 BID  - Cont IV doxycycline 100 BID day 3/5   - procalcitonin 0.39  - RVP negative  - LE Duplex: No evidence of deep venous thrombosis in either lower extremity  - BCx: Coagulase negative Staphylococcus ( probable contaminated) will monitor off ATbx given no fever no leukocytosis )    #MAURI on CKD Stage 3  - Likely pre-renal  - Creatine 1.8<--2.3 <--2.2 <--1.8<--, 1.7<--, 1.7<--, 2.3  - KBUS: Limited portable exam. No definite hydronephrosis similar to CT findings of the same day. Bilateral renal cysts. Patient unable to void therefore postvoid residual not obtained.  - Hold lasix for now  - CT abd/pelv: No evidence of acute abdominal or pelvic pathology.  - Was retaining 600 ml on Bladder scan today   - As per nephro keep monitoring off fluids, its normal for BARRINGTON to worsenfor the first 5-7 days before improvement    #Chronic HFpEF  #HTN/HLD  #Elevated troponins, likely demand ischemia  - Cont metoprolol ER 25mg qD  - Cont lipitor 20mg qHs  - Holding lasix for now  - Cont ASA 81mg qD  - Troponin now stable 0.01     #Chronic Afib  - Not on AC due to recurrent falls  - BB as above  - Cont multaq 400 BID    #Hypothyroidism  - Cont synthroid 112mcg qD    #DVT PPX: heparin  #DIET: DASH   #GI prophylaxis: pantoprazole    89 y/o female PMH of HTN, HLD, CHF, a fib not on AC, hypothyroidism, CKD (baseline Cr 1.5) and COPD on 4L NC admitted for acute hypercapnic respiratory failure due to COPD Exacerbation.    #Agitation  - Became agitated last night  - Seroquel 25 mg once  - Monitor QTc, was prolonged on several EKG, most recent < 500mg     #Acute on Chronic Hypoxic/Hypercapnic respiratory Failure (On Home O2 4L)  #COPD Exacerbation  - Work of Breathing improved today, placed on BiPAP 4 hours on 4 hours off   - Pulmonary team on board. Pt is a chronic CO2 retainer  - CXR showing no acute cardiopulmonary disease  - On 3L NC saturating 90s  - Cont duonebs q4h ATC if off AVAPs  - Cont IV solumedrol 60 BID  - Cont IV doxycycline 100 BID day 3/5   - procalcitonin 0.39  - RVP negative  - LE Duplex: No evidence of deep venous thrombosis in either lower extremity  - BCx: Coagulase negative Staphylococcus, probably contaminated given negative second bottle and patient recieved doxycyline. will monitor off ATbx given no fever no leukocytosis     #MAURI on CKD Stage 3  - Likely pre-renal  - Creatine 1.8<--2.3 <--2.2 <--1.8<--, 1.7<--, 1.7<--, 2.3  - KBUS: Limited portable exam. No definite hydronephrosis similar to CT findings of the same day. Bilateral renal cysts. Patient unable to void therefore postvoid residual not obtained.  - Hold lasix for now  - CT abd/pelv: No evidence of acute abdominal or pelvic pathology.  - Was retaining 600 ml on Bladder scan today   - As per nephro keep an resume fluids if patient not eating    #Chronic HFpEF  #HTN/HLD  #Elevated troponins, likely demand ischemia  - Cont metoprolol ER 25mg qD  - Cont lipitor 20mg qHs  - Holding lasix for now  - Cont ASA 81mg qD  - Troponin now stable 0.01     #Chronic Afib  - Not on AC due to recurrent falls  - BB as above  - Cont multaq 400 BID    #Hypothyroidism  - Cont synthroid 112mcg qD    #DVT PPX: heparin  #DIET: DASH   #GI prophylaxis: pantoprazole

## 2023-03-08 NOTE — PROGRESS NOTE ADULT - SUBJECTIVE AND OBJECTIVE BOX
TANNA MCCRACKEN 89y Female  MRN#: [id number]   Hospital Day: 4d    HPI:  Pt is a 89 y/o female     PMH of HTN, HLD, CHF, a fib not on AC, hyopthyroidism, CKD (baseline Cr 1.7) and COPD on 4L NC    Presenting for SOB.     As per daughter, pt was wheezing at home in the evening, looked SOB and looked like she was holding her breath, became lethargic so they called EMS. Daughter later noticed she was off of her oxygen for some unknown amount of time. Patient also vomited once with EMS. No fever, cough, congestion or abdominal pain.    In the ED :  BP: 174/78  HR : 79  RR : 20  T : 97  O2: 94% on 6L NC -> later on AVAPS    Hb 9.1 (baseline 11)  Cr 2.3 (baseline 1.3)  New transaminitis : AST/ALT /Alk phos : 60/45/139  Trop :0.03  BNP:  830 (baseline 500)  VBG: pH 7.24 , CO2: 88 , HCO3 : 30 -> placed on AVAPS -> pH 7.33 , CO2: 72 , HCO3 : 38    CT abdomen/pelvis : for vomiting : No evidence of acute abdominal or pelvic pathology.  CT Head : No evidence of intracranial hemorrhage, territorial infarct, or mass effect.  ECG : Normal sinus rhythm     (04 Mar 2023 11:52)    SUBJECTIVE  Patient is a 89y old Female who presents with a chief complaint of Resp Failure (08 Mar 2023 11:37)  Currently admitted to medicine with the primary diagnosis of COPD exacerbation    INTERVAL HPI AND OVERNIGHT EVENTS:  Patient was examined and seen at bedside.    VITAL SIGNS: Last 24 Hours  T(C): 36.3 (08 Mar 2023 11:18), Max: 36.3 (07 Mar 2023 16:22)  T(F): 97.3 (08 Mar 2023 11:18), Max: 97.4 (07 Mar 2023 20:00)  HR: 88 (08 Mar 2023 13:00) (70 - 97)  BP: 133/67 (08 Mar 2023 11:18) (103/58 - 152/72)  BP(mean): 94 (08 Mar 2023 11:18) (72 - 103)  RR: 26 (08 Mar 2023 13:00) (18 - 26)  SpO2: 96% (08 Mar 2023 13:00) (93% - 100%)    LABS:                        9.7    3.63  )-----------( 135      ( 08 Mar 2023 02:01 )             32.3     03-08    146  |  105  |  80<HH>  ----------------------------<  93  5.1<H>   |  31  |  1.8<H>    Ca    8.7      08 Mar 2023 02:01  Phos  5.3     03-08  Mg     2.8     03-08    TPro  5.7<L>  /  Alb  3.6  /  TBili  0.3  /  DBili  x   /  AST  37  /  ALT  29  /  AlkPhos  92  03-08      Urinalysis Basic - ( 07 Mar 2023 12:10 )    Color: Light Yellow / Appearance: Clear / S.017 / pH: x  Gluc: x / Ketone: Negative  / Bili: Negative / Urobili: <2 mg/dL   Blood: x / Protein: 30 mg/dL / Nitrite: Negative   Leuk Esterase: Negative / RBC: 5 /HPF / WBC 4 /HPF   Sq Epi: x / Non Sq Epi: 3 /HPF / Bacteria: Negative      ABG - ( 07 Mar 2023 13:57 )  pH, Arterial: 7.34  pH, Blood: x     /  pCO2: 61    /  pO2: 77    / HCO3: 33    / Base Excess: 5.8   /  SaO2: 96.0      PHYSICAL EXAM:    General: No acute distress; Pallor (-), Icterus (-), Cyanosis (-), Clubbing (-)  HEENT: Normocephalic, atraumatic, PERRLA, EOMI  PULM: Bilaterally equal and clear breath sounds, wheeze (-), rubs (-), crackles (-)  CVS: Normal S1 and S2, murmurs (-), rubs (-), gallops (-)   GI: Soft, nondistended, nontender, BS +  MSK: Edema (-), no muscle, bone or joint tenderness noted  SKIN: Warm and well perfused, no rashes noted  NEURO:  Alert and Oriented x 3; No gross focal neurological deficit noted

## 2023-03-09 NOTE — CONSULT NOTE ADULT - ASSESSMENT
89 y/o female PMH of HTN, HLD, CHF, a fib not on AC, hypothyroidism, CKD (baseline Cr 1.5) and COPD on 4L NC admitted for acute hypercapnic respiratory failure due to COPD Exacerbation. Hospital stay complicated by MAURI on CKD 3b.    Assessment and plan  MAURI on CKD 3b/ COPD exacerbation/ HF/ Afib  MAURI likely BARR[place]  creatinine improved post admission then worsened 48 hrs post contrast study  no hydro on CT or US  check UA and sport urine for Na and creat  No need for IVF if patient tolerating po diet  strict i/os  adjust medications to eGFR  avoid nephrotoxic drugs  will follow 
Impression:    Acute on chornic hypercapnic resp failure on AVAPS  COPD exacerbation  Acute on chornic renal failure  Severe COPD on 3-4L NC at home   Chronic afib not on AC due to recurrent falls  Hx of HfpEF    PLAN:    CNS: Avoid CNS depressant    HEENT:  Oral care    PULMONARY:  HOB @ 45 degrees, aspiration precaution, AVAPS, repeat ABG, keep Sao2 88 to 92%m solumedrol 60 q 12    CARDIOVASCULAR: Gentle hydration, CE    GI: GI prophylaxis                                          Feeding  NPO    RENAL:  F/u  lytes.  Correct as needed. accurate I/O    INFECTIOUS DISEASE: doxy, proca, pancx    HEMATOLOGICAL:  DVT prophylaxis. LE doppler    ENDOCRINE:  Follow up FS.  Insulin protocol if needed.    sdu    palliative care eval        
IMPRESSION: Rehab of deconditioning / COPD exacerbation, on home 4L O2  / HTN, HLD, a fib, hypothyroidism, CKD    PRECAUTIONS: [   ] Cardiac  [   ] Respiratory  [   ] Seizures [   ] Contact Isolation  [   ] Droplet Isolation  [   ] Other    Weight Bearing Status:     RECOMMENDATION:     Out of Bed to Chair     DVT/Decubiti Prophylaxis    REHAB PLAN:     [   x ] Bedside P/T 3-5 times a week   [    ]   Bedside O/T  2-3 times a week             [    ] Speech Therapy               [    ]  No Rehab Therapy Indicated   Conditioning/ROM                                    ADL  Bed Mobility                                               Conditioning/ROM  Transfers                                                     Bed Mobility  Sitting /Standing Balance                         Transfers                                        Gait Training                                               Sitting/Standing Balance  Stair Training [   ]Applicable                    Home equipment Eval                                                                        Splinting  [   ] Only      GOALS:   ADL   [    ]   Independent                    Transfers  [    ] Independent                          Ambulation  [    ] Independent     [   x  ] With device                            [    ]  CG                                                         [  x  ]  CG                                                                  [  x  ] CG                            [    ] Min A                                                   [    ] Min A                                                              [    ] Min  A          DISCHARGE PLAN:   [    ]  Good candidate for Intensive Rehabilitation/Hospital based                                             Will tolerate 3hrs Intensive Rehab Daily                                       [   x  ]  Short Term Rehab in Skilled Nursing Facility                           vs            [    x ]  Home with Outpatient or  services                                         [     ]  Possible Candidate for Intensive Hospital based Rehab

## 2023-03-09 NOTE — PROGRESS NOTE ADULT - ASSESSMENT
89 y/o female PMH of HTN, HLD, CHF, a fib not on AC, hypothyroidism, CKD (baseline Cr 1.5) and COPD on 4L NC came for sob and lethargy and vomited once in the EMS ,  admitted for acute hypercapnic respiratory failure due to COPD Exacerbation.    PLAN    #Acute on Chronic Hypoxic/Hypercapnic respiratory Failure (On Home O2 4L)  #COPD Exacerbation  - clinically improving, tolerating 4L NC, but refused KIMANI at night  - NIV at bedtime  - Received Solumedrol today will switch to Prednisone PO tomorrow   - finished the 5 day course of Doxycycline on 3/8/23   - LE duplex negative for DVT  - PT f/u    #MAURI on CKD Stage 3 - improved, prerenal v BARRINGTON  #Hyperkalemia  #Urinary retention  - holding lasix and IVF  - renal function improved  - encourage PO hydration  - s/p renal eval  - TOV today 3/9     #Chronic HFpEF  #HTN/HLD  #Elevated troponin likely demand ischemia  - Cont metoprolol ER 25mg daily   - Cont lipitor 20mg qHs  - Holding lasix for now  - Cont ASA 81mg qD    #Chronic Afib  #Not on AC due to recurrent falls  - on dronedarone and metoprolol   - repeat EKG with improving  QTc     #Hypothyroidism   - Cont synthroid 112mcg qD, check TSH    #DVT PPX: None  #GI Ppx: PPI   #Activity:  IAT  #Diet: DASH/TLC  #Dispo: TBD  #Pending: TOV, improvement in respiratory status          no

## 2023-03-09 NOTE — PROGRESS NOTE ADULT - SUBJECTIVE AND OBJECTIVE BOX
TANNA MCCRACKEN 89y Female  MRN#: [id number]   CODE STATUS:__DNR/DNI____    Hospital Day: 5d    Patient is currently admitted with the primary diagnosis of acute on chronic hypercapnic respiratory failure.    SUBJECTIVE:    Patient seen and examined at bedside this am. Patient is stable and has no other issues. She was confused this am; however, was reorientated.      OBJECTIVE:  PAST MEDICAL & SURGICAL HISTORY:  COPD (chronic obstructive pulmonary disease)    Hypothyroid    HTN (hypertension)    High cholesterol    Colitis    CKD (chronic kidney disease)    No significant past surgical history    ALLERGIES:  No Known Allergies                                         HOME MEDICATIONS:  Home Medications:  albuterol 2.5 mg/3 mL (0.083%) inhalation solution: 3 milliliter(s) inhaled every 6 hours, As Needed for wheezing, shortness of breath (15 Aug 2022 23:26)  aspirin 81 mg oral tablet: 1 tab(s) orally once a day (02 Oct 2022 19:42)  ATORVASTATIN 20MG TABLETS: each orally once a day (06 Oct 2022 13:41)  Lasix 20 mg oral tablet: 1 tab(s) orally once a day (06 Oct 2022 13:52)  melatonin 5 mg oral tablet: 1 tab(s) orally once a day (at bedtime), As Needed, to help you sleep at night (15 Aug 2022 23:26)  Multaq 400 mg oral tablet: 1 tab(s) orally 2 times a day (02 Oct 2022 19:42)  TRELEGY ELLIPTA 100-62.5MCG INH 30P: INHALE 1 PUFF BY MOUTH DAILY (15 Aug 2022 23:26)                           MEDICATIONS:  STANDING MEDICATIONS  albuterol/ipratropium for Nebulization 3 milliLiter(s) Nebulizer every 4 hours  aspirin  chewable 81 milliGRAM(s) Oral daily  atorvastatin 20 milliGRAM(s) Oral at bedtime  chlorhexidine 2% Cloths 1 Application(s) Topical <User Schedule>  dronedarone 400 milliGRAM(s) Oral two times a day  levothyroxine 112 MICROGram(s) Oral daily  melatonin 5 milliGRAM(s) Oral at bedtime  metoprolol succinate ER 25 milliGRAM(s) Oral daily  pantoprazole    Tablet 40 milliGRAM(s) Oral before breakfast  tamsulosin 0.4 milliGRAM(s) Oral at bedtime    PRN MEDICATIONS  acetaminophen     Tablet .. 650 milliGRAM(s) Oral every 6 hours PRN  aluminum hydroxide/magnesium hydroxide/simethicone Suspension 30 milliLiter(s) Oral every 4 hours PRN  guaifenesin/dextromethorphan Oral Liquid 10 milliLiter(s) Oral every 4 hours PRN                                    VITAL SIGNS: Last 24 Hours  T(C): 36.7 (09 Mar 2023 05:00), Max: 36.8 (08 Mar 2023 23:23)  T(F): 98.1 (09 Mar 2023 05:00), Max: 98.2 (08 Mar 2023 23:23)  HR: 95 (09 Mar 2023 05:00) (88 - 98)  BP: 163/76 (09 Mar 2023 05:00) (133/67 - 163/76)  BP(mean): 94 (08 Mar 2023 11:18) (94 - 94)  RR: 20 (09 Mar 2023 05:00) (20 - 26)  SpO2: 94% (09 Mar 2023 08:27) (90% - 97%)      23 @ 07:01  -  23 @ 07:00  --------------------------------------------------------  IN: 0 mL / OUT: 350 mL / NET: -350 mL    LABS:                        9.5    6.71  )-----------( 159      ( 09 Mar 2023 03:11 )             30.3     03-09    139  |  99  |  82<HH>  ----------------------------<  120<H>  4.7   |  30  |  2.0<H>    Ca    8.6      09 Mar 2023 03:11  Phos  5.3     03-08  Mg     2.8     03-09    TPro  6.0  /  Alb  3.8  /  TBili  0.3  /  DBili  x   /  AST  31  /  ALT  29  /  AlkPhos  89  03-09    Urinalysis Basic - ( 07 Mar 2023 12:10 )    Color: Light Yellow / Appearance: Clear / S.017 / pH: x  Gluc: x / Ketone: Negative  / Bili: Negative / Urobili: <2 mg/dL   Blood: x / Protein: 30 mg/dL / Nitrite: Negative   Leuk Esterase: Negative / RBC: 5 /HPF / WBC 4 /HPF   Sq Epi: x / Non Sq Epi: 3 /HPF / Bacteria: Negative    ABG - ( 07 Mar 2023 13:57 )  pH, Arterial: 7.34  pH, Blood: x     /  pCO2: 61    /  pO2: 77    / HCO3: 33    / Base Excess: 5.8   /  SaO2: 96.0      Culture - Urine (collected 07 Mar 2023 12:10)  Source: Catheterized Catheterized  Final Report (08 Mar 2023 23:00):    <10,000 CFU/mL Normal Urogenital Consuelo    RADIOLOGY:    < from: Xray Chest 1 View-PORTABLE IMMEDIATE (Xray Chest 1 View-PORTABLE IMMEDIATE .) (23 @ 17:11) >  Impression:    Bilateral interstitial opacifications.    Without difference.    --- End of Report ---      < end of copied text >      PHYSICAL EXAM:  General: Resting comfortably in no acute painful distress  HEENT: Normocephalic, atraumatic  LUNGS: mild wheezing bilateral lungs   HEART: S1S2 present, regular rate and rhythm, no murmurs, rubs, gallops  ABDOMEN: Soft, nontender, nondistended  EXT: No edema     TANNA MCCRACKEN 89y Female  MRN#: [id number]   CODE STATUS:__DNR/DNI____    Hospital Day: 5d    Patient is currently admitted with the primary diagnosis of acute on chronic hypercapnic respiratory failure.    SUBJECTIVE:    Patient seen and examined at bedside this am. Patient is stable and has no other issues. She was confused this am; however, was reorientated.      OBJECTIVE:  PAST MEDICAL & SURGICAL HISTORY:  COPD (chronic obstructive pulmonary disease)    Hypothyroid    HTN (hypertension)    High cholesterol    Colitis    CKD (chronic kidney disease)    No significant past surgical history    ALLERGIES:  No Known Allergies                                    HOME MEDICATIONS:  Home Medications:  albuterol 2.5 mg/3 mL (0.083%) inhalation solution: 3 milliliter(s) inhaled every 6 hours, As Needed for wheezing, shortness of breath (15 Aug 2022 23:26)  aspirin 81 mg oral tablet: 1 tab(s) orally once a day (02 Oct 2022 19:42)  ATORVASTATIN 20MG TABLETS: each orally once a day (06 Oct 2022 13:41)  Lasix 20 mg oral tablet: 1 tab(s) orally once a day (06 Oct 2022 13:52)  melatonin 5 mg oral tablet: 1 tab(s) orally once a day (at bedtime), As Needed, to help you sleep at night (15 Aug 2022 23:26)  Multaq 400 mg oral tablet: 1 tab(s) orally 2 times a day (02 Oct 2022 19:42)  TRELEGY ELLIPTA 100-62.5MCG INH 30P: INHALE 1 PUFF BY MOUTH DAILY (15 Aug 2022 23:26)                           MEDICATIONS:  STANDING MEDICATIONS  albuterol/ipratropium for Nebulization 3 milliLiter(s) Nebulizer every 4 hours  aspirin  chewable 81 milliGRAM(s) Oral daily  atorvastatin 20 milliGRAM(s) Oral at bedtime  chlorhexidine 2% Cloths 1 Application(s) Topical <User Schedule>  dronedarone 400 milliGRAM(s) Oral two times a day  levothyroxine 112 MICROGram(s) Oral daily  melatonin 5 milliGRAM(s) Oral at bedtime  metoprolol succinate ER 25 milliGRAM(s) Oral daily  pantoprazole    Tablet 40 milliGRAM(s) Oral before breakfast  tamsulosin 0.4 milliGRAM(s) Oral at bedtime    PRN MEDICATIONS  acetaminophen     Tablet .. 650 milliGRAM(s) Oral every 6 hours PRN  aluminum hydroxide/magnesium hydroxide/simethicone Suspension 30 milliLiter(s) Oral every 4 hours PRN  guaifenesin/dextromethorphan Oral Liquid 10 milliLiter(s) Oral every 4 hours PRN                                    VITAL SIGNS: Last 24 Hours  T(C): 36.7 (09 Mar 2023 05:00), Max: 36.8 (08 Mar 2023 23:23)  T(F): 98.1 (09 Mar 2023 05:00), Max: 98.2 (08 Mar 2023 23:23)  HR: 95 (09 Mar 2023 05:00) (88 - 98)  BP: 163/76 (09 Mar 2023 05:00) (133/67 - 163/76)  BP(mean): 94 (08 Mar 2023 11:18) (94 - 94)  RR: 20 (09 Mar 2023 05:00) (20 - 26)  SpO2: 94% (09 Mar 2023 08:27) (90% - 97%)      23 @ 07:01  -  23 @ 07:00  --------------------------------------------------------  IN: 0 mL / OUT: 350 mL / NET: -350 mL    LABS:                        9.5    6.71  )-----------( 159      ( 09 Mar 2023 03:11 )             30.3     03-09    139  |  99  |  82<HH>  ----------------------------<  120<H>  4.7   |  30  |  2.0<H>    Ca    8.6      09 Mar 2023 03:11  Phos  5.3     03-08  Mg     2.8     03-09    TPro  6.0  /  Alb  3.8  /  TBili  0.3  /  DBili  x   /  AST  31  /  ALT  29  /  AlkPhos  89  03-09    Urinalysis Basic - ( 07 Mar 2023 12:10 )    Color: Light Yellow / Appearance: Clear / S.017 / pH: x  Gluc: x / Ketone: Negative  / Bili: Negative / Urobili: <2 mg/dL   Blood: x / Protein: 30 mg/dL / Nitrite: Negative   Leuk Esterase: Negative / RBC: 5 /HPF / WBC 4 /HPF   Sq Epi: x / Non Sq Epi: 3 /HPF / Bacteria: Negative    ABG - ( 07 Mar 2023 13:57 )  pH, Arterial: 7.34  pH, Blood: x     /  pCO2: 61    /  pO2: 77    / HCO3: 33    / Base Excess: 5.8   /  SaO2: 96.0      Culture - Urine (collected 07 Mar 2023 12:10)  Source: Catheterized Catheterized  Final Report (08 Mar 2023 23:00):    <10,000 CFU/mL Normal Urogenital Consuelo    RADIOLOGY:    < from: Xray Chest 1 View-PORTABLE IMMEDIATE (Xray Chest 1 View-PORTABLE IMMEDIATE .) (23 @ 17:11) >  Impression:    Bilateral interstitial opacifications.    Without difference.    --- End of Report ---      < end of copied text >      PHYSICAL EXAM:  General: Resting comfortably in no acute painful distress  HEENT: Normocephalic, atraumatic  LUNGS: mild wheezing bilateral lungs   HEART: S1S2 present, regular rate and rhythm, no murmurs, rubs, gallops  ABDOMEN: Soft, nontender, nondistended  EXT: No edema

## 2023-03-09 NOTE — CONSULT NOTE ADULT - SUBJECTIVE AND OBJECTIVE BOX
HPI:  Pt is a 89 y/o female     PMH of HTN, HLD, CHF, a fib not on AC, hyopthyroidism, CKD (baseline Cr 1.7) and COPD on 4L NC    Presenting for SOB.     As per daughter, pt was wheezing at home in the evening, looked SOB and looked like she was holding her breath, became lethargic so they called EMS. Daughter later noticed she was off of her oxygen for some unknown amount of time. Patient also vomited once with EMS. No fever, cough, congestion or abdominal pain.    In the ED :  BP: 174/78  HR : 79  RR : 20  T : 97  O2: 94% on 6L NC -> later on AVAPS    Hb 9.1 (baseline 11)  Cr 2.3 (baseline 1.3)  New transaminitis : AST/ALT /Alk phos : 60/45/139  Trop :0.03  BNP:  830 (baseline 500)  VBG: pH 7.24 , CO2: 88 , HCO3 : 30 -> placed on AVAPS -> pH 7.33 , CO2: 72 , HCO3 : 38    CT abdomen/pelvis : for vomiting : No evidence of acute abdominal or pelvic pathology.  CT Head : No evidence of intracranial hemorrhage, territorial infarct, or mass effect.  ECG : Normal sinus rhythm    < from: Xray Chest 1 View-PORTABLE IMMEDIATE (Xray Chest 1 View-PORTABLE IMMEDIATE .) (03.07.23 @ 17:11) >  Impression:    Bilateral interstitial opacifications.    Without difference.    --- End of Report ---    #Acute on Chronic Hypoxic/Hypercapnic respiratory Failure (On Home O2 4L)  #COPD Exacerbation  - clinically improving, tolerating 4L NC, but refused KIMANI at night  - NIV at bedtime  - Received Solumedrol today will switch to Prednisone PO tomorrow   - finished the 5 day course of Doxycycline on 3/8/23   - LE duplex negative for DVT  - PT f/u    #MAURI on CKD Stage 3 - improved, prerenal v BARRINGTON  #Hyperkalemia  #Urinary retention  - holding lasix and IVF  - renal function improved  - encourage PO hydration  - s/p renal eval  - TOV today 3/9     #Chronic HFpEF  #HTN/HLD  #Elevated troponin likely demand ischemia  - Cont metoprolol ER 25mg daily   - Cont lipitor 20mg qHs  - Holding lasix for now  - Cont ASA 81mg qD    Medical charts / labs / imaging studies / PT note reviewed       PAST MEDICAL & SURGICAL HISTORY:  COPD (chronic obstructive pulmonary disease)      Hypothyroid      HTN (hypertension)      High cholesterol      Colitis      CKD (chronic kidney disease)      No significant past surgical history          Hospital Course:    TODAY'S SUBJECTIVE & REVIEW OF SYMPTOMS:     Constitutional WNL   Cardio WNL   Resp sob    GI WNL  Heme WNL  Endo WNL  Skin WNL  MSK WNL  Neuro WNL  Cognitive WNL  Psych WNL      MEDICATIONS  (STANDING):  albuterol/ipratropium for Nebulization 3 milliLiter(s) Nebulizer every 4 hours  aspirin  chewable 81 milliGRAM(s) Oral daily  atorvastatin 20 milliGRAM(s) Oral at bedtime  chlorhexidine 2% Cloths 1 Application(s) Topical <User Schedule>  dronedarone 400 milliGRAM(s) Oral two times a day  levothyroxine 112 MICROGram(s) Oral daily  melatonin 5 milliGRAM(s) Oral at bedtime  metoprolol succinate ER 25 milliGRAM(s) Oral daily  pantoprazole    Tablet 40 milliGRAM(s) Oral before breakfast  tamsulosin 0.4 milliGRAM(s) Oral at bedtime    MEDICATIONS  (PRN):  acetaminophen     Tablet .. 650 milliGRAM(s) Oral every 6 hours PRN Temp greater or equal to 38C (100.4F), Mild Pain (1 - 3)  aluminum hydroxide/magnesium hydroxide/simethicone Suspension 30 milliLiter(s) Oral every 4 hours PRN Dyspepsia  guaifenesin/dextromethorphan Oral Liquid 10 milliLiter(s) Oral every 4 hours PRN Cough      FAMILY HISTORY:      Allergies    No Known Allergies    Intolerances        SOCIAL HISTORY:    [  ] Etoh  [  ] Smoking  [  ] Substance abuse     Home Environment:  [   ] Home Alone  [ x  ] Lives with Family  [   ] Home Health Aid    Dwelling:  [   ] Apartment  [ x  ] Private House  [   ] Adult Home  [   ] Skilled Nursing Facility      [   ] Short Term  [   ] Long Term  [   ] Stairs       Elevator [   ]    FUNCTIONAL STATUS PTA: (Check all that apply)  Ambulation: [    ]Independent    [  x ] Dependent     [   ] Non-Ambulatory  Assistive Device: [   ] SA Cane  [   ]  Q Cane  [ x  ] Walker  [   ]  Wheelchair  ADL : [   ] Independent  [ x   ]  Dependent       Vital Signs Last 24 Hrs  T(C): 36.6 (09 Mar 2023 13:09), Max: 36.8 (08 Mar 2023 23:23)  T(F): 97.8 (09 Mar 2023 13:09), Max: 98.2 (08 Mar 2023 23:23)  HR: 87 (09 Mar 2023 13:09) (87 - 98)  BP: 143/66 (09 Mar 2023 13:09) (143/66 - 163/76)  BP(mean): --  RR: 18 (09 Mar 2023 13:09) (18 - 21)  SpO2: 94% (09 Mar 2023 13:09) (90% - 94%)    Parameters below as of 09 Mar 2023 08:27  Patient On (Oxygen Delivery Method): nasal cannula  O2 Flow (L/min): 2        PHYSICAL EXAM: Awake & confused  on posey restrained   GENERAL: NAD  HEAD:  Normocephalic  CHEST/LUNG: decreased BS lung bases   HEART: S1S2+  ABDOMEN: Soft, Nontender  EXTREMITIES:  no calf tenderness    NERVOUS SYSTEM:  Cranial Nerves 2-12 intact [   ] Abnormal  [   ]  ROM: WFL all extremities [   ]  Abnormal [ x  ]able to move all ext - limited participation   Motor Strength: WFL all extremities  [   ]  Abnormal [x   ]  Sensation: intact to light touch [   ] Abnormal [   ]    FUNCTIONAL STATUS:  Bed Mobility: Independent [   ]  Supervision [   ]  Needs Assistance [ x  ]  N/A [   ]  Transfers: Independent [   ]  Supervision [   ]  Needs Assistance [  x ]  N/A [   ]   Ambulation: Independent [   ]  Supervision [   ]  Needs Assistance [  x ]  N/A [   ]  ADL: Independent [   ] Requires Assistance [   ] N/A [   ]      LABS:                        9.5    6.71  )-----------( 159      ( 09 Mar 2023 03:11 )             30.3     03-09    139  |  99  |  82<HH>  ----------------------------<  120<H>  4.7   |  30  |  2.0<H>    Ca    8.6      09 Mar 2023 03:11  Phos  5.3     03-08  Mg     2.8     03-09    TPro  6.0  /  Alb  3.8  /  TBili  0.3  /  DBili  x   /  AST  31  /  ALT  29  /  AlkPhos  89  03-09          RADIOLOGY & ADDITIONAL STUDIES:

## 2023-03-10 NOTE — PROGRESS NOTE ADULT - ASSESSMENT
89 y/o female PMH of HTN, HLD, CHF, a fib not on AC, hypothyroidism, CKD (baseline Cr 1.5) and COPD on 4L NC came for sob and lethargy and vomited once in the EMS ,  admitted for acute hypercapnic respiratory failure due to COPD Exacerbation.    PLAN    #Acute on Chronic Hypoxic/Hypercapnic respiratory Failure (On Home O2 4L)  #COPD Exacerbation  - clinically improving, tolerating 4L NC, but refused KIMANI at night  - NIV at bedtime  - Received Solumedrol today will switch to Prednisone PO tomorrow   - finished the 5 day course of Doxycycline on 3/8/23   - LE duplex negative for DVT  - PT f/u    #MAURI on CKD Stage 3 - improved, prerenal v BARRINGTON  #Hyperkalemia  #Urinary retention  - holding lasix and IVF  - renal function improved  - encourage PO hydration  - s/p renal eval  - TOV today 3/9   - on primafit only now     #Chronic HFpEF  #HTN/HLD  #Elevated troponin likely demand ischemia  - Cont metoprolol ER 25mg daily   - Cont lipitor 20mg qHs  - Holding lasix for now  - Cont ASA 81mg qD    #Chronic Afib  #Not on AC due to recurrent falls  - on dronedarone and metoprolol   - repeat EKG with improving  QTc     #Hypothyroidism   - Cont synthroid 112mcg qD, check TSH    #DVT PPX: None  #GI Ppx: PPI   #Activity:  IAT  #Diet: DASH/TLC  #Dispo: TBD  #Pending: TOV, improvement in respiratory status

## 2023-03-10 NOTE — PROGRESS NOTE ADULT - SUBJECTIVE AND OBJECTIVE BOX
TANNA MCCRACKEN 89y Female  MRN#: [id number]   CODE STATUS:___DNR/DNI_____    Hospital Day: 6d    Patient is currently admitted with the primary diagnosis of acute on chronic hypercapnic respiratory failure.     SUBJECTIVE:    Patient seen and examined at bedside this am. Patient was sleeping and was on Bipap. Overnight, patient was agitated and was pulling out lines. She was desaturating  as she kept pulling off bipap mask and the NC. She got 1 dose of haldol and was calm after that and was placed on Bipap throughout the night.     OBJECTIVE:  PAST MEDICAL & SURGICAL HISTORY:  COPD (chronic obstructive pulmonary disease)    Hypothyroid    HTN (hypertension)    High cholesterol    Colitis    CKD (chronic kidney disease)    No significant past surgical history    ALLERGIES:  No Known Allergies                                         HOME MEDICATIONS:  Home Medications:  albuterol 2.5 mg/3 mL (0.083%) inhalation solution: 3 milliliter(s) inhaled every 6 hours, As Needed for wheezing, shortness of breath (15 Aug 2022 23:26)  aspirin 81 mg oral tablet: 1 tab(s) orally once a day (02 Oct 2022 19:42)  ATORVASTATIN 20MG TABLETS: each orally once a day (06 Oct 2022 13:41)  Lasix 20 mg oral tablet: 1 tab(s) orally once a day (06 Oct 2022 13:52)  melatonin 5 mg oral tablet: 1 tab(s) orally once a day (at bedtime), As Needed, to help you sleep at night (15 Aug 2022 23:26)  Multaq 400 mg oral tablet: 1 tab(s) orally 2 times a day (02 Oct 2022 19:42)  TRELEGY ELLIPTA 100-62.5MCG INH 30P: INHALE 1 PUFF BY MOUTH DAILY (15 Aug 2022 23:26)                           MEDICATIONS:  STANDING MEDICATIONS  albuterol/ipratropium for Nebulization 3 milliLiter(s) Nebulizer every 4 hours  aspirin  chewable 81 milliGRAM(s) Oral daily  atorvastatin 20 milliGRAM(s) Oral at bedtime  chlorhexidine 2% Cloths 1 Application(s) Topical <User Schedule>  dronedarone 400 milliGRAM(s) Oral two times a day  levothyroxine 112 MICROGram(s) Oral daily  melatonin 5 milliGRAM(s) Oral at bedtime  metoprolol succinate ER 25 milliGRAM(s) Oral daily  pantoprazole    Tablet 40 milliGRAM(s) Oral before breakfast  predniSONE   Tablet 40 milliGRAM(s) Oral daily  tamsulosin 0.4 milliGRAM(s) Oral at bedtime    PRN MEDICATIONS  acetaminophen     Tablet .. 650 milliGRAM(s) Oral every 6 hours PRN  aluminum hydroxide/magnesium hydroxide/simethicone Suspension 30 milliLiter(s) Oral every 4 hours PRN  guaifenesin/dextromethorphan Oral Liquid 10 milliLiter(s) Oral every 4 hours PRN                                          VITAL SIGNS: Last 24 Hours  T(C): 36.6 (09 Mar 2023 20:56), Max: 36.6 (09 Mar 2023 13:09)  T(F): 97.9 (09 Mar 2023 20:56), Max: 97.9 (09 Mar 2023 20:56)  HR: 63 (10 Mar 2023 06:45) (63 - 87)  BP: 142/62 (10 Mar 2023 06:45) (138/67 - 143/66)  BP(mean): --  RR: 19 (10 Mar 2023 06:45) (18 - 19)  SpO2: 99% (10 Mar 2023 06:45) (94% - 99%)               LABS:                        9.5    6.30  )-----------( 144      ( 10 Mar 2023 07:02 )             31.9     03-10    143  |  104  |  77<HH>  ----------------------------<  82  4.7   |  27  |  1.7<H>    Ca    8.8      10 Mar 2023 07:02  Mg     2.4     03-10    TPro  6.0  /  Alb  3.8  /  TBili  0.3  /  DBili  x   /  AST  31  /  ALT  29  /  AlkPhos  89  03-09      Culture - Urine (collected 07 Mar 2023 12:10)  Source: Catheterized Catheterized  Final Report (08 Mar 2023 23:00):    <10,000 CFU/mL Normal Urogenital Consuelo      PHYSICAL EXAM:  General: Resting comfortably in no acute painful distress  HEENT: Normocephalic, atraumatic  LUNGS: Clear to auscultation bilaterally, no wheezes, rales, rhonchi  HEART: S1S2 present, regular rate and rhythm, no murmurs, rubs, gallops  ABDOMEN: Soft, nontender, nondistended  EXT: No edema

## 2023-03-10 NOTE — DISCHARGE NOTE NURSING/CASE MANAGEMENT/SOCIAL WORK - PATIENT PORTAL LINK FT
You can access the FollowMyHealth Patient Portal offered by [facility] by registering at the following website: [email or web address]. By joining [employer]’s FollowMyHealth portal, you will also be able to view your health information using other applications (apps) compatible with our system.

## 2023-03-10 NOTE — DISCHARGE NOTE NURSING/CASE MANAGEMENT/SOCIAL WORK - NSDCVIVACCINE_GEN_ALL_CORE_FT
Tdap; 15-Aug-2022 16:59; Sayra Irving (RN); [employer]; [id number] (Exp. Date: 22-Sep-2024); IntraMuscular; Deltoid Left.; 0.5 milliLiter(s); VIS (VIS Published: 09-May-2013, VIS Presented: 15-Aug-2022);

## 2023-03-10 NOTE — PROGRESS NOTE ADULT - ASSESSMENT
Impression:    Acute on chronic hypercapnic resp failure   COPD exacerbation, improved  MAURI on CKD  Delirium  Hyperkalemia-resolved  Severe COPD & Chronic Hypoxemic Respiratory Failure on 3-4L NC at home   Chronic afib not on AC due to recurrent falls  HfpEF        PLAN:    CNS: avoid sedatives. Frequent reorientation    HEENT:  Oral care    PULMONARY:  HOB @ 45 degrees, aspiration precautions.  Solumedrol 60mg BID.  c/w home inhalers.  Duoneb q4h and PRN.  Encourage NIV use.  Supplemental O2 as tolerated.  Target POx 88 - 92%.     CARDIOVASCULAR:  Avoid volume overload.     GI: GI prophylaxis.  Feeding when off NIV.  Bowel regimen if needed.    RENAL:  FU lytes.  Correct as necessary.  Repeat lytes.     INFECTIOUS DISEASE: sp abx    HEMATOLOGICAL:  DVT prophylaxis. LE duplex negative.    ENDOCRINE:  Follow up FS.  Insulin protocol if needed.    Palliative care eval    Very poor overall prognosis       Impression:    Acute on chronic hypercapnic resp failure -improved  COPD exacerbation, improved  MAURI on CKD  Delirium  Hyperkalemia-resolved  Severe COPD & Chronic Hypoxemic Respiratory Failure on 3-4L NC at home   Chronic afib not on AC due to recurrent falls  HfpEF        PLAN:    CNS: avoid sedatives. Frequent reorientation    HEENT:  Oral care    PULMONARY:  HOB @ 45 degrees, aspiration precautions.  Solumedrol 60mg BID.  c/w home inhalers.  Duoneb q4h and PRN.  Encourage NIV use.  Supplemental O2 as tolerated.  Target POx 88 - 92%. Patient will benefit from AVAPS/NIV at home.    CARDIOVASCULAR:  Avoid volume overload.     GI: GI prophylaxis.  Feeding when off NIV.  Bowel regimen if needed.    RENAL:  FU lytes.  Correct as necessary.  Repeat lytes.     INFECTIOUS DISEASE: sp abx    HEMATOLOGICAL:  DVT prophylaxis. LE duplex negative.    ENDOCRINE:  Follow up FS.  Insulin protocol if needed.    Palliative care eval    Very poor overall prognosis

## 2023-03-10 NOTE — PROGRESS NOTE ADULT - SUBJECTIVE AND OBJECTIVE BOX
Over Night Events: events noted, agitated overnight, declined NIV, afebrile    PHYSICAL EXAM    ICU Vital Signs Last 24 Hrs  T(C): 36.6 (09 Mar 2023 20:56), Max: 36.6 (09 Mar 2023 13:09)  T(F): 97.9 (09 Mar 2023 20:56), Max: 97.9 (09 Mar 2023 20:56)  HR: 76 (09 Mar 2023 20:56) (76 - 87)  BP: 138/67 (09 Mar 2023 20:56) (138/67 - 143/66)-  RR: 18 (09 Mar 2023 20:56) (18 - 18)  SpO2: 99% (09 Mar 2023 20:56) (90% - 99%)    O2 Parameters below as of 09 Mar 2023 08:27  Patient On (Oxygen Delivery Method): nasal cannula  O2 Flow (L/min): 2          General: ill looking  Lungs: dec bs both bases  Cardiovascular: Regular   Abdomen: Soft, Positive BS  Extremities: No clubbing   Skin: Warm  Neurological: Non focal       03-08-23 @ 07:01  -  03-09-23 @ 07:00  --------------------------------------------------------  IN:  Total IN: 0 mL    OUT:    Intermittent Catheterization - Urethral (mL): 350 mL  Total OUT: 350 mL    Total NET: -350 mL          LABS:                          9.5    6.71  )-----------( 159      ( 09 Mar 2023 03:11 )             30.3                                               03-09    139  |  99  |  82<HH>  ----------------------------<  120<H>  4.7   |  30  |  2.0<H>    Ca    8.6      09 Mar 2023 03:11  Mg     2.8     03-09    TPro  6.0  /  Alb  3.8  /  TBili  0.3  /  DBili  x   /  AST  31  /  ALT  29  /  AlkPhos  89  03-09                                                                                           LIVER FUNCTIONS - ( 09 Mar 2023 03:11 )  Alb: 3.8 g/dL / Pro: 6.0 g/dL / ALK PHOS: 89 U/L / ALT: 29 U/L / AST: 31 U/L / GGT: x                                                  Culture - Urine (collected 07 Mar 2023 12:10)  Source: Catheterized Catheterized  Final Report (08 Mar 2023 23:00):    <10,000 CFU/mL Normal Urogenital Consuelo                                                                                           MEDICATIONS  (STANDING):  albuterol/ipratropium for Nebulization 3 milliLiter(s) Nebulizer every 4 hours  aspirin  chewable 81 milliGRAM(s) Oral daily  atorvastatin 20 milliGRAM(s) Oral at bedtime  chlorhexidine 2% Cloths 1 Application(s) Topical <User Schedule>  dronedarone 400 milliGRAM(s) Oral two times a day  levothyroxine 112 MICROGram(s) Oral daily  melatonin 5 milliGRAM(s) Oral at bedtime  metoprolol succinate ER 25 milliGRAM(s) Oral daily  pantoprazole    Tablet 40 milliGRAM(s) Oral before breakfast  predniSONE   Tablet 40 milliGRAM(s) Oral daily  tamsulosin 0.4 milliGRAM(s) Oral at bedtime    MEDICATIONS  (PRN):  acetaminophen     Tablet .. 650 milliGRAM(s) Oral every 6 hours PRN Temp greater or equal to 38C (100.4F), Mild Pain (1 - 3)  aluminum hydroxide/magnesium hydroxide/simethicone Suspension 30 milliLiter(s) Oral every 4 hours PRN Dyspepsia  guaifenesin/dextromethorphan Oral Liquid 10 milliLiter(s) Oral every 4 hours PRN Cough

## 2023-03-10 NOTE — CHART NOTE - NSCHARTNOTEFT_GEN_A_CORE
89 yr old female admitted with Chronic Hypercapnic  Respiratory Failure, COPD on 4L of home 02. On admission patients CO2 level was 88. Patient  was placed on BIPAP  settings of 12/6 cmh20 and continued to retain high CO2 levels of 92. Proving bipap was tried and failed. This patient needs home NIV given persistent hypercapnia. Patient  has consistent elevated PCO2 which has caused several admissions.   Patient  will require NIV at bedtime , naptime and periods of shortness of breath.   This NIV is to be used at home for adequate ventilation to keep her hypercapnia under control and prevent further readmissions. Without the NIV this patient will clinically decline. BIPAP was ruled out due to high CO2 levels. She will need NIV for a safe discharge. 89 yr old female admitted with Chronic Hypercapnic  Respiratory Failure secondary to COPD on 4L of home 02. On admission patients CO2 level was 88. Patient  was placed on BIPAP  settings of 12/6 cmh20 and continued to retain high CO2 levels of 92. Proving bipap was tried and failed. This patient needs home NIV given persistent hypercapnia. Patient  has consistent elevated PCO2 which has caused several admissions.   Patient  will require NIV at bedtime , naptime and periods of shortness of breath.   This NIV is to be used at home for adequate ventilation to keep her hypercapnia under control and prevent further readmissions. Without the NIV this patient will clinically decline. BIPAP was ruled out due to high CO2 levels. She will need NIV for a safe discharge.

## 2023-03-11 NOTE — PROGRESS NOTE ADULT - SUBJECTIVE AND OBJECTIVE BOX
TANNA MCCRACKEN 89y Female  MRN#: [id number]   CODE STATUS:__DNR/DNI______    Hospital Day: 7d    Patient is currently admitted with the primary diagnosis of respiratory failure.     SUBJECTIVE:    Patient seen and examined at bedside this am. Patient is stable.     OBJECTIVE:  PAST MEDICAL & SURGICAL HISTORY:  COPD (chronic obstructive pulmonary disease)    Hypothyroid    HTN (hypertension)    High cholesterol    Colitis    CKD (chronic kidney disease)    No significant past surgical history                ALLERGIES:  No Known Allergies        HOME MEDICATIONS:  Home Medications:  albuterol 2.5 mg/3 mL (0.083%) inhalation solution: 3 milliliter(s) inhaled every 6 hours, As Needed for wheezing, shortness of breath (15 Aug 2022 23:26)  aspirin 81 mg oral tablet: 1 tab(s) orally once a day (02 Oct 2022 19:42)  ATORVASTATIN 20MG TABLETS: each orally once a day (06 Oct 2022 13:41)  Lasix 20 mg oral tablet: 1 tab(s) orally once a day (06 Oct 2022 13:52)  melatonin 5 mg oral tablet: 1 tab(s) orally once a day (at bedtime), As Needed, to help you sleep at night (15 Aug 2022 23:26)  Multaq 400 mg oral tablet: 1 tab(s) orally 2 times a day (02 Oct 2022 19:42)  TRELEGY ELLIPTA 100-62.5MCG INH 30P: INHALE 1 PUFF BY MOUTH DAILY (15 Aug 2022 23:26)                           MEDICATIONS:  STANDING MEDICATIONS  albuterol/ipratropium for Nebulization 3 milliLiter(s) Nebulizer every 4 hours  aspirin  chewable 81 milliGRAM(s) Oral daily  atorvastatin 20 milliGRAM(s) Oral at bedtime  chlorhexidine 2% Cloths 1 Application(s) Topical <User Schedule>  dronedarone 400 milliGRAM(s) Oral two times a day  levothyroxine 112 MICROGram(s) Oral daily  melatonin 5 milliGRAM(s) Oral at bedtime  metoprolol succinate ER 25 milliGRAM(s) Oral daily  pantoprazole    Tablet 40 milliGRAM(s) Oral before breakfast  predniSONE   Tablet 40 milliGRAM(s) Oral daily  tamsulosin 0.4 milliGRAM(s) Oral at bedtime    PRN MEDICATIONS  acetaminophen     Tablet .. 650 milliGRAM(s) Oral every 6 hours PRN  aluminum hydroxide/magnesium hydroxide/simethicone Suspension 30 milliLiter(s) Oral every 4 hours PRN  guaifenesin/dextromethorphan Oral Liquid 10 milliLiter(s) Oral every 4 hours PRN                                VITAL SIGNS: Last 24 Hours  T(C): 36.2 (11 Mar 2023 05:00), Max: 36.2 (11 Mar 2023 05:00)  T(F): 97.1 (11 Mar 2023 05:00), Max: 97.1 (11 Mar 2023 05:00)  HR: 78 (11 Mar 2023 07:48) (66 - 78)  BP: 146/71 (11 Mar 2023 05:00) (100/45 - 146/71)  BP(mean): --  RR: 18 (11 Mar 2023 05:00) (18 - 19)  SpO2: 99% (11 Mar 2023 07:48) (96% - 99%)      03-10-23 @ 07:01  -  03-11-23 @ 07:00  --------------------------------------------------------  IN: 0 mL / OUT: 800 mL / NET: -800 mL                LABS:                        9.9    5.87  )-----------( 139      ( 11 Mar 2023 06:09 )             33.4     03-11    146  |  107  |  61<HH>  ----------------------------<  73  5.4<H>   |  28  |  1.4  Ca    8.9      11 Mar 2023 06:09  Mg     2.3     03-11    Culture - Blood (collected 09 Mar 2023 06:19)  Source: .Blood None  Preliminary Report (10 Mar 2023 14:03):    No growth to date.                     PHYSICAL EXAM:  General: Resting comfortably in no acute painful distress  HEENT: Normocephalic, atraumatic  LUNGS: Clear to auscultation bilaterally, no wheezes, rales, rhonchi  HEART: S1S2 present, regular rate and rhythm, no murmurs, rubs, gallops  ABDOMEN: Soft, nontender, nondistended  EXT: No edema                                          TANNA MCCRACKEN 89y Female  MRN#: [id number]   CODE STATUS:__DNR/DNI______    Hospital Day: 7d    Patient is currently admitted with the primary diagnosis of respiratory failure.     SUBJECTIVE:    Patient seen and examined at bedside this am. Patient is stable.     OBJECTIVE:  PAST MEDICAL & SURGICAL HISTORY:  COPD (chronic obstructive pulmonary disease)    Hypothyroid    HTN (hypertension)    High cholesterol    Colitis    CKD (chronic kidney disease)    No significant past surgical history            ALLERGIES:  No Known Allergies    HOME MEDICATIONS:  Home Medications:  albuterol 2.5 mg/3 mL (0.083%) inhalation solution: 3 milliliter(s) inhaled every 6 hours, As Needed for wheezing, shortness of breath (15 Aug 2022 23:26)  aspirin 81 mg oral tablet: 1 tab(s) orally once a day (02 Oct 2022 19:42)  ATORVASTATIN 20MG TABLETS: each orally once a day (06 Oct 2022 13:41)  Lasix 20 mg oral tablet: 1 tab(s) orally once a day (06 Oct 2022 13:52)  melatonin 5 mg oral tablet: 1 tab(s) orally once a day (at bedtime), As Needed, to help you sleep at night (15 Aug 2022 23:26)  Multaq 400 mg oral tablet: 1 tab(s) orally 2 times a day (02 Oct 2022 19:42)  TRELEGY ELLIPTA 100-62.5MCG INH 30P: INHALE 1 PUFF BY MOUTH DAILY (15 Aug 2022 23:26)                           MEDICATIONS:  STANDING MEDICATIONS  albuterol/ipratropium for Nebulization 3 milliLiter(s) Nebulizer every 4 hours  aspirin  chewable 81 milliGRAM(s) Oral daily  atorvastatin 20 milliGRAM(s) Oral at bedtime  chlorhexidine 2% Cloths 1 Application(s) Topical <User Schedule>  dronedarone 400 milliGRAM(s) Oral two times a day  levothyroxine 112 MICROGram(s) Oral daily  melatonin 5 milliGRAM(s) Oral at bedtime  metoprolol succinate ER 25 milliGRAM(s) Oral daily  pantoprazole    Tablet 40 milliGRAM(s) Oral before breakfast  predniSONE   Tablet 40 milliGRAM(s) Oral daily  tamsulosin 0.4 milliGRAM(s) Oral at bedtime    PRN MEDICATIONS  acetaminophen     Tablet .. 650 milliGRAM(s) Oral every 6 hours PRN  aluminum hydroxide/magnesium hydroxide/simethicone Suspension 30 milliLiter(s) Oral every 4 hours PRN  guaifenesin/dextromethorphan Oral Liquid 10 milliLiter(s) Oral every 4 hours PRN                                VITAL SIGNS: Last 24 Hours  T(C): 36.2 (11 Mar 2023 05:00), Max: 36.2 (11 Mar 2023 05:00)  T(F): 97.1 (11 Mar 2023 05:00), Max: 97.1 (11 Mar 2023 05:00)  HR: 78 (11 Mar 2023 07:48) (66 - 78)  BP: 146/71 (11 Mar 2023 05:00) (100/45 - 146/71)  BP(mean): --  RR: 18 (11 Mar 2023 05:00) (18 - 19)  SpO2: 99% (11 Mar 2023 07:48) (96% - 99%)      03-10-23 @ 07:01  -  03-11-23 @ 07:00  --------------------------------------------------------  IN: 0 mL / OUT: 800 mL / NET: -800 mL                LABS:                        9.9    5.87  )-----------( 139      ( 11 Mar 2023 06:09 )             33.4     03-11    146  |  107  |  61<HH>  ----------------------------<  73  5.4<H>   |  28  |  1.4  Ca    8.9      11 Mar 2023 06:09  Mg     2.3     03-11    Culture - Blood (collected 09 Mar 2023 06:19)  Source: .Blood None  Preliminary Report (10 Mar 2023 14:03):    No growth to date.                     PHYSICAL EXAM:  General: Resting comfortably in no acute painful distress  HEENT: Normocephalic, atraumatic  LUNGS: Clear to auscultation bilaterally, no wheezes, rales, rhonchi  HEART: S1S2 present, regular rate and rhythm, no murmurs, rubs, gallops  ABDOMEN: Soft, nontender, nondistended  EXT: No edema

## 2023-03-11 NOTE — PROGRESS NOTE ADULT - ASSESSMENT
89 y/o female PMH of HTN, HLD, CHF, a fib not on AC, hypothyroidism, CKD (baseline Cr 1.5) and COPD on 4L NC came for sob and lethargy and vomited once in the EMS ,  admitted for acute hypercapnic respiratory failure due to COPD Exacerbation.    PLAN    #Acute on Chronic Hypoxic/Hypercapnic respiratory Failure (On Home O2 4L)  #COPD Exacerbation  - clinically improving, tolerating 4L NC, but refused KIMANI at night  - NIV at bedtime  - c/w Prednisone 40 mg ( until tomorrow AM) Taper upon discharge    - finished the 5 day course of Doxycycline on 3/8/23   - LE duplex negative for DVT  - PT f/u    #MAURI on CKD Stage 3 - improved, prerenal v BARRINGTON  #Hyperkalemia  #Urinary retention  - holding lasix and IVF  - renal function improved  - encourage PO hydration  - s/p renal eval  - TOV today 3/9   - on primafit only now     #Chronic HFpEF  #HTN/HLD  #Elevated troponin likely demand ischemia  - Cont metoprolol ER 25mg daily   - Cont lipitor 20mg qHs  - Holding lasix for now  - Cont ASA 81mg qD    #Chronic Afib  #Not on AC due to recurrent falls  - on dronedarone and metoprolol   - repeat EKG with improving  QTc     #Hypothyroidism   - Cont synthroid 112mcg qD, check TSH    #DVT PPX: None  #GI Ppx: PPI   #Activity:  IAT  #Diet: DASH/TLC  #Dispo: TBD  #Pending: improvement in respiratory status

## 2023-03-12 NOTE — PROGRESS NOTE ADULT - SUBJECTIVE AND OBJECTIVE BOX
pt seen and examined ( 3/12)   pt agitated and confused in am , but improved as the day goes by     T(F): 97.1  HR: 75   BP: 151/75  RR: 18   SpO2: 95% on 3 lit     Physical exam:   constitutional NAD, AAOx3 but had delirium early in am , Respiratory  lungs bilat crackles , CVS heart irregular , GI: abdomen Soft NT, ND, BS+, skin: intact  neuro exam Motor, sensory and CN normal, no deficit    labs and imaging reviewed    a/p  Pt is a 89 y/o female with PMH of HTN, HLD, CHF, a fib not on AC, hyopthyroidism, CKD (baseline Cr 1.7) and COPD on 4L NC , Presenting for SOB.      # acute hypercapnic resp failure with hx of copd and chronic hypoxemia, ( on home O2) with acidosis poa,   improved on bipap but pt is not able to tolerate bipap and last night had sundowning and got very confused and pulled IV   now got haldol and is sedated.     # delirium, due to hospital stay and possible sundowning  add serequel  in the evening and then place avap     # CHF, chronic HFpEF cont meds,     # afib not on ac, dw family , they are aware of risks and benefits, and will dw pmd when pt discharged    # Hypothyroidism, cont meds  # ckd stable , pt is following up with nephro as outpt     #Progress Note Handoff  Pending (specify): clinical improvement, avap to be set up   Family discussion: dw pt's son   Disposition: Home_ with home care ( needs avap on dc)   pt is DNR, DNI, confirmed with son .

## 2023-03-13 NOTE — PROGRESS NOTE ADULT - ASSESSMENT
Impression:    Acute on chronic hypercapnic resp failure on NX  COPD exacerbation, improved  Possible RLL pneumonia/ aspiration  MAURI on CKD  Delirium  Hyperkalemia-resolved  Severe COPD & Chronic Hypoxemic Respiratory Failure on 3-4L NC at home   Chronic afib not on AC due to recurrent falls  HfpEF        PLAN:    CNS: avoid sedatives. Frequent reorientation    HEENT:  Oral care    PULMONARY:  HOB @ 45 degrees, aspiration precautions.  neb  q 4, taper steroids    CARDIOVASCULAR:  Avoid volume overload.     GI: GI prophylaxis.  Feeding when off NIV.  Bowel regimen if needed.    RENAL:  FU lytes.  Correct as necessary.  Repeat lytes.     INFECTIOUS DISEASE: sp abx, procal    HEMATOLOGICAL:  DVT prophylaxis. LE duplex negative.    ENDOCRINE:  Follow up FS.  Insulin protocol if needed.    Palliative care eval    Very poor overall prognosis      spoke with son at length

## 2023-03-13 NOTE — PROGRESS NOTE ADULT - ASSESSMENT
89 y/o female PMH of HTN, HLD, CHF, a fib not on AC, hypothyroidism, CKD (baseline Cr 1.5) and COPD on 4L NC came for sob and lethargy and vomited once in the EMS ,  admitted for acute hypercapnic respiratory failure due to COPD Exacerbation.    PLAN    #Acute on Chronic Hypoxic/Hypercapnic respiratory Failure (On Home O2 4L)  #COPD Exacerbation  - clinically improving, tolerating 4L NC, but refused KIMANI at night  - NIV at bedtime  - c/w Prednisone 40 mg ( until tomorrow AM) Taper upon discharge    - finished the 5 day course of Doxycycline on 3/8/23   - LE duplex negative for DVT  - PT f/u    #MAURI on CKD Stage 3 - improved, prerenal v BARRINGTON  #Hyperkalemia  #Urinary retention  - holding lasix and IVF  - renal function improved  - encourage PO hydration  - s/p renal eval  - TOV today 3/9   - on primafit only now     #Chronic HFpEF  #HTN/HLD  #Elevated troponin likely demand ischemia  - Cont metoprolol ER 25mg daily   - Cont lipitor 20mg qHs  - Holding lasix for now  - Cont ASA 81mg qD    #Chronic Afib  #Not on AC due to recurrent falls  - on dronedarone and metoprolol   - repeat EKG with improving  QTc     #Hypothyroidism   - Cont synthroid 112mcg qD, check TSH    #DVT PPX: None  #GI Ppx: PPI   #Activity:  IAT  #Diet: DASH/TLC  #Dispo: TBD  #Pending: improvement in respiratory status; Home AVAPS setup     89 y/o female PMH of HTN, HLD, CHF, a fib not on AC, hypothyroidism, CKD (baseline Cr 1.5) and COPD on 4L NC came for sob and lethargy and vomited once in the EMS ,  admitted for acute hypercapnic respiratory failure due to COPD Exacerbation.    PLAN    #Acute on Chronic Hypoxic/Hypercapnic respiratory Failure (On Home O2 4L)  #COPD Exacerbation  # possible new aspiration pna,   - clinically improving, tolerating 4L NC, but refused KIMANI at night  - NIV at bedtime  - c/w Prednisone 40 mg ( until tomorrow AM) Taper upon discharge    - finished the 5 day course of Doxycycline on 3/8/23   - LE duplex negative for DVT  - PT f/u  - restart abx   - bc   - pulm followup   #MAURI on CKD Stage 3 - improved, prerenal v BARRINGTON  #Hyperkalemia  #Urinary retention  - holding lasix and IVF  - renal function improved  - encourage PO hydration  - s/p renal eval  - TOV today 3/9   - on primafit only now     #Chronic HFpEF  #HTN/HLD  #Elevated troponin likely demand ischemia  - Cont metoprolol ER 25mg daily   - Cont lipitor 20mg qHs  - Holding lasix for now  - Cont ASA 81mg qD    #Chronic Afib  #Not on AC due to recurrent falls  - on dronedarone and metoprolol   - repeat EKG with improving  QTc     #Hypothyroidism   - Cont synthroid 112mcg qD, check TSH    #DVT PPX: None  #GI Ppx: PPI   #Activity:  IAT  #Diet: DASH/TLC  #Dispo: TBD  #Pending: improvement in respiratory status; Home AVAPS setup

## 2023-03-13 NOTE — PROGRESS NOTE ADULT - SUBJECTIVE AND OBJECTIVE BOX
Over Night Events: events noted, on NC, son at bedside, cough    PHYSICAL EXAM    ICU Vital Signs Last 24 Hrs  T(C): 36.6 (13 Mar 2023 12:24), Max: 36.6 (13 Mar 2023 12:24)  T(F): 97.8 (13 Mar 2023 12:24), Max: 97.8 (13 Mar 2023 12:24)  HR: 67 (13 Mar 2023 12:24) (67 - 79)  BP: 113/61 (13 Mar 2023 12:24) (113/61 - 158/70)  RR: 18 (13 Mar 2023 12:24) (18 - 18)  SpO2: 95% (13 Mar 2023 12:24) (95% - 97%)        General: ill looking  cushinoid appearance             Lungs: Bilateral rhonchi  Cardiovascular: MANUEL 2.6   Abdomen: Soft, Positive BS  Neurological: Non focal       03-12-23 @ 07:01  -  03-13-23 @ 07:00  --------------------------------------------------------  IN:  Total IN: 0 mL    OUT:    Voided (mL): 600 mL  Total OUT: 600 mL    Total NET: -600 mL          LABS:                          10.1   8.31  )-----------( 181      ( 13 Mar 2023 06:00 )             34.1                                               03-13    145  |  107  |  43<H>  ----------------------------<  45<LL>  5.8<H>   |  27  |  1.4    Ca    9.0      13 Mar 2023 06:00  Mg     2.1     03-13                                                                                                                                                                                                                       ABG - ( 13 Mar 2023 11:34 )  pH, Arterial: 7.36  pH, Blood: x     /  pCO2: 59    /  pO2: 65    / HCO3: 33    / Base Excess: 6.0   /  SaO2: 94.2                MEDICATIONS  (STANDING):  albuterol/ipratropium for Nebulization 3 milliLiter(s) Nebulizer every 4 hours  aspirin  chewable 81 milliGRAM(s) Oral daily  atorvastatin 20 milliGRAM(s) Oral at bedtime  chlorhexidine 2% Cloths 1 Application(s) Topical <User Schedule>  dronedarone 400 milliGRAM(s) Oral two times a day  levothyroxine 112 MICROGram(s) Oral daily  melatonin 5 milliGRAM(s) Oral at bedtime  metoprolol succinate ER 25 milliGRAM(s) Oral daily  pantoprazole    Tablet 40 milliGRAM(s) Oral before breakfast  predniSONE   Tablet 40 milliGRAM(s) Oral daily  sodium zirconium cyclosilicate 5 Gram(s) Oral once  tamsulosin 0.4 milliGRAM(s) Oral at bedtime    MEDICATIONS  (PRN):  acetaminophen     Tablet .. 650 milliGRAM(s) Oral every 6 hours PRN Temp greater or equal to 38C (100.4F), Mild Pain (1 - 3)  aluminum hydroxide/magnesium hydroxide/simethicone Suspension 30 milliLiter(s) Oral every 4 hours PRN Dyspepsia  guaifenesin/dextromethorphan Oral Liquid 10 milliLiter(s) Oral every 4 hours PRN Cough

## 2023-03-13 NOTE — PROGRESS NOTE ADULT - SUBJECTIVE AND OBJECTIVE BOX
TANNA MCCRACKEN 89y Female  MRN#: [id number]   CODE STATUS:_DNR/DNI_______    Hospital Day: 9d    Patient is currently admitted with the primary diagnosis of COPD exacerbation.    SUBJECTIVE:    Patient seen and examined at bedside this am. Patient is resting comfortably and had no overnight events.     OBJECTIVE:  PAST MEDICAL & SURGICAL HISTORY:  COPD (chronic obstructive pulmonary disease)    Hypothyroid    HTN (hypertension)    High cholesterol    Colitis    CKD (chronic kidney disease)    No significant past surgical history    ALLERGIES:  No Known Allergies                                      HOME MEDICATIONS:  Home Medications:  albuterol 2.5 mg/3 mL (0.083%) inhalation solution: 3 milliliter(s) inhaled every 6 hours, As Needed for wheezing, shortness of breath (15 Aug 2022 23:26)  aspirin 81 mg oral tablet: 1 tab(s) orally once a day (02 Oct 2022 19:42)  ATORVASTATIN 20MG TABLETS: each orally once a day (06 Oct 2022 13:41)  Lasix 20 mg oral tablet: 1 tab(s) orally once a day (06 Oct 2022 13:52)  melatonin 5 mg oral tablet: 1 tab(s) orally once a day (at bedtime), As Needed, to help you sleep at night (15 Aug 2022 23:26)  Multaq 400 mg oral tablet: 1 tab(s) orally 2 times a day (02 Oct 2022 19:42)  TRELEGY ELLIPTA 100-62.5MCG INH 30P: INHALE 1 PUFF BY MOUTH DAILY (15 Aug 2022 23:26)                           MEDICATIONS:  STANDING MEDICATIONS  albuterol/ipratropium for Nebulization 3 milliLiter(s) Nebulizer every 4 hours  aspirin  chewable 81 milliGRAM(s) Oral daily  atorvastatin 20 milliGRAM(s) Oral at bedtime  chlorhexidine 2% Cloths 1 Application(s) Topical <User Schedule>  dronedarone 400 milliGRAM(s) Oral two times a day  levothyroxine 112 MICROGram(s) Oral daily  melatonin 5 milliGRAM(s) Oral at bedtime  metoprolol succinate ER 25 milliGRAM(s) Oral daily  pantoprazole    Tablet 40 milliGRAM(s) Oral before breakfast  predniSONE   Tablet 40 milliGRAM(s) Oral daily  tamsulosin 0.4 milliGRAM(s) Oral at bedtime    PRN MEDICATIONS  acetaminophen     Tablet .. 650 milliGRAM(s) Oral every 6 hours PRN  aluminum hydroxide/magnesium hydroxide/simethicone Suspension 30 milliLiter(s) Oral every 4 hours PRN  guaifenesin/dextromethorphan Oral Liquid 10 milliLiter(s) Oral every 4 hours PRN                                 VITAL SIGNS: Last 24 Hours  T(C): 36.3 (13 Mar 2023 05:26), Max: 36.3 (13 Mar 2023 05:26)  T(F): 97.4 (13 Mar 2023 05:26), Max: 97.4 (13 Mar 2023 05:26)  HR: 71 (13 Mar 2023 05:26) (69 - 79)  BP: 158/70 (13 Mar 2023 05:26) (122/58 - 158/70)  BP(mean): --  RR: 18 (13 Mar 2023 05:26) (18 - 19)  SpO2: 97% (13 Mar 2023 05:26) (97% - 98%)      03-12-23 @ 07:01  -  03-13-23 @ 07:00  --------------------------------------------------------  IN: 0 mL / OUT: 600 mL / NET: -600 mL    LABS:                        9.7    8.36  )-----------( 161      ( 12 Mar 2023 06:17 )             31.7     03-13    145  |  107  |  43<H>  ----------------------------<  45<LL>  5.8<H>   |  27  |  1.4    Ca    9.0      13 Mar 2023 06:00  Mg     2.1     03-13    RADIOLOGY:    < from: Xray Chest 1 View-PORTABLE IMMEDIATE (Xray Chest 1 View-PORTABLE IMMEDIATE .) (03.07.23 @ 17:11) >    Impression:    Bilateral interstitial opacifications.    Without difference.    --- End of Report ---      < end of copied text >      PHYSICAL EXAM:  General: Resting comfortably in no acute painful distress  HEENT: Normocephalic, atraumatic  LUNGS: scattered wheezing bilateral lungs   HEART: S1S2 present, regular rate and rhythm  ABDOMEN: Soft, nontender, nondistended  EXT: No edema  NEURO: Awake alert & oriented x3, No focal deficits                                         TANNA MCCRACKEN 89y Female  MRN#: [id number]   CODE STATUS:_DNR/DNI_______    Hospital Day: 9d    Patient is currently admitted with the primary diagnosis of COPD exacerbation.    SUBJECTIVE:    Patient seen and examined at bedside this am. Patient is resting comfortably and had no overnight events.     OBJECTIVE:  PAST MEDICAL & SURGICAL HISTORY:  COPD (chronic obstructive pulmonary disease)    Hypothyroid    HTN (hypertension)    High cholesterol    Colitis    CKD (chronic kidney disease)    No significant past surgical history    ALLERGIES:  No Known Allergies                                HOME MEDICATIONS:  Home Medications:  albuterol 2.5 mg/3 mL (0.083%) inhalation solution: 3 milliliter(s) inhaled every 6 hours, As Needed for wheezing, shortness of breath (15 Aug 2022 23:26)  aspirin 81 mg oral tablet: 1 tab(s) orally once a day (02 Oct 2022 19:42)  ATORVASTATIN 20MG TABLETS: each orally once a day (06 Oct 2022 13:41)  Lasix 20 mg oral tablet: 1 tab(s) orally once a day (06 Oct 2022 13:52)  melatonin 5 mg oral tablet: 1 tab(s) orally once a day (at bedtime), As Needed, to help you sleep at night (15 Aug 2022 23:26)  Multaq 400 mg oral tablet: 1 tab(s) orally 2 times a day (02 Oct 2022 19:42)  TRELEGY ELLIPTA 100-62.5MCG INH 30P: INHALE 1 PUFF BY MOUTH DAILY (15 Aug 2022 23:26)                           MEDICATIONS:  STANDING MEDICATIONS  albuterol/ipratropium for Nebulization 3 milliLiter(s) Nebulizer every 4 hours  aspirin  chewable 81 milliGRAM(s) Oral daily  atorvastatin 20 milliGRAM(s) Oral at bedtime  chlorhexidine 2% Cloths 1 Application(s) Topical <User Schedule>  dronedarone 400 milliGRAM(s) Oral two times a day  levothyroxine 112 MICROGram(s) Oral daily  melatonin 5 milliGRAM(s) Oral at bedtime  metoprolol succinate ER 25 milliGRAM(s) Oral daily  pantoprazole    Tablet 40 milliGRAM(s) Oral before breakfast  predniSONE   Tablet 40 milliGRAM(s) Oral daily  tamsulosin 0.4 milliGRAM(s) Oral at bedtime    PRN MEDICATIONS  acetaminophen     Tablet .. 650 milliGRAM(s) Oral every 6 hours PRN  aluminum hydroxide/magnesium hydroxide/simethicone Suspension 30 milliLiter(s) Oral every 4 hours PRN  guaifenesin/dextromethorphan Oral Liquid 10 milliLiter(s) Oral every 4 hours PRN                                 VITAL SIGNS: Last 24 Hours  T(C): 36.3 (13 Mar 2023 05:26), Max: 36.3 (13 Mar 2023 05:26)  T(F): 97.4 (13 Mar 2023 05:26), Max: 97.4 (13 Mar 2023 05:26)  HR: 71 (13 Mar 2023 05:26) (69 - 79)  BP: 158/70 (13 Mar 2023 05:26) (122/58 - 158/70)  RR: 18 (13 Mar 2023 05:26) (18 - 19)  SpO2: 97% (13 Mar 2023 05:26) (97% - 98%)      03-12-23 @ 07:01  -  03-13-23 @ 07:00  --------------------------------------------------------  IN: 0 mL / OUT: 600 mL / NET: -600 mL    LABS:                        9.7    8.36  )-----------( 161      ( 12 Mar 2023 06:17 )             31.7     03-13    145  |  107  |  43<H>  ----------------------------<  45<LL>  5.8<H>   |  27  |  1.4    Ca    9.0      13 Mar 2023 06:00  Mg     2.1     03-13    RADIOLOGY:    < from: Xray Chest 1 View- PORTABLE-Urgent (Xray Chest 1 View- PORTABLE-Urgent .) (03.13.23 @ 12:55) >  Impression:    New right-sided patchy opacities.    < end of copied text >  < from: Xray Chest 1 View-PORTABLE IMMEDIATE (Xray Chest 1 View-PORTABLE IMMEDIATE .) (03.07.23 @ 17:11) >    Impression:    Bilateral interstitial opacifications.    Without difference.    --- End of Report ---    PHYSICAL EXAM:  General: Resting comfortably in no acute painful distress  HEENT: Normocephalic, atraumatic  LUNGS: scattered wheezing bilateral lungs   HEART: S1S2 present, regular rate and rhythm  ABDOMEN: Soft, nontender, nondistended  EXT: No edema  NEURO: Awake alert & oriented x3, No focal deficits but has episodes of confusion at times,

## 2023-03-14 NOTE — PROGRESS NOTE ADULT - SUBJECTIVE AND OBJECTIVE BOX
TANNA MCCRACKEN 89y Female  MRN#: [id number]   Hospital Day: 10d    SUBJECTIVE  Patient is a 89y old Female who presents with a chief complaint of SOB  Currently admitted to medicine with the primary diagnosis of COPD exacerbation      INTERVAL HPI AND OVERNIGHT EVENTS:  Patient was examined and seen at bedside. This morning she is resting comfortably in bed and reports no issues or overnight events.    OBJECTIVE  PAST MEDICAL & SURGICAL HISTORY  COPD (chronic obstructive pulmonary disease)    Hypothyroid    HTN (hypertension)    High cholesterol    Colitis    CKD (chronic kidney disease)    No significant past surgical history      ALLERGIES:  No Known Allergies    MEDICATIONS:  STANDING MEDICATIONS  albuterol/ipratropium for Nebulization 3 milliLiter(s) Nebulizer every 4 hours  aspirin  chewable 81 milliGRAM(s) Oral daily  atorvastatin 20 milliGRAM(s) Oral at bedtime  chlorhexidine 2% Cloths 1 Application(s) Topical <User Schedule>  dronedarone 400 milliGRAM(s) Oral two times a day  levothyroxine 112 MICROGram(s) Oral daily  melatonin 5 milliGRAM(s) Oral at bedtime  metoprolol succinate ER 25 milliGRAM(s) Oral daily  pantoprazole    Tablet 40 milliGRAM(s) Oral before breakfast  tamsulosin 0.4 milliGRAM(s) Oral at bedtime    PRN MEDICATIONS  acetaminophen     Tablet .. 650 milliGRAM(s) Oral every 6 hours PRN  aluminum hydroxide/magnesium hydroxide/simethicone Suspension 30 milliLiter(s) Oral every 4 hours PRN  guaifenesin/dextromethorphan Oral Liquid 10 milliLiter(s) Oral every 4 hours PRN      VITAL SIGNS: Last 24 Hours  T(C): 36.6 (14 Mar 2023 05:00), Max: 36.6 (13 Mar 2023 12:24)  T(F): 97.8 (14 Mar 2023 05:00), Max: 97.8 (13 Mar 2023 12:24)  HR: 81 (14 Mar 2023 05:00) (67 - 86)  BP: 165/67 (14 Mar 2023 05:00) (113/61 - 165/67)  BP(mean): --  RR: 18 (14 Mar 2023 05:00) (18 - 18)  SpO2: 96% (14 Mar 2023 05:00) (95% - 96%)    LABS:                        10.9   12.04 )-----------( 220      ( 14 Mar 2023 08:22 )             35.5     03-13    145  |  107  |  43<H>  ----------------------------<  45<LL>  5.8<H>   |  27  |  1.4    Ca    9.0      13 Mar 2023 06:00  Mg     2.1     03-13          ABG - ( 13 Mar 2023 11:34 )  pH, Arterial: 7.36  pH, Blood: x     /  pCO2: 59    /  pO2: 65    / HCO3: 33    / Base Excess: 6.0   /  SaO2: 94.2                      RADIOLOGY:  < from: Xray Chest 1 View- PORTABLE-Urgent (Xray Chest 1 View- PORTABLE-Urgent .) (03.13.23 @ 12:55) >  Impression:    New right-sided patchy opacities.      --- End of Report ---      < end of copied text >      PHYSICAL EXAM:  CONSTITUTIONAL: No acute distress, sitting up comfortably in bed with NC in place  HEENT: Atraumatic, normocephalic, moist mucous membranes  PULMONARY: scattered b/l wheezing  CARDIOVASCULAR: Regular rate and rhythm  GASTROINTESTINAL: Soft, non-tender, non-distended; bowel sounds present  MUSCULOSKELETAL: no edema  NEUROLOGY: AAOX3  SKIN: warm and dry

## 2023-03-14 NOTE — PROGRESS NOTE ADULT - SUBJECTIVE AND OBJECTIVE BOX
Over Night Events: Events noted, on NC, didnt use NIV overnight, didnt sleep well    PHYSICAL EXAM    ICU Vital Signs Last 24 Hrs  T(C): 36.6 (14 Mar 2023 05:00), Max: 36.6 (13 Mar 2023 12:24)  T(F): 97.8 (14 Mar 2023 05:00), Max: 97.8 (13 Mar 2023 12:24)  HR: 81 (14 Mar 2023 05:00) (67 - 86)  BP: 165/67 (14 Mar 2023 05:00) (113/61 - 165/67)  RR: 18 (14 Mar 2023 05:00) (18 - 18)  SpO2: 96% (14 Mar 2023 05:00) (95% - 96%)    O2 Parameters below as of 14 Mar 2023 05:00  Patient On (Oxygen Delivery Method): nasal cannula  O2 Flow (L/min): 3          General: chronically ill looking  Lungs: dec bs both bases  Cardiovascular: MANUEL 2.6  Abdomen: Soft, Positive BS  Extremities: No clubbing   Neurological: Non focal         LABS:                          10.1   8.31  )-----------( 181      ( 13 Mar 2023 06:00 )             34.1                                               03-13    145  |  107  |  43<H>  ----------------------------<  45<LL>  5.8<H>   |  27  |  1.4    Ca    9.0      13 Mar 2023 06:00  Mg     2.1     03-13                                                                                                                                                                                                                       ABG - ( 13 Mar 2023 11:34 )  pH, Arterial: 7.36  pH, Blood: x     /  pCO2: 59    /  pO2: 65    / HCO3: 33    / Base Excess: 6.0   /  SaO2: 94.2                MEDICATIONS  (STANDING):  albuterol/ipratropium for Nebulization 3 milliLiter(s) Nebulizer every 4 hours  aspirin  chewable 81 milliGRAM(s) Oral daily  atorvastatin 20 milliGRAM(s) Oral at bedtime  chlorhexidine 2% Cloths 1 Application(s) Topical <User Schedule>  dronedarone 400 milliGRAM(s) Oral two times a day  levothyroxine 112 MICROGram(s) Oral daily  melatonin 5 milliGRAM(s) Oral at bedtime  metoprolol succinate ER 25 milliGRAM(s) Oral daily  pantoprazole    Tablet 40 milliGRAM(s) Oral before breakfast  predniSONE   Tablet 40 milliGRAM(s) Oral daily  tamsulosin 0.4 milliGRAM(s) Oral at bedtime    MEDICATIONS  (PRN):  acetaminophen     Tablet .. 650 milliGRAM(s) Oral every 6 hours PRN Temp greater or equal to 38C (100.4F), Mild Pain (1 - 3)  aluminum hydroxide/magnesium hydroxide/simethicone Suspension 30 milliLiter(s) Oral every 4 hours PRN Dyspepsia  guaifenesin/dextromethorphan Oral Liquid 10 milliLiter(s) Oral every 4 hours PRN Cough

## 2023-03-14 NOTE — PROGRESS NOTE ADULT - ATTENDING COMMENTS
Pt is a 89 y/o female with PMH of HTN, HLD, CHF, a fib not on AC, hyopthyroidism, CKD (baseline Cr 1.7) and COPD on 4L NC , Presenting for SOB.      # acute hypercapnic resp failure with hx of copd and chronic hypoxemia, ( on home O2) with acidosis poa,   improved on bipap but pt is not able to tolerate bipap and last night had sundowning and got very confused and pulled IV   now got haldol and is sedated.     # delirium, due to hospital stay and possible sundowning  add serequel  in the evening and then place avap     # CHF, chronic HFpEF cont meds,   < from: TTE Echo Complete w/o Contrast w/ Doppler (12.13.21 @ 09:28) >     1. Left ventricular ejection fraction, by visual estimation, is 60 to   65%.   2. Normal global left ventricular systolic function.   3. Spectral Doppler shows impaired relaxation pattern of left   ventricular myocardial filling (Grade I diastolic dysfunction).    < end of copied text >    # afib not on ac, dw family , they are aware of risks and benefits, and will dw pmd when pt discharged    # Hypothyroidism, cont meds  # ckd stable , pt is following up with nephro as outpt   creatinine trend  1.4 (03-11-23 @ 06:09)  1.7 (03-10-23 @ 07:02)  2.0 (03-09-23 @ 03:11)  1.8 (03-08-23 @ 02:01)  1.9 (03-07-23 @ 22:24)  2.3 (03-07-23 @ 09:32)    #Progress Note Handoff  Pending (specify): clinical improvement, avap to be set up   Family discussion: dw pt's son   Disposition: Home_ with home care ( needs avap on dc)   pt is DNR, DNI, confirmed with son .
Pt is a 89 y/o female with PMH of HTN, HLD, CHF, a fib not on AC, hyopthyroidism, CKD (baseline Cr 1.7) and COPD on 4L NC , Presenting for SOB.      # acute hypercapnic resp failure with hx of copd and chronic hypoxemia, ( on home O2) with acidosis poa,   improved on bipap but pt is not able to tolerate bipap and last night had sundowning and got very confused and pulled IV   now got haldol and is sedated.     # delirium, due to hospital stay and possible sundowning  add serequel  in the evening and then place avap     # CHF, chronic HFpEF cont meds,   < from: TTE Echo Complete w/o Contrast w/ Doppler (12.13.21 @ 09:28) >     1. Left ventricular ejection fraction, by visual estimation, is 60 to   65%.   2. Normal global left ventricular systolic function.   3. Spectral Doppler shows impaired relaxation pattern of left   ventricular myocardial filling (Grade I diastolic dysfunction).    < end of copied text >    # afib not on ac, dw family , they are aware of risks and benefits, and will dw pmd when pt discharged    # Hypothyroidism, cont meds  # ckd stable , pt is following up with nephro as outpt   creatinine trend  1.7 (03-10-23 @ 07:02)  2.0 (03-09-23 @ 03:11)  1.8 (03-08-23 @ 02:01)  1.9 (03-07-23 @ 22:24)  2.3 (03-07-23 @ 09:32)  2.2 (03-06-23 @ 12:22)    #Progress Note Handoff  Pending (specify): clinical improvement  Family discussion: dw pt's son at the bedside.   Disposition: Home_ with home care ( possibly needs avap on dc)   pt is DNR, DNI, confirmed with son
a/p  # Pt is a 89 y/o female with PMH of HTN, HLD, CHF, a fib not on AC, hyopthyroidism, CKD (baseline Cr 1.7) and COPD on 4L NC , Presenting for SOB.      # acute hypercapnic resp failure with hx of copd and chronic hypoxemia, ( on home O2) with acidosis poa,   improved on bipap but pt is not able to tolerate bipap and last night had sundowning and got very confused and pulled IV   now got haldol and is sedated.     # delirium, due to hospital stay and possible sundowning  add serequel  in the evening and then place avap     # CHF, chronic HFpEF cont meds,   < from: TTE Echo Complete w/o Contrast w/ Doppler (12.13.21 @ 09:28) >     1. Left ventricular ejection fraction, by visual estimation, is 60 to   65%.   2. Normal global left ventricular systolic function.   3. Spectral Doppler shows impaired relaxation pattern of left   ventricular myocardial filling (Grade I diastolic dysfunction).    < end of copied text >    # afib not on ac, dw family , they are aware of risks and benefits, and will dw pmd when pt discharged    # Hypothyroidism, cont meds  # ckd stable , pt is following up with nephro as outpt   creatinine trend  1.4 (03-13-23 @ 06:00)  1.5 (03-12-23 @ 18:01)  1.4 (03-12-23 @ 06:17)  1.4 (03-11-23 @ 06:09)  1.7 (03-10-23 @ 07:02)  2.0 (03-09-23 @ 03:11)    #Progress Note Handoff  Pending (specify): clinical improvement, avap to be set up   Family discussion: dw pt's son   Disposition: Home_ with home care ( needs avap on dc)   pt is DNR, DNI, confirmed with son .
a/p  # acute hypercapnic respiratory failure ,with chronic hypoxia( on home O2) improved with avap, COPD cont nebs   may need avap at home     # Hypothyroid cont meds    # HTN     # High cholesterol    # Colitis    # CKD   creatinine trend  2.0 (03-09-23 @ 03:11)  1.8 (03-08-23 @ 02:01)  1.9 (03-07-23 @ 22:24)  2.3 (03-07-23 @ 09:32)  2.2 (03-06-23 @ 12:22)  1.8 (03-05-23 @ 12:04)  1.7 (03-05-23 @ 06:30)  1.7 (03-04-23 @ 18:17)  fu nephro     #Progress Note Handoff  Pending : Physical therapy/phsyiatry , possible dc in 24-48 hrs   Family discussion: [facility] pt   Disposition: Home vs snf
89 y/o female with PMH of HTN, HLD, CHF, a fib not on AC, hyopthyroidism, CKD (baseline Cr 1.7) and COPD on 4L NC , Presenting for SOB.      # acute hypercapnic resp failure with hx of copd and chronic hypoxemia, ( on home O2) with acidosis poa,   improved on bipap but pt is not able to tolerate bipap and last night had sundowning and got very confused and pulled IV   now got haldol and is sedated.     # delirium, due to hospital stay and possible sundowning  add serequel  in the evening and then place avap     # CHF, chronic HFpEF cont meds,   < from: TTE Echo Complete w/o Contrast w/ Doppler (12.13.21 @ 09:28) >     1. Left ventricular ejection fraction, by visual estimation, is 60 to   65%.   2. Normal global left ventricular systolic function.   3. Spectral Doppler shows impaired relaxation pattern of left   ventricular myocardial filling (Grade I diastolic dysfunction).    < end of copied text >    # afib not on ac, dw family , they are aware of risks and benefits, and will dw pmd when pt discharged    # Hypothyroidism, cont meds, Thyroid Stimulating Hormone, Serum: 17.10 uIU/mL (08.24.20 @ 07:09)  now hyper, decrease synthroid     # ckd stable , pt is following up with nephro as outpt   creatinine trend  1.2 (03-14-23 @ 08:22)  1.4 (03-13-23 @ 06:00)  1.5 (03-12-23 @ 18:01)  1.4 (03-12-23 @ 06:17)  1.4 (03-11-23 @ 06:09)  1.7 (03-10-23 @ 07:02)    #Progress Note Handoff  Pending (specify): clinical improvement, avap to be set up   Family discussion: dw pt's son and daughter at the bedside  Disposition: Home_ with home care ( needs avap on dc)   pt is DNR, DNI, confirmed with son     [facility] pulm Dr taylor, possible dc in am
Impression:    Acute on chronic hypercapnic resp failure improved refusing NIV   COPD exacerbation, improved  MAURI on CKD, worsening  Hyperkalemia-resolved  Severe COPD & Chronic Hypoxemic Respiratory Failure on 3-4L NC at home   Chronic afib not on AC due to recurrent falls  HfpEF    Plan as outlined above
MAURI on CKD 3b/ COPD exacerbation/ HF/ Afib  MAURI likely BARR[place]  Creat at baseline now   continue IVF if poor po intake  follow BMP    will sign off recall PRN / call using TEAMS or on [phone]
events noted, Better, awake on NC, plan as above
Impression:    Acute on chronic hypercapnic resp failure improved refusing NIV   COPD exacerbation, improved  MAURI on CKD, worsening  Hyperkalemia-resolved  Severe COPD & Chronic Hypoxemic Respiratory Failure on 3-4L NC at home   Chronic afib not on AC due to recurrent falls  HfpEF  Thrombocytopenia     Plan as outlined above
My note supersedes all residents notes that I sign, My correction for their notes are in my notes   the patient today more in resp distress and agitated , confused, her son at bedside , I discussed with him in details- I showed him the labs   On exam General awake, follow simple  command , answer simple questions respond to conversation but confused [place] , Lungs b/l wheezing with increased work of breathing l, Heart regular rhythm tachy sometimes , Abdomen: soft, non tender non distended, Ext no edema neuro: Grossly non focal, moves her extremitas .    89 y/o female PMH of HTN, HLD, CHF, a fib not on AC, hypothyroidism, CKD (baseline Cr 1.5) and COPD on 4L NC came for sob and lethargy and vomited once in the EMS ,  admitted for acute hypercapnic respiratory failure due to COPD Exacerbation.    #Acute on Chronic Hypoxic/Hypercapnic respiratory Failure (On Home O2 4L)  #COPD Exacerbation  Pulmonary team on board. ABG improved today morning. Pt is a chronic CO2 retainer  CXR showing no acute cardiopulmonary disease   NIV at night / today she is on NC 4 L    Supplemental O2 as tolerated.  Target POx 88 - 92%  - Cont duonebs q4h ATC if off AVAPs  today work of breatihng increased - placed on NIV - 4 HOURS Off and on   NPo While on NIV  , will give very gentle hydration LR for 12 hours as she NPO on the NIV  and for metabolic alkalosis   CXR and ABGs noted no has resp acidosis with repeat ABG , NIV adjusted as per pulm ccc   - Cont IV solumedrol 60 decreased to daily for possible delirium , Seroquel 25 mg one dose   - Cont IV doxycycline 100 BID X 5DAYS   - RVP negative   procalcitonin 0.40  - LE Duplex no DVT   - f/u BCx     #MAURI on CKD Stage 3  Hyperkalaima   Likely pre-renal Pt also received contrast   Urine retention   - Hold IVF and continue holding lasix   Creatinine Trend: 2.3<--, 2.2<--, 1.8<--, 1.7<--, 1.7<--, 2.3<--  nephro consult appreciated - discussed with nephro today - keep monitoring off IVF or lasix   prerenal vs BARRINGTON   keep I =O   follow UOP and BMP    - Monitor renal funciton  - renal sono and CT abd no hydro   but sono showed  Urinary bladder volume 676 cc   3/6  bladder scan 50 cc   bladder scan  today  3/7/2023 shoed 600cc- place santizo for now   get UA and urine culture        #Chronic HFpEF  #HTN/HLD  #Elevated troponin likely demand ischemia  - Cont metoprolol ER 25mg qD- give IV metoprolol  while on NIV   - Cont lipitor 20mg qHs  - Holding lasix for now  - Cont ASA 81mg qD  - Trend troponin until stable    #Chronic Afib  Not on AC due to recurrent falls  - BB as above  - on dronedarone - 400 milliGRAM(s) Oral two times a day   c/w  metoprolol succinate   repeat EKG with improving  QTc     #Hypothyroidism  - Cont synthroid 112mcg qD    DVT PPX, heparin    pending: clinical improvement  / monitor Qtc /     Discussed with the patient son in details He Confirmed DNR/DNI     Palliative team consult     PT/OT requested

## 2023-03-14 NOTE — PROGRESS NOTE ADULT - ASSESSMENT
Impression:    Acute on chronic hypercapnic resp failure on NC  COPD exacerbation, improved  Possible RLL pneumonia/ aspiration  MAURI on CKD  Delirium  Hyperkalemia  Severe COPD & Chronic Hypoxemic Respiratory Failure on 3-4L NC at home   Chronic afib not on AC due to recurrent falls  HfpEF        PLAN:    CNS: avoid sedatives.     HEENT:  Oral care    PULMONARY:  HOB @ 45 degrees, aspiration precautions.  neb  q 4, taper steroids    CARDIOVASCULAR:  Avoid volume overload.     GI: GI prophylaxis.  Feeding when off NIV.  Bowel regimen if needed.    RENAL:  FU lytes.  Correct as necessary.  Repeat lytes.     INFECTIOUS DISEASE: sp abx, procal    HEMATOLOGICAL:  DVT prophylaxis.     ENDOCRINE:  Follow up FS.  Insulin protocol if needed.  PT/OT    Very poor overall prognosis      spoke with son at length

## 2023-03-14 NOTE — PROGRESS NOTE ADULT - ASSESSMENT
89 y/o female PMH of HTN, HLD, CHF, a fib not on AC, hypothyroidism, CKD (baseline Cr 1.5) and COPD on 4L NC came for sob and lethargy and vomited once in the EMS ,  admitted for acute hypercapnic respiratory failure due to COPD Exacerbation.    #Acute on Chronic Hypoxic/Hypercapnic respiratory Failure (On Home O2 4L)  #COPD Exacerbation  #Possible new aspiration PNA  - clinically improving, tolerating 4L NC, but refused KIMANI at night again  - Recommending NIV at bedtime  - Prednisone 40mg tapered down today to 20mg (taper on dc)  - Completed 5 day course of Doxycycline on 3/8/23   - 3/4/23 LE duplex negative for DVT  - PT f/u  - 3/14 Procal negative - can hold off on Abx - if clinically worsens can start Abx  - 3/13 CXR New right-sided patchy opacities.  - Pulm following    #MAURI on CKD Stage 3 - improved, prerenal v BARRINGTON  #Hyperkalemia  #Urinary retention  - holding lasix and IVF  - renal function improved  - encourage PO hydration  - s/p renal eval  - TOV passed, on primafit only now     #Chronic HFpEF  #HTN/HLD  #Elevated troponin likely demand ischemia  - Cont metoprolol ER 25mg daily   - Cont lipitor 20mg qHs  - Holding lasix for now  - Cont ASA 81mg qD    #Chronic Afib  #Not on AC due to recurrent falls  - on dronedarone and metoprolol   - repeat EKG with improving  QTc     #Hypothyroidism   - Cont synthroid 112mcg qD, check TSH    #DVT PPX: None  #GI Ppx: PPI   #Activity:  IAT  #Diet: DASH/TLC  #Dispo: TBD  #Pending: improvement in respiratory status; Home AVAPS setup     89 y/o female PMH of HTN, HLD, CHF, a fib not on AC, hypothyroidism, CKD (baseline Cr 1.5) and COPD on 4L NC came for sob and lethargy and vomited once in the EMS ,  admitted for acute hypercapnic respiratory failure due to COPD Exacerbation.    #Acute on Chronic Hypoxic/Hypercapnic respiratory Failure (On Home O2 4L)  #COPD Exacerbation  #Possible new aspiration PNA  - clinically improving, tolerating 4L NC, but refused KIMANI at night again  - Recommending NIV at bedtime  - Prednisone 40mg tapered down today to 20mg (taper on dc)  - Completed 5 day course of Doxycycline on 3/8/23   - 3/4/23 LE duplex negative for DVT  - PT f/u  - 3/14 Procal negative - can hold off on Abx - if clinically worsens can start Abx  - 3/13 CXR New right-sided patchy opacities.  - Pulm following    #MAURI on CKD Stage 3 - improved, prerenal v BARRINGTON  #Hyperkalemia  #Urinary retention  - holding lasix and IVF  - renal function improved  - encourage PO hydration  - s/p renal eval  - TOV passed, on primafit only now     #Chronic HFpEF  #HTN/HLD  #Elevated troponin likely demand ischemia  - Cont metoprolol ER 25mg daily   - Cont lipitor 20mg qHs  - Holding lasix for now  - Cont ASA 81mg qD    #Chronic Afib  #Not on AC due to recurrent falls  - on dronedarone and metoprolol   - repeat EKG with improving  QTc     #Hypothyroidism   - Cont synthroid 112mcg qD, check TSH    #DVT PPX: None  #GI Ppx: PPI   #Activity:  IAT  #Diet: DASH/TLC  #Dispo: TBD  #Pending: improvement in respiratory status; Home AVAPS setup    Updated pt son at bedside this AM

## 2023-03-15 NOTE — PROGRESS NOTE ADULT - SUBJECTIVE AND OBJECTIVE BOX
JOSUE MCCRACKENOINETTE  89y Female    INTERVAL HPI/OVERNIGHT EVENTS:    pt sitting in a chair - son at bedside  she feels well  less SOB  no pain or fever  + cough  ambulating with RW with PT today    T(F): 97.5 (03-15-23 @ 05:00), Max: 97.5 (03-15-23 @ 05:00)  HR: 64 (03-15-23 @ 05:00) (64 - 81)  BP: 131/61 (03-15-23 @ 05:00) (113/59 - 135/63)  RR: 18 (03-15-23 @ 05:00) (18 - 19)  SpO2: 100% (03-15-23 @ 06:34) (96% - 100%) on 3L NC      I&O's Summary    14 Mar 2023 07:01  -  15 Mar 2023 07:00  --------------------------------------------------------  IN: 0 mL / OUT: 500 mL / NET: -500 mL    PHYSICAL EXAM:  GENERAL: NAD  HEAD:  Normocephalic  EYES:  conjunctiva and sclera clear  ENMT: Moist mucous membranes  NERVOUS SYSTEM:  Alert, awake, Good concentration  CHEST/LUNG: crackles at base  HEART: Regular rate and rhythm  ABDOMEN: Soft, Nontender, Nondistended  EXTREMITIES:   No edema  SKIN: warm, dry    Consultant(s) Notes Reviewed:  [x ] YES  [ ] NO  Care Discussed with Consultants/Other Providers [ x] YES  [ ] NO    MEDICATIONS  (STANDING):  albuterol/ipratropium for Nebulization 3 milliLiter(s) Nebulizer every 4 hours  aspirin  chewable 81 milliGRAM(s) Oral daily  atorvastatin 20 milliGRAM(s) Oral at bedtime  chlorhexidine 2% Cloths 1 Application(s) Topical <User Schedule>  dronedarone 400 milliGRAM(s) Oral two times a day  levothyroxine 112 MICROGram(s) Oral daily  melatonin 5 milliGRAM(s) Oral at bedtime  metoprolol succinate ER 25 milliGRAM(s) Oral daily  pantoprazole    Tablet 40 milliGRAM(s) Oral before breakfast  predniSONE   Tablet 20 milliGRAM(s) Oral daily  tamsulosin 0.4 milliGRAM(s) Oral at bedtime    MEDICATIONS  (PRN):  acetaminophen     Tablet .. 650 milliGRAM(s) Oral every 6 hours PRN Temp greater or equal to 38C (100.4F), Mild Pain (1 - 3)  aluminum hydroxide/magnesium hydroxide/simethicone Suspension 30 milliLiter(s) Oral every 4 hours PRN Dyspepsia  guaifenesin/dextromethorphan Oral Liquid 10 milliLiter(s) Oral every 4 hours PRN Cough      LABS:                        10.9   12.04 )-----------( 220      ( 14 Mar 2023 08:22 )             35.5     03-14    138  |  99  |  37<H>  ----------------------------<  91  4.5   |  29  |  1.2    Ca    8.9      14 Mar 2023 08:22  Mg     1.9     03-14    TPro  5.8<L>  /  Alb  3.6  /  TBili  0.6  /  DBili  x   /  AST  20  /  ALT  21  /  AlkPhos  83  03-14          RADIOLOGY & ADDITIONAL TESTS:    Imaging or report Personally Reviewed:  [x ] YES  [ ] NO    < from: Xray Chest 1 View- PORTABLE-Routine (Xray Chest 1 View- PORTABLE-Routine in AM.) (03.14.23 @ 06:43) >  Impression:    Stable bilateral opacities.      < end of copied text >      Case discussed with residents and RN on rounds today    Care discussed with pt and family

## 2023-03-15 NOTE — DISCHARGE NOTE PROVIDER - NSDCCPCAREPLAN_GEN_ALL_CORE_FT
PRINCIPAL DISCHARGE DIAGNOSIS  Diagnosis: COPD exacerbation  Assessment and Plan of Treatment: Chronic Obstructive Pulmonary Disease  Chronic obstructive pulmonary disease (COPD) is a lung condition in which airflow from the lungs is limited. Causes include smoking, secondhand smoke exposure, genetics, or recurrent infections. Take all medicines (inhaled or pills) as directed by your health care provider. Avoid exposure to irritants such as smoke, chemicals, and fumes that aggravate your breathing.  If you are a smoker, the most important thing that you can do is stop smoking. Continuing to smoke will cause further lung damage and breathing trouble. Ask your health care provider for help with quitting smoking.  SEEK IMMEDIATE MEDICAL CARE IF YOU HAVE ANY OF THE FOLLOWING SYMPTOMS: shortness of breath at rest or when talking, bluish discoloration of lips, skin, fever, worsening cough, unexplained chest pain, or lightheadedness/dizziness.      SECONDARY DISCHARGE DIAGNOSES  Diagnosis: Acute kidney injury superimposed on CKD  Assessment and Plan of Treatment:     Diagnosis: Elevated troponin  Assessment and Plan of Treatment:     Diagnosis: Hypercapnia  Assessment and Plan of Treatment:     Diagnosis: Prolonged QT interval  Assessment and Plan of Treatment:      PRINCIPAL DISCHARGE DIAGNOSIS  Diagnosis: COPD exacerbation  Assessment and Plan of Treatment: Chronic Obstructive Pulmonary Disease  Please take all of your medications and use NIV (machine) every night as directed. follow up with Dr. Sid Burgos.  Chronic obstructive pulmonary disease (COPD) is a lung condition in which airflow from the lungs is limited. Causes include smoking, secondhand smoke exposure, genetics, or recurrent infections. Take all medicines (inhaled or pills) as directed by your health care provider. Avoid exposure to irritants such as smoke, chemicals, and fumes that aggravate your breathing.  If you are a smoker, the most important thing that you can do is stop smoking. Continuing to smoke will cause further lung damage and breathing trouble. Ask your health care provider for help with quitting smoking.  SEEK IMMEDIATE MEDICAL CARE IF YOU HAVE ANY OF THE FOLLOWING SYMPTOMS: shortness of breath at rest or when talking, bluish discoloration of lips, skin, fever, worsening cough, unexplained chest pain, or lightheadedness/dizziness.      SECONDARY DISCHARGE DIAGNOSES  Diagnosis: Acute kidney injury superimposed on CKD  Assessment and Plan of Treatment: resolved.

## 2023-03-15 NOTE — PROGRESS NOTE ADULT - SUBJECTIVE AND OBJECTIVE BOX
Over Night Events: events noted, refusing NIV at night, feels better, afebrile    PHYSICAL EXAM    ICU Vital Signs Last 24 Hrs  T(C): 36.4 (15 Mar 2023 05:00), Max: 36.4 (15 Mar 2023 05:00)  T(F): 97.5 (15 Mar 2023 05:00), Max: 97.5 (15 Mar 2023 05:00)  HR: 64 (15 Mar 2023 05:00) (64 - 81)  BP: 131/61 (15 Mar 2023 05:00) (113/59 - 135/63)  RR: 18 (15 Mar 2023 05:00) (18 - 19)  SpO2: 97% (15 Mar 2023 05:00) (96% - 97%)    O2 Parameters below as of 15 Mar 2023 05:00  Patient On (Oxygen Delivery Method): nasal cannula            General: ill looking  Lungs: dec bs both bases  Cardiovascular: MANUEL 2.6  Abdomen: Soft, Positive BS  Extremities: No clubbing   Follows commands        LABS:                          10.9   12.04 )-----------( 220      ( 14 Mar 2023 08:22 )             35.5                                               03-14    138  |  99  |  37<H>  ----------------------------<  91  4.5   |  29  |  1.2    Ca    8.9      14 Mar 2023 08:22  Mg     1.9     03-14    TPro  5.8<L>  /  Alb  3.6  /  TBili  0.6  /  DBili  x   /  AST  20  /  ALT  21  /  AlkPhos  83  03-14                                                                                           LIVER FUNCTIONS - ( 14 Mar 2023 08:22 )  Alb: 3.6 g/dL / Pro: 5.8 g/dL / ALK PHOS: 83 U/L / ALT: 21 U/L / AST: 20 U/L / GGT: x                                                                                                                                   ABG - ( 13 Mar 2023 11:34 )  pH, Arterial: 7.36  pH, Blood: x     /  pCO2: 59    /  pO2: 65    / HCO3: 33    / Base Excess: 6.0   /  SaO2: 94.2                MEDICATIONS  (STANDING):  albuterol/ipratropium for Nebulization 3 milliLiter(s) Nebulizer every 4 hours  aspirin  chewable 81 milliGRAM(s) Oral daily  atorvastatin 20 milliGRAM(s) Oral at bedtime  chlorhexidine 2% Cloths 1 Application(s) Topical <User Schedule>  dronedarone 400 milliGRAM(s) Oral two times a day  levothyroxine 112 MICROGram(s) Oral daily  melatonin 5 milliGRAM(s) Oral at bedtime  metoprolol succinate ER 25 milliGRAM(s) Oral daily  pantoprazole    Tablet 40 milliGRAM(s) Oral before breakfast  predniSONE   Tablet 20 milliGRAM(s) Oral daily  tamsulosin 0.4 milliGRAM(s) Oral at bedtime    MEDICATIONS  (PRN):  acetaminophen     Tablet .. 650 milliGRAM(s) Oral every 6 hours PRN Temp greater or equal to 38C (100.4F), Mild Pain (1 - 3)  aluminum hydroxide/magnesium hydroxide/simethicone Suspension 30 milliLiter(s) Oral every 4 hours PRN Dyspepsia  guaifenesin/dextromethorphan Oral Liquid 10 milliLiter(s) Oral every 4 hours PRN Cough

## 2023-03-15 NOTE — PROGRESS NOTE ADULT - PROVIDER SPECIALTY LIST ADULT
Critical Care
Hospitalist
Hospitalist
Internal Medicine
Hospitalist
Hospitalist
Internal Medicine
Pulmonology
Pulmonology
Critical Care
Internal Medicine
Nephrology
Nephrology
Pulmonology
Pulmonology
Internal Medicine
Pulmonology
Pulmonology

## 2023-03-15 NOTE — DISCHARGE NOTE PROVIDER - HOSPITAL COURSE
PMH of HTN, HLD, CHF, a fib not on AC, hyopthyroidism, CKD (baseline Cr 1.7) and COPD on 4L NC    Presenting for SOB.     As per daughter, pt was wheezing at home in the evening, looked SOB and looked like she was holding her breath, became lethargic so they called EMS. Daughter later noticed she was off of her oxygen for some unknown amount of time. Patient also vomited once with EMS. No fever, cough, congestion or abdominal pain.    In the ED :  BP: 174/78  HR : 79  RR : 20  T : 97  O2: 94% on 6L NC -> later on AVAPS    Hb 9.1 (baseline 11)  Cr 2.3 (baseline 1.3)  New transaminitis : AST/ALT /Alk phos : 60/45/139  Trop :0.03  BNP:  830 (baseline 500)  VBG: pH 7.24 , CO2: 88 , HCO3 : 30 -> placed on AVAPS -> pH 7.33 , CO2: 72 , HCO3 : 38    CT abdomen/pelvis : for vomiting : No evidence of acute abdominal or pelvic pathology.  CT Head : No evidence of intracranial hemorrhage, territorial infarct, or mass effect.  ECG : Normal sinus rhythm    #Acute on Chronic Hypoxic/Hypercapnic respiratory Failure (On Home O2 4L)  #COPD Exacerbation  #Possible new aspiration PNA  - clinically improving, tolerating 4L NC, but refused KIMANI at night again  - Recommending NIV at bedtime  - Prednisone 40mg tapered down today to 20mg (taper on dc)  - Completed 5 day course of Doxycycline on 3/8/23   - 3/4/23 LE duplex negative for DVT  - PT: home pt   - 3/14 Procal negative - can hold off on Abx   - 3/13 CXR New right-sided patchy opacities  - f/u with pulmonologist outpatient     #MAURI on CKD Stage 3 - improved, prerenal v BARRINGTON  #Hyperkalemia  #Urinary retention  - holding lasix and IVF  - renal function improved  - encourage PO hydration  - s/p renal eval  - TOV passed    #Chronic HFpEF  #HTN/HLD  #Elevated troponin likely demand ischemia  - Cont metoprolol ER 25mg daily   - Cont lipitor 20mg qHs  - Holding lasix for now  - Cont ASA 81mg qD    #Chronic Afib  #Not on AC due to recurrent falls  - on dronedarone and metoprolol   - repeat EKG with improving  QTc     #Hypothyroidism   - Cont synthroid 112mcg qD, check TSH    Patient is clinically stable at this time to be discharged. Patient to follow up with her PCP outpatient in 1 week. Patient to follow up with her pulmonologist in 1-2 weeks.

## 2023-03-15 NOTE — PROGRESS NOTE ADULT - SUBJECTIVE AND OBJECTIVE BOX
TANNA MCCRACKEN 89y Female  MRN#: [id number]   CODE STATUS:__DNR/DNI______    Hospital Day: 11d    Patient is currently admitted with the primary diagnosis of acute on chronic hypoxic/hypercapnic respiratory failure.     SUBJECTIVE:    Patient seen and examined at bedside this am. Patient is stable.     OBJECTIVE:  PAST MEDICAL & SURGICAL HISTORY:  COPD (chronic obstructive pulmonary disease)    Hypothyroid    HTN (hypertension)    High cholesterol    Colitis    CKD (chronic kidney disease)    No significant past surgical history                         ALLERGIES:  No Known Allergies                                       HOME MEDICATIONS:  Home Medications:  albuterol 2.5 mg/3 mL (0.083%) inhalation solution: 3 milliliter(s) inhaled every 6 hours, As Needed for wheezing, shortness of breath (15 Aug 2022 23:26)  aspirin 81 mg oral tablet: 1 tab(s) orally once a day (02 Oct 2022 19:42)  ATORVASTATIN 20MG TABLETS: each orally once a day (06 Oct 2022 13:41)  Lasix 20 mg oral tablet: 1 tab(s) orally once a day (06 Oct 2022 13:52)  melatonin 5 mg oral tablet: 1 tab(s) orally once a day (at bedtime), As Needed, to help you sleep at night (15 Aug 2022 23:26)  Multaq 400 mg oral tablet: 1 tab(s) orally 2 times a day (02 Oct 2022 19:42)  TRELEGY ELLIPTA 100-62.5MCG INH 30P: INHALE 1 PUFF BY MOUTH DAILY (15 Aug 2022 23:26)                           MEDICATIONS:  STANDING MEDICATIONS  albuterol/ipratropium for Nebulization 3 milliLiter(s) Nebulizer every 4 hours  aspirin  chewable 81 milliGRAM(s) Oral daily  atorvastatin 20 milliGRAM(s) Oral at bedtime  chlorhexidine 2% Cloths 1 Application(s) Topical <User Schedule>  dronedarone 400 milliGRAM(s) Oral two times a day  levothyroxine 112 MICROGram(s) Oral daily  melatonin 5 milliGRAM(s) Oral at bedtime  metoprolol succinate ER 25 milliGRAM(s) Oral daily  pantoprazole    Tablet 40 milliGRAM(s) Oral before breakfast  predniSONE   Tablet 20 milliGRAM(s) Oral daily  tamsulosin 0.4 milliGRAM(s) Oral at bedtime    PRN MEDICATIONS  acetaminophen     Tablet .. 650 milliGRAM(s) Oral every 6 hours PRN  aluminum hydroxide/magnesium hydroxide/simethicone Suspension 30 milliLiter(s) Oral every 4 hours PRN  guaifenesin/dextromethorphan Oral Liquid 10 milliLiter(s) Oral every 4 hours PRN                                  VITAL SIGNS: Last 24 Hours  T(C): 36.4 (15 Mar 2023 05:00), Max: 36.4 (15 Mar 2023 05:00)  T(F): 97.5 (15 Mar 2023 05:00), Max: 97.5 (15 Mar 2023 05:00)  HR: 64 (15 Mar 2023 05:00) (64 - 81)  BP: 131/61 (15 Mar 2023 05:00) (113/59 - 135/63)  BP(mean): --  RR: 18 (15 Mar 2023 05:00) (18 - 19)  SpO2: 100% (15 Mar 2023 06:34) (96% - 100%)      03-14-23 @ 07:01  -  03-15-23 @ 07:00  --------------------------------------------------------  IN: 0 mL / OUT: 500 mL / NET: -500 mL      LABS:                        10.9   12.04 )-----------( 220      ( 14 Mar 2023 08:22 )             35.5     03-14    138  |  99  |  37<H>  ----------------------------<  91  4.5   |  29  |  1.2    Ca    8.9      14 Mar 2023 08:22  Mg     1.9     03-14    TPro  5.8<L>  /  Alb  3.6  /  TBili  0.6  /  DBili  x   /  AST  20  /  ALT  21  /  AlkPhos  83  03-14    RADIOLOGY:           < from: Xray Chest 1 View- PORTABLE-Routine (Xray Chest 1 View- PORTABLE-Routine in AM.) (03.14.23 @ 06:43) >  Impression:    Stable bilateral opacities.    --- End of Report ---        < end of copied text >                             PHYSICAL EXAM:  General: Resting comfortably in no acute painful distress  HEENT: Normocephalic, atraumatic  LUNGS: decreased bs bilaterally   HEART: S1S2 present, regular rate and rhythm  ABDOMEN: Soft, nontender, nondistended  EXT: No edema

## 2023-03-15 NOTE — PROGRESS NOTE ADULT - REASON FOR ADMISSION
Resp Failure
SOB
Resp Failure

## 2023-03-15 NOTE — DISCHARGE NOTE PROVIDER - NSDCFUSCHEDAPPT_GEN_ALL_CORE_FT
Jose Ramon Jose  [facility]  PULMMED [address]  Scheduled Appointment: 05/05/2023

## 2023-03-15 NOTE — PROGRESS NOTE ADULT - ASSESSMENT
89 y/o woman with PMH of HTN, HLD, HFpEF, Afib not on AC, hypothyroidism, CKD 3 (baseline Cr 1.5) and COPD on 4L NC presented with sob and lethargy and  admitted for acute hypercapnic respiratory failure due to COPD Exacerbation.    1. Acute on Chronic Hypoxemic and Hypercapnic respiratory Failure (On Home O2 4L at home)  COPD Exacerbation  Possible new aspiration pneumonia - RLL  - clinically improved, tolerating 3L NC, but refusing NIV at night (twice)  - importance of NIV at night stressed to the pt - son states that he will ensure that she uses it at home  - prednisone taper  - resume Trelegy on discharge  - Completed 5 day course of Doxycycline on 3/8/23   - 3/4/23 LE duplex negative for DVT  - pt ambulating with PT today  - 3/14 Procal negative - hold off on Abx   - 3/13 CXR New right-sided patchy opacities -stable on CXR 3/14  - case discussed with pulmonary - stable for discharge home with outpt f/u    2. MAURI on CKD Stage 3 - improved  prerenal v BARRINGTON  Hyperkalemia - resolved  Urinary retention -resolved  - holding lasix and IVF  - renal function improved  - encourage PO hydration  - s/p renal eval  - passed TOV    3. Chronic HFpEF - lasix 20mg daily can be resumed on discharge    4. HTN - on metoprolol    5. Hyperlipidemia on statin    6. Chronic Afib on dronedarone and ASA    7. Elevated troponin likely demand ischemia  on ASA/lipitor/metoprolol    8. Hypothyroidism   on synthroid 112mcg qD      DNR/DNI status    overall guarded prognosis  poor prognosis if pt is noncompliant with NIV and pt will be at risk for readmission    med reconciliation reviewed with the resident today  discharge papers will be reviewed    spent 50 minutes on the discharge process of this pt

## 2023-03-15 NOTE — PROGRESS NOTE ADULT - ASSESSMENT
Impression:    Acute on chronic hypercapnic resp failure on NC improved  COPD exacerbation, improved  Possible RLL pneumonia/ aspiration  MAURI on CKD improving  Severe COPD & Chronic Hypoxemic Respiratory Failure on 3-4L NC at home   Chronic afib not on AC due to recurrent falls  HfpEF        PLAN:    CNS: avoid sedatives.     HEENT:  Oral care    PULMONARY:  HOB @ 45 degrees, aspiration precautions.  neb  q 4, taper steroids, AVAPS at hime    CARDIOVASCULAR:  Avoid volume overload.     GI: GI prophylaxis.  Feeding when off NIV.  Bowel regimen if needed.    RENAL:  FU lytes.  Correct as necessary.  Repeat lytes.     INFECTIOUS DISEASE: sp abx    HEMATOLOGICAL:  DVT prophylaxis.     ENDOCRINE:  Follow up FS.  Insulin protocol if needed.  PT/OT    Very poor overall prognosis

## 2023-03-15 NOTE — DISCHARGE NOTE PROVIDER - NSDCMRMEDTOKEN_GEN_ALL_CORE_FT
albuterol 2.5 mg/3 mL (0.083%) inhalation solution: 3 milliliter(s) inhaled every 6 hours, As Needed for wheezing, shortness of breath  aspirin 81 mg oral tablet: 1 tab(s) orally once a day  ATORVASTATIN 20MG TABLETS: each orally once a day  Lasix 20 mg oral tablet: 1 tab(s) orally once a day  levothyroxine 112 mcg (0.112 mg) oral tablet: 1 tab(s) orally once a day, take one hour before breakfast (on an empty stomach)  melatonin 5 mg oral tablet: 1 tab(s) orally once a day (at bedtime), As Needed, to help you sleep at night  metoprolol succinate 25 mg oral tablet, extended release: 1 tab(s) orally once a day  Multaq 400 mg oral tablet: 1 tab(s) orally 2 times a day  pantoprazole 40 mg oral delayed release tablet: 1 tab(s) orally once a day (before a meal); take 30 minutes before breakfast  predniSONE 10 mg oral tablet: 2 tab(s) orally once a day   tamsulosin 0.4 mg oral capsule: 1 cap(s) orally once a day (at bedtime)  TRELEGY ELLIPTA 100-62.5MCG INH 30P: INHALE 1 PUFF BY MOUTH DAILY

## 2023-03-15 NOTE — DISCHARGE NOTE PROVIDER - NSDCFUADDINST_GEN_ALL_CORE_FT
Please use your AVAPS at night or as needed. Please follow up with your PCP in 1-2 weeks. Please follow up with your pulmonologist soon.

## 2023-03-15 NOTE — PROGRESS NOTE ADULT - ASSESSMENT
89 y/o female PMH of HTN, HLD, CHF, a fib not on AC, hypothyroidism, CKD (baseline Cr 1.5) and COPD on 4L NC came for sob and lethargy and vomited once in the EMS ,  admitted for acute hypercapnic respiratory failure due to COPD Exacerbation.    #Acute on Chronic Hypoxic/Hypercapnic respiratory Failure (On Home O2 4L)  #COPD Exacerbation  #Possible new aspiration PNA  - clinically improving, tolerating 4L NC, but refused KIMANI at night again  - Recommending NIV at bedtime  - Prednisone 40mg tapered down today to 20mg (taper on dc)  - Completed 5 day course of Doxycycline on 3/8/23   - 3/4/23 LE duplex negative for DVT  - PT f/u  - 3/14 Procal negative - can hold off on Abx - if clinically worsens can start Abx  - 3/13 CXR New right-sided patchy opacities.  - Pulm following    #MAURI on CKD Stage 3 - improved, prerenal v BARRINGTON  #Hyperkalemia  #Urinary retention  - holding lasix and IVF  - renal function improved  - encourage PO hydration  - s/p renal eval  - TOV passed, on primafit only now     #Chronic HFpEF  #HTN/HLD  #Elevated troponin likely demand ischemia  - Cont metoprolol ER 25mg daily   - Cont lipitor 20mg qHs  - Holding lasix for now  - Cont ASA 81mg qD    #Chronic Afib  #Not on AC due to recurrent falls  - on dronedarone and metoprolol   - repeat EKG with improving  QTc     #Hypothyroidism   - Cont synthroid 112mcg qD, check TSH    #DVT PPX: None  #GI Ppx: PPI   #Activity:  IAT  #Diet: DASH/TLC  #Dispo: TBD  #Pending: improvement in respiratory status; Home AVAPS setup    Updated pt son at bedside this AM

## 2023-03-15 NOTE — DISCHARGE NOTE PROVIDER - CARE PROVIDER_API CALL
Jose Ramon Jose)  Critical Care Medicine; Internal Medicine; Pulmonary Disease; Sleep Medicine  [address] [place] [address]  Phone: [phone]  Fax: [phone]  Established Patient  Follow Up Time: 2 weeks

## 2023-03-17 PROBLEM — Z86.79 HISTORY OF HYPERTENSION: Status: RESOLVED | Noted: 2023-01-01 | Resolved: 2023-01-01

## 2023-03-17 PROBLEM — Z86.39 HISTORY OF HYPOTHYROIDISM: Status: RESOLVED | Noted: 2023-01-01 | Resolved: 2023-01-01

## 2023-03-17 PROBLEM — Z86.39 HISTORY OF HYPERLIPIDEMIA: Status: RESOLVED | Noted: 2023-01-01 | Resolved: 2023-01-01

## 2023-03-17 NOTE — PLAN
[FreeTextEntry1] : COPD - chronic , stable. uses 4L NC at home. Continue trilegy daily and albuterol as needed. Continue Bipap at night, son states Pt refuses frequently but will try to encourage. deep breathing exercises and ambulation reviewed. f/u with pulmonary 3/27. \par \par CKD - MAURI on CKD. b/l le edema, continue lasix daily. continue tamsulosin. i &0. low Na diet.  f/u pcp. \par \par HFpEF - chronic. b/l Le edema. Continue lasix daily. i & 0, low Na diet, daily weights. f/u with pcp, cardio. \par \par Afib - chronic, rate controlled. continue asa, multaq. f/u with pcp. \par \par

## 2023-03-17 NOTE — PHYSICAL EXAM
[No Acute Distress] : no acute distress [Well-Appearing] : well-appearing [Normal Sclera/Conjunctiva] : normal sclera/conjunctiva [Supple] : supple [No Respiratory Distress] : no respiratory distress  [Clear to Auscultation] : lungs were clear to auscultation bilaterally [Normal Rate] : normal rate  [Regular Rhythm] : with a regular rhythm [Pedal Pulses Present] : the pedal pulses are present [Soft] : abdomen soft [Normal Bowel Sounds] : normal bowel sounds [No Joint Swelling] : no joint swelling [Normal Gait] : normal gait [Alert and Oriented x3] : oriented to person, place, and time [Normal Mood] : the mood was normal [de-identified] : 4L NC [de-identified] : b/l LE +1 edema.  [de-identified] : uses walker

## 2023-03-17 NOTE — ASSESSMENT
[FreeTextEntry1] : Patient is a 89 year old female enrolled in the [facility] TCM program s/p a recent discharge from [facility] on 3/4/23 - 3/15/23 for COPD and MAURI. PMH HTN, HLD, HrpEF, Afib, hypothyroid, CKD, COPD with 4L NC at home. Patient was given iv steriod, iv abx and sent home with HCS. HCS in place. Upon arrival patient alert in NAD, denies CP, SOB, edema, fever, chills, or n/v/d. F/U appointment with pulmonary on 3/27/23. Patient was contacted by SHERRY Rouse from TCM and medication reconciliation was done on 3/16/23, within 48 hours of discharge.

## 2023-03-17 NOTE — COUNSELING
[de-identified] : Pt was informed about CN’s role/ STARS program and overview of transitional care reviewed with patient. Pt educated on topics of importance such as compliance with prescribed medication regimen, COPD action plan and escalation process, adequate hydration, and proper diet. Pt encouraged calling CN with any issues, concerns or questions, also educated to notify CN if experiencing CP, SOB, cough, increased mucus production, increased use of rescue inhaler, fever, chills, fatigue, “chest cold symptoms” dizziness, lightheadedness, n/v/d/c, swelling to extremities and/or any signs of COPD exacerbation/flare as reviewed. Reassurance provided. Will continue to monitor\par \par Pt was informed about CN’s role/ STARS program and overview of transitional care reviewed with patient. In addition, yellow contact card was provided. Pt educated on topics of importance such as compliance with all provider visits, prescribed medication regimen, and low salt diet. Pt encouraged calling CN with any issues, concerns or questions, also educated to notify CN if experiencing CP, SOB , cough, increased mucus/phlegm production, abdominal discomfort/swelling, difficulty sleeping or lying flat, fever, chills, fatigue, weight gain of 2-3lbs in 24 hours or 5lbs in one week,  dizziness, lightheadedness, n/v/d/c, swelling to extremities and/or any signs of CHF exacerbation as reviewed. Reassurance provided. Will continue to monitor\par

## 2023-03-17 NOTE — HISTORY OF PRESENT ILLNESS
[FreeTextEntry1] : follow up appointment after being discharged from hospital for COPD and MAURI.  [de-identified] : Patient is a 89 year old female enrolled in the [facility] TCM program s/p a recent discharge from [facility] on 3/4/23 - 3/15/23 for COPD and MAURI. PMH HTN, HLD, HrpEF, Afib, hypothyroid, CKD, COPD with 4L NC at home. Patient was given iv steriod, iv abx and sent home with HCS. HCS in place. Upon arrival patient alert in NAD, denies CP, SOB, edema, fever, chills, or n/v/d. F/U appointment with pulmonary on 3/27/23. Patient was contacted by SHERRY Rouse from TCM and medication reconciliation was done on 3/16/23, within 48 hours of discharge. \par \par Copied from [facility]:\par \par  PMH of HTN, HLD, CHF, a fib not on AC, hyopthyroidism, CKD (baseline Cr 1.7) and COPD on 4L NC\par Presenting for SOB. \par As per daughter, pt was wheezing at home in the evening, looked SOB and looked like she was holding her breath, became lethargic so they called EMS. Daughter later noticed she was off of her oxygen for some unknown amount of time. Patient also vomited once with EMS. No fever, cough, congestion or abdominal pain.\par In the ED :\par BP: 174/78\par HR : 79\par RR : 20\par T : 97\par O2: 94% on 6L NC -> later on AVAPS\par Hb 9.1 (baseline 11)\par Cr 2.3 (baseline 1.3)\par New transaminitis : AST/ALT /Alk phos : 60/45/139\par Trop :0.03\par BNP: 830 (baseline 500)\par VBG: pH 7.24 , CO2: 88 , HCO3 : 30 -> placed on AVAPS -> pH 7.33 , CO2: 72 , HCO3 : 38\par CT abdomen/pelvis : for vomiting : No evidence of acute abdominal or pelvic pathology.\par CT Head : No evidence of intracranial hemorrhage, territorial infarct, or mass effect.\par ECG : Normal sinus rhythm\par #Acute on Chronic Hypoxic/Hypercapnic respiratory Failure (On Home O2 4L)\par #COPD Exacerbation\par #Possible new aspiration PNA\par - clinically improving, tolerating 4L NC, but refused KIMANI at night again\par - Recommending NIV at bedtime\par - Prednisone 40mg tapered down today to 20mg (taper on dc)\par - Completed 5 day course of Doxycycline on 3/8/23 \par - 3/4/23 LE duplex negative for DVT\par - PT: home pt \par - 3/14 Procal negative - can hold off on Abx \par - 3/13 CXR New right-sided patchy opacities\par - f/u with pulmonologist outpatient \par #MAURI on CKD Stage 3 - improved, prerenal v BARRINGTON\par #Hyperkalemia\par #Urinary retention\par - holding lasix and IVF\par - renal function improved\par - encourage PO hydration\par - s/p renal eval\par - TOV passed\par #Chronic HFpEF\par #HTN/HLD\par #Elevated troponin likely demand ischemia\par - Cont metoprolol ER 25mg daily \par - Cont lipitor 20mg qHs\par - Holding lasix for now\par - Cont ASA 81mg qD\par #Chronic Afib\par #Not on AC due to recurrent falls\par - on dronedarone and metoprolol \par - repeat EKG with improving QTc \par #Hypothyroidism \par - Cont synthroid 112mcg qD, check TSH\par Patient is clinically stable at this time to be discharged. Patient to follow up with her PCP outpatient in 1 week. Patient to follow up with her pulmonologist in 1-2 weeks.\par

## 2023-03-23 PROBLEM — I95.9 HYPOTENSION: Status: ACTIVE | Noted: 2023-01-01

## 2023-03-23 NOTE — PHYSICAL EXAM
[No Acute Distress] : no acute distress [Well-Appearing] : well-appearing [Normal Sclera/Conjunctiva] : normal sclera/conjunctiva [Supple] : supple [No Respiratory Distress] : no respiratory distress  [Clear to Auscultation] : lungs were clear to auscultation bilaterally [Normal Rate] : normal rate  [Regular Rhythm] : with a regular rhythm [Pedal Pulses Present] : the pedal pulses are present [No Edema] : there was no peripheral edema [Soft] : abdomen soft [Normal Bowel Sounds] : normal bowel sounds [No Joint Swelling] : no joint swelling [No Rash] : no rash [Normal Gait] : normal gait [Alert and Oriented x3] : oriented to person, place, and time [Normal Mood] : the mood was normal [de-identified] : 4L NC [de-identified] : uses walker

## 2023-03-23 NOTE — ASSESSMENT
[FreeTextEntry1] : Patient is a 89 year old female enrolled in the [facility] TCM program s/p a recent discharge from [facility] on 3/4/23 - 3/15/23 for COPD and MAURI. PMH HTN, HLD, HrpEF, Afib, hypothyroid, CKD, COPD with 4L NC at home. HCS in place.  F/U appointment with pulmonary on 3/27/23. Patient was contacted by SHERRY Rouse from TCM and medication reconciliation was done on 3/16/23, within 48 hours of discharge. \par \par I did initial home visit on 3/17/23. Today upon arrival patient alert in NAD, denies CP, SOB, edema, fever, chills, or n/v/d. Pt states earlier this morning and yesterday she felt weak and fatigue and he BP was reading low. Pt went to see her cardiologist Dr Murray yesterday 3/22/23 for this same complaint.

## 2023-03-23 NOTE — PLAN
[FreeTextEntry1] : COPD - chronic , stable. uses 4L NC at home. Continue trilegy daily and albuterol as needed. Continue Bipap at night. deep breathing exercises and ambulation reviewed. f/u with pulmonary 3/27. \par \par Hypotension - BP 88/42 today. Pt c/o fatigue. Son gave extra dose of lasix 2 days in a row for le edema. Today on exam , no LE edema, lungs clear. Poor fluid intake, as per son, states she does not intake much water. Spoke with Dr. Smooth Murray who seen Pt in the office yesterday, advised to hold lasix x 2 day then resume. Reached out to [facility] SHERRY Magaña. He visited Pt later in the afternoon BP was 102/60 and he will visit her in the home again tomorrow. Son and Pt aware of plan and in agreement. \par \par HFpEF - chronic.hold lasix x2 days. i & 0, low Na diet, daily weights. f/u with cardio. \par \par Afib - chronic, rate controlled. continue asa, multaq. f/u with pcp. \par \par

## 2023-03-23 NOTE — HISTORY OF PRESENT ILLNESS
[FreeTextEntry1] : follow up appointment after being discharged from hospital for COPD and MAURI.  [de-identified] : Patient is a 89 year old female enrolled in the \[facility]\"" TCM program s/p a recent discharge from [facility] on 3/4/23 - 3/15/23 for COPD and MAURI. PMH HTN, HLD, HrpEF, Afib, hypothyroid, CKD, COPD with 4L NC at home. HCS in place.  F/U appointment with pulmonary on 3/27/23. Patient was contacted by SHERRY Rouse from TCM and medication reconciliation was done on 3/16/23, within 48 hours of discharge. \par \par I did initial home visit on 3/17/23. Today upon arrival patient alert in NAD, denies CP, SOB, edema, fever, chills, or n/v/d. Pt states earlier this morning and yesterday she felt weak and fatigue and he BP was reading low. Pt went to see her cardiologist Dr Murray yesterday 3/22/23 for this same complaint. \par

## 2023-03-23 NOTE — COUNSELING
[de-identified] : Pt was informed about CN’s role/ STARS program and overview of transitional care reviewed with patient. Pt educated on topics of importance such as compliance with prescribed medication regimen, COPD action plan and escalation process, adequate hydration, and proper diet. Pt encouraged calling CN with any issues, concerns or questions, also educated to notify CN if experiencing CP, SOB, cough, increased mucus production, increased use of rescue inhaler, fever, chills, fatigue, “chest cold symptoms” dizziness, lightheadedness, n/v/d/c, swelling to extremities and/or any signs of COPD exacerbation/flare as reviewed. Reassurance provided. Will continue to monitor\par \par Pt was informed about CN’s role/ STARS program and overview of transitional care reviewed with patient. In addition, yellow contact card was provided. Pt educated on topics of importance such as compliance with all provider visits, prescribed medication regimen, and low salt diet. Pt encouraged calling CN with any issues, concerns or questions, also educated to notify CN if experiencing CP, SOB , cough, increased mucus/phlegm production, abdominal discomfort/swelling, difficulty sleeping or lying flat, fever, chills, fatigue, weight gain of 2-3lbs in 24 hours or 5lbs in one week,  dizziness, lightheadedness, n/v/d/c, swelling to extremities and/or any signs of CHF exacerbation as reviewed. Reassurance provided. Will continue to monitor\par

## 2023-03-27 PROBLEM — J90 PLEURAL EFFUSION: Status: ACTIVE | Noted: 2021-03-23

## 2023-03-27 NOTE — PROCEDURE
[FreeTextEntry1] : CXR PA/ LAR COUGH\par \par HYPEERINFLATED/ NO ACUTE INFILTRATES\par \par IMPRESSION\par \par UNCHANGED COMPARED TO PRIOR

## 2023-03-27 NOTE — DISCUSSION/SUMMARY
[FreeTextEntry1] : SEVERE COPD NOW EXACERBATION\par SOB OB EXERTION\par SON / DAUGHTER PRESENT SPOKE REGARDING ADVANCE DIRECTIVES\par POOR PROGNOSIS

## 2023-04-03 PROBLEM — J44.9 COPD (CHRONIC OBSTRUCTIVE PULMONARY DISEASE): Status: ACTIVE | Noted: 2021-03-23

## 2023-04-03 PROBLEM — I48.91 ATRIAL FIBRILLATION: Status: ACTIVE | Noted: 2023-01-01

## 2023-04-03 PROBLEM — N18.9 CHRONIC KIDNEY DISEASE, UNSPECIFIED CKD STAGE: Status: ACTIVE | Noted: 2023-01-01

## 2023-04-03 PROBLEM — I50.9 HEART FAILURE: Status: ACTIVE | Noted: 2023-01-01

## 2023-04-03 NOTE — COUNSELING
[de-identified] : Pt was informed about CN’s role/ STARS program and overview of transitional care reviewed with patient. Pt educated on topics of importance such as compliance with prescribed medication regimen, COPD action plan and escalation process, adequate hydration, and proper diet. Pt encouraged calling CN with any issues, concerns or questions, also educated to notify CN if experiencing CP, SOB, cough, increased mucus production, increased use of rescue inhaler, fever, chills, fatigue, “chest cold symptoms” dizziness, lightheadedness, n/v/d/c, swelling to extremities and/or any signs of COPD exacerbation/flare as reviewed. Reassurance provided. Will continue to monitor\par \par Pt was informed about CN’s role/ STARS program and overview of transitional care reviewed with patient. In addition, yellow contact card was provided. Pt educated on topics of importance such as compliance with all provider visits, prescribed medication regimen, and low salt diet. Pt encouraged calling CN with any issues, concerns or questions, also educated to notify CN if experiencing CP, SOB , cough, increased mucus/phlegm production, abdominal discomfort/swelling, difficulty sleeping or lying flat, fever, chills, fatigue, weight gain of 2-3lbs in 24 hours or 5lbs in one week,  dizziness, lightheadedness, n/v/d/c, swelling to extremities and/or any signs of CHF exacerbation as reviewed. Reassurance provided. Will continue to monitor\par  Attending and PA/NP shared services statement (NON-critical care):

## 2023-04-03 NOTE — PLAN
[FreeTextEntry1] : COPD - chronic , stable. uses 4L NC at home. Continue trilegy daily and albuterol as needed. Continue Bipap at night.  Deep breathing exercises and ambulation reviewed. continue oral steroids as prescribed by pulmonologist. \par \par CKD - b/l le edema, continue lasix daily. continue tamsulosin. i &0. low Na diet.  f/u pcp. \par \par HFpEF - chronic. b/l Le edema. Continue lasix daily. i & 0, low Na diet. daily weights encouraged. f/u with pcp, cardio visited competed on 3/22/23. \par \par Afib - chronic, rate controlled. continue asa, multaq. f/u with pcp. \par \par

## 2023-04-03 NOTE — ASSESSMENT
[FreeTextEntry1] : Patient is a 89 year old female enrolled in the [facility] TCM program s/p a recent discharge from [facility] on 3/4/23 - 3/15/23 for COPD and MAURI. PMH HTN, HLD, HrpEF, Afib, hypothyroid, CKD, COPD with 4L NC at home. HCS in place.  F/U appointment with pulmonary on 3/27/23, was started on oral steriods for copd exacerbation. Cardio visit competed on 3/22/23Patient was contacted by SHERRY Rouse from TCM and medication reconciliation was done on 3/16/23, within 48 hours of discharge. \par \par I did initial home visit on 3/17/23 and 3/23/23. Today upon arrival patient alert, smiling, talkative,  NAD, denies CP, SOB, fever, chills, or n/v/d. c/o LE edema. Denies weight gain of more then 2 lbs per day of 5 per week.

## 2023-04-03 NOTE — REVIEW OF SYSTEMS
[Lower Ext Edema] : lower extremity edema [Negative] : Heme/Lymph [Fever] : no fever [Chills] : no chills [Night Sweats] : no night sweats [Recent Change In Weight] : ~T no recent weight change [Chest Pain] : no chest pain [Palpitations] : no palpitations

## 2023-04-03 NOTE — PHYSICAL EXAM
[No Acute Distress] : no acute distress [Well-Appearing] : well-appearing [Normal Sclera/Conjunctiva] : normal sclera/conjunctiva [Supple] : supple [No Respiratory Distress] : no respiratory distress  [Scattered Wheezes] : scattered wheezing was heard [Normal Rate] : normal rate  [Regular Rhythm] : with a regular rhythm [Pedal Pulses Present] : the pedal pulses are present [Soft] : abdomen soft [Normal Bowel Sounds] : normal bowel sounds [No Joint Swelling] : no joint swelling [No Rash] : no rash [Normal Gait] : normal gait [Alert and Oriented x3] : oriented to person, place, and time [Normal Mood] : the mood was normal [de-identified] : 4L NC [de-identified] : b/l LE edema, above compression socks below knee.  [de-identified] : uses walker

## 2023-04-03 NOTE — HISTORY OF PRESENT ILLNESS
[Family Member] : family member [FreeTextEntry1] : follow up appointment after being discharged from hospital for COPD and MAURI.  [de-identified] : Patient is a 89 year old female enrolled in the [facility] TCM program s/p a recent discharge from [facility] on 3/4/23 - 3/15/23 for COPD and MAURI. PMH HTN, HLD, HrpEF, Afib, hypothyroid, CKD, COPD with 4L NC at home. HCS in place.  F/U appointment with pulmonary on 3/27/23, was started on oral steroids for copd exacerbation. Cardio visit competed on 3/22/23. Patient was contacted by SHERRY Rouse from TCM and medication reconciliation was done on 3/16/23, within 48 hours of discharge. \par \par I did initial home visit on 3/17/23 and 3/23/23. Today upon arrival patient alert, smiling, talkative,  NAD, denies CP, SOB, fever, chills, or n/v/d. c/o LE edema. Denies weight gain of more then 2 lbs per day of 5 per week.  \par

## 2023-04-14 NOTE — ED PROVIDER NOTE - CLINICAL SUMMARY MEDICAL DECISION MAKING FREE TEXT BOX
89-year-old female with past medical history of high blood pressure, hyperlipidemia, CHF, atrial fibrillation not on anticoagulation, hypothyroidism, CKD, COPD on 4 L nasal cannula; baseline saturation anywhere from 95-98, but states when she wakes up can be in the high 80s low 90s presents today for mechanical fall.  Son reports patient lives with her and she was using her walker he turned around for second and patient tripped on soda cans that were on the floor, did not hit her head, no LOC, landed on her right arm and sustained a skin tear to the right forearm.  Not sure patient is up-to-date on tetanus.  Patient confirms the story.  Denies any complaint.  Denies any pain to the arm, no head trauma, no LOC.   Recalls all events. No fever, chills, n/v, cp,  pleuritic cp, sob, palpitations, diaphoresis, cough, chest wall/rib pain, clavicular pain, ankle pain, knee pain, shoulder pain, wrist pain, no back pain, no hip pain, no ha/lh/dizziness, numbness/tingling, neck pain/ stiffness, abd pain,  melena/brbpr, urinary symptoms, edema, calf pain/swelling/erythema, sick contacts, recent travel . GCS 15.    On Exam:  Vital Signs: I have reviewed the initial vital signs.  Constitutional: Non toxic appearing pt sitting on stretcher in nad.   HEAD: No signs of basilar skull fracture.  Integumentary: No rash. (+) R forearm skin tear with hematoma, no active bleeding, no tendon rupture in a bloodless field, no deep  lacerations. (+) soft compartments.   EYES: No periorbital swelling/ecchymoses. EOM intact. No nystagmus. No subconjunctival hemorrhage.   ENT: MMM. No rhinorrhea/otorrhea. No epistaxis,  No septal hematoma. No mastoid ecchymoses. No intraoral bleeding, No loose or cracked teeth, no active bleeding. No malocclusion. No TMJ pain.  NECK: Supple, non-tender, No palpable shelves or step-offs.  BACK: No spinous tenderness. No palpable shelves or step-offs.  Cardiovascular:  radial pulses 2/4 b/l. dp and pt pulses 2/4/ b/l. No pain to palpation to chest wall.  Respiratory: BS present b/l, ctabl, no wheezing or crackles, no stridor. No pain to palpation to ribs b/l. No crepitus. Pt on 4l with saturation in mid 90's which son states is baseline.   Gastrointestinal: BS present throughout all 4 quadrants, soft, nd, nt. no r/g.  Musculoskeletal: FROM of all extremities. No bony tenderness. No pain to palpation to clavicles, no obvious bony deformities. No hip pain. No short leg. No internal or external rotation of LE. NO pain to shulders, elbows, wrists, no snuff box tenderness with FROM.   Neurologic: GCS 15. CN II-XII intact, no facial droop or slurring of speech. Motor 5/5 and sensation intact throughout upper and lower extremities.    Plan: Tdap, cxr, pelvic xray, ct head, neck, R forearm and elbow xrays, reassess.     Imaging was ordered and reviewed by me.  Appropriate medications for patient's presenting complaints were ordered and effects were reassessed.  Patient's records (prior hospital, ED visit,) were reviewed.  Additional history was obtained from family. 89-year-old female with past medical history of high blood pressure, hyperlipidemia, CHF, atrial fibrillation not on anticoagulation, hypothyroidism, CKD, COPD on 4 L nasal cannula; baseline saturation anywhere from 95-98, but states when she wakes up can be in the high 80s low 90s presents today for mechanical fall.  Son reports patient lives with her and she was using her walker he turned around for second and patient tripped on soda cans that were on the floor, did not hit her head, no LOC, landed on her right arm and sustained a skin tear to the right forearm.  Not sure patient is up-to-date on tetanus.  Patient confirms the story.  Denies any complaint.  Denies any pain to the arm, no head trauma, no LOC.   Recalls all events. No fever, chills, n/v, cp,  pleuritic cp, sob, palpitations, diaphoresis, cough, chest wall/rib pain, clavicular pain, ankle pain, knee pain, shoulder pain, wrist pain, no back pain, no hip pain, no ha/lh/dizziness, numbness/tingling, neck pain/ stiffness, abd pain,  melena/brbpr, urinary symptoms, edema, calf pain/swelling/erythema, sick contacts, recent travel . GCS 15.    On Exam:  Vital Signs: I have reviewed the initial vital signs.  Constitutional: Non toxic appearing pt sitting on stretcher in nad.   HEAD: No signs of basilar skull fracture.  Integumentary: No rash. (+) R forearm skin tear with hematoma, no active bleeding, no tendon rupture in a bloodless field, no deep  lacerations. (+) soft compartments.   EYES: No periorbital swelling/ecchymoses. EOM intact. No nystagmus. No subconjunctival hemorrhage.   ENT: MMM. No rhinorrhea/otorrhea. No epistaxis,  No septal hematoma. No mastoid ecchymoses. No intraoral bleeding, No loose or cracked teeth, no active bleeding. No malocclusion. No TMJ pain.  NECK: Supple, non-tender, No palpable shelves or step-offs.  BACK: No spinous tenderness. No palpable shelves or step-offs.  Cardiovascular:  radial pulses 2/4 b/l. dp and pt pulses 2/4/ b/l. No pain to palpation to chest wall.  Respiratory: BS present b/l, ctabl, no wheezing or crackles, no stridor. No pain to palpation to ribs b/l. No crepitus. Pt on 4l with saturation in mid 90's which son states is baseline.   Gastrointestinal: BS present throughout all 4 quadrants, soft, nd, nt. no r/g.  Musculoskeletal: FROM of all extremities. No bony tenderness. No pain to palpation to clavicles, no obvious bony deformities. No hip pain. No short leg. No internal or external rotation of LE. NO pain to shulders, elbows, wrists, no snuff box tenderness with FROM.   Neurologic: GCS 15. CN II-XII intact, no facial droop or slurring of speech. Motor 5/5 and sensation intact throughout upper and lower extremities.    Plan: Tdap, cxr, pelvic xray, ct head, neck, R forearm and elbow xrays, reassess.     Imaging was ordered and reviewed by me.  Appropriate medications for patient's presenting complaints were ordered and effects were reassessed.  Patient's records (prior hospital, ED visit,) were reviewed.  Additional history was obtained from family. Escalation to admission/observation was considered. However patient feels much better and is comfortable with discharge.  Appropriate follow-up was arranged.  Family at bedside with patient eager to leave, aware of proper wound care, signs and symptoms to return for, will follow-up with PMD.  Report patient ambulates with a walker, baseline, comfortable with patient going home.

## 2023-04-14 NOTE — ED ADULT TRIAGE NOTE - CHIEF COMPLAINT QUOTE
s/p trip and fall, son denies her hitting her head, +right arm laceration, patient on ASA daily, POX on 4LNC 82%, Hx COPD on home O2

## 2023-04-14 NOTE — ED PROVIDER NOTE - PATIENT PORTAL LINK FT
You can access the FollowMyHealth Patient Portal offered by [facility] by registering at the following website: [email or web address]. By joining "[employer]"’s FollowMyHealth portal, you will also be able to view your health information using other applications (apps) compatible with our system.

## 2023-04-14 NOTE — ED PROVIDER NOTE - NSFOLLOWUPINSTRUCTIONS_ED_ALL_ED_FT
Please follow up with your primary care doctor.  Take acetaminophen (Tylenol) as needed for pain.  A copy of your results is attached.  Keep your wound clean and dry.    Fall prevention includes ways to make your home and other areas safer. It also includes ways you can move more carefully to prevent a fall. Health conditions that cause changes in your blood pressure, vision, or muscle strength and coordination may increase your risk for falls. Medicines may also increase your risk for falls if they make you dizzy, weak, or sleepy.     SEEK IMMEDIATE MEDICAL ATTENTION IF: You have fallen and are unconscious, you have fallen and cannot move part of your body, or you have fallen and have pain or a headache.     FALL PREVENTION TIPS:  Stand or sit up slowly. Use assistive devices as directed. You may need to have grab bars put in your bathroom near the toilet or in the shower.  Wear shoes that fit well and have soles that . Wear shoes both inside and outside. Do not wear shoes with high heels.  Wear a personal alarm that can call 911 in an emergency.  Manage your medical conditions. Keep all appointments with your healthcare providers. Visit your eye doctor as directed.      HOME SAFETY TIPS:  Put nonslip strips on your bath or shower floor to prevent you from slipping. Use a shower seat so you do not need to stand while you shower. Sit on the toilet or a chair in your bathroom to dry yourself and put on clothing. This will prevent you from losing your balance from drying or dressing yourself while you are standing.  Keep paths clear. Remove books, shoes, and other objects from walkways and stairs. Place cords for telephones and lamps out of the way so that you do not need to walk over them. Tape them down if you cannot move them. Remove small rugs or secure it with double-sided tape to prevent you from tripping. Install bright lights in your home. Use night lights to help light paths to the bathroom or kitchen. Always turn on the light before you start walking.  Keep items you use often on shelves within reach. Do not use a step stool to help you reach an item.  Place reflective tape on the edges of your stairs to see better.

## 2023-04-14 NOTE — ED ADULT NURSE NOTE - OBJECTIVE STATEMENT
pt a 89 yo F who presents to the ED s/p single mechanical fall. pt tripped and fell over her walker landing on her lower back and R arm. pt presents w/ skin avulsions on the R posterior forearm and the R posterior upper arm

## 2023-04-14 NOTE — ED PROVIDER NOTE - CARE PLAN
1 Principal Discharge DX:	Skin tear of right upper arm without complication  Secondary Diagnosis:	Fall

## 2023-04-14 NOTE — ED PROVIDER NOTE - PHYSICAL EXAMINATION
_  CONSTITUTIONAL: ABC intact, GCS 15, NAD  HEAD: NCAT, no signs of basilar skull fx, no depressions  EENT: EOMI, PERRL b/l, no epistaxis, MMM  NECK: No midline C-spine tenderness/step-offs/deformities, no anterior swelling  CV: RRR, equal distal pulses  RESP: Normal work of breathing, symmetric rise and fall of chest  ABD: Soft, NT, no R/G  EXT: No obvious deformity, no tenderness of extremities, cap refill < 2 seconds  CHEST: No clavicular/chest wall tenderness/deformity/tenting/crepitus  PELVIS: No pelvic instability  SKIN: No lacerations, no abrasions; +skin avulsions to RUE  NEURO: AAOx2, no FND (motor strength and sensation grossly intact throughout)  PSYCH: Cooperative, appropriate affect

## 2023-04-14 NOTE — ED PROVIDER NOTE - CARE PROVIDER_API CALL
EARL ESQUIVEL   Internal Medicine  [address] [place] [address]  Phone: [phone]  Fax: [phone]  Follow Up Time:

## 2023-04-14 NOTE — ED PROVIDER NOTE - OBJECTIVE STATEMENT
Patient is a 89-year-old woman with a history of COPD on 4 L NC, paroxysmal atrial fibrillation not on anticoagulation, hypothyroidism, CKD, brought in by her son s/p fall today. -HT, -LOC, -AC. Patient had a witnessed fall: she was ambulating with a walker, tripped, and fell. She experienced skin avulsions to the right elbow and forearm. No SOB, chest/abdominal pain.

## 2023-04-14 NOTE — ED PROVIDER NOTE - PROGRESS NOTE DETAILS
ED Attending LEELA Anderson  Discussed with Dr. Cleary this patient is a start patient, agrees if no significant injury patient can be discharged, pending imaging results. Jeff: CTs negative for acute injury, XR pelvis negative. TDAP given for tetanus ppx due to skin avulsion of R forearm. R arm wounds dressed with bacitracin, nonadherent dressing, kerlix. Wound care instructions given to family.  Pt's son was aggressive towards staff, was disruptive towards patient care, was asked to leave hospital. Pt's other family member currently at bedside.   Pt currently stable for discharge home with f/u with PMD. ED Attending LEELA Anderson  Discussed with Dr. Knight- hospitalist this patient is a star patient, agrees if no significant injury patient can be discharged, pending imaging results. Jeff: CTs negative for acute injury, XR pelvis negative. TDAP given for tetanus ppx due to skin avulsion of R forearm. R arm wounds dressed with bacitracin, nonadherent dressing, kerlix. Wound care instructions given to family.  Pt's son was aggressive towards staff, was disruptive towards patient care.  Pt's other family member currently at bedside.   Pt currently stable for discharge home with f/u with PMD.

## 2023-04-14 NOTE — ED ADULT NURSE REASSESSMENT NOTE - NS ED NURSE REASSESS COMMENT FT1
pt 2 skin avulsions irrigated and cleaned. adaptic placed on wounds and wounds wrapped in kerlix gauze

## 2023-04-14 NOTE — ED PROVIDER NOTE - ATTENDING CONTRIBUTION TO CARE
89-year-old female with past medical history of high blood pressure, hyperlipidemia, CHF, atrial fibrillation not on anticoagulation, hypothyroidism, CKD, COPD on 4 L nasal cannula; baseline saturation anywhere from 95-98, but states when she wakes up can be in the high 80s low 90s presents today for mechanical fall.  Son reports patient lives with her and she was using her walker he turned around for second and patient tripped on soda cans that were on the floor, did not hit her head, no LOC, landed on her right arm and sustained a skin tear to the right forearm.  Not sure patient is up-to-date on tetanus.  Patient confirms the story.  Denies any complaint.  Denies any pain to the arm, no head trauma, no LOC.   Recalls all events. No fever, chills, n/v, cp,  pleuritic cp, sob, palpitations, diaphoresis, cough, chest wall/rib pain, clavicular pain, ankle pain, knee pain, shoulder pain, wrist pain, no back pain, no hip pain, no ha/lh/dizziness, numbness/tingling, neck pain/ stiffness, abd pain,  melena/brbpr, urinary symptoms, edema, calf pain/swelling/erythema, sick contacts, recent travel . GCS 15.    On Exam:  Vital Signs: I have reviewed the initial vital signs.  Constitutional: Non toxic appearing pt sitting on stretcher in nad.   HEAD: No signs of basilar skull fracture.  Integumentary: No rash. (+) R forearm skin tear with hematoma, no active bleeding, no tendon rupture in a bloodless field, no deep  lacerations. (+) soft compartments.   EYES: No periorbital swelling/ecchymoses. EOM intact. No nystagmus. No subconjunctival hemorrhage.   ENT: MMM. No rhinorrhea/otorrhea. No epistaxis,  No septal hematoma. No mastoid ecchymoses. No intraoral bleeding, No loose or cracked teeth, no active bleeding. No malocclusion. No TMJ pain.  NECK: Supple, non-tender, No palpable shelves or step-offs.  BACK: No spinous tenderness. No palpable shelves or step-offs.  Cardiovascular:  radial pulses 2/4 b/l. dp and pt pulses 2/4/ b/l. No pain to palpation to chest wall.  Respiratory: BS present b/l, ctabl, no wheezing or crackles, no stridor. No pain to palpation to ribs b/l. No crepitus.   Gastrointestinal: BS present throughout all 4 quadrants, soft, nd, nt. no r/g.  Musculoskeletal: FROM of all extremities. No bony tenderness. No pain to palpation to clavicles, no obvious bony deformities. No hip pain. No short leg. No internal or external rotation of LE. NO pain to shulders, elbows, wrists, no snuff box tenderness with FROM.   Neurologic: GCS 15. CN II-XII intact, no facial droop or slurring of speech. Motor 5/5 and sensation intact throughout upper and lower extremities.    Plan: Tdap, cxr, pelvic xray, ct head, neck, R forearm and elbow xrays, reassess.

## 2023-04-14 NOTE — ED CLERICAL - NS ED CLERK NOTE PRE-ARRIVAL INFORMATION; ADDITIONAL PRE-ARRIVAL INFORMATION
This patient is enrolled in the STAR readmission reduction initiative and has active care navigation. This patient can be followed up by the care navigation team within 24 hours.  To arrange close follow-up or to obtain additional clinical information, please call the contact number above. The on-call [facility] Hospitalist has been notified and will coordinate care in concert with the ED Physician including consults as necessary. Please reach out to the Hospitalist on-call directly (schedule on AMiON posted on the intranet). You may also call the Hospitalist Division at [phone] at either site. Consider CDU for management per guideline”

## 2023-04-26 NOTE — H&P ADULT - NSHPLABSRESULTS_GEN_ALL_CORE
9.0    8.60  )-----------( 235      ( 26 Apr 2023 12:20 )             29.2       04-26    137  |  96<L>  |  57<H>  ----------------------------<  93  5.5<H>   |  30  |  1.8<H>    Ca    8.6      26 Apr 2023 12:20  Mg     2.5     04-26    TPro  5.9<L>  /  Alb  3.7  /  TBili  0.4  /  DBili  x   /  AST  35  /  ALT  20  /  AlkPhos  103  04-26                      Lactate Trend      CARDIAC MARKERS ( 26 Apr 2023 12:20 )  x     / 0.04 ng/mL / x     / x     / x            CAPILLARY BLOOD GLUCOSE      POCT Blood Glucose.: 100 mg/dL (26 Apr 2023 12:18)

## 2023-04-26 NOTE — ED ADULT NURSE REASSESSMENT NOTE - NS ED NURSE REASSESS COMMENT FT1
received pt from previous RN, pt  A&Ox2, calm, breathing with ease on BIPAP. V/S stable at this time

## 2023-04-26 NOTE — CONSULT NOTE ADULT - ASSESSMENT
Impression:    Acute on chronic hypercapnic resp failure  Acute on chronic hypoxemic respiratory failure  COPD exacerbation  MAURI on CKD  Severe COPD & Chronic Hypoxemic Respiratory Failure on 3-4L NC at home   Chronic afib not on AC due to recurrent falls  HfpEF        PLAN:    CNS: avoid sedatives.     HEENT:  Oral care    PULMONARY: Xray no new infiltrates. Still has acute respiratory acidosis: neb  q 4, BPAP alternate with NC and at night (on AVAPS at home). Solumedrol 60 BID. HOB @ 45 degrees, aspiration precautions.    CARDIOVASCULAR:  Avoid volume overload. Trend troponin.    GI: GI prophylaxis. Feeding when off NIV.  Bowel regimen if needed.    RENAL: Obtain urine Na and cr. FU lytes. Correct as necessary. Repeat lytes.     INFECTIOUS DISEASE: F/u blood and sputum cultures. Ceftriaxone and azithromycin for 5 days.    HEMATOLOGICAL:  DVT prophylaxis.     ENDOCRINE:  Follow up FS.  Insulin protocol if needed.  PT/OT    DISPO: SDU     Impression:    Acute on chronic hypercapnic resp failure  Acute on chronic hypoxemic respiratory failure  COPD exacerbation  MAURI on CKD  Severe COPD & Chronic Hypoxemic Respiratory Failure on 3-4L NC at home   Chronic afib not on AC due to recurrent falls  HfpEF        PLAN:    CNS: avoid sedatives. MS at baseline now.     HEENT:  Oral care    PULMONARY: Xray no new infiltrates. , BPAP alternate with NC and at night (on AVAPS at home). Solumedrol 60 BID for now to be tapered when clinically improved. HOB @ 45 degrees, aspiration precautions. Initial VBG noted with acute on chronic hypercapnia. Repeat VBG in 1 hr.     CARDIOVASCULAR:  Avoid volume overload. Trend troponin.    GI: GI prophylaxis. Feeding when off NIV.  Bowel regimen if needed.    RENAL: Obtain urine Na and cr. FU lytes. Correct as necessary. Repeat lytes. US kidneys and bladder. Gentle IV hydration.     INFECTIOUS DISEASE: F/u blood and sputum cultures. Check procal. Azithromycin for 5 days.     HEMATOLOGICAL:  DVT prophylaxis. Check duplex of the LEs.     ENDOCRINE:  Follow up FS.  Insulin protocol if needed.    MUSC: PT/OT    DISPO: SDU for now; if stays stable and repeat VBG is better then may downgrade to medical floor.

## 2023-04-26 NOTE — H&P ADULT - HISTORY OF PRESENT ILLNESS
89-year-old female past medical history of COPD on 4 L NC, Chronic A-fib not on AC, hypothyroidism, CKDIII, HFpEF presents for evaluation of shortness of breath. Patient developed shortness of breath with associated wheezing today, no inciting or relieving factors.  Endorses increased phlegm over the past couple days.  Denies fever, headache, chest pain, abdominal pain, weakness, numbness, dysuria, hematuria, vomiting, diarrhea. she was palced on CPAP by EMS received 2 DuoNebs and 60 mg of Solu-Medrol     In ED  VT bp 118/53, HR 67, RR 22, T 97.6 F, SpO2 97% on BIPAP  Labs significant for hgb 9.0 (bl 9-10), Cr 1.8 (bl 1.2), Mg 2.5, K 5.5, Trop 0.04,   VBG PH 7.27, pCO2 71, HCO3 33, pO2 78    CXR No evidence of focal consolidation, pleural effusion or pneumothorax    s/p evaluation by CC team, admitted to SDU for managements of AHRF secondary to COPD exacerbation 89-year-old female past medical history of COPD on 4 L NC, Chronic A-fib not on AC, hypothyroidism, CKDIII, HFpEF presents for evaluation of shortness of breath. istory obtained from son who lives with her. Patient developed shortness of breath with associated dry cough for past 3 days, however she was not hypoxic and was saturating 95% on baseline 4 L at home. No reported fever, chills, headache, chest pain, abdominal pain, weakness, numbness, dysuria, hematuria, vomiting, diarrhea. She was placed on CPAP by EMS received 2 DuoNebs and 60 mg of Solu-Medrol. At baseline she is AAOx2-3 and ambulates with walker at home. Pt had recent admission for COPD exacerbation 3/4-3/15.     In ED  VT bp 118/53, HR 67, RR 22, T 97.6 F, SpO2 97% on BIPAP  Labs significant for hgb 9.0 (bl 9-10), Cr 1.8 (bl 1.2), Mg 2.5, K 5.5, Trop 0.04,   VBG PH 7.27, pCO2 71, HCO3 33, pO2 78    CXR No evidence of focal consolidation, pleural effusion or pneumothorax    s/p 1x Levaquin and Cefepime, Solumedrol 65 mg 1x in ED  s/p evaluation by CC team, admitted to SDU for managements of AHRF secondary to COPD exacerbation

## 2023-04-26 NOTE — CONSULT NOTE ADULT - ATTENDING COMMENTS
Impression:    Acute on chronic hypercapnic resp failure  Acute on chronic hypoxemic respiratory failure  COPD exacerbation  MAURI on CKD  Severe COPD & Chronic Hypoxemic Respiratory Failure on 3-4L NC at home   Chronic afib not on AC due to recurrent falls  HfpEF    Impression and plan are my own.

## 2023-04-26 NOTE — CONSULT NOTE ADULT - SUBJECTIVE AND OBJECTIVE BOX
Patient is a 89y old  Female who presents with a chief complaint of     HPI: 89-year-old female past medical history of COPD on 4 L NC, A-fib not on AC, hypothyroid, CKD presents for evaluation of shortness of breath.  Patient developed shortness of breath with associated wheezing today, no inciting or relieving factors.  Endorses increased phlegm over the past couple days.  Denies fever, headache, chest pain, abdominal pain, weakness, numbness, dysuria, hematuria, vomiting, diarrhea.      PAST MEDICAL & SURGICAL HISTORY:  COPD (chronic obstructive pulmonary disease)      Hypothyroid      HTN (hypertension)      High cholesterol      Colitis      CKD (chronic kidney disease)      No significant past surgical history          SOCIAL HX:   Smoking     former    FAMILY HISTORY:  :  No known cardiovacular family hisotry     Review Of Systems:     All ROS are negative except per HPI       Allergies    No Known Allergies    Intolerances          PHYSICAL EXAM    ICU Vital Signs Last 24 Hrs  T(C): 35.7 (26 Apr 2023 12:15), Max: 36.4 (26 Apr 2023 12:01)  T(F): 96.3 (26 Apr 2023 12:15), Max: 97.6 (26 Apr 2023 12:01)  HR: 65 (26 Apr 2023 13:00) (65 - 72)  BP: 101/51 (26 Apr 2023 13:00) (101/51 - 118/53)  BP(mean): 70 (26 Apr 2023 13:00) (70 - 70)  ABP: --  ABP(mean): --  RR: 17 (26 Apr 2023 13:00) (17 - 24)  SpO2: 100% (26 Apr 2023 13:00) (97% - 100%)    O2 Parameters below as of 26 Apr 2023 13:00  Patient On (Oxygen Delivery Method): BiPAP/CPAP    O2 Concentration (%): 50        CONSTITUTIONAL:  NAD    ENT:   Airway patent,   Mouth with normal mucosa.     EYES:   pupils equal,   round and reactive to light.    CARDIAC:   Normal rate,   Regular rhythm.    Heart sounds S1, S2.   No edema    RESPIRATORY:   wheezing   Normal chest expansion  No use of accessory muscles    GASTROINTESTINAL:  Non-tender,   + BS    MUSCULOSKELETAL:   Range of motion is not limited,  Nno clubbing, cyanosis    NEUROLOGICAL:   Alert and oriented   No motor deficits.    SKIN:   Warm and dry  No evidence of rash.    PSYCHIATRIC:   No apparent risk to self or others.                LABS:                          9.0    8.60  )-----------( 235      ( 26 Apr 2023 12:20 )             29.2                                               04-26    137  |  96<L>  |  57<H>  ----------------------------<  93  5.5<H>   |  30  |  1.8<H>    Ca    8.6      26 Apr 2023 12:20  Mg     2.5     04-26    TPro  5.9<L>  /  Alb  3.7  /  TBili  0.4  /  DBili  x   /  AST  35  /  ALT  20  /  AlkPhos  103  04-26                                                 CARDIAC MARKERS ( 26 Apr 2023 12:20 )  x     / 0.04 ng/mL / x     / x     / x                                                LIVER FUNCTIONS - ( 26 Apr 2023 12:20 )  Alb: 3.7 g/dL / Pro: 5.9 g/dL / ALK PHOS: 103 U/L / ALT: 20 U/L / AST: 35 U/L / GGT: x                                                                                                                                       X-Rays reviewed:                                                                                    ECHO    CXR interpreted by me:    MEDICATIONS  (STANDING):  albuterol/ipratropium for Nebulization.. 3 milliLiter(s) Nebulizer every 20 minutes  cefepime   IVPB 2000 milliGRAM(s) IV Intermittent once  lactated ringers. 500 milliLiter(s) (250 mL/Hr) IV Continuous <Continuous>  levoFLOXacin IVPB 750 milliGRAM(s) IV Intermittent once    MEDICATIONS  (PRN):         Patient is a 89y old  Female who presents with a chief complaint of     HPI: 89-year-old female past medical history of COPD on 4 L NC, A-fib not on AC, hypothyroid, CKD presents for evaluation of shortness of breath.  Patient developed shortness of breath with associated wheezing today, no inciting or relieving factors.  Endorses increased phlegm over the past couple days.  Denies fever, headache, chest pain, abdominal pain, weakness, numbness, dysuria, hematuria, vomiting, diarrhea.      PAST MEDICAL & SURGICAL HISTORY:  COPD (chronic obstructive pulmonary disease)      Hypothyroid      HTN (hypertension)      High cholesterol      Colitis      CKD (chronic kidney disease)      No significant past surgical history          SOCIAL HX:   Smoking     former    FAMILY HISTORY:  :  No known cardiovacular family hisotry     Review Of Systems:     All ROS are negative except per HPI       Allergies    No Known Allergies    Intolerances          PHYSICAL EXAM    ICU Vital Signs Last 24 Hrs  T(C): 35.7 (26 Apr 2023 12:15), Max: 36.4 (26 Apr 2023 12:01)  T(F): 96.3 (26 Apr 2023 12:15), Max: 97.6 (26 Apr 2023 12:01)  HR: 65 (26 Apr 2023 13:00) (65 - 72)  BP: 101/51 (26 Apr 2023 13:00) (101/51 - 118/53)  BP(mean): 70 (26 Apr 2023 13:00) (70 - 70)  ABP: --  ABP(mean): --  RR: 17 (26 Apr 2023 13:00) (17 - 24)  SpO2: 100% (26 Apr 2023 13:00) (97% - 100%)    O2 Parameters below as of 26 Apr 2023 13:00  Patient On (Oxygen Delivery Method): BiPAP/CPAP    O2 Concentration (%): 50        CONSTITUTIONAL:  NAD    ENT:   Airway patent,   Mouth with normal mucosa.     EYES:   pupils equal,   round and reactive to light.    CARDIAC:   Normal rate,   Regular rhythm.    Heart sounds S1, S2.   No edema    RESPIRATORY:   wheezing   Normal chest expansion  No use of accessory muscles    GASTROINTESTINAL:  Non-tender,   + BS    MUSCULOSKELETAL:   Range of motion is not limited,  Nno clubbing, cyanosis    NEUROLOGICAL:   Alert and oriented   No motor deficits.    SKIN:   Warm and dry  No evidence of rash.    PSYCHIATRIC:   No apparent risk to self or others.                LABS:                          9.0    8.60  )-----------( 235      ( 26 Apr 2023 12:20 )             29.2                                               04-26    137  |  96<L>  |  57<H>  ----------------------------<  93  5.5<H>   |  30  |  1.8<H>    Ca    8.6      26 Apr 2023 12:20  Mg     2.5     04-26    TPro  5.9<L>  /  Alb  3.7  /  TBili  0.4  /  DBili  x   /  AST  35  /  ALT  20  /  AlkPhos  103  04-26                                                 CARDIAC MARKERS ( 26 Apr 2023 12:20 )  x     / 0.04 ng/mL / x     / x     / x                                                LIVER FUNCTIONS - ( 26 Apr 2023 12:20 )  Alb: 3.7 g/dL / Pro: 5.9 g/dL / ALK PHOS: 103 U/L / ALT: 20 U/L / AST: 35 U/L / GGT: x                                                                                                                                       X-Rays reviewed: clear                                                                                   ECHO    CXR interpreted by me:    MEDICATIONS  (STANDING):  albuterol/ipratropium for Nebulization.. 3 milliLiter(s) Nebulizer every 20 minutes  cefepime   IVPB 2000 milliGRAM(s) IV Intermittent once  lactated ringers. 500 milliLiter(s) (250 mL/Hr) IV Continuous <Continuous>  levoFLOXacin IVPB 750 milliGRAM(s) IV Intermittent once    MEDICATIONS  (PRN):

## 2023-04-26 NOTE — H&P ADULT - NSHPPHYSICALEXAM_GEN_ALL_CORE
GENERAL: lethargic, appears weak  HEENT: legally blind L?  CHEST/LUNG: decreased b/l BS, + wheezing  HEART: Regular rate and rhythm; No murmurs, rubs, or gallops  ABDOMEN: Bowel sounds present; Soft, Nontender, + distended.   EXTREMITIES:  1+ edema  NERVOUS SYSTEM:  Alert & Oriented X2, follows command  SKIN: multiple ecchymosis b/l UE

## 2023-04-26 NOTE — ED PROVIDER NOTE - PHYSICAL EXAMINATION
CONST: Pt appears uncomfortable  EYES: Sclera and conjunctiva clear.   ENT: No nasal discharge. Oropharynx normal appearing  NECK: Non-tender, no meningeal signs. normal ROM. supple   CARD: S1 S2; No jvd  RESP: B/L wheezing. spO2 97% on Bipap  GI: Soft, non-tender, non-distended. normal BS  MS: Normal ROM in all extremities. pulses 2 +. no calf tenderness or swelling  SKIN: Skin tear to R forearm  NEURO: A&Ox3, No focal deficits. Strength 5/5 with no sensory deficits. CONST: Pt appears uncomfortable  EYES: Sclera and conjunctiva clear.   ENT: No nasal discharge. Oropharynx normal appearing  NECK: Non-tender, no meningeal signs. normal ROM. supple   CARD: S1 S2; No jvd  RESP: B/L wheezing. spO2 97% on Bipap  GI: Soft, non-tender, non-distended. normal BS  MS: Normal ROM in all extremities. pulses 2 +. no calf tenderness or swelling  SKIN: Skin tear to R forearm  NEURO: A&Ox1, No focal deficits.

## 2023-04-26 NOTE — GOALS OF CARE CONVERSATION - ADVANCED CARE PLANNING - CONVERSATION DETAILS
Goals of Care Conversation discussed with HCP Andrzej Bernstein.  Discussed FULL CODE VS DNR/DNI. CPR and intubation   Andrzej confirmed that the pt is DNR/DNI.  All questions answered.    Patient with end stage COPD / recurrent admissions for respiratory failure   Chronic Home O2 4L at baseline   Admitted to SDU as DNR/DNI at this time c/w BiPAP and medical management

## 2023-04-26 NOTE — ED PROVIDER NOTE - CLINICAL SUMMARY MEDICAL DECISION MAKING FREE TEXT BOX
89-year-old female history of COPD on 4 L A-fib not on anticoagulation hypothyroid CKD brought in by ambulance for shortness of breath son At bedside providing the history states she has been having increasing using increased Nebs patient denies chest pain nausea vomiting lower abdominal pain when patient arrived she was on CPAP by EMS received 2 DuoNebs and 60 mg vs reviewed patient placed on bipap immediately labs imaging ekg obtained and reviewed nebs steroids given, icu consulted patient admitted to SDU

## 2023-04-26 NOTE — ED PROVIDER NOTE - OBJECTIVE STATEMENT
89-year-old female past medical history of COPD on 4 L NC, A-fib not on AC, hypothyroid, CKD presents for evaluation of shortness of breath.  Patient developed shortness of breath with associated wheezing today, no inciting or relieving factors.  Endorses increased phlegm over the past couple days.  Denies fever, headache, chest pain, abdominal pain, weakness, numbness, dysuria, hematuria, vomiting, diarrhea.

## 2023-04-26 NOTE — H&P ADULT - ASSESSMENT
89-year-old female past medical history of COPD on 4 L NC, Chronic A-fib not on AC, hypothyroidism, CKDIII, HFpEF presents for evaluation of shortness of breath. istory obtained from son who lives with her. Patient developed shortness of breath with associated dry cough for past 3 days, however she was not hypoxic and was saturating 95% on baseline 4 L at home. No reported fever, chills, headache, chest pain, abdominal pain, weakness, numbness, dysuria, hematuria, vomiting, diarrhea. She was placed on CPAP by EMS received 2 DuoNebs and 60 mg of Solu-Medrol. At baseline she is AAOx2-3 and ambulates with walker at home. Pt had recent admission for COPD exacerbation 3/4-3/15.     In ED  VT bp 118/53, HR 67, RR 22, T 97.6 F, SpO2 97% on BIPAP  Labs significant for hgb 9.0 (bl 9-10), Cr 1.8 (bl 1.2), Mg 2.5, K 5.5, Trop 0.04,       CXR No evidence of focal consolidation, pleural effusion or pneumothorax    s/p 1x Levaquin and Cefepime, Solumedrol 65 mg 1x in ED  s/p evaluation by CC team, admitted to SDU for managements of AHRF secondary to COPD exacerbation    #Acute on chronic hypercapnic hypoxemic respiratory failure likely secondary to COPD exacerbation  #Hx COPD on home oxygen 3-4 L  - VBG PH 7.27, pCO2 71, HCO3 33, pO2 78  - s/p BIPAP and IV Solumedrol in ED  - Pt evaluated by CC team and admitted to SDU  - Xray no new infiltrates  - c/w BPAP alternate with NC and at night (on AVAPS at home)  - c/w Solumedrol 60 BID for now to be tapered when clinically improved  - HOB @ 45 degrees, aspiration precautions  - Initial VBG noted with acute on chronic hypercapnia. f/u Repeat VBG  - c/w Duonebs and Home Inhalers PRN  - Start Azithromycin for 5ds  - f/u LE duplex  - f/u RVP, Procal     #MAURI on CKDIII  - gentle hydration : LR at 75/h   - Hold lasix and resume accordingly  - check urine lytes  - KBUS  - avoid Nephrotoxics    #Hx of Normocytic anemia   - Hb 9.0 (baseline 9-10)  - No evidence of acute bleeding  - f/u Iron profile, ferritin, b12 , folate     #Hx A-fib off AC due to recurrent falls  - check EKG  - c/w home metoprolol with holding parameters and Multaq 400 mg BID    #Hx of HFPEF  - holding lasix 20mg PO QD - for MAURI  - resume accordingly  - Avoid volume overload    #DLD  #Hypothyroidism  - C/W levothyroxine and atorvastatin     #Recurrent falls - Mechanical   - PT eval / Fall precautions     #Troponemia likely type 2  - Trop :0.04 in the setting of CKD  - BNP:  642 (baseline 500)  - serial EKG, trend CE      DVT PPX: Heparin sq   Diet: DASH while off of BIPAP  GI PPX : protonix   Dispo: From home - admitted to SDU  Code status: DNR/DNI     MED REC confirmed with Patient's son over the phone 89-year-old female past medical history of COPD on 4 L NC, Chronic A-fib not on AC, hypothyroidism, CKDIII, HFpEF presents for evaluation of shortness of breath. istory obtained from son who lives with her. Patient developed shortness of breath with associated dry cough for past 3 days, however she was not hypoxic and was saturating 95% on baseline 4 L at home. No reported fever, chills, headache, chest pain, abdominal pain, weakness, numbness, dysuria, hematuria, vomiting, diarrhea. She was placed on CPAP by EMS received 2 DuoNebs and 60 mg of Solu-Medrol. At baseline she is AAOx2-3 and ambulates with walker at home. Pt had recent admission for COPD exacerbation 3/4-3/15.     In ED  VT bp 118/53, HR 67, RR 22, T 97.6 F, SpO2 97% on BIPAP  Labs significant for hgb 9.0 (bl 9-10), Cr 1.8 (bl 1.2), Mg 2.5, K 5.5, Trop 0.04,       CXR No evidence of focal consolidation, pleural effusion or pneumothorax    s/p 1x Levaquin and Cefepime, Solumedrol 65 mg 1x in ED  s/p evaluation by CC team, admitted to SDU for managements of AHRF secondary to COPD exacerbation    #Acute on chronic hypercapnic hypoxemic respiratory failure likely secondary to COPD exacerbation  #Hx COPD on home oxygen 3-4 L  - VBG PH 7.27, pCO2 71, HCO3 33, pO2 78  - s/p BIPAP and IV Solumedrol in ED  - Pt evaluated by CC team and admitted to SDU  - Xray no new infiltrates  - c/w BPAP alternate with NC and at night (on AVAPS at home)  - c/w Solumedrol 60 BID for now to be tapered when clinically improved  - HOB @ 45 degrees, aspiration precautions  - Initial VBG noted with acute on chronic hypercapnia. f/u Repeat VBG  - c/w Duonebs and Home Inhalers PRN  - Start Doxycycline for 5ds, (due to interaction bw Azithro and Multaq for QTC prolongation   - f/u LE duplex  - f/u RVP, Procal     #MAURI on CKDIII  - gentle hydration : LR at 75/h for 12 hrs  - Hold lasix and resume accordingly  - check urine lytes  - KBUS  - avoid Nephrotoxics    #Hx of Normocytic anemia   - Hb 9.0 (baseline 9-10)  - No evidence of acute bleeding  - f/u Iron profile, ferritin, b12 , folate     #Hx A-fib off AC due to recurrent falls  - check EKG  - c/w home metoprolol with holding parameters and Multaq 400 mg BID    #Hx of HFPEF  - holding lasix 20mg PO QD - for MAURI  - resume accordingly  - Avoid volume overload    #DLD  #Hypothyroidism  - C/W levothyroxine and atorvastatin     #Recurrent falls - Mechanical   - PT eval / Fall precautions     #Troponemia likely type 2  - Trop :0.04 in the setting of CKD  - BNP:  642 (baseline 500)  - serial EKG, trend CE      DVT PPX: Heparin sq   Diet: DASH while off of BIPAP  GI PPX : protonix   Dispo: From home - admitted to SDU  Code status: DNR/DNI     MED REC confirmed with Patient's son over the phone

## 2023-04-26 NOTE — ED PROVIDER NOTE - CRITICAL CARE ATTENDING CONTRIBUTION TO CARE
I personally evaluated the patient. I reviewed the Resident’s or Physician Assistant’s note (as assigned above), and agree with the findings and plan except as documented in my note.  89-year-old female history of COPD on 4 L A-fib not on anticoagulation hypothyroid CKD brought in by ambulance for shortness of breath son At bedside providing the history states she has been having increasing using increased Nebs patient denies chest pain nausea vomiting lower abdominal pain when patient arrived she was on CPAP by EMS received 2 DuoNebs and 60 mg of Solu-Medrol CONSTITUTIONAL: tachypneic  HEAD: NCAT  EYES: PERRL; EOMI;   ENT: Normal pharynx; mucous membranes pink/moist, no erythema.  NECK: Supple;   CARD: IRR; nl S1/S2; no M/R/G. Pulses equal bilaterally.  RESP: tachypneic b/l wheezing  ABD: Soft, NT ND   MSK/EXT: No gross deformities  SKIN: Warm and dry;   NEURO: AAOx3  PSYCH: Memory Intact, Normal Affect I personally evaluated the patient. I reviewed the Resident’s or Physician Assistant’s note (as assigned above), and agree with the findings and plan except as documented in my note.  89-year-old female history of COPD on 4 L A-fib not on anticoagulation hypothyroid CKD brought in by ambulance for shortness of breath son At bedside providing the history states she has been having increasing using increased Nebs patient denies chest pain nausea vomiting lower abdominal pain when patient arrived she was on CPAP by EMS received 2 DuoNebs and 60 mg of Solu-Medrol upon arrival to ED placed on bipap  CONSTITUTIONAL: tachypneic  HEAD: NCAT  EYES: PERRL; EOMI;   ENT: Normal pharynx; mucous membranes pink/moist, no erythema.  NECK: Supple;   CARD: IRR; nl S1/S2; no M/R/G. Pulses equal bilaterally.  RESP: tachypneic b/l wheezing  ABD: Soft, NT ND   MSK/EXT: No gross deformities  SKIN: Warm and dry;   NEURO: AAOx3  PSYCH: Memory Intact, Normal Affect

## 2023-04-26 NOTE — H&P ADULT - ATTENDING COMMENTS
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 At this time pt seen in the ER and is a candidate for SDU.   Care per CCM Team / Note reviewed / Orders reviewed.   Check EKG / repeat ABG and notify ICU Fellow for further management decisions     Guarded Prognosis   GOC need to be Addressed / Recommend Palliative Care Consult if ok with ICU Team     Dispo: Admit to SDU

## 2023-04-27 NOTE — PROGRESS NOTE ADULT - SUBJECTIVE AND OBJECTIVE BOX
Over Night Events: events noted, on bipap 16/7 55%, not following commnads    PHYSICAL EXAM    ICU Vital Signs Last 24 Hrs  T(C): 35.7 (26 Apr 2023 12:15), Max: 36.4 (26 Apr 2023 12:01)  T(F): 96.3 (26 Apr 2023 12:15), Max: 97.6 (26 Apr 2023 12:01)  HR: 73 (27 Apr 2023 05:00) (65 - 73)  BP: 100/50 (27 Apr 2023 05:00) (100/50 - 119/62)  BP(mean): 84 (26 Apr 2023 17:43) (70 - 84)  RR: 18 (27 Apr 2023 05:00) (16 - 24)  SpO2: 99% (27 Apr 2023 05:00) (97% - 100%)    O2 Parameters below as of 27 Apr 2023 05:00  Patient On (Oxygen Delivery Method): BiPAP/CPAP    O2 Concentration (%): 35        General: ill looking  Lungs: dec bs both bases  Cardiovascular: Regular   Abdomen: Soft, Positive BS  Extremities: No clubbing   Neurological: Non focal         LABS:                          9.0    8.60  )-----------( 235      ( 26 Apr 2023 12:20 )             29.2                                               04-26    136  |  95<L>  |  56<H>  ----------------------------<  122<H>  6.0<HH>   |  28  |  1.7<H>    Ca    8.6      26 Apr 2023 20:41  Mg     2.5     04-26    TPro  5.9<L>  /  Alb  3.7  /  TBili  0.4  /  DBili  x   /  AST  35  /  ALT  20  /  AlkPhos  103  04-26                                                 CARDIAC MARKERS ( 26 Apr 2023 20:41 )  x     / 0.02 ng/mL / 188 U/L / x     / 6.3 ng/mL  CARDIAC MARKERS ( 26 Apr 2023 12:20 )  x     / 0.04 ng/mL / x     / x     / x                                                LIVER FUNCTIONS - ( 26 Apr 2023 12:20 )  Alb: 3.7 g/dL / Pro: 5.9 g/dL / ALK PHOS: 103 U/L / ALT: 20 U/L / AST: 35 U/L / GGT: x                                                                                                                                       MEDICATIONS  (STANDING):  albuterol    90 MICROgram(s) HFA Inhaler 2 Puff(s) Inhalation every 6 hours  albuterol/ipratropium for Nebulization 3 milliLiter(s) Nebulizer every 6 hours  aspirin  chewable 81 milliGRAM(s) Oral daily  atorvastatin 20 milliGRAM(s) Oral at bedtime  budesonide 160 MICROgram(s)/formoterol 4.5 MICROgram(s) Inhaler 2 Puff(s) Inhalation two times a day  doxycycline IVPB 100 milliGRAM(s) IV Intermittent every 12 hours  dronedarone 400 milliGRAM(s) Oral two times a day  heparin   Injectable 5000 Unit(s) SubCutaneous every 8 hours  lactated ringers. 1000 milliLiter(s) (75 mL/Hr) IV Continuous <Continuous>  levothyroxine 112 MICROGram(s) Oral daily  methylPREDNISolone sodium succinate Injectable 60 milliGRAM(s) IV Push every 12 hours  metoprolol succinate ER 25 milliGRAM(s) Oral daily  sodium zirconium cyclosilicate 10 Gram(s) Oral every 12 hours  tamsulosin 0.4 milliGRAM(s) Oral at bedtime      cxr reviewed

## 2023-04-27 NOTE — PATIENT PROFILE ADULT - FALL HARM RISK - HARM RISK INTERVENTIONS

## 2023-04-27 NOTE — ED ADULT NURSE REASSESSMENT NOTE - NS ED NURSE REASSESS COMMENT FT1
pt on BIPAP, as per MD pt can be off for PO meds. pt was not able to swallow PO meds. MD Bernstein made aware.

## 2023-04-27 NOTE — PROGRESS NOTE ADULT - SUBJECTIVE AND OBJECTIVE BOX
TANNA MCCRACKEN  89y, Female  Allergy: No Known Allergies    Hospital Day: 1d    Patient seen and examined. No acute events overnight    PMH/PSH:  PAST MEDICAL & SURGICAL HISTORY:  COPD (chronic obstructive pulmonary disease)      Hypothyroid      HTN (hypertension)      High cholesterol      Colitis      CKD (chronic kidney disease)      No significant past surgical history          VITALS:  T(F): 98.8 (04-27-23 @ 12:05), Max: 98.8 (04-27-23 @ 12:05)  HR: 70 (04-27-23 @ 12:05)  BP: 109/53 (04-27-23 @ 12:05) (100/50 - 119/62)  RR: 22 (04-27-23 @ 12:05)  SpO2: 100% (04-27-23 @ 12:05)    TESTS & MEASUREMENTS:  Weight (Kg): 72.575 (04-26-23 @ 12:01)  BMI (kg/m2): 34.6 (04-26)                          7.4    4.94  )-----------( 195      ( 27 Apr 2023 06:00 )             24.8       04-27    134<L>  |  95<L>  |  63<HH>  ----------------------------<  87  5.8<H>   |  28  |  1.9<H>    Ca    8.2<L>      27 Apr 2023 06:00  Phos  4.8     04-27  Mg     2.3     04-27    TPro  5.3<L>  /  Alb  3.4<L>  /  TBili  0.5  /  DBili  x   /  AST  34  /  ALT  19  /  AlkPhos  86  04-27    LIVER FUNCTIONS - ( 27 Apr 2023 06:00 )  Alb: 3.4 g/dL / Pro: 5.3 g/dL / ALK PHOS: 86 U/L / ALT: 19 U/L / AST: 34 U/L / GGT: x           CARDIAC MARKERS ( 27 Apr 2023 06:00 )  x     / 0.03 ng/mL / x     / x     / x      CARDIAC MARKERS ( 26 Apr 2023 20:41 )  x     / 0.02 ng/mL / 188 U/L / x     / 6.3 ng/mL  CARDIAC MARKERS ( 26 Apr 2023 12:20 )  x     / 0.04 ng/mL / x     / x     / x                RADIOLOGY & ADDITIONAL TESTS:    RECENT DIAGNOSTIC ORDERS:  Basic Metabolic Panel: STAT  Addl Info: Conditional Order: Activate 2 hour(s) after the administration of Dextrose/Insulin (04-27-23 @ 08:45)  Blood Gas Profile w/Lytes - Venous: STAT (04-27-23 @ 07:04)  Ferritin, Serum: AM Sched. Collection: 27-Apr-2023 04:30 (04-26-23 @ 17:24)  Procalcitonin, Serum: 20:00 (04-26-23 @ 17:24)  Diet, DASH/TLC:   Sodium & Cholesterol Restricted  For patients receiving Renal Replacement - No Protein Restr, No Conc K, No Conc Phos, Low Sodium (04-26-23 @ 15:54)  Potassium, Random Urine: STAT (04-26-23 @ 15:50)  Urea Nitrogen,  Random Urine: STAT (04-26-23 @ 15:50)  Osmolality, Random Urine: STAT (04-26-23 @ 15:50)  Protein/Creatinine Ratio, Urine: STAT (04-26-23 @ 15:50)  Sodium, Random Urine: STAT (04-26-23 @ 15:50)  Urinalysis: STAT (04-26-23 @ 15:50)  POCUS ED Guided Vascular Access: Urgent   Indication: Vascular Access  Transport: Portable  Provider's Contact #: [phone] (04-26-23 @ 14:20)      MEDICATIONS:  MEDICATIONS  (STANDING):  albuterol    90 MICROgram(s) HFA Inhaler 2 Puff(s) Inhalation every 6 hours  albuterol/ipratropium for Nebulization 3 milliLiter(s) Nebulizer every 6 hours  aspirin  chewable 81 milliGRAM(s) Oral daily  atorvastatin 20 milliGRAM(s) Oral at bedtime  budesonide 160 MICROgram(s)/formoterol 4.5 MICROgram(s) Inhaler 2 Puff(s) Inhalation two times a day  doxycycline IVPB 100 milliGRAM(s) IV Intermittent every 12 hours  dronedarone 400 milliGRAM(s) Oral two times a day  heparin   Injectable 5000 Unit(s) SubCutaneous every 8 hours  lactated ringers. 1000 milliLiter(s) (75 mL/Hr) IV Continuous <Continuous>  levothyroxine 112 MICROGram(s) Oral daily  methylPREDNISolone sodium succinate Injectable 60 milliGRAM(s) IV Push every 12 hours  metoprolol succinate ER 25 milliGRAM(s) Oral daily  sodium zirconium cyclosilicate 10 Gram(s) Oral every 12 hours  tamsulosin 0.4 milliGRAM(s) Oral at bedtime    MEDICATIONS  (PRN):      HOME MEDICATIONS:  albuterol 2.5 mg/3 mL (0.083%) inhalation solution (04-26)  aspirin 81 mg oral tablet (04-26)  ATORVASTATIN 20MG TABLETS (04-26)  Lasix 20 mg oral tablet (04-26)  Multaq 400 mg oral tablet (04-26)  TRELEGY ELLIPTA 100-62.5MCG INH 30P (04-26)      REVIEW OF SYSTEMS:  All other review of systems is negative unless indicated above.     PHYSICAL EXAM:  PHYSICAL EXAM:  GENERAL: NAD, well-developed  HEAD:  Atraumatic, Normocephalic  NECK: Supple, No JVD  CHEST/LUNG: Clear to auscultation bilaterally; No wheeze  HEART: Regular rate and rhythm; No murmurs, rubs, or gallops  ABDOMEN: Soft, Nontender, Nondistended; Bowel sounds present  EXTREMITIES:  2+ Peripheral Pulses, No clubbing, cyanosis, or edema  SKIN: No rashes or lesions

## 2023-04-27 NOTE — PATIENT PROFILE ADULT - OVER THE PAST TWO WEEKS HAVE YOU FELT DOWN, DEPRESSED OR HOPELESS?
Subjective:   Patient ID: James Norton is a 55year old male.     HPI    Patient comes today with complaint of cough productive of green-yellow sputum patient concerned due to frequent infections with pneumonia due to his chronic history of bronchiec ER  [facility] - as above, follow with pulmonary     21 minutes spent  No orders of the defined types were placed in this encounter.       Meds This Visit:  Requested Prescriptions     Signed Prescriptions Disp Refills   • skye no

## 2023-04-27 NOTE — PROGRESS NOTE ADULT - ASSESSMENT
Impression:    Acute on chronic hypercapnic resp failure on NIV  Acute on chronic hypoxemic respiratory failure  COPD exacerbation  MAURI on CKD/ hyperkalemia  Severe COPD & Chronic Hypoxemic Respiratory Failure on 3-4L NC at home   Chronic afib not on AC due to recurrent falls  HfpEF        PLAN:    CNS: avoid sedatives    HEENT:  Oral care    PULMONARY: Xray no new infiltrates. , BPAP alternate with NC and at night (on AVAPS at home). Solumedrol 60 BID for now to be tapered when clinically improved. HOB @ 45 degrees, aspiration precautions. Initial VBG noted with acute on chronic hypercapnia. Change BIPAP to AVAPS    CARDIOVASCULAR:  Avoid volume overload. Gentle hydration    GI: GI prophylaxis. Feeding when off NIV.  Bowel regimen if needed.    RENAL: Correct as necessary. Repeat lytes    INFECTIOUS DISEASE: abx    HEMATOLOGICAL:  DVT prophylaxis. Check duplex of the LEs.     ENDOCRINE:  Follow up FS.  Insulin protocol if needed.    MUSC: PT/OT    DISPO: SDU   palliative care shama

## 2023-04-27 NOTE — PROGRESS NOTE ADULT - ASSESSMENT
89-year-old female past medical history of COPD on 4 L NC, Chronic A-fib not on AC, hypothyroidism, CKDIII, HFpEF presents for evaluation of shortness of breath due to COPD exacerbation.    #Acute on chronic hypercapnic hypoxemic respiratory failure  # COPD exacerbation  #Hx COPD on home oxygen 3-4 L  Repeat ABG showing improved co2 retention  Duplex negative for DVT   CXR 04/27: Unchanged bibasilar opacities  Currently on BIPAP 35% FiO2, saturating 99%. Arousable, but not able to follow commands  - c/w BIPAP alternate with NC as tolerated and at night (on AVAPS at home)  - c/w Solumedrol 60 BID  - HOB @ 45 degrees, aspiration precautions  - c/w Duonebs and Home Inhalers PRN  - Continue doxycycline  - f/u LE duplex  - f/u RVP, Procal     #MAURI on CKDIII  - gentle hydration : LR at 75/h for 12 hrs  - Hold lasix and resume accordingly  - avoid Nephrotoxics    #Hx of Normocytic anemia   - Hb 9.0 (baseline 9-10)    #Chronic A-fib off AC due to recurrent falls  - check EKG  - c/w home metoprolol with holding parameters and Multaq 400 mg BID    #Chronic HFPEF  #HLD  - holding lasix 20mg PO QD - for MAURI  - Cont statin  - resume accordingly  - Avoid volume overload    #Hypothyroidism  - C/W levothyroxine    #Recurrent falls - Mechanical   - PT eval / Fall precautions     #Troponemia likely type 2  - Trop :0.04 in the setting of CKD  - BNP:  642 (baseline 500)  - Monitor    DVT PPX: Heparin sq   Diet: DASH while off of BIPAP    #Progress Note Handoff  Pending (specify): Wean off BIPAP as tolerated, cont IV solumedrol  Family discussion: dw daughter by bedside  Disposition: Home

## 2023-04-28 NOTE — PROGRESS NOTE ADULT - SUBJECTIVE AND OBJECTIVE BOX
TANNA MCCRACKEN  89y, Female  Allergy: No Known Allergies    Hospital Day: 2d    Patient seen and examined. No acute events overnight    PMH/PSH:  PAST MEDICAL & SURGICAL HISTORY:  COPD (chronic obstructive pulmonary disease)      Hypothyroid      HTN (hypertension)      High cholesterol      Colitis      CKD (chronic kidney disease)      No significant past surgical history          VITALS:  T(F): 97.3 (04-28-23 @ 05:39), Max: 97.9 (04-27-23 @ 15:22)  HR: 74 (04-28-23 @ 07:52)  BP: 132/68 (04-28-23 @ 05:39) (111/53 - 132/68)  RR: 18 (04-28-23 @ 00:00)  SpO2: 97% (04-28-23 @ 07:52)    TESTS & MEASUREMENTS:  Weight (Kg):   BMI (kg/m2): 34.6 (04-26)                          7.8    6.35  )-----------( 208      ( 28 Apr 2023 11:19 )             25.4       04-28    137  |  97<L>  |  56<H>  ----------------------------<  74  6.0<HH>   |  32  |  1.6<H>    Ca    8.3<L>      28 Apr 2023 11:19  Phos  4.8     04-27  Mg     2.5     04-28    TPro  5.5<L>  /  Alb  3.3<L>  /  TBili  0.4  /  DBili  x   /  AST  61<H>  /  ALT  27  /  AlkPhos  80  04-28    LIVER FUNCTIONS - ( 28 Apr 2023 11:19 )  Alb: 3.3 g/dL / Pro: 5.5 g/dL / ALK PHOS: 80 U/L / ALT: 27 U/L / AST: 61 U/L / GGT: x           CARDIAC MARKERS ( 27 Apr 2023 06:00 )  x     / 0.03 ng/mL / x     / x     / x      CARDIAC MARKERS ( 26 Apr 2023 20:41 )  x     / 0.02 ng/mL / 188 U/L / x     / 6.3 ng/mL            RADIOLOGY & ADDITIONAL TESTS:    RECENT DIAGNOSTIC ORDERS:  ABO Rh Confirmatory Specimen: STAT  Addl Info: Conditional: ABO Rh Confirmatory Specimen (04-28-23 @ 11:17)  Magnesium, Serum: STAT (04-28-23 @ 11:17)  Comprehensive Metabolic Panel: STAT (04-28-23 @ 11:17)  Complete Blood Count + Automated Diff: STAT (04-28-23 @ 11:17)  Blood Gas Profile - Arterial: 11:00 (04-28-23 @ 08:51)      MEDICATIONS:  MEDICATIONS  (STANDING):  albuterol    90 MICROgram(s) HFA Inhaler 2 Puff(s) Inhalation every 6 hours  albuterol/ipratropium for Nebulization 3 milliLiter(s) Nebulizer every 6 hours  aspirin  chewable 81 milliGRAM(s) Oral daily  atorvastatin 20 milliGRAM(s) Oral at bedtime  budesonide 160 MICROgram(s)/formoterol 4.5 MICROgram(s) Inhaler 2 Puff(s) Inhalation two times a day  doxycycline IVPB 100 milliGRAM(s) IV Intermittent every 12 hours  dronedarone 400 milliGRAM(s) Oral two times a day  heparin   Injectable 5000 Unit(s) SubCutaneous every 8 hours  lactated ringers. 1000 milliLiter(s) (75 mL/Hr) IV Continuous <Continuous>  levothyroxine 112 MICROGram(s) Oral daily  methylPREDNISolone sodium succinate Injectable 60 milliGRAM(s) IV Push every 12 hours  metoprolol succinate ER 25 milliGRAM(s) Oral daily  sodium zirconium cyclosilicate 10 Gram(s) Oral every 12 hours  sodium zirconium cyclosilicate 10 Gram(s) Oral once  tamsulosin 0.4 milliGRAM(s) Oral at bedtime    MEDICATIONS  (PRN):      HOME MEDICATIONS:  albuterol 2.5 mg/3 mL (0.083%) inhalation solution (04-26)  aspirin 81 mg oral tablet (04-26)  ATORVASTATIN 20MG TABLETS (04-26)  Lasix 20 mg oral tablet (04-26)  Multaq 400 mg oral tablet (04-26)  TRELEGY ELLIPTA 100-62.5MCG INH 30P (04-26)      REVIEW OF SYSTEMS:  All other review of systems is negative unless indicated above.     PHYSICAL EXAM:  PHYSICAL EXAM:  GENERAL: NAD, well-developed  HEAD:  Atraumatic, Normocephalic  NECK: Supple, No JVD  CHEST/LUNG: Clear to auscultation bilaterally; No wheeze  HEART: Regular rate and rhythm; No murmurs, rubs, or gallops  ABDOMEN: Soft, Nontender, Nondistended; Bowel sounds present  EXTREMITIES:  2+ Peripheral Pulses, No clubbing, cyanosis, or edema  SKIN: No rashes or lesions

## 2023-04-28 NOTE — PROGRESS NOTE ADULT - ASSESSMENT
89-year-old female past medical history of COPD on 4 L NC, Chronic A-fib not on AC, hypothyroidism, CKDIII, HFpEF presents for evaluation of shortness of breath due to COPD exacerbation.    #Acute on chronic hypercapnic hypoxemic respiratory failure  # COPD exacerbation  #Hx COPD on home oxygen 3-4 L  Repeat ABGs showing improved co2 retention  Duplex negative for DVT   CXR 04/27: Unchanged bibasilar opacities  Currently on BIPAP 35% FiO2, saturating 99%. Arousable, but not able to follow commands  LE duplex negative for DVT  Procalcitonin 0.12  - c/w BIPAP alternate with HFNC as tolerated and at night (on AVAPS at home)  - c/w Solumedrol 60 BID  - HOB @ 45 degrees, aspiration precautions  - c/w Duonebs and Home Inhalers PRN  - Continue doxycycline  - f/u RVP, Procal     #MAURI on CKDIII  - gentle hydration : LR at 75/h for 12 hrs  - Hold lasix and resume accordingly  - avoid Nephrotoxics    #Hx of Normocytic anemia   - Hb 9.0 (baseline 9-10)    #Chronic A-fib off AC due to recurrent falls  - check EKG  - c/w home metoprolol with holding parameters and Multaq 400 mg BID    #Chronic HFPEF  #HLD  - holding lasix 20mg PO QD - for MAURI  - Cont statin  - resume accordingly  - Avoid volume overload    #Hypothyroidism  - C/W levothyroxine    #Recurrent falls - Mechanical   - PT eval / Fall precautions     #Troponemia likely type 2  - Trop :0.04 in the setting of CKD  - BNP:  642 (baseline 500)  - Monitor    DVT PPX: Heparin sq   Diet: DASH while off of BIPAP    #Progress Note Handoff  Pending (specify): Wean off BIPAP as tolerated, cont IV solumedrol  Family discussion: dw daughter by bedside  Disposition: Home

## 2023-04-28 NOTE — RAPID RESPONSE TEAM SUMMARY - NSSITUATIONBACKGROUNDRRT_GEN_ALL_CORE
Patient was taken off bipap and put on HFNC, she developed cyanosis and RR was called. Bedside POCUS did not show any signs of volume overload. She was wheezing on exam. Patient is admitted for hypoxic and hypercapnic Resp failure 2/2 to COPD. Patient was put back on Bipap and she improved.

## 2023-04-28 NOTE — PROGRESS NOTE ADULT - SUBJECTIVE AND OBJECTIVE BOX
Over Night Events: events noted, on NIV, ABG noted, still altered, afebrile  PHYSICAL EXAM    ICU Vital Signs Last 24 Hrs  T(C): 36.3 (28 Apr 2023 05:39), Max: 37.1 (27 Apr 2023 12:05)  T(F): 97.3 (28 Apr 2023 05:39), Max: 98.8 (27 Apr 2023 12:05)  HR: 81 (28 Apr 2023 05:39) (70 - 87)  BP: 132/68 (28 Apr 2023 05:39) (105/52 - 132/68)  BP(mean): 90 (28 Apr 2023 05:39) (67 - 90)  RR: 18 (28 Apr 2023 00:00) (18 - 22)  SpO2: 100% (28 Apr 2023 00:00) (96% - 100%)    O2 Parameters below as of 28 Apr 2023 00:00  Patient On (Oxygen Delivery Method): BiPAP/CPAP             General: ILL looking  Lungs: dec bs both bases  Cardiovascular: Regular   Abdomen: Soft, Positive BS  Extremities: No clubbing   Neurological: Non focal         LABS:                          7.4    4.94  )-----------( 195      ( 27 Apr 2023 06:00 )             24.8                                               04-28    136  |  97<L>  |  64<HH>  ----------------------------<  87  5.2<H>   |  29  |  1.8<H>    Ca    8.5      28 Apr 2023 01:23  Phos  4.8     04-27  Mg     2.3     04-27    TPro  5.3<L>  /  Alb  3.4<L>  /  TBili  0.5  /  DBili  x   /  AST  34  /  ALT  19  /  AlkPhos  86  04-27                                                 CARDIAC MARKERS ( 27 Apr 2023 06:00 )  x     / 0.03 ng/mL / x     / x     / x      CARDIAC MARKERS ( 26 Apr 2023 20:41 )  x     / 0.02 ng/mL / 188 U/L / x     / 6.3 ng/mL  CARDIAC MARKERS ( 26 Apr 2023 12:20 )  x     / 0.04 ng/mL / x     / x     / x                                                LIVER FUNCTIONS - ( 27 Apr 2023 06:00 )  Alb: 3.4 g/dL / Pro: 5.3 g/dL / ALK PHOS: 86 U/L / ALT: 19 U/L / AST: 34 U/L / GGT: x                                                                                                                                   ABG - ( 27 Apr 2023 09:37 )  pH, Arterial: 7.32  pH, Blood: x     /  pCO2: 61    /  pO2: 182   / HCO3: 31    / Base Excess: 4.6   /  SaO2: 99.6                MEDICATIONS  (STANDING):  albuterol    90 MICROgram(s) HFA Inhaler 2 Puff(s) Inhalation every 6 hours  albuterol/ipratropium for Nebulization 3 milliLiter(s) Nebulizer every 6 hours  aspirin  chewable 81 milliGRAM(s) Oral daily  atorvastatin 20 milliGRAM(s) Oral at bedtime  budesonide 160 MICROgram(s)/formoterol 4.5 MICROgram(s) Inhaler 2 Puff(s) Inhalation two times a day  doxycycline IVPB 100 milliGRAM(s) IV Intermittent every 12 hours  dronedarone 400 milliGRAM(s) Oral two times a day  heparin   Injectable 5000 Unit(s) SubCutaneous every 8 hours  lactated ringers. 1000 milliLiter(s) (75 mL/Hr) IV Continuous <Continuous>  levothyroxine 112 MICROGram(s) Oral daily  methylPREDNISolone sodium succinate Injectable 60 milliGRAM(s) IV Push every 12 hours  metoprolol succinate ER 25 milliGRAM(s) Oral daily  sodium zirconium cyclosilicate 10 Gram(s) Oral every 12 hours  tamsulosin 0.4 milliGRAM(s) Oral at bedtime

## 2023-04-28 NOTE — PROGRESS NOTE ADULT - ASSESSMENT
Impression:    Acute on chronic hypercapnic resp failure on NIV  Acute on chronic hypoxemic respiratory failure  COPD exacerbation  MAURI on CKD/ hyperkalemia  Severe COPD & Chronic Hypoxemic Respiratory Failure on 3-4L NC at home   Chronic afib not on AC due to recurrent falls  HfpEF        PLAN:    CNS: avoid sedatives    HEENT:  Oral care    PULMONARY: Xray no new infiltrates. , BPAP alternate with HHFNC and at night (on AVAPS at home). Solumedrol 60 BID for now to be tapered when clinically improved. HOB @ 45 degrees, aspiration precautions. Initial VBG noted with acute on chronic hypercapnia. DEC fio2 TO 30% KEEP SAO2 88 TO 92%    CARDIOVASCULAR:  Avoid volume overload. Gentle hydration    GI: GI prophylaxis. Feeding when off NIV.  Bowel regimen if needed.    RENAL: Correct as necessary. Repeat lytes    INFECTIOUS DISEASE: abx    HEMATOLOGICAL:  DVT prophylaxis. Check duplex of the LEs.     ENDOCRINE:  Follow up FS.  Insulin protocol if needed.    MUSC: PT/OT    DISPO: SDU   palliative care shama

## 2023-04-29 NOTE — SWALLOW BEDSIDE ASSESSMENT ADULT - PHARYNGEAL PHASE
Delayed pharyngeal swallow/Wet vocal quality post oral intake/Delayed cough post oral intake/Delayed throat clear post oral intake Within functional limits

## 2023-04-29 NOTE — PROGRESS NOTE ADULT - ASSESSMENT
89-year-old female past medical history of COPD on 4 L NC, Chronic A-fib not on AC, hypothyroidism, CKDIII, HFpEF presents for evaluation of shortness of breath due to COPD exacerbation.    #Acute on chronic hypercapnic hypoxemic respiratory failure  # COPD exacerbation  #Hx COPD on home oxygen 3-4 L  Duplex negative for DVT   blood gases noted    NIV qhs and prn,   Solumedrol 60 BID for now    Rapid resopnse overnight for desaturaion   aspiration precautions. S&S for feeding eval - feeding when off NIV  CXR 04/27: Unchanged bibasilar opacities  Currently on HFNC   LE duplex negative for DVT  Procalcitonin 0.12  - c/w Duonebs and Home Inhalers PRN  - Continue doxycycline  - f/u RVP, Procal     #MAURI on CKDIII / Hyperkalemia   - gentle hydration : LR at 75/h for 12 hrs decrease to 50 cc/h   Creatinine Trend: 1.4<--, 1.6<--, 1.8<--, 1.8<--, 1.9<--, 1.7<--  - Hold lasix and resume accordingly  - avoid Nephrotoxics  lokelma and low K diet once diet approved bt S/S     #Hx of Normocytic anemia   - Hb 9.0 (baseline 9-10)    #Chronic A-fib off AC due to recurrent falls  -EKG NOTED   - c/w home metoprolol with holding parameters and Multaq 400 mg BID    #Chronic HFPEF  #HLD  - holding lasix 20mg PO QD - for MAURI  - Cont statin  - resume accordingly  - Avoid volume overload    #Hypothyroidism  - C/W levothyroxine    #Recurrent falls - Mechanical   - PT eval / Fall precautions     #Troponemia likely type 2  - Trop :0.04 in the setting of CKD-->0.02-->0.03   - BNP:  642 (baseline 500)  - Monitor    DVT PPX: Heparin sq   Diet: DASH while off of BIPAP    GOC DNR/DNI - refer to GOC on admission     #Progress Note Handoff  Pending (specify): Respiratory status /  cont IV solumedrol, monitor K , feeding   Family discussion: dw son  by bedside  Disposition: Home

## 2023-04-29 NOTE — SWALLOW BEDSIDE ASSESSMENT ADULT - COMMENTS
GOC- DNR/ DNI GOC- DNR/ DNI. POt currently on HFNC  48Lpm/ 40% o2.  Pt report xerostomia, family at bedside reports increased fatigue since being switched to HFNC Pt quick to speak immediately following trial presentation without initiating swallow. Suspected pharyngeal dysphagia

## 2023-04-29 NOTE — PROGRESS NOTE ADULT - SUBJECTIVE AND OBJECTIVE BOX
Patient is a 89y old  Female who presents with a chief complaint of COPD exacerbation (29 Apr 2023 09:08)        Over Night Events:  used niv overnight  now on NRB      ROS:     All ROS are negative except HPI         PHYSICAL EXAM    ICU Vital Signs Last 24 Hrs  T(C): 36.1 (29 Apr 2023 08:00), Max: 36.4 (29 Apr 2023 04:00)  T(F): 97 (29 Apr 2023 08:00), Max: 97.5 (29 Apr 2023 04:00)  HR: 96 (29 Apr 2023 08:00) (73 - 96)  BP: 136/56 (29 Apr 2023 08:00) (112/52 - 140/58)  BP(mean): 80 (29 Apr 2023 08:00) (75 - 84)  ABP: --  ABP(mean): --  RR: 20 (29 Apr 2023 08:00) (20 - 20)  SpO2: 100% (29 Apr 2023 08:00) (98% - 100%)    O2 Parameters below as of 29 Apr 2023 08:00  Patient On (Oxygen Delivery Method): mask, nonrebreather            CONSTITUTIONAL:  chronically ill appearing  In NAD    ENT:   Airway patent,   Mouth with normal mucosa.   No thrush  on NRB      EYES:   Pupils equal,   Round and reactive to light.    CARDIAC:   Normal rate,   Regular rhythm.    No edema    RESPIRATORY:   +wheezing  Bilateral BSpoor air entryormal chest expansion  Not tachypneic,  No use of accessory muscles    GASTROINTESTINAL:  Abdomen soft,   Non-tender,   No guarding,     MUSCULOSKELETAL:   Range of motion is not limited,  No clubbing, cyanosis    NEUROLOGICAL:   alert, awake    SKIN:   Skin normal color for race,   Warm and dry and intact.   No evidence of rash.        04-28-23 @ 07:01  -  04-29-23 @ 07:00  --------------------------------------------------------  IN:  Total IN: 0 mL    OUT:    Voided (mL): 0 mL  Total OUT: 0 mL    Total NET: 0 mL          LABS:                            7.8    6.35  )-----------( 208      ( 28 Apr 2023 11:19 )             25.4                                               04-28    137  |  97<L>  |  56<H>  ----------------------------<  74  6.0<HH>   |  32  |  1.6<H>    Ca    8.3<L>      28 Apr 2023 11:19  Mg     2.5     04-28    TPro  5.5<L>  /  Alb  3.3<L>  /  TBili  0.4  /  DBili  x   /  AST  61<H>  /  ALT  27  /  AlkPhos  80  04-28                                                                                           LIVER FUNCTIONS - ( 28 Apr 2023 11:19 )  Alb: 3.3 g/dL / Pro: 5.5 g/dL / ALK PHOS: 80 U/L / ALT: 27 U/L / AST: 61 U/L / GGT: x                                                                                                                                   ABG - ( 28 Apr 2023 20:05 )  pH, Arterial: 7.32  pH, Blood: x     /  pCO2: 62    /  pO2: 258   / HCO3: 32    / Base Excess: 4.9   /  SaO2: 98.8                MEDICATIONS  (STANDING):  albuterol    90 MICROgram(s) HFA Inhaler 2 Puff(s) Inhalation every 6 hours  albuterol/ipratropium for Nebulization 3 milliLiter(s) Nebulizer every 6 hours  aspirin  chewable 81 milliGRAM(s) Oral daily  atorvastatin 20 milliGRAM(s) Oral at bedtime  budesonide 160 MICROgram(s)/formoterol 4.5 MICROgram(s) Inhaler 2 Puff(s) Inhalation two times a day  doxycycline IVPB 100 milliGRAM(s) IV Intermittent every 12 hours  dronedarone 400 milliGRAM(s) Oral two times a day  heparin   Injectable 5000 Unit(s) SubCutaneous every 8 hours  lactated ringers. 1000 milliLiter(s) (75 mL/Hr) IV Continuous <Continuous>  levothyroxine 112 MICROGram(s) Oral daily  methylPREDNISolone sodium succinate Injectable 60 milliGRAM(s) IV Push every 12 hours  metoprolol succinate ER 25 milliGRAM(s) Oral daily  sodium zirconium cyclosilicate 10 Gram(s) Oral every 12 hours  tamsulosin 0.4 milliGRAM(s) Oral at bedtime    MEDICATIONS  (PRN):      New X-rays reviewed:                                                                                  ECHO    CXR interpreted by me:

## 2023-04-29 NOTE — PROGRESS NOTE ADULT - ASSESSMENT
Impression:    Acute on chronic hypercapnic resp failure on NIV  Acute on chronic hypoxemic respiratory failure  COPD exacerbation  MAURI on CKD/ hyperkalemia  Severe COPD & Chronic Hypoxemic Respiratory Failure on 3-4L NC at home   Chronic afib not on AC due to recurrent falls  HfpEF        PLAN:    CNS: avoid sedatives    HEENT:  Oral care    PULMONARY: Xray no new infiltrates. , NIV qhs and prn,( AVAPS at home). Solumedrol 60 BID for now to be tapered when clinically improved. HOB @ 45 degrees, aspiration precautions. Initial VBG noted with acute on chronic hypercapnia. KEEP SAO2 88 TO 92%    CARDIOVASCULAR:  Avoid volume overload. Gentle hydration    GI: GI prophylaxis. S&S eval, feeding when off NIV    RENAL: Correct as necessary. Repeat lytes    INFECTIOUS DISEASE: cw doxy (5 day course)    HEMATOLOGICAL:  DVT prophylaxis. duplex negative 4/26    ENDOCRINE:  Follow up FS.  Insulin protocol if needed.    MUSC: PT/OT    DISPO: SDU   palliative care eval    DNR/DNI   Impression:    Acute on chronic hypercapnic resp failure on NIV  Acute on chronic hypoxemic respiratory failure  COPD exacerbation  MAURI on CKD/ hyperkalemia  Severe COPD & Chronic Hypoxemic Respiratory Failure on 3-4L NC at home   Chronic afib not on AC due to recurrent falls  HfpEF      PLAN:    CNS: avoid sedatives    HEENT:  Oral care    PULMONARY: Xray no new infiltrates. , NIV qhs and prn,( AVAPS at home). Solumedrol 60 BID for now to be tapered when clinically improved. HOB @ 45 degrees, aspiration precautions. Initial VBG noted with acute on chronic hypercapnia. KEEP SAO2 88 TO 92%.     CARDIOVASCULAR:  Avoid volume overload. Gentle hydration    GI: GI prophylaxis. S&S eval, feeding when off NIV    RENAL: Correct as necessary. Repeat lytes. Lokelma if hyperk persists. LR at 50cc/hr.     INFECTIOUS DISEASE: cw doxy (5 day course)    HEMATOLOGICAL:  DVT prophylaxis. duplex negative 4/26    ENDOCRINE:  Follow up FS.  Insulin protocol if needed.    MUSC: PT/OT    DISPO: SDU     palliative care eval    DNR/DNI

## 2023-04-29 NOTE — SWALLOW BEDSIDE ASSESSMENT ADULT - SWALLOW EVAL: DIAGNOSIS
Pt presents with + toleration of ice chips at bedside. suspected pharyngeal dysphagia fro puree and mildly thick liquids, Pt's cognition and impulsive speaking post trials results in delayed pharyngeal swallow, wet vocal quality and coughing,

## 2023-04-29 NOTE — SWALLOW BEDSIDE ASSESSMENT ADULT - SWALLOW EVAL: PATIENT/FAMILY GOALS STATEMENT
SLP educated Pt's son David on recs for NPO x meds in [facility] [place] and small allowance of ice chips at this time. he expressed understanding

## 2023-04-29 NOTE — PROGRESS NOTE ADULT - SUBJECTIVE AND OBJECTIVE BOX
T H I S   I S    N O  T   A    F I N A L I Z E D   N O T BRYNN PARKERETTE  89y, Female  Allergy: No Known Allergies    Hospital Day: 3d    Patient seen and examined earlier today.     PMH/PSH:  PAST MEDICAL & SURGICAL HISTORY:  COPD (chronic obstructive pulmonary disease)      Hypothyroid      HTN (hypertension)      High cholesterol      Colitis      CKD (chronic kidney disease)      No significant past surgical history          LAST 24-Hr EVENTS:    VITALS:  T(F): 97 (04-29-23 @ 08:00), Max: 97.5 (04-29-23 @ 04:00)  HR: 96 (04-29-23 @ 08:00)  BP: 136/56 (04-29-23 @ 08:00) (112/52 - 140/58)  RR: 20 (04-29-23 @ 08:00)  SpO2: 100% (04-29-23 @ 08:00)    Non Invasive Vent (BiLevel) Settings: Routine   Timed From: 22:00 To: 06:00   Indication for NPPV: COPD Exacerbation   FIO2 (%): 100  Inspiratory Pressure (cmH2O):  0   Expiratory Pressure (cmH2O):  5   Back Up Rate (bpm): 5   Notify provider for SpO2 (%) BELOW: 85   Notify provider for SpO2 (%) ABOVE: 85  Targeted SpO2 Range (%): 88-97   BIPAP V60 O2 Titration Guideline: Device O2 Percent Range (%): %, Device O2 Flow Rate Range (LPM): N/A, O2 Titration Guideline: Increase/Decrease by 10%. Notify Provider for any increase in oxygen therapy or inability to achieve targeted SpO2   Order Duration: 28 hours   Order must be renewed every 28 hours   Order does NOT auto-discontinue   SUPPLEMENTAL O2 PRN while off BiLevel: High Flow Nasal Cannula   FIO2 (%) for Suppliment Oxygen: 100   LPM (L/min) for Supplemental Oxygen: 50 (04-28-23 @ 12:53)        TESTS & MEASUREMENTS:  Weight/BMI  72.575 (04-26-23 @ 12:01)  34.6 (04-26-23 @ 12:01)    04-28-23 @ 07:01  -  04-29-23 @ 07:00  --------------------------------------------------------  IN: 0 mL / OUT: 0 mL / NET: 0 mL                            7.8    6.35  )-----------( 208      ( 28 Apr 2023 11:19 )             25.4         04-28    137  |  97<L>  |  56<H>  ----------------------------<  74  6.0<HH>   |  32  |  1.6<H>    Ca    8.3<L>      28 Apr 2023 11:19  Mg     2.5     04-28    TPro  5.5<L>  /  Alb  3.3<L>  /  TBili  0.4  /  DBili  x   /  AST  61<H>  /  ALT  27  /  AlkPhos  80  04-28    LIVER FUNCTIONS - ( 28 Apr 2023 11:19 )  Alb: 3.3 g/dL / Pro: 5.5 g/dL / ALK PHOS: 80 U/L / ALT: 27 U/L / AST: 61 U/L / GGT: x                   Procalcitonin, Serum: 0.12 ng/mL (04-26-23 @ 20:41)      Ferritin, Serum: 71 ng/mL (04-27-23 @ 06:00)      COVID-19 PCR: NotDetec (04-26-23 @ 14:00)                RADIOLOGY, ECG, & ADDITIONAL TESTS:  12 Lead ECG:   Ventricular Rate 78 BPM    Atrial Rate 78 BPM    P-R Interval 166 ms    QRS Duration 84 ms    Q-T Interval 396 ms    QTC Calculation(Bazett) 451 ms    P Axis 55 degrees    R Axis 110 degrees    T Axis 67 degrees    Diagnosis Line Normal sinus rhythm  Right axis deviation  Pulmonary disease pattern  Abnormal ECG    Confirmed by Nicola Kay ([id number]) on 4/27/2023 7:07:11 AM (04-27-23 @ 02:48)      RECENT DIAGNOSTIC ORDERS:  Magnesium, Serum: 11:00 (04-29-23 @ 08:30)  Comprehensive Metabolic Panel: 11:00 (04-29-23 @ 08:30)  Complete Blood Count + Automated Diff: 11:00 (04-29-23 @ 08:30)  Magnesium, Serum: AM Sched. Collection: 30-Apr-2023 04:30 (04-29-23 @ 03:49)  Comprehensive Metabolic Panel: AM Sched. Collection: 30-Apr-2023 04:30 (04-29-23 @ 03:49)  Complete Blood Count + Automated Diff: AM Sched. Collection: 30-Apr-2023 04:30 (04-29-23 @ 03:49)  Blood Gas Arterial - Lytes,iCa,Lact: Routine (04-28-23 @ 15:35)  Magnesium, Serum: AM Sched. Collection: 01-May-2023 04:30 (04-28-23 @ 14:38)  Magnesium, Serum: AM Sched. Collection: 30-Apr-2023 04:30 (04-28-23 @ 14:38)  Comprehensive Metabolic Panel: AM Sched. Collection: 01-May-2023 04:30 (04-28-23 @ 14:38)  Comprehensive Metabolic Panel: AM Sched. Collection: 30-Apr-2023 04:30 (04-28-23 @ 14:38)  Complete Blood Count + Automated Diff: AM Sched. Collection: 01-May-2023 04:30 (04-28-23 @ 14:38)  Complete Blood Count + Automated Diff: AM Sched. Collection: 30-Apr-2023 04:30 (04-28-23 @ 14:38)  ABO Rh Confirmatory Specimen: STAT  Addl Info: Conditional: ABO Rh Confirmatory Specimen (04-28-23 @ 11:17)  Magnesium, Serum: STAT (04-28-23 @ 11:17)  Comprehensive Metabolic Panel: STAT (04-28-23 @ 11:17)  Complete Blood Count + Automated Diff: STAT (04-28-23 @ 11:17)      MEDICATIONS:  MEDICATIONS  (STANDING):  albuterol    90 MICROgram(s) HFA Inhaler 2 Puff(s) Inhalation every 6 hours  albuterol/ipratropium for Nebulization 3 milliLiter(s) Nebulizer every 6 hours  aspirin  chewable 81 milliGRAM(s) Oral daily  atorvastatin 20 milliGRAM(s) Oral at bedtime  budesonide 160 MICROgram(s)/formoterol 4.5 MICROgram(s) Inhaler 2 Puff(s) Inhalation two times a day  doxycycline IVPB 100 milliGRAM(s) IV Intermittent every 12 hours  dronedarone 400 milliGRAM(s) Oral two times a day  heparin   Injectable 5000 Unit(s) SubCutaneous every 8 hours  lactated ringers. 1000 milliLiter(s) (75 mL/Hr) IV Continuous <Continuous>  levothyroxine 112 MICROGram(s) Oral daily  methylPREDNISolone sodium succinate Injectable 60 milliGRAM(s) IV Push every 12 hours  metoprolol succinate ER 25 milliGRAM(s) Oral daily  sodium zirconium cyclosilicate 10 Gram(s) Oral every 12 hours  tamsulosin 0.4 milliGRAM(s) Oral at bedtime    MEDICATIONS  (PRN):      HOME MEDICATIONS:  albuterol 2.5 mg/3 mL (0.083%) inhalation solution (04-26)  aspirin 81 mg oral tablet (04-26)  ATORVASTATIN 20MG TABLETS (04-26)  Lasix 20 mg oral tablet (04-26)  levothyroxine 112 mcg (0.112 mg) oral tablet (04-26)  metoprolol succinate 25 mg oral tablet, extended release (04-26)  Multaq 400 mg oral tablet (04-26)  tamsulosin 0.4 mg oral capsule (04-26)  TRELEGY ELLIPTA 100-62.5MCG INH 30P (04-26)      PHYSICAL EXAM:  GENERAL:   CHEST/LUNG:   HEART:   ABDOMEN:   EXTREMITIES:               TANNA MCCRACKEN  89y, Female  Allergy: No Known Allergies    Hospital Day: 3d    Patient seen and examined earlier today.  the patient stated she feels ok, her son at bedside   PMH/PSH:  PAST MEDICAL & SURGICAL HISTORY:  COPD (chronic obstructive pulmonary disease)      Hypothyroid      HTN (hypertension)      High cholesterol      Colitis      CKD (chronic kidney disease)      No significant past surgical history          LAST 24-Hr EVENTS:    VITALS:  T(F): 97 (04-29-23 @ 08:00), Max: 97.5 (04-29-23 @ 04:00)  HR: 96 (04-29-23 @ 08:00)  BP: 136/56 (04-29-23 @ 08:00) (112/52 - 140/58)  RR: 20 (04-29-23 @ 08:00)  SpO2: 100% (04-29-23 @ 08:00)    Non Invasive Vent (BiLevel) Settings: Routine   Timed From: 22:00 To: 06:00   Indication for NPPV: COPD Exacerbation   FIO2 (%): 100  Inspiratory Pressure (cmH2O):  0   Expiratory Pressure (cmH2O):  5   Back Up Rate (bpm): 5   Notify provider for SpO2 (%) BELOW: 85   Notify provider for SpO2 (%) ABOVE: 85  Targeted SpO2 Range (%): 88-97   BIPAP V60 O2 Titration Guideline: Device O2 Percent Range (%): %, Device O2 Flow Rate Range (LPM): N/A, O2 Titration Guideline: Increase/Decrease by 10%. Notify Provider for any increase in oxygen therapy or inability to achieve targeted SpO2   Order Duration: 28 hours   Order must be renewed every 28 hours   Order does NOT auto-discontinue   SUPPLEMENTAL O2 PRN while off BiLevel: High Flow Nasal Cannula   FIO2 (%) for Suppliment Oxygen: 100   LPM (L/min) for Supplemental Oxygen: 50 (04-28-23 @ 12:53)        TESTS & MEASUREMENTS:  Weight/BMI  72.575 (04-26-23 @ 12:01)  34.6 (04-26-23 @ 12:01)    04-28-23 @ 07:01  -  04-29-23 @ 07:00  --------------------------------------------------------  IN: 0 mL / OUT: 0 mL / NET: 0 mL                            7.8    6.35  )-----------( 208      ( 28 Apr 2023 11:19 )             25.4         04-28    137  |  97<L>  |  56<H>  ----------------------------<  74  6.0<HH>   |  32  |  1.6<H>    Ca    8.3<L>      28 Apr 2023 11:19  Mg     2.5     04-28    TPro  5.5<L>  /  Alb  3.3<L>  /  TBili  0.4  /  DBili  x   /  AST  61<H>  /  ALT  27  /  AlkPhos  80  04-28    LIVER FUNCTIONS - ( 28 Apr 2023 11:19 )  Alb: 3.3 g/dL / Pro: 5.5 g/dL / ALK PHOS: 80 U/L / ALT: 27 U/L / AST: 61 U/L / GGT: x                   Procalcitonin, Serum: 0.12 ng/mL (04-26-23 @ 20:41)      Ferritin, Serum: 71 ng/mL (04-27-23 @ 06:00)      COVID-19 PCR: NotDetec (04-26-23 @ 14:00)                RADIOLOGY, ECG, & ADDITIONAL TESTS:  12 Lead ECG:   Ventricular Rate 78 BPM    Atrial Rate 78 BPM    P-R Interval 166 ms    QRS Duration 84 ms    Q-T Interval 396 ms    QTC Calculation(Bazett) 451 ms    P Axis 55 degrees    R Axis 110 degrees    T Axis 67 degrees    Diagnosis Line Normal sinus rhythm  Right axis deviation  Pulmonary disease pattern  Abnormal ECG    Confirmed by Nicola Kay ([id number]) on 4/27/2023 7:07:11 AM (04-27-23 @ 02:48)      RECENT DIAGNOSTIC ORDERS:  Magnesium, Serum: 11:00 (04-29-23 @ 08:30)  Comprehensive Metabolic Panel: 11:00 (04-29-23 @ 08:30)  Complete Blood Count + Automated Diff: 11:00 (04-29-23 @ 08:30)  Magnesium, Serum: AM Sched. Collection: 30-Apr-2023 04:30 (04-29-23 @ 03:49)  Comprehensive Metabolic Panel: AM Sched. Collection: 30-Apr-2023 04:30 (04-29-23 @ 03:49)  Complete Blood Count + Automated Diff: AM Sched. Collection: 30-Apr-2023 04:30 (04-29-23 @ 03:49)  Blood Gas Arterial - Lytes,iCa,Lact: Routine (04-28-23 @ 15:35)  Magnesium, Serum: AM Sched. Collection: 01-May-2023 04:30 (04-28-23 @ 14:38)  Magnesium, Serum: AM Sched. Collection: 30-Apr-2023 04:30 (04-28-23 @ 14:38)  Comprehensive Metabolic Panel: AM Sched. Collection: 01-May-2023 04:30 (04-28-23 @ 14:38)  Comprehensive Metabolic Panel: AM Sched. Collection: 30-Apr-2023 04:30 (04-28-23 @ 14:38)  Complete Blood Count + Automated Diff: AM Sched. Collection: 01-May-2023 04:30 (04-28-23 @ 14:38)  Complete Blood Count + Automated Diff: AM Sched. Collection: 30-Apr-2023 04:30 (04-28-23 @ 14:38)  ABO Rh Confirmatory Specimen: STAT  Addl Info: Conditional: ABO Rh Confirmatory Specimen (04-28-23 @ 11:17)  Magnesium, Serum: STAT (04-28-23 @ 11:17)  Comprehensive Metabolic Panel: STAT (04-28-23 @ 11:17)  Complete Blood Count + Automated Diff: STAT (04-28-23 @ 11:17)      MEDICATIONS:  MEDICATIONS  (STANDING):  albuterol    90 MICROgram(s) HFA Inhaler 2 Puff(s) Inhalation every 6 hours  albuterol/ipratropium for Nebulization 3 milliLiter(s) Nebulizer every 6 hours  aspirin  chewable 81 milliGRAM(s) Oral daily  atorvastatin 20 milliGRAM(s) Oral at bedtime  budesonide 160 MICROgram(s)/formoterol 4.5 MICROgram(s) Inhaler 2 Puff(s) Inhalation two times a day  doxycycline IVPB 100 milliGRAM(s) IV Intermittent every 12 hours  dronedarone 400 milliGRAM(s) Oral two times a day  heparin   Injectable 5000 Unit(s) SubCutaneous every 8 hours  lactated ringers. 1000 milliLiter(s) (75 mL/Hr) IV Continuous <Continuous>  levothyroxine 112 MICROGram(s) Oral daily  methylPREDNISolone sodium succinate Injectable 60 milliGRAM(s) IV Push every 12 hours  metoprolol succinate ER 25 milliGRAM(s) Oral daily  sodium zirconium cyclosilicate 10 Gram(s) Oral every 12 hours  tamsulosin 0.4 milliGRAM(s) Oral at bedtime    MEDICATIONS  (PRN):      HOME MEDICATIONS:  albuterol 2.5 mg/3 mL (0.083%) inhalation solution (04-26)  aspirin 81 mg oral tablet (04-26)  ATORVASTATIN 20MG TABLETS (04-26)  Lasix 20 mg oral tablet (04-26)  levothyroxine 112 mcg (0.112 mg) oral tablet (04-26)  metoprolol succinate 25 mg oral tablet, extended release (04-26)  Multaq 400 mg oral tablet (04-26)  tamsulosin 0.4 mg oral capsule (04-26)  TRELEGY ELLIPTA 100-62.5MCG INH 30P (04-26)      PHYSICAL EXAM:  On exam  General: awake, alert, NAD, chronic ill appearance, frail on HFNC   Lungs:  decrease breath sound b/l ,  normal resp effort  Heart: regular ryhthm   Abdomen: soft, non tender non distended  Ext: no edema, can move all  his extremities , fragile skin

## 2023-04-30 NOTE — PROGRESS NOTE ADULT - SUBJECTIVE AND OBJECTIVE BOX
T H I S   I S    N O  T   A    F I N A L I Z E D   N O T BRYNN PARKERETTE  89y, Female  Allergy: No Known Allergies    Hospital Day: 4d    Patient seen and examined earlier today.     PMH/PSH:  PAST MEDICAL & SURGICAL HISTORY:  COPD (chronic obstructive pulmonary disease)      Hypothyroid      HTN (hypertension)      High cholesterol      Colitis      CKD (chronic kidney disease)      No significant past surgical history          LAST 24-Hr EVENTS:    VITALS:  T(F): 96.7 (04-30-23 @ 16:00), Max: 98.2 (04-30-23 @ 07:55)  HR: 68 (04-30-23 @ 16:00)  BP: 144/64 (04-30-23 @ 16:00) (120/68 - 175/75)  RR: 18 (04-30-23 @ 16:00)  SpO2: 100% (04-30-23 @ 16:00)          TESTS & MEASUREMENTS:  Weight/BMI  72.575 (04-26-23 @ 12:01)  34.6 (04-26-23 @ 12:01)    04-28-23 @ 07:01  -  04-29-23 @ 07:00  --------------------------------------------------------  IN: 0 mL / OUT: 0 mL / NET: 0 mL    04-29-23 @ 07:01  -  04-30-23 @ 07:00  --------------------------------------------------------  IN: 150 mL / OUT: 950 mL / NET: -800 mL    04-30-23 @ 07:01  -  04-30-23 @ 17:18  --------------------------------------------------------  IN: 350 mL / OUT: 0 mL / NET: 350 mL                            8.7    5.71  )-----------( 198      ( 30 Apr 2023 07:02 )             28.8         04-30    142  |  101  |  59<H>  ----------------------------<  103<H>  5.7<H>   |  30  |  1.6<H>    Ca    8.9      30 Apr 2023 07:02  Mg     2.8     04-30    TPro  5.8<L>  /  Alb  3.7  /  TBili  0.4  /  DBili  x   /  AST  48<H>  /  ALT  31  /  AlkPhos  89  04-30    LIVER FUNCTIONS - ( 30 Apr 2023 07:02 )  Alb: 3.7 g/dL / Pro: 5.8 g/dL / ALK PHOS: 89 U/L / ALT: 31 U/L / AST: 48 U/L / GGT: x                   Procalcitonin, Serum: 0.12 ng/mL (04-26-23 @ 20:41)      Ferritin, Serum: 71 ng/mL (04-27-23 @ 06:00)      COVID-19 PCR: NotDetec (04-26-23 @ 14:00)                RADIOLOGY, ECG, & ADDITIONAL TESTS:  12 Lead ECG:   Ventricular Rate 78 BPM    Atrial Rate 78 BPM    P-R Interval 166 ms    QRS Duration 84 ms    Q-T Interval 396 ms    QTC Calculation(Bazett) 451 ms    P Axis 55 degrees    R Axis 110 degrees    T Axis 67 degrees    Diagnosis Line Normal sinus rhythm  Right axis deviation  Pulmonary disease pattern  Abnormal ECG    Confirmed by Nicola Kay ([id number]) on 4/27/2023 7:07:11 AM (04-27-23 @ 02:48)      RECENT DIAGNOSTIC ORDERS:  12 Lead ECG (04-30-23 @ 09:12)  Type + Screen: 23:30 (04-30-23 @ 07:57)  Magnesium, Serum: 23:30 (04-30-23 @ 07:57)  Comprehensive Metabolic Panel: 23:30 (04-30-23 @ 07:57)  Complete Blood Count + Automated Diff: 23:30 (04-30-23 @ 07:57)      MEDICATIONS:  MEDICATIONS  (STANDING):  albuterol    90 MICROgram(s) HFA Inhaler 2 Puff(s) Inhalation every 6 hours  albuterol/ipratropium for Nebulization 3 milliLiter(s) Nebulizer every 6 hours  aspirin  chewable 81 milliGRAM(s) Oral daily  atorvastatin 20 milliGRAM(s) Oral at bedtime  budesonide 160 MICROgram(s)/formoterol 4.5 MICROgram(s) Inhaler 2 Puff(s) Inhalation two times a day  chlorhexidine 2% Cloths 1 Application(s) Topical daily  dextrose 5% + sodium chloride 0.45%. 1000 milliLiter(s) (75 mL/Hr) IV Continuous <Continuous>  doxycycline IVPB 100 milliGRAM(s) IV Intermittent every 12 hours  dronedarone 400 milliGRAM(s) Oral two times a day  heparin   Injectable 5000 Unit(s) SubCutaneous every 8 hours  levothyroxine 112 MICROGram(s) Oral daily  methylPREDNISolone sodium succinate Injectable 60 milliGRAM(s) IV Push every 8 hours  metoprolol succinate ER 25 milliGRAM(s) Oral daily  sodium zirconium cyclosilicate 10 Gram(s) Oral every 12 hours  tamsulosin 0.4 milliGRAM(s) Oral at bedtime    MEDICATIONS  (PRN):      HOME MEDICATIONS:  albuterol 2.5 mg/3 mL (0.083%) inhalation solution (04-26)  aspirin 81 mg oral tablet (04-26)  ATORVASTATIN 20MG TABLETS (04-26)  Lasix 20 mg oral tablet (04-26)  levothyroxine 112 mcg (0.112 mg) oral tablet (04-26)  metoprolol succinate 25 mg oral tablet, extended release (04-26)  Multaq 400 mg oral tablet (04-26)  tamsulosin 0.4 mg oral capsule (04-26)  TRELEGY ELLIPTA 100-62.5MCG INH 30P (04-26)      PHYSICAL EXAM:  GENERAL:   CHEST/LUNG:   HEART:   ABDOMEN:   EXTREMITIES:           TANNA MCCRACKEN  89y, Female  Allergy: No Known Allergies    Hospital Day: 4d    Patient seen and examined earlier today. she is hypoxic, tachypnic  and agitated son and daughter at bedside      PMH/PSH:  PAST MEDICAL & SURGICAL HISTORY:  COPD (chronic obstructive pulmonary disease)      Hypothyroid      HTN (hypertension)      High cholesterol      Colitis      CKD (chronic kidney disease)      No significant past surgical history          LAST 24-Hr EVENTS:    VITALS:  T(F): 96.7 (04-30-23 @ 16:00), Max: 98.2 (04-30-23 @ 07:55)  HR: 68 (04-30-23 @ 16:00)  BP: 144/64 (04-30-23 @ 16:00) (120/68 - 175/75)  RR: 18 (04-30-23 @ 16:00)  SpO2: 100% (04-30-23 @ 16:00)          TESTS & MEASUREMENTS:  Weight/BMI  72.575 (04-26-23 @ 12:01)  34.6 (04-26-23 @ 12:01)    04-28-23 @ 07:01  -  04-29-23 @ 07:00  --------------------------------------------------------  IN: 0 mL / OUT: 0 mL / NET: 0 mL    04-29-23 @ 07:01  -  04-30-23 @ 07:00  --------------------------------------------------------  IN: 150 mL / OUT: 950 mL / NET: -800 mL    04-30-23 @ 07:01  -  04-30-23 @ 17:18  --------------------------------------------------------  IN: 350 mL / OUT: 0 mL / NET: 350 mL                            8.7    5.71  )-----------( 198      ( 30 Apr 2023 07:02 )             28.8         04-30    142  |  101  |  59<H>  ----------------------------<  103<H>  5.7<H>   |  30  |  1.6<H>    Ca    8.9      30 Apr 2023 07:02  Mg     2.8     04-30    TPro  5.8<L>  /  Alb  3.7  /  TBili  0.4  /  DBili  x   /  AST  48<H>  /  ALT  31  /  AlkPhos  89  04-30    LIVER FUNCTIONS - ( 30 Apr 2023 07:02 )  Alb: 3.7 g/dL / Pro: 5.8 g/dL / ALK PHOS: 89 U/L / ALT: 31 U/L / AST: 48 U/L / GGT: x           ABG - ( 30 Apr 2023 10:36 )  pH, Arterial: 7.32  pH, Blood: x     /  pCO2: 67    /  pO2: 118   / HCO3: 34    / Base Excess: 7.2   /  SaO2: 98.2                Procalcitonin, Serum: 0.12 ng/mL (04-26-23 @ 20:41)      Ferritin, Serum: 71 ng/mL (04-27-23 @ 06:00)      COVID-19 PCR: NotDetec (04-26-23 @ 14:00)                RADIOLOGY, ECG, & ADDITIONAL TESTS:  12 Lead ECG:   Ventricular Rate 78 BPM    Atrial Rate 78 BPM    P-R Interval 166 ms    QRS Duration 84 ms    Q-T Interval 396 ms    QTC Calculation(Bazett) 451 ms    P Axis 55 degrees    R Axis 110 degrees    T Axis 67 degrees    Diagnosis Line Normal sinus rhythm  Right axis deviation  Pulmonary disease pattern  Abnormal ECG    Confirmed by Nicola Kay ([id number]) on 4/27/2023 7:07:11 AM (04-27-23 @ 02:48)      RECENT DIAGNOSTIC ORDERS:  12 Lead ECG (04-30-23 @ 09:12)  Type + Screen: 23:30 (04-30-23 @ 07:57)  Magnesium, Serum: 23:30 (04-30-23 @ 07:57)  Comprehensive Metabolic Panel: 23:30 (04-30-23 @ 07:57)  Complete Blood Count + Automated Diff: 23:30 (04-30-23 @ 07:57)      MEDICATIONS:  MEDICATIONS  (STANDING):  albuterol    90 MICROgram(s) HFA Inhaler 2 Puff(s) Inhalation every 6 hours  albuterol/ipratropium for Nebulization 3 milliLiter(s) Nebulizer every 6 hours  aspirin  chewable 81 milliGRAM(s) Oral daily  atorvastatin 20 milliGRAM(s) Oral at bedtime  budesonide 160 MICROgram(s)/formoterol 4.5 MICROgram(s) Inhaler 2 Puff(s) Inhalation two times a day  chlorhexidine 2% Cloths 1 Application(s) Topical daily  dextrose 5% + sodium chloride 0.45%. 1000 milliLiter(s) (75 mL/Hr) IV Continuous <Continuous>  doxycycline IVPB 100 milliGRAM(s) IV Intermittent every 12 hours  dronedarone 400 milliGRAM(s) Oral two times a day  heparin   Injectable 5000 Unit(s) SubCutaneous every 8 hours  levothyroxine 112 MICROGram(s) Oral daily  methylPREDNISolone sodium succinate Injectable 60 milliGRAM(s) IV Push every 8 hours  metoprolol succinate ER 25 milliGRAM(s) Oral daily  sodium zirconium cyclosilicate 10 Gram(s) Oral every 12 hours  tamsulosin 0.4 milliGRAM(s) Oral at bedtime    MEDICATIONS  (PRN):      HOME MEDICATIONS:  albuterol 2.5 mg/3 mL (0.083%) inhalation solution (04-26)  aspirin 81 mg oral tablet (04-26)  ATORVASTATIN 20MG TABLETS (04-26)  Lasix 20 mg oral tablet (04-26)  levothyroxine 112 mcg (0.112 mg) oral tablet (04-26)  metoprolol succinate 25 mg oral tablet, extended release (04-26)  Multaq 400 mg oral tablet (04-26)  tamsulosin 0.4 mg oral capsule (04-26)  TRELEGY ELLIPTA 100-62.5MCG INH 30P (04-26)      PHYSICAL EXAM:  GENERAL: awake, distressed, frail   CHEST/LUNG:  decrease breath sounds b/l , tachypnic   HEART:  tachycardia , regular   ABDOMEN:  soft, non tended   EXTREMITIES:  no edema, fragile skin

## 2023-04-30 NOTE — PROGRESS NOTE ADULT - ASSESSMENT
Impression:    Acute on chronic hypercapnic resp failure on NIV  Acute on chronic hypoxemic respiratory failure  COPD exacerbation  MAURI on CKD/ hyperkalemia  Severe COPD & Chronic Hypoxemic Respiratory Failure on 3-4L NC at home   Chronic afib not on AC due to recurrent falls  HfpEF      PLAN:    CNS: Avoid sedatives    HEENT: Oral care    PULMONARY: Repeat xray. NIV qhs and prn, (AVAPS at home). Increase Solumedrol to 60 TID. HOB @ 45 degrees, aspiration precautions. Initial VBG noted with acute on chronic hypercapnia. KEEP SAO2 88 TO 92%. ABG in 1 hour.    CARDIOVASCULAR: Obtain EKG. Avoid volume overload. Gentle hydration    GI: GI prophylaxis. S&S eval, feeding when off NIV.    RENAL: Correct as necessary. Repeat lytes. Lokelma if hyperk persists. LR at 50cc/hr.     INFECTIOUS DISEASE: cw doxy (5 day course)    HEMATOLOGICAL: DVT prophylaxis. duplex negative 4/26    ENDOCRINE: Follow up FS. Insulin protocol if needed.    MUSC: PT/OT    DISPO: SDU     palliative care eval    DNR/DNI   Impression:    Acute on chronic hypercapnic resp failure on NIV  Acute on chronic hypoxemic respiratory failure  COPD exacerbation  MAURI on CKD/ hyperkalemia  Severe COPD & Chronic Hypoxemic Respiratory Failure on 3-4L NC at home   Chronic afib not on AC due to recurrent falls  HfpEF      PLAN:    CNS: Avoid sedatives    HEENT: Oral care    PULMONARY: Repeat xray. NIV qhs and prn, (AVAPS at home). Increase Solumedrol to 60 TID. HOB @ 45 degrees, aspiration precautions. Initial VBG noted with acute on chronic hypercapnia. KEEP SAO2 88 TO 92%. ABG in 1 hour.    CARDIOVASCULAR: Obtain EKG. Avoid volume overload. Gentle hydration    GI: GI prophylaxis. S&S eval, feeding when off NIV.    RENAL: Correct as necessary. Repeat lytes. Lokelma if hyperk persists. D5+1/2 NS at 50cc/hr.     INFECTIOUS DISEASE: cw doxy (5 day course)    HEMATOLOGICAL: DVT prophylaxis. duplex negative 4/26    ENDOCRINE: Follow up FS. Insulin protocol if needed.    MUSC: PT/OT    DISPO: SDU     palliative care eval    DNR/DNI   Impression:    Acute on chronic hypercapnic resp failure on NIV  Acute on chronic hypoxemic respiratory failure  COPD exacerbation  MAURI on CKD/ hyperkalemia  Severe COPD & Chronic Hypoxemic Respiratory Failure on 3-4L NC at home   Chronic afib not on AC due to recurrent falls  HfpEF      PLAN:    CNS: Avoid sedatives. Morphine PRN.     HEENT: Oral care    PULMONARY: Repeat xray with no infiltrates. NIV qhs and prn, (AVAPS at home). Solumedrol 40 TID. HOB @ 45 degrees, aspiration precautions. ABG with acute on chronic hypercapnia stable.     CARDIOVASCULAR: Obtain EKG. Keep equal balance.     GI: GI prophylaxis. S&S eval, feeding when off NIV if can tolerate.     RENAL: Correct as necessary. Repeat lytes. Lokelma. Gentle IV hydration if cannot tolerate diet.     INFECTIOUS DISEASE: cw doxy (5 day course)    HEMATOLOGICAL: DVT prophylaxis. duplex negative 4/26    ENDOCRINE: Follow up FS. Insulin protocol if needed.    MUSC: PT/OT    DISPO: SDU     palliative care eval. Poor prognosis.     DNR/DNI

## 2023-04-30 NOTE — PROGRESS NOTE ADULT - ASSESSMENT
89-year-old female past medical history of COPD on 4 L NC, Chronic A-fib not on AC, hypothyroidism, CKDIII, HFpEF presents for evaluation of shortness of breath due to COPD exacerbation.    #Acute on chronic hypercapnic hypoxemic respiratory failure  # COPD exacerbation  #Hx COPD on home oxygen 3-4 L  Duplex negative for DVT   blood gases noted    NIV qhs and prn,   Solumedrol 60 BID for now    Rapid resopnse overnight of 4/28 for desaturaion   aspiration precautions. S&S for feeding eval - feeding when off NIV  CXR 04/27: Unchanged bibasilar opacities  Currently on HFNC   LE duplex negative for DVT  Procalcitonin 0.12  - c/w Duonebs and Home Inhalers PRN  - Continue doxycycline  - RVP and Procal negative   4/30: she is hypoxic, tachypnic  and agitated son and daughter at bedside  . placed on NIV , with improvement oxygen  but she still uncomfortable , discussed with the son and with pulm cc team at bedside  and the patient son he confirmed DNR/DNI , pulm cc offered morphine for comfortably and after explaining in details the risks/ benifits and alrentative by the pulm cc team the patient son agreed. Dilaudid given   ABG/ CXR / EKG noted       #MAURI on CKDIII / Hyperkalemia   - gentle hydration : switch LR to d5 half ns   Creatinine Trend: 1.6<--, 1.4<--, 1.6<--, 1.8<--, 1.8<--, 1.9<--  - Hold lasix and resume accordingly  - avoid Nephrotoxics  lokelma when she is of niv  and low K diet once diet approved bt S/S     #Hx of Normocytic anemia   - Hb 9.0 (baseline 9-10)    #Chronic A-fib off AC due to recurrent falls  -EKG NOTED   - c/w home metoprolol with holding parameters and Multaq 400 mg BID  IV BB while NPO if needed     #Chronic HFPEF  #HLD  - holding lasix 20mg PO QD - for MAURI  - Cont statin  - resume accordingly  - Avoid volume overload    #Hypothyroidism  - C/W levothyroxine    #Recurrent falls - Mechanical   - PT eval / Fall precautions     #Troponemia likely type 2  - Trop :0.04 in the setting of CKD-->0.02-->0.03   - BNP:  642 (baseline 500)  - Monitor    DVT PPX: Heparin sq   Diet: DASH while off of BIPAP    GOC DNR/DNI - refer to GOC on admission , consider palliative monday     poor prognosis     #Progress Note Handoff  Pending (specify): Respiratory status /  cont IV solumedrol, monitor K , feeding   Family discussion: dw son  by bedside

## 2023-04-30 NOTE — PROGRESS NOTE ADULT - SUBJECTIVE AND OBJECTIVE BOX
Patient is a 89y old  Female who presents with a chief complaint of COPD exacerbation (29 Apr 2023 10:04)        Over Night Events: On AVAPS now. Off pressors.        ROS:     All pertinent ROS are negative except HPI         PHYSICAL EXAM    ICU Vital Signs Last 24 Hrs  T(C): 36.8 (30 Apr 2023 07:55), Max: 36.8 (30 Apr 2023 07:55)  T(F): 98.2 (30 Apr 2023 07:55), Max: 98.2 (30 Apr 2023 07:55)  HR: 83 (30 Apr 2023 07:55) (79 - 122)  BP: 175/75 (30 Apr 2023 07:55) (130/61 - 175/75)  BP(mean): 108 (30 Apr 2023 07:55) (88 - 108)  RR: 20 (30 Apr 2023 07:55) (20 - 20)  SpO2: 100% (30 Apr 2023 07:55) (96% - 100%)    O2 Parameters below as of 30 Apr 2023 07:55  Patient On (Oxygen Delivery Method): nasal cannula, high flow            CONSTITUTIONAL:  NAD    ENT:   Airway patent,   Mouth with normal mucosa.   No thrush    EYES:   Pupils equal,   Round and reactive to light.    CARDIAC:   Normal rate,   Regular rhythm.    No edema    RESPIRATORY:   MICHELE wheezing  Tachypneic  Accessory muscle usage    GASTROINTESTINAL:  Abdomen soft,   Non-tender,   No guarding,   + BS    MUSCULOSKELETAL:   Range of motion is not limited,  No clubbing, cyanosis    NEUROLOGICAL:   Alert and oriented   No motor deficits.    SKIN:   Warm and dry  No evidence of rash.    PSYCHIATRIC:   No apparent risk to self or others.        04-29-23 @ 07:01  -  04-30-23 @ 07:00  --------------------------------------------------------  IN:    Lactated Ringers: 100 mL  Total IN: 100 mL    OUT:    Voided (mL): 950 mL  Total OUT: 950 mL    Total NET: -850 mL          LABS:                            8.7    5.71  )-----------( 198      ( 30 Apr 2023 07:02 )             28.8                                               04-30    142  |  101  |  59<H>  ----------------------------<  103<H>  5.7<H>   |  30  |  1.6<H>    Ca    8.9      30 Apr 2023 07:02  Mg     2.8     04-30    TPro  5.8<L>  /  Alb  3.7  /  TBili  0.4  /  DBili  x   /  AST  48<H>  /  ALT  31  /  AlkPhos  89  04-30                                                                                           LIVER FUNCTIONS - ( 30 Apr 2023 07:02 )  Alb: 3.7 g/dL / Pro: 5.8 g/dL / ALK PHOS: 89 U/L / ALT: 31 U/L / AST: 48 U/L / GGT: x                                                                                                                                   ABG - ( 28 Apr 2023 20:05 )  pH, Arterial: 7.32  pH, Blood: x     /  pCO2: 62    /  pO2: 258   / HCO3: 32    / Base Excess: 4.9   /  SaO2: 98.8                MEDICATIONS  (STANDING):  albuterol    90 MICROgram(s) HFA Inhaler 2 Puff(s) Inhalation every 6 hours  albuterol/ipratropium for Nebulization 3 milliLiter(s) Nebulizer every 6 hours  aspirin  chewable 81 milliGRAM(s) Oral daily  atorvastatin 20 milliGRAM(s) Oral at bedtime  budesonide 160 MICROgram(s)/formoterol 4.5 MICROgram(s) Inhaler 2 Puff(s) Inhalation two times a day  chlorhexidine 2% Cloths 1 Application(s) Topical daily  doxycycline IVPB 100 milliGRAM(s) IV Intermittent every 12 hours  dronedarone 400 milliGRAM(s) Oral two times a day  heparin   Injectable 5000 Unit(s) SubCutaneous every 8 hours  lactated ringers. 1000 milliLiter(s) (75 mL/Hr) IV Continuous <Continuous>  lactated ringers. 1000 milliLiter(s) (50 mL/Hr) IV Continuous <Continuous>  levothyroxine 112 MICROGram(s) Oral daily  methylPREDNISolone sodium succinate Injectable 60 milliGRAM(s) IV Push every 12 hours  metoprolol succinate ER 25 milliGRAM(s) Oral daily  QUEtiapine 25 milliGRAM(s) Oral once  sodium zirconium cyclosilicate 10 Gram(s) Oral every 12 hours  tamsulosin 0.4 milliGRAM(s) Oral at bedtime    MEDICATIONS  (PRN):      New X-rays reviewed:                                                                                  ECHO    CXR interpreted by me:

## 2023-05-01 NOTE — PROGRESS NOTE ADULT - SUBJECTIVE AND OBJECTIVE BOX
Hospital Day:  5d    Subjective:    Patient is a 89y old  Female who presents with a chief complaint of COPD exacerbation. No acute events overnight.     Admitted to medicine for a primary diagnosis of COPD exacerbation     Past Medical Hx:   COPD (chronic obstructive pulmonary disease)    Hypothyroid    HTN (hypertension)    High cholesterol    Colitis    CKD (chronic kidney disease)      Past Sx:  No significant past surgical history      Allergies:  No Known Allergies    Current Meds:   Standng Meds:  albuterol    90 MICROgram(s) HFA Inhaler 2 Puff(s) Inhalation every 6 hours  albuterol/ipratropium for Nebulization 3 milliLiter(s) Nebulizer every 6 hours  aspirin  chewable 81 milliGRAM(s) Oral daily  atorvastatin 20 milliGRAM(s) Oral at bedtime  budesonide 160 MICROgram(s)/formoterol 4.5 MICROgram(s) Inhaler 2 Puff(s) Inhalation two times a day  chlorhexidine 2% Cloths 1 Application(s) Topical daily  dextrose 5% + sodium chloride 0.45%. 1000 milliLiter(s) (75 mL/Hr) IV Continuous <Continuous>  doxycycline IVPB 100 milliGRAM(s) IV Intermittent every 12 hours  dronedarone 400 milliGRAM(s) Oral two times a day  heparin   Injectable 5000 Unit(s) SubCutaneous every 8 hours  levothyroxine 112 MICROGram(s) Oral daily  methylPREDNISolone sodium succinate Injectable 60 milliGRAM(s) IV Push every 12 hours  metoprolol succinate ER 25 milliGRAM(s) Oral daily  sodium zirconium cyclosilicate 10 Gram(s) Oral every 12 hours  tamsulosin 0.4 milliGRAM(s) Oral at bedtime    PRN Meds:    HOME MEDICATIONS:  albuterol 2.5 mg/3 mL (0.083%) inhalation solution: 3 milliliter(s) inhaled every 6 hours, As Needed for wheezing, shortness of breath  aspirin 81 mg oral tablet: 1 tab(s) orally once a day  ATORVASTATIN 20MG TABLETS: each orally once a day  Lasix 20 mg oral tablet: 1 tab(s) orally once a day  Multaq 400 mg oral tablet: 1 tab(s) orally 2 times a day  TRELEGY ELLIPTA 100-62.5MCG INH 30P: INHALE 1 PUFF BY MOUTH DAILY      Vital Signs:   T(F): 97.2 (05-01-23 @ 07:18), Max: 97.9 (04-30-23 @ 12:00)  HR: 72 (05-01-23 @ 07:18) (58 - 97)  BP: 153/59 (05-01-23 @ 07:18) (120/56 - 170/70)  RR: 18 (05-01-23 @ 07:18) (16 - 20)  SpO2: 100% (05-01-23 @ 10:34) (98% - 100%)      04-30-23 @ 07:01  -  05-01-23 @ 07:00  --------------------------------------------------------  IN: 1425 mL / OUT: 800 mL / NET: 625 mL        Physical Exam:   GENERAL: NAD  HEENT: NCAT  CHEST/LUNG: CTAB  HEART: Regular rate and rhythm; s1 s2 appreciated, No murmurs, rubs, or gallops  ABDOMEN: Soft, Nontender, Nondistended; Bowel sounds present  EXTREMITIES: No LE edema b/l  SKIN: no rashes, no new lesions  NERVOUS SYSTEM:  Alert & Oriented X3        Labs:                         8.7    5.71  )-----------( 198      ( 30 Apr 2023 07:02 )             28.8       30 Apr 2023 07:02    142    |  101    |  59     ----------------------------<  103    5.7     |  30     |  1.6      Ca    8.9        30 Apr 2023 07:02  Mg     2.8       30 Apr 2023 07:02    TPro  5.8    /  Alb  3.7    /  TBili  0.4    /  DBili  x      /  AST  48     /  ALT  31     /  AlkPhos  89     30 Apr 2023 07:02

## 2023-05-01 NOTE — PROGRESS NOTE ADULT - ASSESSMENT
89-year-old female past medical history of COPD on 4 L NC, Chronic A-fib not on AC, hypothyroidism, CKDIII, HFpEF presents for evaluation of shortness of breath due to COPD exacerbation.    #Acute on Chronic Hypercapnic respiratory failure   # COPD exacerbation  #Hx COPD on home oxygen 3-4 L  CXR - No evidence of focal consolidation, pleural effusion or pneumothorax.  Duplex - No evidence of deep venous thrombosis or superficial thrombophlebitis in the bilateral lower extremities.  ABG showing Hypercapnia   - Procal .12, RVP negative   - Bipap PRN and qhs, on HFNC currently, wean O2 as tolerated   - S/p Solumedrol TID --> BID    - C/w Duonebs and home inhalers PRN  - C/w Doxy   4/28 - RR for desaturation - Placed on NIV and responded well   - S&S eval     #MAURI on CKDIII  #Hyperkalemia   - C/w D5 1/2NS   - Creatinine downtrending, pending blood work today   - Hold Lasix and resume accordingly  - avoid Nephrotoxics    #Hx of Normocytic anemia   - Hb 9.0 (baseline 9-10)    #Chronic A-fib off AC due to recurrent falls  -EKG NOTED   - c/w home metoprolol with holding parameters and Multaq 400 mg BID  - IV BB while NPO if needed     #Chronic HFPEF  #HLD  - holding lasix 20mg PO QD - for MAURI  - Cont statin  - resume accordingly  - Avoid volume overload    #Hypothyroidism  - C/W levothyroxine    #Recurrent falls - Mechanical   - PT eval / Fall precautions     #Troponemia likely type 2  - Trop :0.04 in the setting of CKD-->0.02-->0.03   - BNP:  642 (baseline 500)  - Monitor    DVT PPX: Heparin sq   Diet: NPO. S&S pending   Activity: PT/OT needed   GI: PPI   GOC: DNR/DNI

## 2023-05-01 NOTE — PHARMACOTHERAPY INTERVENTION NOTE - COMMENTS
Recommended to discontinue the doxycycline order since patient has received 5 days of doxycycline therapy.    Petr Patterson, PharmD, [facility]  Clinical Pharmacy Specialist, Infectious Diseases  Tele-Antimicrobial Stewardship Program (Tele-ASP)  Tele-ASP Phone: [phone]

## 2023-05-01 NOTE — PHYSICAL THERAPY INITIAL EVALUATION ADULT - PERTINENT HX OF CURRENT PROBLEM, REHAB EVAL
89-year-old female past medical history of COPD on 4 L NC, Chronic A-fib not on AC, hypothyroidism, CKDIII, HFpEF presents for evaluation of shortness of breath due to COPD exacerbation.

## 2023-05-01 NOTE — PHYSICAL THERAPY INITIAL EVALUATION ADULT - GAIT DEVIATIONS NOTED, PT EVAL
posterior loss of balance when ambulating/decreased velocity of limb motion/decreased weight-shifting ability

## 2023-05-01 NOTE — PROGRESS NOTE ADULT - SUBJECTIVE AND OBJECTIVE BOX
Patient is a 89y old  Female who presents with a chief complaint of COPD exacerbation (30 Apr 2023 17:18)        Over Night Events:  Off pressors.  On HFNCO2.  Did not use NIV last night.          ROS:     All ROS are negative except HPI         PHYSICAL EXAM    ICU Vital Signs Last 24 Hrs  T(C): 36.2 (01 May 2023 07:18), Max: 36.6 (30 Apr 2023 12:00)  T(F): 97.2 (01 May 2023 07:18), Max: 97.9 (30 Apr 2023 12:00)  HR: 72 (01 May 2023 07:18) (58 - 97)  BP: 153/59 (01 May 2023 07:18) (120/56 - 170/70)  BP(mean): 83 (01 May 2023 07:18) (83 - 93)  ABP: --  ABP(mean): --  RR: 18 (01 May 2023 07:18) (16 - 20)  SpO2: 100% (01 May 2023 07:18) (98% - 100%)    O2 Parameters below as of 01 May 2023 07:18  Patient On (Oxygen Delivery Method): nasal cannula, high flow            CONSTITUTIONAL:  IN NAD    ENT:   Airway patent,   Mouth with normal mucosa.       EYES:   Pupils equal,   Round and reactive to light.    CARDIAC:   Normal rate,   Regular rhythm.        RESPIRATORY:   No wheezing  Bilateral BS  Normal chest expansion  Not tachypneic,  No use of accessory muscles    GASTROINTESTINAL:  Abdomen soft,   Non-tender,   No guarding,   + BS    MUSCULOSKELETAL:   Range of motion is not limited,  No clubbing, cyanosis    NEUROLOGICAL:   Alert     SKIN:   Skin normal color for race,   No evidence of rash.        04-30-23 @ 07:01  -  05-01-23 @ 07:00  --------------------------------------------------------  IN:    dextrose 5% + sodium chloride 0.45%: 1225 mL    IV PiggyBack: 100 mL    Lactated Ringers: 100 mL  Total IN: 1425 mL    OUT:    Voided (mL): 800 mL  Total OUT: 800 mL    Total NET: 625 mL          LABS:                            8.7    5.71  )-----------( 198      ( 30 Apr 2023 07:02 )             28.8                                               04-30    142  |  101  |  59<H>  ----------------------------<  103<H>  5.7<H>   |  30  |  1.6<H>    Creatinine Trend  BUN 59, Cr 1.6, (04-30-23 @ 07:02)  Creatinine Trend  BUN 60, Cr 1.4, (04-29-23 @ 12:12)  Creatinine Trend  BUN 56, Cr 1.6, (04-28-23 @ 11:19)  Creatinine Trend  BUN 64, Cr 1.8, (04-28-23 @ 01:23)  Creatinine Trend  BUN 62, Cr 1.8, (04-27-23 @ 16:13)  Creatinine Trend  BUN 63, Cr 1.9, (04-27-23 @ 06:00)  Creatinine Trend  BUN 56, Cr 1.7, (04-26-23 @ 20:41)  Creatinine Trend  BUN 57, Cr 1.8, (04-26-23 @ 12:20)      Ca    8.9      30 Apr 2023 07:02  Mg     2.8     04-30    TPro  5.8<L>  /  Alb  3.7  /  TBili  0.4  /  DBili  x   /  AST  48<H>  /  ALT  31  /  AlkPhos  89  04-30                                                                                           LIVER FUNCTIONS - ( 30 Apr 2023 07:02 )  Alb: 3.7 g/dL / Pro: 5.8 g/dL / ALK PHOS: 89 U/L / ALT: 31 U/L / AST: 48 U/L / GGT: x                                                                                                                                   ABG - ( 30 Apr 2023 10:36 )  pH, Arterial: 7.32  pH, Blood: x     /  pCO2: 67    /  pO2: 118   / HCO3: 34    / Base Excess: 7.2   /  SaO2: 98.2                MEDICATIONS  (STANDING):  albuterol    90 MICROgram(s) HFA Inhaler 2 Puff(s) Inhalation every 6 hours  albuterol/ipratropium for Nebulization 3 milliLiter(s) Nebulizer every 6 hours  aspirin  chewable 81 milliGRAM(s) Oral daily  atorvastatin 20 milliGRAM(s) Oral at bedtime  budesonide 160 MICROgram(s)/formoterol 4.5 MICROgram(s) Inhaler 2 Puff(s) Inhalation two times a day  chlorhexidine 2% Cloths 1 Application(s) Topical daily  dextrose 5% + sodium chloride 0.45%. 1000 milliLiter(s) (75 mL/Hr) IV Continuous <Continuous>  doxycycline IVPB 100 milliGRAM(s) IV Intermittent every 12 hours  dronedarone 400 milliGRAM(s) Oral two times a day  heparin   Injectable 5000 Unit(s) SubCutaneous every 8 hours  levothyroxine 112 MICROGram(s) Oral daily  methylPREDNISolone sodium succinate Injectable 60 milliGRAM(s) IV Push every 8 hours  metoprolol succinate ER 25 milliGRAM(s) Oral daily  sodium zirconium cyclosilicate 10 Gram(s) Oral every 12 hours  tamsulosin 0.4 milliGRAM(s) Oral at bedtime    MEDICATIONS  (PRN):      New X-rays reviewed:                                                                                  ECHO       Patient is a 89y old  Female who presents with a chief complaint of COPD exacerbation (30 Apr 2023 17:18)        Over Night Events:  Remains critically ill on HFNCO2.  Did not use NIV last night.          ROS:     All ROS are negative except HPI         PHYSICAL EXAM    ICU Vital Signs Last 24 Hrs  T(C): 36.2 (01 May 2023 07:18), Max: 36.6 (30 Apr 2023 12:00)  T(F): 97.2 (01 May 2023 07:18), Max: 97.9 (30 Apr 2023 12:00)  HR: 72 (01 May 2023 07:18) (58 - 97)  BP: 153/59 (01 May 2023 07:18) (120/56 - 170/70)  BP(mean): 83 (01 May 2023 07:18) (83 - 93)  ABP: --  ABP(mean): --  RR: 18 (01 May 2023 07:18) (16 - 20)  SpO2: 100% (01 May 2023 07:18) (98% - 100%)    O2 Parameters below as of 01 May 2023 07:18  Patient On (Oxygen Delivery Method): nasal cannula, high flow            CONSTITUTIONAL:  IN NAD    ENT:   Airway patent,   Mouth with normal mucosa.       EYES:   Pupils equal,   Round and reactive to light.    CARDIAC:   Normal rate,   Regular rhythm.        RESPIRATORY:   No wheezing  Bilateral BS  Normal chest expansion  Not tachypneic,  No use of accessory muscles    GASTROINTESTINAL:  Abdomen soft,   Non-tender,   No guarding,   + BS    MUSCULOSKELETAL:   Range of motion is not limited,  No clubbing, cyanosis    NEUROLOGICAL:   Alert     SKIN:   Skin normal color for race,   No evidence of rash.        04-30-23 @ 07:01  -  05-01-23 @ 07:00  --------------------------------------------------------  IN:    dextrose 5% + sodium chloride 0.45%: 1225 mL    IV PiggyBack: 100 mL    Lactated Ringers: 100 mL  Total IN: 1425 mL    OUT:    Voided (mL): 800 mL  Total OUT: 800 mL    Total NET: 625 mL          LABS:                            8.7    5.71  )-----------( 198      ( 30 Apr 2023 07:02 )             28.8                                               04-30    142  |  101  |  59<H>  ----------------------------<  103<H>  5.7<H>   |  30  |  1.6<H>    Creatinine Trend  BUN 59, Cr 1.6, (04-30-23 @ 07:02)  Creatinine Trend  BUN 60, Cr 1.4, (04-29-23 @ 12:12)  Creatinine Trend  BUN 56, Cr 1.6, (04-28-23 @ 11:19)  Creatinine Trend  BUN 64, Cr 1.8, (04-28-23 @ 01:23)  Creatinine Trend  BUN 62, Cr 1.8, (04-27-23 @ 16:13)  Creatinine Trend  BUN 63, Cr 1.9, (04-27-23 @ 06:00)  Creatinine Trend  BUN 56, Cr 1.7, (04-26-23 @ 20:41)  Creatinine Trend  BUN 57, Cr 1.8, (04-26-23 @ 12:20)      Ca    8.9      30 Apr 2023 07:02  Mg     2.8     04-30    TPro  5.8<L>  /  Alb  3.7  /  TBili  0.4  /  DBili  x   /  AST  48<H>  /  ALT  31  /  AlkPhos  89  04-30                                                                                           LIVER FUNCTIONS - ( 30 Apr 2023 07:02 )  Alb: 3.7 g/dL / Pro: 5.8 g/dL / ALK PHOS: 89 U/L / ALT: 31 U/L / AST: 48 U/L / GGT: x                                                                                                                                   ABG - ( 30 Apr 2023 10:36 )  pH, Arterial: 7.32  pH, Blood: x     /  pCO2: 67    /  pO2: 118   / HCO3: 34    / Base Excess: 7.2   /  SaO2: 98.2                MEDICATIONS  (STANDING):  albuterol    90 MICROgram(s) HFA Inhaler 2 Puff(s) Inhalation every 6 hours  albuterol/ipratropium for Nebulization 3 milliLiter(s) Nebulizer every 6 hours  aspirin  chewable 81 milliGRAM(s) Oral daily  atorvastatin 20 milliGRAM(s) Oral at bedtime  budesonide 160 MICROgram(s)/formoterol 4.5 MICROgram(s) Inhaler 2 Puff(s) Inhalation two times a day  chlorhexidine 2% Cloths 1 Application(s) Topical daily  dextrose 5% + sodium chloride 0.45%. 1000 milliLiter(s) (75 mL/Hr) IV Continuous <Continuous>  doxycycline IVPB 100 milliGRAM(s) IV Intermittent every 12 hours  dronedarone 400 milliGRAM(s) Oral two times a day  heparin   Injectable 5000 Unit(s) SubCutaneous every 8 hours  levothyroxine 112 MICROGram(s) Oral daily  methylPREDNISolone sodium succinate Injectable 60 milliGRAM(s) IV Push every 8 hours  metoprolol succinate ER 25 milliGRAM(s) Oral daily  sodium zirconium cyclosilicate 10 Gram(s) Oral every 12 hours  tamsulosin 0.4 milliGRAM(s) Oral at bedtime    MEDICATIONS  (PRN):      New X-rays reviewed:                                                                                  ECHO

## 2023-05-01 NOTE — CONSULT NOTE ADULT - ASSESSMENT
89 year old woman with history of COPD on 4LNC, CKDIII, HFpEF presents with COPD exacerbation.  Palliative care consulted for [facility]    Education about palliative care provided to patient/family.  See Recs below.    Please call [phone] with questions or concerns 24/7.   We will continue to follow.

## 2023-05-01 NOTE — SWALLOW BEDSIDE ASSESSMENT ADULT - SLP GENERAL OBSERVATIONS
Pt letharigic in bed, o2 via HFNC 48lmp/ 40% o2 m, family received at bedside
pt asleep however arousable. No c/o pain. Son present at bedside. Baseline cough and hoarse vocal quality.

## 2023-05-01 NOTE — PHYSICAL THERAPY INITIAL EVALUATION ADULT - GENERAL OBSERVATIONS, REHAB EVAL
2:30-3:15 pt encountered in bed, alert and cooperative, 1:1 obs, son present, on hi flow, +tele.  spo2 100%.  pt had good tolerance for bed mobility, and was able to ambulate several steps with RW .  PT recommends DC to Home PT when medically stable

## 2023-05-01 NOTE — CONSULT NOTE ADULT - SUBJECTIVE AND OBJECTIVE BOX
5 CC:  SOB    HPI:  89-year-old female past medical history of COPD on 4 L NC, Chronic A-fib not on AC, hypothyroidism, CKDIII, HFpEF presents for evaluation of shortness of breath. istory obtained from son who lives with her. Patient developed shortness of breath with associated dry cough for past 3 days, however she was not hypoxic and was saturating 95% on baseline 4 L at home. No reported fever, chills, headache, chest pain, abdominal pain, weakness, numbness, dysuria, hematuria, vomiting, diarrhea. She was placed on CPAP by EMS received 2 DuoNebs and 60 mg of Solu-Medrol. At baseline she is AAOx2-3 and ambulates with walker at home. Pt had recent admission for COPD exacerbation 3/4-3/15.     In ED  VT bp 118/53, HR 67, RR 22, T 97.6 F, SpO2 97% on BIPAP  Labs significant for hgb 9.0 (bl 9-10), Cr 1.8 (bl 1.2), Mg 2.5, K 5.5, Trop 0.04,   VBG PH 7.27, pCO2 71, HCO3 33, pO2 78    CXR No evidence of focal consolidation, pleural effusion or pneumothorax    s/p 1x Levaquin and Cefepime, Solumedrol 65 mg 1x in ED  s/p evaluation by CC team, admitted to SDU for managements of AHRF secondary to COPD exacerbation (26 Apr 2023 15:37)    PERTINENT PM/SXH:   COPD (chronic obstructive pulmonary disease)    Hypothyroid    HTN (hypertension)    High cholesterol    Colitis    CKD (chronic kidney disease)      No significant past surgical history      FAMILY HISTORY:  Unable to determine    ITEMS NOT CHECKED ARE NOT PRESENT    SOCIAL HISTORY:   Significant other/partner[ ]  Children[ ]  Amish/Spirituality:  Substance hx:  [ ]   Tobacco hx:  [ ]   Alcohol hx: [ ]   Living Situation: [x ]Home  [ ]Long term care  [ ]Rehab [ ]Other  Home Services: [ ] HHA [ ] Visting RN [ ] Hospice  Occupation:  Home Opioid hx:  [ ] Y [ ] N [x] I-Stop Reference No:  Reference #: [id number] - no meds listed    ADVANCE DIRECTIVES:    [ ] Full Code [ x] DNR  MOLST  [ ]  Living Will  [ ]   DECISION MAKER(s):  [ ] Health Care Proxy(s)  [x] Surrogate(s)  [ ] Guardian           Name(s): Phone Number(s): Son    BASELINE (I)ADL(s) (prior to admission):  [place]: [ ]Total  [ ] Moderate [ ]Dependent  Palliative Performance Status Version 2:         %    [email or web address]    Allergies    No Known Allergies    Intolerances    MEDICATIONS  (STANDING):  albuterol    90 MICROgram(s) HFA Inhaler 2 Puff(s) Inhalation every 6 hours  albuterol/ipratropium for Nebulization 3 milliLiter(s) Nebulizer every 6 hours  aspirin  chewable 81 milliGRAM(s) Oral daily  atorvastatin 20 milliGRAM(s) Oral at bedtime  budesonide 160 MICROgram(s)/formoterol 4.5 MICROgram(s) Inhaler 2 Puff(s) Inhalation two times a day  chlorhexidine 2% Cloths 1 Application(s) Topical daily  dextrose 5% + sodium chloride 0.45%. 1000 milliLiter(s) (75 mL/Hr) IV Continuous <Continuous>  dronedarone 400 milliGRAM(s) Oral two times a day  heparin   Injectable 5000 Unit(s) SubCutaneous every 8 hours  levothyroxine 112 MICROGram(s) Oral daily  methylPREDNISolone sodium succinate Injectable 40 milliGRAM(s) IV Push every 12 hours  metoprolol tartrate 25 milliGRAM(s) Oral every 12 hours  pantoprazole    Tablet 40 milliGRAM(s) Oral before breakfast  sodium zirconium cyclosilicate 10 Gram(s) Oral every 12 hours  tamsulosin 0.4 milliGRAM(s) Oral at bedtime    MEDICATIONS  (PRN):    PRESENT SYMPTOMS: [ ]Unable to obtain due to poor mentation   Source if other than patient:  [ ]Family   [ ]Team     Pain: [ ]yes [x ]no  QOL impact -   Location -                    Aggravating factors -  Quality -  Radiation -  Timing-  Severity (0-10 scale):  Minimal acceptable level (0-10 scale):     CPOT:    https://www.[facility].org/getattachment/kdm42l40-6b2k-4q0u-4j5k-7851z0338v4v/Critical-Care-Pain-Observation-Tool-(CPOT)    PAIN AD Score:   http://geriatrictoolkit.[facility]/cog/painad.pdf (press ctrl +  left click to view)    Dyspnea:                           [ ]None[x ]Mild [ ]Moderate [ ]Severe     Respiratory Distress Observation Scale (RDOS):   A score of 0 to 2 signifies little or no respiratory distress, 3 signifies mild distress, scores 4 to 6 indicate moderate distress, and scores greater than 7 signify severe distress  [email or web address]-mqxveuagemx-[email or web address]    Anxiety:                             [ x]None[ ]Mild [ ]Moderate [ ]Severe   Fatigue:                             [ x]None[ ]Mild [ ]Moderate [ ]Severe   Nausea:                             [x ]None[ ]Mild [ ]Moderate [ ]Severe   Loss of appetite:              [ x]None[ ]Mild [ ]Moderate [ ]Severe   Constipation:                    [x ]None[ ]Mild [ ]Moderate [ ]Severe    Other Symptoms:  [x ]All other review of systems negative     Palliative Performance Status Version 2:         30%    [email or web address]  PHYSICAL EXAM:  Vital Signs Last 24 Hrs  T(C): 36.7 (01 May 2023 20:00), Max: 37.2 (01 May 2023 11:00)  T(F): 98 (01 May 2023 20:00), Max: 99 (01 May 2023 11:00)  HR: 144 (01 May 2023 20:00) (72 - 144)  BP: 113/65 (01 May 2023 20:00) (95/57 - 170/70)  BP(mean): 82 (01 May 2023 20:00) (72 - 88)  RR: 20 (01 May 2023 20:00) (18 - 20)  SpO2: 98% (01 May 2023 20:40) (98% - 100%)    Parameters below as of 01 May 2023 20:40  Patient On (Oxygen Delivery Method): nasal cannula, high flow  O2 Flow (L/min): 40  O2 Concentration (%): 50 I&O's Summary    30 Apr 2023 07:01  -  01 May 2023 07:00  --------------------------------------------------------  IN: 1425 mL / OUT: 800 mL / NET: 625 mL    01 May 2023 07:01  -  01 May 2023 21:29  --------------------------------------------------------  IN: 0 mL / OUT: 1100 mL / NET: -1100 mL        GENERAL:  [ x] No acute distress [ ]Lethargic  [ ]Unarousable  [ ]Verbal  [ ]Non-Verbal [ ]Cachexia    BEHAVIORAL/PSYCH:  [ x]Alert and Oriented x2-3  [ ] Anxiety [ ] Delirium [ ] Agitation [ ] Calm   EYES: [ x] No scleral icterus [ ] Scleral icterus [ ] Closed  ENMT:  [ ]Dry mouth  [x ]No external oral lesions [ ] No external ear or nose lesions  CARDIOVASCULAR:  [ ]Regular [ ]Irregular [ ]Tachy [ ]Not Tachy  [ ]Maximus [ ] Edema [ ] No edema  PULMONARY:  [ ]Tachypnea  [ ]Audible excessive secretions [ x] No labored breathing [ ] labored breathing  GASTROINTESTINAL: [ ]Soft  [ ]Distended  [x ]Not distended [ ]Non tender [ ]Tender  MUSCULOSKELETAL: [ ]No clubbing [ ] clubbing  [x ] No cyanosis [ ] cyanosis  NEUROLOGIC: [ ]No focal deficits  [x ]Follows commands  [ ]Does not follow commands  [ ]Cognitive impairment  [ ]Dysphagia  [ ]Dysarthria  [ ]Paresis   SKIN: [ ] Jaundiced [ ] Non-jaundiced [ ]Rash [ ]No Rash [ ] Warm [ ] Dry  MISC/LINES: [ ] ET tube [ ] Trach [ ]NGT/OGT [ ]PEG [ ]Tobar    CRITICAL CARE:  [ ] Shock Present  [ ]Septic [ ]Cardiogenic [ ]Neurologic [ ]Hypovolemic  [ ]  Vasopressors [ ]  Inotropes   [x ]Respiratory failure present [ ]Mechanical ventilation [ ]Non-invasive ventilatory support [x ]High flow  [ ]Acute  [ ]Chronic [ ]Hypoxic  [ ]Hypercarbic [ ]Other  [ ]Other organ failure     LABS: reviewed by me                        9.1    5.95  )-----------( 215      ( 01 May 2023 11:08 )             30.4   05-01    143  |  103  |  50<H>  ----------------------------<  224<H>  4.8   |  30  |  1.4    Ca    8.6      01 May 2023 11:08  Mg     2.8     04-30    TPro  5.2<L>  /  Alb  3.4<L>  /  TBili  0.4  /  DBili  x   /  AST  36  /  ALT  27  /  AlkPhos  84  05-01        RADIOLOGY & ADDITIONAL STUDIES: reviewed by me  < from: Xray Chest 1 View AP/PA (04.30.23 @ 10:13) >  Impression:    No radiographic evidence of acute cardiopulmonary disease.    < end of copied text >      EKG: reviewed by me  < from: 12 Lead ECG (04.30.23 @ 10:55) >  Ventricular Rate 78 BPM    Atrial Rate 78 BPM    P-R Interval 142 ms    QRS Duration 72 ms    Q-T Interval 390 ms    QTC Calculation(Bazett) 444 ms    P Axis 68 degrees    R Axis 111 degrees    T Axis 83 degrees    Diagnosis Line Normal sinus rhythm  Right axis deviation  Pulmonary disease pattern  Abnormal ECG    < end of copied text >      PROTEIN CALORIE MALNUTRITION PRESENT: [ ]mild [ ]moderate [ ]severe [ ]underweight [ ]morbid obesity  https://www.andeal.org/vault/2440/web/files/ONC/Table_Clinical%20Characteristics%20to%20Document%20Malnutrition-White%20JV%20et%20al%202012.pdf    Height (cm): 144.8 (04-26-23 @ 12:01), 144.8 (04-14-23 @ 17:16), 144.8 (03-07-23 @ 00:13)  Weight (kg): 72.575 (04-26-23 @ 12:01), 45.4 (03-03-23 @ 22:36), 45.4 (10-02-22 @ 09:29)  BMI (kg/m2): 34.6 (04-26-23 @ 12:01), 21.7 (04-14-23 @ 17:16), 21.7 (03-07-23 @ 00:13)    [ ]PPSV2 < or = to 30% [ ]significant weight loss  [ ]poor nutritional intake  [ ]anasarca      [ ]Artificial Nutrition      REFERRALS:   [ ]Chaplaincy  [ ]Hospice  [ ]Child Life  [x]Social Work  [ ]Case management [ ]Holistic Therapy     Patient discussed with primary medical team MD  Palliative care education provided to patient and/or family    Goals of Care Document:

## 2023-05-01 NOTE — CONSULT NOTE ADULT - CONVERSATION DETAILS
Spoke with son at bedside. Palliative care introduced. He was able to provide a clinical history and hospital course.  Introduced GOC. We noted the patient remains on HFNC and the team continues to attempt to medically wean her to nasal cannula.    He noted he was waiting to hear a medical update from the primary team, but discussed that he feels her quality of life has been poor.  We discussed options moving forward after discharge, including hospice on discharge. He will consider hospice and speak with patient, if able. All questions answered.  Will follow up following a medical update.

## 2023-05-01 NOTE — SWALLOW BEDSIDE ASSESSMENT ADULT - NS SPL SWALLOW CLINIC TRIAL FT
+ toleration observed without overt symptoms of penetration/aspiration for small controlled sips
tolerated

## 2023-05-01 NOTE — PROGRESS NOTE ADULT - ASSESSMENT
Impression:    Acute on chronic hypercapnic respiratory failure  Acute on chronic hypoxemic respiratory failure  COPD exacerbation  MAURI on CKD with hyperkalemia  HO Severe COPD on home O2.    Chronic afib not on AC due to recurrent falls  HfpEF      PLAN:    CNS: Avoid sedatives.      HEENT: Oral care    PULMONARY:  Solumedrol 40 BID.  HOB @ 45 degrees, aspiration precautions.  Wean O2.  Encourage NIV      CARDIOVASCULAR: Avoid overload.      GI: GI prophylaxis. S&S eval, feeding when off NIV if can tolerate.     RENAL: Correct as necessary. Repeat lytes. Lokelma. Gentle IV hydration if cannot tolerate diet.     INFECTIOUS DISEASE: cw doxy (5 day course)    HEMATOLOGICAL: DVT prophylaxis. duplex negative 4/26.      ENDOCRINE: Follow up FS. Insulin protocol if needed.    MUSC: PT/OT    DISPO: SDU     palliative care eval. Poor prognosis.     DNR/DNI   Impression:    Acute on chronic hypercapnic respiratory failure  Acute on chronic hypoxemic respiratory failure  COPD exacerbation  MAURI on CKD with hyperkalemia  HO Severe COPD on home O2.    Chronic afib not on AC due to recurrent falls  HfpEF      PLAN:    CNS: Avoid sedatives.      HEENT: Oral care    PULMONARY:  Solumedrol 40 BID.  HOB @ 45 degrees, aspiration precautions.  Wean O2.  Encourage NIV      CARDIOVASCULAR: Avoid overload.      GI: GI prophylaxis. S&S eval, feeding when off NIV if can tolerate.     RENAL: Correct as necessary. Repeat lytes. Lokelma. Gentle IV hydration if cannot tolerate diet.     INFECTIOUS DISEASE: cw doxy (5 day course)    HEMATOLOGICAL: DVT prophylaxis.  Duplex negative 4/26.  Monitor CBC and Coags     ENDOCRINE: Follow up FS. Insulin protocol if needed.    MUSC: PT/OT    DISPO: SDU     Poor prognosis.     DNR/DNI

## 2023-05-01 NOTE — SWALLOW BEDSIDE ASSESSMENT ADULT - ASR SWALLOW LINGUAL MOBILITY
I would have her try Singulair 10 mg daily and Allegra daily. She will probably need a refill of her singular prescription.  
within functional limits
within functional limits

## 2023-05-01 NOTE — CONSULT NOTE ADULT - PROBLEM SELECTOR RECOMMENDATION 9
Respiratory failure in the setting of COPD exacerbation. On HFNC. HIgh risk  -HFNC weaning per primary team  -continue solumedrol, doxycycline per primary team  -GOC as appropriate

## 2023-05-01 NOTE — SWALLOW BEDSIDE ASSESSMENT ADULT - SWALLOW EVAL: DIAGNOSIS
+ toleration observed without overt symptoms of penetration/aspiration for puree and small sips of thin liquids

## 2023-05-02 NOTE — DIETITIAN INITIAL EVALUATION ADULT - OTHER INFO
Patient is a 89 yo female with PMHx of COPD on 4L NC, chronic Afib not on AC, hypothyroidism, CKD 3, HFpEF presents for SOB due to COPD exacerbation. Patient with Acute on Chronic Hypercapnic respiratory failure, COPD - CXR: no evidence of focal consolidation, pleural effusion or pneumothorax; MAURI on CKD III - improved; Hyperkalemia - resolved; Chronic Afib; Chronic HFpEF, HLD - avoid volume overload; hypothyroidism    SLP evaluation 5/1 - + toleration observed without overt symptoms of penetration/aspiration for puree and small sips of thin liquids; Recommendations - puree with thins

## 2023-05-02 NOTE — PROGRESS NOTE ADULT - ASSESSMENT
89 year old woman with history of COPD on 4LNC, CKDIII, HFpEF presents with COPD exacerbation.  Palliative care consulted for GOC.    Patient seen at bedside with son today. Now on nasal cannula. Will continue to address GOC as appropriate.    Education about palliative care provided to patient/family.  See Recs below.    Please call [phone] with questions or concerns 24/7.   We will continue to follow.

## 2023-05-02 NOTE — PROGRESS NOTE ADULT - ASSESSMENT
89-year-old female past medical history of COPD on 4 L NC, Chronic A-fib not on AC, hypothyroidism, CKDIII, HFpEF presents for evaluation of shortness of breath due to COPD exacerbation.    #Acute on Chronic Hypercapnic respiratory failure   # COPD exacerbation  #Hx COPD on home oxygen 3-4 L  CXR - No evidence of focal consolidation, pleural effusion or pneumothorax.  Duplex - No evidence of deep venous thrombosis or superficial thrombophlebitis in the bilateral lower extremities.  ABG showing Hypercapnia   - Procal .12, RVP negative   - Bipap PRN and qhs, on HFNC 40/40, wean off O2 to NC today   - S/p Solumedrol TID --> BID --> q24 today   - C/w Duonebs and home inhalers PRN  - s/p 5 day course of doxy    4/28 - RR for desaturation - Placed on NIV and responded well   - S&S eval - pureed diet     #MAURI on CKDIII - improved   #Hyperkalemia - resolved   - C/w D5 1/2NS   - Creatinine downtrending  - Hold Lasix and resume accordingly  - avoid Nephrotoxics    #Hx of Normocytic anemia   - Hb 9.0 (baseline 9-10)    #Chronic A-fib off AC due to recurrent falls  -EKG NOTED   - c/w home metoprolol with holding parameters and Multaq 400 mg BID  - Start metoprolol tartrate 25 BID     #Chronic HFPEF  #HLD  - holding lasix 20mg PO QD - for MAURI  - Cont statin  - resume accordingly  - Avoid volume overload    #Hypothyroidism  - C/W levothyroxine    #Recurrent falls - Mechanical   - PT eval / Fall precautions     #Troponemia likely type 2  - Trop :0.04 in the setting of CKD-->0.02-->0.03   - BNP:  642 (baseline 500)  - Monitor    DVT PPX: Heparin sq   Diet: Pureed   Activity: PT/OT needed   GI: PPI   GOC: DNR/DNI, further GOC pending, son is considering hospice

## 2023-05-02 NOTE — PROGRESS NOTE ADULT - PROBLEM SELECTOR PLAN 1
Respiratory failure in the setting of COPD exacerbation.   -weaned to nasal cannula today  -GOC as appropriate.

## 2023-05-02 NOTE — DIETITIAN INITIAL EVALUATION ADULT - OTHER CALCULATIONS
Energy: 1207 - 1309 kcal/day (MSJ x 1.2 - 1.3 SF)   Estimated Needs with consideration for age, acuity of PCA

## 2023-05-02 NOTE — DIETITIAN INITIAL EVALUATION ADULT - PERTINENT LABORATORY DATA
05-02    140  |  103  |  51<H>  ----------------------------<  172<H>  4.9   |  28  |  1.6<H>    Ca    8.1<L>      02 May 2023 06:55    TPro  4.8<L>  /  Alb  3.1<L>  /  TBili  0.3  /  DBili  x   /  AST  28  /  ALT  24  /  AlkPhos  78  05-02

## 2023-05-02 NOTE — PROGRESS NOTE ADULT - SUBJECTIVE AND OBJECTIVE BOX
Patient is a 89y old  Female who presents with a chief complaint of COPD exacerbation (02 May 2023 07:58)        Over Night Events:  Remains critically ill on HFNCO2.  Off pressors.  Tolerated NIV last night         ROS:     All ROS are negative except HPI         PHYSICAL EXAM    ICU Vital Signs Last 24 Hrs  T(C): 36.6 (02 May 2023 07:18), Max: 37.2 (01 May 2023 11:00)  T(F): 97.8 (02 May 2023 07:18), Max: 99 (01 May 2023 11:00)  HR: 89 (02 May 2023 07:18) (83 - 144)  BP: 123/65 (02 May 2023 07:18) (95/57 - 135/61)  BP(mean): 85 (02 May 2023 07:18) (72 - 88)  ABP: --  ABP(mean): --  RR: 20 (02 May 2023 07:18) (20 - 20)  SpO2: 100% (02 May 2023 07:18) (98% - 100%)    O2 Parameters below as of 02 May 2023 07:18  Patient On (Oxygen Delivery Method): nasal cannula, high flow            CONSTITUTIONAL:  In NAD    ENT:   Airway patent,   Mouth with normal mucosa.       EYES:   Pupils equal,   Round and reactive to light.    CARDIAC:   Normal rate,   Regular rhythm.        RESPIRATORY:   No wheezing  Bilateral crackles   Normal chest expansion  Not tachypneic,  No use of accessory muscles    GASTROINTESTINAL:  Abdomen soft,   Non-tender,   No guarding,   + BS    MUSCULOSKELETAL:   Range of motion is not limited,  No clubbing, cyanosis    NEUROLOGICAL:   Alert    No motor  deficits.    SKIN:   Skin normal color for race,   No evidence of rash.    PSYCHIATRIC:   No apparent risk to self or others.        05-01-23 @ 07:01  -  05-02-23 @ 07:00  --------------------------------------------------------  IN:    dextrose 5% + sodium chloride 0.45%: 900 mL  Total IN: 900 mL    OUT:    Voided (mL): 1250 mL  Total OUT: 1250 mL    Total NET: -350 mL          LABS:                            9.1    5.95  )-----------( 215      ( 01 May 2023 11:08 )             30.4                                               05-01    143  |  103  |  50<H>  ----------------------------<  224<H>  4.8   |  30  |  1.4    Creatinine Trend  BUN 50, Cr 1.4, (05-01-23 @ 11:08)  Creatinine Trend  BUN 59, Cr 1.6, (04-30-23 @ 07:02)  Creatinine Trend  BUN 60, Cr 1.4, (04-29-23 @ 12:12)  Creatinine Trend  BUN 56, Cr 1.6, (04-28-23 @ 11:19)  Creatinine Trend  BUN 64, Cr 1.8, (04-28-23 @ 01:23)  Creatinine Trend  BUN 62, Cr 1.8, (04-27-23 @ 16:13)      Ca    8.6      01 May 2023 11:08    TPro  5.2<L>  /  Alb  3.4<L>  /  TBili  0.4  /  DBili  x   /  AST  36  /  ALT  27  /  AlkPhos  84  05-01                                                                                           LIVER FUNCTIONS - ( 01 May 2023 11:08 )  Alb: 3.4 g/dL / Pro: 5.2 g/dL / ALK PHOS: 84 U/L / ALT: 27 U/L / AST: 36 U/L / GGT: x                                                                                                                                   ABG - ( 30 Apr 2023 10:36 )  pH, Arterial: 7.32  pH, Blood: x     /  pCO2: 67    /  pO2: 118   / HCO3: 34    / Base Excess: 7.2   /  SaO2: 98.2                MEDICATIONS  (STANDING):  albuterol    90 MICROgram(s) HFA Inhaler 2 Puff(s) Inhalation every 6 hours  albuterol/ipratropium for Nebulization 3 milliLiter(s) Nebulizer every 6 hours  aspirin  chewable 81 milliGRAM(s) Oral daily  atorvastatin 20 milliGRAM(s) Oral at bedtime  budesonide 160 MICROgram(s)/formoterol 4.5 MICROgram(s) Inhaler 2 Puff(s) Inhalation two times a day  chlorhexidine 2% Cloths 1 Application(s) Topical daily  dextrose 5% + sodium chloride 0.45%. 1000 milliLiter(s) (75 mL/Hr) IV Continuous <Continuous>  dronedarone 400 milliGRAM(s) Oral two times a day  heparin   Injectable 5000 Unit(s) SubCutaneous every 8 hours  levothyroxine 112 MICROGram(s) Oral daily  methylPREDNISolone sodium succinate Injectable 40 milliGRAM(s) IV Push every 12 hours  metoprolol tartrate 25 milliGRAM(s) Oral every 12 hours  pantoprazole    Tablet 40 milliGRAM(s) Oral before breakfast  sodium zirconium cyclosilicate 10 Gram(s) Oral every 12 hours  tamsulosin 0.4 milliGRAM(s) Oral at bedtime    MEDICATIONS  (PRN):      New X-rays reviewed:                                                                                  ECHO

## 2023-05-02 NOTE — PROGRESS NOTE ADULT - SUBJECTIVE AND OBJECTIVE BOX
HPI:  89-year-old female past medical history of COPD on 4 L NC, Chronic A-fib not on AC, hypothyroidism, CKDIII, HFpEF presents for evaluation of shortness of breath. istory obtained from son who lives with her. Patient developed shortness of breath with associated dry cough for past 3 days, however she was not hypoxic and was saturating 95% on baseline 4 L at home. No reported fever, chills, headache, chest pain, abdominal pain, weakness, numbness, dysuria, hematuria, vomiting, diarrhea. She was placed on CPAP by EMS received 2 DuoNebs and 60 mg of Solu-Medrol. At baseline she is AAOx2-3 and ambulates with walker at home. Pt had recent admission for COPD exacerbation 3/4-3/15.     Interval history  -Patient seen at bedside  -She is now on O2 via nasal cannula  -Denies any complaints at time of visit    ADVANCE DIRECTIVES:    [ ] Full Code [ x] DNR  MOLST  [ ]  Living Will  [ ]   DECISION MAKER(s):  [ ] Health Care Proxy(s)  [x] Surrogate(s)  [ ] Guardian           Name(s): Phone Number(s): Martin Donnelly    BASELINE (I)ADL(s) (prior to admission):  Hinds: [ ]Total  [ ] Moderate [ ]Dependent  Palliative Performance Status Version 2:         %    http://npcrc.org/files/news/palliative_performance_scale_[email or web address].pdf    Allergies    No Known Allergies    Intolerances    MEDICATIONS  (STANDING):  albuterol    90 MICROgram(s) HFA Inhaler 2 Puff(s) Inhalation every 6 hours  albuterol/ipratropium for Nebulization 3 milliLiter(s) Nebulizer every 6 hours  aspirin  chewable 81 milliGRAM(s) Oral daily  atorvastatin 20 milliGRAM(s) Oral at bedtime  budesonide 160 MICROgram(s)/formoterol 4.5 MICROgram(s) Inhaler 2 Puff(s) Inhalation two times a day  chlorhexidine 2% Cloths 1 Application(s) Topical daily  dronedarone 400 milliGRAM(s) Oral two times a day  heparin   Injectable 5000 Unit(s) SubCutaneous every 8 hours  levothyroxine 112 MICROGram(s) Oral daily  methylPREDNISolone sodium succinate Injectable 40 milliGRAM(s) IV Push every 12 hours  metoprolol tartrate 25 milliGRAM(s) Oral every 12 hours  pantoprazole    Tablet 40 milliGRAM(s) Oral before breakfast  sodium zirconium cyclosilicate 10 Gram(s) Oral every 12 hours  tamsulosin 0.4 milliGRAM(s) Oral at bedtime    MEDICATIONS  (PRN):      PRESENT SYMPTOMS: [ ]Unable to obtain due to poor mentation   Source if other than patient:  [ ]Family   [ ]Team     Pain: [ ]yes [x ]no  QOL impact -   Location -                    Aggravating factors -  Quality -  Radiation -  Timing-  Severity (0-10 scale):  Minimal acceptable level (0-10 scale):     CPOT:    https://[email or web address]/[email or web address]/Critical-Care-Pain-Observation-Tool-(CPOT)    PAIN AD Score:   [email or web address]/painad.pdf (press ctrl +  left click to view)    Dyspnea:                           [x] ne[ Mild [ ]Moderate [ ]Severe     Respiratory Distress Observation Scale (RDOS):   A score of 0 to 2 signifies little or no respiratory distress, 3 signifies mild distress, scores 4 to 6 indicate moderate distress, and scores greater than 7 signify severe distress  [email or web address]-wyveqhkgkas-[email or web address]    Anxiety:                             [ x]None[ ]Mild [ ]Moderate [ ]Severe   Fatigue:                             [ x]None[ ]Mild [ ]Moderate [ ]Severe   Nausea:                             [x ]None[ ]Mild [ ]Moderate [ ]Severe   Loss of appetite:              [ x]None[ ]Mild [ ]Moderate [ ]Severe   Constipation:                    [x ]None[ ]Mild [ ]Moderate [ ]Severe    Other Symptoms:  [x ]All other review of systems negative     Palliative Performance Status Version 2:         30%    http://npcrc.org/files/news/palliative_performance_scale_ppsv2.pdf  PHYSICAL EXAM:  Vital Signs Last 24 Hrs  T(C): 36.8 (02 May 2023 11:02), Max: 36.8 (01 May 2023 16:00)  T(F): 98.3 (02 May 2023 11:02), Max: 98.3 (01 May 2023 16:00)  HR: 105 (02 May 2023 11:02) (83 - 144)  BP: 128/60 (02 May 2023 11:02) (95/57 - 128/60)  BP(mean): 87 (02 May 2023 11:02) (72 - 87)  RR: 20 (02 May 2023 11:02) (20 - 20)  SpO2: 100% (02 May 2023 11:02) (96% - 100%)    Parameters below as of 02 May 2023 11:02  Patient On (Oxygen Delivery Method): nasal cannula    GENERAL:  [ x] No acute distress [ ]Lethargic  [ ]Unarousable  [ ]Verbal  [ ]Non-Verbal [ ]Cachexia    BEHAVIORAL/PSYCH:  [ x]Alert and Oriented x2-3  [ ] Anxiety [ ] Delirium [ ] Agitation [ ] Calm   EYES: [ x] No scleral icterus [ ] Scleral icterus [ ] Closed  ENMT:  [ ]Dry mouth  [x ]No external oral lesions [ ] No external ear or nose lesions  CARDIOVASCULAR:  [ ]Regular [ ]Irregular [ ]Tachy [ ]Not Tachy  [ ]Maximus [ ] Edema [ ] No edema  PULMONARY:  [ ]Tachypnea  [ ]Audible excessive secretions [ x] No labored breathing [ ] labored breathing  GASTROINTESTINAL: [ ]Soft  [ ]Distended  [x ]Not distended [ ]Non tender [ ]Tender  MUSCULOSKELETAL: [ ]No clubbing [ ] clubbing  [x ] No cyanosis [ ] cyanosis  NEUROLOGIC: [ ]No focal deficits  [x ]Follows commands  [ ]Does not follow commands  [ ]Cognitive impairment  [ ]Dysphagia  [ ]Dysarthria  [ ]Paresis   SKIN: [ ] Jaundiced [ ] Non-jaundiced [ ]Rash [ ]No Rash [ ] Warm [ ] Dry  MISC/LINES: [ ] ET tube [ ] Trach [ ]NGT/OGT [ ]PEG [ ]Tobar    CRITICAL CARE:  [ ] Shock Present  [ ]Septic [ ]Cardiogenic [ ]Neurologic [ ]Hypovolemic  [ ]  Vasopressors [ ]  Inotropes   [x ]Respiratory failure present [ ]Mechanical ventilation [ ]Non-invasive ventilatory support [ ]High flow  [ ]Acute  [ ]Chronic [ ]Hypoxic  [ ]Hypercarbic [ ]Other  [ ]Other organ failure     LABS: reviewed by me                                   8.9    6.21  )-----------( 158      ( 02 May 2023 06:55 )             29.7       05-02    140  |  103  |  51<H>  ----------------------------<  172<H>  4.9   |  28  |  1.6<H>    Ca    8.1<L>      02 May 2023 06:55    TPro  4.8<L>  /  Alb  3.1<L>  /  TBili  0.3  /  DBili  x   /  AST  28  /  ALT  24  /  AlkPhos  78  05-02                            CAPILLARY BLOOD GLUCOSE                  EKG: reviewed by me  < from: 12 Lead ECG (05.01.23 @ 14:55) >  Ventricular Rate 141 BPM    Atrial Rate 141 BPM    QRS Duration 74 ms    Q-T Interval 288 ms    QTC Calculation(Bazett) 441 ms    R Axis 105 degrees    T Axis 79 degrees    Diagnosis Line Supraventricular tachycardia  Rightward axis  Nonspecific STand T wave abnormality  Abnormal ECG    < end of copied text >      PROTEIN CALORIE MALNUTRITION PRESENT: [ ]mild [ ]moderate [ ]severe [ ]underweight [ ]morbid obesity  https://www.andeal.org/vault/2440/web/files/ONC/Table_Clinical%20Characteristics%20to%20Document%20Malnutrition-White%20JV%20et%20al%202012.pdf    Height (cm): 144.8 (04-26-23 @ 12:01), 144.8 (04-14-23 @ 17:16), 144.8 (03-07-23 @ 00:13)  Weight (kg): 72.575 (04-26-23 @ 12:01), 45.4 (03-03-23 @ 22:36), 45.4 (10-02-22 @ 09:29)  BMI (kg/m2): 34.6 (04-26-23 @ 12:01), 21.7 (04-14-23 @ 17:16), 21.7 (03-07-23 @ 00:13)    [ ]PPSV2 < or = to 30% [ ]significant weight loss  [ ]poor nutritional intake  [ ]anasarca      [ ]Artificial Nutrition      REFERRALS:   [ ]Chaplaincy  [ ]Hospice  [ ]Child Life  [x]Social Work  [ ]Case management [ ]Holistic Therapy     Patient discussed with primary medical team MD  Palliative care education provided to patient and/or family

## 2023-05-02 NOTE — DIETITIAN INITIAL EVALUATION ADULT - EDUCATION DIETARY MODIFICATIONS
discussed benefits of oral nutrition supplements/(1) partially meets; needs review/practice/verbalization

## 2023-05-02 NOTE — DIETITIAN INITIAL EVALUATION ADULT - COLLABORATION WITH OTHER PROVIDERS
Intervention: meals and snacks, medical food supplements, coordination of care  Monitoring/Evaluation: diet order, energy intake, weight, labs, skin status, NFPE

## 2023-05-02 NOTE — PROGRESS NOTE ADULT - ASSESSMENT
Impression:    Acute on chronic hypercapnic respiratory failure  Acute on chronic hypoxemic respiratory failure  COPD exacerbation  MAURI on CKD with hyperkalemia improving   HO Severe COPD on home O2.    Chronic afib not on AC due to recurrent falls  HfpEF      PLAN:    CNS: Avoid sedatives.      HEENT: Oral care    PULMONARY:  Solumedrol 40 BID.  HOB @ 45 degrees, aspiration precautions.  Wean O2.  Encourage NIV      CARDIOVASCULAR: Avoid overload.      GI: GI prophylaxis. S&S eval, feeding when off NIV if can tolerate.     RENAL: Correct as necessary. Repeat lytes. Lokelma. Gentle IV hydration if cannot tolerate diet.     INFECTIOUS DISEASE: cw doxy (5 day course)    HEMATOLOGICAL: DVT prophylaxis.  Duplex negative 4/26.  Monitor CBC and Coags     ENDOCRINE: Follow up FS. Insulin protocol if needed.    MUSC: PT/OT    DISPO: SDU     Poor prognosis.     DNR/DNI   Impression:    Acute on chronic hypercapnic respiratory failure  Acute on chronic hypoxemic respiratory failure  COPD exacerbation  MAURI on CKD with hyperkalemia improving   HO Severe COPD on home O2.    Chronic afib not on AC due to recurrent falls  HFPEF      PLAN:    CNS: Avoid sedatives.      HEENT: Oral care    PULMONARY:  Solumedrol 40 BID.  HOB @ 45 degrees, aspiration precautions.  Wean O2 as tolerated.  Encourage NIV use during sleep adn PRN during the day     CARDIOVASCULAR: Avoid overload.      GI: GI prophylaxis. S&S eval, feeding when off NIV if can tolerate.     RENAL: Correct as necessary.  Repeat lytes.  DC Lokelma.  Hold IV fluid.      INFECTIOUS DISEASE: Finish ABX course     HEMATOLOGICAL: DVT prophylaxis.  Duplex negative 4/26.  Monitor CBC and Coags     ENDOCRINE: Follow up FS. Insulin protocol if needed.    MUSC: PT/OT    DISPO: SDU     Poor prognosis.     DNR/DNI    DW Son at the bed side

## 2023-05-02 NOTE — DIETITIAN INITIAL EVALUATION ADULT - PERTINENT MEDS FT
MEDICATIONS  (STANDING):  albuterol    90 MICROgram(s) HFA Inhaler 2 Puff(s) Inhalation every 6 hours  albuterol/ipratropium for Nebulization 3 milliLiter(s) Nebulizer every 6 hours  aspirin  chewable 81 milliGRAM(s) Oral daily  atorvastatin 20 milliGRAM(s) Oral at bedtime  budesonide 160 MICROgram(s)/formoterol 4.5 MICROgram(s) Inhaler 2 Puff(s) Inhalation two times a day  chlorhexidine 2% Cloths 1 Application(s) Topical daily  dronedarone 400 milliGRAM(s) Oral two times a day  heparin   Injectable 5000 Unit(s) SubCutaneous every 8 hours  levothyroxine 112 MICROGram(s) Oral daily  methylPREDNISolone sodium succinate Injectable 40 milliGRAM(s) IV Push every 12 hours  metoprolol tartrate 25 milliGRAM(s) Oral every 12 hours  pantoprazole    Tablet 40 milliGRAM(s) Oral before breakfast  sodium zirconium cyclosilicate 10 Gram(s) Oral every 12 hours  tamsulosin 0.4 milliGRAM(s) Oral at bedtime    MEDICATIONS  (PRN):

## 2023-05-02 NOTE — PROGRESS NOTE ADULT - SUBJECTIVE AND OBJECTIVE BOX
Hospital Day:  6d    Subjective:    Patient is a 89y old  Female who presents with a chief complaint of COPD exacerbation. no acute events overnight. Pt satting well today on HFNC 40/40. Will wean off HFNC     Admitted to medicine for a primary diagnosis of COPD exacerbation     Past Medical Hx:   COPD (chronic obstructive pulmonary disease)    Hypothyroid    HTN (hypertension)    High cholesterol    Colitis    CKD (chronic kidney disease)      Past Sx:  No significant past surgical history      Allergies:  No Known Allergies    Current Meds:   Standng Meds:  albuterol    90 MICROgram(s) HFA Inhaler 2 Puff(s) Inhalation every 6 hours  albuterol/ipratropium for Nebulization 3 milliLiter(s) Nebulizer every 6 hours  aspirin  chewable 81 milliGRAM(s) Oral daily  atorvastatin 20 milliGRAM(s) Oral at bedtime  budesonide 160 MICROgram(s)/formoterol 4.5 MICROgram(s) Inhaler 2 Puff(s) Inhalation two times a day  chlorhexidine 2% Cloths 1 Application(s) Topical daily  dextrose 5% + sodium chloride 0.45%. 1000 milliLiter(s) (75 mL/Hr) IV Continuous <Continuous>  dronedarone 400 milliGRAM(s) Oral two times a day  heparin   Injectable 5000 Unit(s) SubCutaneous every 8 hours  levothyroxine 112 MICROGram(s) Oral daily  methylPREDNISolone sodium succinate Injectable 40 milliGRAM(s) IV Push every 12 hours  metoprolol tartrate 25 milliGRAM(s) Oral every 12 hours  pantoprazole    Tablet 40 milliGRAM(s) Oral before breakfast  sodium zirconium cyclosilicate 10 Gram(s) Oral every 12 hours  tamsulosin 0.4 milliGRAM(s) Oral at bedtime    PRN Meds:    HOME MEDICATIONS:  albuterol 2.5 mg/3 mL (0.083%) inhalation solution: 3 milliliter(s) inhaled every 6 hours, As Needed for wheezing, shortness of breath  aspirin 81 mg oral tablet: 1 tab(s) orally once a day  ATORVASTATIN 20MG TABLETS: each orally once a day  Lasix 20 mg oral tablet: 1 tab(s) orally once a day  Multaq 400 mg oral tablet: 1 tab(s) orally 2 times a day  TRELEGY ELLIPTA 100-62.5MCG INH 30P: INHALE 1 PUFF BY MOUTH DAILY      Vital Signs:   T(F): 97.8 (05-02-23 @ 07:18), Max: 99 (05-01-23 @ 11:00)  HR: 89 (05-02-23 @ 07:18) (83 - 144)  BP: 123/65 (05-02-23 @ 07:18) (95/57 - 135/61)  RR: 20 (05-02-23 @ 07:18) (20 - 20)  SpO2: 100% (05-02-23 @ 07:18) (98% - 100%)      05-01-23 @ 07:01  -  05-02-23 @ 07:00  --------------------------------------------------------  IN: 900 mL / OUT: 1250 mL / NET: -350 mL        Physical Exam:   GENERAL: NAD  HEENT: NCAT  CHEST/LUNG: CTAB  HEART: Regular rate and rhythm; s1 s2 appreciated, No murmurs, rubs, or gallops  ABDOMEN: Soft, Nontender, Nondistended; Bowel sounds present  EXTREMITIES: No LE edema b/l  SKIN: no rashes, no new lesions  NERVOUS SYSTEM:  Alert & Oriented X3  LINES/CATHETERS:        Labs:                         9.1    5.95  )-----------( 215      ( 01 May 2023 11:08 )             30.4     Neutophil% 92.9, Lymphocyte% 4.2, Monocyte% 2.4, Bands% 0.5 05-01-23 @ 11:08    01 May 2023 11:08    143    |  103    |  50     ----------------------------<  224    4.8     |  30     |  1.4      Ca    8.6        01 May 2023 11:08    TPro  5.2    /  Alb  3.4    /  TBili  0.4    /  DBili  x      /  AST  36     /  ALT  27     /  AlkPhos  84     01 May 2023 11:08

## 2023-05-02 NOTE — DIETITIAN INITIAL EVALUATION ADULT - ADD RECOMMEND
1) continue current diet order - with texture/consistency per SLP  2) Recommend Ensure Plus HP 2x/day and Boost Pudding 3x/day  to optimize kcal and protein intake  3) Daily weights  4) Maximum encouragement and assistance with all meals, snacks, supplement    Patient is at high nutrition risk, RD to f/u in 3-5 days or PRN

## 2023-05-02 NOTE — DIETITIAN INITIAL EVALUATION ADULT - ORAL NUTRITION SUPPLEMENTS
Ensure Plus 2x/day (350 kcal, 20 gm Protein each)   Boost Pudding 3x/day (270 kcal, 7 gm Protein each)

## 2023-05-02 NOTE — DIETITIAN INITIAL EVALUATION ADULT - ORAL INTAKE PTA/DIET HISTORY
Nutrition hx obtained from patient's family present at bedside. Patient had a good appetite and PO intake at home. She was drinking oral nutrition supplement at home. UBW: 112 lbs. NKFA, food intolerances.    Per PCA, patient was able to consume pancakes, scrambled eggs, a few bites of farina.

## 2023-05-03 NOTE — PROGRESS NOTE ADULT - SUBJECTIVE AND OBJECTIVE BOX
TANNA MCCRACKEN  89y Female    CHIEF COMPLAINT:    Patient is a 89y old  Female who presents with a chief complaint of COPD exacerbation (03 May 2023 12:24)    INTERVAL HPI/OVERNIGHT EVENTS:    Patient seen and examined. No acute events overnight. Remains tachycardic, otherwise no complaints     ROS: All other systems are negative.    Vital Signs:    T(F): 97.4 (23 @ 11:34), Max: 97.7 (23 @ 20:12)  HR: 148 (23 @ 11:34) (85 - 148)  BP: 107/64 (23 @ 11:34) (107/64 - 150/71)  RR: 20 (23 @ 11:34) (20 - 20)  SpO2: 95% (23 @ 14:01) (90% - 100%)    02 May 2023 07:01  -  03 May 2023 07:00  --------------------------------------------------------  IN: 0 mL / OUT: 500 mL / NET: -500 mL    Daily Weight in k.1 (03 May 2023 07:56)    PHYSICAL EXAM:    GENERAL:  NAD chronically ill appearing   SKIN: No rashes or lesions  HEENT: Atraumatic. Normocephalic.    NECK: Supple, No JVD.    PULMONARY: decreased breath sounds B/L. No wheezing.    CVS: Normal S1, S2. Rate and Rhythm are regular. Tachycardic  ABDOMEN/GI: Soft, Nontender, Nondistended   MSK:  No clubbing or cyanosis   NEUROLOGIC: Moves all extremities   PSYCH: Awake and alert     Consultant(s) Notes Reviewed:  [x ] YES  [ ] NO  Care Discussed with Consultants/Other Providers [ x] YES  [ ] NO    LABS:                        10.1   8.38  )-----------( 158      ( 03 May 2023 01:01 )             33.5     141  |  103  |  58<H>  ----------------------------<  148<H>  5.2<H>   |  26  |  1.7<H>    Ca    8.7      03 May 2023 12:34  Mg     2.3     -    TPro  5.3<L>  /  Alb  3.4<L>  /  TBili  0.3  /  DBili  x   /  AST  27  /  ALT  27  /  AlkPhos  84      RADIOLOGY & ADDITIONAL TESTS:  Imaging or report Personally Reviewed:  [x] YES  [ ] NO  EKG reviewed: [x] YES  [ ] NO    Medications:  Standing  albuterol    90 MICROgram(s) HFA Inhaler 2 Puff(s) Inhalation every 6 hours  albuterol/ipratropium for Nebulization 3 milliLiter(s) Nebulizer every 6 hours  aspirin  chewable 81 milliGRAM(s) Oral daily  atorvastatin 20 milliGRAM(s) Oral at bedtime  budesonide 160 MICROgram(s)/formoterol 4.5 MICROgram(s) Inhaler 2 Puff(s) Inhalation two times a day  chlorhexidine 2% Cloths 1 Application(s) Topical daily  dextrose 5% + sodium chloride 0.45%. 1000 milliLiter(s) IV Continuous <Continuous>  diltiazem    Tablet 30 milliGRAM(s) Oral every 6 hours  dronedarone 400 milliGRAM(s) Oral two times a day  heparin   Injectable 5000 Unit(s) SubCutaneous every 8 hours  levothyroxine 112 MICROGram(s) Oral daily  methylPREDNISolone sodium succinate Injectable 40 milliGRAM(s) IV Push every 12 hours  pantoprazole    Tablet 40 milliGRAM(s) Oral before breakfast  sodium zirconium cyclosilicate 10 Gram(s) Oral every 12 hours  tamsulosin 0.4 milliGRAM(s) Oral at bedtime    PRN Meds

## 2023-05-03 NOTE — CHART NOTE - NSCHARTNOTEFT_GEN_A_CORE
Transfer from: [facility]A    Transfer to: Tele     [facility] / HOSPITAL COURSE:  89-year-old female past medical history of COPD on 4 L NC, Chronic A-fib not on AC, hypothyroidism, CKDIII, HFpEF presents for evaluation of shortness of breath. istory obtained from son who lives with her. Patient developed shortness of breath with associated dry cough for past 3 days, however she was not hypoxic and was saturating 95% on baseline 4 L at home. No reported fever, chills, headache, chest pain, abdominal pain, weakness, numbness, dysuria, hematuria, vomiting, diarrhea. She was placed on CPAP by EMS received 2 DuoNebs and 60 mg of Solu-Medrol. At baseline she is AAOx2-3 and ambulates with walker at home. Pt had recent admission for COPD exacerbation 3/4-3/15.     In ED  VT bp 118/53, HR 67, RR 22, T 97.6 F, SpO2 97% on BIPAP  Labs significant for hgb 9.0 (bl 9-10), Cr 1.8 (bl 1.2), Mg 2.5, K 5.5, Trop 0.04,   VBG PH 7.27, pCO2 71, HCO3 33, pO2 78    CXR No evidence of focal consolidation, pleural effusion or pneumothorax    s/p 1x Levaquin and Cefepime, Solumedrol 65 mg 1x in ED  s/p evaluation by CC team, admitted to SDU for managements of AHRF secondary to COPD exacerbation (04-26-23 @ 15:37)    CEU     While in the CEU, pt was treated with steroids, antibiotics, and inhalers. patient was initially requiring Bipap and HFNC but was weaned off to NC yesterday. Pt satting well on NC. Patient was initially on Solumedrol TID which was weaned down to BID and then q24 yesterday. Speech saw patient and recommended pureed diet.   Patient also had MAURI on CKD III, patient was treated with fluids and it improved, but is now uptrending again.   Patient has been in and out of Afib rvr. pt has known history of Afib not on AC due to recurrent falls. Metoprolol tartrate 25 BID was started and patient responded well.   Palliative has been following for GOC as patient is DNR/DNI and son is considering hospice given quality of life.     ASSESSMENT & PLAN:     89-year-old female past medical history of COPD on 4 L NC, Chronic A-fib not on AC, hypothyroidism, CKDIII, HFpEF presents for evaluation of shortness of breath due to COPD exacerbation.    #Acute on Chronic Hypercapnic respiratory failure   # COPD exacerbation  #Hx COPD on home oxygen 3-4 L  CXR - No evidence of focal consolidation, pleural effusion or pneumothorax.  Duplex - No evidence of deep venous thrombosis or superficial thrombophlebitis in the bilateral lower extremities.  ABG showing Hypercapnia   - Procal .12, RVP negative   - Bipap PRN and qhs, on HFNC 40/40, wean off O2 to NC today   - S/p Solumedrol TID --> BID --> q24 today   - C/w Duonebs and home inhalers PRN  - s/p 5 day course of doxy    4/28 - RR for desaturation - Placed on NIV and responded well   - S&S eval - pureed diet     #MAURI on CKDIII - improved   #Hyperkalemia - resolved   - C/w D5 1/2NS   - Creatinine downtrending  - Hold Lasix and resume accordingly  - avoid Nephrotoxics    #Hx of Normocytic anemia   - Hb 9.0 (baseline 9-10)    #Chronic A-fib off AC due to recurrent falls  -EKG NOTED   - c/w home metoprolol with holding parameters and Multaq 400 mg BID  - Start metoprolol tartrate 25 BID     #Chronic HFPEF  #HLD  - holding lasix 20mg PO QD - for MAURI  - Cont statin  - resume accordingly  - Avoid volume overload    #Hypothyroidism  - C/W levothyroxine    #Recurrent falls - Mechanical   - PT eval / Fall precautions     #Troponemia likely type 2  - Trop :0.04 in the setting of CKD-->0.02-->0.03   - BNP:  642 (baseline 500)  - Monitor    DVT PPX: Heparin sq   Diet: Pureed   Activity: PT/OT needed   GI: PPI   GOC: DNR/DNI, further GOC pending, son is considering hospice      FOR FOLLOW UP:  [ ] Rate control   [ ] Palliative F/u   [ ]Wean steroids Transfer from: [facility] 2A    Transfer to: Tele     [facility] / HOSPITAL COURSE:  89-year-old female past medical history of COPD on 4 L NC, Chronic A-fib not on AC, hypothyroidism, CKDIII, HFpEF presents for evaluation of shortness of breath. istory obtained from son who lives with her. Patient developed shortness of breath with associated dry cough for past 3 days, however she was not hypoxic and was saturating 95% on baseline 4 L at home. No reported fever, chills, headache, chest pain, abdominal pain, weakness, numbness, dysuria, hematuria, vomiting, diarrhea. She was placed on CPAP by EMS received 2 DuoNebs and 60 mg of Solu-Medrol. At baseline she is AAOx2-3 and ambulates with walker at home. Pt had recent admission for COPD exacerbation 3/4-3/15.     In ED  VT bp 118/53, HR 67, RR 22, T 97.6 F, SpO2 97% on BIPAP  Labs significant for hgb 9.0 (bl 9-10), Cr 1.8 (bl 1.2), Mg 2.5, K 5.5, Trop 0.04,   VBG PH 7.27, pCO2 71, HCO3 33, pO2 78    CXR No evidence of focal consolidation, pleural effusion or pneumothorax    s/p 1x Levaquin and Cefepime, Solumedrol 65 mg 1x in ED  s/p evaluation by CC team, admitted to SDU for managements of AHRF secondary to COPD exacerbation (04-26-23 @ 15:37)    CEU     While in the CEU, pt was treated with steroids, antibiotics, and inhalers. patient was initially requiring Bipap and HFNC but was weaned off to NC yesterday. Pt satting well on NC. Patient was initially on Solumedrol TID which was weaned down to BID and then q24 yesterday. Speech saw patient and recommended pureed diet.   Patient also had MAURI on CKD III, patient was treated with fluids and it improved, but is now uptrending again.   Patient has been in and out of Afib rvr. pt has known history of Afib not on AC due to recurrent falls. Metoprolol tartrate 25 BID was started but will switch to Cardizem 30 q6 after speaking w pt cardiologist today. Can increase Cardizem if pt tolerates it   Palliative has been following for GOC as patient is DNR/DNI and son is considering hospice given quality of life.     ASSESSMENT & PLAN:     89-year-old female past medical history of COPD on 4 L NC, Chronic A-fib not on AC, hypothyroidism, CKDIII, HFpEF presents for evaluation of shortness of breath due to COPD exacerbation.    #Acute on Chronic Hypercapnic respiratory failure   # COPD exacerbation  #Hx COPD on home oxygen 3-4 L  CXR - No evidence of focal consolidation, pleural effusion or pneumothorax.  Duplex - No evidence of deep venous thrombosis or superficial thrombophlebitis in the bilateral lower extremities.  ABG showing Hypercapnia   - Procal .12, RVP negative   - Bipap PRN and qhs, on HFNC 40/40, wean off O2 to NC today   - S/p Solumedrol TID --> BID --> q24 today   - C/w Duonebs and home inhalers PRN  - s/p 5 day course of doxy    - S&S eval - pureed diet     #MAURI on CKDIII - improved   #Hyperkalemia - resolved   - C/w D5 1/2NS   - Creatinine downtrending  - Hold Lasix and resume accordingly  - avoid Nephrotoxics    #Hx of Normocytic anemia   - Hb 9.0 (baseline 9-10)    #Chronic A-fib off AC due to recurrent falls  -EKG NOTED   - c/w home metoprolol with holding parameters and Multaq 400 mg BID  - dc metoprolol tartrate 25 BID       #Chronic HFPEF  #HLD  - holding lasix 20mg PO QD - for MAURI  - Cont statin  - resume accordingly  - Avoid volume overload    #Hypothyroidism  - C/W levothyroxine    #Recurrent falls - Mechanical   - PT eval / Fall precautions     #Troponemia likely type 2  - Trop :0.04 in the setting of CKD-->0.02-->0.03   - BNP:  642 (baseline 500)  - Monitor    DVT PPX: Heparin sq   Diet: Pureed   Activity: PT/OT needed   GI: PPI   GOC: DNR/DNI, further GOC pending, son is considering hospice      FOR FOLLOW UP:  [ ] Rate control   [ ] Palliative F/u   [ ]Wean steroids Transfer from: [facility] 2A    Transfer to: Tele     [facility] / HOSPITAL COURSE:  89-year-old female past medical history of COPD on 4 L NC, Chronic A-fib not on AC, hypothyroidism, CKDIII, HFpEF presents for evaluation of shortness of breath. istory obtained from son who lives with her. Patient developed shortness of breath with associated dry cough for past 3 days, however she was not hypoxic and was saturating 95% on baseline 4 L at home. No reported fever, chills, headache, chest pain, abdominal pain, weakness, numbness, dysuria, hematuria, vomiting, diarrhea. She was placed on CPAP by EMS received 2 DuoNebs and 60 mg of Solu-Medrol. At baseline she is AAOx2-3 and ambulates with walker at home. Pt had recent admission for COPD exacerbation 3/4-3/15.     In ED  VT bp 118/53, HR 67, RR 22, T 97.6 F, SpO2 97% on BIPAP  Labs significant for hgb 9.0 (bl 9-10), Cr 1.8 (bl 1.2), Mg 2.5, K 5.5, Trop 0.04,   VBG PH 7.27, pCO2 71, HCO3 33, pO2 78    CXR No evidence of focal consolidation, pleural effusion or pneumothorax    s/p 1x Levaquin and Cefepime, Solumedrol 65 mg 1x in ED  s/p evaluation by CC team, admitted to SDU for managements of AHRF secondary to COPD exacerbation (04-26-23 @ 15:37)    CEU     While in the CEU, pt was treated with steroids, antibiotics, and inhalers. patient was initially requiring Bipap and HFNC but was weaned off to NC yesterday. Pt satting well on NC. Patient was initially on Solumedrol TID which was weaned down to BID and then q24 yesterday. Speech saw patient and recommended pureed diet.   Patient also had MAURI on CKD III, patient was treated with fluids and it improved, but is now uptrending again.   Patient has been in and out of Afib rvr. pt has known history of Afib not on AC due to recurrent falls. Metoprolol tartrate 25 BID was started but will switch to Cardizem 30 q6 after speaking w pt cardiologist today. Can increase Cardizem if pt tolerates it   Palliative has been following for GOC as patient is DNR/DNI and son is considering hospice given quality of life.     ASSESSMENT & PLAN:     89-year-old female past medical history of COPD on 4 L NC, Chronic A-fib not on AC, hypothyroidism, CKDIII, HFpEF presents for evaluation of shortness of breath due to COPD exacerbation.    #Acute on Chronic Hypercapnic respiratory failure   # COPD exacerbation  #Hx COPD on home oxygen 3-4 L  CXR - No evidence of focal consolidation, pleural effusion or pneumothorax.  Duplex - No evidence of deep venous thrombosis or superficial thrombophlebitis in the bilateral lower extremities.  ABG showing Hypercapnia   - Procal .12, RVP negative   - Bipap PRN and qhs, on HFNC 40/40, wean off O2 to NC today   - S/p Solumedrol TID --> BID --> q24 today   - C/w Duonebs and home inhalers PRN  - s/p 5 day course of doxy    - S&S eval - pureed diet     #MAURI on CKDIII - improved   #Hyperkalemia - resolved   - C/w D5 1/2NS   - Creatinine downtrending  - Hold Lasix and resume accordingly  - avoid Nephrotoxics    #Hx of Normocytic anemia   - Hb 9.0 (baseline 9-10)    #Chronic A-fib off AC due to recurrent falls  -EKG NOTED   - c/w Multaq 400 mg BID  - dc metoprolol tartrate 25 BID   - Start cardizem 30q6 today, uptitrate as bP tolerates   -D/w pt cardiolgist dr Rebollar     #Chronic HFPEF  #HLD  - holding lasix 20mg PO QD - for MAURI  - Cont statin  - resume accordingly  - Avoid volume overload    #Hypothyroidism  - C/W levothyroxine    #Recurrent falls - Mechanical   - PT eval / Fall precautions     #Troponemia likely type 2  - Trop :0.04 in the setting of CKD-->0.02-->0.03   - BNP:  642 (baseline 500)  - Monitor    DVT PPX: Heparin sq   Diet: Pureed   Activity: PT/OT needed   GI: PPI   GOC: DNR/DNI, further GOC pending, son is considering hospice      FOR FOLLOW UP:  [ ] Rate control   [ ] Palliative F/u   [ ]Wean steroids Transfer from: [facility] 2A    Transfer to: Tele     [facility] / HOSPITAL COURSE:  89-year-old female past medical history of COPD on 4 L NC, Chronic A-fib not on AC, hypothyroidism, CKDIII, HFpEF presents for evaluation of shortness of breath. istory obtained from son who lives with her. Patient developed shortness of breath with associated dry cough for past 3 days, however she was not hypoxic and was saturating 95% on baseline 4 L at home. No reported fever, chills, headache, chest pain, abdominal pain, weakness, numbness, dysuria, hematuria, vomiting, diarrhea. She was placed on CPAP by EMS received 2 DuoNebs and 60 mg of Solu-Medrol. At baseline she is AAOx2-3 and ambulates with walker at home. Pt had recent admission for COPD exacerbation 3/4-3/15.     In ED  VT bp 118/53, HR 67, RR 22, T 97.6 F, SpO2 97% on BIPAP  Labs significant for hgb 9.0 (bl 9-10), Cr 1.8 (bl 1.2), Mg 2.5, K 5.5, Trop 0.04,   VBG PH 7.27, pCO2 71, HCO3 33, pO2 78    CXR No evidence of focal consolidation, pleural effusion or pneumothorax    s/p 1x Levaquin and Cefepime, Solumedrol 65 mg 1x in ED  s/p evaluation by CC team, admitted to SDU for managements of AHRF secondary to COPD exacerbation (04-26-23 @ 15:37)    CEU     While in the CEU, pt was treated with steroids, antibiotics, and inhalers. patient was initially requiring Bipap and HFNC but was weaned off to NC yesterday. Pt satting well on NC. Patient was initially on Solumedrol TID which was weaned down to BID and then q24 yesterday. Speech saw patient and recommended pureed diet.   Patient also had MAURI on CKD III, patient was treated with fluids and it improved, but is now uptrending again.   Patient has been in and out of Afib rvr. pt has known history of Afib not on AC due to recurrent falls. Metoprolol tartrate 25 BID was started but will switch to Cardizem 30 q6 after speaking w pt cardiologist today. Can increase Cardizem if pt tolerates it   Palliative has been following for GOC as patient is DNR/DNI and son is considering hospice given quality of life.   - pt agitated overnight, was given 1mg Ativan    ASSESSMENT & PLAN:     89-year-old female past medical history of COPD on 4 L NC, Chronic A-fib not on AC, hypothyroidism, CKDIII, HFpEF presents for evaluation of shortness of breath due to COPD exacerbation.    #Acute on Chronic Hypercapnic respiratory failure   # COPD exacerbation  #Hx COPD on home oxygen 3-4 L  CXR - No evidence of focal consolidation, pleural effusion or pneumothorax.  Duplex - No evidence of deep venous thrombosis or superficial thrombophlebitis in the bilateral lower extremities.  ABG showing Hypercapnia   - Procal .12, RVP negative   - Bipap PRN and qhs, on HFNC 40/40, wean off O2 to NC today   - S/p Solumedrol TID --> BID --> q24 --> Prednisone 40 5 days starting tomorrow morning   - C/w Duonebs and home inhalers PRN  - s/p 5 day course of doxy    - S&S eval appreciated     #MAURI on CKDIII   #Hyperkalemia - resolved   - C/w D5 1/2NS   - Creatinine uptrended yesterday, this morning pending new Cr.   - Hold Lasix and resume accordingly  - avoid Nephrotoxics    #Hx of Normocytic anemia   - Hb 9.0 (baseline 9-10)    #Chronic A-fib off AC due to recurrent falls  - EKG NOTED   - c/w Multaq 400 mg BID  - dc metoprolol tartrate 25 BID   - Start Cardizem 30q6 today, uptitrate as bP tolerates   - D/w pt cardiolgist dr Rebollar, needs official cardio eval     #Chronic HFPEF  #HLD  - holding lasix 20mg PO QD - for MAURI  - Cont statin  - resume accordingly  - Avoid volume overload    #Hypothyroidism  - C/W levothyroxine    #Recurrent falls - Mechanical   - PT eval / Fall precautions     #Troponemia likely type 2  - Trop :0.04 in the setting of CKD-->0.02-->0.03   - BNP:  642 (baseline 500)  - Monitor    DVT PPX: Heparin sq   Diet: per speech   Activity: PT/OT needed   GI: PPI   GOC: DNR/DNI, further GOC pending, son is considering hospice      FOR FOLLOW UP:  [ ] Rate control   [ ] Palliative F/u   [ ] PO prednisone   - pt needs midline, poor vasculature, procedure team consulted, start fluids after   -

## 2023-05-03 NOTE — PROGRESS NOTE ADULT - SUBJECTIVE AND OBJECTIVE BOX
Patient is a 89y old  Female who presents with a chief complaint of Chronic obstructive pulmonary disease (02 May 2023 13:20)        Over Night Events: no acute events overnight, on 3L NC 95%       Vital Signs Last 24 Hrs  T(C): 36.3 (03 May 2023 07:56), Max: 36.8 (02 May 2023 11:02)  T(F): 97.4 (03 May 2023 07:56), Max: 98.3 (02 May 2023 11:02)  HR: 133 (03 May 2023 07:56) (85 - 133)  BP: 108/62 (03 May 2023 07:56) (108/62 - 150/71)  BP(mean): 81 (03 May 2023 07:56) (81 - 94)  RR: 20 (03 May 2023 07:56) (20 - 20)  SpO2: 94% (03 May 2023 07:56) (90% - 100%)    O2 Parameters below as of 03 May 2023 07:56  Patient On (Oxygen Delivery Method): nasal cannula, high flow        CONSTITUTIONAL:   Ill appearing. NAD    ENT:   Airway patent,   Mouth with normal mucosa.   No thrush    EYES:   Pupils equal,   Round and reactive to light.    CARDIAC:   A. flutter    RESPIRATORY:   No wheezing  Bilateral BS  Normal chest expansion  Not tachypneic,  No use of accessory muscles    GASTROINTESTINAL:  Abdomen soft,   Non-tender,   No guarding,   + BS    MUSCULOSKELETAL:   Range of motion is not limited      NEUROLOGICAL:   Alert and oriented       SKIN:   Skin normal color for race,   Warm and dry  No evidence of rash.    PSYCHIATRIC:   No apparent risk to self or others.          05-02-23 @ 07:01  -  05-03-23 @ 07:00  --------------------------------------------------------  IN:  Total IN: 0 mL    OUT:    Voided (mL): 500 mL  Total OUT: 500 mL    Total NET: -500 mL          LABS:                            10.1   8.38  )-----------( 158      ( 03 May 2023 01:01 )             33.5                                               05-03    144  |  105  |  59<H>  ----------------------------<  132<H>  5.4<H>   |  28  |  1.7<H>    Ca    8.6      03 May 2023 01:01  Mg     2.3     05-03    TPro  5.3<L>  /  Alb  3.4<L>  /  TBili  0.3  /  DBili  x   /  AST  27  /  ALT  27  /  AlkPhos  84  05-03                                                                                           LIVER FUNCTIONS - ( 03 May 2023 01:01 )  Alb: 3.4 g/dL / Pro: 5.3 g/dL / ALK PHOS: 84 U/L / ALT: 27 U/L / AST: 27 U/L / GGT: x                                                                                                                                       MEDICATIONS  (STANDING):  albuterol    90 MICROgram(s) HFA Inhaler 2 Puff(s) Inhalation every 6 hours  albuterol/ipratropium for Nebulization 3 milliLiter(s) Nebulizer every 6 hours  aspirin  chewable 81 milliGRAM(s) Oral daily  atorvastatin 20 milliGRAM(s) Oral at bedtime  budesonide 160 MICROgram(s)/formoterol 4.5 MICROgram(s) Inhaler 2 Puff(s) Inhalation two times a day  chlorhexidine 2% Cloths 1 Application(s) Topical daily  diltiazem    Tablet 30 milliGRAM(s) Oral every 6 hours  dronedarone 400 milliGRAM(s) Oral two times a day  heparin   Injectable 5000 Unit(s) SubCutaneous every 8 hours  levothyroxine 112 MICROGram(s) Oral daily  methylPREDNISolone sodium succinate Injectable 40 milliGRAM(s) IV Push every 12 hours  metoprolol tartrate 25 milliGRAM(s) Oral every 12 hours  pantoprazole    Tablet 40 milliGRAM(s) Oral before breakfast  sodium zirconium cyclosilicate 10 Gram(s) Oral every 12 hours  tamsulosin 0.4 milliGRAM(s) Oral at bedtime    MEDICATIONS  (PRN):         Patient is a 89y old  Female who presents with a chief complaint of Chronic obstructive pulmonary disease (02 May 2023 13:20)        Over Night Events: no acute events overnight, on 3L NC 95%.  Off pressors.  Tachycardic       Vital Signs Last 24 Hrs  T(C): 36.3 (03 May 2023 07:56), Max: 36.8 (02 May 2023 11:02)  T(F): 97.4 (03 May 2023 07:56), Max: 98.3 (02 May 2023 11:02)  HR: 133 (03 May 2023 07:56) (85 - 133)  BP: 108/62 (03 May 2023 07:56) (108/62 - 150/71)  BP(mean): 81 (03 May 2023 07:56) (81 - 94)  RR: 20 (03 May 2023 07:56) (20 - 20)  SpO2: 94% (03 May 2023 07:56) (90% - 100%)    O2 Parameters below as of 03 May 2023 07:56  Patient On (Oxygen Delivery Method): nasal cannula, high flow        CONSTITUTIONAL:   Ill appearing. NAD    ENT:   Airway patent,   Mouth with normal mucosa.   No thrush    EYES:   Pupils equal,   Round and reactive to light.    CARDIAC:   A. flutter    RESPIRATORY:   No wheezing  Bilateral BS  Normal chest expansion  Not tachypneic,  No use of accessory muscles    GASTROINTESTINAL:  Abdomen soft,   Non-tender,   No guarding,   + BS    MUSCULOSKELETAL:   Range of motion is not limited      NEUROLOGICAL:   Alert and oriented       SKIN:   Skin normal color for race,   Warm and dry  No evidence of rash.    PSYCHIATRIC:   No apparent risk to self or others.          05-02-23 @ 07:01  -  05-03-23 @ 07:00  --------------------------------------------------------  IN:  Total IN: 0 mL    OUT:    Voided (mL): 500 mL  Total OUT: 500 mL    Total NET: -500 mL          LABS:                            10.1   8.38  )-----------( 158      ( 03 May 2023 01:01 )             33.5                                               05-03    144  |  105  |  59<H>  ----------------------------<  132<H>  5.4<H>   |  28  |  1.7<H>    Creatinine Trend  BUN 59, Cr 1.7, (05-03-23 @ 01:01)  Creatinine Trend  BUN 51, Cr 1.6, (05-02-23 @ 06:55)  Creatinine Trend  BUN 50, Cr 1.4, (05-01-23 @ 11:08)  Creatinine Trend  BUN 59, Cr 1.6, (04-30-23 @ 07:02)  Creatinine Trend  BUN 60, Cr 1.4, (04-29-23 @ 12:12)  Creatinine Trend  BUN 56, Cr 1.6, (04-28-23 @ 11:19)      Ca    8.6      03 May 2023 01:01  Mg     2.3     05-03    TPro  5.3<L>  /  Alb  3.4<L>  /  TBili  0.3  /  DBili  x   /  AST  27  /  ALT  27  /  AlkPhos  84  05-03                                                                                           LIVER FUNCTIONS - ( 03 May 2023 01:01 )  Alb: 3.4 g/dL / Pro: 5.3 g/dL / ALK PHOS: 84 U/L / ALT: 27 U/L / AST: 27 U/L / GGT: x                                                                                                                                       MEDICATIONS  (STANDING):  albuterol    90 MICROgram(s) HFA Inhaler 2 Puff(s) Inhalation every 6 hours  albuterol/ipratropium for Nebulization 3 milliLiter(s) Nebulizer every 6 hours  aspirin  chewable 81 milliGRAM(s) Oral daily  atorvastatin 20 milliGRAM(s) Oral at bedtime  budesonide 160 MICROgram(s)/formoterol 4.5 MICROgram(s) Inhaler 2 Puff(s) Inhalation two times a day  chlorhexidine 2% Cloths 1 Application(s) Topical daily  diltiazem    Tablet 30 milliGRAM(s) Oral every 6 hours  dronedarone 400 milliGRAM(s) Oral two times a day  heparin   Injectable 5000 Unit(s) SubCutaneous every 8 hours  levothyroxine 112 MICROGram(s) Oral daily  methylPREDNISolone sodium succinate Injectable 40 milliGRAM(s) IV Push every 12 hours  metoprolol tartrate 25 milliGRAM(s) Oral every 12 hours  pantoprazole    Tablet 40 milliGRAM(s) Oral before breakfast  sodium zirconium cyclosilicate 10 Gram(s) Oral every 12 hours  tamsulosin 0.4 milliGRAM(s) Oral at bedtime    MEDICATIONS  (PRN):         Patient is a 89y old  Female who presents with a chief complaint of Chronic obstructive pulmonary disease (02 May 2023 13:20)        Over Night Events: Remains critically ill on O2.  Off pressors.  Tachycardic this am       Vital Signs Last 24 Hrs  T(C): 36.3 (03 May 2023 07:56), Max: 36.8 (02 May 2023 11:02)  T(F): 97.4 (03 May 2023 07:56), Max: 98.3 (02 May 2023 11:02)  HR: 133 (03 May 2023 07:56) (85 - 133)  BP: 108/62 (03 May 2023 07:56) (108/62 - 150/71)  BP(mean): 81 (03 May 2023 07:56) (81 - 94)  RR: 20 (03 May 2023 07:56) (20 - 20)  SpO2: 94% (03 May 2023 07:56) (90% - 100%)    O2 Parameters below as of 03 May 2023 07:56  Patient On (Oxygen Delivery Method): nasal cannula, high flow        CONSTITUTIONAL:   Ill appearing. NAD    ENT:   Airway patent,   Mouth with normal mucosa.   No thrush    EYES:   Pupils equal,   Round and reactive to light.    CARDIAC:   A. flutter    RESPIRATORY:   No wheezing  Bilateral BS  Normal chest expansion  Not tachypneic,  No use of accessory muscles    GASTROINTESTINAL:  Abdomen soft,   Non-tender,   No guarding,   + BS    MUSCULOSKELETAL:   Range of motion is not limited      NEUROLOGICAL:   Alert and oriented       SKIN:   Skin normal color for race,   Warm and dry  No evidence of rash.    PSYCHIATRIC:   No apparent risk to self or others.          05-02-23 @ 07:01  -  05-03-23 @ 07:00  --------------------------------------------------------  IN:  Total IN: 0 mL    OUT:    Voided (mL): 500 mL  Total OUT: 500 mL    Total NET: -500 mL          LABS:                            10.1   8.38  )-----------( 158      ( 03 May 2023 01:01 )             33.5                                               05-03    144  |  105  |  59<H>  ----------------------------<  132<H>  5.4<H>   |  28  |  1.7<H>    Creatinine Trend  BUN 59, Cr 1.7, (05-03-23 @ 01:01)  Creatinine Trend  BUN 51, Cr 1.6, (05-02-23 @ 06:55)  Creatinine Trend  BUN 50, Cr 1.4, (05-01-23 @ 11:08)  Creatinine Trend  BUN 59, Cr 1.6, (04-30-23 @ 07:02)  Creatinine Trend  BUN 60, Cr 1.4, (04-29-23 @ 12:12)  Creatinine Trend  BUN 56, Cr 1.6, (04-28-23 @ 11:19)      Ca    8.6      03 May 2023 01:01  Mg     2.3     05-03    TPro  5.3<L>  /  Alb  3.4<L>  /  TBili  0.3  /  DBili  x   /  AST  27  /  ALT  27  /  AlkPhos  84  05-03                                                                                           LIVER FUNCTIONS - ( 03 May 2023 01:01 )  Alb: 3.4 g/dL / Pro: 5.3 g/dL / ALK PHOS: 84 U/L / ALT: 27 U/L / AST: 27 U/L / GGT: x                                                                                                                                       MEDICATIONS  (STANDING):  albuterol    90 MICROgram(s) HFA Inhaler 2 Puff(s) Inhalation every 6 hours  albuterol/ipratropium for Nebulization 3 milliLiter(s) Nebulizer every 6 hours  aspirin  chewable 81 milliGRAM(s) Oral daily  atorvastatin 20 milliGRAM(s) Oral at bedtime  budesonide 160 MICROgram(s)/formoterol 4.5 MICROgram(s) Inhaler 2 Puff(s) Inhalation two times a day  chlorhexidine 2% Cloths 1 Application(s) Topical daily  diltiazem    Tablet 30 milliGRAM(s) Oral every 6 hours  dronedarone 400 milliGRAM(s) Oral two times a day  heparin   Injectable 5000 Unit(s) SubCutaneous every 8 hours  levothyroxine 112 MICROGram(s) Oral daily  methylPREDNISolone sodium succinate Injectable 40 milliGRAM(s) IV Push every 12 hours  metoprolol tartrate 25 milliGRAM(s) Oral every 12 hours  pantoprazole    Tablet 40 milliGRAM(s) Oral before breakfast  sodium zirconium cyclosilicate 10 Gram(s) Oral every 12 hours  tamsulosin 0.4 milliGRAM(s) Oral at bedtime    MEDICATIONS  (PRN):

## 2023-05-03 NOTE — PROGRESS NOTE ADULT - ASSESSMENT
89-year-old female past medical history of COPD on 4 L NC, Chronic A-fib not on AC, hypothyroidism, CKDIII, HFpEF presents for evaluation of shortness of breath due to COPD exacerbation. Currently being monitored in sdu     Acute on chronic hypercapnic hypoxemic respiratory failure  COPD exacerbation - improved  Hx COPD on home oxygen 3-4 L  currently on 4L NC, s/p HFNC  on solumedrol 40 Q12H, off antibiotics, on nebs  prednisone in AM if 02 requirements improving  diet per speech and swallow    MAURI on CKDIII / Hyperkalemia   encourage PO intake  Lokelma for K >5.5  monitor urine output     Chronic A-fib off AC due to recurrent falls  - rate remains uncontrolled  - appreciate cardio eval   - c/w Multaq 400 mg BID, add cardizem 30 q6H and titrate up as needed if BP tolerates    Chronic HFPEF  HLD  - holding lasix 20mg PO QD  - Cont statin  - resume accordingly  - Avoid volume overload    Hypothyroidism- C/W levothyroxine    Recurrent falls - Mechanical   - PT eval / Fall precautions     Troponemia likely type 2  - currently denies any symptoms   - Trop :0.04 in the setting of CKD-->0.02-->0.03   - BNP:  642 (baseline 500)  - Monitor     DNR/DNI - Palliative consult appreciated , son considering hospice pending conversation with patient's pulm    poor prognosis     Pending (specify): Respiratory status /  cont IV solumedrol, monitor K , feeding, rate control

## 2023-05-03 NOTE — CONSULT NOTE ADULT - SUBJECTIVE AND OBJECTIVE BOX
Patient is a 89y old  Female who presents with a chief complaint of COPD exacerbation (04 May 2023 12:10)      HPI:  89-year-old female past medical history of COPD on 4 L NC, Chronic A-fib not on AC, hypothyroidism, CKDIII, HFpEF admitted with COPD exacerbation. Patient was noted to have episodes of Afib that spontaneously converted to NSR . She denies chest pain and report improvement in her breathing.       PAST MEDICAL & SURGICAL HISTORY:  COPD (chronic obstructive pulmonary disease)      Hypothyroid      HTN (hypertension)      High cholesterol      Colitis      CKD (chronic kidney disease)      No significant past surgical history          PREVIOUS DIAGNOSTIC TESTING:      ECHO  FINDINGS:    STRESS  FINDINGS:    CATHETERIZATION  FINDINGS:    MEDICATIONS  (STANDING):  albuterol    90 MICROgram(s) HFA Inhaler 2 Puff(s) Inhalation every 6 hours  albuterol/ipratropium for Nebulization 3 milliLiter(s) Nebulizer every 6 hours  aspirin  chewable 81 milliGRAM(s) Oral daily  atorvastatin 20 milliGRAM(s) Oral at bedtime  budesonide 160 MICROgram(s)/formoterol 4.5 MICROgram(s) Inhaler 2 Puff(s) Inhalation two times a day  chlorhexidine 2% Cloths 1 Application(s) Topical daily  dextrose 5% + sodium chloride 0.45%. 1000 milliLiter(s) (60 mL/Hr) IV Continuous <Continuous>  diltiazem    Tablet 30 milliGRAM(s) Oral every 6 hours  dronedarone 400 milliGRAM(s) Oral two times a day  heparin   Injectable 5000 Unit(s) SubCutaneous every 8 hours  levothyroxine 112 MICROGram(s) Oral daily  pantoprazole    Tablet 40 milliGRAM(s) Oral before breakfast  sodium zirconium cyclosilicate 10 Gram(s) Oral every 12 hours  tamsulosin 0.4 milliGRAM(s) Oral at bedtime    MEDICATIONS  (PRN):      FAMILY HISTORY:      SOCIAL HISTORY:  CIGARETTES:    ALCOHOL:    REVIEW OF SYSTEMS:  CONSTITUTIONAL: No fever, weight loss, or fatigue  NECK: No pain or stiffness  RESPIRATORY: No hemoptysis;  shortness of breath improvement   CARDIOVASCULAR: No chest pain, palpitations, dizziness, or leg swelling  GASTROINTESTINAL: No abdominal or epigastric pain. No nausea, vomiting, or hematemesis; No diarrhea or constipation. No melena or hematochezia.  GENITOURINARY: No dysuria, frequency, hematuria, or incontinence  NEUROLOGICAL: No headaches, memory loss, loss of strength, numbness, or tremors  SKIN: No itching, burning, rashes, or lesions   ENDOCRINE: No heat or cold intolerance; No hair loss  MUSCULOSKELETAL: No joint pain or swelling; No muscle, back, or extremity pain  HEME/LYMPH: No easy bruising, or bleeding gums          Vital Signs Last 24 Hrs  T(C): 35.8 (04 May 2023 14:23), Max: 36.2 (04 May 2023 07:59)  T(F): 96.5 (04 May 2023 14:23), Max: 97.1 (04 May 2023 07:59)  HR: 70 (04 May 2023 17:52) (66 - 79)  BP: 125/61 (04 May 2023 17:52) (99/67 - 161/73)  BP(mean): 99 (04 May 2023 11:38) (79 - 99)  RR: 20 (04 May 2023 14:23) (20 - 20)  SpO2: 100% (04 May 2023 14:23) (93% - 100%)    Parameters below as of 04 May 2023 14:23  Patient On (Oxygen Delivery Method): nasal cannula  O2 Flow (L/min): 4          PHYSICAL EXAM:  GENERAL: Mild resp distress  HEAD:  Atraumatic, Normocephalic  NECK: Supple, No JVD, Normal thyroid  NERVOUS SYSTEM:  Alert & Oriented X3, Good concentration  CHEST/LUNG: bilateral poor air entry   HEART: Regular rate and rhythm; No murmurs, rubs, or gallops  ABDOMEN: Soft, Nontender, Nondistended; Bowel sounds present  EXTREMITIES:  No edema  SKIN: No rashes or lesions    INTERPRETATION OF TELEMETRY:    ECG:    I&O's Detail      LABS:                        9.5    6.19  )-----------( 133      ( 04 May 2023 11:40 )             31.0     05-04    146  |  107  |  60<H>  ----------------------------<  89  5.3<H>   |  29  |  1.7<H>    Ca    8.6      04 May 2023 11:40  Mg     2.3     05-04    TPro  5.2<L>  /  Alb  3.3<L>  /  TBili  0.4  /  DBili  x   /  AST  24  /  ALT  23  /  AlkPhos  80  05-04            I&O's Summary      RADIOLOGY & ADDITIONAL STUDIES:        diltiazem    Tablet 30 milliGRAM(s) Oral every 6 hours  dronedarone 400 milliGRAM(s) Oral two times a day    COPD exacerbation  PAfib/ not on AC due to recurrent falls    - Management as per primary team  - Start Cardizem  - Will follow as needed

## 2023-05-03 NOTE — PROGRESS NOTE ADULT - ASSESSMENT
89 year old woman with history of COPD on 4LNC, CKDIII, HFpEF presents with COPD exacerbation.  Palliative care consulted for GOC.    Patient seen at bedside with son today. Now on nasal cannula. Will continue to address GOC as appropriate. They are hoping to speak with patient's Pulmonologist- notified primary team in CEU about this request. Will follow up.     Education about palliative care provided to patient/family.  See Recs below.    Please call [phone] with questions or concerns 24/7.   We will continue to follow.

## 2023-05-03 NOTE — PROGRESS NOTE ADULT - PROBLEM SELECTOR PLAN 3
-DNR/DNI  -ongoing medical management  -GOC as appropriate - will discuss hospice further as appropriate   -family intent on keeping patient at home

## 2023-05-03 NOTE — PROGRESS NOTE ADULT - PROBLEM SELECTOR PLAN 1
Respiratory failure in the setting of COPD exacerbation.   -weaned to nasal cannula today  -[facility] as appropriate  - family hoping to speak with Dr. Jose --> primary team aware

## 2023-05-03 NOTE — PROGRESS NOTE ADULT - ASSESSMENT
IMPRESSION:     Acute on chronic hypercapnic respiratory failure, improving   Acute on chronic hypoxemic respiratory failure  COPD exacerbation  MAURI on CKD with hyperkalemia improving   HO Severe COPD on home O2.    Chronic afib not on AC due to recurrent falls  HFPEF      PLAN:    CNS: Avoid sedatives    HEENT: Oral care    PULMONARY:   HOB @ 45 degrees, aspiration precautions.  Wean O2 as tolerated.  Encourage NIV use during sleep and PRN during the day, Decrease Solumedrol to 40mg Q24H.      CARDIOVASCULAR: Avoid overload. D51/2NS@75 for now    GI: GI prophylaxis. S&S eval, bowel regimen    RENAL: Correct as necessary.  Repeat lytes.  Repeat BMP, resume Lokelma if uptrending potassium      INFECTIOUS DISEASE: SP ABX course     HEMATOLOGICAL: DVT prophylaxis.  Duplex negative 4/26.  Monitor CBC and Coags     ENDOCRINE: Follow up FS. Insulin protocol if needed.    MUSC: PT/OT    Poor prognosis.     DNR/DNI    DGTF      IMPRESSION:     Acute on chronic hypercapnic respiratory failure, improving   Acute on chronic hypoxemic respiratory failure  COPD exacerbation  MAURI on CKD with hyperkalemia improving   HO Severe COPD on home O2.    Chronic afib not on AC due to recurrent falls.  Now wiht RVR / VERONIKA corona SP cardiology follow up   HFPEF      PLAN:    CNS: Avoid sedatives    HEENT: Oral care    PULMONARY:   HOB @ 45 degrees, aspiration precautions.  Wean O2 as tolerated.  Encourage NIV use during sleep and PRN during the day, Decrease Solumedrol to 40mg Q24H.      CARDIOVASCULAR: Avoid overload. D51/2NS@75 for now.  Rate Control.  DW cards Cardizem for now     GI: GI prophylaxis. S&S eval, bowel regimen    RENAL: Correct as necessary.  Repeat lytes.  Repeat BMP, resume Lokelma if uptrending potassium      INFECTIOUS DISEASE: SP ABX course     HEMATOLOGICAL: DVT prophylaxis.  Duplex negative 4/26.  Monitor CBC and Coags     ENDOCRINE: Follow up FS. Insulin protocol if needed.    MUSC: PT/OT    Poor prognosis.     DNR/DNI    SDU

## 2023-05-03 NOTE — PROGRESS NOTE ADULT - SUBJECTIVE AND OBJECTIVE BOX
HPI:  89-year-old female past medical history of COPD on 4 L NC, Chronic A-fib not on AC, hypothyroidism, CKDIII, HFpEF presents for evaluation of shortness of breath. istory obtained from son who lives with her. Patient developed shortness of breath with associated dry cough for past 3 days, however she was not hypoxic and was saturating 95% on baseline 4 L at home. No reported fever, chills, headache, chest pain, abdominal pain, weakness, numbness, dysuria, hematuria, vomiting, diarrhea. She was placed on CPAP by EMS received 2 DuoNebs and 60 mg of Solu-Medrol. At baseline she is AAOx2-3 and ambulates with walker at home. Pt had recent admission for COPD exacerbation 3/4-3/15.     Interval history  -Patient seen at bedside  -She is now on O2 via nasal cannula  -Denies any complaints at time of visit    ADVANCE DIRECTIVES:    [ ] Full Code [ x] DNR/DNI  MOLST  [ X]  Living Will  [ ]   DECISION MAKER(s): HCP  [ ] Health Care Proxy(s)  [x] Surrogate(s)  [ ] Guardian           Name(s): Phone Number(s): Son Andrzej is HCP ( in one content). secondary HCP is Minda reyes.     BASELINE (I)ADL(s) (prior to admission):  [place]: [ ]Total  [ X ] Moderate [ ]Dependent  Palliative Performance Status Version 2:       60  %    [email or web address]/files/[email or web address]    Allergies    No Known Allergies    Intolerances    MEDICATIONS  (STANDING):  albuterol    90 MICROgram(s) HFA Inhaler 2 Puff(s) Inhalation every 6 hours  albuterol/ipratropium for Nebulization 3 milliLiter(s) Nebulizer every 6 hours  aspirin  chewable 81 milliGRAM(s) Oral daily  atorvastatin 20 milliGRAM(s) Oral at bedtime  budesonide 160 MICROgram(s)/formoterol 4.5 MICROgram(s) Inhaler 2 Puff(s) Inhalation two times a day  chlorhexidine 2% Cloths 1 Application(s) Topical daily  dronedarone 400 milliGRAM(s) Oral two times a day  heparin   Injectable 5000 Unit(s) SubCutaneous every 8 hours  levothyroxine 112 MICROGram(s) Oral daily  methylPREDNISolone sodium succinate Injectable 40 milliGRAM(s) IV Push every 12 hours  metoprolol tartrate 25 milliGRAM(s) Oral every 12 hours  pantoprazole    Tablet 40 milliGRAM(s) Oral before breakfast  sodium zirconium cyclosilicate 10 Gram(s) Oral every 12 hours  tamsulosin 0.4 milliGRAM(s) Oral at bedtime    MEDICATIONS  (PRN):      PRESENT SYMPTOMS:   Pain: [ ]yes [x ]no  QOL impact -   Location -                    Aggravating factors -  Quality -  Radiation -  Timing-  Severity (0-10 scale):  Minimal acceptable level (0-10 scale):       Dyspnea:                           [] ne[X  Mild [ ]Moderate [ ]Severe     Respiratory Distress Observation Scale (RDOS):   A score of 0 to 2 signifies little or no respiratory distress, 3 signifies mild distress, scores 4 to 6 indicate moderate distress, and scores greater than 7 signify severe distress  [email or web address]    Anxiety:                             [ x]None[ ]Mild [ ]Moderate [ ]Severe   Fatigue:                             [ x]None[ ]Mild [ ]Moderate [ ]Severe   Nausea:                             [x ]None[ ]Mild [ ]Moderate [ ]Severe   Loss of appetite:              [ x]None[ ]Mild [ ]Moderate [ ]Severe   Constipation:                    [x ]None[ ]Mild [ ]Moderate [ ]Severe    Other Symptoms:  [x ]All other review of systems negative     Palliative Performance Status Version 2:         30%    [email or web address]    PHYSICAL EXAM:  ICU Vital Signs Last 24 Hrs  T(C): 36.3 (03 May 2023 11:34), Max: 36.5 (02 May 2023 20:12)  T(F): 97.4 (03 May 2023 11:34), Max: 97.7 (02 May 2023 20:12)  HR: 148 (03 May 2023 11:34) (85 - 148)  BP: 107/64 (03 May 2023 11:34) (107/64 - 150/71)  BP(mean): 78 (03 May 2023 11:34) (78 - 94)  RR: 20 (03 May 2023 11:34) (20 - 20)  SpO2: 97% (03 May 2023 11:34) (90% - 100%)    GENERAL:  [ x] No acute distress [ ]Lethargic  [ ]Unarousable  [X ]Verbal  [ ]Non-Verbal [ ]Cachexia    BEHAVIORAL/PSYCH:  [ ]Alert and Oriented x  [ ] Anxiety [ ] Delirium [ ] Agitation [X ] Calm   EYES: [ x] No scleral icterus [ ] Scleral icterus [ ] Closed  ENMT:  [ ]Dry mouth  [x ]No external oral lesions [ ] No external ear or nose lesions  CARDIOVASCULAR:  [ ]Regular [ ]Irregular [ ]Tachy [ ]Not Tachy  [ ]Maximus [ ] Edema [ ] No edema  PULMONARY:  [ ]Tachypnea  [ ]Audible excessive secretions [ x] No labored breathing [ ] labored breathing  GASTROINTESTINAL: [ ]Soft  [ ]Distended  [x ]Not distended [ ]Non tender [ ]Tender  MUSCULOSKELETAL: [ ]No clubbing [ ] clubbing  [x ] No cyanosis [ ] cyanosis  NEUROLOGIC: [ ]No focal deficits  [x ]Follows commands  [ ]Does not follow commands  [ ]Cognitive impairment  [ ]Dysphagia  [ ]Dysarthria  [ ]Paresis   SKIN: [ ] Jaundiced [ ] Non-jaundiced [ ]Rash [ ]No Rash [ ] Warm [ ] Dry  MISC/LINES: [ ] ET tube [ ] Trach [ ]NGT/OGT [ ]PEG [ ]Tobar    CRITICAL CARE:  [ ] Shock Present  [ ]Septic [ ]Cardiogenic [ ]Neurologic [ ]Hypovolemic  [ ]  Vasopressors [ ]  Inotropes   [x ]Respiratory failure present [ ]Mechanical ventilation [ ]Non-invasive ventilatory support [ ]High flow  [ ]Acute  [ ]Chronic [ ]Hypoxic  [ ]Hypercarbic [ ]Other  [ ]Other organ failure     LABS: reviewed by me             05-03    144  |  105  |  59<H>  ----------------------------<  132<H>  5.4<H>   |  28  |  1.7<H>    Ca    8.6      03 May 2023 01:01  Mg     2.3     05-03    TPro  5.3<L>  /  Alb  3.4<L>  /  TBili  0.3  /  DBili  x   /  AST  27  /  ALT  27  /  AlkPhos  84  05-03                            10.1   8.38  )-----------( 158      ( 03 May 2023 01:01 )             33.5       EKG: reviewed by me  < from: 12 Lead ECG (05.01.23 @ 14:55) >  Ventricular Rate 141 BPM  < from: Xray Chest 1 View AP/PA (04.30.23 @ 10:13) >  Impression:    No radiographic evidence of acute cardiopulmonary disease.        --- End of Report ---      < end of copied text >    Atrial Rate 141 BPM    QRS Duration 74 ms    Q-T Interval 288 ms    QTC Calculation(Bazett) 441 ms    R Axis 105 degrees    T Axis 79 degrees    Diagnosis Line Supraventricular tachycardia  Rightward axis  Nonspecific STand T wave abnormality  Abnormal ECG    < end of copied text >        REFERRALS:   [ ]Chaplaincy  [ ]Hospice  [ ]Child Life  [x]Social Work  [ ]Case management [ ]Holistic Therapy     Patient discussed with primary medical team MD  Palliative care education provided to patient and/or family

## 2023-05-04 NOTE — PROGRESS NOTE ADULT - ASSESSMENT
IMPRESSION:     Acute on chronic hypercapnic respiratory failure, improving   Acute on chronic hypoxemic respiratory failure  COPD exacerbation  MAURI on CKD with hyperkalemia improving   HO Severe COPD on home O2.    Chronic afib not on AC due to recurrent falls.  Now wiht RVR / A cj SP cardiology follow up   HFPEF      PLAN:    CNS: Avoid sedatives    HEENT: Oral care    PULMONARY:   HOB @ 45 degrees, aspiration precautions.  Wean O2 as tolerated.  Encourage NIV use during sleep and PRN during the day, Prednisone 40mg for 5 days     CARDIOVASCULAR: Avoid overload. D51/2NS@75.  Rate controlled on Cardizem     GI: GI prophylaxis. feeding per speech and swallow, bowel regimen    RENAL: Correct as necessary.  Repeat lytes.  Repeat BMP, Lokelma if needed     INFECTIOUS DISEASE: SP ABX course     HEMATOLOGICAL: DVT prophylaxis.  Duplex negative 4/26.  Monitor CBC and Coags     ENDOCRINE: Follow up FS. Insulin protocol if needed.    MUSC: PT/OT    Poor prognosis.     DNR/DNI    SDU monitoring      IMPRESSION:     Acute on chronic hypercapnic respiratory failure, improving   Acute on chronic hypoxemic respiratory failure  COPD exacerbation  MAURI on CKD with hyperkalemia improving   HO Severe COPD on home O2.    Chronic afib not on AC due to recurrent falls. Rate controlled.  SP cardiology follow up   HFPEF      PLAN:    CNS: Avoid sedatives    HEENT: Oral care    PULMONARY:   HOB @ 45 degrees, aspiration precautions.  Wean O2 as tolerated.  Encourage NIV use during sleep and PRN during the day, Prednisone 40mg for 5 days     CARDIOVASCULAR: Avoid overload. D51/2NS@75.  Rate controlled on Cardizem     GI: GI prophylaxis. feeding per speech and swallow, bowel regimen    RENAL: Correct as necessary.  Repeat lytes.  Repeat BMP, Lokelma if needed     INFECTIOUS DISEASE: SP ABX course     HEMATOLOGICAL: DVT prophylaxis.  Duplex negative 4/26.  Monitor CBC and Coags     ENDOCRINE: Follow up FS. Insulin protocol if needed.    MUSC: PT/OT    Poor prognosis.     DNR/DNI    DGTF

## 2023-05-04 NOTE — PROGRESS NOTE ADULT - SUBJECTIVE AND OBJECTIVE BOX
TANNA MCCRACKEN  89y Female    CHIEF COMPLAINT:    Patient is a 89y old  Female who presents with a chief complaint of COPD exacerbation (04 May 2023 08:53)      INTERVAL HPI/OVERNIGHT EVENTS:    Patient seen and examined. Received ativan overnight for agitation, now lethargic, arousable    ROS: All other systems are negative.    Vital Signs:    T(F): 97.1 (05-04-23 @ 07:59), Max: 97.4 (05-03-23 @ 11:34)  HR: 66 (05-04-23 @ 07:59) (66 - 148)  BP: 114/55 (05-04-23 @ 07:59) (99/67 - 114/55)  RR: 20 (05-04-23 @ 07:59) (20 - 20)  SpO2: 93% (05-04-23 @ 07:59) (93% - 97%)    PHYSICAL EXAM:    GENERAL:  NAD chronically ill appearing   SKIN: No rashes or lesions  HEENT: Atraumatic. Normocephalic   NECK: Supple, No JVD. No lymphadenopathy.  PULMONARY: Coarse breath sounds B/L. No wheezing.    CVS: Normal S1, S2. Rate and Rhythm are regular.    ABDOMEN/GI: Soft, Nontender, Nondistended   MSK:  No clubbing or cyanosis   NEUROLOGIC: non focal   PSYCH: lethargic, arousable     Consultant(s) Notes Reviewed:  [x ] YES  [ ] NO  Care Discussed with Consultants/Other Providers [ x] YES  [ ] NO    LABS:                        10.1   8.38  )-----------( 158      ( 03 May 2023 01:01 )             33.5     141  |  103  |  58<H>  ----------------------------<  148<H>  5.2<H>   |  26  |  1.7<H>    Ca    8.7      03 May 2023 12:34  Mg     2.3     05-03    TPro  5.3<L>  /  Alb  3.4<L>  /  TBili  0.3  /  DBili  x   /  AST  27  /  ALT  27  /  AlkPhos  84  05-03    RADIOLOGY & ADDITIONAL TESTS:  Imaging or report Personally Reviewed:  [x] YES  [ ] NO  EKG reviewed: [x] YES  [ ] NO    Medications:  Standing  albuterol    90 MICROgram(s) HFA Inhaler 2 Puff(s) Inhalation every 6 hours  albuterol/ipratropium for Nebulization 3 milliLiter(s) Nebulizer every 6 hours  aspirin  chewable 81 milliGRAM(s) Oral daily  atorvastatin 20 milliGRAM(s) Oral at bedtime  budesonide 160 MICROgram(s)/formoterol 4.5 MICROgram(s) Inhaler 2 Puff(s) Inhalation two times a day  chlorhexidine 2% Cloths 1 Application(s) Topical daily  dextrose 5% + sodium chloride 0.45%. 1000 milliLiter(s) IV Continuous <Continuous>  diltiazem    Tablet 30 milliGRAM(s) Oral every 6 hours  dronedarone 400 milliGRAM(s) Oral two times a day  heparin   Injectable 5000 Unit(s) SubCutaneous every 8 hours  levothyroxine 112 MICROGram(s) Oral daily  pantoprazole    Tablet 40 milliGRAM(s) Oral before breakfast  sodium zirconium cyclosilicate 10 Gram(s) Oral every 12 hours  tamsulosin 0.4 milliGRAM(s) Oral at bedtime    PRN Meds

## 2023-05-04 NOTE — PROGRESS NOTE ADULT - SUBJECTIVE AND OBJECTIVE BOX
Patient is a 89y old  Female who presents with a chief complaint of COPD exacerbation (03 May 2023 15:40)        Over Night Events: agitated overnight SP 1mg Ativan, remains on 3L NC, refused NIV       Vital Signs Last 24 Hrs  T(C): 36.2 (04 May 2023 07:59), Max: 36.3 (03 May 2023 11:34)  T(F): 97.1 (04 May 2023 07:59), Max: 97.4 (03 May 2023 11:34)  HR: 66 (04 May 2023 07:59) (66 - 148)  BP: 114/55 (04 May 2023 07:59) (99/67 - 114/55)  BP(mean): 79 (04 May 2023 07:59) (78 - 79)  RR: 20 (04 May 2023 07:59) (20 - 20)  SpO2: 93% (04 May 2023 07:59) (93% - 97%)    O2 Parameters below as of 04 May 2023 07:59  Patient On (Oxygen Delivery Method): nasal cannula, high flow            CONSTITUTIONAL:  NAD    ENT:   Airway patent,   Mouth with normal mucosa.   No thrush    EYES:   Pupils equal,   Round and reactive to light.    CARDIAC:   Normal rate,   Regular rhythm.        RESPIRATORY:   Decreased bilateral air entry   No wheezing  Bilateral BS  Normal chest expansion  Not tachypneic,  No use of accessory muscles    GASTROINTESTINAL:  Abdomen soft,   Non-tender,   No guarding,   + BS      MUSCULOSKELETAL:   Range of motion is not limited      NEUROLOGICAL:   Alert and oriented x2-3      SKIN:   Skin normal color for race,   Warm and dry      PSYCHIATRIC:   No apparent risk to self or others.          LABS:                            10.1   8.38  )-----------( 158      ( 03 May 2023 01:01 )             33.5                                               05-03    141  |  103  |  58<H>  ----------------------------<  148<H>  5.2<H>   |  26  |  1.7<H>    Ca    8.7      03 May 2023 12:34  Mg     2.3     05-03    TPro  5.3<L>  /  Alb  3.4<L>  /  TBili  0.3  /  DBili  x   /  AST  27  /  ALT  27  /  AlkPhos  84  05-03                                                                                           LIVER FUNCTIONS - ( 03 May 2023 01:01 )  Alb: 3.4 g/dL / Pro: 5.3 g/dL / ALK PHOS: 84 U/L / ALT: 27 U/L / AST: 27 U/L / GGT: x                                                                                                                                       MEDICATIONS  (STANDING):  albuterol    90 MICROgram(s) HFA Inhaler 2 Puff(s) Inhalation every 6 hours  albuterol/ipratropium for Nebulization 3 milliLiter(s) Nebulizer every 6 hours  aspirin  chewable 81 milliGRAM(s) Oral daily  atorvastatin 20 milliGRAM(s) Oral at bedtime  budesonide 160 MICROgram(s)/formoterol 4.5 MICROgram(s) Inhaler 2 Puff(s) Inhalation two times a day  chlorhexidine 2% Cloths 1 Application(s) Topical daily  dextrose 5% + sodium chloride 0.45%. 1000 milliLiter(s) (60 mL/Hr) IV Continuous <Continuous>  diltiazem    Tablet 30 milliGRAM(s) Oral every 6 hours  dronedarone 400 milliGRAM(s) Oral two times a day  heparin   Injectable 5000 Unit(s) SubCutaneous every 8 hours  levothyroxine 112 MICROGram(s) Oral daily  methylPREDNISolone sodium succinate Injectable 40 milliGRAM(s) IV Push every 12 hours  pantoprazole    Tablet 40 milliGRAM(s) Oral before breakfast  sodium zirconium cyclosilicate 10 Gram(s) Oral every 12 hours  tamsulosin 0.4 milliGRAM(s) Oral at bedtime    MEDICATIONS  (PRN):      CXR reviewed

## 2023-05-04 NOTE — PROGRESS NOTE ADULT - SUBJECTIVE AND OBJECTIVE BOX
HPI:    89-year-old female past medical history of COPD on 4 L NC, Chronic A-fib not on AC, hypothyroidism, CKDIII, HFpEF presents for evaluation of shortness of breath. History obtained from son who lives with her. Patient developed shortness of breath with associated dry cough for past 3 days, however she was not hypoxic and was saturating 95% on baseline 4 L at home. No reported fever, chills, headache, chest pain, abdominal pain, weakness, numbness, dysuria, hematuria, vomiting, diarrhea. She was placed on CPAP by EMS received 2 DuoNebs and 60 mg of Solu-Medrol. At baseline she is AAOx2-3 and ambulates with walker at home. Pt had recent admission for COPD exacerbation 3/4-3/15.     Interval history:   -Patient seen at bedside    -She is now on O2 via nasal cannula     -Denies any complaints at time of visit but is more confused today. overnight she was noncompliant and agitated, was given Ativan.     ADVANCE DIRECTIVES:    [ ] Full Code [ x] DNR/DNI  MOLST  [ X]  Living Will  [ ]   DECISION MAKER(s): HCP  [ ] Health Care Proxy(s)  [x] Surrogate(s)  [ ] Guardian           Name(s): Phone Number(s): Son Andrzej is HCP ( in one content). secondary HCP is Minda reyes.     BASELINE (I)ADL(s) (prior to admission):  [place]: [ ]Total  [ X ] Moderate [ ]Dependent  Palliative Performance Status Version 2:       60  %    [email or web address]/files/[email or web address]_[email or web address]    Allergies    No Known Allergies    Intolerances    MEDICATIONS  (STANDING):  albuterol    90 MICROgram(s) HFA Inhaler 2 Puff(s) Inhalation every 6 hours  albuterol/ipratropium for Nebulization 3 milliLiter(s) Nebulizer every 6 hours  aspirin  chewable 81 milliGRAM(s) Oral daily  atorvastatin 20 milliGRAM(s) Oral at bedtime  budesonide 160 MICROgram(s)/formoterol 4.5 MICROgram(s) Inhaler 2 Puff(s) Inhalation two times a day  chlorhexidine 2% Cloths 1 Application(s) Topical daily  dronedarone 400 milliGRAM(s) Oral two times a day  heparin   Injectable 5000 Unit(s) SubCutaneous every 8 hours  levothyroxine 112 MICROGram(s) Oral daily  methylPREDNISolone sodium succinate Injectable 40 milliGRAM(s) IV Push every 12 hours  metoprolol tartrate 25 milliGRAM(s) Oral every 12 hours  pantoprazole    Tablet 40 milliGRAM(s) Oral before breakfast  sodium zirconium cyclosilicate 10 Gram(s) Oral every 12 hours  tamsulosin 0.4 milliGRAM(s) Oral at bedtime    MEDICATIONS  (PRN):      PRESENT SYMPTOMS:   Pain: [ ]yes [x ]no  QOL impact -   Location -                    Aggravating factors -  Quality -  Radiation -  Timing-  Severity (0-10 scale):  Minimal acceptable level (0-10 scale):       Dyspnea:                           [X] None[X0  Mild [ ]Moderate [ ]Severe     Respiratory Distress Observation Scale (RDOS):   A score of 0 to 2 signifies little or no respiratory distress, 3 signifies mild distress, scores 4 to 6 indicate moderate distress, and scores greater than 7 signify severe distress  [email or web address]    Anxiety:                             [ x]None[ ]Mild [ ]Moderate [ ]Severe   Fatigue:                             [ x]None[ ]Mild [ ]Moderate [ ]Severe   Nausea:                             [x ]None[ ]Mild [ ]Moderate [ ]Severe   Loss of appetite:              [ x]None[ ]Mild [ ]Moderate [ ]Severe   Constipation:                    [x ]None[ ]Mild [ ]Moderate [ ]Severe    Other Symptoms:  [x ]All other review of systems negative     Palliative Performance Status Version 2:         30%    [email or web address]    PHYSICAL EXAM:  ICU Vital Signs Last 24 Hrs  T(C): 36.1 (04 May 2023 11:38), Max: 36.2 (03 May 2023 16:25)  T(F): 97 (04 May 2023 11:38), Max: 97.1 (03 May 2023 16:25)  HR: 77 (04 May 2023 11:38) (66 - 145)  BP: 161/69 (04 May 2023 11:38) (99/67 - 161/69)  BP(mean): 99 (04 May 2023 11:38) (79 - 99)  RR: 20 (04 May 2023 11:38) (20 - 20)  SpO2: 95% (04 May 2023 11:38) (93% - 95%)    GENERAL:  [ x] No acute distress [ ]Lethargic  [ ]Unarousable  [X ]Verbal  [ ]Non-Verbal [ ]Cachexia    BEHAVIORAL/PSYCH:  [ ]Alert and Oriented x  [ ] Anxiety [ ] Delirium [ ] Agitation [X ] Calm   EYES: [ x] No scleral icterus [ ] Scleral icterus [ ] Closed  ENMT:  [ ]Dry mouth  [x ]No external oral lesions [ ] No external ear or nose lesions  CARDIOVASCULAR:  [ ]Regular [ ]Irregular [ ]Tachy [ ]Not Tachy  [ ]Maximus [ ] Edema [ ] No edema  PULMONARY:  [ ]Tachypnea  [ ]Audible excessive secretions [ x] No labored breathing [ ] labored breathing  GASTROINTESTINAL: [ ]Soft  [ ]Distended  [x ]Not distended [ ]Non tender [ ]Tender  MUSCULOSKELETAL: [ ]No clubbing [ ] clubbing  [x ] No cyanosis [ ] cyanosis  NEUROLOGIC: [ ]No focal deficits  [x ]Follows commands  [ ]Does not follow commands  [ ]Cognitive impairment  [ ]Dysphagia  [ ]Dysarthria  [ ]Paresis   SKIN: [ ] Jaundiced [ ] Non-jaundiced [ ]Rash [ ]No Rash [ ] Warm [ ] Dry  MISC/LINES: [ ] ET tube [ ] Trach [ ]NGT/OGT [ ]PEG [ ]Tobar      LABS: reviewed by me               05-03    141  |  103  |  58<H>  ----------------------------<  148<H>  5.2<H>   |  26  |  1.7<H>    Ca    8.7      03 May 2023 12:34  Mg     2.3     05-03    TPro  5.3<L>  /  Alb  3.4<L>  /  TBili  0.3  /  DBili  x   /  AST  27  /  ALT  27  /  AlkPhos  84  05-03                            9.5    6.19  )-----------( 133      ( 04 May 2023 11:40 )             31.0           EKG: reviewed by me  < from: 12 Lead ECG (05.01.23 @ 14:55) >  Ventricular Rate 141 BPM  < from: Xray Chest 1 View AP/PA (04.30.23 @ 10:13) >  Impression:    No radiographic evidence of acute cardiopulmonary disease.        --- End of Report ---      < end of copied text >    Atrial Rate 141 BPM    QRS Duration 74 ms    Q-T Interval 288 ms    QTC Calculation(Bazett) 441 ms    R Axis 105 degrees    T Axis 79 degrees    Diagnosis Line Supraventricular tachycardia  Rightward axis  Nonspecific STand T wave abnormality  Abnormal ECG    < end of copied text >        REFERRALS:   [ ]Chaplaincy  [ ]Hospice  [ ]Child Life  [x]Social Work  [ ]Case management [ ]Holistic Therapy     Patient discussed with primary medical team MD  Palliative care education provided to patient and/or family

## 2023-05-04 NOTE — PROGRESS NOTE ADULT - PROBLEM SELECTOR PLAN 2
Respiratory failure in the setting of COPD exacerbation.   -weaned to nasal cannula today - patient refused Bipap last night   -GOC as appropriate  -family hoping to speak with Dr. Jose before making any major decisions --> spoke with pulm

## 2023-05-04 NOTE — CHART NOTE - NSCHARTNOTEFT_GEN_A_CORE
PALLIATIVE MEDICINE INTERDISCIPLINARY TEAM NOTE    Provider:                                             Met with: [  x ] Patient  [ x  ] Family  [   ] Other:    Primary Language: [ x  ] English [   ] Other*:                      *Interpretation provided by:    SUPPORT DIAGNOSES            (Check all that apply)    [   ] EOL issues  [ x  ] Advanced Illness  [   ] Cultural / spiritual concerns  [ x  ] Pain / suffering  [   ] Dementia / AMS  [   ] Other:  [   ] AD issues  [   ] Grief / loss / sadness  [   ] Discharge issues  [  x ] Distress / coping    PSYCHOSOCIAL ASSESSMENT OF PATIENT         (Check all that apply)    [ x  ] Initial Assessment            [   ] Reassessment          [   ] Not Applicable this visit    Pain/suffering acuity:  [ x  ] None to mild (0-3)           [   ] Moderate (4-6)        [   ] High (7-10)    Mental Status:  [   ] Alert/oriented (x3)          [  x ] Confused/Altered(x2)         [   ] Non-resp    Functional status:  [   ] Independent w ADLs      [ x  ] Needs Assistance             [   ] Bedbound/Full Care    Coping:  [   ] Coping well                     [  x ] Coping w/difficulty            [   ] Poor coping    Support system:  [  x ] Strong                              [   ] Adequate                        [   ] Inadequate      Past history and medications for:     [ ] Anxiety       [ ] Depression    [ ] Sleep disorders       SERVICE PROVIDED  [   ]Discharge support / facilitation  [   ]AD / goals of care counseling                                  [   ]EOL / death / bereavement counseling  [  x ]Counseling / support                                                [   ] Family meeting  [   ]Prayer / sacrament / ritual                                      [   ] Referral   [   ]Other                                                                       NOTE and Plan of Care (PoC):  patient is a 89 y/o F with pmhx of COPD, chronic Afib, hypothyroidism, CKDIII, HFpEF presenting for eval of SOB. met with patient and her son Andrzej. Reviewed role of palliative care SW. Andrzej detailed patients long hx leading up to current hospitalization. patient lives with him and he helps with ADLs. Team discussed hospice with Andrzej, however, he wished to speak with pulm- Dr Sid Burgos prior to making any decisions. all questions answered. support rendered. contact info provided. will follow. [phone]

## 2023-05-04 NOTE — PROGRESS NOTE ADULT - PROBLEM SELECTOR PLAN 1
+ confused and altered from baseline  Was given Ativan 1 mg IV last night- consider lowering dose to 0.25 or 0.5 PRN for agitation    -Recommend non-pharmacological interventions to prevent/treat delirium  - maintain day/night light cycles  - optimize sleep-wake cycle, minimize environmental noise  - reorientation frequently  - use verbal redirection as first line   - minimize restraints and lines  - ensure good bladder/bowel function  - ensure adequate pain control  - minimize use of anticholinergic, antihistaminic, and benzodiazepine medications

## 2023-05-04 NOTE — PROCEDURE NOTE - NSUS ED ADDITIONAL DETAIL1 FT
18 gauge IV performed using Ultrasound guidance.  I supervised this Ultrasound guided peripheral IV placement. Confirmation of line obtained with Ultrasound and image saved. No complications form procedure and line is functioning well.

## 2023-05-04 NOTE — PROGRESS NOTE ADULT - ASSESSMENT
89 year old woman with history of COPD on 4LNC, CKDIII, HFpEF presents with COPD exacerbation.  Palliative care consulted for GOC.    Patient seen at bedside with son and daughter today. Now on nasal cannula. Will continue to address GOC as appropriate. They are hoping to speak with patient's Pulmonologist- notified primary team in CEU about this request. As of now they are leaning away from hospice. Will follow up.     Education about palliative care provided to patient/family.  See Recs below.    Please call [phone] with questions or concerns 24/7.   We will continue to follow.

## 2023-05-05 NOTE — PROCEDURE NOTE - NSPROCNAME_GEN_A_CORE
[FreeTextEntry1] : DOLORES GARCIA is a 59 year female seen today for medical weight loss consultation. Patient is aware that our medical weight loss program uses several weight loss options to help patients lose weight and improve their overall health. Nutritional, lifestyle changes and physical activity are very important components to help patients achieve their weight loss goals. FDA approved medications may be prescribed to qualified patients to help with appetite support.\par \par Plan: Blood work. RTO in 1 week. Call with concerns.\par \par 
Midline Insertion
Point of Care Ultrasound Vascular Access

## 2023-05-05 NOTE — PROGRESS NOTE ADULT - ASSESSMENT
89 y/o woman with PMH of COPD on 4 L NC and noncompliant with NIV at times, Chronic Afib not on AC due to recurrent falls, hypothyroidism, CKD 3 and chronic HFpEF presented for shortness of breath due to COPD exacerbation. She is now downgraded to the medical floor from stepdown unit.    1. Acute on chronic hypercapnic and hypoxemic respiratory failure due to COPD exacerbation   COPD on home oxygen 3-4 L  noncompliant with NIV at times  was on HFNC and now on O2 at 4L NC - her baseline  continue solumedrol IV 40mg q12hr for now per pulmonary  CXR 5/5 reviewed by me: increased opacities right >left -> discuss with pulm re: restarting abx  continue nebs and symbicort  diet per speech and swallow  aspiration precautions  avoid ativan for agitation (s/p Ativan 1mg the night before and pt was lethargic)  continue frequent reorientation (family is involved - son)     2. MAURI on CKD 3 - resolving - on IVF and encourage PO intake  baseline around 1.4 per chart review  monitor urine output     3. Hyperkalemia - resolved - on lokelma 10g q12hr - hold if K remains <5    4. Chronic Afib not on anticoagulation due to recurrent falls  rate now better controlled on cardizem 30mg q6hr and dronedarone 400mg bid  seen by Dr Huynh this admission  if rate remains controlled, can switch to Cardizem  daily on discharge    5. Chronic HFpEF  lasix on hold due to MAURI and re-evaluate  Avoid volume overload    6. Hypothyroidism- on levothyroxine    7. Recurrent falls - Mechanical   fall precautions  PT as tolerated    8. Demand ischemia  pt asymptomatic  troponins reviewed    9. Chronic anemia - macrocytic  can repeat folate and vit B12 levels    10. Skin tears - nonadherent dressing, gauze and monitor    11. DVT prophylaxis - heparin SQ      DNR/DNI status  overall poor prognosis  Palliative care following  per chart, son considering hospice pending conversation with pulmonary      PROGRESS NOTE HANDOFF    Pending: improvement in respiratory status, labs in AM, palliative care f/u    Family discussion: with son at bedside today    Disposition: to be determined

## 2023-05-05 NOTE — MEDICAL STUDENT PROGRESS NOTE(EDUCATION) - SUBJECTIVE AND OBJECTIVE BOX
LENGTH OF HOSPITAL STAY: 9d    CHIEF COMPLAINT:    Patient is a 89y old  Female who presents with a chief complaint of COPD exacerbation (05 May 2023 06:25)      OVERNIGHT EVENTS:  Pt given ativan 1mg last night for agitation. No other acute events overnight. Pt doing well.    FOLLOW UP:  CXR  Family decision regarding hospice    HOSPITAL COURSE:    HPI:    HPI:  89-year-old female past medical history of COPD on 4 L NC, Chronic A-fib not on AC, hypothyroidism, CKDIII, HFpEF presents for evaluation of shortness of breath. istory obtained from son who lives with her. Patient developed shortness of breath with associated dry cough for past 3 days, however she was not hypoxic and was saturating 95% on baseline 4 L at home. No reported fever, chills, headache, chest pain, abdominal pain, weakness, numbness, dysuria, hematuria, vomiting, diarrhea. She was placed on CPAP by EMS received 2 DuoNebs and 60 mg of Solu-Medrol. At baseline she is AAOx2-3 and ambulates with walker at home. Pt had recent admission for COPD exacerbation 3/4-3/15.     In ED  VT bp 118/53, HR 67, RR 22, T 97.6 F, SpO2 97% on BIPAP  Labs significant for hgb 9.0 (bl 9-10), Cr 1.8 (bl 1.2), Mg 2.5, K 5.5, Trop 0.04,   VBG PH 7.27, pCO2 71, HCO3 33, pO2 78    CXR No evidence of focal consolidation, pleural effusion or pneumothorax    s/p 1x Levaquin and Cefepime, Solumedrol 65 mg 1x in ED  s/p evaluation by CC team, admitted to SDU for managements of AHRF secondary to COPD exacerbation (26 Apr 2023 15:37)      ALLERGIES:    Allergies    No Known Allergies    Intolerances        PMHx:    PAST MEDICAL & SURGICAL HISTORY:  COPD (chronic obstructive pulmonary disease)      Hypothyroid      HTN (hypertension)      High cholesterol      Colitis      CKD (chronic kidney disease)      No significant past surgical history    MEDICATIONS:  STANDING MEDICATIONS  albuterol    90 MICROgram(s) HFA Inhaler 2 Puff(s) Inhalation every 6 hours  albuterol/ipratropium for Nebulization 3 milliLiter(s) Nebulizer every 6 hours  aspirin  chewable 81 milliGRAM(s) Oral daily  atorvastatin 20 milliGRAM(s) Oral at bedtime  budesonide 160 MICROgram(s)/formoterol 4.5 MICROgram(s) Inhaler 2 Puff(s) Inhalation two times a day  chlorhexidine 2% Cloths 1 Application(s) Topical daily  dextrose 5% + sodium chloride 0.45%. 1000 milliLiter(s) IV Continuous <Continuous>  diltiazem    Tablet 30 milliGRAM(s) Oral every 6 hours  dronedarone 400 milliGRAM(s) Oral two times a day  heparin   Injectable 5000 Unit(s) SubCutaneous every 8 hours  levothyroxine 112 MICROGram(s) Oral daily  methylPREDNISolone sodium succinate Injectable 40 milliGRAM(s) IV Push two times a day  pantoprazole    Tablet 40 milliGRAM(s) Oral before breakfast  sodium zirconium cyclosilicate 10 Gram(s) Oral every 12 hours  tamsulosin 0.4 milliGRAM(s) Oral at bedtime    PRN MEDICATIONS      LABS:                        9.8    9.05  )-----------( 140      ( 05 May 2023 07:03 )             32.0     05-05    143  |  106  |  52<H>  ----------------------------<  89  4.6   |  26  |  1.5    Ca    8.7      05 May 2023 07:03  Mg     2.2     05-05    TPro  5.0<L>  /  Alb  3.2<L>  /  TBili  0.4  /  DBili  x   /  AST  24  /  ALT  21  /  AlkPhos  79  05-05        ABG - ( 04 May 2023 10:48 )  pH, Arterial: 7.50  pH, Blood: x     /  pCO2: 39    /  pO2: 119   / HCO3: 30    / Base Excess: 6.7   /  SaO2: 98.7            VITALS:   T(F): 96.9  HR: 52  BP: 136/65  RR: 20  SpO2: 96%          PHYSICAL EXAM:    Gen: NAD, resting in bed  HEENT: Normocephalic, skin break on nose due to BiPAP mask  Neck: supple, no lymphadenopathy  CV: Regular rate & regular rhythm, S1 S2 heard  Lungs: clear to auscultation bilaterally, rhonchi or rales. No wheezing appreciated  Abdomen: Soft, BS present, NT, ND  Ext: Warm, well perfused  Neuro: non focal, AAOx3  Skin: no rash, no erythema. Skin break on left anterior forearm due to IV placement.

## 2023-05-05 NOTE — MEDICAL STUDENT PROGRESS NOTE(EDUCATION) - NS MD HP STUD ASPLAN PLAN FT
89-year-old female past medical history of COPD on 4 L NC, Chronic A-fib not on AC, hypothyroidism, CKDIII, HFpEF presents for evaluation of shortness of breath due to COPD exacerbation. Currently being monitored in sdu     Acute on chronic hypercapnic hypoxemic respiratory failure  COPD exacerbation - improved  Hx COPD on home oxygen 3-4 L  currently on 4L NC, s/p HFNC  change prednisone to solumedrol 40 q 12 as per pulm recs, off antibiotics, on nebs  diet per speech and swallow  avoid ativan for agitation, frequent reorientation    MAURI on CKDIII / Hyperkalemia   encourage PO intake, baseline mid 1s  Lokelma for K >5.5  improving K 4.6 on 5/5  monitor urine output    Chronic A-fib off AC due to recurrent falls  - rate remains uncontrolled  - was seen by Dr Huynh yesterday  - c/w Multaq 400 mg BID, added  cardizem 30 q6H, rate now controlled  - if rate remains controlled, can switch to Cardizem  daily on dc     Chronic HFPEF  HLD  - holding lasix 20mg PO QD  - Cont statin  - Avoid volume overload    Hypothyroidism- C/W levothyroxine    Recurrent falls - Mechanical   - PT eval / Fall precautions     Troponemia likely type 2  - currently denies any symptoms   - Trop :0.04 in the setting of CKD-->0.02-->0.03   - BNP:  642 (baseline 500)  - Monitor     DNR/DNI - Palliative consult appreciated , son considering hospice pending conversation with patient's pulm  poor prognosis     Pending (specify): Respiratory status / , monitor K , feeding, rate control, palliative fu    89-year-old female past medical history of COPD on 4 L NC, Chronic A-fib not on AC, hypothyroidism, CKDIII, HFpEF presents for evaluation of shortness of breath due to COPD exacerbation. Currently being monitored in sdu     # Acute on chronic hypercapnic hypoxemic respiratory failure  # COPD exacerbation - improved  # Hx COPD on home oxygen 3-4 L  - currently on 4L NC, s/p HFNC  - change prednisone to solumedrol 40 q 12 as per pulm recs, off antibiotics  - c/w nebs and encourage NIV use   - avoid ativan for agitation, frequent reorientation    # Skin Tear   - On LUE  - non-stick dressings and wraps     # MAURI on CKDIII / Hyperkalemia - resolving   - Cr 1.5 on 5/5, improving   - Lokelma for K >5.5  - improving K 4.6 on 5/5  - monitor urine output    # Chronic A-fib off AC due to recurrent falls  - rate remains uncontrolled  - was seen by Dr Huynh yesterday  - c/w Multaq 400 mg BID, added  cardizem 30 q6H, rate now controlled  - if rate remains controlled, can switch to Cardizem  daily on dc     Chronic HFPEF  HLD  - holding lasix 20mg PO QD  - Cont statin  - Avoid volume overload    Hypothyroidism- C/W levothyroxine    Recurrent falls - Mechanical   - PT eval / Fall precautions     Troponemia likely type 2  - currently denies any symptoms   - Trop :0.04 in the setting of CKD-->0.02-->0.03   - BNP:  642 (baseline 500)  - Monitor     DNR/DNI - Palliative consult appreciated , son considering hospice pending conversation with patient's pulm  poor prognosis     Pending (specify): Respiratory status / , monitor K , feeding, rate control, palliative fu    89-year-old female past medical history of COPD on 4 L NC, Chronic A-fib not on AC, hypothyroidism, CKDIII, HFpEF presents for evaluation of shortness of breath due to COPD exacerbation. Currently being monitored on medicine floor, downgraded from sdu    # Acute on chronic hypercapnic hypoxemic respiratory failure  # COPD exacerbation - improved  # Hx COPD on home oxygen 3-4 L  - currently on 4L NC, s/p HFNC  - change prednisone to solumedrol 40 q 12 as per pulm recs, off antibiotics  - c/w nebs and encourage NIV use   - avoid ativan for agitation, frequent reorientation    # Skin Tear   - On LUE  - non-stick dressings and wraps     # MAURI on CKDIII / Hyperkalemia - resolving   - Cr 1.5 on 5/5, improving   - Lokelma for K >5.5  - improving K 4.6 on 5/5  - monitor urine output    # Chronic A-fib off AC due to recurrent falls  - rate remains uncontrolled  - was seen by Dr Huynh yesterday  - c/w Multaq 400 mg BID, added  cardizem 30 q6H, rate now controlled  - if rate remains controlled, can switch to Cardizem  daily on dc     Chronic HFPEF  HLD  - holding lasix 20mg PO QD  - Cont statin  - Avoid volume overload    Hypothyroidism- C/W levothyroxine    Recurrent falls - Mechanical   - PT eval / Fall precautions     Troponemia likely type 2  - currently denies any symptoms   - Trop :0.04 in the setting of CKD-->0.02-->0.03   - BNP:  642 (baseline 500)  - Monitor     DNR/DNI - Palliative consult appreciated , son considering hospice pending conversation with patient's pulm  poor prognosis     Pending (specify): Respiratory status / , monitor K , feeding, rate control, palliative fu

## 2023-05-05 NOTE — PROCEDURE NOTE - SUPERVISORY STATEMENT
Procedure team was consulted to perform urgent midline for difficult IV access. I was present for the key critical aspects of the procedure performed during the care of the patient. Utilizing ultrasound guidance,  a midline catheter was inserted into the  L upper arm. Prior to needle insertion, the patency of the vein was confirmed with ultrasound.  The representative images are stored on the [employer] application.

## 2023-05-05 NOTE — PROGRESS NOTE ADULT - SUBJECTIVE AND OBJECTIVE BOX
TANNA MCCRACKEN  89y Female    INTERVAL HPI/OVERNIGHT EVENTS:    pt feels OK - denies SOB, pain  no fever  son at bedside  ate some breakfast    T(F): 96.9 (05-05-23 @ 04:27), Max: 97 (05-04-23 @ 11:38)  HR: 52 (05-05-23 @ 04:27) (52 - 77)  BP: 136/65 (05-05-23 @ 04:27) (125/61 - 161/73)  RR: 20 (05-05-23 @ 04:27) (20 - 20)  SpO2: 96% (05-05-23 @ 04:27) (95% - 100%) on 96% on 4L NC    I&O's Summary    04 May 2023 07:01  -  05 May 2023 07:00  --------------------------------------------------------  IN: 0 mL / OUT: 500 mL / NET: -500 mL      PHYSICAL EXAM:  GENERAL: NAD  HEAD:  Normocephalic  EYES:  conjunctiva and sclera clear  ENMT: Moist mucous membranes  NERVOUS SYSTEM:  Alert, awake, Good concentration, Pala  CHEST/LUNG: decreased BS b/l, no wheezes appreciated at this time  HEART: IRR  ABDOMEN: Soft, Nontender, Nondistended; Bowel sounds present  EXTREMITIES:  + UE edema b/l  SKIN: multiple skin tears and ecchymoses  left arm with skin tear with bleeding - RN to redress (discussed on rounds today)    Consultant(s) Notes Reviewed:  [x ] YES  [ ] NO  Care Discussed with Consultants/Other Providers [ x] YES  [ ] NO    MEDICATIONS  (STANDING):  albuterol    90 MICROgram(s) HFA Inhaler 2 Puff(s) Inhalation every 6 hours  albuterol/ipratropium for Nebulization 3 milliLiter(s) Nebulizer every 6 hours  aspirin  chewable 81 milliGRAM(s) Oral daily  atorvastatin 20 milliGRAM(s) Oral at bedtime  budesonide 160 MICROgram(s)/formoterol 4.5 MICROgram(s) Inhaler 2 Puff(s) Inhalation two times a day  chlorhexidine 2% Cloths 1 Application(s) Topical daily  dextrose 5% + sodium chloride 0.45%. 1000 milliLiter(s) (75 mL/Hr) IV Continuous <Continuous>  diltiazem    Tablet 30 milliGRAM(s) Oral every 6 hours  dronedarone 400 milliGRAM(s) Oral two times a day  heparin   Injectable 5000 Unit(s) SubCutaneous every 8 hours  levothyroxine 112 MICROGram(s) Oral daily  methylPREDNISolone sodium succinate Injectable 40 milliGRAM(s) IV Push two times a day  pantoprazole    Tablet 40 milliGRAM(s) Oral before breakfast  sodium zirconium cyclosilicate 10 Gram(s) Oral every 12 hours  tamsulosin 0.4 milliGRAM(s) Oral at bedtime    MEDICATIONS  (PRN):      LABS:                        9.8    9.05  )-----------( 140      ( 05 May 2023 07:03 )             32.0     05-05    143  |  106  |  52<H>  ----------------------------<  89  4.6   |  26  |  1.5    Ca    8.7      05 May 2023 07:03  Mg     2.2     05-05    TPro  5.0<L>  /  Alb  3.2<L>  /  TBili  0.4  /  DBili  x   /  AST  24  /  ALT  21  /  AlkPhos  79  05-05          RADIOLOGY & ADDITIONAL TESTS:    Imaging and report Personally Reviewed:  [x ] YES  [ ] NO    < from: Xray Chest 1 View AP/PA (04.30.23 @ 10:13) >  Impression:    No radiographic evidence of acute cardiopulmonary disease.    < end of copied text >      Case discussed with resident, med student and RN on rounds today    Care discussed with pt's family

## 2023-05-05 NOTE — PROGRESS NOTE ADULT - ASSESSMENT
IMPRESSION:     Acute on chronic hypercapnic respiratory failure, improving   Acute on chronic hypoxemic respiratory failure  COPD exacerbation  MAURI on CKD with hyperkalemia  HO Severe COPD on home O2.    Chronic afib not on AC due to recurrent falls. Rate controlled.  SP cardiology follow up   HFPEF      PLAN:    CNS: Avoid sedatives    HEENT: Oral care    PULMONARY:   HOB @ 45 degrees, aspiration precautions.  Wean O2 as tolerated.  Encourage NIV use during sleep and PRN during the day change prednisone to solumedrol 40 q 12, repeat CXR    CARDIOVASCULAR: Avoid overload. D51/2NS@75.  Rate controlled on Cardizem     GI: GI prophylaxis. feeding per speech and swallow, bowel regimen    RENAL: Correct as necessary.  Repeat lytes.  Repeat BMP, Lokelma    INFECTIOUS DISEASE: SP ABX course     HEMATOLOGICAL: DVT prophylaxis.  Duplex negative 4/26.  Monitor CBC and Coags     ENDOCRINE: Follow up FS. Insulin protocol if needed.    MUSC: PT/OT    Poor prognosis.     DNR/DNI

## 2023-05-05 NOTE — CHART NOTE - NSCHARTNOTEFT_GEN_A_CORE
Registered Dietitian Follow-Up  Patient Profile Reviewed                           Yes [x]   No []  Nutrition History Previously Obtained        Yes [x]  No []      Pertinent Medical Interventions:  89 y/o woman with PMH of COPD on 4 L NC and noncompliant with NIV at times, Chronic Afib not on AC due to recurrent falls, hypothyroidism, CKD 3 and chronic HFpEF presented for shortness of breath due to COPD exacerbation. She is now downgraded to the medical floor from stepdown unit.  1. Acute on chronic hypercapnic and hypoxemic respiratory failure due to COPD exacerbation   COPD on home oxygen 3-4 L  per chart, son considering hospice pending conversation with pulmonary    Nutrition Interval History:   patient son at bedside  reports improved PO intake though not baseline, having 50-75% of trays  was previously receiving oral supplements though were discontinued on 5/3??? was consuming them when she received them  Nutrient Intake: Patient meeting 50-75% of estimated energy needs in-house     Diet order:   Diet, Soft and Bite Sized:   DASH/TLC {Sodium & Cholesterol Restricted} (05-03-23 @ 10:50) [Active]    Anthropometrics:  Height (cm): 144.8 (05-02-23 @ 13:20)  weight 72.6kg 4/26 suspect not accurate weight secondary to patient with fluid + scale errors?  BMI 34.6kg/m2 suspect not accurate due to ^ above reason, ***ADJUSTED with UBW 24.32kg/m2***  IBW: 42kg  UBW 51kg per family report, dry    current admission weights (kg):   59.1 (05-03)  55.2 (05-01)  53.1 (04-30)  54.2 (04-29)    previous admission weights (kg):   45.4 (03-03-23 @ 22:36)  45.4 (10-02-22 @ 09:29)  45.4 (09-25-22 @ 13:44)  47.2 (08-15-22 @ 15:02)  45.4 (12-08-21 @ 08:41)  48.1 (11-04-20 @ 06:42)  50.8 (09-28-20 @ 21:39)  52.8 (08-15-20 @ 20:34)    MEDICATIONS  (STANDING):  albuterol    90 MICROgram(s) HFA Inhaler 2 Puff(s) Inhalation every 6 hours  albuterol/ipratropium for Nebulization 3 milliLiter(s) Nebulizer every 6 hours  aspirin  chewable 81 milliGRAM(s) Oral daily  atorvastatin 20 milliGRAM(s) Oral at bedtime  budesonide 160 MICROgram(s)/formoterol 4.5 MICROgram(s) Inhaler 2 Puff(s) Inhalation two times a day  chlorhexidine 2% Cloths 1 Application(s) Topical daily  dextrose 5% + sodium chloride 0.45%. 1000 milliLiter(s) (75 mL/Hr) IV Continuous <Continuous>  diltiazem    Tablet 30 milliGRAM(s) Oral every 6 hours  dronedarone 400 milliGRAM(s) Oral two times a day  heparin   Injectable 5000 Unit(s) SubCutaneous every 8 hours  levothyroxine 112 MICROGram(s) Oral daily  methylPREDNISolone sodium succinate Injectable 40 milliGRAM(s) IV Push two times a day  pantoprazole    Tablet 40 milliGRAM(s) Oral before breakfast  tamsulosin 0.4 milliGRAM(s) Oral at bedtime    Pertinent Labs: 05-05 @ 07:03: Na 143, BUN 52<H>, Cr 1.5, BG 89, K+ 4.6, Phos --, Mg 2.2, Alk Phos 79, ALT/SGPT 21, AST/SGOT 24, HbA1c --    Physical Findings:  - Cognition: confused  - GI function: 5/2 x2  - Tubes: none  - Oral/Mouth cavity: SLP recommendations for puree thin liquids 5/1  - Skin: no pressure injuries indicated in flow sheets   - Edema:  L/R leg 2+     Nutrition Requirements: with consideration for age, weight, BMI  Weight Used: adjust with dry UBW 51kg     Estimated Energy Needs    Continue []  Adjust [x] 791-949kcal/day (MSJ x1-1.2)  Estimated Protein Needs    Continue []  Adjust [x] 51-61g/day (1-1.2g/kg)  Estimated Fluid Needs        Continue []  Adjust [x] 1275mL/day (25mL/kg)  [x] Previous Nutrition Diagnosis:            [x] Ongoing          [] Resolved  #1 Inadequate protein energy intake  Goal/Expected Outcome: meet >/=75% est energy needs within 5-7days  Nutrition Intervention: Meals and Snacks, Medical Food Supplement, Vitamin Supplement, Nutrition Related Medication, Coordination of Care  Indicator/Monitoring:  Monitor diet order, energy intake, food and nutrient intake, body composition, weight    Recommendations:  - DASH/TLC, defer texture to SLP, puree thin liquids per most recent evaluation 5/1??? order currently soft and bite size   -Ensure HIGH PROTEIN 2x/day to optimize pro/kcal intake (350kcal, 20 g pro/serving)     Tracie Montero, #[id number] or via TEAMS  Patient is at Moderate Risk, follow up x 5-7days

## 2023-05-05 NOTE — CONSULT NOTE ADULT - ASSESSMENT
Assessment:  Patient  received in bed,   Francisco score vent dependent ,Limited Mobility, High risk for pressure injury development or progression   Skin assessed- B/L upper extremity bruising with skin tears - moderate amount of bleeding  noted  with dressing removal                        Pressure dressing applied    Plan:  Wound and skin care  recs.    Clean skin tears with saline , pat dry then apply xeroform  abd pad then Kerlix dressing   Pressure  injury  preventive  measures  skin  and incontinence care   Assess wound and inform primary provider of any changes   Case discussed with primary Rn  Wound/ ostomy specialist  to f/u as needed     Offloading: [x ] Frequent position changes [ ] Devices/Equipment  Cleansing: x[ ] Saline [ ] Soap/Water [ ] Other: ______  Topicals: [ ] Barrier Cream [ ] Antimicrobial [ ] Enzymatic Wound Debridement  Dressings: [ ] Dry, sterile [ ] Allevyn  Foam [x ] Absorbant Pads [ ] Collagenase    Other Recs.   Per Primary Team      Total time for bedside assessment , review of medical records  and  discussion of plan of care with primary team greater than 35 min

## 2023-05-05 NOTE — CONSULT NOTE ADULT - SUBJECTIVE AND OBJECTIVE BOX
HPI:   Patient is a 89-year-old female past medical history of COPD on 4 L NC, Chronic A-fib not on AC, hypothyroidism, CKDIII, HFpEF presents for evaluation of shortness of breath. History obtained from son who lives with her. Patient developed shortness of breath with associated dry cough for past 3 days, however she was not hypoxic and was saturating 95% on baseline 4 L at home. No reported fever, chills, headache, chest pain, abdominal pain, weakness, numbness, dysuria, hematuria, vomiting, diarrhea. She was placed on CPAP by EMS received 2  DuoNebs and 60 mg of Solu-Medrol. At baseline she is AAOx2-3 and ambulates with walker at home. Pt had recent admission for COPD exacerbation 3/4-3/15.   In ED  VT bp 118/53, HR 67, RR 22, T 97.6 F, SpO2 97% on BIPAP  Labs significant for hgb 9.0 (bl 9-10), Cr 1.8 (bl 1.2), Mg 2.5, K 5.5, Trop 0.04,   VBG PH 7.27, pCO2 71, HCO3 33, pO2 78    CXR No evidence of focal consolidation, pleural effusion or pneumothorax    s/p 1x Levaquin and Cefepime, Solumedrol 65 mg 1x in ED  s/p evaluation by CC team, admitted to SDU for managements of AHRF secondary to COPD exacerbation     PAST MEDICAL & SURGICAL HISTORY:  COPD (chronic obstructive pulmonary disease)  Hypothyroid  HTN (hypertension)  High cholesterol  Colitis  CKD (chronic kidney disease)  No significant past surgical history    REVIEW OF SYSTEMS: Pt unable to offer    MEDICATIONS  (STANDING):  albuterol    90 MICROgram(s) HFA Inhaler 2 Puff(s) Inhalation every 6 hours  albuterol/ipratropium for Nebulization 3 milliLiter(s) Nebulizer every 6 hours  aspirin  chewable 81 milliGRAM(s) Oral daily  atorvastatin 20 milliGRAM(s) Oral at bedtime  budesonide 160 MICROgram(s)/formoterol 4.5 MICROgram(s) Inhaler 2 Puff(s) Inhalation two times a day  chlorhexidine 2% Cloths 1 Application(s) Topical daily  dextrose 5% + sodium chloride 0.45%. 1000 milliLiter(s) (75 mL/Hr) IV Continuous <Continuous>  diltiazem    Tablet 30 milliGRAM(s) Oral every 6 hours  dronedarone 400 milliGRAM(s) Oral two times a day  heparin   Injectable 5000 Unit(s) SubCutaneous every 8 hours  levothyroxine 112 MICROGram(s) Oral daily  methylPREDNISolone sodium succinate Injectable 40 milliGRAM(s) IV Push two times a day  pantoprazole    Tablet 40 milliGRAM(s) Oral before breakfast  sodium zirconium cyclosilicate 10 Gram(s) Oral every 12 hours  tamsulosin 0.4 milliGRAM(s) Oral at bedtime    MEDICATIONS  (PRN):      Allergies  No Known Allergies    Intolerances    SOCIAL HISTORY:   lives at home with son     FAMILY HISTORY:     PHYSICAL EXAM:  Vital Signs Last 24 Hrs  T(C): 35.8 (05 May 2023 12:48), Max: 36.1 (05 May 2023 04:27)  T(F): 96.5 (05 May 2023 12:48), Max: 96.9 (05 May 2023 04:27)  HR: 52 (05 May 2023 04:27) (52 - 70)  BP: 117/60 (05 May 2023 12:48) (117/60 - 136/65)  BP(mean): --  RR: 19 (05 May 2023 12:48) (19 - 20)  SpO2: 96% (05 May 2023 04:27) (96% - 96%)    Parameters below as of 05 May 2023 04:27  Patient On (Oxygen Delivery Method): nasal cannula    General : NAD,  frail  HEENT:  NC/AT, PERRL, EOMI, sclera clear, mucosa moist, throat clear, trachea midline, neck supple  Cardiovascular: RRR   Respiratory: equal chest rise  Gastrointestinal Soft NT/ND (+)BS    Neurology:  Weakened strength & sensation  Psych: calm  Musculoskeletal:  limited stiff  Vascular: BLE equally warm/  Skin:  frail,  ecchymosis w/o hematoma/ skin tears     LABS/ CULTURES/ RADIOLOGY:                        9.8    9.05  )-----------( 140      ( 05 May 2023 07:03 )             32.0       143  |  106  |  52  ----------------------------<  89      [05-05-23 @ 07:03]  4.6   |  26  |  1.5        Ca     8.7     [05-05-23 @ 07:03]      Mg     2.2     [05-05-23 @ 07:03]    TPro  5.0  /  Alb  3.2  /  TBili  0.4  /  DBili  x   /  AST  24  /  ALT  21  /  AlkPhos  79  [05-05-23 @ 07:03]

## 2023-05-05 NOTE — PROGRESS NOTE ADULT - SUBJECTIVE AND OBJECTIVE BOX
Over Night Events: events noted, on NC, Still actively wheezing    PHYSICAL EXAM    ICU Vital Signs Last 24 Hrs  T(C): 36.1 (05 May 2023 04:27), Max: 36.2 (04 May 2023 07:59)  T(F): 96.9 (05 May 2023 04:27), Max: 97.1 (04 May 2023 07:59)  HR: 52 (05 May 2023 04:27) (52 - 77)  BP: 136/65 (05 May 2023 04:27) (114/55 - 161/73)  BP(mean): 99 (04 May 2023 11:38) (79 - 99)  RR: 20 (05 May 2023 04:27) (20 - 20)  SpO2: 96% (05 May 2023 04:27) (93% - 100%)    O2 Parameters below as of 05 May 2023 04:27  Patient On (Oxygen Delivery Method): nasal cannula            General: ill looking, cushinoid appearance  Lungs: Bilateral wheezing  Cardiovascular: MANUEL 2.6  Abdomen: Soft, Positive BS  Extremities: No clubbing   Skin: Warm  Neurological: Non focal       05-04-23 @ 07:01  -  05-05-23 @ 06:25  --------------------------------------------------------  IN:  Total IN: 0 mL    OUT:    Voided (mL): 500 mL  Total OUT: 500 mL    Total NET: -500 mL          LABS:                          9.5    6.19  )-----------( 133      ( 04 May 2023 11:40 )             31.0                                               05-04    146  |  107  |  60<H>  ----------------------------<  89  5.3<H>   |  29  |  1.7<H>    Ca    8.6      04 May 2023 11:40  Mg     2.3     05-04    TPro  5.2<L>  /  Alb  3.3<L>  /  TBili  0.4  /  DBili  x   /  AST  24  /  ALT  23  /  AlkPhos  80  05-04                                                                                           LIVER FUNCTIONS - ( 04 May 2023 11:40 )  Alb: 3.3 g/dL / Pro: 5.2 g/dL / ALK PHOS: 80 U/L / ALT: 23 U/L / AST: 24 U/L / GGT: x                                                                                                                                   ABG - ( 04 May 2023 10:48 )  pH, Arterial: 7.50  pH, Blood: x     /  pCO2: 39    /  pO2: 119   / HCO3: 30    / Base Excess: 6.7   /  SaO2: 98.7                MEDICATIONS  (STANDING):  albuterol    90 MICROgram(s) HFA Inhaler 2 Puff(s) Inhalation every 6 hours  albuterol/ipratropium for Nebulization 3 milliLiter(s) Nebulizer every 6 hours  aspirin  chewable 81 milliGRAM(s) Oral daily  atorvastatin 20 milliGRAM(s) Oral at bedtime  budesonide 160 MICROgram(s)/formoterol 4.5 MICROgram(s) Inhaler 2 Puff(s) Inhalation two times a day  chlorhexidine 2% Cloths 1 Application(s) Topical daily  dextrose 5% + sodium chloride 0.45%. 1000 milliLiter(s) (60 mL/Hr) IV Continuous <Continuous>  diltiazem    Tablet 30 milliGRAM(s) Oral every 6 hours  dronedarone 400 milliGRAM(s) Oral two times a day  heparin   Injectable 5000 Unit(s) SubCutaneous every 8 hours  levothyroxine 112 MICROGram(s) Oral daily  pantoprazole    Tablet 40 milliGRAM(s) Oral before breakfast  predniSONE   Tablet 40 milliGRAM(s) Oral daily  sodium zirconium cyclosilicate 10 Gram(s) Oral every 12 hours  tamsulosin 0.4 milliGRAM(s) Oral at bedtime

## 2023-05-06 NOTE — PROGRESS NOTE ADULT - ASSESSMENT
89 y/o woman with PMH of COPD on 4 L NC and noncompliant with NIV at times, Chronic Afib not on AC due to recurrent falls, hypothyroidism, CKD 3 and chronic HFpEF presented for shortness of breath due to COPD exacerbation. She is now downgraded to the medical floor from stepdown unit.    1. Acute on chronic hypercapnic and hypoxemic respiratory failure due to COPD exacerbation   COPD on home oxygen 3-4 L  noncompliant with NIV at times  was on HFNC and now on O2 at 4L NC - her baseline  continue solumedrol IV 40mg q12hr for now per pulmonary  CXR 5/5 reviewed by me: increased opacities right >left -> discuss with pulm re: restarting abx  continue nebs and symbicort  diet per speech and swallow  aspiration precautions  avoid ativan for agitation (s/p Ativan 1mg the night before and pt was lethargic)  continue frequent reorientation (family is involved - son)     2. MAURI on CKD 3 - resolving - on IVF and encourage PO intake  baseline around 1.4 per chart review  monitor urine output     3. Hyperkalemia - resolved - on lokelma 10g q12hr - hold if K remains <5    4. Chronic Afib not on anticoagulation due to recurrent falls  rate now better controlled on cardizem 30mg q6hr and dronedarone 400mg bid  seen by Dr Huynh this admission  if rate remains controlled, can switch to Cardizem  daily on discharge    5. Chronic HFpEF  lasix on hold due to MAURI and re-evaluate  Avoid volume overload    6. Hypothyroidism- on levothyroxine    7. Recurrent falls - Mechanical   fall precautions  PT as tolerated    8. Demand ischemia  pt asymptomatic  troponins reviewed    9. Chronic anemia - macrocytic  can repeat folate and vit B12 levels    10. Skin tears - nonadherent dressing, gauze and monitor    11. DVT prophylaxis - heparin SQ      DNR/DNI status  overall poor prognosis  Palliative care following  per chart, son considering hospice pending conversation with pulmonary      PROGRESS NOTE HANDOFF    Pending: improvement in respiratory status, labs in AM, palliative care f/u         Disposition: to be determined    Marianne Minor MD  Attending Hospitalist

## 2023-05-06 NOTE — PROGRESS NOTE ADULT - SUBJECTIVE AND OBJECTIVE BOX
SUBJECTIVE:    Patient is a 89y old Female who presents with a chief complaint of COPD exacerbation (05 May 2023 14:48)    Currently admitted to medicine with the primary diagnosis of COPD exacerbation       Today is hospital day 10d. This morning she is resting comfortably in bed and reports no new issues or overnight events.     PAST MEDICAL & SURGICAL HISTORY  COPD (chronic obstructive pulmonary disease)    Hypothyroid    HTN (hypertension)    High cholesterol    Colitis    CKD (chronic kidney disease)    No significant past surgical history      SOCIAL HISTORY:  Negative for smoking/alcohol/drug use.     ALLERGIES:  No Known Allergies    MEDICATIONS:  STANDING MEDICATIONS  albuterol    90 MICROgram(s) HFA Inhaler 2 Puff(s) Inhalation every 6 hours  albuterol/ipratropium for Nebulization 3 milliLiter(s) Nebulizer every 6 hours  aspirin  chewable 81 milliGRAM(s) Oral daily  atorvastatin 20 milliGRAM(s) Oral at bedtime  bacitracin   Ointment 1 Application(s) Topical daily  budesonide 160 MICROgram(s)/formoterol 4.5 MICROgram(s) Inhaler 2 Puff(s) Inhalation two times a day  chlorhexidine 2% Cloths 1 Application(s) Topical daily  dextrose 5% + sodium chloride 0.45%. 1000 milliLiter(s) IV Continuous <Continuous>  diltiazem    Tablet 30 milliGRAM(s) Oral every 6 hours  dronedarone 400 milliGRAM(s) Oral two times a day  heparin   Injectable 5000 Unit(s) SubCutaneous every 8 hours  levothyroxine 112 MICROGram(s) Oral daily  methylPREDNISolone sodium succinate Injectable 40 milliGRAM(s) IV Push two times a day  pantoprazole    Tablet 40 milliGRAM(s) Oral before breakfast  tamsulosin 0.4 milliGRAM(s) Oral at bedtime    PRN MEDICATIONS    VITALS:   T(F): 98  HR: 79  BP: 111/51  RR: 18  SpO2: 96%    PHYSICAL EXAM:  GEN: No acute distress  LUNGS: Clear to auscultation bilaterally   HEART: S1/S2 present.    ABD: Soft, non-tender, non-distended. Bowel sounds present       LABS:                        7.7    10.16 )-----------( 173      ( 06 May 2023 05:31 )             25.4     05-06    135  |  100  |  56<H>  ----------------------------<  98  4.8   |  27  |  1.9<H>    Ca    8.2<L>      06 May 2023 05:31  Mg     2.1     05-06    TPro  5.0<L>  /  Alb  3.2<L>  /  TBili  0.4  /  DBili  x   /  AST  24  /  ALT  21  /  AlkPhos  79  05-05        ABG - ( 04 May 2023 10:48 )  pH, Arterial: 7.50  pH, Blood: x     /  pCO2: 39    /  pO2: 119   / HCO3: 30    / Base Excess: 6.7   /  SaO2: 98.7                          RADIOLOGY:

## 2023-05-07 NOTE — PROGRESS NOTE ADULT - SUBJECTIVE AND OBJECTIVE BOX
TANNA MCCRACKEN  89y, Female  Allergy: No Known Allergies    Hospital Day: 11d    Patient seen and examined earlier today. Still feeling short of breath today , as per son at bedside, pt has not had Bipap overnight as required. Addressed with RT at bedside.     PMH/PSH:  PAST MEDICAL & SURGICAL HISTORY:  COPD (chronic obstructive pulmonary disease)      Hypothyroid      HTN (hypertension)      High cholesterol      Colitis      CKD (chronic kidney disease)      No significant past surgical history          LAST 24-Hr EVENTS:    VITALS:  T(F): 96.9 (05-07-23 @ 04:25), Max: 97.4 (05-06-23 @ 20:57)  HR: 87 (05-07-23 @ 09:20)  BP: 101/45 (05-07-23 @ 04:25) (101/45 - 111/50)  RR: 20 (05-07-23 @ 04:25)  SpO2: 94% (05-07-23 @ 09:20)        TESTS & MEASUREMENTS:  Weight (Kg):       05-05-23 @ 07:01  -  05-06-23 @ 07:00  --------------------------------------------------------  IN: 0 mL / OUT: 200 mL / NET: -200 mL                            7.2    12.21 )-----------( 171      ( 07 May 2023 08:33 )             23.4       05-07    134<L>  |  96<L>  |  63<HH>  ----------------------------<  118<H>  4.9   |  23  |  2.5<H>    Ca    8.1<L>      07 May 2023 08:33  Mg     2.2     05-07                            RADIOLOGY, ECG, & ADDITIONAL TESTS:      RECENT DIAGNOSTIC ORDERS:  Basic Metabolic Panel: 11:00 (05-07-23 @ 08:28)  Complete Blood Count: 11:00 (05-07-23 @ 08:28)      MEDICATIONS:  MEDICATIONS  (STANDING):  albuterol    90 MICROgram(s) HFA Inhaler 2 Puff(s) Inhalation every 6 hours  albuterol/ipratropium for Nebulization 3 milliLiter(s) Nebulizer every 6 hours  aspirin  chewable 81 milliGRAM(s) Oral daily  atorvastatin 20 milliGRAM(s) Oral at bedtime  bacitracin   Ointment 1 Application(s) Topical daily  budesonide 160 MICROgram(s)/formoterol 4.5 MICROgram(s) Inhaler 2 Puff(s) Inhalation two times a day  chlorhexidine 2% Cloths 1 Application(s) Topical daily  dextrose 5% + sodium chloride 0.45%. 1000 milliLiter(s) (75 mL/Hr) IV Continuous <Continuous>  diltiazem    Tablet 30 milliGRAM(s) Oral every 6 hours  dronedarone 400 milliGRAM(s) Oral two times a day  heparin   Injectable 5000 Unit(s) SubCutaneous every 12 hours  levothyroxine 112 MICROGram(s) Oral daily  methylPREDNISolone sodium succinate Injectable 40 milliGRAM(s) IV Push two times a day  pantoprazole    Tablet 40 milliGRAM(s) Oral before breakfast  tamsulosin 0.4 milliGRAM(s) Oral at bedtime    MEDICATIONS  (PRN):      HOME MEDICATIONS:  albuterol 2.5 mg/3 mL (0.083%) inhalation solution (04-26)  aspirin 81 mg oral tablet (04-26)  ATORVASTATIN 20MG TABLETS (04-26)  Lasix 20 mg oral tablet (04-26)  Multaq 400 mg oral tablet (04-26)  TRELEGY ELLIPTA 100-62.5MCG INH 30P (04-26)      PHYSICAL EXAM:  GENERAL: NAD  HEAD:  Normocephalic  EYES:  conjunctiva and sclera clear  ENMT: Moist mucous membranes  NERVOUS SYSTEM:  Alert, awake, Good concentration  CHEST/LUNG: decreased BS b/l, no wheezes appreciated at this time  HEART: not tachycardic,  no MRG   ABDOMEN: Soft, Nontender, Nondistended; Bowel sounds present  EXTREMITIES:  + UE edema b/l  SKIN: multiple skin tears and ecchymoses  left arm with skin tear with bleeding through bandage today

## 2023-05-07 NOTE — PROGRESS NOTE ADULT - ASSESSMENT
89 y/o woman with PMH of COPD on 4 L NC and noncompliant with NIV at times, Chronic Afib not on AC due to recurrent falls, hypothyroidism, CKD 3 and chronic HFpEF presented for shortness of breath due to COPD exacerbation. She is now downgraded to the medical floor from stepdown unit.    #Acute on chronic hypercapnic and hypoxemic respiratory failure due to COPD exacerbation   COPD on home oxygen 3-4 L  noncompliant with NIV at times  was on HFNC and now on O2 at 4L NC - her baseline  CXR 5/5 reviewed, increased interstitial markings and prominence of R pulm artery   Plan:   Recheck BNP given XR findings   S/p course of doxycycline, no need for further abx at this time  continue solumedrol IV 40mg q12hr for now per pulmonary on 5/5  continue nebs and symbicort, discussed the need for Bipap at night with RT   aspiration precautions    #MAURI on CKD 3 - worsening   #Chronic HFpEF with possible exacerbation   - home lasix was on hold due to MAURI on admission, which initially improved now worsening   -pt has been on D5 1/2 NS since 5/3 with worsening renal function now   - on admission, CXR on 5/5 with increased interstitial marking and prominence or R Pulm Artery   - Repeat BNP   - Repeat CXR   - Echocardiogram ordered  ( prior echo is from 2021EF 60-65% with G1 DD )   - IVF discontinued   - Urine Studies ordered , nursing to do post void residual     #Leukocytosis- no other s/s sepsis, will monitor closely for now     #Acute on Chronic Normocytic Anemia likely 2/2 LUE skin tear with significant bleeding   - Wound addressed by WC nurse, but pt continues to have bleeding through dressing   - heparin dosage decreased , consider dc ASA   - If pt continues to have strike through of bandages tomorrow, with continuously downtrending hemoglobin, will place a Burn consult   - repeat hemoglobin ordered, T&S ordered   - iron studies noted, % sat low, but total iron and ferritin WNL   - B12 and folate ordered     # Hyperkalemia - resolved s/p lokelma     #Chronic Afib not on anticoagulation due to recurrent falls  rate now better controlled on cardizem 30mg q6hr and dronedarone 400mg bid  seen by Dr Huynh this admission  if rate remains controlled, can switch to Cardizem  daily on discharge    #Hypothyroidism- on levothyroxine    #Recurrent falls - Mechanical   fall precautions  PT as tolerated    # Demand ischemia  pt asymptomatic  troponins reviewed      DVT prophylaxis - heparin SQ      DNR/DNI status  guarded prognosis   Palliative care following      PROGRESS NOTE HANDOFF    Pending: needs heart failure workup, MAURI workup, monitoring anemia, nephrology consult, pumonary follow up, still on IV solumedrol     Total time spent to complete patient's bedside assessment, review medical chart, discuss medical plan of care with covering medical team was more than 50 minutes  with >50% of time spent face to face with patient, discussion with patient/family and/or coordination of care      Dia Howell DO          89 y/o woman with PMH of COPD on 4 L NC and noncompliant with NIV at times, Chronic Afib not on AC due to recurrent falls, hypothyroidism, CKD 3 and chronic HFpEF presented for shortness of breath due to COPD exacerbation. She is now downgraded to the medical floor from stepdown unit.    #Acute on chronic hypercapnic and hypoxemic respiratory failure due to COPD exacerbation   COPD on home oxygen 3-4 L  noncompliant with NIV at times  was on HFNC and now on O2 at 4L NC - her baseline  CXR 5/5 reviewed, increased interstitial markings and prominence of R pulm artery   Plan:   Recheck BNP given XR findings   S/p course of doxycycline, no need for further abx at this time  continue solumedrol IV 40mg q12hr for now per pulmonary on 5/5  continue nebs and symbicort, discussed the need for Bipap at night with RT   aspiration precautions    #MAURI on CKD 3 - worsening   #Chronic HFpEF with possible exacerbation   - home lasix was on hold due to MAURI on admission, which initially improved now worsening   -pt has been on D5 1/2 NS since 5/3 with worsening renal function now   - on admission, CXR on 5/5 with increased interstitial marking and prominence or R Pulm Artery   - Repeat BNP   - Repeat CXR   - Echocardiogram ordered  ( prior echo is from 2021EF 60-65% with G1 DD )   - IVF discontinued   - Urine Studies ordered , nursing to do post void residual     #Leukocytosis- no other s/s sepsis, will monitor closely for now     #Acute on Chronic Normocytic Anemia likely 2/2 LUE skin tear with significant bleeding   - Wound addressed by WC nurse, but pt continues to have bleeding through dressing   - heparin dosage decreased , consider dc ASA   - If pt continues to have strike through of bandages tomorrow, with continuously downtrending hemoglobin, will place a Burn consult   - repeat hemoglobin ordered, T&S ordered   - iron studies noted, % sat low, but total iron and ferritin WNL   - B12 and folate ordered     # Hyperkalemia - resolved s/p lokelma     #Chronic Afib not on anticoagulation due to recurrent falls  rate now better controlled on cardizem 30mg q6hr and dronedarone 400mg bid  seen by Dr Huynh this admission  if rate remains controlled, can switch to Cardizem  daily on discharge    #Hypothyroidism- on levothyroxine    #Recurrent falls - Mechanical   fall precautions  PT as tolerated    # Demand ischemia  pt asymptomatic  troponins reviewed      DVT prophylaxis - heparin SQ      DNR/DNI status  guarded prognosis   Palliative care following      PROGRESS NOTE HANDOFF    Pending: needs heart failure workup, MAURI workup, monitoring anemia, nephrology consult, pumonary follow up, still on IV solumedrol     Total time spent to complete patient's bedside assessment, review medical chart, discuss medical plan of care with covering medical team was more than 50 minutes  with >50% of time spent face to face with patient, discussion with patient/family and/or coordination of care      Dia Howell DO

## 2023-05-08 NOTE — CONSULT NOTE ADULT - ASSESSMENT
Patient is a 89-year-old female past medical history of COPD on 4 L NC, Chronic A-fib not on AC, hypothyroidism, CKDIII, HFpEF presents for evaluation of shortness of breath. Seen by Burn for BUE wounds    BUE wounds  - Local wound care: please wash wound with soap and water. Apply Xeroform kerlix and ace wrap BID.   - Keep BUE elevated for swelling   - Remainder of care per primary team

## 2023-05-08 NOTE — CONSULT NOTE ADULT - CONSULT REQUESTED DATE/TIME
05-May-2023 14:48
08-May-2023 19:15
26-Apr-2023 14:18
03-May-2023 08:20
08-May-2023 15:29
01-May-2023 21:29

## 2023-05-08 NOTE — PROGRESS NOTE ADULT - PROBLEM SELECTOR PLAN 4
-DNR/DNI  -ongoing medical management  -GOC as appropriate - will discuss hospice further as appropriate   -family intent on keeping patient at home -DNR/DNI  -ongoing medical management  -GOC as appropriate   -family intent on keeping patient at home

## 2023-05-08 NOTE — PROGRESS NOTE ADULT - ASSESSMENT
89 y/o woman with PMH of COPD on 4 L NC and noncompliant with NIV at times, Chronic Afib not on AC due to recurrent falls, hypothyroidism, CKD 3 and chronic HFpEF presented for shortness of breath due to COPD exacerbation. She is now downgraded to the medical floor from stepdown unit.    #Acute on chronic hypercapnic and hypoxemic respiratory failure due to COPD exacerbation   #history COPD on home oxygen 3-4 L  - was on HFNC and now on O2 at 4L NC - her baseline  - S/p course of doxycycline  - XR 5/5 reviewed, increased interstitial markings and prominence of R pulm artery   - continue solumedrol IV 40mg q24hr per pulmonary   - continue nebs and symbicort,  - aspiration precautions    #MAURI on CKD 3 - worsening   #Chronic HFpEF with possible exacerbation   - home lasix was on hold due to MAURI on admission, which initially improved on IV fluids now worsening   -  on admission  - CXR on 5/5 with increased interstitial marking and prominence or R Pulm Artery   - Follow up repat Repeat BNP   - Follow up Echocardiogram ( prior echo is from 2021EF 60-65% with G1 DD )   - Follow up Urine Studies   - Nephrology consulted     #Acute on Chronic Normocytic Anemia likely 2/2 LUE skin tear with significant bleeding   - Wound addressed by WC nurse, but pt continues to have bleeding through dressing   - heparin dosage decreased   - iron studies noted, % sat low, but total iron and ferritin WNL   - Hb 5/8 5.7  - 2 units of PRBC's ordered  - Burn consulted - follow up     #Chronic Afib not on anticoagulation due to recurrent falls  - c/w cardizem 30mg q6hr and dronedarone 400mg bid  - if rate remains controlled, can switch to Cardizem  daily on discharge   - Cardiology on board     #Hypothyroidism- on levothyroxine    #Recurrent falls - Mechanical   - fall precautions  - PT as tolerated    Activity: PT  Diet: Soft and bite sized  DVT prophylaxis - heparin SQ  Code status: DNR/DNI status

## 2023-05-08 NOTE — PROGRESS NOTE ADULT - ASSESSMENT
89 year old woman with history of COPD on 4LNC, CKDIII, HFpEF presents with COPD exacerbation.  Palliative care consulted for GOC.    Patient seen at bedside with son and daughter today. Now on nasal cannula. Will continue to address GOC as appropriate. They are hoping to speak with patient's Pulmonologist- notified primary team in [facility] about this request. As of now they are leaning away from hospice. Will follow up.     Education about palliative care provided to patient/family.  See Recs below.    Please call [phone] with questions or concerns 24/7.   We will continue to follow.    89 year old woman with history of COPD on 4LNC, CKDIII, HFpEF presents with COPD exacerbation.  Palliative care consulted for GOC.    Patient seen at bedside with son and daughter today. Now on nasal cannula. They are hoping to speak with patient's Pulmonologist to hear Dr. Jose's recommendations. They are considering comfort care.     Education about palliative care provided to patient/family.  See Recs below.    Please call [phone] with questions or concerns 24/7.   We will continue to follow.

## 2023-05-08 NOTE — CONSULT NOTE ADULT - SUBJECTIVE AND OBJECTIVE BOX
Patient is a 89-year-old female past medical history of COPD on 4 L NC, Chronic A-fib not on AC, hypothyroidism, CKDIII, HFpEF presents for evaluation of shortness of breath. Patient developed shortness of breath with associated dry cough for past 3 days, however she was not hypoxic and was saturating 95% on baseline 4 L at home. No reported fever, chills, headache, chest pain, abdominal pain, weakness, numbness, dysuria, hematuria, vomiting, diarrhea. She was placed on CPAP by EMS received 2 DuoNebs and 60 mg of Solu-Medrol. At baseline she is AAOx2-3 and ambulates with walker at home. Pt had recent admission for COPD exacerbation 3/4-3/15.     PAST MEDICAL & SURGICAL HISTORY:  - COPD (chronic obstructive pulmonary disease)  - Hypothyroid  - HTN (hypertension)  - High cholesterol  - Colitis  - CKD (chronic kidney disease)    Vital Signs Last 24 Hrs  T(C): 36.7 (08 May 2023 18:54), Max: 36.7 (08 May 2023 17:05)  T(F): 98 (08 May 2023 18:54), Max: 98.1 (08 May 2023 17:05)  HR: 80 (08 May 2023 18:54) (70 - 95)  BP: 115/53 (08 May 2023 18:54) (87/45 - 118/49)  BP(mean): 62 (08 May 2023 11:30) (62 - 70)  RR: 19 (08 May 2023 18:54) (18 - 19)  SpO2: 94% (08 May 2023 12:48) (94% - 97%)    O2 Parameters below as of 08 May 2023 04:54  Patient On (Oxygen Delivery Method): BiPAP/CPAP    PHYSICAL EXAM:  GENERAL: Patient lying in bed in NAD, well-developed  HEAD:  Atraumatic, Normocephalic  EYES: EOMI, PERRLA  CHEST/LUNG: Breathing comfortably on RA, speaking in full sentences  HEART: In no cardiopulmonary distress  SKIN:   BUE: Skin tear ~4cm x 3cm to the forearm, wound base pink clean and moist. Swelling noted with erythema. RUE with small ~2cm x 2cm wound pink and moist. No active bleeding, malodor, purulence or drainage noted.

## 2023-05-08 NOTE — CONSULT NOTE ADULT - ASSESSMENT
MAURI on CKD 3 / pre-renal or ATN iso hypotension / CRS   Acute respiratory failure / COPD exacerbation / ADHF   Hypervolemic hyponatremia  Acute normocytic anemia      - chest xray noted  - start lasix 40mg iv q12h  - d/c tamsulosin d/t hypotension / diltiazem if needed  - monitor bladder scan / r/o urinary retention  - recent renal/bladder ultrasound w/o R hydro, L unable to visualize  - needs RBCs with extra dose of iv lasix / r/o overt bleed  - check phos level  - pulm f/u   - palliative care f/u / GOC

## 2023-05-08 NOTE — PROGRESS NOTE ADULT - ASSESSMENT
IMPRESSION:     Acute on chronic hypercapnic respiratory failure on NC  Acute on chronic hypoxemic respiratory failure  COPD exacerbation  MAURI on CKD   Anemia  HO Severe COPD on home O2.    Chronic afib not on AC due to recurrent falls. Rate controlled.  SP cardiology follow up   HFPEF      PLAN:    CNS: Avoid sedatives    HEENT: Oral care    PULMONARY:   HOB @ 45 degrees, aspiration precautions.  Wean O2 as tolerated.  Encourage NIV use during sleep and PRN during the day, solumedrol 40 q 24    CARDIOVASCULAR: Avoid overload Rate control    GI: GI prophylaxis. feeding per speech and swallow, bowel regimen    RENAL: Correct as necessary.  Repeat lytes.     INFECTIOUS DISEASE: SP ABX course     HEMATOLOGICAL: DVT prophylaxis.  Monitor CBC and Coags transfuse 1 unit PRBC    ENDOCRINE: Follow up FS. Insulin protocol if needed.    MUSC: PT/OT    Poor prognosis.     DNR/DNI

## 2023-05-08 NOTE — PROGRESS NOTE ADULT - PROBLEM SELECTOR PLAN 3
-improved  -avoid nephrotoxins  -trend creatinine -improved, then worsened again over weekend   -avoid nephrotoxins  -trend creatinine

## 2023-05-08 NOTE — PROGRESS NOTE ADULT - PROBLEM SELECTOR PLAN 1
+ confused and altered from baseline  Was given Ativan 1 mg IV last night- consider lowering dose to 0.25 or 0.5 PRN for agitation    -Recommend non-pharmacological interventions to prevent/treat delirium  - maintain day/night light cycles  - optimize sleep-wake cycle, minimize environmental noise  - reorientation frequently  - use verbal redirection as first line   - minimize restraints and lines  - ensure good bladder/bowel function  - ensure adequate pain control  - minimize use of anticholinergic, antihistaminic, and benzodiazepine medications + confused and altered from baseline    -Recommend non-pharmacological interventions to prevent/treat delirium  - maintain day/night light cycles  - optimize sleep-wake cycle, minimize environmental noise  - reorientation frequently  - use verbal redirection as first line   - minimize restraints and lines  - ensure good bladder/bowel function  - ensure adequate pain control  - minimize use of anticholinergic, antihistaminic, and benzodiazepine medications

## 2023-05-08 NOTE — PROGRESS NOTE ADULT - SUBJECTIVE AND OBJECTIVE BOX
LENGTH OF HOSPITAL STAY: 12d    CHIEF COMPLAINT:   Patient is a 89y old  Female who presents with a chief complaint of COPD exacerbation (08 May 2023 06:20)      HISTORY OF PRESENTING ILLNESS:    HPI:  89-year-old female past medical history of COPD on 4 L NC, Chronic A-fib not on AC, hypothyroidism, CKDIII, HFpEF presents for evaluation of shortness of breath. istory obtained from son who lives with her. Patient developed shortness of breath with associated dry cough for past 3 days, however she was not hypoxic and was saturating 95% on baseline 4 L at home. No reported fever, chills, headache, chest pain, abdominal pain, weakness, numbness, dysuria, hematuria, vomiting, diarrhea. She was placed on CPAP by EMS received 2 DuoNebs and 60 mg of Solu-Medrol. At baseline she is AAOx2-3 and ambulates with walker at home. Pt had recent admission for COPD exacerbation 3/4-3/15.     In ED  VT bp 118/53, HR 67, RR 22, T 97.6 F, SpO2 97% on BIPAP  Labs significant for hgb 9.0 (bl 9-10), Cr 1.8 (bl 1.2), Mg 2.5, K 5.5, Trop 0.04,   VBG PH 7.27, pCO2 71, HCO3 33, pO2 78    CXR No evidence of focal consolidation, pleural effusion or pneumothorax    s/p 1x Levaquin and Cefepime, Solumedrol 65 mg 1x in ED  s/p evaluation by CC team, admitted to SDU for managements of AHRF secondary to COPD exacerbation (2023 15:37)    PAST MEDICAL & SURGICAL HISTORY  PAST MEDICAL & SURGICAL HISTORY:  COPD (chronic obstructive pulmonary disease)      Hypothyroid      HTN (hypertension)      High cholesterol      Colitis      CKD (chronic kidney disease)      No significant past surgical history        SOCIAL HISTORY:    ALLERGIES:  No Known Allergies    MEDICATIONS:  STANDING MEDICATIONS  albuterol    90 MICROgram(s) HFA Inhaler 2 Puff(s) Inhalation every 6 hours  albuterol/ipratropium for Nebulization 3 milliLiter(s) Nebulizer every 6 hours  aspirin  chewable 81 milliGRAM(s) Oral daily  atorvastatin 20 milliGRAM(s) Oral at bedtime  bacitracin   Ointment 1 Application(s) Topical daily  budesonide 160 MICROgram(s)/formoterol 4.5 MICROgram(s) Inhaler 2 Puff(s) Inhalation two times a day  chlorhexidine 2% Cloths 1 Application(s) Topical daily  diltiazem    Tablet 30 milliGRAM(s) Oral every 6 hours  dronedarone 400 milliGRAM(s) Oral two times a day  heparin   Injectable 5000 Unit(s) SubCutaneous every 12 hours  levothyroxine 112 MICROGram(s) Oral daily  pantoprazole    Tablet 40 milliGRAM(s) Oral before breakfast  tamsulosin 0.4 milliGRAM(s) Oral at bedtime    PRN MEDICATIONS    VITALS:   T(F): 97.1  HR: 79  BP: 87/45  RR: 18  SpO2: 95%    LABS:                        5.7    8.96  )-----------( 150      ( 08 May 2023 07:10 )             18.7     05-08    131<L>  |  95<L>  |  72<HH>  ----------------------------<  107<H>  5.2<H>   |  24  |  2.7<H>    Ca    7.9<L>      08 May 2023 07:10  Mg     2.0     05-08        Urinalysis Basic - ( 07 May 2023 14:58 )    Color: Yellow / Appearance: Slightly Turbid / S.021 / pH: x  Gluc: x / Ketone: Negative  / Bili: Negative / Urobili: <2 mg/dL   Blood: x / Protein: Trace / Nitrite: Negative   Leuk Esterase: Negative / RBC: 0 /HPF / WBC 1 /HPF   Sq Epi: x / Non Sq Epi: x / Bacteria: Moderate                RADIOLOGY:    PHYSICAL EXAM:  GEN: No acute distress  LUNGS: Clear to auscultation bilaterally   HEART: S1/S2 present. RRR.   ABD: Soft, non-tender, non-distended.   SKIN: generalized edema; LUE bleed  NEURO: AAOX2     LENGTH OF HOSPITAL STAY: 12d    CHIEF COMPLAINT:   Patient is a 89y old  Female who presents with a chief complaint of COPD exacerbation (08 May 2023 06:20)      HISTORY OF PRESENTING ILLNESS:    HPI:  89-year-old female past medical history of COPD on 4 L NC, Chronic A-fib not on AC, hypothyroidism, CKDIII, HFpEF presents for evaluation of shortness of breath. istory obtained from son who lives with her. Patient developed shortness of breath with associated dry cough for past 3 days, however she was not hypoxic and was saturating 95% on baseline 4 L at home. No reported fever, chills, headache, chest pain, abdominal pain, weakness, numbness, dysuria, hematuria, vomiting, diarrhea. She was placed on CPAP by EMS received 2 DuoNebs and 60 mg of Solu-Medrol. At baseline she is AAOx2-3 and ambulates with walker at home. Pt had recent admission for COPD exacerbation 3/4-3/15.     In ED  VT bp 118/53, HR 67, RR 22, T 97.6 F, SpO2 97% on BIPAP  Labs significant for hgb 9.0 (bl 9-10), Cr 1.8 (bl 1.2), Mg 2.5, K 5.5, Trop 0.04,   VBG PH 7.27, pCO2 71, HCO3 33, pO2 78    CXR No evidence of focal consolidation, pleural effusion or pneumothorax    s/p 1x Levaquin and Cefepime, Solumedrol 65 mg 1x in ED  s/p evaluation by CC team, admitted to SDU for managements of AHRF secondary to COPD exacerbation (2023 15:37)    PAST MEDICAL & SURGICAL HISTORY  PAST MEDICAL & SURGICAL HISTORY:  COPD (chronic obstructive pulmonary disease)      Hypothyroid      HTN (hypertension)      High cholesterol      Colitis      CKD (chronic kidney disease)      No significant past surgical history        SOCIAL HISTORY:    ALLERGIES:  No Known Allergies    MEDICATIONS:  STANDING MEDICATIONS  albuterol    90 MICROgram(s) HFA Inhaler 2 Puff(s) Inhalation every 6 hours  albuterol/ipratropium for Nebulization 3 milliLiter(s) Nebulizer every 6 hours  aspirin  chewable 81 milliGRAM(s) Oral daily  atorvastatin 20 milliGRAM(s) Oral at bedtime  bacitracin   Ointment 1 Application(s) Topical daily  budesonide 160 MICROgram(s)/formoterol 4.5 MICROgram(s) Inhaler 2 Puff(s) Inhalation two times a day  chlorhexidine 2% Cloths 1 Application(s) Topical daily  diltiazem    Tablet 30 milliGRAM(s) Oral every 6 hours  dronedarone 400 milliGRAM(s) Oral two times a day  heparin   Injectable 5000 Unit(s) SubCutaneous every 12 hours  levothyroxine 112 MICROGram(s) Oral daily  pantoprazole    Tablet 40 milliGRAM(s) Oral before breakfast  tamsulosin 0.4 milliGRAM(s) Oral at bedtime    PRN MEDICATIONS    VITALS:   T(F): 97.1  HR: 79  BP: 87/45  RR: 18  SpO2: 95%    LABS:                        5.7    8.96  )-----------( 150      ( 08 May 2023 07:10 )             18.7     05-08    131<L>  |  95<L>  |  72<HH>  ----------------------------<  107<H>  5.2<H>   |  24  |  2.7<H>    Ca    7.9<L>      08 May 2023 07:10  Mg     2.0     05-08        Urinalysis Basic - ( 07 May 2023 14:58 )    Color: Yellow / Appearance: Slightly Turbid / S.021 / pH: x  Gluc: x / Ketone: Negative  / Bili: Negative / Urobili: <2 mg/dL   Blood: x / Protein: Trace / Nitrite: Negative   Leuk Esterase: Negative / RBC: 0 /HPF / WBC 1 /HPF   Sq Epi: x / Non Sq Epi: x / Bacteria: Moderate                RADIOLOGY:    PHYSICAL EXAM:  GEN: No acute distress  LUNGS: Clear to auscultation bilaterally   HEART: S1/S2 present. RRR.   ABD: Soft, non-tender, non-distended.   SKIN: generalized edema; LUE skin tear with bleed  NEURO: AAOX2

## 2023-05-08 NOTE — PROGRESS NOTE ADULT - SUBJECTIVE AND OBJECTIVE BOX
Over Night Events: events noted, on NC, afebrile    PHYSICAL EXAM    ICU Vital Signs Last 24 Hrs  T(C): 36.2 (08 May 2023 04:54), Max: 36.3 (07 May 2023 20:47)  T(F): 97.1 (08 May 2023 04:54), Max: 97.4 (07 May 2023 20:47)  HR: 95 (08 May 2023 04:54) (70 - 95)  BP: 100/48 (08 May 2023 04:54) (100/48 - 109/49)  BP(mean): 70 (07 May 2023 20:47) (70 - 80)  RR: 18 (08 May 2023 04:54) (18 - 19)  SpO2: 95% (08 May 2023 04:54) (94% - 97%)    O2 Parameters below as of 08 May 2023 04:54  Patient On (Oxygen Delivery Method): BiPAP/CPAP            General: ill looking  Lungs: dec bs both bases  Cardiovascular: Regular   Abdomen: Soft, Positive BS  Extremities: No clubbing   Neurological: Non focal       23 @ 07:01  -  23 @ 06:20  --------------------------------------------------------  IN:  Total IN: 0 mL    OUT:    Voided (mL): 600 mL  Total OUT: 600 mL    Total NET: -600 mL          LABS:                          7.2    12.21 )-----------( 171      ( 07 May 2023 08:33 )             23.4                                               05    134<L>  |  96<L>  |  63<HH>  ----------------------------<  118<H>  4.9   |  23  |  2.5<H>    Ca    8.1<L>      07 May 2023 08:33  Mg     2.2     05-07                                               Urinalysis Basic - ( 07 May 2023 14:58 )    Color: Yellow / Appearance: Slightly Turbid / S.021 / pH: x  Gluc: x / Ketone: Negative  / Bili: Negative / Urobili: <2 mg/dL   Blood: x / Protein: Trace / Nitrite: Negative   Leuk Esterase: Negative / RBC: 0 /HPF / WBC 1 /HPF   Sq Epi: x / Non Sq Epi: x / Bacteria: Moderate                                                                                                                                                                                MEDICATIONS  (STANDING):  albuterol    90 MICROgram(s) HFA Inhaler 2 Puff(s) Inhalation every 6 hours  albuterol/ipratropium for Nebulization 3 milliLiter(s) Nebulizer every 6 hours  aspirin  chewable 81 milliGRAM(s) Oral daily  atorvastatin 20 milliGRAM(s) Oral at bedtime  bacitracin   Ointment 1 Application(s) Topical daily  budesonide 160 MICROgram(s)/formoterol 4.5 MICROgram(s) Inhaler 2 Puff(s) Inhalation two times a day  chlorhexidine 2% Cloths 1 Application(s) Topical daily  diltiazem    Tablet 30 milliGRAM(s) Oral every 6 hours  dronedarone 400 milliGRAM(s) Oral two times a day  heparin   Injectable 5000 Unit(s) SubCutaneous every 12 hours  levothyroxine 112 MICROGram(s) Oral daily  methylPREDNISolone sodium succinate Injectable 40 milliGRAM(s) IV Push two times a day  pantoprazole    Tablet 40 milliGRAM(s) Oral before breakfast  tamsulosin 0.4 milliGRAM(s) Oral at bedtime

## 2023-05-08 NOTE — PROGRESS NOTE ADULT - SUBJECTIVE AND OBJECTIVE BOX
HPI:    89-year-old female past medical history of COPD on 4 L NC, Chronic A-fib not on AC, hypothyroidism, CKDIII, HFpEF presents for evaluation of shortness of breath. History obtained from son who lives with her. Patient developed shortness of breath with associated dry cough for past 3 days, however she was not hypoxic and was saturating 95% on baseline 4 L at home. No reported fever, chills, headache, chest pain, abdominal pain, weakness, numbness, dysuria, hematuria, vomiting, diarrhea. She was placed on CPAP by EMS received 2 DuoNebs and 60 mg of Solu-Medrol. At baseline she is AAOx2-3 and ambulates with walker at home. Pt had recent admission for COPD exacerbation 3/4-3/15.     Interval history:   -Patient seen at bedside    -She is now on O2 via nasal cannula     -Denies any complaints at time of visit but is more confused today. overnight she was noncompliant and agitated, was given Ativan.     ADVANCE DIRECTIVES:    [ ] Full Code [ x] DNR/DNI  MOLST  [ X]  Living Will  [ ]   DECISION MAKER(s): HCP  [ ] Health Care Proxy(s)  [x] Surrogate(s)  [ ] Guardian           Name(s): Phone Number(s): Son Andrzej is HCP ( in one content). secondary HCP is Minda reyes.     BASELINE (I)ADL(s) (prior to admission):  [place]: [ ]Total  [ X ] Moderate [ ]Dependent  Palliative Performance Status Version 2:       60  %    http://npcrc.org/files/news/[email or web address]_performance_[email or web address]_[email or web address].pdf    Allergies    No Known Allergies    Intolerances    MEDICATIONS  (STANDING):  albuterol    90 MICROgram(s) HFA Inhaler 2 Puff(s) Inhalation every 6 hours  albuterol/ipratropium for Nebulization 3 milliLiter(s) Nebulizer every 6 hours  aspirin  chewable 81 milliGRAM(s) Oral daily  atorvastatin 20 milliGRAM(s) Oral at bedtime  budesonide 160 MICROgram(s)/formoterol 4.5 MICROgram(s) Inhaler 2 Puff(s) Inhalation two times a day  chlorhexidine 2% Cloths 1 Application(s) Topical daily  dronedarone 400 milliGRAM(s) Oral two times a day  heparin   Injectable 5000 Unit(s) SubCutaneous every 8 hours  levothyroxine 112 MICROGram(s) Oral daily  methylPREDNISolone sodium succinate Injectable 40 milliGRAM(s) IV Push every 12 hours  metoprolol tartrate 25 milliGRAM(s) Oral every 12 hours  pantoprazole    Tablet 40 milliGRAM(s) Oral before breakfast  sodium zirconium cyclosilicate 10 Gram(s) Oral every 12 hours  tamsulosin 0.4 milliGRAM(s) Oral at bedtime    MEDICATIONS  (PRN):      PRESENT SYMPTOMS:   Pain: [ ]yes [x ]no  QOL impact -   Location -                    Aggravating factors -  Quality -  Radiation -  Timing-  Severity (0-10 scale):  Minimal acceptable level (0-10 scale):       Dyspnea:                           [X] None[X0  Mild [ ]Moderate [ ]Severe     Respiratory Distress Observation Scale (RDOS):   A score of 0 to 2 signifies little or no respiratory distress, 3 signifies mild distress, scores 4 to 6 indicate moderate distress, and scores greater than 7 signify severe distress  [email or web address]    Anxiety:                             [ x]None[ ]Mild [ ]Moderate [ ]Severe   Fatigue:                             [ x]None[ ]Mild [ ]Moderate [ ]Severe   Nausea:                             [x ]None[ ]Mild [ ]Moderate [ ]Severe   Loss of appetite:              [ x]None[ ]Mild [ ]Moderate [ ]Severe   Constipation:                    [x ]None[ ]Mild [ ]Moderate [ ]Severe    Other Symptoms:  [x ]All other review of systems negative     Palliative Performance Status Version 2:         30%    [email or web address]/[email or web address]    PHYSICAL EXAM:  ICU Vital Signs Last 24 Hrs  T(C): 36.1 (04 May 2023 11:38), Max: 36.2 (03 May 2023 16:25)  T(F): 97 (04 May 2023 11:38), Max: 97.1 (03 May 2023 16:25)  HR: 77 (04 May 2023 11:38) (66 - 145)  BP: 161/69 (04 May 2023 11:38) (99/67 - 161/69)  BP(mean): 99 (04 May 2023 11:38) (79 - 99)  RR: 20 (04 May 2023 11:38) (20 - 20)  SpO2: 95% (04 May 2023 11:38) (93% - 95%)    GENERAL:  [ x] No acute distress [ ]Lethargic  [ ]Unarousable  [X ]Verbal  [ ]Non-Verbal [ ]Cachexia    BEHAVIORAL/PSYCH:  [ ]Alert and Oriented x  [ ] Anxiety [ ] Delirium [ ] Agitation [X ] Calm   EYES: [ x] No scleral icterus [ ] Scleral icterus [ ] Closed  ENMT:  [ ]Dry mouth  [x ]No external oral lesions [ ] No external ear or nose lesions  CARDIOVASCULAR:  [ ]Regular [ ]Irregular [ ]Tachy [ ]Not Tachy  [ ]Maximus [ ] Edema [ ] No edema  PULMONARY:  [ ]Tachypnea  [ ]Audible excessive secretions [ x] No labored breathing [ ] labored breathing  GASTROINTESTINAL: [ ]Soft  [ ]Distended  [x ]Not distended [ ]Non tender [ ]Tender  MUSCULOSKELETAL: [ ]No clubbing [ ] clubbing  [x ] No cyanosis [ ] cyanosis  NEUROLOGIC: [ ]No focal deficits  [x ]Follows commands  [ ]Does not follow commands  [ ]Cognitive impairment  [ ]Dysphagia  [ ]Dysarthria  [ ]Paresis   SKIN: [ ] Jaundiced [ ] Non-jaundiced [ ]Rash [ ]No Rash [ ] Warm [ ] Dry  MISC/LINES: [ ] ET tube [ ] Trach [ ]NGT/OGT [ ]PEG [ ]Tobar      LABS: reviewed by me               05-03    141  |  103  |  58<H>  ----------------------------<  148<H>  5.2<H>   |  26  |  1.7<H>    Ca    8.7      03 May 2023 12:34  Mg     2.3     05-03    TPro  5.3<L>  /  Alb  3.4<L>  /  TBili  0.3  /  DBili  x   /  AST  27  /  ALT  27  /  AlkPhos  84  05-03                            9.5    6.19  )-----------( 133      ( 04 May 2023 11:40 )             31.0           EKG: reviewed by me  < from: 12 Lead ECG (05.01.23 @ 14:55) >  Ventricular Rate 141 BPM  < from: Xray Chest 1 View AP/PA (04.30.23 @ 10:13) >  Impression:    No radiographic evidence of acute cardiopulmonary disease.        --- End of Report ---      < end of copied text >    Atrial Rate 141 BPM    QRS Duration 74 ms    Q-T Interval 288 ms    QTC Calculation(Bazett) 441 ms    R Axis 105 degrees    T Axis 79 degrees    Diagnosis Line Supraventricular tachycardia  Rightward axis  Nonspecific STand T wave abnormality  Abnormal ECG    < end of copied text >        REFERRALS:   [ ]Chaplaincy  [ ]Hospice  [ ]Child Life  [x]Social Work  [ ]Case management [ ]Holistic Therapy     Patient discussed with primary medical team MD  Palliative care education provided to patient and/or family   HPI:    89-year-old female past medical history of COPD on 4 L NC, Chronic A-fib not on AC, hypothyroidism, CKDIII, HFpEF presents for evaluation of shortness of breath. History obtained from son who lives with her. Patient developed shortness of breath with associated dry cough for past 3 days, however she was not hypoxic and was saturating 95% on baseline 4 L at home. No reported fever, chills, headache, chest pain, abdominal pain, weakness, numbness, dysuria, hematuria, vomiting, diarrhea. She was placed on CPAP by EMS received 2 DuoNebs and 60 mg of Solu-Medrol. At baseline she is AAOx2-3 and ambulates with walker at home. Pt had recent admission for COPD exacerbation 3/4-3/15.     Interval history:   -Patient seen at bedside    -She is now on O2 via nasal cannula, new drop in HGB, worsening MAURI  - son and daughter are at bedside    ADVANCE DIRECTIVES:    [ ] Full Code [ x] DNR/DNI  MOLST  [ X]  Living Will  [ ]   DECISION MAKER(s): HCP  [ ] Health Care Proxy(s)  [x] Surrogate(s)  [ ] Guardian           Name(s): Phone Number(s): Son Andrzej is HCP ( in one content). secondary HCP is Minda reyes.     BASELINE (I)ADL(s) (prior to admission):  [place]: [ ]Total  [ X ] Moderate [ ]Dependent  Palliative Performance Status Version 2:       60  %    [email or web address]/[email or web address]    Allergies    No Known Allergies    Intolerances    MEDICATIONS  (STANDING):  albuterol    90 MICROgram(s) HFA Inhaler 2 Puff(s) Inhalation every 6 hours  albuterol/ipratropium for Nebulization 3 milliLiter(s) Nebulizer every 6 hours  aspirin  chewable 81 milliGRAM(s) Oral daily  atorvastatin 20 milliGRAM(s) Oral at bedtime  budesonide 160 MICROgram(s)/formoterol 4.5 MICROgram(s) Inhaler 2 Puff(s) Inhalation two times a day  chlorhexidine 2% Cloths 1 Application(s) Topical daily  dronedarone 400 milliGRAM(s) Oral two times a day  heparin   Injectable 5000 Unit(s) SubCutaneous every 8 hours  levothyroxine 112 MICROGram(s) Oral daily  methylPREDNISolone sodium succinate Injectable 40 milliGRAM(s) IV Push every 12 hours  metoprolol tartrate 25 milliGRAM(s) Oral every 12 hours  pantoprazole    Tablet 40 milliGRAM(s) Oral before breakfast  sodium zirconium cyclosilicate 10 Gram(s) Oral every 12 hours  tamsulosin 0.4 milliGRAM(s) Oral at bedtime    MEDICATIONS  (PRN):      PRESENT SYMPTOMS:   Pain: [ ]yes [x ]no  QOL impact -   Location -                    Aggravating factors -  Quality -  Radiation -  Timing-  Severity (0-10 scale):  Minimal acceptable level (0-10 scale):       Dyspnea:                           [X] None[X0  Mild [ ]Moderate [ ]Severe     Respiratory Distress Observation Scale (RDOS):   A score of 0 to 2 signifies little or no respiratory distress, 3 signifies mild distress, scores 4 to 6 indicate moderate distress, and scores greater than 7 signify severe distress  [email or web address]    Anxiety:                             [ x]None[ ]Mild [ ]Moderate [ ]Severe   Fatigue:                             [ x]None[ ]Mild [ ]Moderate [ ]Severe   Nausea:                             [x ]None[ ]Mild [ ]Moderate [ ]Severe   Loss of appetite:              [ x]None[ ]Mild [ ]Moderate [ ]Severe   Constipation:                    [x ]None[ ]Mild [ ]Moderate [ ]Severe    Other Symptoms:  [x ]All other review of systems negative     Palliative Performance Status Version 2:         20%    [email or web address]    PHYSICAL EXAM:  ICU Vital Signs Last 24 Hrs  T(C): 36.2 (08 May 2023 04:54), Max: 36.3 (07 May 2023 20:47)  T(F): 97.1 (08 May 2023 04:54), Max: 97.4 (07 May 2023 20:47)  HR: 79 (08 May 2023 11:30) (70 - 95)  BP: 87/45 (08 May 2023 11:30) (87/45 - 109/49)  BP(mean): 62 (08 May 2023 11:30) (62 - 80)  RR: 18 (08 May 2023 04:54) (18 - 18)  SpO2: 95% (08 May 2023 04:54) (95% - 97%)    GENERAL:  [ x] No acute distress [ ]Lethargic  [ ]Unarousable  [X ]Verbal  [ ]Non-Verbal [ ]Cachexia    BEHAVIORAL/PSYCH:  [ ]Alert and Oriented x  [ ] Anxiety [ ] Delirium [ ] Agitation [X ] Calm   EYES: [ x] No scleral icterus [ ] Scleral icterus [ ] Closed  ENMT:  [ ]Dry mouth  [x ]No external oral lesions [ ] No external ear or nose lesions  CARDIOVASCULAR:  [ ]Regular [ ]Irregular [ ]Tachy [ ]Not Tachy  [ ]Maximus [ ] Edema [ ] No edema  PULMONARY:  [ ]Tachypnea  [ ]Audible excessive secretions [ x] No labored breathing [ ] labored breathing  GASTROINTESTINAL: [ ]Soft  [ ]Distended  [x ]Not distended [ ]Non tender [ ]Tender  MUSCULOSKELETAL: [ ]No clubbing [ ] clubbing  [x ] No cyanosis [ ] cyanosis  NEUROLOGIC: [ ]No focal deficits  [x ]Follows commands  [ ]Does not follow commands  [ ]Cognitive impairment  [ ]Dysphagia  [ ]Dysarthria  [ ]Paresis   SKIN: [ ] Jaundiced [ ] Non-jaundiced [ ]Rash [ ]No Rash [ ] Warm [ ] Dry  MISC/LINES: [ ] ET tube [ ] Trach [ ]NGT/OGT [ ]PEG [ ]Tobar      LABS: reviewed by me             05-08    131<L>  |  95<L>  |  72<HH>  ----------------------------<  107<H>  5.2<H>   |  24  |  2.7<H>    Ca    7.9<L>      08 May 2023 07:10  Mg     2.0     05-08                              5.7    8.96  )-----------( 150      ( 08 May 2023 07:10 )             18.7       EKG: reviewed by me  < from: 12 Lead ECG (05.01.23 @ 14:55) >  Ventricular Rate 141 BPM  < from: Xray Chest 1 View AP/PA (04.30.23 @ 10:13) >  Impression:    No radiographic evidence of acute cardiopulmonary disease.        --- End of Report ---      < end of copied text >    Atrial Rate 141 BPM    QRS Duration 74 ms    Q-T Interval 288 ms    QTC Calculation(Bazett) 441 ms    R Axis 105 degrees    T Axis 79 degrees    Diagnosis Line Supraventricular tachycardia  Rightward axis  Nonspecific STand T wave abnormality  Abnormal ECG    < end of copied text >        REFERRALS:   [ ]Chaplaincy  [ ]Hospice  [ ]Child Life  [x]Social Work  [ ]Case management [ ]Holistic Therapy     Patient discussed with primary medical team MD  Palliative care education provided to patient and/or family

## 2023-05-08 NOTE — CONSULT NOTE ADULT - SUBJECTIVE AND OBJECTIVE BOX
NEPHROLOGY CONSULTATION NOTE    TANNA MCCRACKEN  89y  Female  MRN-[id number]    CC:   Patient is a 89y old  Female who presents with a chief complaint of COPD exacerbation (08 May 2023 13:55)      HPI:  89-year-old female past medical history of COPD on 4 L NC, Chronic A-fib not on AC, hypothyroidism, CKDIII, HFpEF presents for evaluation of shortness of breath. istory obtained from son who lives with her. Patient developed shortness of breath with associated dry cough for past 3 days, however she was not hypoxic and was saturating 95% on baseline 4 L at home. No reported fever, chills, headache, chest pain, abdominal pain, weakness, numbness, dysuria, hematuria, vomiting, diarrhea. She was placed on CPAP by EMS received 2 DuoNebs and 60 mg of Solu-Medrol. At baseline she is AAOx2-3 and ambulates with walker at home. Pt had recent admission for COPD exacerbation 3/4-3/15.     In ED  VT bp 118/53, HR 67, RR 22, T 97.6 F, SpO2 97% on BIPAP  Labs significant for hgb 9.0 (bl 9-10), Cr 1.8 (bl 1.2), Mg 2.5, K 5.5, Trop 0.04,   VBG PH 7.27, pCO2 71, HCO3 33, pO2 78    CXR No evidence of focal consolidation, pleural effusion or pneumothorax    s/p 1x Levaquin and Cefepime, Solumedrol 65 mg 1x in ED  s/p evaluation by CC team, admitted to SDU for managements of AHRF secondary to COPD exacerbation (2023 15:37)      PAST MEDICAL & SURGICAL HISTORY:  COPD (chronic obstructive pulmonary disease)      Hypothyroid      HTN (hypertension)      High cholesterol      Colitis      CKD (chronic kidney disease)      No significant past surgical history        Allergies:  No Known Allergies    Home Medications Reviewed  Hospital Medications:   MEDICATIONS  (STANDING):  albuterol    90 MICROgram(s) HFA Inhaler 2 Puff(s) Inhalation every 6 hours  albuterol/ipratropium for Nebulization 3 milliLiter(s) Nebulizer every 6 hours  atorvastatin 20 milliGRAM(s) Oral at bedtime  bacitracin   Ointment 1 Application(s) Topical daily  budesonide 160 MICROgram(s)/formoterol 4.5 MICROgram(s) Inhaler 2 Puff(s) Inhalation two times a day  chlorhexidine 2% Cloths 1 Application(s) Topical daily  diltiazem    Tablet 30 milliGRAM(s) Oral every 6 hours  dronedarone 400 milliGRAM(s) Oral two times a day  levothyroxine 112 MICROGram(s) Oral daily  pantoprazole    Tablet 40 milliGRAM(s) Oral before breakfast  tamsulosin 0.4 milliGRAM(s) Oral at bedtime    MEDICATIONS  (PRN):    Home Medications:  albuterol 2.5 mg/3 mL (0.083%) inhalation solution: 3 milliliter(s) inhaled every 6 hours, As Needed for wheezing, shortness of breath (2023 17:07)  aspirin 81 mg oral tablet: 1 tab(s) orally once a day (2023 17:07)  ATORVASTATIN 20MG TABLETS: each orally once a day (2023 17:07)  Lasix 20 mg oral tablet: 1 tab(s) orally once a day (2023 17:07)  Multaq 400 mg oral tablet: 1 tab(s) orally 2 times a day (2023 17:07)  TRELEGY ELLIPTA 100-62.5MCG INH 30P: INHALE 1 PUFF BY MOUTH DAILY (2023 17:07)      SOCIAL HISTORY:  Social History:      FAMILY HISTORY:      REVIEW OF SYSTEMS:  All other review of systems is negative unless indicated above.    VITALS:  T(F): 96.9 (23 @ 12:48), Max: 97.4 (23 @ 20:47)  HR: 77 (23 @ 12:48)  BP: 98/48 (23 @ 12:48)  RR: 19 (23 @ 12:48)  SpO2: 94% (23 @ 12:48)      I&O's Detail    07 May 2023 07:01  -  08 May 2023 07:00  --------------------------------------------------------  IN:  Total IN: 0 mL    OUT:    Voided (mL): 600 mL  Total OUT: 600 mL    Total NET: -600 mL            I&O's Summary    07 May 2023 07:01  -  08 May 2023 07:00  --------------------------------------------------------  IN: 0 mL / OUT: 600 mL / NET: -600 mL        PHYSICAL EXAM:  Gen: NAD, face mask  resp: b/l breath sounds  card: S1/S2  abd: soft  ext: +dep edema    LABS:        131<L>  |  95<L>  |  72<HH>  ----------------------------<  107<H>  5.2<H>   |  24  |  2.7<H>    Ca    7.9<L>      08 May 2023 07:10  Mg     2.0           Creatinine Trend:   Creatinine, Serum: 2.7 mg/dL (23 @ 07:10)  Creatinine, Serum: 2.5 mg/dL (23 @ 08:33)  Creatinine, Serum: 1.9 mg/dL (23 @ 05:31)  Creatinine, Serum: 1.5 mg/dL (23 @ 07:03)  Creatinine, Serum: 1.7 mg/dL (23 @ 11:40)  Creatinine, Serum: 1.7 mg/dL (23 @ 12:34)                            5.7    8.96  )-----------( 150      ( 08 May 2023 07:10 )             18.7     Mean Cell Volume: 96.4 fL (23 @ 07:10)    Urine Studies:  Urinalysis Basic - ( 07 May 2023 14:58 )    Color: Yellow / Appearance: Slightly Turbid / S.021 / pH:   Gluc:  / Ketone: Negative  / Bili: Negative / Urobili: <2 mg/dL   Blood:  / Protein: Trace / Nitrite: Negative   Leuk Esterase: Negative / RBC: 0 /HPF / WBC 1 /HPF   Sq Epi:  / Non Sq Epi:  / Bacteria: Moderate      Sodium, Random Urine: 24.0 mmoL/L ( @ 14:58)  Creatinine, Random Urine: 121 mg/dL ( @ 14:58)            RADIOLOGY & ADDITIONAL STUDIES:    US Kidney and Bladder:   ACC: [id number] EXAM:  US KIDNEYS AND BLADDER   ORDERED BY: SPENSER APARICIO     PROCEDURE DATE:  2023          INTERPRETATION:  CLINICAL INFORMATION: Acute kidney injury    COMPARISON: CT dated 3/4/2023    TECHNIQUE: Sonography of the kidneys and bladder.    FINDINGS:    Limited, portable study.    Right kidney: 11.7 cm. No hydronephrosis. 7.1 cm cyst.    Left kidney:  Poorly visualized.. Unable to assess for hydronephrosis.   7.2 cm cyst.    Urinary bladder: Bladder volume of 128 cc. Debris in the bladder. Patient   unable to void at the time of exam.    IMPRESSION:    No right-sided hydronephrosis. Left kidney poorly visualized-unable to   assess for hydronephrosis.    Debris in the bladder. Correlate with urinalysis.    --- End ofReport ---            JOSSELIN SOUZA MD; Attending Radiologist  This document has been electronically signed. 2023  8:35AM (23 @ 23:24)      Xray Chest 1 View- PORTABLE-Urgent:   ACC: [id number] EXAM:  XR CHEST PORTABLE URGENT 1V   ORDERED BY: MONTANA JEFFERSON     PROCEDURE DATE:  2023          INTERPRETATION:  Clinical History / Reason for exam: Dyspnea    Comparison : Chest radiograph 3/5/2023.    Technique/Positioning:Frontal.    Findings:    Support devices: None.    Cardiac/mediastinum/hilum: Indeterminate    Lung parenchyma/Pleura: Pulmonary vascular congestion unchanged. Left   lower lobe opacity/pleural effusion unchanged. No air leak.    Skeleton/soft tissues: Unremarkable.    Impression:    Pulmonary vascular congestion unchanged. Left lower lobe opacity/pleural   effusion unchanged. No air leak.        --- End of Report ---            ILIANA DICK MD; Attending Radiologist  This document has been electronically signed. May  7 2023  2:22PM (23 @ 14:13)

## 2023-05-08 NOTE — PROGRESS NOTE ADULT - PROBLEM SELECTOR PLAN 2
Respiratory failure in the setting of COPD exacerbation.   -weaned to nasal cannula today - patient refused Bipap last night   -GOC as appropriate  -family hoping to speak with Dr. Jose before making any major decisions --> spoke with pulm Respiratory failure in the setting of COPD exacerbation.   -weaned to nasal cannula today/Bipap at night   -GOC as appropriate  -family hoping to speak with Dr. Jose for recommended plan --> reached out to pulm

## 2023-05-09 NOTE — PROGRESS NOTE ADULT - SUBJECTIVE AND OBJECTIVE BOX
Nephrology progress note    Patient was seen and examined, events over the last 24 h noted .    Allergies:  No Known Allergies    Hospital Medications:   MEDICATIONS  (STANDING):  albuterol    90 MICROgram(s) HFA Inhaler 2 Puff(s) Inhalation every 6 hours  albuterol/ipratropium for Nebulization 3 milliLiter(s) Nebulizer every 6 hours  atorvastatin 20 milliGRAM(s) Oral at bedtime  bacitracin   Ointment 1 Application(s) Topical daily  budesonide 160 MICROgram(s)/formoterol 4.5 MICROgram(s) Inhaler 2 Puff(s) Inhalation two times a day  chlorhexidine 2% Cloths 1 Application(s) Topical daily  diltiazem    Tablet 30 milliGRAM(s) Oral every 6 hours  dronedarone 400 milliGRAM(s) Oral two times a day  furosemide   Injectable 40 milliGRAM(s) IV Push every 12 hours  levothyroxine 112 MICROGram(s) Oral daily  methylPREDNISolone sodium succinate Injectable 40 milliGRAM(s) IV Push daily  pantoprazole    Tablet 40 milliGRAM(s) Oral before breakfast        VITALS:  T(F): 98.4 (23 @ 20:00), Max: 98.4 (23 @ 20:00)  HR: 83 (23 @ 20:00)  BP: 127/60 (23 @ 20:00)  RR: 18 (23 @ 20:00)  SpO2: 97% (23 @ 23:52)  Wt(kg): --     @ 07:01  -   @ 07:00  --------------------------------------------------------  IN: 0 mL / OUT: 600 mL / NET: -600 mL     @ 07:01  -   @ 21:19  --------------------------------------------------------  IN: 0 mL / OUT: 700 mL / NET: -700 mL          PHYSICAL EXAM:  Constitutional: NAD  HEENT: anicteric sclera, oropharynx clear, MMM  Neck: No JVD  Respiratory: CTAB, no wheezes, rales or rhonchi  Cardiovascular: S1, S2, RRR  Gastrointestinal: BS+, soft, NT/ND  Extremities: No cyanosis or clubbing. No peripheral edema  :  No santizo.   Skin: No rashes    LABS:      131<L>  |  92<L>  |  82<HH>  ----------------------------<  83  5.0   |  23  |  3.1<H>    Ca    7.7<L>      09 May 2023 08:44  Phos  5.5       Mg     2.2         TPro  5.2<L>  /  Alb  3.5  /  TBili  1.0  /  DBili      /  AST  29  /  ALT  22  /  AlkPhos  65                            9.1    13.72 )-----------( 154      ( 09 May 2023 08:44 )             27.2       Urine Studies:  Urinalysis Basic - ( 07 May 2023 14:58 )    Color: Yellow / Appearance: Slightly Turbid / S.021 / pH:   Gluc:  / Ketone: Negative  / Bili: Negative / Urobili: <2 mg/dL   Blood:  / Protein: Trace / Nitrite: Negative   Leuk Esterase: Negative / RBC: 0 /HPF / WBC 1 /HPF   Sq Epi:  / Non Sq Epi:  / Bacteria: Moderate      Sodium, Random Urine: 24.0 mmoL/L ( @ 14:58)  Creatinine, Random Urine: 121 mg/dL ( @ 14:58)    RADIOLOGY & ADDITIONAL STUDIES:

## 2023-05-09 NOTE — PROGRESS NOTE ADULT - PROBLEM SELECTOR PLAN 1
+ confused and altered from baseline    -Recommend non-pharmacological interventions to prevent/treat delirium  - maintain day/night light cycles  - optimize sleep-wake cycle, minimize environmental noise  - reorientation frequently  - use verbal redirection as first line   - minimize restraints and lines  - ensure good bladder/bowel function  - ensure adequate pain control  - minimize use of anticholinergic, antihistaminic, and benzodiazepine medications

## 2023-05-09 NOTE — PROGRESS NOTE ADULT - ATTENDING COMMENTS
89 y/o woman with PMH of COPD on 4 L NC and noncompliant with NIV at times, Chronic Afib not on AC due to recurrent falls, hypothyroidism, CKD 3 and chronic HFpEF presented for shortness of breath due to COPD exacerbation. She is now downgraded to the medical floor from stepdown unit.    #Acute on chronic hypercapnic and hypoxemic respiratory failure due to COPD exacerbation   #history COPD on home oxygen 3-4 L  - was on HFNC on admission and now  down to O2 at 4L NC - her baseline  - S/p course of doxycycline  - XR 5/5 reviewed, increased interstitial markings and prominence of R pulm artery   - continue solumedrol IV 40mg q24hr per pulmonary. Eventual taper.   - continue nebs and symbicort,  - aspiration precautions    #MAURI on CKD 3 - worsening   #Chronic HFpEF with acute exacerbation   - home lasix was on hold due to MAURI on admission, which initially improved on IV fluids now worsening   -  on admission  - CXR on 5/5 with increased interstitial marking and prominence or R Pulm Artery   - Follow up repat Repeat BNP   - Follow up Echocardiogram ( prior echo is from 2021EF 60-65% with G1 DD )   - Follow up Urine Studies   - Nephrology consulted - start lasix 40mg iv q12h  5/9: On exam, bilateral crackles, LE edema noted. c/w lasix 40 iv q12 for now. respiratory rate slightly high at rest. monitor.    #Acute on Chronic Normocytic Anemia likely 2/2 LUE skin tear with significant bleeding   - Wound addressed by WC nurse, but pt continues to have bleeding through dressing   - heparin dosage decreased   - iron studies noted, % sat low, but total iron and ferritin WNL   - Hb 5/8 5.7  - s/p 2 units of PRBC's. Latest Hb 9.  - Burn consulted: - Local wound care: wash wound with soap and water. Apply Xeroform kerlix and ace wrap BID.   - Keep BUE elevated for swelling     #Chronic Afib not on anticoagulation due to recurrent falls  - c/w cardizem 30mg q6hr and dronedarone 400mg bid  - if rate remains controlled, can switch to Cardizem  daily on discharge   - Cardiology on board     #Hypothyroidism- on levothyroxine    #Recurrent falls - Mechanical   - fall precautions  - PT as tolerated    Activity: PT  Diet: Soft and bite sized  DVT prophylaxis - heparin SQ  Code status: DNR/DNI status  HANDOFF: c/w lasix q12, IV solumedrol. watch for response to above treatments. family also in talks with Dr sukumar landry about GOC in case condition worsens.
Impression:    Acute on chronic hypercapnic resp failure on NIV  Acute on chronic hypoxemic respiratory failure  COPD exacerbation  MAURI on CKD/ hyperkalemia  Severe COPD & Chronic Hypoxemic Respiratory Failure on 3-4L NC at home   Chronic afib not on AC due to recurrent falls  HfpEF    Impression and plan above are my own.
My note supersedes all residents notes that I sign, My correction for their notes are in my notes   the patient is more awake, stated that she feels better, now on NC, her daughter and son at bedside ,   normal resp effort , decrease breath sound b/l rest of exam no change     89-year-old female past medical history of COPD on 4 L NC, Chronic A-fib not on AC, hypothyroidism, CKDIII, HFpEF presents for evaluation of shortness of breath due to COPD exacerbation.    #Acute on chronic hypercapnic hypoxemic respiratory failure  # COPD exacerbation  #Hx COPD on home oxygen 3-4 L  Duplex negative for DVT   blood gases noted    NIV qhs and prn,   Solumedrol 60 BID decreased ot 40 bid   Rapid resopnse overnight of 4/28 for desaturaion   aspiration precautions. S&S for feeding eval - feeding when off NIV  CXR 04/27: Unchanged bibasilar opacities   HFNC weaned to NC today   LE duplex negative for DVT  Procalcitonin 0.12  - c/w Duonebs and Home Inhalers PRN  - Continue doxycycline  - RVP and Procal negative   today improved from yesterday , now on HFNC   S/S  eval noted     #MAURI on CKDIII / Hyperkalemia   hold IVF since she is eating now   Creatinine Trend: 1.6<--, 1.4<--, 1.6<--, 1.4<--, 1.6<--, 1.8<--  - Holding lasix and resume accordingly  - avoid Nephrotoxics   low K diet once diet approved bt S/S   k 4,8     #Hx of Normocytic anemia   - Hb 9.1 (baseline 9-10)    #Chronic A-fib off AC due to recurrent falls  -EKG NOTED   - c/w home metoprolol with holding parameters and Multaq 400 mg BID      #Chronic HFPEF  #HLD  - holding lasix 20mg PO QD - for MAURI  - Cont statin  - resume accordingly  - Avoid volume overload    #Hypothyroidism  - C/W levothyroxine    #Recurrent falls - Mechanical   - PT eval / Fall precautions     #Troponemia likely type 2  - Trop :0.04 in the setting of CKD-->0.02-->0.03   - BNP:  642 (baseline 500)  - Monitor    DVT PPX: Heparin sq   Diet: DASH while off of BIPAP    GOC DNR/DNI - refer to GOC on admission , Palliative consult appreciated , palliative will discuss hospice further.    poor prognosis     #Progress Note Handoff  Pending (specify): Respiratory status /  cont IV solumedrol, monitor K , feeding   Family discussion: dw son and hosseinter  by bedside .
My note supersedes all residents notes that I sign, My correction for their notes are in my notes   the patient looks better and calmer today ,on HFNC , normal resp effort , rest of exam no change   she stated that she feels ok     89-year-old female past medical history of COPD on 4 L NC, Chronic A-fib not on AC, hypothyroidism, CKDIII, HFpEF presents for evaluation of shortness of breath due to COPD exacerbation.    #Acute on chronic hypercapnic hypoxemic respiratory failure  # COPD exacerbation  #Hx COPD on home oxygen 3-4 L  Duplex negative for DVT   blood gases noted    NIV qhs and prn,   Solumedrol 60 BID decreased ot 40 bid   Rapid resopnse overnight of 4/28 for desaturaion   aspiration precautions. S&S for feeding eval - feeding when off NIV  CXR 04/27: Unchanged bibasilar opacities  Currently on HFNC   LE duplex negative for DVT  Procalcitonin 0.12  - c/w Duonebs and Home Inhalers PRN  - Continue doxycycline  - RVP and Procal negative   today improved from yesterday , now on HFNC   S/S  eval requested given she is awake and off the NIV     #MAURI on CKDIII / Hyperkalemia   - gentle hydration : switch LR to d5 half ns   Creatinine Trend: 1.4<--, 1.6<--, 1.4<--, 1.6<--, 1.8<--, 1.8<--  - Hold lasix and resume accordingly  - avoid Nephrotoxics   low K diet once diet approved bt S/S   k 4,8     #Hx of Normocytic anemia   - Hb 9.1 (baseline 9-10)    #Chronic A-fib off AC due to recurrent falls  -EKG NOTED   - c/w home metoprolol with holding parameters and Multaq 400 mg BID      #Chronic HFPEF  #HLD  - holding lasix 20mg PO QD - for MAURI  - Cont statin  - resume accordingly  - Avoid volume overload    #Hypothyroidism  - C/W levothyroxine    #Recurrent falls - Mechanical   - PT eval / Fall precautions     #Troponemia likely type 2  - Trop :0.04 in the setting of CKD-->0.02-->0.03   - BNP:  642 (baseline 500)  - Monitor    DVT PPX: Heparin sq   Diet: DASH while off of BIPAP    GOC DNR/DNI - refer to GOC on admission , consult  palliative     poor prognosis     #Progress Note Handoff  Pending (specify): Respiratory status /  cont IV solumedrol, monitor K , feeding   Family discussion: violette son and amparo  by bedside
89 y/o woman with PMH of COPD on 4 L NC and noncompliant with NIV at times, Chronic Afib not on AC due to recurrent falls, hypothyroidism, CKD 3 and chronic HFpEF presented for shortness of breath due to COPD exacerbation. She is now downgraded to the medical floor from stepdown unit.    #Acute on chronic hypercapnic and hypoxemic respiratory failure due to COPD exacerbation   COPD on home oxygen 3-4 L  noncompliant with NIV at times  was on HFNC and now on O2 at 4L NC - her baseline  Plan:   S/p course of doxycycline, no need for further abx at this time  deescalate to solumedrol IV 40mg q24hr for now per pulmonary on   continue nebs and symbicort  Bipap at night   aspiration precautions    #MAURI on CKD 3 - worsening   #Chronic HFpEF with exacerbation   - home lasix was on hold due to MAURI on admission, which initially improved now worsening   - pt has been on D5 1/2 NS 5/3-  with worsening renal function   -  on admission--> 897, CXR on  with pulmonary vascular congestion and L pleural effusion   - Urine studies c/w prerenal etiology   Plan:   - Echocardiogram ordered  ( prior echo is from F 60-65% with G1 DD )   - Nephrology consulted, starting Lasix 40 q12     #Leukocytosis- resolved, no other s/s sepsis    #Acute on Chronic Normocytic Anemia likely 2/2 LUE skin tear with significant bleeding   #Acute Blood Loss Anemia   - Wound addressed by WC nurse, but pt continues to have bleeding through dressing   - Hb 5.7 today, will order 2u prbc   - heparin and asa dcd  - Burn team consulted for management   - iron studies noted, % sat low, but total iron and ferritin WNL   - B12 and folate ordered     # Hyperkalemia - resolved s/p lokelma     #Chronic Afib not on anticoagulation due to recurrent falls  rate now better controlled on cardizem 30mg q6hr and dronedarone 400mg bid  seen by Dr Huynh this admission  if rate remains controlled, can switch to Cardizem  daily on discharge  - holding parameters for cardizem placed due to relative hypotension and acute blood loss anemia     #Hypothyroidism- on levothyroxine    #Recurrent falls - Mechanical   fall precautions  PT as tolerated    # Demand ischemia  pt asymptomatic  troponins reviewed      DVT prophylaxis - on hold due to anemia       DNR/DNI status  guarded prognosis   Palliative care following- family considering CMO, want to hear from Dr. Sid Burgos ( Pulmonary )       PROGRESS NOTE HANDOFF    Pendinu prbc today, monitoring anemia, starting diuresis for HF exacerbation, monitoring BP, monitoring renal function     Total time spent to complete patient's bedside assessment, review medical chart, discuss medical plan of care with covering medical team was more than 50 minutes  with >50% of time spent face to face with patient, discussion with patient/family and/or coordination of care      Dia Howell, DO
IMPRESSION:     Acute on chronic hypercapnic respiratory failure, improving   Acute on chronic hypoxemic respiratory failure  COPD exacerbation  MAURI on CKD with hyperkalemia improving   HO Severe COPD on home O2.    Chronic afib not on AC due to recurrent falls.  Now wiht RVR / A cj QUICK cardiology follow up   HFPEF      Plan as outlined above
Impression:    Acute on chronic hypercapnic resp failure on NIV  Acute on chronic hypoxemic respiratory failure  COPD exacerbation  MAURI on CKD/ hyperkalemia  Severe COPD & Chronic Hypoxemic Respiratory Failure on 3-4L NC at home   Chronic afib not on AC due to recurrent falls  HfpEF      Impression and plan are my own.
IMPRESSION:     Acute on chronic hypercapnic respiratory failure, improving   Acute on chronic hypoxemic respiratory failure  COPD exacerbation  MAURI on CKD with hyperkalemia improving   HO Severe COPD on home O2.    Chronic afib not on AC due to recurrent falls. Rate controlled.  SP cardiology follow up   HFPEF    Plan as outlined above

## 2023-05-09 NOTE — PROGRESS NOTE ADULT - PROBLEM SELECTOR PLAN 4
-DNR/DNI  -ongoing medical management  -GOC as appropriate - they are considering CMO  -family intent on keeping patient at home

## 2023-05-09 NOTE — PROGRESS NOTE ADULT - ASSESSMENT
89 y/o woman with PMH of COPD on 4 L NC and noncompliant with NIV at times, Chronic Afib not on AC due to recurrent falls, hypothyroidism, CKD 3 and chronic HFpEF presented for shortness of breath due to COPD exacerbation. She is now downgraded to the medical floor from stepdown unit.    #Acute on chronic hypercapnic and hypoxemic respiratory failure due to COPD exacerbation   #history COPD on home oxygen 3-4 L  - was on HFNC and now on O2 at 4L NC - her baseline  - S/p course of doxycycline  - XR 5/5 reviewed, increased interstitial markings and prominence of R pulm artery   - continue solumedrol IV 40mg q24hr per pulmonary   - continue nebs and symbicort,  - aspiration precautions    #MAURI on CKD 3 - worsening   #Chronic HFpEF with possible exacerbation   - home lasix was on hold due to MAURI on admission, which initially improved on IV fluids now worsening   -  on admission  - CXR on 5/5 with increased interstitial marking and prominence or R Pulm Artery   - Follow up repat Repeat BNP   - Follow up Echocardiogram ( prior echo is from 2021EF 60-65% with G1 DD )   - Follow up Urine Studies   - Nephrology consulted - start lasix 40mg iv q12h    #Acute on Chronic Normocytic Anemia likely 2/2 LUE skin tear with significant bleeding   - Wound addressed by WC nurse, but pt continues to have bleeding through dressing   - heparin dosage decreased   - iron studies noted, % sat low, but total iron and ferritin WNL   - Hb 5/8 5.7  - s/p 2 units of PRBC's   - Burn consulted: - Local wound care: wash wound with soap and water. Apply Xeroform kerlix and ace wrap BID.   - Keep BUE elevated for swelling     #Chronic Afib not on anticoagulation due to recurrent falls  - c/w cardizem 30mg q6hr and dronedarone 400mg bid  - if rate remains controlled, can switch to Cardizem  daily on discharge   - Cardiology on board     #Hypothyroidism- on levothyroxine    #Recurrent falls - Mechanical   - fall precautions  - PT as tolerated    Activity: PT  Diet: Soft and bite sized  DVT prophylaxis - heparin SQ  Code status: DNR/DNI status

## 2023-05-09 NOTE — PROGRESS NOTE ADULT - PROBLEM SELECTOR PLAN 2
Respiratory failure in the setting of COPD exacerbation.   - weaned to nasal cannula today/Bipap at night   - recommendations as per pulm

## 2023-05-09 NOTE — PROGRESS NOTE ADULT - SUBJECTIVE AND OBJECTIVE BOX
Over Night Events: events noted, renal/ burn noted, afebrile    PHYSICAL EXAM    ICU Vital Signs Last 24 Hrs  T(C): 36.7 (09 May 2023 05:08), Max: 36.7 (08 May 2023 17:05)  T(F): 98 (09 May 2023 05:08), Max: 98.1 (08 May 2023 17:05)  HR: 86 (09 May 2023 05:08) (75 - 86)  BP: 117/56 (09 May 2023 05:08) (87/45 - 118/49)  BP(mean): 62 (08 May 2023 11:30) (62 - 62)  RR: 18 (09 May 2023 05:08) (18 - 19)  SpO2: 97% (08 May 2023 23:52) (94% - 97%)    O2 Parameters below as of 08 May 2023 23:52  Patient On (Oxygen Delivery Method): nasal cannula,3L            General: ill looking  cushinoid appearance  Lungs: Bilateral  wheezing  Cardiovascular: irregular  Abdomen: Soft, Positive BS  Extremities: No clubbing   Neurological: Non focal         LABS:                          5.7    8.96  )-----------( 150      ( 08 May 2023 07:10 )             18.7                                               05-08    131<L>  |  95<L>  |  72<HH>  ----------------------------<  107<H>  5.2<H>   |  24  |  2.7<H>    Ca    7.9<L>      08 May 2023 07:10  Mg     2.0     05-08                                               Urinalysis Basic - ( 07 May 2023 14:58 )    Color: Yellow / Appearance: Slightly Turbid / S.021 / pH: x  Gluc: x / Ketone: Negative  / Bili: Negative / Urobili: <2 mg/dL   Blood: x / Protein: Trace / Nitrite: Negative   Leuk Esterase: Negative / RBC: 0 /HPF / WBC 1 /HPF   Sq Epi: x / Non Sq Epi: x / Bacteria: Moderate                                                                                                                                                                                MEDICATIONS  (STANDING):  albuterol    90 MICROgram(s) HFA Inhaler 2 Puff(s) Inhalation every 6 hours  albuterol/ipratropium for Nebulization 3 milliLiter(s) Nebulizer every 6 hours  atorvastatin 20 milliGRAM(s) Oral at bedtime  bacitracin   Ointment 1 Application(s) Topical daily  budesonide 160 MICROgram(s)/formoterol 4.5 MICROgram(s) Inhaler 2 Puff(s) Inhalation two times a day  chlorhexidine 2% Cloths 1 Application(s) Topical daily  diltiazem    Tablet 30 milliGRAM(s) Oral every 6 hours  dronedarone 400 milliGRAM(s) Oral two times a day  furosemide   Injectable 40 milliGRAM(s) IV Push every 12 hours  levothyroxine 112 MICROGram(s) Oral daily  methylPREDNISolone sodium succinate Injectable 40 milliGRAM(s) IV Push daily  pantoprazole    Tablet 40 milliGRAM(s) Oral before breakfast

## 2023-05-09 NOTE — PROGRESS NOTE ADULT - ASSESSMENT
MAURI on CKD 3 / pre-renal or ATN iso hypotension / CRS   Acute respiratory failure / COPD exacerbation / ADHF   Hypervolemic hyponatremia  Acute normocytic anemia      - cr stable  - cont lasix 40mg iv q12h  - monitor bladder scan / r/o urinary retention  - recent renal/bladder ultrasound w/o R hydro, L unable to visualize  - needs RBCs with extra dose of iv lasix / r/o overt bleed  - check phos level  - pulm f/u   - palliative care f/u / GOC

## 2023-05-09 NOTE — PROGRESS NOTE ADULT - SUBJECTIVE AND OBJECTIVE BOX
LENGTH OF HOSPITAL STAY: 13d    CHIEF COMPLAINT:   Patient is a 89y old  Female who presents with a chief complaint of COPD exacerbation (09 May 2023 07:36)      HISTORY OF PRESENTING ILLNESS:    HPI:  89-year-old female past medical history of COPD on 4 L NC, Chronic A-fib not on AC, hypothyroidism, CKDIII, HFpEF presents for evaluation of shortness of breath. istory obtained from son who lives with her. Patient developed shortness of breath with associated dry cough for past 3 days, however she was not hypoxic and was saturating 95% on baseline 4 L at home. No reported fever, chills, headache, chest pain, abdominal pain, weakness, numbness, dysuria, hematuria, vomiting, diarrhea. She was placed on CPAP by EMS received 2 DuoNebs and 60 mg of Solu-Medrol. At baseline she is AAOx2-3 and ambulates with walker at home. Pt had recent admission for COPD exacerbation 3/4-3/15.     In ED  VT bp 118/53, HR 67, RR 22, T 97.6 F, SpO2 97% on BIPAP  Labs significant for hgb 9.0 (bl 9-10), Cr 1.8 (bl 1.2), Mg 2.5, K 5.5, Trop 0.04,   VBG PH 7.27, pCO2 71, HCO3 33, pO2 78    CXR No evidence of focal consolidation, pleural effusion or pneumothorax    s/p 1x Levaquin and Cefepime, Solumedrol 65 mg 1x in ED  s/p evaluation by CC team, admitted to SDU for managements of AHRF secondary to COPD exacerbation (2023 15:37)    PAST MEDICAL & SURGICAL HISTORY  PAST MEDICAL & SURGICAL HISTORY:  COPD (chronic obstructive pulmonary disease)      Hypothyroid      HTN (hypertension)      High cholesterol      Colitis      CKD (chronic kidney disease)      No significant past surgical history        SOCIAL HISTORY:    ALLERGIES:  No Known Allergies    MEDICATIONS:  STANDING MEDICATIONS  albuterol    90 MICROgram(s) HFA Inhaler 2 Puff(s) Inhalation every 6 hours  albuterol/ipratropium for Nebulization 3 milliLiter(s) Nebulizer every 6 hours  atorvastatin 20 milliGRAM(s) Oral at bedtime  bacitracin   Ointment 1 Application(s) Topical daily  budesonide 160 MICROgram(s)/formoterol 4.5 MICROgram(s) Inhaler 2 Puff(s) Inhalation two times a day  chlorhexidine 2% Cloths 1 Application(s) Topical daily  diltiazem    Tablet 30 milliGRAM(s) Oral every 6 hours  dronedarone 400 milliGRAM(s) Oral two times a day  furosemide   Injectable 40 milliGRAM(s) IV Push every 12 hours  levothyroxine 112 MICROGram(s) Oral daily  methylPREDNISolone sodium succinate Injectable 40 milliGRAM(s) IV Push daily  pantoprazole    Tablet 40 milliGRAM(s) Oral before breakfast    PRN MEDICATIONS    VITALS:   T(F): 98  HR: 86  BP: 117/56  RR: 18  SpO2: 97%    LABS:                        5.7    8.96  )-----------( 150      ( 08 May 2023 07:10 )             18.7     05-08    131<L>  |  95<L>  |  72<HH>  ----------------------------<  107<H>  5.2<H>   |  24  |  2.7<H>    Ca    7.9<L>      08 May 2023 07:10  Mg     2.0     05-08        Urinalysis Basic - ( 07 May 2023 14:58 )    Color: Yellow / Appearance: Slightly Turbid / S.021 / pH: x  Gluc: x / Ketone: Negative  / Bili: Negative / Urobili: <2 mg/dL   Blood: x / Protein: Trace / Nitrite: Negative   Leuk Esterase: Negative / RBC: 0 /HPF / WBC 1 /HPF   Sq Epi: x / Non Sq Epi: x / Bacteria: Moderate                RADIOLOGY:    PHYSICAL EXAM:  GEN: No acute distress  LUNGS: Clear to auscultation bilaterally   HEART: S1/S2 present. RRR.   ABD: Soft, non-tender, non-distended.   EXT: B/L LUE swelling   NEURO: AAOX2

## 2023-05-09 NOTE — PROGRESS NOTE ADULT - SUBJECTIVE AND OBJECTIVE BOX
HPI:    89-year-old female past medical history of COPD on 4 L NC, Chronic A-fib not on AC, hypothyroidism, CKDIII, HFpEF presents for evaluation of shortness of breath. History obtained from son who lives with her. Patient developed shortness of breath with associated dry cough for past 3 days, however she was not hypoxic and was saturating 95% on baseline 4 L at home. No reported fever, chills, headache, chest pain, abdominal pain, weakness, numbness, dysuria, hematuria, vomiting, diarrhea. She was placed on CPAP by EMS received 2 DuoNebs and 60 mg of Solu-Medrol. At baseline she is AAOx2-3 and ambulates with walker at home. Pt had recent admission for COPD exacerbation 3/4-3/15.     Interval history:   -Patient seen at bedside    -She is now on O2 via nasal cannula, improvement in HGB, worsening MAURI  - son is at bedside reports he was able to speak with pulm this AM    ADVANCE DIRECTIVES:    [ ] Full Code [ x] DNR/DNI  MOLST  [ X]  Living Will  [ ]   DECISION MAKER(s): HCP  [ ] Health Care Proxy(s)  [x] Surrogate(s)  [ ] Guardian           Name(s): Phone Number(s): Son Andrzej is HCP ( in one content). secondary HCP is Minda reyes.     BASELINE (I)ADL(s) (prior to admission):  [place]: [ ]Total  [ X ] Moderate [ ]Dependent  Palliative Performance Status Version 2:       60  %    [email or web address]/files/[email or web address]    Allergies    No Known Allergies    Intolerances    MEDICATIONS  (STANDING):  albuterol    90 MICROgram(s) HFA Inhaler 2 Puff(s) Inhalation every 6 hours  albuterol/ipratropium for Nebulization 3 milliLiter(s) Nebulizer every 6 hours  aspirin  chewable 81 milliGRAM(s) Oral daily  atorvastatin 20 milliGRAM(s) Oral at bedtime  budesonide 160 MICROgram(s)/formoterol 4.5 MICROgram(s) Inhaler 2 Puff(s) Inhalation two times a day  chlorhexidine 2% Cloths 1 Application(s) Topical daily  dronedarone 400 milliGRAM(s) Oral two times a day  heparin   Injectable 5000 Unit(s) SubCutaneous every 8 hours  levothyroxine 112 MICROGram(s) Oral daily  methylPREDNISolone sodium succinate Injectable 40 milliGRAM(s) IV Push every 12 hours  metoprolol tartrate 25 milliGRAM(s) Oral every 12 hours  pantoprazole    Tablet 40 milliGRAM(s) Oral before breakfast  sodium zirconium cyclosilicate 10 Gram(s) Oral every 12 hours  tamsulosin 0.4 milliGRAM(s) Oral at bedtime    MEDICATIONS  (PRN):      PRESENT SYMPTOMS:   Pain: [ ]yes [x ]no  QOL impact -   Location -                    Aggravating factors -  Quality -  Radiation -  Timing-  Severity (0-10 scale):  Minimal acceptable level (0-10 scale):       Dyspnea:                           [X] None[X0  Mild [ ]Moderate [ ]Severe     Respiratory Distress Observation Scale (RDOS): 0  A score of 0 to 2 signifies little or no respiratory distress, 3 signifies mild distress, scores 4 to 6 indicate moderate distress, and scores greater than 7 signify severe distress  [email or web address]    Anxiety:                             [ x]None[ ]Mild [ ]Moderate [ ]Severe   Fatigue:                             [ x]None[ ]Mild [ ]Moderate [ ]Severe   Nausea:                             [x ]None[ ]Mild [ ]Moderate [ ]Severe   Loss of appetite:              [ x]None[ ]Mild [ ]Moderate [ ]Severe   Constipation:                    [x ]None[ ]Mild [ ]Moderate [ ]Severe    Other Symptoms:  [x ]All other review of systems negative     Palliative Performance Status Version 2:         20%    [email or web address]    PHYSICAL EXAM:  ICU Vital Signs Last 24 Hrs  T(C): 36.7 (09 May 2023 05:08), Max: 36.7 (08 May 2023 17:05)  T(F): 98 (09 May 2023 05:08), Max: 98.1 (08 May 2023 17:05)  HR: 78 (09 May 2023 11:26) (75 - 86)  BP: 121/59 (09 May 2023 11:26) (92/50 - 121/59)  RR: 18 (09 May 2023 05:08) (18 - 19)  SpO2: 97% (08 May 2023 23:52) (97% - 97%)    GENERAL:  [ x] No acute distress [ ]Lethargic  [ ]Unarousable  [X ]Verbal  [ ]Non-Verbal [ ]Cachexia    BEHAVIORAL/PSYCH:  [ ]Alert and Oriented x  [ ] Anxiety [ ] Delirium [ ] Agitation [X ] Calm   EYES: [ x] No scleral icterus [ ] Scleral icterus [ ] Closed  ENMT:  [ ]Dry mouth  [x ]No external oral lesions [ ] No external ear or nose lesions  CARDIOVASCULAR:  [ ]Regular [ ]Irregular [ ]Tachy [ ]Not Tachy  [ ]Maximus [ ] Edema [ ] No edema  PULMONARY:  [ ]Tachypnea  [ ]Audible excessive secretions [ x] No labored breathing [ ] labored breathing  GASTROINTESTINAL: [ ]Soft  [ ]Distended  [x ]Not distended [ ]Non tender [ ]Tender  MUSCULOSKELETAL: [ ]No clubbing [ ] clubbing  [x ] No cyanosis [ ] cyanosis  NEUROLOGIC: [ ]No focal deficits  [x ]Follows commands  [ ]Does not follow commands  [ ]Cognitive impairment  [ ]Dysphagia  [ ]Dysarthria  [ ]Paresis   SKIN: [ ] Jaundiced [ ] Non-jaundiced [ ]Rash [ ]No Rash [ ] Warm [ ] Dry  MISC/LINES: [ ] ET tube [ ] Trach [ ]NGT/OGT [ ]PEG [ ]Tobar      LABS: reviewed by me             05-08    131<L>  |  95<L>  |  72<HH>  ----------------------------<  107<H>  5.2<H>   |  24  |  2.7<H>    Ca    7.9<L>      08 May 2023 07:10  Mg     2.0     05-08                            5.7    8.96  )-----------( 150      ( 08 May 2023 07:10 )             18.7       EKG: reviewed by me  < from: 12 Lead ECG (05.01.23 @ 14:55) >  Ventricular Rate 141 BPM  < from: Xray Chest 1 View AP/PA (04.30.23 @ 10:13) >  Impression:    No radiographic evidence of acute cardiopulmonary disease.        --- End of Report ---      < end of copied text >    Atrial Rate 141 BPM    QRS Duration 74 ms    Q-T Interval 288 ms    QTC Calculation(Bazett) 441 ms    R Axis 105 degrees    T Axis 79 degrees    Diagnosis Line Supraventricular tachycardia  Rightward axis  Nonspecific STand T wave abnormality  Abnormal ECG    < end of copied text >      Patient discussed with primary medical team MD  Palliative care education provided to patient and/or family

## 2023-05-09 NOTE — PROGRESS NOTE ADULT - ASSESSMENT
89 year old woman with history of COPD on 4LNC, CKDIII, HFpEF presents with COPD exacerbation.  Palliative care consulted for GOC.    Patient seen at bedside with son  today. Now on nasal cannula. They are considering comfort care.     Education about palliative care provided to patient/family.  See Recs below.    Please call [phone] with questions or concerns 24/7.   We will continue to follow.

## 2023-05-09 NOTE — PROGRESS NOTE ADULT - ASSESSMENT
IMPRESSION:     Acute on chronic hypercapnic respiratory failure on NC  Acute on chronic hypoxemic respiratory failure  COPD exacerbation  MAURI on CKD worsening  Anemia  HO Severe COPD on home O2.    Chronic afib not on AC due to recurrent falls. Rate controlled.  SP cardiology follow up   HFPEF      PLAN:    CNS: Avoid sedatives    HEENT: Oral care    PULMONARY:   HOB @ 45 degrees, aspiration precautions.  Wean O2 as tolerated.  Encourage NIV use during sleep and PRN during the day, solumedrol 40 q 24    CARDIOVASCULAR: Avoid overload Rate control, lasix x1    GI: GI prophylaxis. feeding per speech and swallow, bowel regimen    RENAL: Correct as necessary.  Repeat lytes.     INFECTIOUS DISEASE: SP ABX course     HEMATOLOGICAL: DVT prophylaxis.  Monitor CBC and Coags, fup cbc    ENDOCRINE: Follow up FS. Insulin protocol if needed.    MUSC: PT/OT    Poor prognosis.     DNR/DNI    palliative care fup

## 2023-05-10 NOTE — PROGRESS NOTE ADULT - ASSESSMENT
89-year-old female past medical history of COPD on 4 L NC, Chronic A-fib not on AC, hypothyroidism, CKDIII, HFpEF presents for evaluation of shortness of breath. istory obtained from son who lives with her. Patient developed shortness of breath with associated dry cough for past 3 days, however she was not hypoxic and was saturating 95% on baseline 4 L at home.    Pt was initially admitted to step down unit and later transferred to medicine floor    # Acute on chronic  hypoxic  respiratory failure sec  to COPD exacerbation   # H/o chr resp failure on 4 lit oxygen at home  - Xray Chest 1 View- PORTABLE-Urgent (Xray Chest 1 View- PORTABLE-Urgent .) (05.07.23 @ 14:13) >Pulmonary vascular congestion unchanged. Left lower lobe opacity/pleural effusion unchanged. No air leak.  - S/p doxycycline  - c/w solumedrol  - c/w duoneb, budesonide  - maintain oxygen sat > 92  - pulmonary following    # Acute kidney injury on CKD stage 3, prerenal or ATN  # Hyponatremia  -  US Kidney and Bladder (04.26.23 @ 23:24) >No right-sided hydronephrosis. Left kidney poorly visualized-unable to   assess for hydronephrosis. Debris in the bladde  - urine Culture Results: <10,000 CFU/mL Normal Urogenital Consuelo (05.08.23 @ 03:15)  - Nephrology f/u : MAURI on CKD 3 / pre-renal or ATN iso hypotension / CRS   - c/w IV lasix  - monitor BMP    # Acute on chr diastolic CHF  # Chronic afib , rate controlled  -TTE Echo Complete w/o Contrast w/ Doppler (05.09.23 @ 13:19) >EF of 67 %.  - c/w IV lasix  - c/w diltiazem, dronedarone   - not on AC sec to recurrent falls    # Acute blood loss anemia sec to bleeding from skin tear  # B/l Upper ext  wounds  - s/p 2 units of PRBC's   - Local wound care: wash wound with soap and water. Apply Xeroform kerlix and ace wrap BID.   - Keep BUE elevated for swelling     # Hypothyroidism  - c/w synthroid    # Dyslipidemia  - c/w statin    # H/o recurrent falls  - fall precautions    # DVT prrophylaxis    # Poor prognosis    # DNR/ DNI family considering CMO    # Pending: monitor BMP, awaiting  families decision  # Discussed plan of care with patient  # Disposition: Home

## 2023-05-10 NOTE — PROGRESS NOTE ADULT - SUBJECTIVE AND OBJECTIVE BOX
Over Night Events: events noted, on NC, renal reviewed, afebrile    PHYSICAL EXAM    ICU Vital Signs Last 24 Hrs  T(C): 36.4 (10 May 2023 05:37), Max: 36.9 (09 May 2023 20:00)  T(F): 97.6 (10 May 2023 05:37), Max: 98.4 (09 May 2023 20:00)  HR: 76 (10 May 2023 05:37) (71 - 85)  BP: 113/54 (10 May 2023 05:37) (113/54 - 143/64)  RR: 18 (10 May 2023 05:37) (18 - 18)  SpO2: 95% (10 May 2023 05:37) (95% - 95%)        General: ill looking  Lungs: Bilateral wheezing  Cardiovascular: MANUEL 2.6  Abdomen: Soft, Positive BS  Swelling +      05-09-23 @ 07:01  -  05-10-23 @ 07:00  --------------------------------------------------------  IN:  Total IN: 0 mL    OUT:    Voided (mL): 1500 mL  Total OUT: 1500 mL    Total NET: -1500 mL          LABS:                          9.1    13.72 )-----------( 154      ( 09 May 2023 08:44 )             27.2                                               05-09    131<L>  |  92<L>  |  82<HH>  ----------------------------<  83  5.0   |  23  |  3.1<H>    Ca    7.7<L>      09 May 2023 08:44  Phos  5.5     05-09  Mg     2.2     05-09    TPro  5.2<L>  /  Alb  3.5  /  TBili  1.0  /  DBili  x   /  AST  29  /  ALT  22  /  AlkPhos  65  05-09                                                                                           LIVER FUNCTIONS - ( 09 May 2023 08:44 )  Alb: 3.5 g/dL / Pro: 5.2 g/dL / ALK PHOS: 65 U/L / ALT: 22 U/L / AST: 29 U/L / GGT: x                                                  Culture - Urine (collected 08 May 2023 03:15)  Source: Clean Catch Clean Catch (Midstream)  Final Report (09 May 2023 08:57):    <10,000 CFU/mL Normal Urogenital Consuelo                                                                                           MEDICATIONS  (STANDING):  albuterol    90 MICROgram(s) HFA Inhaler 2 Puff(s) Inhalation every 6 hours  albuterol/ipratropium for Nebulization 3 milliLiter(s) Nebulizer every 6 hours  atorvastatin 20 milliGRAM(s) Oral at bedtime  bacitracin   Ointment 1 Application(s) Topical daily  budesonide 160 MICROgram(s)/formoterol 4.5 MICROgram(s) Inhaler 2 Puff(s) Inhalation two times a day  chlorhexidine 2% Cloths 1 Application(s) Topical daily  diltiazem    Tablet 30 milliGRAM(s) Oral every 6 hours  dronedarone 400 milliGRAM(s) Oral two times a day  furosemide   Injectable 40 milliGRAM(s) IV Push every 12 hours  levothyroxine 112 MICROGram(s) Oral daily  methylPREDNISolone sodium succinate Injectable 40 milliGRAM(s) IV Push daily  pantoprazole    Tablet 40 milliGRAM(s) Oral before breakfast

## 2023-05-10 NOTE — PROGRESS NOTE ADULT - ASSESSMENT
IMPRESSION:     Acute on chronic hypercapnic respiratory failure on NC  Acute on chronic hypoxemic respiratory failure  COPD exacerbation  MAURI on CKD worsening non oliguric  Anemia  HO Severe COPD on home O2.    Chronic afib not on AC due to recurrent falls. Rate controlled.  SP cardiology follow up   HFPEF      PLAN:    CNS: Avoid sedatives    HEENT: Oral care    PULMONARY:   HOB @ 45 degrees, aspiration precautions.  Wean O2 as tolerated.  Encourage NIV use during sleep and PRN during the day, solumedrol 40 q 24    CARDIOVASCULAR: Avoid overload Rate control, lasix daily    GI: GI prophylaxis. feeding per speech and swallow, bowel regimen    RENAL: Correct as necessary.  Repeat lytes.     INFECTIOUS DISEASE: SP ABX course     HEMATOLOGICAL: DVT prophylaxis.  Monitor CBC and Coags, fup cbc    ENDOCRINE: Follow up FS. Insulin protocol if needed.    MUSC: PT/OT    Poor prognosis.     DNR/DNI  spoke with son    palliative care fup

## 2023-05-10 NOTE — PROGRESS NOTE ADULT - SUBJECTIVE AND OBJECTIVE BOX
HPI:    89-year-old female past medical history of COPD on 4 L NC, Chronic A-fib not on AC, hypothyroidism, CKDIII, HFpEF presents for evaluation of shortness of breath. History obtained from son who lives with her. Patient developed shortness of breath with associated dry cough for past 3 days, however she was not hypoxic and was saturating 95% on baseline 4 L at home. No reported fever, chills, headache, chest pain, abdominal pain, weakness, numbness, dysuria, hematuria, vomiting, diarrhea. She was placed on CPAP by EMS received 2 DuoNebs and 60 mg of Solu-Medrol. At baseline she is AAOx2-3 and ambulates with walker at home. Pt had recent admission for COPD exacerbation 3/4-3/15.     Interval history:   -Patient seen at bedside    -She is now on O2 via nasal cannula, improvement in HGB, worsening MAURI  -son and daughter at bedside, report she is sleeping more today than yesterday     ADVANCE DIRECTIVES:    [ ] Full Code [ x] DNR/DNI  MOLST  [ X]  Living Will  [ ]   DECISION MAKER(s): HCP  [ ] Health Care Proxy(s)  [x] Surrogate(s)  [ ] Guardian           Name(s): Phone Number(s): Son Andrzej is HCP ( in one content). secondary HCP is Minda reyes.     BASELINE (I)ADL(s) (prior to admission):  [place]: [ ]Total  [ X ] Moderate [ ]Dependent  Palliative Performance Status Version 2:       60  %    [email or web address]/files/[email or web address]    Allergies    No Known Allergies    Intolerances    MEDICATIONS  (STANDING):  albuterol    90 MICROgram(s) HFA Inhaler 2 Puff(s) Inhalation every 6 hours  albuterol/ipratropium for Nebulization 3 milliLiter(s) Nebulizer every 6 hours  aspirin  chewable 81 milliGRAM(s) Oral daily  atorvastatin 20 milliGRAM(s) Oral at bedtime  budesonide 160 MICROgram(s)/formoterol 4.5 MICROgram(s) Inhaler 2 Puff(s) Inhalation two times a day  chlorhexidine 2% Cloths 1 Application(s) Topical daily  dronedarone 400 milliGRAM(s) Oral two times a day  heparin   Injectable 5000 Unit(s) SubCutaneous every 8 hours  levothyroxine 112 MICROGram(s) Oral daily  methylPREDNISolone sodium succinate Injectable 40 milliGRAM(s) IV Push every 12 hours  metoprolol tartrate 25 milliGRAM(s) Oral every 12 hours  pantoprazole    Tablet 40 milliGRAM(s) Oral before breakfast  sodium zirconium cyclosilicate 10 Gram(s) Oral every 12 hours  tamsulosin 0.4 milliGRAM(s) Oral at bedtime    MEDICATIONS  (PRN):      PRESENT SYMPTOMS:   Pain: [ ]yes [x ]no   QOL impact -   Location -                    Aggravating factors -  Quality -  Radiation -  Timing-  Severity (0-10 scale):  Minimal acceptable level (0-10 scale):       Dyspnea:                           [X] None[  Mild [ ]Moderate [ ]Severe     Respiratory Distress Observation Scale (RDOS): 0  A score of 0 to 2 signifies little or no respiratory distress, 3 signifies mild distress, scores 4 to 6 indicate moderate distress, and scores greater than 7 signify severe distress  [email or web address]    Anxiety:                             [ x]None[ ]Mild [ ]Moderate [ ]Severe   Fatigue:                             [ x]None[ ]Mild [ ]Moderate [ ]Severe   Nausea:                             [x ]None[ ]Mild [ ]Moderate [ ]Severe   Loss of appetite:              [ x]None[ ]Mild [ ]Moderate [ ]Severe   Constipation:                    [x ]None[ ]Mild [ ]Moderate [ ]Severe    Other Symptoms:  [x ]All other review of systems negative     Palliative Performance Status Version 2:         20%    [email or web address]    PHYSICAL EXAM:  ICU Vital Signs Last 24 Hrs  T(C): 36.4 (10 May 2023 05:37), Max: 36.9 (09 May 2023 20:00)  T(F): 97.6 (10 May 2023 05:37), Max: 98.4 (09 May 2023 20:00)  HR: 76 (10 May 2023 05:37) (71 - 85)  BP: 113/54 (10 May 2023 05:37) (113/54 - 143/64)  RR: 18 (10 May 2023 05:37) (18 - 18)  SpO2: 97% (10 May 2023 07:48) (95% - 97%)    GENERAL:  [ x] No acute distress [ X]Lethargic  [ ]Unarousable  [X ]Verbal  [ ]Non-Verbal [ ]Cachexia    BEHAVIORAL/PSYCH:  [ ]Alert and Oriented x  [ ] Anxiety [ ] Delirium [ ] Agitation [X ] Calm   EYES: [ x] No scleral icterus [ ] Scleral icterus [ ] Closed  ENMT:  [ ]Dry mouth  [x ]No external oral lesions [ ] No external ear or nose lesions  CARDIOVASCULAR:  [ ]Regular [ ]Irregular [ ]Tachy [ ]Not Tachy  [ ]Maximus [ ] Edema [ ] No edema  PULMONARY:  [ ]Tachypnea  [ ]Audible excessive secretions [ x] No labored breathing [ ] labored breathing  GASTROINTESTINAL: [ ]Soft  [ ]Distended  [x ]Not distended [ ]Non tender [ ]Tender  MUSCULOSKELETAL: [ ]No clubbing [ ] clubbing  [x ] No cyanosis [ ] cyanosis  NEUROLOGIC: [ ]No focal deficits  [x ]Follows commands  [ ]Does not follow commands  [ ]Cognitive impairment  [ ]Dysphagia  [ ]Dysarthria  [ ]Paresis   SKIN: [ ] Jaundiced [ ] Non-jaundiced [ ]Rash [ ]No Rash [ ] Warm [ ] Dry  MISC/LINES: [ ] ET tube [ ] Trach [ ]NGT/OGT [ ]PEG [ ]Tobar      LABS: reviewed by me    05-10    134<L>  |  94<L>  |  90<HH>  ----------------------------<  99  4.5   |  28  |  3.3<H>    Ca    8.0<L>      10 May 2023 07:10  Phos  5.9     05-10  Mg     2.2     05-10    TPro  5.3<L>  /  Alb  3.5  /  TBili  0.6  /  DBili  x   /  AST  27  /  ALT  24  /  AlkPhos  71  05-10                            9.4    11.29 )-----------( 200      ( 10 May 2023 07:10 )             29.2           EKG: reviewed by me  < from: 12 Lead ECG (05.01.23 @ 14:55) >  Ventricular Rate 141 BPM  < from: Xray Chest 1 View AP/PA (04.30.23 @ 10:13) >  Impression:    No radiographic evidence of acute cardiopulmonary disease.        --- End of Report ---      < end of copied text >    Atrial Rate 141 BPM    QRS Duration 74 ms    Q-T Interval 288 ms    QTC Calculation(Bazett) 441 ms    R Axis 105 degrees    T Axis 79 degrees    Diagnosis Line Supraventricular tachycardia  Rightward axis  Nonspecific STand T wave abnormality  Abnormal ECG    < end of copied text >      Patient discussed with primary medical team MD  Palliative care education provided to patient and/or family

## 2023-05-10 NOTE — PROGRESS NOTE ADULT - ASSESSMENT
89 year old woman with history of COPD on 4LNC, CKDIII, HFpEF presents with COPD exacerbation.  Palliative care consulted for GOC.    Patient seen at bedside with son  today. Now on nasal cannula. They are considering comfort care. Plan to speak with pulm tomorrow.     Education about palliative care provided to patient/family.  See Recs below.    Please call [phone] with questions or concerns 24/7.   We will continue to follow.

## 2023-05-10 NOTE — PROGRESS NOTE ADULT - SUBJECTIVE AND OBJECTIVE BOX
Patient is a 89y old  Female who presents with a chief complaint of COPD exacerbation (10 May 2023 12:43)    Patient was seen and examined.  Patient with poor oral intake  Pt drowsy    PAST MEDICAL & SURGICAL HISTORY:  COPD (chronic obstructive pulmonary disease)  Hypothyroid  HTN (hypertension)  High cholesterol  Colitis  CKD (chronic kidney disease)    No significant past surgical history    Allergies  No Known Allergies    MEDICATIONS  (STANDING):  albuterol    90 MICROgram(s) HFA Inhaler 2 Puff(s) Inhalation every 6 hours  albuterol/ipratropium for Nebulization 3 milliLiter(s) Nebulizer every 6 hours  atorvastatin 20 milliGRAM(s) Oral at bedtime  bacitracin   Ointment 1 Application(s) Topical daily  budesonide 160 MICROgram(s)/formoterol 4.5 MICROgram(s) Inhaler 2 Puff(s) Inhalation two times a day  chlorhexidine 2% Cloths 1 Application(s) Topical daily  diltiazem    Tablet 30 milliGRAM(s) Oral every 6 hours  dronedarone 400 milliGRAM(s) Oral two times a day  furosemide   Injectable 40 milliGRAM(s) IV Push every 12 hours  levothyroxine 112 MICROGram(s) Oral daily  methylPREDNISolone sodium succinate Injectable 40 milliGRAM(s) IV Push daily  pantoprazole    Tablet 40 milliGRAM(s) Oral before breakfast    MEDICATIONS  (PRN):    Vital Signs Last 24 Hrs  T(C): 36.8  T(F): 98.2  HR: 77  BP: 109/58  BP(mean): --  RR: 18  SpO2: 97%    O/E: drowsy , not in distress.  HEENT: atraumatic, EOMI.  Chest: decreased breath sounds B/l   CVS: SIS2 +, irregular no murmur.  P/A: Soft, BS+  CNS: drowsy  Ext:  lower ext edema++  Skin:  BUE wounds+, dressing+  All systems reviewed positive findings as above.                          9.4<L>  11.29<H> )-----------( 200      ( 10 May 2023 07:10 )             29.2<L>                        9.1<L>  13.72<H> )-----------( 154      ( 09 May 2023 08:44 )             27.2<L>    05-10    134<L>  |  94<L>  |  90<HH>  ----------------------------<  99  4.5   |  28  |  3.3<H>  05-09    131<L>  |  92<L>  |  82<HH>  ----------------------------<  83  5.0   |  23  |  3.1<H>    Ca    8.0<L>      10 May 2023 07:10  Ca    7.7<L>      09 May 2023 08:44  Phos  5.9     05-10  Mg     2.2     05-10    TPro  5.3<L>  /  Alb  3.5  /  TBili  0.6  /  DBili  x   /  AST  27  /  ALT  24  /  AlkPhos  71  05-10  TPro  5.2<L>  /  Alb  3.5  /  TBili  1.0  /  DBili  x   /  AST  29  /  ALT  22  /  AlkPhos  65  05-09                Culture - Urine (collected 08 May 2023 03:15)  Source: Clean Catch Clean Catch (Midstream)  Final Report (09 May 2023 08:57):    <10,000 CFU/mL Normal Urogenital Consuelo

## 2023-05-10 NOTE — PROGRESS NOTE ADULT - ASSESSMENT
MAURI on CKD 3 / pre-renal or ATN iso hypotension / CRS   Acute respiratory failure / COPD exacerbation / ADHF   Hypervolemic hyponatremia  Acute normocytic anemia    - cr stable  - cont lasix 40mg iv q12h  - non oliguruc   - monitor bladder scan / r/o urinary retention  - recent renal/bladder ultrasound w/o R hydro, L unable to visualize  - if repeated k > 5.5 start lokelma 10   -ph level noted / no binders   - pulm f/u   - overall prognosis poor   - palliative care f/u appreciated

## 2023-05-10 NOTE — PROGRESS NOTE ADULT - SUBJECTIVE AND OBJECTIVE BOX
LENGTH OF HOSPITAL STAY: 14d    CHIEF COMPLAINT:   Patient is a 89y old  Female who presents with a chief complaint of COPD exacerbation (10 May 2023 07:50)      HISTORY OF PRESENTING ILLNESS:    HPI:  89-year-old female past medical history of COPD on 4 L NC, Chronic A-fib not on AC, hypothyroidism, CKDIII, HFpEF presents for evaluation of shortness of breath. istory obtained from son who lives with her. Patient developed shortness of breath with associated dry cough for past 3 days, however she was not hypoxic and was saturating 95% on baseline 4 L at home. No reported fever, chills, headache, chest pain, abdominal pain, weakness, numbness, dysuria, hematuria, vomiting, diarrhea. She was placed on CPAP by EMS received 2 DuoNebs and 60 mg of Solu-Medrol. At baseline she is AAOx2-3 and ambulates with walker at home. Pt had recent admission for COPD exacerbation 3/4-3/15.     In ED  VT bp 118/53, HR 67, RR 22, T 97.6 F, SpO2 97% on BIPAP  Labs significant for hgb 9.0 (bl 9-10), Cr 1.8 (bl 1.2), Mg 2.5, K 5.5, Trop 0.04,   VBG PH 7.27, pCO2 71, HCO3 33, pO2 78    CXR No evidence of focal consolidation, pleural effusion or pneumothorax    s/p 1x Levaquin and Cefepime, Solumedrol 65 mg 1x in ED  s/p evaluation by CC team, admitted to SDU for managements of AHRF secondary to COPD exacerbation (26 Apr 2023 15:37)    PAST MEDICAL & SURGICAL HISTORY  PAST MEDICAL & SURGICAL HISTORY:  COPD (chronic obstructive pulmonary disease)      Hypothyroid      HTN (hypertension)      High cholesterol      Colitis      CKD (chronic kidney disease)      No significant past surgical history        SOCIAL HISTORY:    ALLERGIES:  No Known Allergies    MEDICATIONS:  STANDING MEDICATIONS  albuterol    90 MICROgram(s) HFA Inhaler 2 Puff(s) Inhalation every 6 hours  albuterol/ipratropium for Nebulization 3 milliLiter(s) Nebulizer every 6 hours  atorvastatin 20 milliGRAM(s) Oral at bedtime  bacitracin   Ointment 1 Application(s) Topical daily  budesonide 160 MICROgram(s)/formoterol 4.5 MICROgram(s) Inhaler 2 Puff(s) Inhalation two times a day  chlorhexidine 2% Cloths 1 Application(s) Topical daily  diltiazem    Tablet 30 milliGRAM(s) Oral every 6 hours  dronedarone 400 milliGRAM(s) Oral two times a day  furosemide   Injectable 40 milliGRAM(s) IV Push every 12 hours  levothyroxine 112 MICROGram(s) Oral daily  methylPREDNISolone sodium succinate Injectable 40 milliGRAM(s) IV Push daily  pantoprazole    Tablet 40 milliGRAM(s) Oral before breakfast    PRN MEDICATIONS    VITALS:   T(F): 97.6  HR: 76  BP: 113/54  RR: 18  SpO2: 95%    LABS:                        9.1    13.72 )-----------( 154      ( 09 May 2023 08:44 )             27.2     05-09    131<L>  |  92<L>  |  82<HH>  ----------------------------<  83  5.0   |  23  |  3.1<H>    Ca    7.7<L>      09 May 2023 08:44  Phos  5.5     05-09  Mg     2.2     05-09    TPro  5.2<L>  /  Alb  3.5  /  TBili  1.0  /  DBili  x   /  AST  29  /  ALT  22  /  AlkPhos  65  05-09              Culture - Urine (collected 08 May 2023 03:15)  Source: Clean Catch Clean Catch (Midstream)  Final Report (09 May 2023 08:57):    <10,000 CFU/mL Normal Urogenital Consuelo          RADIOLOGY:    PHYSICAL EXAM:  GEN: No acute distress  LUNGS: decreased breath sounds bilaterally   HEART: S1/S2 present. RRR.   ABD: Soft, non-tender, non-distended. Bowel sounds present  NEURO: AAOX2

## 2023-05-10 NOTE — PROGRESS NOTE ADULT - SUBJECTIVE AND OBJECTIVE BOX
seen and examined  24 h events noted       PAST HISTORY  --------------------------------------------------------------------------------  No significant changes to PMH, PSH, FHx, SHx, unless otherwise noted    ALLERGIES & MEDICATIONS  --------------------------------------------------------------------------------  Allergies    No Known Allergies    Intolerances      Standing Inpatient Medications  albuterol    90 MICROgram(s) HFA Inhaler 2 Puff(s) Inhalation every 6 hours  albuterol/ipratropium for Nebulization 3 milliLiter(s) Nebulizer every 6 hours  atorvastatin 20 milliGRAM(s) Oral at bedtime  bacitracin   Ointment 1 Application(s) Topical daily  budesonide 160 MICROgram(s)/formoterol 4.5 MICROgram(s) Inhaler 2 Puff(s) Inhalation two times a day  chlorhexidine 2% Cloths 1 Application(s) Topical daily  diltiazem    Tablet 30 milliGRAM(s) Oral every 6 hours  dronedarone 400 milliGRAM(s) Oral two times a day  furosemide   Injectable 40 milliGRAM(s) IV Push every 12 hours  levothyroxine 112 MICROGram(s) Oral daily  methylPREDNISolone sodium succinate Injectable 40 milliGRAM(s) IV Push daily  pantoprazole    Tablet 40 milliGRAM(s) Oral before breakfast        VITALS/PHYSICAL EXAM  --------------------------------------------------------------------------------  T(C): 36.4 (05-10-23 @ 05:37), Max: 36.9 (05-09-23 @ 20:00)  HR: 76 (05-10-23 @ 05:37) (71 - 85)  BP: 113/54 (05-10-23 @ 05:37) (113/54 - 143/64)  RR: 18 (05-10-23 @ 05:37) (18 - 18)  SpO2: 95% (05-10-23 @ 05:37) (95% - 95%)  Wt(kg): --        05-09-23 @ 07:01  -  05-10-23 @ 07:00  --------------------------------------------------------  IN: 0 mL / OUT: 1500 mL / NET: -1500 mL      Physical Exam:  	Gen: NAD  	Pulm: B/L wilner   	CV: S1S2; no rub  	Abd: +distended  	LE:  edema      LABS/STUDIES  --------------------------------------------------------------------------------              9.1    13.72 >-----------<  154      [05-09-23 @ 08:44]              27.2     131  |  92  |  82  ----------------------------<  83      [05-09-23 @ 08:44]  5.0   |  23  |  3.1        Ca     7.7     [05-09-23 @ 08:44]      Mg     2.2     [05-09-23 @ 08:44]      Phos  5.5     [05-09-23 @ 08:44]    TPro  5.2  /  Alb  3.5  /  TBili  1.0  /  DBili  x   /  AST  29  /  ALT  22  /  AlkPhos  65  [05-09-23 @ 08:44]      Creatinine Trend:  SCr 3.1 [05-09 @ 08:44]  SCr 2.7 [05-08 @ 07:10]  SCr 2.5 [05-07 @ 08:33]  SCr 1.9 [05-06 @ 05:31]  SCr 1.5 [05-05 @ 07:03]    Urinalysis - [05-07-23 @ 14:58]      Color Yellow / Appearance Slightly Turbid / SG 1.021 / pH 5.5      Gluc Negative / Ketone Negative  / Bili Negative / Urobili <2 mg/dL       Blood Negative / Protein Trace / Leuk Est Negative / Nitrite Negative      RBC 0 / WBC 1 / Hyaline 0 / Gran  / Sq Epi  / Non Sq Epi  / Bacteria Moderate    Urine Creatinine 121      [05-07-23 @ 14:58]  Urine Sodium 24.0      [05-07-23 @ 14:58]  Urine Urea Nitrogen 1114      [05-07-23 @ 14:58]    Iron 36, TIBC 297, %sat 12      [04-27-23 @ 06:00]  Ferritin 71      [04-27-23 @ 06:00]

## 2023-05-10 NOTE — CHART NOTE - NSCHARTNOTEFT_GEN_A_CORE
Notified by vascular lab that patient has left brachial DVT. Anticoagulation held for now due to severe bleeding risk. Will reconvene for plan in the AM.

## 2023-05-10 NOTE — PROGRESS NOTE ADULT - ASSESSMENT
89 y/o woman with PMH of COPD on 4 L NC and noncompliant with NIV at times, Chronic Afib not on AC due to recurrent falls, hypothyroidism, CKD 3 and chronic HFpEF presented for shortness of breath due to COPD exacerbation. She is now downgraded to the medical floor from stepdown unit.    #Acute on chronic hypercapnic and hypoxemic respiratory failure due to COPD exacerbation   #history COPD on home oxygen 3-4 L  - was on HFNC and now on O2 at 4L NC - her baseline  - S/p course of doxycycline  - XR 5/5 reviewed, increased interstitial markings and prominence of R pulm artery   - continue solumedrol IV 40mg q24hr per pulmonary   - continue nebs and symbicort,  - aspiration precautions    #MAURI on CKD 3 - worsening   #Chronic HFpEF with possible exacerbation   - home lasix was on hold due to MAURI on admission, which initially improved on IV fluids now worsening   -  on admission  - CXR on 5/5 with increased interstitial marking and prominence or R Pulm Artery   - Follow up repat Repeat BNP   - Echo 5/9: EF 60%  - Nephrology consulted - start lasix 40mg iv q12h    #Acute on Chronic Normocytic Anemia likely 2/2 LUE skin tear with significant bleeding   - Wound addressed by WC nurse, but pt continues to have bleeding through dressing   - heparin dosage decreased   - iron studies noted, % sat low, but total iron and ferritin WNL   - Hb 5/8 5.7  - s/p 2 units of PRBC's   - Burn consulted: - Local wound care: wash wound with soap and water. Apply Xeroform kerlix and ace wrap BID.   - Keep BUE elevated for swelling     #Chronic Afib not on anticoagulation due to recurrent falls  - c/w cardizem 30mg q6hr and dronedarone 400mg bid  - if rate remains controlled, can switch to Cardizem  daily on discharge   - Cardiology on board     #Hypothyroidism- on levothyroxine    #Recurrent falls - Mechanical   - fall precautions  - PT as tolerated    Activity: PT  Diet: Soft and bite sized  DVT prophylaxis - heparin SQ  Code status: DNR/DNI status

## 2023-05-11 NOTE — PROGRESS NOTE ADULT - PROBLEM SELECTOR PLAN 1
- no labs  - no IVF  - no artificial feeding  - no vitals  - no pulse ox  - no 02 supplementation  - no injections  - no FS  - comfort feedings OK  - continue synthroid, antiarrythmics as tolerated by mouth      Haldol 0.5 mg IV Q 6 H PRN for agitation or nausea  Dilaudid 0.25 mg IV Q 1 H PRN for pain or dyspnea  Ativan 0.5 mg IV Q4h PRN for agitation and anxiety  Glycopyrrolate 0.2 mg IV Q6h PRN for secretions - no labs  - no IVF  - no artificial feeding  - no vitals  - no pulse ox  - no 02 supplementation  - no injections  - no FS  - comfort feedings OK  - continue synthroid    Haldol 0.5 mg IV Q 6 H PRN for agitation or nausea  Dilaudid 0.25 mg IV Q 1 H PRN for pain or dyspnea  Ativan 0.5 mg IV Q4h PRN for agitation and anxiety  Glycopyrrolate 0.2 mg IV Q6h PRN for secretions

## 2023-05-11 NOTE — PROGRESS NOTE ADULT - SUBJECTIVE AND OBJECTIVE BOX
Over Night Events: events noted, on NC, afebrile, renal reviewed  PHYSICAL EXAM    ICU Vital Signs Last 24 Hrs  T(C): 36.9 (11 May 2023 04:03), Max: 36.9 (11 May 2023 04:03)  T(F): 98.5 (11 May 2023 04:03), Max: 98.5 (11 May 2023 04:03)  HR: 73 (11 May 2023 04:03) (67 - 77)  BP: 132/57 (11 May 2023 04:03) (99/56 - 132/57)  RR: 20 (11 May 2023 04:03) (18 - 20)  SpO2: 95% (11 May 2023 04:03) (95% - 95%)    O2 Parameters below as of 11 May 2023 04:03  Patient On (Oxygen Delivery Method): nasal cannula            General: ill looking  Lungs: Bilateral wheezing  Cardiovascular: MANUEL 2.6  Abdomen: Soft, Positive BS  Extremities: No clubbing   Neurological: Non focal       05-10-23 @ 07:01  -  05-11-23 @ 07:00  --------------------------------------------------------  IN:  Total IN: 0 mL    OUT:    Voided (mL): 1400 mL  Total OUT: 1400 mL    Total NET: -1400 mL          LABS:                          9.4    11.29 )-----------( 200      ( 10 May 2023 07:10 )             29.2                                               05-10    134<L>  |  94<L>  |  90<HH>  ----------------------------<  99  4.5   |  28  |  3.3<H>    Ca    8.0<L>      10 May 2023 07:10  Phos  5.9     05-10  Mg     2.2     05-10    TPro  5.3<L>  /  Alb  3.5  /  TBili  0.6  /  DBili  x   /  AST  27  /  ALT  24  /  AlkPhos  71  05-10                                                                                           LIVER FUNCTIONS - ( 10 May 2023 07:10 )  Alb: 3.5 g/dL / Pro: 5.3 g/dL / ALK PHOS: 71 U/L / ALT: 24 U/L / AST: 27 U/L / GGT: x                                                                                                                                       MEDICATIONS  (STANDING):  albuterol    90 MICROgram(s) HFA Inhaler 2 Puff(s) Inhalation every 6 hours  albuterol/ipratropium for Nebulization 3 milliLiter(s) Nebulizer every 6 hours  atorvastatin 20 milliGRAM(s) Oral at bedtime  bacitracin   Ointment 1 Application(s) Topical daily  budesonide 160 MICROgram(s)/formoterol 4.5 MICROgram(s) Inhaler 2 Puff(s) Inhalation two times a day  chlorhexidine 2% Cloths 1 Application(s) Topical daily  diltiazem    Tablet 30 milliGRAM(s) Oral every 6 hours  dronedarone 400 milliGRAM(s) Oral two times a day  furosemide   Injectable 40 milliGRAM(s) IV Push every 12 hours  levothyroxine 112 MICROGram(s) Oral daily  methylPREDNISolone sodium succinate Injectable 40 milliGRAM(s) IV Push daily  pantoprazole    Tablet 40 milliGRAM(s) Oral before breakfast    MEDICATIONS  (PRN):      Xrays:                                                                                     ECHO

## 2023-05-11 NOTE — PROGRESS NOTE ADULT - ASSESSMENT
IMPRESSION:     Acute on chronic hypercapnic respiratory failure on NC  Acute on chronic hypoxemic respiratory failure  COPD exacerbation  MAURI on CKD worsening non oliguric  Anemia  HO Severe COPD on home O2.    Chronic afib not on AC due to recurrent falls. Rate controlled.  SP cardiology follow up   HFPEF      PLAN:    CNS: Avoid sedatives    HEENT: Oral care    PULMONARY:   HOB @ 45 degrees, aspiration precautions.  Wean O2 as tolerated.  Encourage NIV use during sleep and PRN during the day, solumedrol 40 q 24    CARDIOVASCULAR: Avoid overload Rate control,    GI: GI prophylaxis. feeding per speech and swallow, bowel regimen    RENAL: Correct as necessary.  Repeat lytes.     INFECTIOUS DISEASE: SP ABX course     HEMATOLOGICAL: DVT prophylaxis.  Monitor CBC and Coags, fup cbc    ENDOCRINE: Follow up FS. Insulin protocol if needed.    MUSC: PT/OT    Poor prognosis.     DNR/DNI  spoke with son    palliative care fup

## 2023-05-11 NOTE — PROGRESS NOTE ADULT - SUBJECTIVE AND OBJECTIVE BOX
Patient is a 89y old  Female who presents with a chief complaint of COPD exacerbation (10 May 2023 12:43)    Patient was seen and examined.  Patient with poor oral intake  Pt is drowsy    PAST MEDICAL & SURGICAL HISTORY:  COPD (chronic obstructive pulmonary disease)  Hypothyroid  HTN (hypertension)  High cholesterol  Colitis  CKD (chronic kidney disease)    No significant past surgical history    Allergies  No Known Allergies    MEDICATIONS  (STANDING):  albuterol    90 MICROgram(s) HFA Inhaler 2 Puff(s) Inhalation every 6 hours  albuterol/ipratropium for Nebulization 3 milliLiter(s) Nebulizer every 6 hours  bacitracin   Ointment 1 Application(s) Topical daily  budesonide 160 MICROgram(s)/formoterol 4.5 MICROgram(s) Inhaler 2 Puff(s) Inhalation two times a day  chlorhexidine 2% Cloths 1 Application(s) Topical daily  diltiazem    Tablet 30 milliGRAM(s) Oral every 6 hours  dronedarone 400 milliGRAM(s) Oral two times a day  furosemide   Injectable 40 milliGRAM(s) IV Push every 12 hours  levothyroxine 112 MICROGram(s) Oral daily  methylPREDNISolone sodium succinate Injectable 40 milliGRAM(s) IV Push daily    MEDICATIONS  (PRN):  glycopyrrolate Injectable 0.2 milliGRAM(s) IV Push every 6 hours PRN Respiratory secretions  haloperidol    Injectable 0.5 milliGRAM(s) IntraMuscular every 6 hours PRN Agitation or nausea  HYDROmorphone  Injectable 0.25 milliGRAM(s) IV Push every 1 hour PRN Pain or dyspnea  LORazepam   Injectable 0.5 milliGRAM(s) IV Push every 4 hours PRN Anxiety or agitation    T(C): 36.9 (05-11-23 @ 04:03), Max: 36.9 (05-11-23 @ 04:03)  HR: 73 (05-11-23 @ 04:03) (67 - 73)  BP: 132/57 (05-11-23 @ 04:03) (99/56 - 132/57)  RR: 20 (05-11-23 @ 04:03) (20 - 20)  SpO2: 95% (05-11-23 @ 04:03) (95% - 95%)    O/E: drowsy , not in distress.  HEENT: atraumatic, EOMI.  Chest: decreased breath sounds B/l   CVS: SIS2 +, irregular no murmur.  P/A: Soft, BS+  CNS: drowsy  Ext:  lower ext edema++  Skin:  BUE wounds+, dressing+  All systems reviewed positive findings as above.                            9.4<L>  8.74  )-----------( 178      ( 11 May 2023 06:59 )             28.5<L>                        9.4<L>  11.29<H> )-----------( 200      ( 10 May 2023 07:10 )             29.2<L>  05-11    139  |  96<L>  |  93<HH>  ----------------------------<  95  4.4   |  24  |  3.2<H>  05-10    134<L>  |  94<L>  |  90<HH>  ----------------------------<  99  4.5   |  28  |  3.3<H>    Ca    8.4      11 May 2023 06:59  Ca    8.0<L>      10 May 2023 07:10  Phos  5.6     05-11  Mg     2.2     05-11    TPro  5.2<L>  /  Alb  3.2<L>  /  TBili  0.6  /  DBili  x   /  AST  24  /  ALT  22  /  AlkPhos  66  05-11  TPro  5.3<L>  /  Alb  3.5  /  TBili  0.6  /  DBili  x   /  AST  27  /  ALT  24  /  AlkPhos  71  05-10

## 2023-05-11 NOTE — PROGRESS NOTE ADULT - CONVERSATION DETAILS
Spoke at bedside with family. Andrzej spoke with pulm and was able to come to the decision that he wants to proceed with making the patient as comfortable as possible and stop life prolonging measures. Discussed comfort measures only including stopping labs, vitals, FS, IV fluids, Bipap, escalation of care, pulse ox monitoring, SC injections, and focusing on treating her symptoms. Explained she can continue her usual medications as prescribed for now. Explained that she may pass away here at the hospital but also may last weeks, and so he may be approached about d/c with hospice in the future. He would rather keep her here. Explained that we can discussed discharge plans once we see how she does over the next day or so. Daughter of patient also in agreement with plan of care.
Spoke with son at bedside. He feels like his mother is suffering and does not want to put her through too much more. Again he asserts he will not let her go to a NH for discharge. Discussed again CMO which would be starting end of life care in the hospital, including no labs, vitals, tests, and only focusing on symptom management. He has decided he definitely will not consent to HD if the patient's kidney function continues to decline. He was able to speak with pulmonology this AM and he reports they suggested giving the patient a few more days of treatment.
Breath sounds clear and equal bilaterally.
Spoke with son of patient at bedside. Discussed the patient's condition and current COPD flares. Discussed the option's for d/c including home care with ongoing med mgmt versus hospice care. One of his concerns with hospice care includes that they do not provide much day to day assistance at home. he feels it would be difficult for him and his family to provide end of life care on their own without much help. Discussed the option for private hire HHA or NH placement. Son is adamant the patient should be kept home and he does not want his mother placed in a NH for long term care. He wants to speak with the patients pulmonologist before making any decisions.
Spoke with family at bedside. Son is HCP. He reports concern re: his mother's deteriorating condition. He wants whatever is  best for his mother. His main concern is that she is suffering right now. Discussed options moving forward including starting comfort care here in the hospital. Discussed options for d/c with hospice, either home or in a facility. He is adamant about his mother NOT going to a nursing home but also does not think he can handle her care at home. Explained that he could start CMO here and then figure out the discharge plan from there.     Andrzej does not want to make a decision without speaking with Dr. Jose.

## 2023-05-11 NOTE — PROGRESS NOTE ADULT - TREATMENT GUIDELINE COMMENT
DNR/DNI  CMO
DNR/DNI  Continue current medical management  Pulm to speak with son Andrzej
DNR/DNI  Continue current med mgmt  Son is considering CMO
DNR/DNI  Continue current med mgmt  Will reach out to pulm

## 2023-05-11 NOTE — PROGRESS NOTE ADULT - SUBJECTIVE AND OBJECTIVE BOX
HPI:    89-year-old female past medical history of COPD on 4 L NC, Chronic A-fib not on AC, hypothyroidism, CKDIII, HFpEF presents for evaluation of shortness of breath. History obtained from son who lives with her. Patient developed shortness of breath with associated dry cough for past 3 days, however she was not hypoxic and was saturating 95% on baseline 4 L at home. No reported fever, chills, headache, chest pain, abdominal pain, weakness, numbness, dysuria, hematuria, vomiting, diarrhea. She was placed on CPAP by EMS received 2 DuoNebs and 60 mg of Solu-Medrol. At baseline she is AAOx2-3 and ambulates with walker at home. Pt had recent admission for COPD exacerbation 3/4-3/15.     Interval history:   -Patient seen at bedside    -She is now on O2 via nasal cannula, improvement in HGB, worsening MAURI  -son and daughter at bedside, she denies pain, discomfort, dyspnea     ADVANCE DIRECTIVES:    [ ] Full Code [ x] DNR/DNI  MOLST  [ X]  Living Will  [ ]   DECISION MAKER(s): HCP  [ ] Health Care Proxy(s)  [x] Surrogate(s)  [ ] Guardian           Name(s): Phone Number(s): Son Andrzej is HCP ( in one content). secondary HCP is Minda reyes.     BASELINE (I)ADL(s) (prior to admission):  [place]: [ ]Total  [ X ] Moderate [ ]Dependent  Palliative Performance Status Version 2:       60  %    [email or web address]    Allergies    No Known Allergies    Intolerances    MEDICATIONS  (STANDING):  albuterol    90 MICROgram(s) HFA Inhaler 2 Puff(s) Inhalation every 6 hours  albuterol/ipratropium for Nebulization 3 milliLiter(s) Nebulizer every 6 hours  aspirin  chewable 81 milliGRAM(s) Oral daily  atorvastatin 20 milliGRAM(s) Oral at bedtime  budesonide 160 MICROgram(s)/formoterol 4.5 MICROgram(s) Inhaler 2 Puff(s) Inhalation two times a day  chlorhexidine 2% Cloths 1 Application(s) Topical daily  dronedarone 400 milliGRAM(s) Oral two times a day  heparin   Injectable 5000 Unit(s) SubCutaneous every 8 hours  levothyroxine 112 MICROGram(s) Oral daily  methylPREDNISolone sodium succinate Injectable 40 milliGRAM(s) IV Push every 12 hours  metoprolol tartrate 25 milliGRAM(s) Oral every 12 hours  pantoprazole    Tablet 40 milliGRAM(s) Oral before breakfast  sodium zirconium cyclosilicate 10 Gram(s) Oral every 12 hours  tamsulosin 0.4 milliGRAM(s) Oral at bedtime    MEDICATIONS  (PRN):      PRESENT SYMPTOMS:   Pain: [ ]yes [x ]no   QOL impact -   Location -                    Aggravating factors -  Quality -  Radiation -  Timing-  Severity (0-10 scale):  Minimal acceptable level (0-10 scale):       Dyspnea:                           [X] None[  Mild [ ]Moderate [ ]Severe     Respiratory Distress Observation Scale (RDOS): 0  A score of 0 to 2 signifies little or no respiratory distress, 3 signifies mild distress, scores 4 to 6 indicate moderate distress, and scores greater than 7 signify severe distress  [email or web address]    Anxiety:                             [ x]None[ ]Mild [ ]Moderate [ ]Severe   Fatigue:                             [ x]None[ ]Mild [ ]Moderate [ ]Severe   Nausea:                             [x ]None[ ]Mild [ ]Moderate [ ]Severe   Loss of appetite:              [ x]None[ ]Mild [ ]Moderate [ ]Severe   Constipation:                    [x ]None[ ]Mild [ ]Moderate [ ]Severe    Other Symptoms:  [x ]All other review of systems negative     Palliative Performance Status Version 2:         20%    [email or web address]    PHYSICAL EXAM:  ICU Vital Signs Last 24 Hrs  T(C): 36.9 (11 May 2023 04:03), Max: 36.9 (11 May 2023 04:03)  T(F): 98.5 (11 May 2023 04:03), Max: 98.5 (11 May 2023 04:03)  HR: 73 (11 May 2023 04:03) (67 - 77)  BP: 132/57 (11 May 2023 04:03) (99/56 - 132/57)  RR: 20 (11 May 2023 04:03) (18 - 20)  SpO2: 95% (11 May 2023 04:03) (95% - 95%)    GENERAL:  [ x] No acute distress [ X]Lethargic  [ ]Unarousable  [X ]Verbal  [ ]Non-Verbal [ ]Cachexia    BEHAVIORAL/PSYCH:  [ ]Alert and Oriented x  [ ] Anxiety [ ] Delirium [ ] Agitation [X ] Calm   EYES: [ x] No scleral icterus [ ] Scleral icterus [ ] Closed  ENMT:  [ ]Dry mouth  [x ]No external oral lesions [ ] No external ear or nose lesions  CARDIOVASCULAR:  [ ]Regular [ ]Irregular [ ]Tachy [ ]Not Tachy  [ ]Maximus [ ] Edema [ ] No edema  PULMONARY:  [ ]Tachypnea  [ ]Audible excessive secretions [ x] No labored breathing [ ] labored breathing  GASTROINTESTINAL: [ ]Soft  [ ]Distended  [x ]Not distended [ ]Non tender [ ]Tender  MUSCULOSKELETAL: [ ]No clubbing [ ] clubbing  [x ] No cyanosis [ ] cyanosis  NEUROLOGIC: [ ]No focal deficits  [x ]Follows commands  [ ]Does not follow commands  [ ]Cognitive impairment  [ ]Dysphagia  [ ]Dysarthria  [ ]Paresis   SKIN: [ ] Jaundiced [ ] Non-jaundiced [ ]Rash [ ]No Rash [ ] Warm [ ] Dry  MISC/LINES: [ ] ET tube [ ] Trach [ ]NGT/OGT [ ]PEG [ ]Tobar      LABS: reviewed by georgie    05-11    139  |  96<L>  |  93<HH>  ----------------------------<  95  4.4   |  24  |  3.2<H>    Ca    8.4      11 May 2023 06:59  Phos  5.6     05-11  Mg     2.2     05-11    TPro  5.2<L>  /  Alb  3.2<L>  /  TBili  0.6  /  DBili  x   /  AST  24  /  ALT  22  /  AlkPhos  66  05-11                            9.4    8.74  )-----------( 178      ( 11 May 2023 06:59 )             28.5     EKG: reviewed by me  < from: 12 Lead ECG (05.01.23 @ 14:55) >  Ventricular Rate 141 BPM  < from: Xray Chest 1 View AP/PA (04.30.23 @ 10:13) >  Impression:    No radiographic evidence of acute cardiopulmonary disease.        --- End of Report ---      < end of copied text >    Atrial Rate 141 BPM    QRS Duration 74 ms    Q-T Interval 288 ms    QTC Calculation(Bazett) 441 ms    R Axis 105 degrees    T Axis 79 degrees    Diagnosis Line Supraventricular tachycardia  Rightward axis  Nonspecific STand T wave abnormality  Abnormal ECG    < end of copied text >      Patient discussed with primary medical team MD  Palliative care education provided to patient and/or family

## 2023-05-11 NOTE — PROGRESS NOTE ADULT - ASSESSMENT
MAURI on CKD 3 / pre-renal or ATN iso hypotension / Cardiorenal syndrome   Acute respiratory failure / COPD exacerbation / ADHF   Hypervolemic hyponatremia  Acute normocytic anemia    - cr stable  - cont lasix 40mg iv q12h  - non oliguruc   - monitor bladder scan / r/o urinary retention  - recent renal/bladder ultrasound w/o R hydro, L unable to visualize  -ph level noted / no binders   - pulm f/u   - overall prognosis poor   - palliative care f/u appreciated     spoke with son/ patient will be CMO   will sign off recall PRN / call using TEAMS or on [phone]

## 2023-05-11 NOTE — PROGRESS NOTE ADULT - ASSESSMENT
89-year-old female past medical history of COPD on 4 L NC, Chronic A-fib not on AC, hypothyroidism, CKDIII, HFpEF presents for evaluation of shortness of breath. istory obtained from son who lives with her. Patient developed shortness of breath with associated dry cough for past 3 days, however she was not hypoxic and was saturating 95% on baseline 4 L at home.    Pt was initially admitted to step down unit and later transferred to medicine floor    # Acute on chronic  hypoxic  respiratory failure sec  to COPD exacerbation   # H/o chr resp failure on 4 lit oxygen at home  # Acute kidney injury on CKD stage 3, prerenal or ATN  # Hyponatremia  # Acute on chr diastolic CHF  # Chronic afib , rate controlled  -# Acute blood loss anemia sec to bleeding from skin tear  # B/l Upper ext  wounds  # Hypothyroidism  # Dyslipidemia  # H/o recurrent falls    # DNR/ DNI/ CMO  - c/w Comfort care measures

## 2023-05-11 NOTE — PROGRESS NOTE ADULT - SUBJECTIVE AND OBJECTIVE BOX
Nephrology progress note    THIS IS AN INCOMPLETE NOTE . FULL NOTE TO FOLLOW SHORTLY    Patient is seen and examined, events over the last 24 h noted .    Allergies:  No Known Allergies    Hospital Medications:   MEDICATIONS  (STANDING):  albuterol    90 MICROgram(s) HFA Inhaler 2 Puff(s) Inhalation every 6 hours  albuterol/ipratropium for Nebulization 3 milliLiter(s) Nebulizer every 6 hours  atorvastatin 20 milliGRAM(s) Oral at bedtime  bacitracin   Ointment 1 Application(s) Topical daily  budesonide 160 MICROgram(s)/formoterol 4.5 MICROgram(s) Inhaler 2 Puff(s) Inhalation two times a day  chlorhexidine 2% Cloths 1 Application(s) Topical daily  diltiazem    Tablet 30 milliGRAM(s) Oral every 6 hours  dronedarone 400 milliGRAM(s) Oral two times a day  furosemide   Injectable 40 milliGRAM(s) IV Push every 12 hours  levothyroxine 112 MICROGram(s) Oral daily  methylPREDNISolone sodium succinate Injectable 40 milliGRAM(s) IV Push daily  pantoprazole    Tablet 40 milliGRAM(s) Oral before breakfast        VITALS:  T(F): 98.5 (23 @ 04:03), Max: 98.5 (23 @ 04:03)  HR: 73 (23 @ 04:03)  BP: 132/57 (23 @ 04:03)  RR: 20 (23 @ 04:03)  SpO2: 95% (23 @ 04:03)  Wt(kg): --    :  -  05-10 @ 07:00  --------------------------------------------------------  IN: 0 mL / OUT: 1500 mL / NET: -1500 mL    05-10 @ 07:01  -   @ 07:00  --------------------------------------------------------  IN: 0 mL / OUT: 1400 mL / NET: -1400 mL          PHYSICAL EXAM:  Constitutional: NAD  HEENT: anicteric sclera, oropharynx clear, MMM  Neck: No JVD  Respiratory: CTAB, no wheezes, rales or rhonchi  Cardiovascular: S1, S2, RRR  Gastrointestinal: BS+, soft, NT/ND  Extremities: No cyanosis or clubbing. No peripheral edema  :  No santizo.   Skin: No rashes    LABS:  05-10    134<L>  |  94<L>  |  90<HH>  ----------------------------<  99  4.5   |  28  |  3.3<H>  Creatinine Trend: 3.3<--, 3.1<--, 2.7<--, 2.5<--, 1.9<--, 1.5<--  Ca    8.0<L>      10 May 2023 07:10  Phos  5.9     05-10  Mg     2.2     05-10    TPro  5.3<L>  /  Alb  3.5  /  TBili  0.6  /  DBili      /  AST  27  /  ALT  24  /  AlkPhos  71  05-10                          9.4    8.74  )-----------( 178      ( 11 May 2023 06:59 )             28.5       Urine Studies:  Urinalysis Basic - ( 07 May 2023 14:58 )    Color: Yellow / Appearance: Slightly Turbid / S.021 / pH:   Gluc:  / Ketone: Negative  / Bili: Negative / Urobili: <2 mg/dL   Blood:  / Protein: Trace / Nitrite: Negative   Leuk Esterase: Negative / RBC: 0 /HPF / WBC 1 /HPF   Sq Epi:  / Non Sq Epi:  / Bacteria: Moderate      Sodium, Random Urine: 24.0 mmoL/L ( @ 14:58)  Creatinine, Random Urine: 121 mg/dL ( @ 14:58)      Iron 36, TIBC 297, %sat 12      [23 @ 06:00]  Ferritin 71      [23 @ 06:00]          RADIOLOGY & ADDITIONAL STUDIES:   Nephrology progress note  Patient is seen and examined, events over the last 24 h noted .  lying in bed     Allergies:  No Known Allergies    Hospital Medications:   MEDICATIONS  (STANDING):  albuterol    90 MICROgram(s) HFA Inhaler 2 Puff(s) Inhalation every 6 hours  albuterol/ipratropium for Nebulization 3 milliLiter(s) Nebulizer every 6 hours  atorvastatin 20 milliGRAM(s) Oral at bedtime  bacitracin   Ointment 1 Application(s) Topical daily  budesonide 160 MICROgram(s)/formoterol 4.5 MICROgram(s) Inhaler 2 Puff(s) Inhalation two times a day  chlorhexidine 2% Cloths 1 Application(s) Topical daily  diltiazem    Tablet 30 milliGRAM(s) Oral every 6 hours  dronedarone 400 milliGRAM(s) Oral two times a day  furosemide   Injectable 40 milliGRAM(s) IV Push every 12 hours  levothyroxine 112 MICROGram(s) Oral daily  methylPREDNISolone sodium succinate Injectable 40 milliGRAM(s) IV Push daily  pantoprazole    Tablet 40 milliGRAM(s) Oral before breakfast        VITALS:  T(F): 98.5 (23 @ 04:03), Max: 98.5 (23 @ 04:03)  HR: 73 (23 @ 04:03)  BP: 132/57 (23 @ 04:03)  RR: 20 (23 @ 04:03)  SpO2: 95% (23 @ 04:03)       @ :  -  05-10 @ 07:00  --------------------------------------------------------  IN: 0 mL / OUT: 1500 mL / NET: -1500 mL    05-10 @ 07:01  -   @ 07:00  --------------------------------------------------------  IN: 0 mL / OUT: 1400 mL / NET: -1400 mL          PHYSICAL EXAM:  Constitutional: NAD  Respiratory: CTAB  Cardiovascular: S1, S2, RRR  Gastrointestinal: BS+, soft, NT/ND  Extremities: No cyanosis or clubbing. plus one pitting edema   :  No santizo.   Skin: No rashes    LABS:  05-10    134<L>  |  94<L>  |  90<HH>  ----------------------------<  99  4.5   |  28  |  3.3<H>    Creatinine Trend: 3.3<--, 3.1<--, 2.7<--, 2.5<--, 1.9<--, 1.5<--    Ca    8.0<L>      10 May 2023 07:10  Phos  5.9     05-10  Mg     2.2     05-10    TPro  5.3<L>  /  Alb  3.5  /  TBili  0.6  /  DBili      /  AST  27  /  ALT  24  /  AlkPhos  71  05-10                          9.4    8.74  )-----------( 178      ( 11 May 2023 06:59 )             28.5       Urine Studies:  Urinalysis Basic - ( 07 May 2023 14:58 )    Color: Yellow / Appearance: Slightly Turbid / S.021 / pH:   Gluc:  / Ketone: Negative  / Bili: Negative / Urobili: <2 mg/dL   Blood:  / Protein: Trace / Nitrite: Negative   Leuk Esterase: Negative / RBC: 0 /HPF / WBC 1 /HPF   Sq Epi:  / Non Sq Epi:  / Bacteria: Moderate      Sodium, Random Urine: 24.0 mmoL/L ( @ 14:58)  Creatinine, Random Urine: 121 mg/dL ( @ 14:58)      Iron 36, TIBC 297, %sat 12      [23 @ 06:00]  Ferritin 71      [23 @ 06:00]          RADIOLOGY & ADDITIONAL STUDIES:

## 2023-05-11 NOTE — PROGRESS NOTE ADULT - SUBJECTIVE AND OBJECTIVE BOX
89 y/o woman with PMH of COPD on 4 L NC and noncompliant with NIV at times, Chronic Afib not on AC due to recurrent falls, hypothyroidism, CKD 3 and chronic HFpEF presented for shortness of breath due to COPD exacerbation. She is now downgraded to the medical floor from stepdown unit.    #Acute on chronic hypercapnic and hypoxemic respiratory failure due to COPD exacerbation   #history COPD on home oxygen 3-4 L  - was on HFNC and now on O2 at 4L NC - her baseline  - S/p course of doxycycline  - XR 5/5 reviewed, increased interstitial markings and prominence of R pulm artery   - continue solumedrol IV 40mg q24hr per pulmonary   - continue nebs and symbicort  - aspiration precautions    #New DVT of LUE - prelim report   -Follow up official report  - Will hold on AC in the setting of recent blood loss with transfusions  - Monitor Hb     #MAURI on CKD 3 - worsening   #Chronic HFpEF with possible exacerbation   - home lasix was on hold due to MAURI on admission, which initially improved on IV fluids now worsening   -  on admission  - CXR on 5/5 with increased interstitial marking and prominence or R Pulm Artery   - Follow up repat Repeat BNP   - Echo 5/9: EF 60%  - Nephrology consulted - start lasix 40mg iv q12h    #Acute on Chronic Normocytic Anemia likely 2/2 LUE skin tear with significant bleeding   - Wound addressed by WC nurse, but pt continues to have bleeding through dressing   - heparin dosage decreased   - iron studies noted, % sat low, but total iron and ferritin WNL   - Hb 5/8 5.7  - s/p 2 units of PRBC's   - Burn consulted: - Local wound care: wash wound with soap and water. Apply Xeroform kerlix and ace wrap BID.   - Keep BUE elevated for swelling     #Chronic Afib not on anticoagulation due to recurrent falls  - c/w cardizem 30mg q6hr and dronedarone 400mg bid  - if rate remains controlled, can switch to Cardizem  daily on discharge   - Cardiology on board     #Hypothyroidism- on levothyroxine    #Recurrent falls - Mechanical   - fall precautions  - PT as tolerated    Activity: PT  Diet: Soft and bite sized  DVT prophylaxis - heparin SQ  Code status: DNR/DNI status

## 2023-05-11 NOTE — PROGRESS NOTE ADULT - PROBLEM SELECTOR PLAN 2
Respiratory failure in the setting of COPD exacerbation.   - no further Bipap- continue with NC  - continue with inhalers, nebs, steroids, lasix for now as tolerated Respiratory failure in the setting of COPD exacerbation.   - no further Bipap- continue with NC  - continue with inhalers

## 2023-05-11 NOTE — PROGRESS NOTE ADULT - ASSESSMENT
89 year old woman with history of COPD on 4LNC, CKDIII, HFpEF presents with COPD exacerbation.  Palliative care consulted for GOC.    Patient seen at bedside with son and daughter today. Starting CMO.   See recommendations below.   Son is very concerned about moving patient out of hospital. Discussed options for hospice but will hold off on consult for now until patient is comfortably transitioned to CMO and remains stable.     Education about palliative care provided to patient/family.  See Recs below.    Please call [phone] with questions or concerns 24/7.   We will continue to follow.

## 2023-05-11 NOTE — PROGRESS NOTE ADULT - WHAT MATTERS MOST
Doing what is best for mom   Pulm recommendations
Comfort   No Nursing home
keeping the patient at home   speaking with her pulmonologist before making decisions
Doing what is best for mom   Dr. Jose's recommendations

## 2023-05-12 NOTE — PROGRESS NOTE ADULT - ASSESSMENT
89 year old woman with history of COPD on 4LNC, CKDIII, HFpEF presents with COPD exacerbation.  Palliative care consulted for GOC.  Patient is comfort measures only.     Spoke with children at bedside. They expressed some frustration over communication and symptom management. All questions answered.       Education about palliative care provided to patient/family.  See Recs below.    Please call [phone] with questions or concerns 24/7.   We will continue to follow.

## 2023-05-12 NOTE — PROGRESS NOTE ADULT - ASSESSMENT
89-year-old female past medical history of COPD on 4 L NC, Chronic A-fib not on AC, hypothyroidism, CKDIII, HFpEF presents for evaluation of shortness of breath. istory obtained from son who lives with her. Patient developed shortness of breath with associated dry cough for past 3 days, however she was not hypoxic and was saturating 95% on baseline 4 L at home.    Pt was initially admitted to step down unit and later transferred to medicine floor    # Acute on chronic  hypoxic  respiratory failure sec  to COPD exacerbation   # H/o chr resp failure on 4 lit oxygen at home  # Acute kidney injury on CKD stage 3, prerenal or ATN  # Hyponatremia  # Acute on chr diastolic CHF  # Chronic afib , rate controlled  -# Acute blood loss anemia sec to bleeding from skin tear  # B/l Upper ext  wounds  # Hypothyroidism  # Dyslipidemia  # H/o recurrent falls    # DNR/ DNI/ CMO  - c/w Comfort care measures  - Hospice consulted

## 2023-05-12 NOTE — PROGRESS NOTE ADULT - SUBJECTIVE AND OBJECTIVE BOX
Over Night Events: event s noted, on NC, spoke with son    PHYSICAL EXAM    ICU Vital Signs Last 24 Hrs  T(C): 36.1 (11 May 2023 21:00), Max: 36.1 (11 May 2023 21:00)  T(F): 96.9 (11 May 2023 21:00), Max: 96.9 (11 May 2023 21:00)  HR: 73 (11 May 2023 21:00) (73 - 73)  BP: 126/58 (11 May 2023 21:00) (126/58 - 126/58)  RR: 18 (11 May 2023 21:00) (18 - 18)  SpO2: 97% (11 May 2023 21:00) (97% - 97%)    O2 Parameters below as of 11 May 2023 21:00  Patient On (Oxygen Delivery Method): nasal cannula            General: ill looking  Lungs: Bl wheezing  Cardiovascular: Regular   Abdomen: Soft, Positive BS  Extremities: No clubbing   Neurological: Non focal         LABS:                          9.4    8.74  )-----------( 178      ( 11 May 2023 06:59 )             28.5                                               05-11    139  |  96<L>  |  93<HH>  ----------------------------<  95  4.4   |  24  |  3.2<H>    Ca    8.4      11 May 2023 06:59  Phos  5.6     05-11  Mg     2.2     05-11    TPro  5.2<L>  /  Alb  3.2<L>  /  TBili  0.6  /  DBili  x   /  AST  24  /  ALT  22  /  AlkPhos  66  05-11                                                                                           LIVER FUNCTIONS - ( 11 May 2023 06:59 )  Alb: 3.2 g/dL / Pro: 5.2 g/dL / ALK PHOS: 66 U/L / ALT: 22 U/L / AST: 24 U/L / GGT: x                                                                                                                                       MEDICATIONS  (STANDING):  albuterol    90 MICROgram(s) HFA Inhaler 2 Puff(s) Inhalation every 6 hours  bacitracin   Ointment 1 Application(s) Topical daily  budesonide 160 MICROgram(s)/formoterol 4.5 MICROgram(s) Inhaler 2 Puff(s) Inhalation two times a day  chlorhexidine 2% Cloths 1 Application(s) Topical daily  dronedarone 400 milliGRAM(s) Oral two times a day  levothyroxine 112 MICROGram(s) Oral daily    MEDICATIONS  (PRN):  glycopyrrolate Injectable 0.2 milliGRAM(s) IV Push every 6 hours PRN Respiratory secretions  haloperidol    Injectable 0.5 milliGRAM(s) IV Push every 6 hours PRN agitation or nausea  HYDROmorphone  Injectable 0.25 milliGRAM(s) IV Push every 1 hour PRN Pain or dyspnea  LORazepam   Injectable 0.5 milliGRAM(s) IV Push every 4 hours PRN Anxiety or agitation

## 2023-05-12 NOTE — PROGRESS NOTE ADULT - PROBLEM SELECTOR PROBLEM 4
Palliative care by specialist
Palliative care by specialist
Acute kidney injury superimposed on CKD
Palliative care by specialist
Palliative care by specialist
Acute kidney injury superimposed on CKD

## 2023-05-12 NOTE — PROGRESS NOTE ADULT - PROBLEM SELECTOR PROBLEM 1
Altered mental status
COPD exacerbation
COPD exacerbation
Altered mental status
Comfort measures only status
Comfort measures only status

## 2023-05-12 NOTE — PROGRESS NOTE ADULT - SUBJECTIVE AND OBJECTIVE BOX
LENGTH OF HOSPITAL STAY: 16d    CHIEF COMPLAINT:   Patient is a 89y old  Female who presents with a chief complaint of COPD exacerbation (12 May 2023 08:21)      HISTORY OF PRESENTING ILLNESS:    HPI:  89-year-old female past medical history of COPD on 4 L NC, Chronic A-fib not on AC, hypothyroidism, CKDIII, HFpEF presents for evaluation of shortness of breath. istory obtained from son who lives with her. Patient developed shortness of breath with associated dry cough for past 3 days, however she was not hypoxic and was saturating 95% on baseline 4 L at home. No reported fever, chills, headache, chest pain, abdominal pain, weakness, numbness, dysuria, hematuria, vomiting, diarrhea. She was placed on CPAP by EMS received 2 DuoNebs and 60 mg of Solu-Medrol. At baseline she is AAOx2-3 and ambulates with walker at home. Pt had recent admission for COPD exacerbation 3/4-3/15.     In ED  VT bp 118/53, HR 67, RR 22, T 97.6 F, SpO2 97% on BIPAP  Labs significant for hgb 9.0 (bl 9-10), Cr 1.8 (bl 1.2), Mg 2.5, K 5.5, Trop 0.04,   VBG PH 7.27, pCO2 71, HCO3 33, pO2 78    CXR No evidence of focal consolidation, pleural effusion or pneumothorax    s/p 1x Levaquin and Cefepime, Solumedrol 65 mg 1x in ED  s/p evaluation by CC team, admitted to SDU for managements of AHRF secondary to COPD exacerbation (26 Apr 2023 15:37)    PAST MEDICAL & SURGICAL HISTORY  PAST MEDICAL & SURGICAL HISTORY:  COPD (chronic obstructive pulmonary disease)      Hypothyroid      HTN (hypertension)      High cholesterol      Colitis      CKD (chronic kidney disease)      No significant past surgical history        SOCIAL HISTORY:    ALLERGIES:  No Known Allergies    MEDICATIONS:  STANDING MEDICATIONS  albuterol    90 MICROgram(s) HFA Inhaler 2 Puff(s) Inhalation every 6 hours  bacitracin   Ointment 1 Application(s) Topical daily  budesonide 160 MICROgram(s)/formoterol 4.5 MICROgram(s) Inhaler 2 Puff(s) Inhalation two times a day  chlorhexidine 2% Cloths 1 Application(s) Topical daily  dronedarone 400 milliGRAM(s) Oral two times a day  levothyroxine 112 MICROGram(s) Oral daily    PRN MEDICATIONS  glycopyrrolate Injectable 0.2 milliGRAM(s) IV Push every 6 hours PRN  haloperidol    Injectable 0.5 milliGRAM(s) IV Push every 6 hours PRN  HYDROmorphone  Injectable 0.25 milliGRAM(s) IV Push every 1 hour PRN  LORazepam   Injectable 0.5 milliGRAM(s) IV Push every 4 hours PRN    VITALS:   T(F): 96.9  HR: 73  BP: 126/58  RR: 18  SpO2: 97%    LABS:                        9.4    8.74  )-----------( 178      ( 11 May 2023 06:59 )             28.5     05-11    139  |  96<L>  |  93<HH>  ----------------------------<  95  4.4   |  24  |  3.2<H>    Ca    8.4      11 May 2023 06:59  Phos  5.6     05-11  Mg     2.2     05-11    TPro  5.2<L>  /  Alb  3.2<L>  /  TBili  0.6  /  DBili  x   /  AST  24  /  ALT  22  /  AlkPhos  66  05-11                  RADIOLOGY:    PHYSICAL EXAM:  GEN: No acute distress  LUNGS: Clear to auscultation bilaterally   HEART: S1/S2 present. RRR.   ABD: Soft, non-tender, non-distended. Bowel sounds present  NEURO: AAOX2

## 2023-05-12 NOTE — PROGRESS NOTE ADULT - ASSESSMENT
89 y/o woman with PMH of COPD on 4 L NC and noncompliant with NIV at times, Chronic Afib not on AC due to recurrent falls, hypothyroidism, CKD 3 and chronic HFpEF presented for shortness of breath due to COPD exacerbation. She is now downgraded to the medical floor from stepdown unit. Now patient on comfort care measures.     #Acute on chronic hypercapnic and hypoxemic respiratory failure due to COPD exacerbation   #history COPD on home oxygen 3-4 L  - was on HFNC and now on O2 at 4L NC - her baseline  - S/p course of doxycycline  - XR 5/5 reviewed, increased interstitial markings and prominence of R pulm artery   - Comfort care measures     #New DVT of LUE  - Not on AC  - comfort care measures                   #MAURI on CKD 3 - worsening   #Chronic HFpEF with possible exacerbation   - home lasix was on hold due to MAURI on admission, which initially improved on IV fluids now worsening   -  on admission  - CXR on 5/5 with increased interstitial marking and prominence or R Pulm Artery   - Echo 5/9: EF 60%  - Comfort care measures     #Acute on Chronic Normocytic Anemia likely 2/2 LUE skin tear with significant bleeding   - Wound addressed by WC nurse, but pt continues to have bleeding through dressing   - iron studies noted, % sat low, but total iron and ferritin WNL   - Hb 5/8 5.7  - s/p 2 units of PRBC's   - Burn consulted: - Local wound care: wash wound with soap and water. Apply Xeroform kerlix and ace wrap BID.   - Keep BUE elevated for swelling     #Chronic Afib not on anticoagulation due to recurrent falls  - Comfort care measures                    #Hypothyroidism- on levothyroxine    Code status: DNR/DNI status; COMFORT CARE

## 2023-05-12 NOTE — PROGRESS NOTE ADULT - ASSESSMENT
IMPRESSION:     Acute on chronic hypercapnic respiratory failure on NC  Acute on chronic hypoxemic respiratory failure  COPD exacerbation  MAURI on CKD worsening non oliguric  Anemia  HO Severe COPD on home O2.    Chronic afib not on AC due to recurrent falls. Rate controlled.  SP cardiology follow up   HFPEF      PLAN:    CNS: Avoid sedatives    HEENT: Oral care    PULMONARY:   HOB @ 45 degrees, aspiration precautions.  Wean O2 as tolerated.  Encourage NIV use during sleep and PRN during the day, solumedrol    CARDIOVASCULAR: Avoid overload Rate control,    GI: GI prophylaxis. feeding per speech and swallow, bowel regimen    RENAL: Correct as necessary.  Repeat lytes.     INFECTIOUS DISEASE: SP ABX course     HEMATOLOGICAL: DVT prophylaxis.     ENDOCRINE: Follow up FS. Insulin protocol if needed.    MUSC: PT/OT    Poor prognosis.     DNR/DNI  spoke with son    palliative care fup

## 2023-05-12 NOTE — PROGRESS NOTE ADULT - PROBLEM SELECTOR PLAN 1
- no labs  - no IVF  - no artificial feeding  - no vitals  - no pulse ox  - no 02 supplementation  - no injections  - no FS  - comfort feedings OK  - continue synthroid    Dilaudid 0.25 mg IV Q 1 H PRN for pain or dyspnea  Ativan 0.5 mg IV Q4h PRN for agitation and anxiety  Glycopyrrolate 0.2 mg IV Q6h PRN for secretions

## 2023-05-12 NOTE — HOSPICE CARE NOTE - CONVESATION DETAILS
Call placed to patient's son Andrzej who declined to speak with hospice at this time. Andrzej states he is not ready for hospice. Hospice to follow up next week.

## 2023-05-12 NOTE — PROGRESS NOTE ADULT - PROBLEM SELECTOR PLAN 5
-DNR/DNI  -CMO  -hospice consulted
-DNR/DNI  -CMO  -Did not yet place hospice consult but did explain hospice to son

## 2023-05-12 NOTE — PROGRESS NOTE ADULT - PROBLEM SELECTOR PROBLEM 3
History  Chief Complaint   Patient presents with    Shortness of Breath     Brought in via EMS from [address]  Patient was in nursing home in [facility] and just got to [address] [facility] on Monday  Patient c/o SOB worse with exertion  61-year-old [place] American male with no past medical history BIBEMS presenting for evaluation of shortness of breath  Patient recently incarcerated and our [facility] and discharged to [address]  Patient reports increasing shortness of breath for the last 2 days worse with exertion  Patient denies any cough or fevers however EMS found patient to be febrile at 101F  Unknown sick contacts  Denies any cp, abdominal pain, n/v/d  None       History reviewed  No pertinent past medical history  History reviewed  No pertinent surgical history  History reviewed  No pertinent family history  I have reviewed and agree with the history as documented  E-Cigarette/Vaping     E-Cigarette/Vaping Substances     Social History     Tobacco Use    Smoking status: Never Smoker    Smokeless tobacco: Never Used   Substance Use Topics    Alcohol use: Not Currently     Comment: "Not anymore"     Drug use: Not Currently     Types: Cocaine     Comment: Last used in December 2019       Review of Systems   All other systems reviewed and are negative  Physical Exam  Physical Exam   Constitutional: He is oriented to person, place, and time  He appears well-developed and well-nourished  Non-toxic appearance  He does not appear ill  No distress  Sating at 83% on RA, pt placed on 4L nasal cannula up to 95%   HENT:   Head: Normocephalic and atraumatic  Mouth/Throat: No oropharyngeal exudate  Eyes: Conjunctivae are normal    EOM grossly intact   Neck: Normal range of motion  Neck supple  No JVD present  Cardiovascular: Normal rate  Exam reveals no decreased pulses  Pulmonary/Chest: Effort normal  No respiratory distress  He has no decreased breath sounds  He has no wheezes 
He has no rales  Abdominal: Soft  Musculoskeletal:   FROM, steady gait, cap refill brisk, strength and sensation grossly intact throughout   Neurological: He is alert and oriented to person, place, and time  Skin: Skin is warm and dry  Capillary refill takes less than 2 seconds  Psychiatric: He has a normal mood and affect  His behavior is normal    Nursing note and vitals reviewed  Vital Signs  ED Triage Vitals [04/01/20 1110]   Temperature Pulse Respirations Blood Pressure SpO2   99 8 °F (37 7 °C) 96 (!) 23 111/62 94 %      Temp Source Heart Rate Source Patient Position - Orthostatic VS BP Location FiO2 (%)   Oral Monitor Sitting Left arm --      Pain Score       --           Vitals:    04/01/20 1110 04/01/20 1230   BP: 111/62 118/71   Pulse: 96 89   Patient Position - Orthostatic VS: Sitting Sitting         Visual Acuity      ED Medications  Medications - No data to display    Diagnostic Studies  Results Reviewed     Procedure Component Value Units Date/Time    Procalcitonin [[id number]]     Lab Status:  No result Specimen:  Blood     Legionella antigen, urine [[id number]]     Lab Status:  No result Specimen:  Urine, Other     Strep Pneumoniae, Urine [[id number]]     Lab Status:  No result Specimen:  Urine, Other     Influenza A/B and RSV PCR [[id number]]     Lab Status:  No result Specimen:  Nasopharyngeal from Nose     Troponin I [[id number]]  (Normal) Collected:  04/01/20 1135    Lab Status:  Final result Specimen:  Blood from Arm, Right Updated:  04/01/20 1212     Troponin I <0 01 ng/mL     NT-BNP PRO [[id number]]  (Normal) Collected:  04/01/20 1135    Lab Status:  Final result Specimen:  Blood from Arm, Right Updated:  04/01/20 1211     NT-proBNP 68 2 pg/mL     Blood culture #2 [[id number]] Collected:  04/01/20 1158    Lab Status:   In process Specimen:  Blood from Hand, Left Updated:  04/01/20 1210    Comprehensive metabolic panel [[id number]]  (Abnormal) Collected:  04/01/20 1135    Lab Status:  Final
Acute kidney injury superimposed on CKD
result Specimen:  Blood from Arm, Right Updated:  04/01/20 1202     Sodium 136 mmol/L      Potassium 4 0 mmol/L      Chloride 104 mmol/L      CO2 25 mmol/L      ANION GAP 7 mmol/L      BUN 19 mg/dL      Creatinine 1 21 mg/dL      Glucose 106 mg/dL      Calcium 8 3 mg/dL      AST 40 U/L      ALT 31 U/L      Alkaline Phosphatase 40 U/L      Total Protein 7 1 g/dL      Albumin 3 7 g/dL      Total Bilirubin 0 60 mg/dL      eGFR 61 ml/min/1 73sq m     Narrative:       Hemolysis  National Kidney Disease Foundation guidelines for Chronic Kidney Disease (CKD):     Stage 1 with normal or high GFR (GFR > 90 mL/min/1 73 square meters)    Stage 2 Mild CKD (GFR = 60-89 mL/min/1 73 square meters)    Stage 3A Moderate CKD (GFR = 45-59 mL/min/1 73 square meters)    Stage 3B Moderate CKD (GFR = 30-44 mL/min/1 73 square meters)    Stage 4 Severe CKD (GFR = 15-29 mL/min/1 73 square meters)    Stage 5 End Stage CKD (GFR <15 mL/min/1 73 square meters)  Note: GFR calculation is accurate only with a steady state creatinine    Blood culture #1 [[id number]] Collected:  04/01/20 1135    Lab Status: In process Specimen:  Blood from Arm, Right Updated:  04/01/20 1200    Lactic acid, plasma [[id number]]  (Normal) Collected:  04/01/20 1135    Lab Status:  Final result Specimen:  Blood from Arm, Right Updated:  04/01/20 1159     LACTIC ACID 1 3 mmol/L     Narrative:       Result may be elevated if tourniquet was used during collection      CBC and differential [[id number]]  (Abnormal) Collected:  04/01/20 1135    Lab Status:  Final result Specimen:  Blood from Arm, Right Updated:  04/01/20 1158     WBC 5 70 Thousand/uL      RBC 4 58 Million/uL      Hemoglobin 13 5 g/dL      Hematocrit 40 1 %      MCV 88 fL      MCH 29 6 pg      MCHC 33 8 g/dL      RDW 13 6 %      MPV 9 0 fL      Platelets 889 Thousands/uL      Neutrophils Relative 80 %      Lymphocytes Relative 12 %      Monocytes Relative 4 %      Eosinophils Relative 2 %      Basophils
Relative 1 %      Neutrophils Absolute 4 60 Thousands/µL      Lymphocytes Absolute 0 70 Thousands/µL      Monocytes Absolute 0 20 Thousand/µL      Eosinophils Absolute 0 10 Thousand/µL      Basophils Absolute 0 10 Thousands/µL     Coronavirus 2019-nCoV [facility] [[id number]] Collected:  04/01/20 1131    Lab Status:  No result Specimen:  Nasopharyngeal Swab                  XR chest portable    (Results Pending)              Procedures  ECG 12 Lead Documentation Only  Date/Time: 4/1/2020 11:33 AM  Performed by: Jose Alejandro Buchanan PA-C  Authorized by: Jose Alejandro Buchanan PA-C     Indications / Diagnosis:  Sob  ECG reviewed by me, the ED Provider: yes    Patient location:  ED  Interpretation:     Interpretation: normal    Rate:     ECG rate:  87    ECG rate assessment: normal    Rhythm:     Rhythm: sinus rhythm    Ectopy:     Ectopy: none    QRS:     QRS axis:  Normal  Conduction:     Conduction: normal    ST segments:     ST segments:  Normal  T waves:     T waves: normal    Comments:                   ED Course                                 MDM  Number of Diagnoses or Management Options  Hypoxia:   Shortness of breath:   Diagnosis management comments: 75 yo M with no PMH presenting for evaluation of sob, pt recently incarcerated and now currently residing at [place] [address], pt initially sating at 83% on RA, now up to 94/95% with nasal cannula, otherwise well appearing, cxr findings consistent with potential covid19, will admit awaiting covid results and for hypoxia    Portions of the record may have been created with voice recognition software  Occasional wrong word or "sound a like" substitutions may have occurred due to the inherent limitations of voice recognition software  Read the chart carefully and recognize, using context, where substitutions have occurred            Disposition  Final diagnoses:   Shortness of breath   Hypoxia     Time reflects when diagnosis was documented in both MDM as
Counseling regarding goals of care
applicable and the Disposition within this note     Time User Action Codes Description Comment    4/1/2020 12:31 PM Marleny [place] Add [R06 02] Shortness of breath     4/1/2020 12:31 PM Marleny [place] Add [R09 02] Hypoxia       ED Disposition     ED Disposition Condition Date/Time Comment    Admit Stable Wed Apr 1, 2020 12:31 PM Case was discussed with SABRINA and the patient's admission status was agreed to be Admission Status: inpatient status to the service of Dr Krys Coffman   Follow-up Information    None         Patient's Medications    No medications on file     No discharge procedures on file      PDMP Review     None          ED Provider  Electronically Signed by           Yeimy Cano PA-C  04/01/20 1637
Palliative care by specialist
Acute kidney injury superimposed on CKD
Counseling regarding goals of care
Palliative care by specialist
Acute kidney injury superimposed on CKD
Acute kidney injury superimposed on CKD

## 2023-05-12 NOTE — PROGRESS NOTE ADULT - PROBLEM SELECTOR PLAN 2
Respiratory failure in the setting of COPD exacerbation.   - no further Bipap- continue with NC  - continue with inhalers  -added nebulizer patient's son's request

## 2023-05-12 NOTE — PROGRESS NOTE ADULT - SUBJECTIVE AND OBJECTIVE BOX
Patient is a 89y old  Female who presents with a chief complaint of COPD exacerbation (10 May 2023 12:43)    Patient was seen and examined.  Pt on comfort measures, not in distress    PAST MEDICAL & SURGICAL HISTORY:  COPD (chronic obstructive pulmonary disease)  Hypothyroid  HTN (hypertension)  High cholesterol  Colitis  CKD (chronic kidney disease)    No significant past surgical history    Allergies  No Known Allergies    MEDICATIONS  (STANDING):  albuterol    90 MICROgram(s) HFA Inhaler 2 Puff(s) Inhalation every 6 hours  bacitracin   Ointment 1 Application(s) Topical daily  budesonide 160 MICROgram(s)/formoterol 4.5 MICROgram(s) Inhaler 2 Puff(s) Inhalation two times a day  chlorhexidine 2% Cloths 1 Application(s) Topical daily  dronedarone 400 milliGRAM(s) Oral two times a day  levothyroxine 112 MICROGram(s) Oral daily    MEDICATIONS  (PRN):  glycopyrrolate Injectable 0.2 milliGRAM(s) IV Push every 6 hours PRN Respiratory secretions  haloperidol    Injectable 0.5 milliGRAM(s) IV Push every 6 hours PRN agitation or nausea  HYDROmorphone  Injectable 0.25 milliGRAM(s) IV Push every 1 hour PRN Pain or dyspnea  LORazepam   Injectable 0.5 milliGRAM(s) IV Push every 4 hours PRN Anxiety or agitation    T(C): 35.9 (05-12-23 @ 13:41), Max: 36.1 (05-11-23 @ 21:00)  HR: 70 (05-12-23 @ 13:41) (70 - 73)  BP: 120/54 (05-12-23 @ 13:41) (120/54 - 126/58)  RR: 18 (05-12-23 @ 13:41) (18 - 18)  SpO2: 95% (05-12-23 @ 13:41) (95% - 97%)    O/E:  not in distress.  HEENT: atraumatic, EOMI.  Chest: decreased breath sounds B/l   CVS: SIS2 +, irregular no murmur.  P/A: Soft, BS+  Ext:  lower ext edema++  Skin:  BUE wounds+, dressing+

## 2023-05-12 NOTE — PROGRESS NOTE ADULT - PROBLEM SELECTOR PROBLEM 2
Acute kidney injury superimposed on CKD
COPD exacerbation
Acute kidney injury superimposed on CKD
COPD exacerbation

## 2023-05-12 NOTE — PROGRESS NOTE ADULT - SUBJECTIVE AND OBJECTIVE BOX
HPI:    89-year-old female past medical history of COPD on 4 L NC, Chronic A-fib not on AC, hypothyroidism, CKDIII, HFpEF presents for evaluation of shortness of breath. History obtained from son who lives with her. Patient developed shortness of breath with associated dry cough for past 3 days, however she was not hypoxic and was saturating 95% on baseline 4 L at home. No reported fever, chills, headache, chest pain, abdominal pain, weakness, numbness, dysuria, hematuria, vomiting, diarrhea. She was placed on CPAP by EMS received 2 DuoNebs and 60 mg of Solu-Medrol. At baseline she is AAOx2-3 and ambulates with walker at home. Pt had recent admission for COPD exacerbation 3/4-3/15.     Interval history:   -Patient seen at bedside    -She is now on O2 via nasal cannula, improvement in HGB, worsening MAURI  -son and daughter at bedside, she denies pain, discomfort, dyspnea     ADVANCE DIRECTIVES:    [ ] Full Code [ x] DNR/DNI  MOLST  [ X]  Living Will  [ ]   DECISION MAKER(s): HCP  [ ] Health Care Proxy(s)  [x] Surrogate(s)  [ ] Guardian           Name(s): Phone Number(s): Son Andrzej is HCP ( in one content). secondary HCP is Minda reyes.     BASELINE (I)ADL(s) (prior to admission):  [place]: [ ]Total  [ X ] Moderate [ ]Dependent  Palliative Performance Status Version 2:       60  %    [email or web address]/files/[email or web address]    Allergies    No Known Allergies    Intolerances    MEDICATIONS  (STANDING):  albuterol    90 MICROgram(s) HFA Inhaler 2 Puff(s) Inhalation every 6 hours  bacitracin   Ointment 1 Application(s) Topical daily  budesonide 160 MICROgram(s)/formoterol 4.5 MICROgram(s) Inhaler 2 Puff(s) Inhalation two times a day  chlorhexidine 2% Cloths 1 Application(s) Topical daily  dronedarone 400 milliGRAM(s) Oral two times a day  levothyroxine 112 MICROGram(s) Oral daily    MEDICATIONS  (PRN):  albuterol/ipratropium for Nebulization. 3 milliLiter(s) Nebulizer once PRN Shortness of Breath and/or Wheezing  glycopyrrolate Injectable 0.2 milliGRAM(s) IV Push every 6 hours PRN Respiratory secretions  haloperidol    Injectable 0.5 milliGRAM(s) IV Push every 6 hours PRN agitation or nausea  HYDROmorphone  Injectable 0.25 milliGRAM(s) IV Push every 1 hour PRN Pain or dyspnea  LORazepam   Injectable 0.5 milliGRAM(s) IV Push every 4 hours PRN Anxiety or agitation        PRESENT SYMPTOMS:   Pain: [ ]yes [x ]no   QOL impact -   Location -                    Aggravating factors -  Quality -  Radiation -  Timing-  Severity (0-10 scale):  Minimal acceptable level (0-10 scale):       Dyspnea:                           [X] None[  Mild [ ]Moderate [ ]Severe     Respiratory Distress Observation Scale (RDOS): 0  A score of 0 to 2 signifies little or no respiratory distress, 3 signifies mild distress, scores 4 to 6 indicate moderate distress, and scores greater than 7 signify severe distress  [email or web address]    Anxiety:                             [ x]None[ ]Mild [ ]Moderate [ ]Severe   Fatigue:                             [ x]None[ ]Mild [ ]Moderate [ ]Severe   Nausea:                             [x ]None[ ]Mild [ ]Moderate [ ]Severe   Loss of appetite:              [ x]None[ ]Mild [ ]Moderate [ ]Severe   Constipation:                    [x ]None[ ]Mild [ ]Moderate [ ]Severe    Other Symptoms:  [x ]All other review of systems negative     Palliative Performance Status Version 2:         20%    [email or web address]    PHYSICAL EXAM:  Vital Signs Last 24 Hrs  T(C): 35.9 (12 May 2023 13:41), Max: 36.1 (11 May 2023 21:00)  T(F): 96.6 (12 May 2023 13:41), Max: 96.9 (11 May 2023 21:00)  HR: 70 (12 May 2023 13:41) (70 - 73)  BP: 120/54 (12 May 2023 13:41) (120/54 - 126/58)  BP(mean): --  RR: 18 (12 May 2023 13:41) (18 - 18)  SpO2: 95% (12 May 2023 13:41) (95% - 97%)    Parameters below as of 11 May 2023 21:00  Patient On (Oxygen Delivery Method): nasal cannula    GENERAL:  [ x] No acute distress [ X]Lethargic  [ ]Unarousable  [X ]Verbal  [ ]Non-Verbal [ ]Cachexia    BEHAVIORAL/PSYCH:  [ x]Alert and Oriented x2  [ ] Anxiety [ ] Delirium [ ] Agitation [X ] Calm   EYES: [ x] No scleral icterus [ ] Scleral icterus [ ] Closed  ENMT:  [ ]Dry mouth  [x ]No external oral lesions [ ] No external ear or nose lesions  CARDIOVASCULAR:  [ ]Regular [ ]Irregular [ ]Tachy [x ]Not Tachy  [ ]Maximus [ ] Edema [ ] No edema  PULMONARY:  [ ]Tachypnea  [ ]Audible excessive secretions [ x] No labored breathing [ ] labored breathing  GASTROINTESTINAL: [ ]Soft  [ ]Distended  [x ]Not distended [ ]Non tender [ ]Tender  MUSCULOSKELETAL: [ ]No clubbing [ ] clubbing  [x ] No cyanosis [ ] cyanosis  NEUROLOGIC: [ ]No focal deficits  [x ]Follows commands  [ ]Does not follow commands  [ ]Cognitive impairment  [ ]Dysphagia  [ ]Dysarthria  [ ]Paresis   SKIN: [ ] Jaundiced [ ] Non-jaundiced [ ]Rash [ ]No Rash [ ] Warm [ ] Dry  MISC/LINES: [ ] ET tube [ ] Trach [ ]NGT/OGT [ ]PEG [ ]Tobar      LABS: reviewed by me                        9.4    8.74  )-----------( 178      ( 11 May 2023 06:59 )             28.5       05-11    139  |  96<L>  |  93<HH>  ----------------------------<  95  4.4   |  24  |  3.2<H>    Ca    8.4      11 May 2023 06:59  Phos  5.6     05-11  Mg     2.2     05-11    TPro  5.2<L>  /  Alb  3.2<L>  /  TBili  0.6  /  DBili  x   /  AST  24  /  ALT  22  /  AlkPhos  66  05-11                            CAPILLARY BLOOD GLUCOSE      EKG: reviewed by me  < from: 12 Lead ECG (05.02.23 @ 23:51) >  Ventricular Rate 136 BPM    Atrial Rate 326 BPM    QRS Duration 76 ms    Q-T Interval 288 ms    QTC Calculation(Bazett) 433 ms    R Axis 95 degrees    T Axis 97 degrees    Diagnosis Line Atrial flutter with rapid ventricular response  Rightward axis  Nonspecific ST and T wave abnormality  Abnormal ECG    < end of copied text >    Patient discussed with primary medical team MD  Palliative care education provided to patient and/or family

## 2023-05-13 NOTE — DISCHARGE NOTE FOR THE EXPIRED PATIENT - HOSPITAL COURSE
89 y/o woman with PMH of COPD on 4 L NC and noncompliant with NIV at times, Chronic Afib not on AC due to recurrent falls, hypothyroidism, CKD 3 and chronic HFpEF presented for shortness of breath due to COPD exacerbation. She is now downgraded to the medical floor from stepdown unit. Now patient on comfort care measures.     #Acute on chronic hypercapnic and hypoxemic respiratory failure due to COPD exacerbation   #history COPD on home oxygen 3-4 L  - was on HFNC and now on O2 at 4L NC - her baseline  - S/p course of doxycycline  - XR 5/5 reviewed, increased interstitial markings and prominence of R pulm artery   - Comfort care measures     #New DVT of LUE  - Not on AC  - comfort care measures                   #MAURI on CKD 3 - worsening   #Chronic HFpEF with possible exacerbation   - home lasix was on hold due to MAURI on admission, which initially improved on IV fluids now worsening   -  on admission  - CXR on 5/5 with increased interstitial marking and prominence or R Pulm Artery   - Echo 5/9: EF 60%  - Comfort care measures     #Acute on Chronic Normocytic Anemia likely 2/2 LUE skin tear with significant bleeding   - s/p 2 units of PRBC's   - Burn consulted: - Local wound care: wash wound with soap and water. Apply Xeroform kerlix and ace wrap BID.     #Chronic Afib not on anticoagulation due to recurrent falls      #Hypothyroidism- on levothyroxine    patient made comfort measures only on may 11 with only comfort measure medications and care.

## 2023-05-13 NOTE — PROGRESS NOTE ADULT - PROVIDER SPECIALTY LIST ADULT
Critical Care
Hospitalist
Hospitalist
Internal Medicine
Nephrology
Nephrology
Critical Care
Hospitalist
Internal Medicine
Pulmonology
Pulmonology
Hospitalist
Internal Medicine
Nephrology
Pulmonology
Critical Care
Hospitalist
Palliative Care
Palliative Care
Pulmonology
Pulmonology
Hospitalist
Hospitalist
Palliative Care

## 2023-05-13 NOTE — PROGRESS NOTE ADULT - SUBJECTIVE AND OBJECTIVE BOX
Patient is a 89y old  Female who presents with a chief complaint of COPD exacerbation (10 May 2023 12:43)    Patient was seen and examined.  Pt on comfort measures, not in distress    PAST MEDICAL & SURGICAL HISTORY:  COPD (chronic obstructive pulmonary disease)  Hypothyroid  HTN (hypertension)  High cholesterol  Colitis  CKD (chronic kidney disease)    No significant past surgical history    Allergies  No Known Allergies    MEDICATIONS  (STANDING):  albuterol    90 MICROgram(s) HFA Inhaler 2 Puff(s) Inhalation every 6 hours  bacitracin   Ointment 1 Application(s) Topical daily  budesonide 160 MICROgram(s)/formoterol 4.5 MICROgram(s) Inhaler 2 Puff(s) Inhalation two times a day  chlorhexidine 2% Cloths 1 Application(s) Topical daily  dronedarone 400 milliGRAM(s) Oral two times a day  levothyroxine 112 MICROGram(s) Oral daily    MEDICATIONS  (PRN):  albuterol/ipratropium for Nebulization. 3 milliLiter(s) Nebulizer once PRN Shortness of Breath and/or Wheezing  glycopyrrolate Injectable 0.2 milliGRAM(s) IV Push every 6 hours PRN Respiratory secretions  haloperidol    Injectable 0.5 milliGRAM(s) IV Push every 6 hours PRN agitation or nausea  HYDROmorphone  Injectable 0.25 milliGRAM(s) IV Push every 1 hour PRN Pain or dyspnea  LORazepam   Injectable 0.5 milliGRAM(s) IV Push every 4 hours PRN Anxiety or agitation    T(C): 35.9 (05-12-23 @ 13:41), Max: 35.9 (05-12-23 @ 13:41)  HR: 70 (05-12-23 @ 13:41) (70 - 70)  BP: 120/54 (05-12-23 @ 13:41) (120/54 - 120/54)  RR: 18 (05-12-23 @ 13:41) (18 - 18)  SpO2: 95% (05-12-23 @ 13:41) (95% - 95%)    O/E:  not in distress.  HEENT: atraumatic, EOMI.  Chest: decreased breath sounds B/l   CVS: SIS2 +, irregular no murmur.  P/A: Soft, BS+  Ext:  no edema feet  Skin:  BUE wounds+, dressing+

## 2023-05-13 NOTE — PROGRESS NOTE ADULT - ASSESSMENT
IMPRESSION:     Acute on chronic hypercapnic respiratory failure on NC  Acute on chronic hypoxemic respiratory failure  COPD exacerbation  MAURI on CKD worsening non oliguric  Anemia  HO Severe COPD on home O2.    Chronic afib not on AC due to recurrent falls. Rate controlled.  SP cardiology follow up   HFPEF      PLAN:    CNS: opiates for resp comfort    HEENT: Oral care    PULMONARY:   HOB @ 45 degrees, aspiration precautions.  Wean O2 as tolerated.  Encourage NIV use during sleep and PRN during the day,     CARDIOVASCULAR: Avoid overload Rate control,    GI: GI prophylaxis. feeding per speech and swallow, bowel regimen    RENAL: Correct as necessary.  Repeat lytes.     INFECTIOUS DISEASE: SP ABX course     HEMATOLOGICAL: DVT prophylaxis.     ENDOCRINE: Follow up FS. Insulin protocol if needed.    MUSC: PT/OT    Poor prognosis.     DNR/DNI  spoke with son    palliative care fup

## 2023-05-13 NOTE — PROGRESS NOTE ADULT - REASON FOR ADMISSION
COPD exacerbation

## 2023-05-13 NOTE — PROGRESS NOTE ADULT - SUBJECTIVE AND OBJECTIVE BOX
Over Night Events: events noted, on NC, worsening status, palliative noted    PHYSICAL EXAM    ICU Vital Signs Last 24 Hrs  T(C): 35.9 (12 May 2023 13:41), Max: 35.9 (12 May 2023 13:41)  T(F): 96.6 (12 May 2023 13:41), Max: 96.6 (12 May 2023 13:41)  HR: 70 (12 May 2023 13:41) (70 - 70)  BP: 120/54 (12 May 2023 13:41) (120/54 - 120/54)  RR: 18 (12 May 2023 13:41) (18 - 18)  SpO2: 95% (12 May 2023 13:41) (95% - 95%)        General: ill looking  Lungs: Bilateral wheezing  Cardiovascular: Regular   Abdomen: Soft, Positive BS  Extremities: No clubbing   obtunded      LABS:                          9.4    8.74  )-----------( 178      ( 11 May 2023 06:59 )             28.5                                               05-11    139  |  96<L>  |  93<HH>  ----------------------------<  95  4.4   |  24  |  3.2<H>    Ca    8.4      11 May 2023 06:59  Phos  5.6     05-11  Mg     2.2     05-11    TPro  5.2<L>  /  Alb  3.2<L>  /  TBili  0.6  /  DBili  x   /  AST  24  /  ALT  22  /  AlkPhos  66  05-11                                                                                           LIVER FUNCTIONS - ( 11 May 2023 06:59 )  Alb: 3.2 g/dL / Pro: 5.2 g/dL / ALK PHOS: 66 U/L / ALT: 22 U/L / AST: 24 U/L / GGT: x                                                                                                                                       MEDICATIONS  (STANDING):  albuterol    90 MICROgram(s) HFA Inhaler 2 Puff(s) Inhalation every 6 hours  bacitracin   Ointment 1 Application(s) Topical daily  budesonide 160 MICROgram(s)/formoterol 4.5 MICROgram(s) Inhaler 2 Puff(s) Inhalation two times a day  chlorhexidine 2% Cloths 1 Application(s) Topical daily  dronedarone 400 milliGRAM(s) Oral two times a day  levothyroxine 112 MICROGram(s) Oral daily    MEDICATIONS  (PRN):  albuterol/ipratropium for Nebulization. 3 milliLiter(s) Nebulizer once PRN Shortness of Breath and/or Wheezing  glycopyrrolate Injectable 0.2 milliGRAM(s) IV Push every 6 hours PRN Respiratory secretions  haloperidol    Injectable 0.5 milliGRAM(s) IV Push every 6 hours PRN agitation or nausea  HYDROmorphone  Injectable 0.25 milliGRAM(s) IV Push every 1 hour PRN Pain or dyspnea  LORazepam   Injectable 0.5 milliGRAM(s) IV Push every 4 hours PRN Anxiety or agitation

## 2023-05-13 NOTE — PROGRESS NOTE ADULT - ASSESSMENT
89-year-old female past medical history of COPD on 4 L NC, Chronic A-fib not on AC, hypothyroidism, CKDIII, HFpEF presents for evaluation of shortness of breath. istory obtained from son who lives with her. Patient developed shortness of breath with associated dry cough for past 3 days, however she was not hypoxic and was saturating 95% on baseline 4 L at home.    Pt was initially admitted to step down unit and later transferred to medicine floor    # Acute on chronic  hypoxic  respiratory failure sec  to COPD exacerbation   # H/o chr resp failure on 4 lit oxygen at home  # Acute kidney injury on CKD stage 3, prerenal or ATN  # Hyponatremia  # Acute on chr diastolic CHF  # Chronic afib , rate controlled  -# Acute blood loss anemia sec to bleeding from skin tear  # B/l Upper ext  wounds  # Hypothyroidism  # Dyslipidemia  # H/o recurrent falls    # DNR/ DNI/ CMO  - c/w Comfort care measures  - Hospice : patient's son Andrzej who declined to speak with hospice at this time. Andrzej states he is not ready for hospice.

## 2023-05-13 NOTE — PROGRESS NOTE ADULT - TIME BILLING
Direct patient care. Discussed on rounds with housestaff
Direct patient care. Discussed with Housestaff
Direct patient care. Discussed with housestaff

## 2023-05-13 NOTE — DISCHARGE NOTE FOR THE EXPIRED PATIENT - NS PRELIMINARY CAUSE OF DEATH_RESTRICTED
"Resent Rx as \"written order\"   As per below \"pharmacy never received\"    Silvia LOPEZ RN    " Cardiopulmonary Arrest/Chronic lung disease/Heart Failure/Renal Failure

## 2023-05-13 NOTE — DISCHARGE NOTE FOR THE EXPIRED PATIENT - DEATH FALLS UNDER ME JURISDICTION?
CC: Follow up     INTERVAL HPI/OVERNIGHT EVENTS: Patient seen and examined, Tmax of 100 overnight with tachycardia. this morning has complaints of pain and burning with urination       Vital Signs Last 24 Hrs  T(C): 37.1 (28 Sep 2022 10:27), Max: 38.3 (27 Sep 2022 20:19)  T(F): 98.8 (28 Sep 2022 10:27), Max: 100.9 (27 Sep 2022 20:19)  HR: 100 (28 Sep 2022 10:) (84 - 118)  BP: 122/76 (28 Sep 2022 10:27) (122/76 - 137/80)  BP(mean): --  RR: 17 (28 Sep 2022 10:) (16 - 19)  SpO2: 95% (28 Sep 2022 10:) (92% - 95%)    Parameters below as of 28 Sep 2022 10:27  Patient On (Oxygen Delivery Method): room air        PHYSICAL EXAM:    GENERAL: NAD, AOX3  HEAD:  Atraumatic, Normocephalic  EYES:  conjunctiva and sclera clear  ENMT: Moist mucous membranes  NECK: C Collar   CHEST/LUNG: Clear to auscultation bilaterally; No rales, rhonchi, wheezing, or rubs  HEART: Regular rate and rhythm; No murmurs, rubs, or gallops  ABDOMEN: Soft, Nontender, Nondistended; Bowel sounds present  EXTREMITIES:  2+ Peripheral Pulses, No clubbing, cyanosis, or edema        MEDICATIONS  (STANDING):  cefTRIAXone Injectable. 1000 milliGRAM(s) IV Push every 24 hours  enoxaparin Injectable 40 milliGRAM(s) SubCutaneous every 24 hours  gabapentin 600 milliGRAM(s) Oral every 8 hours  influenza  Vaccine (HIGH DOSE) 0.7 milliLiter(s) IntraMuscular once  methocarbamol 750 milliGRAM(s) Oral three times a day  oxyCODONE  ER Tablet 20 milliGRAM(s) Oral every 12 hours  polyethylene glycol 3350 17 Gram(s) Oral every 12 hours  senna 2 Tablet(s) Oral at bedtime    MEDICATIONS  (PRN):  aluminum hydroxide/magnesium hydroxide/simethicone Suspension 30 milliLiter(s) Oral once PRN Dyspepsia  bisacodyl Suppository 10 milliGRAM(s) Rectal daily PRN Constipation  HYDROmorphone   Tablet 4 milliGRAM(s) Oral every 4 hours PRN Severe Pain (7 - 10)  HYDROmorphone   Tablet 2 milliGRAM(s) Oral every 4 hours PRN Moderate Pain (4 - 6)  ondansetron Injectable 4 milliGRAM(s) IV Push every 6 hours PRN Nausea and/or Vomiting      Allergies    tetanus toxoid (Hives)    Intolerances    lactose (Other)        LABS:                          9.3    9.20  )-----------( 271      ( 28 Sep 2022 05:01 )             28.1         137  |  100  |  7.3<L>  ----------------------------<  85  4.2   |  28.0  |  0.36<L>    Ca    9.9      28 Sep 2022 05:01    TPro  5.1<L>  /  Alb  2.4<L>  /  TBili  0.4  /  DBili  x   /  AST  32<H>  /  ALT  8   /  AlkPhos  148<H>        Urinalysis Basic - ( 28 Sep 2022 05:00 )    Color: Yellow / Appearance: very cloudy / S.015 / pH: x  Gluc: x / Ketone: Small  / Bili: Negative / Urobili: Negative   Blood: x / Protein: 30 mg/dL / Nitrite: Positive   Leuk Esterase: Moderate / RBC: 3-5 /HPF / WBC >50 /HPF   Sq Epi: x / Non Sq Epi: Few / Bacteria: Many        RADIOLOGY & ADDITIONAL TESTS:   No

## 2023-05-19 DIAGNOSIS — D62 ACUTE POSTHEMORRHAGIC ANEMIA: ICD-10-CM

## 2023-05-19 DIAGNOSIS — Z99.81 DEPENDENCE ON SUPPLEMENTAL OXYGEN: ICD-10-CM

## 2023-05-19 DIAGNOSIS — N17.9 ACUTE KIDNEY FAILURE, UNSPECIFIED: ICD-10-CM

## 2023-05-19 DIAGNOSIS — I82.622 ACUTE EMBOLISM AND THROMBOSIS OF DEEP VEINS OF LEFT UPPER EXTREMITY: ICD-10-CM

## 2023-05-19 DIAGNOSIS — J96.22 ACUTE AND CHRONIC RESPIRATORY FAILURE WITH HYPERCAPNIA: ICD-10-CM

## 2023-05-19 DIAGNOSIS — R45.1 RESTLESSNESS AND AGITATION: ICD-10-CM

## 2023-05-19 DIAGNOSIS — E78.00 PURE HYPERCHOLESTEROLEMIA, UNSPECIFIED: ICD-10-CM
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
## 2023-05-19 DIAGNOSIS — I13.0 HYPERTENSIVE HEART AND CHRONIC KIDNEY DISEASE WITH HEART FAILURE AND STAGE 1 THROUGH STAGE 4 CHRONIC KIDNEY DISEASE, OR UNSPECIFIED CHRONIC KIDNEY DISEASE: ICD-10-CM

## 2023-05-19 DIAGNOSIS — R33.9 RETENTION OF URINE, UNSPECIFIED: ICD-10-CM

## 2023-05-19 DIAGNOSIS — Z91.81 HISTORY OF FALLING: ICD-10-CM

## 2023-05-19 DIAGNOSIS — I48.20 CHRONIC ATRIAL FIBRILLATION, UNSPECIFIED: ICD-10-CM

## 2023-05-19 DIAGNOSIS — E87.5 HYPERKALEMIA: ICD-10-CM

## 2023-05-19 DIAGNOSIS — S41.111A LACERATION WITHOUT FOREIGN BODY OF RIGHT UPPER ARM, INITIAL ENCOUNTER: ICD-10-CM

## 2023-05-19 DIAGNOSIS — E03.9 HYPOTHYROIDISM, UNSPECIFIED: ICD-10-CM

## 2023-05-19 DIAGNOSIS — Z51.5 ENCOUNTER FOR PALLIATIVE CARE: ICD-10-CM

## 2023-05-19 DIAGNOSIS — S51.811A LACERATION WITHOUT FOREIGN BODY OF RIGHT FOREARM, INITIAL ENCOUNTER: ICD-10-CM

## 2023-05-19 DIAGNOSIS — Z20.822 CONTACT WITH AND (SUSPECTED) EXPOSURE TO COVID-19: ICD-10-CM

## 2023-05-19 DIAGNOSIS — I50.33 ACUTE ON CHRONIC DIASTOLIC (CONGESTIVE) HEART FAILURE: ICD-10-CM

## 2023-05-19 DIAGNOSIS — J44.1 CHRONIC OBSTRUCTIVE PULMONARY DISEASE WITH (ACUTE) EXACERBATION: ICD-10-CM

## 2023-05-19 DIAGNOSIS — Z66 DO NOT RESUSCITATE: ICD-10-CM

## 2023-05-19 DIAGNOSIS — Z87.891 PERSONAL HISTORY OF NICOTINE DEPENDENCE: ICD-10-CM

## 2023-05-19 DIAGNOSIS — Z79.51 LONG TERM (CURRENT) USE OF INHALED STEROIDS: ICD-10-CM

## 2023-05-19 DIAGNOSIS — I24.8 OTHER FORMS OF ACUTE ISCHEMIC HEART DISEASE: ICD-10-CM

## 2023-05-19 DIAGNOSIS — Z79.82 LONG TERM (CURRENT) USE OF ASPIRIN: ICD-10-CM

## 2023-05-19 DIAGNOSIS — J96.21 ACUTE AND CHRONIC RESPIRATORY FAILURE WITH HYPOXIA: ICD-10-CM

## 2023-05-19 DIAGNOSIS — S51.812A LACERATION WITHOUT FOREIGN BODY OF LEFT FOREARM, INITIAL ENCOUNTER: ICD-10-CM

## 2023-05-19 DIAGNOSIS — Z91.198 PATIENT'S NONCOMPLIANCE WITH OTHER MEDICAL TREATMENT AND REGIMEN FOR OTHER REASON: ICD-10-CM

## 2023-05-19 DIAGNOSIS — I95.9 HYPOTENSION, UNSPECIFIED: ICD-10-CM
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
## 2023-05-19 DIAGNOSIS — D63.1 ANEMIA IN CHRONIC KIDNEY DISEASE: ICD-10-CM
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
## 2023-05-19 DIAGNOSIS — E87.1 HYPO-OSMOLALITY AND HYPONATREMIA: ICD-10-CM

## 2023-05-19 DIAGNOSIS — Z78.1 PHYSICAL RESTRAINT STATUS: ICD-10-CM

## 2023-05-19 DIAGNOSIS — N18.32 CHRONIC KIDNEY DISEASE, STAGE 3B: ICD-10-CM

## 2023-05-19 DIAGNOSIS — D72.829 ELEVATED WHITE BLOOD CELL COUNT, UNSPECIFIED: ICD-10-CM
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
## 2023-06-20 NOTE — H&P ADULT - HIV OFFER
Unable to answer due to medical condition/unresponsive/etc... [FreeTextEntry2] :  referred pt for back pain

## 2023-07-26 NOTE — DISCHARGE NOTE PROVIDER - NSDCACTIVITY_GEN_ALL_CORE
No heavy lifting/straining Benzoyl Peroxide Pregnancy And Lactation Text: This medication is Pregnancy Category C. It is unknown if benzoyl peroxide is excreted in breast milk.

## 2023-08-16 NOTE — PHYSICAL THERAPY INITIAL EVALUATION ADULT - AMBULATION SKILLS, REHAB EVAL
ROMs unremarkable in UEs/LEs,  , normal gait and station
uses rolator walker/needed assist/needs device

## 2023-08-23 NOTE — ED ADULT NURSE NOTE - CADM POA URETHRAL CATHETER
Suturegard Intro: Intraoperative tissue expansion was performed, utilizing the SUTUREGARD device, in order to reduce wound tension. No

## 2023-10-20 NOTE — ED PROVIDER NOTE - CADM POA PRESS ULCER
Quality 431: Preventive Care And Screening: Unhealthy Alcohol Use - Screening: Patient not identified as an unhealthy alcohol user when screened for unhealthy alcohol use using a systematic screening method Quality 402: Tobacco Use And Help With Quitting Among Adolescents: Patient screened for tobacco and never smoked Detail Level: Detailed Quality 110: Preventive Care And Screening: Influenza Immunization: Influenza Immunization previously received during influenza season Quality 226: Preventive Care And Screening: Tobacco Use: Screening And Cessation Intervention: Patient screened for tobacco use and is an ex/non-smoker Quality 111:Pneumonia Vaccination Status For Older Adults: Patient received any pneumococcal conjugate or polysaccharide vaccine on or after their 60th birthday and before the end of the measurement period Quality 47: Advance Care Plan: Advance Care Planning discussed and documented; advance care plan or surrogate decision maker documented in the medical record. Quality 130: Documentation Of Current Medications In The Medical Record: Current Medications Documented No

## 2023-12-18 NOTE — CONSULT NOTE ADULT - CONSULT REQUESTED BY NAME
1/13/2022 colonoscopy:Significant for nonbleeding internal hemorrhoids, single nonbleeding colonic angioectasia, normal ileum EGD 6/18/2008:Slight irregularity at the GE junction but otherwise within normal limits Stephen Hernández Dr. Stephen Hernández Dr. Td Hernández

## 2024-03-05 NOTE — PHYSICAL THERAPY INITIAL EVALUATION ADULT - GENERAL OBSERVATIONS, REHAB EVAL
13:50-14:20 pt encountered in bed, +tele, primafit, + O2 via nasal canula, 3L/min SPO2 91%.  Family present.  Pt had perfromed bed mobility safely, however SPO2 decreased to 77% while sitting at EOB.  Upon return to semifowler SPO2 increased to 90%. Continue PT as tolerated.  Pt would benefit from home PT upon discharge.
The patient has been re-examined and I agree with the above assessment or I updated with my findings.

## 2024-03-06 NOTE — DISCHARGE NOTE NURSING/CASE MANAGEMENT/SOCIAL WORK - NSDCPEFALRISK_GEN_ALL_CORE
For information on Fall & Injury Prevention, visit: [email or web address] OR  [email or web address] OR  https://www.cdc.gov/steadi/patient.html independent

## 2024-03-17 NOTE — ED PROVIDER NOTE - NS ED ATTENDING STATEMENT MOD
MODERATE
I have personally performed a face to face diagnostic evaluation on this patient. I have reviewed the ACP note and agree with the history, exam and plan of care, except as noted.

## 2024-03-19 NOTE — OCCUPATIONAL THERAPY INITIAL EVALUATION ADULT - SHORT TERM MEMORY, REHAB EVAL
Xerosis Aggressive Treatment: I recommended application of Cetaphil or CeraVe numerous times a day and before going to bed to all dry areas. I also prescribed a topical steroid for twice daily use. Hypercholesterolemia Monitoring: I explained this is common when taking isotretinoin. We will monitor closely. Nosebleeds Normal Treatment: I explained this is common when taking isotretinoin. I recommended saline mist in each nostril multiple times a day. If this worsens they will contact us. Female Pregnancy Counseling Text: Female patients should also be on two forms of birth control while taking this medication and for one month after their last dose. Add High Risk Medication Management Associated Diagnosis?: No Myalgia Monitoring: I explained this is common when taking isotretinoin. If this worsens they will contact us. Are Labs Available For Review?: Yes Hypertriglyceridemia Treatment: I explained this is common when taking isotretinoin. If this worsens they will contact us. They may try OTC ibuprofen. Pounds Preamble Statement (Weight Entered In Details Tab): Reported Weight in pounds: Months Of Therapy Completed: 1 Cheilitis Aggressive Treatment: I recommended application of Vaseline or Aquaphor numerous times a day (as often as every hour) and before going to bed. I also prescribed a topical steroid for twice daily use. Headache Monitoring: I recommended monitoring the headaches for now. There is no evidence of increased intracranial pressure. They were instructed to call if the headaches are worsening. Retinoid Dermatitis Aggressive Treatment: I recommended more frequent application of Cetaphil or CeraVe to the areas of dermatitis. I also prescribed a topical steroid for twice daily use until the dermatitis resolves. Comments: STARTING MONTH 2 Kilograms Preamble Statement (Weight Entered In Details Tab): Reported Weight in kilograms: Retinoid Dermatitis Normal Treatment: I recommended more frequent application of Cetaphil or CeraVe to the areas of dermatitis. What Is The Patient's Gender: Female Patient Weight (Optional But Required For Cumulative Dose-Numbers And Decimals Only): 140 Female Completion Statement: After discussing her treatment course we decided to discontinue isotretinoin therapy at this time. I explained that she would need to continue her birth control methods for at least one month after the last dosage. She should also get a pregnancy test one month after the last dose. She shouldn't donate blood for one month after the last dose. She should call with any new symptoms of depression. Weight Units: pounds Counseling Text: I reviewed the side effect in detail. Patient should get monthly blood tests, not donate blood, not drive at night if vision affected, and not share medication. Male Completion Statement: After discussing his treatment course we decided to discontinue isotretinoin therapy at this time. He shouldn't donate blood for one month after the last dose. He should call with any new symptoms of depression. Cheilitis Normal Treatment: I recommended application of Vaseline or Aquaphor numerous times a day (as often as every hour) and before going to bed. intact Xerosis Normal Treatment: I recommended application of Cetaphil or CeraVe numerous times a day going to bed to all dry areas. Detail Level: Zone Xerosis Aggressive Treatment: I recommended application of Cetaphil or CeraVe numerous times a day going to bed to all dry areas. I also prescribed a topical steroid for twice daily use. Xerosis Normal Treatment: I recommended application of Cetaphil or CeraVe numerous times a day and before going to bed to all dry areas. Use Therapeutic Ranged Or Therapeutic Target: please select Range or Target Lower Range (In Mg/Kg): 120 Next Month's Dosage: 30mg BID Upper Range (In Mg/Kg): 150 Target Cumulative Dosage (In Mg/Kg): 135

## 2024-05-08 NOTE — DISCHARGE NOTE NURSING/CASE MANAGEMENT/SOCIAL WORK - CASE MANAGER'S NAME
[facility]              Trina Maurer MD FACE     HISTORY OF PRESENT ILLNESS  Nkechi Toscano is a 65  y.o. female.  Follow up  After  last  visit for hypercalcemia, MNG ,  From Dec   2023     Her pcp is retiring and is requesting DM control also     Labs at pcp showed calcium crossing 11 and she is here for that reason           Dec 2022     During eval for hypercalcemia, she had usg and that showed thyrodi nodule   So, she underwent FNA  in sept 2022 June 2022     Here to discuss results      March 2022   The patient has  asymptomatic elevation of the serum and calcium and parathormone.   She has not  been taking medication : thiazide diuretic   She has not had previous history of head or neck radiation. She does not have familial history of endocrine malignancies.  Patient reports being dizzy occasionally and her BP today is on lower side     Review of Systems   None       Physical Exam   Constitutional: She is oriented to person, place, and time. She appears well-developed and well-nourished.   Thyromegaly present   Psychiatric: She has a normal mood and affect.     Labs    Lab Results   Component Value Date     04/12/2024    K 4.2 04/12/2024     04/12/2024    CO2 30 04/12/2024    BUN 20 04/12/2024    CREATININE 0.83 04/12/2024    GLUCOSE 123 (H) 04/12/2024    CALCIUM 11.1 (H) 04/12/2024    BILITOT 1.0 04/12/2024    ALKPHOS 81 04/12/2024    AST 19 04/12/2024    ALT 20 04/12/2024    LABGLOM 78 04/12/2024    GFRAA >60 08/11/2022    AGRATIO 1.0 (L) 11/29/2022    GLOB 3.7 04/12/2024    CHOL 136 04/12/2024    TRIG 62 04/12/2024    LDL 44.6 04/12/2024    HDL 79 04/12/2024        ASSESSMENT and PLAN    1. Hypercalcemia  : Patient has moderate range of hypercalcemia, chronic and asymptomatic     primary hyperparathyroidism      May 2024  : calcium crossed 11   , repeat 24 hr urine , ordering labs with I PTH , Pth rel pep  Ordering a parathyroid scan at [facility]       Dec 
Patient identified with two identification factors, Name and Date of Birth.    Chief Complaint   Patient presents with    New Patient     Hypercalcemia       /61 (Site: Right Upper Arm, Position: Sitting, Cuff Size: Large Adult)   Pulse 61   Temp 97.8 °F (36.6 °C) (Temporal)   Resp 16   Ht 1.588 m (5' 2.5\")   Wt 93.5 kg (206 lb 3.2 oz)   SpO2 100%   BMI 37.11 kg/m²       1. \"Have you been to the ER, urgent care clinic since your last visit?  Hospitalized since your last visit?\" No     2. \"Have you seen or consulted any other health care providers outside of the [facility] since your last visit?\" No     
Evelina Castro, RN

## 2024-07-14 NOTE — PHYSICAL THERAPY INITIAL EVALUATION ADULT - BED MOBILITY TRAINING, PT EVAL
Patient is a 54 y/o female who presents to the ED with depression and suicidal ideation. Patient states that she has been depressed since November. She states that she does not eat and has lost 40 pounds because of it. She states that she was recently diagnosed with diabetes and feels hopeless. She has had suicidal ideations. She denies any drugs or alcohol.         Review of Systems   Constitutional:  Negative for chills and fever.   HENT:  Negative for ear pain, sinus pressure and sore throat.    Eyes:  Negative for pain, discharge and redness.   Respiratory:  Negative for cough, shortness of breath and wheezing.    Cardiovascular:  Negative for chest pain.   Gastrointestinal:  Negative for abdominal distention, diarrhea, nausea and vomiting.   Genitourinary:  Negative for dysuria and frequency.   Musculoskeletal:  Negative for arthralgias and back pain.   Skin:  Negative for rash and wound.   Neurological:  Negative for weakness and headaches.   Hematological:  Negative for adenopathy.   Psychiatric/Behavioral:  Positive for behavioral problems and suicidal ideas.    All other systems reviewed and are negative.       Physical Exam  Vitals and nursing note reviewed.   Constitutional:       General: She is not in acute distress.  HENT:      Head: Normocephalic and atraumatic.      Right Ear: External ear normal.      Left Ear: External ear normal.      Nose: Nose normal.      Mouth/Throat:      Mouth: Mucous membranes are moist.   Eyes:      Conjunctiva/sclera: Conjunctivae normal.      Pupils: Pupils are equal, round, and reactive to light.   Cardiovascular:      Rate and Rhythm: Regular rhythm. Tachycardia present.      Heart sounds: No murmur heard.  Pulmonary:      Effort: Pulmonary effort is normal. No respiratory distress.      Breath sounds: Normal breath sounds. No stridor. No wheezing, rhonchi or rales.   Abdominal:      General: Bowel sounds are normal. There is no distension.      Palpations: Abdomen 
perform bed mobility with moderate assistance by DC

## 2024-12-03 NOTE — SWALLOW BEDSIDE ASSESSMENT ADULT - SLP PERTINENT HISTORY OF CURRENT PROBLEM
89-y.o F w/ PMHx  of COPD on 4 L NC, Chronic A-fib not on AC, hypothyroidism, CKDIII, HFpEF p/ for evaluation of SOB . hx  obtained from son who lives w/  her. Pt developed SOB  w/  associated dry cough for 3 days, however she was not hypoxic and was saturating 95% on baseline 4 L at home. Admitted on 4/26/23. CXR 4/28/23 + Bilateral opacities and cardiomegaly, stable. CTH 4/14/23 + Moderate chronic microvascular type changes.
89-year-old female past medical history of COPD on 4 L NC, Chronic A-fib not on AC, hypothyroidism, CKDIII, HFpEF presents for evaluation of shortness of breath due to COPD exacerbation. pt being treated for Acute on Chronic Hypercapnic respiratory failure
The patient is a 6y4m Female complaining of cough.

## 2025-06-25 NOTE — ED ADULT TRIAGE NOTE - RESPIRATORY RATE (BREATHS/MIN)
Take an oral antihistamine daily (the one prescribed in [place], then switch to cetirizine 10mg daily when you run out)  Use fluticasone nasal sprays, 1 spray in each nostril, morning and night starting 2 weeks before your allergy seasons and continued through the allergy season. This medicine needs to be taken regularly to have its effect.  Use azelastine nasal spray up to twice a day as needed for symptoms not controlled with the other medicines.  Consider using nasal rinses like a Netipot or NeilMed rinse when you have severe symptoms or a cold.   20

## 2025-07-14 NOTE — HISTORY OF PRESENT ILLNESS
Spiritual Care Visit Note    Patient Name: Linda Hernandez Date of Spiritual Care Visit: 25   : 1947 Primary Dx: GAMEZ (dyspnea on exertion)       Referred By:      Spiritual Care Taxonomy:    Intended Effects: Bessie affirmation    Methods: Offer spiritual/Restorationism support    Interventions: Ask guided questions about cultural and Restorationism values, Invite someone to reminisce, Share written prayer, Discuss concerns    Visit Type/Summary:     - Spiritual Care: Linda and  Jose shared of their bessie journey and life together.  provided prayers for good outcomes tomorrow and provided them with communion. They were thankful  could visit.  Chaplains remain available .  remains available as needed for follow up.    Spiritual Care support can be requested via an Epic consult. For urgent/immediate needs, please contact the On Call  at: Edward: ext [phone]         
[TextBox_4] : SEVERE COPD OXYGEN DEPENDANT, SOB ON MINIMAL EXERTION